# Patient Record
Sex: MALE | Race: WHITE | NOT HISPANIC OR LATINO | Employment: OTHER | URBAN - METROPOLITAN AREA
[De-identification: names, ages, dates, MRNs, and addresses within clinical notes are randomized per-mention and may not be internally consistent; named-entity substitution may affect disease eponyms.]

---

## 2017-02-21 ENCOUNTER — RECORDS - HEALTHEAST (OUTPATIENT)
Dept: LAB | Facility: CLINIC | Age: 72
End: 2017-02-21

## 2017-02-21 LAB
CHOLEST SERPL-MCNC: 146 MG/DL
FASTING STATUS PATIENT QL REPORTED: NO
HDLC SERPL-MCNC: 59 MG/DL
LDLC SERPL CALC-MCNC: 52 MG/DL
TRIGL SERPL-MCNC: 174 MG/DL

## 2017-11-14 ENCOUNTER — RECORDS - HEALTHEAST (OUTPATIENT)
Dept: LAB | Facility: CLINIC | Age: 72
End: 2017-11-14

## 2017-11-14 LAB
CHOLEST SERPL-MCNC: 144 MG/DL
FASTING STATUS PATIENT QL REPORTED: ABNORMAL
HDLC SERPL-MCNC: 48 MG/DL
LDLC SERPL CALC-MCNC: 40 MG/DL
TRIGL SERPL-MCNC: 282 MG/DL

## 2017-11-15 LAB — HCV AB SERPL QL IA: NEGATIVE

## 2018-02-05 ENCOUNTER — RECORDS - HEALTHEAST (OUTPATIENT)
Dept: ADMINISTRATIVE | Facility: OTHER | Age: 73
End: 2018-02-05

## 2018-02-06 ENCOUNTER — RECORDS - HEALTHEAST (OUTPATIENT)
Dept: VASCULAR ULTRASOUND | Facility: CLINIC | Age: 73
End: 2018-02-06

## 2018-02-06 ENCOUNTER — COMMUNICATION - HEALTHEAST (OUTPATIENT)
Dept: TELEHEALTH | Facility: CLINIC | Age: 73
End: 2018-02-06

## 2018-02-06 DIAGNOSIS — I73.9 PERIPHERAL VASCULAR DISEASE, UNSPECIFIED (H): ICD-10-CM

## 2018-04-26 ENCOUNTER — RECORDS - HEALTHEAST (OUTPATIENT)
Dept: LAB | Facility: CLINIC | Age: 73
End: 2018-04-26

## 2018-04-26 LAB
ANION GAP SERPL CALCULATED.3IONS-SCNC: 10 MMOL/L (ref 5–18)
BUN SERPL-MCNC: 29 MG/DL (ref 8–28)
CALCIUM SERPL-MCNC: 9.5 MG/DL (ref 8.5–10.5)
CHLORIDE BLD-SCNC: 103 MMOL/L (ref 98–107)
CO2 SERPL-SCNC: 25 MMOL/L (ref 22–31)
CREAT SERPL-MCNC: 1.12 MG/DL (ref 0.7–1.3)
GFR SERPL CREATININE-BSD FRML MDRD: >60 ML/MIN/1.73M2
GLUCOSE BLD-MCNC: 181 MG/DL (ref 70–125)
POTASSIUM BLD-SCNC: 4.6 MMOL/L (ref 3.5–5)
SODIUM SERPL-SCNC: 138 MMOL/L (ref 136–145)

## 2018-06-27 ENCOUNTER — RECORDS - HEALTHEAST (OUTPATIENT)
Dept: LAB | Facility: CLINIC | Age: 73
End: 2018-06-27

## 2018-06-27 LAB
ANION GAP SERPL CALCULATED.3IONS-SCNC: 8 MMOL/L (ref 5–18)
BUN SERPL-MCNC: 30 MG/DL (ref 8–28)
CALCIUM SERPL-MCNC: 9.3 MG/DL (ref 8.5–10.5)
CHLORIDE BLD-SCNC: 105 MMOL/L (ref 98–107)
CO2 SERPL-SCNC: 24 MMOL/L (ref 22–31)
CREAT SERPL-MCNC: 1.12 MG/DL (ref 0.7–1.3)
GFR SERPL CREATININE-BSD FRML MDRD: >60 ML/MIN/1.73M2
GLUCOSE BLD-MCNC: 223 MG/DL (ref 70–125)
POTASSIUM BLD-SCNC: 4.8 MMOL/L (ref 3.5–5)
SODIUM SERPL-SCNC: 137 MMOL/L (ref 136–145)

## 2018-07-26 ENCOUNTER — RECORDS - HEALTHEAST (OUTPATIENT)
Dept: LAB | Facility: CLINIC | Age: 73
End: 2018-07-26

## 2018-07-26 LAB
ANION GAP SERPL CALCULATED.3IONS-SCNC: 9 MMOL/L (ref 5–18)
BUN SERPL-MCNC: 22 MG/DL (ref 8–28)
CALCIUM SERPL-MCNC: 9.5 MG/DL (ref 8.5–10.5)
CHLORIDE BLD-SCNC: 103 MMOL/L (ref 98–107)
CO2 SERPL-SCNC: 24 MMOL/L (ref 22–31)
CREAT SERPL-MCNC: 1.17 MG/DL (ref 0.7–1.3)
GFR SERPL CREATININE-BSD FRML MDRD: >60 ML/MIN/1.73M2
GLUCOSE BLD-MCNC: 166 MG/DL (ref 70–125)
POTASSIUM BLD-SCNC: 4.8 MMOL/L (ref 3.5–5)
SODIUM SERPL-SCNC: 136 MMOL/L (ref 136–145)

## 2018-08-28 ENCOUNTER — RECORDS - HEALTHEAST (OUTPATIENT)
Dept: LAB | Facility: CLINIC | Age: 73
End: 2018-08-28

## 2018-08-28 LAB
ALBUMIN SERPL-MCNC: 4.1 G/DL (ref 3.5–5)
ALP SERPL-CCNC: 59 U/L (ref 45–120)
ALT SERPL W P-5'-P-CCNC: 74 U/L (ref 0–45)
ANION GAP SERPL CALCULATED.3IONS-SCNC: 13 MMOL/L (ref 5–18)
AST SERPL W P-5'-P-CCNC: 47 U/L (ref 0–40)
BILIRUB SERPL-MCNC: 1 MG/DL (ref 0–1)
BUN SERPL-MCNC: 38 MG/DL (ref 8–28)
CALCIUM SERPL-MCNC: 10 MG/DL (ref 8.5–10.5)
CHLORIDE BLD-SCNC: 98 MMOL/L (ref 98–107)
CO2 SERPL-SCNC: 22 MMOL/L (ref 22–31)
CREAT SERPL-MCNC: 1.46 MG/DL (ref 0.7–1.3)
GFR SERPL CREATININE-BSD FRML MDRD: 47 ML/MIN/1.73M2
GLUCOSE BLD-MCNC: 324 MG/DL (ref 70–125)
LIPASE SERPL-CCNC: 28 U/L (ref 0–52)
MAGNESIUM SERPL-MCNC: 1.8 MG/DL (ref 1.8–2.6)
POTASSIUM BLD-SCNC: 4.8 MMOL/L (ref 3.5–5)
PROT SERPL-MCNC: 7.1 G/DL (ref 6–8)
SODIUM SERPL-SCNC: 133 MMOL/L (ref 136–145)

## 2018-11-14 ENCOUNTER — RECORDS - HEALTHEAST (OUTPATIENT)
Dept: LAB | Facility: CLINIC | Age: 73
End: 2018-11-14

## 2018-11-14 LAB
ALBUMIN SERPL-MCNC: 4.1 G/DL (ref 3.5–5)
ALP SERPL-CCNC: 66 U/L (ref 45–120)
ALT SERPL W P-5'-P-CCNC: 68 U/L (ref 0–45)
ANION GAP SERPL CALCULATED.3IONS-SCNC: 11 MMOL/L (ref 5–18)
AST SERPL W P-5'-P-CCNC: 42 U/L (ref 0–40)
BILIRUB SERPL-MCNC: 1 MG/DL (ref 0–1)
BUN SERPL-MCNC: 36 MG/DL (ref 8–28)
CALCIUM SERPL-MCNC: 9.6 MG/DL (ref 8.5–10.5)
CHLORIDE BLD-SCNC: 98 MMOL/L (ref 98–107)
CO2 SERPL-SCNC: 25 MMOL/L (ref 22–31)
CREAT SERPL-MCNC: 1.57 MG/DL (ref 0.7–1.3)
GFR SERPL CREATININE-BSD FRML MDRD: 44 ML/MIN/1.73M2
GLUCOSE BLD-MCNC: 311 MG/DL (ref 70–125)
POTASSIUM BLD-SCNC: 5 MMOL/L (ref 3.5–5)
PROT SERPL-MCNC: 7.3 G/DL (ref 6–8)
SODIUM SERPL-SCNC: 134 MMOL/L (ref 136–145)

## 2019-01-15 ENCOUNTER — RECORDS - HEALTHEAST (OUTPATIENT)
Dept: LAB | Facility: CLINIC | Age: 74
End: 2019-01-15

## 2019-01-15 LAB
ALBUMIN SERPL-MCNC: 3.9 G/DL (ref 3.5–5)
ALP SERPL-CCNC: 54 U/L (ref 45–120)
ALT SERPL W P-5'-P-CCNC: 93 U/L (ref 0–45)
ANION GAP SERPL CALCULATED.3IONS-SCNC: 12 MMOL/L (ref 5–18)
AST SERPL W P-5'-P-CCNC: 50 U/L (ref 0–40)
BASOPHILS # BLD AUTO: 0.1 THOU/UL (ref 0–0.2)
BASOPHILS NFR BLD AUTO: 1 % (ref 0–2)
BILIRUB SERPL-MCNC: 0.8 MG/DL (ref 0–1)
BUN SERPL-MCNC: 28 MG/DL (ref 8–28)
CALCIUM SERPL-MCNC: 9 MG/DL (ref 8.5–10.5)
CHLORIDE BLD-SCNC: 99 MMOL/L (ref 98–107)
CHOLEST SERPL-MCNC: 145 MG/DL
CO2 SERPL-SCNC: 23 MMOL/L (ref 22–31)
CREAT SERPL-MCNC: 1.26 MG/DL (ref 0.7–1.3)
CREAT UR-MCNC: 58.7 MG/DL
EOSINOPHIL # BLD AUTO: 0.1 THOU/UL (ref 0–0.4)
EOSINOPHIL NFR BLD AUTO: 2 % (ref 0–6)
ERYTHROCYTE [DISTWIDTH] IN BLOOD BY AUTOMATED COUNT: 12.9 % (ref 11–14.5)
FASTING STATUS PATIENT QL REPORTED: NO
GFR SERPL CREATININE-BSD FRML MDRD: 56 ML/MIN/1.73M2
GLUCOSE BLD-MCNC: 364 MG/DL (ref 70–125)
HCT VFR BLD AUTO: 38.9 % (ref 40–54)
HDLC SERPL-MCNC: 67 MG/DL
HGB BLD-MCNC: 13.5 G/DL (ref 14–18)
LDLC SERPL CALC-MCNC: 44 MG/DL
LYMPHOCYTES # BLD AUTO: 1 THOU/UL (ref 0.8–4.4)
LYMPHOCYTES NFR BLD AUTO: 21 % (ref 20–40)
MCH RBC QN AUTO: 33.8 PG (ref 27–34)
MCHC RBC AUTO-ENTMCNC: 34.7 G/DL (ref 32–36)
MCV RBC AUTO: 97 FL (ref 80–100)
MICROALBUMIN UR-MCNC: 6.38 MG/DL (ref 0–1.99)
MICROALBUMIN/CREAT UR: 108.7 MG/G
MONOCYTES # BLD AUTO: 0.4 THOU/UL (ref 0–0.9)
MONOCYTES NFR BLD AUTO: 9 % (ref 2–10)
NEUTROPHILS # BLD AUTO: 2.9 THOU/UL (ref 2–7.7)
NEUTROPHILS NFR BLD AUTO: 66 % (ref 50–70)
PLATELET # BLD AUTO: 99 THOU/UL (ref 140–440)
PMV BLD AUTO: 12.2 FL (ref 8.5–12.5)
POTASSIUM BLD-SCNC: 4.7 MMOL/L (ref 3.5–5)
PROT SERPL-MCNC: 7.1 G/DL (ref 6–8)
RBC # BLD AUTO: 4 MILL/UL (ref 4.4–6.2)
SODIUM SERPL-SCNC: 134 MMOL/L (ref 136–145)
TRIGL SERPL-MCNC: 171 MG/DL
WBC: 4.5 THOU/UL (ref 4–11)

## 2019-08-05 ENCOUNTER — RECORDS - HEALTHEAST (OUTPATIENT)
Dept: LAB | Facility: CLINIC | Age: 74
End: 2019-08-05

## 2019-08-05 LAB
ALBUMIN SERPL-MCNC: 3.9 G/DL (ref 3.5–5)
ALP SERPL-CCNC: 100 U/L (ref 45–120)
ALT SERPL W P-5'-P-CCNC: 72 U/L (ref 0–45)
ANION GAP SERPL CALCULATED.3IONS-SCNC: 10 MMOL/L (ref 5–18)
AST SERPL W P-5'-P-CCNC: 45 U/L (ref 0–40)
BILIRUB SERPL-MCNC: 0.6 MG/DL (ref 0–1)
BUN SERPL-MCNC: 28 MG/DL (ref 8–28)
CALCIUM SERPL-MCNC: 9.4 MG/DL (ref 8.5–10.5)
CHLORIDE BLD-SCNC: 98 MMOL/L (ref 98–107)
CO2 SERPL-SCNC: 27 MMOL/L (ref 22–31)
CREAT SERPL-MCNC: 1.43 MG/DL (ref 0.7–1.3)
GFR SERPL CREATININE-BSD FRML MDRD: 48 ML/MIN/1.73M2
GLUCOSE BLD-MCNC: 418 MG/DL (ref 70–125)
POTASSIUM BLD-SCNC: 5.3 MMOL/L (ref 3.5–5)
PROT SERPL-MCNC: 7.2 G/DL (ref 6–8)
SODIUM SERPL-SCNC: 135 MMOL/L (ref 136–145)

## 2019-11-06 ENCOUNTER — RECORDS - HEALTHEAST (OUTPATIENT)
Dept: LAB | Facility: CLINIC | Age: 74
End: 2019-11-06

## 2019-11-06 LAB
ANION GAP SERPL CALCULATED.3IONS-SCNC: 11 MMOL/L (ref 5–18)
BUN SERPL-MCNC: 27 MG/DL (ref 8–28)
CALCIUM SERPL-MCNC: 9.1 MG/DL (ref 8.5–10.5)
CHLORIDE BLD-SCNC: 98 MMOL/L (ref 98–107)
CO2 SERPL-SCNC: 26 MMOL/L (ref 22–31)
CREAT SERPL-MCNC: 1.42 MG/DL (ref 0.7–1.3)
GFR SERPL CREATININE-BSD FRML MDRD: 49 ML/MIN/1.73M2
GLUCOSE BLD-MCNC: 368 MG/DL (ref 70–125)
POTASSIUM BLD-SCNC: 4.7 MMOL/L (ref 3.5–5)
SODIUM SERPL-SCNC: 135 MMOL/L (ref 136–145)

## 2020-02-10 ENCOUNTER — RECORDS - HEALTHEAST (OUTPATIENT)
Dept: LAB | Facility: CLINIC | Age: 75
End: 2020-02-10

## 2020-02-10 LAB
ANION GAP SERPL CALCULATED.3IONS-SCNC: 13 MMOL/L (ref 5–18)
BUN SERPL-MCNC: 31 MG/DL (ref 8–28)
CALCIUM SERPL-MCNC: 9.9 MG/DL (ref 8.5–10.5)
CHLORIDE BLD-SCNC: 102 MMOL/L (ref 98–107)
CHOLEST SERPL-MCNC: 152 MG/DL
CO2 SERPL-SCNC: 25 MMOL/L (ref 22–31)
CREAT SERPL-MCNC: 1.21 MG/DL (ref 0.7–1.3)
CREAT UR-MCNC: 52.7 MG/DL
FASTING STATUS PATIENT QL REPORTED: NO
GFR SERPL CREATININE-BSD FRML MDRD: 59 ML/MIN/1.73M2
GLUCOSE BLD-MCNC: 175 MG/DL (ref 70–125)
HDLC SERPL-MCNC: 63 MG/DL
LDLC SERPL CALC-MCNC: 65 MG/DL
MICROALBUMIN UR-MCNC: 7.22 MG/DL (ref 0–1.99)
MICROALBUMIN/CREAT UR: 137 MG/G
POTASSIUM BLD-SCNC: 4.8 MMOL/L (ref 3.5–5)
SODIUM SERPL-SCNC: 140 MMOL/L (ref 136–145)
TRIGL SERPL-MCNC: 119 MG/DL

## 2020-03-19 ENCOUNTER — RECORDS - HEALTHEAST (OUTPATIENT)
Dept: LAB | Facility: CLINIC | Age: 75
End: 2020-03-19

## 2020-03-19 LAB
ALBUMIN SERPL-MCNC: 4.2 G/DL (ref 3.5–5)
ALP SERPL-CCNC: 90 U/L (ref 45–120)
ALT SERPL W P-5'-P-CCNC: 83 U/L (ref 0–45)
ANION GAP SERPL CALCULATED.3IONS-SCNC: 11 MMOL/L (ref 5–18)
AST SERPL W P-5'-P-CCNC: 57 U/L (ref 0–40)
BILIRUB SERPL-MCNC: 0.9 MG/DL (ref 0–1)
BUN SERPL-MCNC: 23 MG/DL (ref 8–28)
CALCIUM SERPL-MCNC: 9.7 MG/DL (ref 8.5–10.5)
CHLORIDE BLD-SCNC: 99 MMOL/L (ref 98–107)
CO2 SERPL-SCNC: 26 MMOL/L (ref 22–31)
CREAT SERPL-MCNC: 1.2 MG/DL (ref 0.7–1.3)
GFR SERPL CREATININE-BSD FRML MDRD: 59 ML/MIN/1.73M2
GLUCOSE BLD-MCNC: 137 MG/DL (ref 70–125)
LIPASE SERPL-CCNC: 58 U/L (ref 0–52)
POTASSIUM BLD-SCNC: 4.4 MMOL/L (ref 3.5–5)
PROT SERPL-MCNC: 7.9 G/DL (ref 6–8)
SODIUM SERPL-SCNC: 136 MMOL/L (ref 136–145)

## 2020-05-05 ENCOUNTER — RECORDS - HEALTHEAST (OUTPATIENT)
Dept: LAB | Facility: CLINIC | Age: 75
End: 2020-05-05

## 2020-05-05 LAB
ALBUMIN SERPL-MCNC: 4.1 G/DL (ref 3.5–5)
ALP SERPL-CCNC: 93 U/L (ref 45–120)
ALT SERPL W P-5'-P-CCNC: 77 U/L (ref 0–45)
ANION GAP SERPL CALCULATED.3IONS-SCNC: 11 MMOL/L (ref 5–18)
AST SERPL W P-5'-P-CCNC: 37 U/L (ref 0–40)
BILIRUB SERPL-MCNC: 0.7 MG/DL (ref 0–1)
BUN SERPL-MCNC: 32 MG/DL (ref 8–28)
CALCIUM SERPL-MCNC: 9.3 MG/DL (ref 8.5–10.5)
CHLORIDE BLD-SCNC: 101 MMOL/L (ref 98–107)
CO2 SERPL-SCNC: 23 MMOL/L (ref 22–31)
CREAT SERPL-MCNC: 1.37 MG/DL (ref 0.7–1.3)
GFR SERPL CREATININE-BSD FRML MDRD: 51 ML/MIN/1.73M2
GLUCOSE BLD-MCNC: 222 MG/DL (ref 70–125)
LIPASE SERPL-CCNC: 45 U/L (ref 0–52)
POTASSIUM BLD-SCNC: 4.6 MMOL/L (ref 3.5–5)
PROT SERPL-MCNC: 7.7 G/DL (ref 6–8)
SODIUM SERPL-SCNC: 135 MMOL/L (ref 136–145)

## 2020-07-06 ENCOUNTER — RECORDS - HEALTHEAST (OUTPATIENT)
Dept: LAB | Facility: CLINIC | Age: 75
End: 2020-07-06

## 2020-07-06 LAB
ALBUMIN SERPL-MCNC: 4.2 G/DL (ref 3.5–5)
ALP SERPL-CCNC: 75 U/L (ref 45–120)
ALT SERPL W P-5'-P-CCNC: 62 U/L (ref 0–45)
ANION GAP SERPL CALCULATED.3IONS-SCNC: 14 MMOL/L (ref 5–18)
AST SERPL W P-5'-P-CCNC: 36 U/L (ref 0–40)
BILIRUB SERPL-MCNC: 0.6 MG/DL (ref 0–1)
BUN SERPL-MCNC: 30 MG/DL (ref 8–28)
CALCIUM SERPL-MCNC: 9.6 MG/DL (ref 8.5–10.5)
CHLORIDE BLD-SCNC: 103 MMOL/L (ref 98–107)
CO2 SERPL-SCNC: 22 MMOL/L (ref 22–31)
CREAT SERPL-MCNC: 1.27 MG/DL (ref 0.7–1.3)
ERYTHROCYTE [SEDIMENTATION RATE] IN BLOOD BY WESTERGREN METHOD: 25 MM/HR (ref 0–15)
GFR SERPL CREATININE-BSD FRML MDRD: 55 ML/MIN/1.73M2
GLUCOSE BLD-MCNC: 102 MG/DL (ref 70–125)
POTASSIUM BLD-SCNC: 4.7 MMOL/L (ref 3.5–5)
PROT SERPL-MCNC: 7.6 G/DL (ref 6–8)
SODIUM SERPL-SCNC: 139 MMOL/L (ref 136–145)

## 2020-07-13 ENCOUNTER — RECORDS - HEALTHEAST (OUTPATIENT)
Dept: LAB | Facility: CLINIC | Age: 75
End: 2020-07-13

## 2020-07-13 LAB — ERYTHROCYTE [SEDIMENTATION RATE] IN BLOOD BY WESTERGREN METHOD: 18 MM/HR (ref 0–15)

## 2021-05-27 ENCOUNTER — RECORDS - HEALTHEAST (OUTPATIENT)
Dept: ADMINISTRATIVE | Facility: CLINIC | Age: 76
End: 2021-05-27

## 2021-05-29 ENCOUNTER — RECORDS - HEALTHEAST (OUTPATIENT)
Dept: ADMINISTRATIVE | Facility: CLINIC | Age: 76
End: 2021-05-29

## 2021-05-30 ENCOUNTER — RECORDS - HEALTHEAST (OUTPATIENT)
Dept: ADMINISTRATIVE | Facility: CLINIC | Age: 76
End: 2021-05-30

## 2021-05-31 ENCOUNTER — RECORDS - HEALTHEAST (OUTPATIENT)
Dept: ADMINISTRATIVE | Facility: CLINIC | Age: 76
End: 2021-05-31

## 2021-06-02 ENCOUNTER — RECORDS - HEALTHEAST (OUTPATIENT)
Dept: ADMINISTRATIVE | Facility: CLINIC | Age: 76
End: 2021-06-02

## 2024-10-07 ENCOUNTER — APPOINTMENT (OUTPATIENT)
Dept: RADIOLOGY | Facility: HOSPITAL | Age: 79
DRG: 638 | End: 2024-10-07
Attending: STUDENT IN AN ORGANIZED HEALTH CARE EDUCATION/TRAINING PROGRAM
Payer: COMMERCIAL

## 2024-10-07 ENCOUNTER — HOSPITAL ENCOUNTER (INPATIENT)
Facility: HOSPITAL | Age: 79
LOS: 2 days | Discharge: HOME | DRG: 638 | End: 2024-10-09
Attending: STUDENT IN AN ORGANIZED HEALTH CARE EDUCATION/TRAINING PROGRAM | Admitting: FAMILY MEDICINE
Payer: COMMERCIAL

## 2024-10-07 DIAGNOSIS — E11.628 DIABETIC FOOT INFECTION (H): ICD-10-CM

## 2024-10-07 DIAGNOSIS — L08.9 DIABETIC FOOT INFECTION (H): ICD-10-CM

## 2024-10-07 PROBLEM — E11.9 DIABETES MELLITUS (H): Status: ACTIVE | Noted: 2024-10-07

## 2024-10-07 LAB
ALBUMIN SERPL BCG-MCNC: 3.9 G/DL (ref 3.5–5.2)
ALP SERPL-CCNC: 89 U/L (ref 40–150)
ALT SERPL W P-5'-P-CCNC: 42 U/L (ref 0–70)
ANION GAP SERPL CALCULATED.3IONS-SCNC: 11 MMOL/L (ref 7–15)
AST SERPL W P-5'-P-CCNC: 23 U/L (ref 0–45)
BASOPHILS # BLD AUTO: 0.1 10E3/UL (ref 0–0.2)
BASOPHILS NFR BLD AUTO: 1 %
BILIRUB SERPL-MCNC: 0.5 MG/DL
BUN SERPL-MCNC: 33.4 MG/DL (ref 8–23)
CALCIUM SERPL-MCNC: 9.3 MG/DL (ref 8.8–10.4)
CHLORIDE SERPL-SCNC: 101 MMOL/L (ref 98–107)
CREAT SERPL-MCNC: 1.19 MG/DL (ref 0.67–1.17)
CRP SERPL-MCNC: 7.3 MG/L
EGFRCR SERPLBLD CKD-EPI 2021: 63 ML/MIN/1.73M2
EOSINOPHIL # BLD AUTO: 0.1 10E3/UL (ref 0–0.7)
EOSINOPHIL NFR BLD AUTO: 1 %
ERYTHROCYTE [DISTWIDTH] IN BLOOD BY AUTOMATED COUNT: 14.9 % (ref 10–15)
ERYTHROCYTE [SEDIMENTATION RATE] IN BLOOD BY WESTERGREN METHOD: 57 MM/HR (ref 0–20)
GLUCOSE BLDC GLUCOMTR-MCNC: 184 MG/DL (ref 70–99)
GLUCOSE BLDC GLUCOMTR-MCNC: 254 MG/DL (ref 70–99)
GLUCOSE SERPL-MCNC: 171 MG/DL (ref 70–99)
HCO3 SERPL-SCNC: 24 MMOL/L (ref 22–29)
HCT VFR BLD AUTO: 36.5 % (ref 40–53)
HGB BLD-MCNC: 11.8 G/DL (ref 13.3–17.7)
IMM GRANULOCYTES # BLD: 0.2 10E3/UL
IMM GRANULOCYTES NFR BLD: 3 %
LYMPHOCYTES # BLD AUTO: 1.4 10E3/UL (ref 0.8–5.3)
LYMPHOCYTES NFR BLD AUTO: 15 %
MAGNESIUM SERPL-MCNC: 2.3 MG/DL (ref 1.7–2.3)
MCH RBC QN AUTO: 28.9 PG (ref 26.5–33)
MCHC RBC AUTO-ENTMCNC: 32.3 G/DL (ref 31.5–36.5)
MCV RBC AUTO: 89 FL (ref 78–100)
MONOCYTES # BLD AUTO: 0.8 10E3/UL (ref 0–1.3)
MONOCYTES NFR BLD AUTO: 8 %
NEUTROPHILS # BLD AUTO: 7 10E3/UL (ref 1.6–8.3)
NEUTROPHILS NFR BLD AUTO: 73 %
NRBC # BLD AUTO: 0 10E3/UL
NRBC BLD AUTO-RTO: 0 /100
PLATELET # BLD AUTO: 143 10E3/UL (ref 150–450)
POTASSIUM SERPL-SCNC: 5.1 MMOL/L (ref 3.4–5.3)
PROT SERPL-MCNC: 7.9 G/DL (ref 6.4–8.3)
RBC # BLD AUTO: 4.09 10E6/UL (ref 4.4–5.9)
SODIUM SERPL-SCNC: 136 MMOL/L (ref 135–145)
WBC # BLD AUTO: 9.6 10E3/UL (ref 4–11)

## 2024-10-07 PROCEDURE — 250N000012 HC RX MED GY IP 250 OP 636 PS 637

## 2024-10-07 PROCEDURE — 36415 COLL VENOUS BLD VENIPUNCTURE: CPT | Performed by: STUDENT IN AN ORGANIZED HEALTH CARE EDUCATION/TRAINING PROGRAM

## 2024-10-07 PROCEDURE — 250N000011 HC RX IP 250 OP 636: Performed by: STUDENT IN AN ORGANIZED HEALTH CARE EDUCATION/TRAINING PROGRAM

## 2024-10-07 PROCEDURE — 99222 1ST HOSP IP/OBS MODERATE 55: CPT | Mod: AI

## 2024-10-07 PROCEDURE — 80053 COMPREHEN METABOLIC PANEL: CPT | Performed by: STUDENT IN AN ORGANIZED HEALTH CARE EDUCATION/TRAINING PROGRAM

## 2024-10-07 PROCEDURE — 96365 THER/PROPH/DIAG IV INF INIT: CPT

## 2024-10-07 PROCEDURE — 99285 EMERGENCY DEPT VISIT HI MDM: CPT | Mod: 25

## 2024-10-07 PROCEDURE — 85652 RBC SED RATE AUTOMATED: CPT | Performed by: STUDENT IN AN ORGANIZED HEALTH CARE EDUCATION/TRAINING PROGRAM

## 2024-10-07 PROCEDURE — 86140 C-REACTIVE PROTEIN: CPT | Performed by: STUDENT IN AN ORGANIZED HEALTH CARE EDUCATION/TRAINING PROGRAM

## 2024-10-07 PROCEDURE — 999N000157 HC STATISTIC RCP TIME EA 10 MIN

## 2024-10-07 PROCEDURE — 120N000001 HC R&B MED SURG/OB

## 2024-10-07 PROCEDURE — 73630 X-RAY EXAM OF FOOT: CPT | Mod: RT

## 2024-10-07 PROCEDURE — 85025 COMPLETE CBC W/AUTO DIFF WBC: CPT | Performed by: STUDENT IN AN ORGANIZED HEALTH CARE EDUCATION/TRAINING PROGRAM

## 2024-10-07 PROCEDURE — 87040 BLOOD CULTURE FOR BACTERIA: CPT | Performed by: STUDENT IN AN ORGANIZED HEALTH CARE EDUCATION/TRAINING PROGRAM

## 2024-10-07 PROCEDURE — 250N000013 HC RX MED GY IP 250 OP 250 PS 637

## 2024-10-07 PROCEDURE — 83735 ASSAY OF MAGNESIUM: CPT | Performed by: INTERNAL MEDICINE

## 2024-10-07 RX ORDER — ASPIRIN 81 MG/1
81 TABLET ORAL DAILY
Status: DISCONTINUED | OUTPATIENT
Start: 2024-10-08 | End: 2024-10-09 | Stop reason: HOSPADM

## 2024-10-07 RX ORDER — ONDANSETRON 2 MG/ML
4 INJECTION INTRAMUSCULAR; INTRAVENOUS EVERY 6 HOURS PRN
Status: DISCONTINUED | OUTPATIENT
Start: 2024-10-07 | End: 2024-10-09 | Stop reason: HOSPADM

## 2024-10-07 RX ORDER — ACETAMINOPHEN 325 MG/1
650 TABLET ORAL EVERY 4 HOURS PRN
Status: DISCONTINUED | OUTPATIENT
Start: 2024-10-07 | End: 2024-10-09 | Stop reason: HOSPADM

## 2024-10-07 RX ORDER — PIPERACILLIN SODIUM, TAZOBACTAM SODIUM 3; .375 G/15ML; G/15ML
3.38 INJECTION, POWDER, LYOPHILIZED, FOR SOLUTION INTRAVENOUS EVERY 8 HOURS
Status: DISCONTINUED | OUTPATIENT
Start: 2024-10-07 | End: 2024-10-09

## 2024-10-07 RX ORDER — ONDANSETRON 4 MG/1
4 TABLET, ORALLY DISINTEGRATING ORAL EVERY 6 HOURS PRN
Status: DISCONTINUED | OUTPATIENT
Start: 2024-10-07 | End: 2024-10-09 | Stop reason: HOSPADM

## 2024-10-07 RX ORDER — MULTIVITAMIN WITH IRON
1 TABLET ORAL DAILY
COMMUNITY

## 2024-10-07 RX ORDER — LEVOTHYROXINE SODIUM 25 UG/1
50 TABLET ORAL DAILY
Status: DISCONTINUED | OUTPATIENT
Start: 2024-10-08 | End: 2024-10-09 | Stop reason: HOSPADM

## 2024-10-07 RX ORDER — CLOPIDOGREL BISULFATE 75 MG/1
75 TABLET ORAL DAILY
Status: DISCONTINUED | OUTPATIENT
Start: 2024-10-08 | End: 2024-10-09 | Stop reason: HOSPADM

## 2024-10-07 RX ORDER — PROCHLORPERAZINE 25 MG
12.5 SUPPOSITORY, RECTAL RECTAL EVERY 12 HOURS PRN
Status: DISCONTINUED | OUTPATIENT
Start: 2024-10-07 | End: 2024-10-09 | Stop reason: HOSPADM

## 2024-10-07 RX ORDER — AMOXICILLIN 250 MG
2 CAPSULE ORAL 2 TIMES DAILY PRN
Status: DISCONTINUED | OUTPATIENT
Start: 2024-10-07 | End: 2024-10-09 | Stop reason: HOSPADM

## 2024-10-07 RX ORDER — MULTIVIT WITH MINERALS/LUTEIN
1000 TABLET ORAL DAILY
Status: ON HOLD | COMMUNITY
End: 2024-10-24

## 2024-10-07 RX ORDER — PROCHLORPERAZINE MALEATE 5 MG/1
5 TABLET ORAL EVERY 6 HOURS PRN
Status: DISCONTINUED | OUTPATIENT
Start: 2024-10-07 | End: 2024-10-09 | Stop reason: HOSPADM

## 2024-10-07 RX ORDER — ACETAMINOPHEN 325 MG/1
650 TABLET ORAL ONCE
Status: COMPLETED | OUTPATIENT
Start: 2024-10-07 | End: 2024-10-07

## 2024-10-07 RX ORDER — METFORMIN HYDROCHLORIDE 500 MG/1
2000 TABLET, EXTENDED RELEASE ORAL
COMMUNITY

## 2024-10-07 RX ORDER — AMOXICILLIN 250 MG
1 CAPSULE ORAL 2 TIMES DAILY PRN
Status: DISCONTINUED | OUTPATIENT
Start: 2024-10-07 | End: 2024-10-09 | Stop reason: HOSPADM

## 2024-10-07 RX ORDER — IRBESARTAN 300 MG/1
300 TABLET ORAL AT BEDTIME
Status: ON HOLD | COMMUNITY
End: 2024-11-01

## 2024-10-07 RX ORDER — SPIRONOLACTONE 25 MG/1
25 TABLET ORAL DAILY
COMMUNITY

## 2024-10-07 RX ORDER — ASPIRIN 81 MG/1
81 TABLET ORAL DAILY
COMMUNITY

## 2024-10-07 RX ORDER — AMLODIPINE BESYLATE 5 MG/1
5 TABLET ORAL DAILY
Status: ON HOLD | COMMUNITY
End: 2024-11-01

## 2024-10-07 RX ORDER — SPIRONOLACTONE 25 MG/1
25 TABLET ORAL DAILY
Status: DISCONTINUED | OUTPATIENT
Start: 2024-10-08 | End: 2024-10-09 | Stop reason: HOSPADM

## 2024-10-07 RX ORDER — UBIDECARENONE 100 MG
200 CAPSULE ORAL DAILY
COMMUNITY

## 2024-10-07 RX ORDER — ROSUVASTATIN CALCIUM 40 MG/1
40 TABLET, COATED ORAL DAILY
COMMUNITY
End: 2024-11-05

## 2024-10-07 RX ORDER — ROSUVASTATIN CALCIUM 40 MG/1
40 TABLET, COATED ORAL DAILY
Status: DISCONTINUED | OUTPATIENT
Start: 2024-10-08 | End: 2024-10-09 | Stop reason: HOSPADM

## 2024-10-07 RX ORDER — MULTIVITAMIN,THERAPEUTIC
1 TABLET ORAL DAILY
COMMUNITY
End: 2024-11-05

## 2024-10-07 RX ORDER — LACTOBACILLUS RHAMNOSUS GG 10B CELL
1 CAPSULE ORAL DAILY
COMMUNITY

## 2024-10-07 RX ORDER — CLOPIDOGREL BISULFATE 75 MG/1
75 TABLET ORAL DAILY
COMMUNITY

## 2024-10-07 RX ORDER — CALCIUM CARBONATE 500 MG/1
1000 TABLET, CHEWABLE ORAL 4 TIMES DAILY PRN
Status: DISCONTINUED | OUTPATIENT
Start: 2024-10-07 | End: 2024-10-09 | Stop reason: HOSPADM

## 2024-10-07 RX ORDER — DEXTROSE MONOHYDRATE 25 G/50ML
25-50 INJECTION, SOLUTION INTRAVENOUS
Status: DISCONTINUED | OUTPATIENT
Start: 2024-10-07 | End: 2024-10-09 | Stop reason: HOSPADM

## 2024-10-07 RX ORDER — NICOTINE POLACRILEX 4 MG
15-30 LOZENGE BUCCAL
Status: DISCONTINUED | OUTPATIENT
Start: 2024-10-07 | End: 2024-10-09 | Stop reason: HOSPADM

## 2024-10-07 RX ORDER — PIPERACILLIN SODIUM, TAZOBACTAM SODIUM 3; .375 G/15ML; G/15ML
3.38 INJECTION, POWDER, LYOPHILIZED, FOR SOLUTION INTRAVENOUS ONCE
Status: COMPLETED | OUTPATIENT
Start: 2024-10-07 | End: 2024-10-07

## 2024-10-07 RX ORDER — LEVOTHYROXINE SODIUM 50 UG/1
50 TABLET ORAL DAILY
COMMUNITY

## 2024-10-07 RX ORDER — AMLODIPINE BESYLATE 5 MG/1
5 TABLET ORAL DAILY
Status: DISCONTINUED | OUTPATIENT
Start: 2024-10-08 | End: 2024-10-09 | Stop reason: HOSPADM

## 2024-10-07 RX ORDER — ACETAMINOPHEN 650 MG/1
650 SUPPOSITORY RECTAL EVERY 4 HOURS PRN
Status: DISCONTINUED | OUTPATIENT
Start: 2024-10-07 | End: 2024-10-09 | Stop reason: HOSPADM

## 2024-10-07 RX ORDER — IRBESARTAN 300 MG/1
300 TABLET ORAL AT BEDTIME
Status: DISCONTINUED | OUTPATIENT
Start: 2024-10-07 | End: 2024-10-09 | Stop reason: HOSPADM

## 2024-10-07 RX ORDER — LIDOCAINE 40 MG/G
CREAM TOPICAL
Status: DISCONTINUED | OUTPATIENT
Start: 2024-10-07 | End: 2024-10-09 | Stop reason: HOSPADM

## 2024-10-07 RX ADMIN — ACETAMINOPHEN 650 MG: 325 TABLET ORAL at 14:38

## 2024-10-07 RX ADMIN — INSULIN GLARGINE 15 UNITS: 100 INJECTION, SOLUTION SUBCUTANEOUS at 21:14

## 2024-10-07 RX ADMIN — PIPERACILLIN AND TAZOBACTAM 3.38 G: 3; .375 INJECTION, POWDER, FOR SOLUTION INTRAVENOUS at 11:12

## 2024-10-07 RX ADMIN — IRBESARTAN 300 MG: 300 TABLET ORAL at 21:13

## 2024-10-07 ASSESSMENT — ACTIVITIES OF DAILY LIVING (ADL)
ADLS_ACUITY_SCORE: 40
ADLS_ACUITY_SCORE: 40
ADLS_ACUITY_SCORE: 35
ADLS_ACUITY_SCORE: 35
ADLS_ACUITY_SCORE: 40
ADLS_ACUITY_SCORE: 40
DEPENDENT_IADLS:: SHOPPING;TRANSPORTATION
ADLS_ACUITY_SCORE: 40
ADLS_ACUITY_SCORE: 35
ADLS_ACUITY_SCORE: 33
ADLS_ACUITY_SCORE: 35
ADLS_ACUITY_SCORE: 40
ADLS_ACUITY_SCORE: 40
ADLS_ACUITY_SCORE: 35
ADLS_ACUITY_SCORE: 35

## 2024-10-07 ASSESSMENT — COLUMBIA-SUICIDE SEVERITY RATING SCALE - C-SSRS
1. IN THE PAST MONTH, HAVE YOU WISHED YOU WERE DEAD OR WISHED YOU COULD GO TO SLEEP AND NOT WAKE UP?: NO
6. HAVE YOU EVER DONE ANYTHING, STARTED TO DO ANYTHING, OR PREPARED TO DO ANYTHING TO END YOUR LIFE?: NO
2. HAVE YOU ACTUALLY HAD ANY THOUGHTS OF KILLING YOURSELF IN THE PAST MONTH?: NO

## 2024-10-07 NOTE — CONSULTS
"Care Management Initial Consult    General Information  Assessment completed with: Patient,    Type of CM/SW Visit: Initial Assessment    Primary Care Provider verified and updated as needed: Yes   Readmission within the last 30 days: no previous admission in last 30 days      Reason for Consult: discharge planning  Advance Care Planning: Advance Care Planning Reviewed: other (see comments) (\"have a HCD at home\", informed pt if he brings in we can get scanned into chart)          Communication Assessment  Patient's communication style: spoken language (English or Bilingual)             Cognitive  Cognitive/Neuro/Behavioral:                        Living Environment:   People in home: alone     Current living Arrangements: apartment, independent living facility      Able to return to prior arrangements: other (see comments) (to be determined)       Family/Social Support:  Care provided by: self  Provides care for: no one  Marital Status:   Support system: Children          Description of Support System: Supportive, Involved    Support Assessment: Adequate family and caregiver support, Patient communicates needs well met    Current Resources:   Patient receiving home care services: No (was due to start home PT this week)        Community Resources: None  Equipment currently used at home: cane, straight, other (see comments) (electric scooter, CPAP)  Supplies currently used at home: None    Employment/Financial:  Employment Status: retired, , previous service     Employment/ Comments: [facility]: [id number]  Financial Concerns: none   Referral to Financial Worker: No       Does the patient's insurance plan have a 3 day qualifying hospital stay waiver?  No    Lifestyle & Psychosocial Needs:  Social Determinants of Health     Food Insecurity: Not on file   Depression: Not on file   Housing Stability: Not on file   Tobacco Use: Not on file (11/12/2016)   Financial Resource Strain: Not " on file   Alcohol Use: Not on file   Transportation Needs: Not on file   Physical Activity: Not on file   Interpersonal Safety: Not on file   Stress: Not on file   Social Connections: Not on file   Health Literacy: Not on file       Functional Status:  Prior to admission patient needed assistance:   Dependent ADLs:: Ambulation-cane, Independent  Dependent IADLs:: Shopping, Transportation       Mental Health Status:  Mental Health Status: No Current Concerns       Chemical Dependency Status:  Chemical Dependency Status: No Current Concerns             Values/Beliefs:  Spiritual, Cultural Beliefs, [employer]ism Practices, Values that affect care: no               Discussed  Partnership in Safe Discharge Planning  document with patient/family: No    Additional Information:  CM consult received for discharge planning.  Met with patient at bedside to introduce CM role and perform initial assessment.  Janice lives in an apartment at [facility].  He ambulates short distances with a cane.  Uses an electric scooter for longer distances and outside the apartment building.  He is independent with ADLs and IADLs except shopping and transportation which sons help with.  Patient reports he was due to start home care PT this week through his facility.  Will likely need assistance for wound care at discharge as he says he cannot care for wound himself.  PT/OT recommendations pending.  Sons can transport at discharge.    Next Steps: CM to follow for medical progression of care, discharge recommendations, and final discharge plan.         Riley Rosales CRNI

## 2024-10-07 NOTE — PHARMACY-VANCOMYCIN DOSING SERVICE
"Pharmacy Vancomycin Initial Note  Date of Service 2024  Patient's  1945  79 year old, male    Indication: Skin and Soft Tissue Infection    Current estimated CrCl = Estimated Creatinine Clearance: 59.3 mL/min (A) (based on SCr of 1.19 mg/dL (H)).    Creatinine for last 3 days  10/7/2024: 11:04 AM Creatinine 1.19 mg/dL    Recent Vancomycin Level(s) for last 3 days  No results found for requested labs within last 3 days.      Vancomycin IV Administrations (past 72 hours)        No vancomycin orders with administrations in past 72 hours.                    Nephrotoxins and other renal medications (From now, onward)      Start     Dose/Rate Route Frequency Ordered Stop    10/08/24 0900  vancomycin (VANCOCIN) 1,500 mg in sodium chloride 0.9 % 290 mL intermittent infusion         1,500 mg  over 90 Minutes Intravenous EVERY 24 HOURS 10/07/24 1538      10/08/24 0800  empagliflozin (JARDIANCE) tablet 10 mg        Note to Pharmacy: PTA Sig:Take 10 mg by mouth daily.      10 mg Oral DAILY 10/07/24 1439      10/07/24 1700  piperacillin-tazobactam (ZOSYN) 3.375 g vial to attach to  mL bag        Note to Pharmacy: For [facility], [facility] and [facility]: For Zosyn-naive patients, use the \"Zosyn initial dose + extended infusion\" order panel.    3.375 g  over 240 Minutes Intravenous EVERY 8 HOURS 10/07/24 1530      10/07/24 1600  vancomycin (VANCOCIN) 1,750 mg in 0.9% NaCl 517.5 mL intermittent infusion         1,750 mg  over 2 Hours Intravenous ONCE 10/07/24 1533              Contrast Orders - past 72 hours (72h ago, onward)      None            Insight[employer] Prediction of Planned Initial Vancomycin Regimen  Loading dose: 1750 mg at 16:00 10/07/2024.  Regimen: 1500 mg IV every 24 hours.  Start time: 09:00 on 10/08/2024  Exposure target: AUC24 (range)400-600 mg/L.hr   AUC24,ss: 481 mg/L.hr  Probability of AUC24 > 400: 70 %  Ctrough,ss: 14.4 mg/L  Probability of Ctrough,ss > 20: 23 %  Probability of nephrotoxicity (Lodise DOT " 2009): 10 %        Plan:  Load vancomycin 1750 mg IV x1. Start vancomycin 1500 mg IV q24h.   Vancomycin monitoring method: AUC  Vancomycin therapeutic monitoring goal: 400-600 mg*h/L  Pharmacy will check vancomycin levels as appropriate in 1-3 Days.    Serum creatinine levels will be ordered daily for the first week of therapy and at least twice weekly for subsequent weeks.      Cindy Soriano, PharmD

## 2024-10-07 NOTE — PHARMACY-ADMISSION MEDICATION HISTORY
Pharmacist Admission Medication History    Admission medication history is complete. The information provided in this note is only as accurate as the sources available at the time of the update.    Information Source(s): Patient and CareEverywhere/SureScripts via in-person    Pertinent Information: [facility] so [facility] meds were not available in SureScripts. He did give me a nice list of his meds that I was able to use to update our list. He was unable to give me exact details of his mealtime insulin regimen. He reported that it is based on his pre meal blood sugar and if it is >150 mg/dL he will take 20 units. He was not able to produce any other information regarding the sliding scale.     Changes made to PTA medication list:  Added: all  Deleted: None  Changed: None    Allergies reviewed with patient and updates made in EHR: yes    Medication History Completed By: LAVELL FUENTES [facility] 10/7/2024 11:59 AM    PTA Med List   Medication Sig Last Dose    amLODIPine (NORVASC) 5 MG tablet Take 5 mg by mouth daily. 10/7/2024 at am    aspirin 81 MG EC tablet Take 81 mg by mouth daily. 10/7/2024 at am    clopidogrel (PLAVIX) 75 MG tablet Take 75 mg by mouth daily. 10/7/2024 at am    co-enzyme Q-10 100 MG CAPS capsule Take 200 mg by mouth daily. 10/7/2024 at am    empagliflozin (JARDIANCE) 10 MG TABS tablet Take 10 mg by mouth daily. 10/7/2024 at am    insulin aspart (NOVOLOG PEN) 100 UNIT/ML pen Inject subcutaneously 3 times daily (with meals). Per sliding scale, if BG >150 mg/dL patient takes 20 units 10/7/2024 at am    insulin glargine (LANTUS PEN) 100 UNIT/ML pen Inject 100 Units subcutaneously every morning. 10/6/2024 at am    insulin glargine (LANTUS PEN) 100 UNIT/ML pen Inject 25 Units subcutaneously at bedtime. 10/7/2024 at pm    irbesartan (AVAPRO) 300 MG tablet Take 300 mg by mouth at bedtime. 10/6/2024 at qhs    lactobacillus rhamnosus, GG, (CULTURELL) capsule Take 1 capsule by mouth daily.  10/7/2024 at am    levothyroxine (SYNTHROID/LEVOTHROID) 50 MCG tablet Take 50 mcg by mouth daily. 10/7/2024 at am    metFORMIN (GLUCOPHAGE XR) 500 MG 24 hr tablet Take 2,000 mg by mouth daily (with dinner). 10/6/2024 at pm    Multiple Vitamins-Minerals (PRESERVISION AREDS 2 PO) Take 2 capsules by mouth daily. 10/7/2024 at am    multivitamin, therapeutic (THERA-VIT) TABS tablet Take 1 tablet by mouth daily. 10/7/2024 at am    rosuvastatin (CRESTOR) 40 MG tablet Take 40 mg by mouth daily. 10/7/2024 at am    spironolactone (ALDACTONE) 25 MG tablet Take 25 mg by mouth daily. 10/7/2024 at am    vitamin C (ASCORBIC ACID) 1000 MG TABS Take 1,000 mg by mouth daily. 10/7/2024 at am    vitamin C with B complex (B COMPLEX-C) tablet Take 1 tablet by mouth daily. 10/7/2024 at am

## 2024-10-07 NOTE — ED PROVIDER NOTES
EMERGENCY DEPARTMENT ENCOUNTER      NAME: Janice Nowak  AGE: 79 year old male  YOB: 1945  MRN: [id number]  EVALUATION DATE & TIME: 10/7/2024  9:58 AM    PCP: Min Rangel    ED PROVIDER: Tristan Smallwood M.D.      Chief Complaint   Patient presents with    Wound Check         FINAL IMPRESSION:  1. Diabetic foot infection (H)          ED COURSE & MEDICAL DECISION MAKING:    10:40 AM Introduced myself to the patient, obtained history of present illness, and performed initial physical exam at this time.    2:22 PM I spoke to the hospitalist.    Pertinent Labs & Imaging studies reviewed. (See chart for details)  79 year old male presents to the Emergency Department for evaluation of infection to foot.  Patient has a diabetic foot ulcer.  X-ray does not show any obvious osteomyelitis.   blood work reassuring.  Patient is nontoxic.  Because of the significance of the ulcer will admit him to the hospital for IV antibiotics and consultation with podiatry.   considered whether MRI was necessary today however with the patient be admitted will allow the inpatient team to order that test and consultations as needed.    At the conclusion of the encounter I discussed the results of all of the tests and the disposition. The questions were answered. The patient or family acknowledged understanding and was agreeable with the care plan.              Medical Decision Making  Obtained supplemental history:Supplemental history obtained?: Family Member/Significant Other  Reviewed external records: External records reviewed?: No  Care impacted by chronic illness:Diabetes, Heart Disease, Hyperlipidemia, Hypertension, and Other: neuropathy  Did you consider but not order tests?: Work up considered but not performed and documented in chart, if applicable  Did you interpret images independently?: Independent interpretation of ECG and images noted in documentation, when applicable.  Consultation discussion with other  provider:Did you involve another provider (consultant, , pharmacy, etc.)?: I discussed the care with another health care provider, see documentation for details.  Admit.    MIPS: Not Applicable          This patient involved a high degree of complexity in medical decision making, as significant risks were present and assessed. Recent encounters & results in medical record reviewed by me.     All workup (i.e. any EKG/labs/imaging as per charting below) reviewed and independently interpreted by me. See respective sections for details.       minutes of critical care time     MEDICATIONS GIVEN IN THE EMERGENCY:  Medications   piperacillin-tazobactam (ZOSYN) 3.375 g vial to attach to  mL bag (0 g Intravenous Stopped 10/7/24 1140)   acetaminophen (TYLENOL) tablet 650 mg (650 mg Oral $Given 10/7/24 1438)   piperacillin-tazobactam (ZOSYN) 3.375 g vial to attach to  mL bag (3.375 g Intravenous $New Bag 10/8/24 2100)   vancomycin (VANCOCIN) 1,750 mg in 0.9% NaCl 517.5 mL intermittent infusion (1,750 mg Intravenous $New Bag 10/8/24 2210)       NEW PRESCRIPTIONS STARTED AT TODAY'S ER VISIT  Discharge Medication List as of 10/9/2024  1:38 PM             =================================================================    HPI    Patient information was obtained from: the patient, family member    Use of : N/A        Janice Nowak is a 79 year old male with a pertinent history of hyperlipidemia, HTN, DMT2, SABRINA, CAD, heart failure, neuropathy, osteoarthritis who presents for evaluation of wound check.    [facility] a wound on his right heel that has been there since June. He was recommended to come in as he was told it's likely infected. The patient was seen in June for the wound and again ten days ago, it has gotten worse in that time. He denies fever, chills, and sick symptoms.      REVIEW OF SYSTEMS   Review of Systems   See HPI    PAST MEDICAL HISTORY:  No past  "medical history on file.    PAST SURGICAL HISTORY:  No past surgical history on file.        CURRENT MEDICATIONS:    amLODIPine (NORVASC) 5 MG tablet  aspirin 81 MG EC tablet  clopidogrel (PLAVIX) 75 MG tablet  co-enzyme Q-10 100 MG CAPS capsule  empagliflozin (JARDIANCE) 10 MG TABS tablet  insulin aspart (NOVOLOG PEN) 100 UNIT/ML pen  insulin glargine (LANTUS PEN) 100 UNIT/ML pen  insulin glargine (LANTUS PEN) 100 UNIT/ML pen  irbesartan (AVAPRO) 300 MG tablet  lactobacillus rhamnosus, GG, (CULTURELL) capsule  levothyroxine (SYNTHROID/LEVOTHROID) 50 MCG tablet  metFORMIN (GLUCOPHAGE XR) 500 MG 24 hr tablet  Multiple Vitamins-Minerals (PRESERVISION AREDS 2 PO)  multivitamin, therapeutic (THERA-VIT) TABS tablet  rosuvastatin (CRESTOR) 40 MG tablet  spironolactone (ALDACTONE) 25 MG tablet  vitamin C (ASCORBIC ACID) 1000 MG TABS  vitamin C with B complex (B COMPLEX-C) tablet        ALLERGIES:  Allergies   Allergen Reactions    Exenatide Unknown    Heparin     Liraglutide     Lisinopril Cough    Morphine        FAMILY HISTORY:  No family history on file.    SOCIAL HISTORY:   Social History     Socioeconomic History    Marital status:        VITALS:  BP 91/60 (BP Location: Right arm)   Pulse 87   Temp 98.3  F (36.8  C) (Oral)   Resp 18   Ht 1.727 m (5' 8\")   Wt 102.3 kg (225 lb 8 oz)   SpO2 97%   BMI 34.29 kg/m        PHYSICAL EXAM    Constitutional: Well developed, Well nourished, NAD, GCS 15  HENT: Normocephalic, Atraumatic, Bilateral external ears normal, Oropharynx normal, mucous membranes moist, Nose normal. Neck-  Normal range of motion, No tenderness, Supple, No stridor.  Eyes: PERRL, EOMI, Conjunctiva normal, No discharge.   Respiratory: Normal breath sounds, No respiratory distress, No wheezing, Speaks full sentences easily. No cough.  Cardiovascular: Normal heart rate, Regular rhythm, No murmurs, No rubs, No gallops. Chest wall nontender.  GI:Soft, No tenderness, No masses, No flank tenderness. No " rebound or guarding.   :   Musculoskeletal: 2+ DP pulses. No edema.No cyanosis, No clubbing. Good range of motion in all major joints. No tenderness to palpation or major deformities noted.   Integument: Warm, Dry, No erythema, No rash. No petechiae.   Neurologic: Alert & oriented x 3,  CN 3-12 intact Normal motor function, Normal sensory function, No focal deficits noted. Normal gait. Normal finger to nose bilaterally  Psychiatric: Affect normal, Judgment normal, Mood normal. Cooperative.          LAB:  All pertinent labs reviewed and interpreted.  Labs Ordered and Resulted from Time of ED Arrival to Time of ED Departure   COMPREHENSIVE METABOLIC PANEL - Abnormal       Result Value    Sodium 136      Potassium 5.1      Carbon Dioxide (CO2) 24      Anion Gap 11      Urea Nitrogen 33.4 (*)     Creatinine 1.19 (*)     GFR Estimate 63      Calcium 9.3      Chloride 101      Glucose 171 (*)     Alkaline Phosphatase 89      AST 23      ALT 42      Protein Total 7.9      Albumin 3.9      Bilirubin Total 0.5     ERYTHROCYTE SEDIMENTATION RATE AUTO - Abnormal    Erythrocyte Sedimentation Rate 57 (*)    CRP INFLAMMATION - Abnormal    CRP Inflammation 7.30 (*)    CBC WITH PLATELETS AND DIFFERENTIAL - Abnormal    WBC Count 9.6      RBC Count 4.09 (*)     Hemoglobin 11.8 (*)     Hematocrit 36.5 (*)     MCV 89      MCH 28.9      MCHC 32.3      RDW 14.9      Platelet Count 143 (*)     % Neutrophils 73      % Lymphocytes 15      % Monocytes 8      % Eosinophils 1      % Basophils 1      % Immature Granulocytes 3      NRBCs per 100 WBC 0      Absolute Neutrophils 7.0      Absolute Lymphocytes 1.4      Absolute Monocytes 0.8      Absolute Eosinophils 0.1      Absolute Basophils 0.1      Absolute Immature Granulocytes 0.2      Absolute NRBCs 0.0         RADIOLOGY:  Reviewed all pertinent imaging. Please see official radiology report.  MR Foot Right w/o Contrast   Final Result   IMPRESSION:   1.  Soft tissue ulcer of the heel with  subcutaneous edema abutting the calcaneus compatible with cellulitis. No organized/drainable collection. There is T2 hyperintense marrow edema of the subjacent calcaneal tuberosity with mild linear T1 hypointense    signal but no confluent T1 hypointense replacement. Given location subjacent to an ulcer, these findings are compatible with a high likelihood though are not definitive of osteomyelitis.   2.  Severe talonavicular joint osteoarthritis.      Foot  XR, G/E 3 views, right   Final Result   IMPRESSION: Deep soft tissue ulceration along the posterior aspect of the heel. No focal osseous erosions or periostitis to suggest osteomyelitis. If there is persistent clinical concern for osteomyelitis, MRI is more sensitive.  Large plantar calcaneal    spur. Moderate midfoot degenerative change most evident at the talonavicular joint where there are prominent dorsal osteophytes. Vascular calcifications extending into the toes. Bipartite medial hallux sesamoid.          PROCEDURES:         I, Andrés Winston, am serving as a scribe to document services personally performed by Dr. Tristan Smallwood based on my observation and the provider's statements to me. I, Tristan Smallwood MD attest that Andrés Winston is acting in a scribe capacity, has observed my performance of the services and has documented them in accordance with my direction.    Tristan Smallwood M.D.  Emergency Medicine  [facility] EMERGENCY DEPARTMENT  [facility] [facility] [address]  [phone]  Dept: [phone]       Tristan Smallwood MD  10/12/24 0656

## 2024-10-07 NOTE — PROGRESS NOTES
"Pt uses CPAP nightly at home, family to bring home unit to the hospital today, RT available as needed, Room air SpO2 99 %.     BP (!) 199/86   Pulse 79   Temp 97.5  F (36.4  C) (Oral)   Resp 20   Ht 1.727 m (5' 8\")   Wt 102.3 kg (225 lb 8 oz)   SpO2 99%   BMI 34.29 kg/m      "

## 2024-10-07 NOTE — H&P
Resident/Fellow Attestation   I, Sybil Peter DO, was present with the medical/ELIO student who participated in the service and in the documentation of the note.  I have verified the history and personally performed the physical exam and medical decision making.  I agree with the assessment and plan of care as documented in the note.      Here for what appears to be a diabetic foot ulcer. This is chronic for him >2 years and intermittently painful. The patient is denying any CP, SOB, fever, or chills. Appears to have poorly controlled diabetes with most recent A1c 10 days ago of 9.1.     On exam, right heel ulcer is about 4 cm in diameter and 0.5-1 cm in depth. Dry, without purulence. No surrounding erythema, swelling, or warmth. Feet appear well perfused and warm although he does have multiple healing wounds on his shin's and feet bilaterally. There is a fissure of the lateral left foot with surrounding dried blood. Diabetic foot exam demonstrates absent pinprick sensation in the bilateral feet on the dorsal and plantar surfaces.     Plan for podiatry consult and right foot MRI. Hold antibiotics for now in the absence of soft tissue infection. Will consider restarting if osteomyelitis is demonstrated on MRI.     Sybil Peter DO  PGY1  Date of Service (when I saw the patient): 10/07/24    [facility]    History and Physical - Hospitalist Service       Date of Admission:  10/7/2024    Assessment & Plan      Janice Nowak is a 79 year old male admitted on 10/7/2024. He has history of insulin dependent type 2 diabetes mellitus, presenting with worsening ulcer to the right heel, concerning for possible osteomyelitis.     Diabetic foot ulcer  Concern for osteomyelitis  Type 2 Diabetes Mellitus  Neuropathy  Patient with poorly controlled diabetes, A1c of 9.1% on 09/27/24, and poor glucose control at home per CGM. Presenting with ulcer to the heel of the right heel which has been present  "for several years. Has occasional discomfort to the area, however this has not changed. He has significant numbness to the bilateral feet, with diminished sensation with diabetic foot exam. Work up in ED with elevated CRP, sed rate, but no fever or white count. No erythema or purulence to suggest cellulitis and no evidence of systemic infection. Foot XR without evidence of osteomyelitis, however MRI is more sensitive. Overall, ulcer does not appear infected, however MRI would be needed to rule out osteomyelitis.   - Hold antibiotics, pending MRI in AM   > Low index of suspicion for infection at this time, would need MRI vs biopsy to diagnose osteomyelitis  - Podiatry consult in AM  - Glargine 100u in AM, 15u in PM  - HSSI   - PTA Empagliflozin 10mg daily  - Hold PTA metformin    CAD  Heart failure  HTN  HLD  No echocardiogram available in the system. No evidence of volume overload with clear lungs, normal work of breathing, and trace edema.   - PTA ASA 81mg, Plavix 75mg  - PTA irbesartan 300mg daily  - PTA norvasc 5mg daily  - PTA Rosuvastatin 40mg daily  - PTA spironolactone 25mg daily    SABRINA  - PTA CPAP at night    Hypothyroidism  - PTA Levothyroxine 50mcg daily          Diet:  Consistent carbs  DVT Prophylaxis: Pneumatic Compression Devices  Reyes Catheter: Not present  Fluids: None  Lines: None     Cardiac Monitoring: None  Code Status:  DNR/DNI    Clinically Significant Risk Factors Present on Admission                # Drug Induced Platelet Defect: home medication list includes an antiplatelet medication   # Hypertension: Noted on problem list         # Obesity: Estimated body mass index is 34.29 kg/m  as calculated from the following:    Height as of this encounter: 1.727 m (5' 8\").    Weight as of this encounter: 102.3 kg (225 lb 8 oz).              Disposition Plan      Expected Discharge Date: 10/09/2024                The patient's care was discussed with the Attending Physician, Dr. Bain " .      Angel Chu, M4  Medical Student  Hospitalist Service  [facility]  Securely message with [employer] (more info)  Text page via [facility] Paging/Directory   ______________________________________________________________________    Chief Complaint   Foot ulcer    History is obtained from the patient    History of Present Illness   Janice Nowak is a 79 year old male who has a history of type 2 diabetes with insulin dependence, CAD, HTN, HLD, and is admitted for worsening diabetic foot ulcer.     He  has had an ulcer to the right heal for several years which has been managed as an outpatient. [facility] and was recommended to come to the ER for admission for further management due to concern for infection. Patient denies fever, chills, chest pain, shortness of breath, nausea, vomiting, and diarrhea. He does have diffuse abdominal pain that has been present for several months but is not acutely changed today.    ED course:  - CRP, sed rate elevated  - CBC without leukocytosis  - CMP unremarkable  - Foot XR without evidence of acute osteomyelitis  - Blood cultures pending  - S/p dose of Zosyn    Past Medical History    No past medical history on file.    Past Surgical History   No past surgical history on file.    Prior to Admission Medications   Prior to Admission Medications   Prescriptions Last Dose Informant Patient Reported? Taking?   Multiple Vitamins-Minerals (PRESERVISION AREDS 2 PO) 10/7/2024 at am  Yes Yes   Sig: Take 2 capsules by mouth daily.   amLODIPine (NORVASC) 5 MG tablet 10/7/2024 at am  Yes Yes   Sig: Take 5 mg by mouth daily.   aspirin 81 MG EC tablet 10/7/2024 at am  Yes Yes   Sig: Take 81 mg by mouth daily.   clopidogrel (PLAVIX) 75 MG tablet 10/7/2024 at am  Yes Yes   Sig: Take 75 mg by mouth daily.   co-enzyme Q-10 100 MG CAPS capsule 10/7/2024 at am  Yes Yes   Sig: Take 200 mg by mouth daily.   empagliflozin  (JARDIANCE) 10 MG TABS tablet 10/7/2024 at am  Yes Yes   Sig: Take 10 mg by mouth daily.   insulin aspart (NOVOLOG PEN) 100 UNIT/ML pen 10/7/2024 at am  Yes Yes   Sig: Inject subcutaneously 3 times daily (with meals). Per sliding scale, if BG >150 mg/dL patient takes 20 units   insulin glargine (LANTUS PEN) 100 UNIT/ML pen 10/6/2024 at am  Yes Yes   Sig: Inject 100 Units subcutaneously every morning.   insulin glargine (LANTUS PEN) 100 UNIT/ML pen 10/7/2024 at pm  Yes Yes   Sig: Inject 25 Units subcutaneously at bedtime.   irbesartan (AVAPRO) 300 MG tablet 10/6/2024 at qhs  Yes Yes   Sig: Take 300 mg by mouth at bedtime.   lactobacillus rhamnosus, GG, (CULTURELL) capsule 10/7/2024 at am  Yes Yes   Sig: Take 1 capsule by mouth daily.   levothyroxine (SYNTHROID/LEVOTHROID) 50 MCG tablet 10/7/2024 at am  Yes Yes   Sig: Take 50 mcg by mouth daily.   metFORMIN (GLUCOPHAGE XR) 500 MG 24 hr tablet 10/6/2024 at pm  Yes Yes   Sig: Take 2,000 mg by mouth daily (with dinner).   multivitamin, therapeutic (THERA-VIT) TABS tablet 10/7/2024 at am  Yes Yes   Sig: Take 1 tablet by mouth daily.   rosuvastatin (CRESTOR) 40 MG tablet 10/7/2024 at am  Yes Yes   Sig: Take 40 mg by mouth daily.   spironolactone (ALDACTONE) 25 MG tablet 10/7/2024 at am  Yes Yes   Sig: Take 25 mg by mouth daily.   vitamin C (ASCORBIC ACID) 1000 MG TABS 10/7/2024 at am  Yes Yes   Sig: Take 1,000 mg by mouth daily.   vitamin C with B complex (B COMPLEX-C) tablet 10/7/2024 at am  Yes Yes   Sig: Take 1 tablet by mouth daily.      Facility-Administered Medications: None        Review of Systems    The 10 point Review of Systems is negative other than noted in the HPI or here.     Social History   I have reviewed this patient's social history and updated it with pertinent information if needed.       Family History     No significant family history      Allergies   Allergies   Allergen Reactions    Exenatide Unknown    Heparin     Liraglutide     Lisinopril Cough     Morphine         Physical Exam   Vital Signs: Temp: 97.5  F (36.4  C) Temp src: Oral BP: (!) 199/86 Pulse: 79   Resp: 20 SpO2: 99 %      Weight: 225 lbs 8 oz    General Appearance: Awake, alert, lying in bed, no acute distress  Respiratory: Normal work of breathing, clear to auscultation bilaterally  Cardiovascular: RRR, no murmurs  GI: Normoactive bowel sounds, mild distension, mild diffuse tenderness  Skin: No rashes noted at exposed skin;   Extremities: base of heel of right foot with ulcer, 3cm in diameter, 0.5cm in depth, no evidence of surrounding erythema. Significantly diminished sensation of bilateral feet at dorsal and plantar aspects  Other: Normal mood, normal affect       Data     I have personally reviewed the following data over the past 24 hrs:    9.6  \   11.8 (L)   / 143 (L)     136 101 33.4 (H) /  171 (H)   5.1 24 1.19 (H) \     ALT: 42 AST: 23 AP: 89 TBILI: 0.5   ALB: 3.9 TOT PROTEIN: 7.9 LIPASE: N/A     Procal: N/A CRP: 7.30 (H) Lactic Acid: N/A         Imaging results reviewed over the past 24 hrs:   Recent Results (from the past 24 hour(s))   Foot  XR, G/E 3 views, right    Narrative    EXAM: XR FOOT RIGHT G/E 3 VIEWS  LOCATION: [facility]  DATE: 10/7/2024    INDICATION: diabetic foot ulcer right heel  COMPARISON: None.      Impression    IMPRESSION: Deep soft tissue ulceration along the posterior aspect of the heel. No focal osseous erosions or periostitis to suggest osteomyelitis. If there is persistent clinical concern for osteomyelitis, MRI is more sensitive.  Large plantar calcaneal   spur. Moderate midfoot degenerative change most evident at the talonavicular joint where there are prominent dorsal osteophytes. Vascular calcifications extending into the toes. Bipartite medial hallux sesamoid.

## 2024-10-07 NOTE — ED TRIAGE NOTES
Pt to triage via w/c requesting evaluation of right heel ulcer. [facility] for wound evaluation for suspected infection. Pt has had poorly healing ulcer to right heel since April, hx IDDM.   Denies fever, fatigue, n/v or other systemic complaints.

## 2024-10-07 NOTE — ED NOTES
"[facility] ED Handoff Report    ED Chief Complaint: Wound on the right heel    ED Diagnosis:  (E11.628,  L08.9) Diabetic foot infection (H)       PMH:  No past medical history on file.     Code Status:  Full Code     Falls Risk: Yes Band: Applied    Current Living Situation/Residence: lives in an assisted living facility     Elimination Status: Continent: Yes     Activity Level: SBA w/ walker    Patients Preferred Language:  English     Needed: No    Vital Signs:  BP (!) 199/86   Pulse 79   Temp 97.5  F (36.4  C) (Oral)   Resp 20   Ht 1.727 m (5' 8\")   Wt 102.3 kg (225 lb 8 oz)   SpO2 99%   BMI 34.29 kg/m       Cardiac Rhythm: NA    Pain Score: 4/10    Is the Patient Confused:  No    Last Food or Drink: 10/07/24    Focused Assessment:  [facility] on heel of right foot that has not been healing well since April.    Tests Performed: Done: Labs and Imaging    Treatments Provided:  Tylenol for shooting pain up right leg.    Family Dynamics/Concerns: No    Family Updated On Visitor Policy: Yes    Plan of Care Communicated to Family: Yes    Who Was Updated about Plan of Care: Pt son at bedside    Belongings Checklist Done and Signed by Patient: Yes    Belongings Sent with Patient: Clothes, cane, glasses    Medications sent with patient: NA    Additional Information: JAMESON    RN: Cary Vasquez RN   10/7/2024 2:58 PM        "

## 2024-10-08 ENCOUNTER — APPOINTMENT (OUTPATIENT)
Dept: MRI IMAGING | Facility: HOSPITAL | Age: 79
DRG: 638 | End: 2024-10-08
Payer: COMMERCIAL

## 2024-10-08 LAB
ANION GAP SERPL CALCULATED.3IONS-SCNC: 10 MMOL/L (ref 7–15)
BUN SERPL-MCNC: 32.5 MG/DL (ref 8–23)
CALCIUM SERPL-MCNC: 9 MG/DL (ref 8.8–10.4)
CHLORIDE SERPL-SCNC: 105 MMOL/L (ref 98–107)
CREAT SERPL-MCNC: 1.08 MG/DL (ref 0.67–1.17)
EGFRCR SERPLBLD CKD-EPI 2021: 70 ML/MIN/1.73M2
ERYTHROCYTE [DISTWIDTH] IN BLOOD BY AUTOMATED COUNT: 14.9 % (ref 10–15)
GLUCOSE BLDC GLUCOMTR-MCNC: 115 MG/DL (ref 70–99)
GLUCOSE BLDC GLUCOMTR-MCNC: 120 MG/DL (ref 70–99)
GLUCOSE BLDC GLUCOMTR-MCNC: 122 MG/DL (ref 70–99)
GLUCOSE BLDC GLUCOMTR-MCNC: 137 MG/DL (ref 70–99)
GLUCOSE BLDC GLUCOMTR-MCNC: 192 MG/DL (ref 70–99)
GLUCOSE SERPL-MCNC: 128 MG/DL (ref 70–99)
HCO3 SERPL-SCNC: 22 MMOL/L (ref 22–29)
HCT VFR BLD AUTO: 34 % (ref 40–53)
HGB BLD-MCNC: 11.2 G/DL (ref 13.3–17.7)
MAGNESIUM SERPL-MCNC: 2.3 MG/DL (ref 1.7–2.3)
MCH RBC QN AUTO: 29.3 PG (ref 26.5–33)
MCHC RBC AUTO-ENTMCNC: 32.9 G/DL (ref 31.5–36.5)
MCV RBC AUTO: 89 FL (ref 78–100)
PLATELET # BLD AUTO: 125 10E3/UL (ref 150–450)
POTASSIUM SERPL-SCNC: 4.5 MMOL/L (ref 3.4–5.3)
RBC # BLD AUTO: 3.82 10E6/UL (ref 4.4–5.9)
SODIUM SERPL-SCNC: 137 MMOL/L (ref 135–145)
WBC # BLD AUTO: 8.4 10E3/UL (ref 4–11)

## 2024-10-08 PROCEDURE — 85014 HEMATOCRIT: CPT

## 2024-10-08 PROCEDURE — 250N000011 HC RX IP 250 OP 636: Performed by: FAMILY MEDICINE

## 2024-10-08 PROCEDURE — 80048 BASIC METABOLIC PNL TOTAL CA: CPT

## 2024-10-08 PROCEDURE — 36415 COLL VENOUS BLD VENIPUNCTURE: CPT

## 2024-10-08 PROCEDURE — 120N000001 HC R&B MED SURG/OB

## 2024-10-08 PROCEDURE — 83735 ASSAY OF MAGNESIUM: CPT

## 2024-10-08 PROCEDURE — 99232 SBSQ HOSP IP/OBS MODERATE 35: CPT | Mod: GC

## 2024-10-08 PROCEDURE — 250N000013 HC RX MED GY IP 250 OP 250 PS 637

## 2024-10-08 PROCEDURE — 73718 MRI LOWER EXTREMITY W/O DYE: CPT | Mod: RT

## 2024-10-08 PROCEDURE — 258N000003 HC RX IP 258 OP 636: Performed by: FAMILY MEDICINE

## 2024-10-08 RX ORDER — PIPERACILLIN SODIUM, TAZOBACTAM SODIUM 3; .375 G/15ML; G/15ML
3.38 INJECTION, POWDER, LYOPHILIZED, FOR SOLUTION INTRAVENOUS EVERY 8 HOURS
Status: DISCONTINUED | OUTPATIENT
Start: 2024-10-09 | End: 2024-10-09 | Stop reason: HOSPADM

## 2024-10-08 RX ORDER — PIPERACILLIN SODIUM, TAZOBACTAM SODIUM 3; .375 G/15ML; G/15ML
3.38 INJECTION, POWDER, LYOPHILIZED, FOR SOLUTION INTRAVENOUS ONCE
Status: COMPLETED | OUTPATIENT
Start: 2024-10-08 | End: 2024-10-08

## 2024-10-08 RX ADMIN — IRBESARTAN 300 MG: 300 TABLET ORAL at 21:00

## 2024-10-08 RX ADMIN — INSULIN GLARGINE 15 UNITS: 100 INJECTION, SOLUTION SUBCUTANEOUS at 21:02

## 2024-10-08 RX ADMIN — ROSUVASTATIN 40 MG: 40 TABLET, FILM COATED ORAL at 08:41

## 2024-10-08 RX ADMIN — AMLODIPINE BESYLATE 5 MG: 5 TABLET ORAL at 08:41

## 2024-10-08 RX ADMIN — LEVOTHYROXINE SODIUM 50 MCG: 0.03 TABLET ORAL at 08:41

## 2024-10-08 RX ADMIN — SPIRONOLACTONE 25 MG: 25 TABLET, FILM COATED ORAL at 08:41

## 2024-10-08 RX ADMIN — EMPAGLIFLOZIN 10 MG: 10 TABLET, FILM COATED ORAL at 08:41

## 2024-10-08 RX ADMIN — ACETAMINOPHEN 650 MG: 325 TABLET ORAL at 01:02

## 2024-10-08 RX ADMIN — CLOPIDOGREL BISULFATE 75 MG: 75 TABLET ORAL at 10:25

## 2024-10-08 RX ADMIN — SODIUM CHLORIDE 1750 MG: 9 INJECTION, SOLUTION INTRAVENOUS at 22:10

## 2024-10-08 RX ADMIN — ASPIRIN 81 MG: 81 TABLET, COATED ORAL at 10:25

## 2024-10-08 RX ADMIN — PIPERACILLIN AND TAZOBACTAM 3.38 G: 3; .375 INJECTION, POWDER, FOR SOLUTION INTRAVENOUS at 21:00

## 2024-10-08 ASSESSMENT — ACTIVITIES OF DAILY LIVING (ADL)
ADLS_ACUITY_SCORE: 40
ADLS_ACUITY_SCORE: 46
ADLS_ACUITY_SCORE: 46
ADLS_ACUITY_SCORE: 40
ADLS_ACUITY_SCORE: 46
ADLS_ACUITY_SCORE: 40
ADLS_ACUITY_SCORE: 46
ADLS_ACUITY_SCORE: 44
ADLS_ACUITY_SCORE: 46
ADLS_ACUITY_SCORE: 44
ADLS_ACUITY_SCORE: 44
ADLS_ACUITY_SCORE: 40
ADLS_ACUITY_SCORE: 46
ADLS_ACUITY_SCORE: 40
ADLS_ACUITY_SCORE: 46
ADLS_ACUITY_SCORE: 40
ADLS_ACUITY_SCORE: 46

## 2024-10-08 NOTE — PLAN OF CARE
"  Problem: Adult Inpatient Plan of Care  Goal: Plan of Care Review  Description: The Plan of Care Review/Shift note should be completed every shift.  The Outcome Evaluation is a brief statement about your assessment that the patient is improving, declining, or no change.  This information will be displayed automatically on your shift  note.  10/7/2024 2142 by Marshall Magaña RN  Outcome: Progressing  10/7/2024 2142 by Marshall Magaña RN  Outcome: Progressing  Flowsheets (Taken 10/7/2024 2142)  Plan of Care Reviewed With:   patient   family  Goal: Patient-Specific Goal (Individualized)  Description: You can add care plan individualizations to a care plan. Examples of Individualization might be:  \"Parent requests to be called daily at 9am for status\", \"I have a hard time hearing out of my right ear\", or \"Do not touch me to wake me up as it startles  me\".  10/7/2024 2142 by Marshall Magaña RN  Outcome: Progressing  10/7/2024 2142 by Marshall Magaña RN  Outcome: Progressing  Goal: Absence of Hospital-Acquired Illness or Injury  10/7/2024 2142 by Marshall Magaña RN  Outcome: Progressing  10/7/2024 2142 by Marshall Magaña RN  Outcome: Progressing  Intervention: Identify and Manage Fall Risk  Recent Flowsheet Documentation  Taken 10/7/2024 1600 by Marshall Magaña RN  Safety Promotion/Fall Prevention:   clutter free environment maintained   lighting adjusted  Intervention: Prevent Skin Injury  Recent Flowsheet Documentation  Taken 10/7/2024 1617 by Marshall Magaña RN  Body Position: position changed independently  Taken 10/7/2024 1520 by Marshall Magaña RN  Body Position: position changed independently  Intervention: Prevent and Manage VTE (Venous Thromboembolism) Risk  Recent Flowsheet Documentation  Taken 10/7/2024 1600 by Marshall Magaña RN  VTE Prevention/Management: SCDs off (sequential compression devices)  Goal: Optimal Comfort and Wellbeing  10/7/2024 2142 by Marshall Magaña RN  Outcome: Progressing  10/7/2024 2142 by Gómez" Marshall IBRAHIM RN  Outcome: Progressing  Intervention: Monitor Pain and Promote Comfort  Recent Flowsheet Documentation  Taken 10/7/2024 1617 by Marshall Magaña RN  Pain Management Interventions:   emotional support   pillow support provided   repositioned   rest  Taken 10/7/2024 1520 by Marshall Magaña RN  Pain Management Interventions:   repositioned   rest  Goal: Readiness for Transition of Care  10/7/2024 2142 by Marshall Magaña RN  Outcome: Progressing  10/7/2024 2142 by Marshall Magaña RN  Outcome: Progressing     Problem: Risk for Delirium  Goal: Optimal Coping  10/7/2024 2142 by Marshall Magaña RN  Outcome: Progressing  10/7/2024 2142 by Marshall Magaña RN  Outcome: Progressing  Goal: Improved Behavioral Control  10/7/2024 2142 by Marshall Magaña RN  Outcome: Progressing  10/7/2024 2142 by Marshall Magaña RN  Outcome: Progressing  Intervention: Minimize Safety Risk  Recent Flowsheet Documentation  Taken 10/7/2024 1600 by Marshall Magaña RN  Communication Enhancement Strategies: call light answered in person  Enhanced Safety Measures: pain management  Goal: Improved Attention and Thought Clarity  10/7/2024 2142 by Marshall Magaña RN  Outcome: Progressing  10/7/2024 2142 by Marshall Magaña RN  Outcome: Progressing  Goal: Improved Sleep  10/7/2024 2142 by Marshall Magaña RN  Outcome: Progressing  10/7/2024 2142 by Marshall Magaña RN  Outcome: Progressing   Goal Outcome Evaluation:      Plan of Care Reviewed With: patient, family

## 2024-10-08 NOTE — PROGRESS NOTES
Resident/Fellow Attestation   I, Sybil Peter DO, was present with the medical/ELIO student who participated in the service and in the documentation of the note.  I have verified the history and personally performed the physical exam and medical decision making.  I agree with the assessment and plan of care as documented in the note.      The patient is stable today with no changes in symptoms. Diet started per podiatry. Continue aspirin and plavix per podiatry as no surgery planned for today. MRI this afternoon and podiatry to follow.     Sybil Peter DO  PGY1  Date of Service (when I saw the patient): 10/08/24    [facility]    Progress Note - Hospitalist Service       Date of Admission:  10/7/2024    Assessment & Plan   Janice Nowak is a 79 year old male admitted on 10/7/2024. He has history of insulin dependent type 2 diabetes mellitus, presenting with worsening ulcer to the right heel, concerning for possible osteomyelitis. MRI and podiatry consult pending.     Diabetic foot ulcer  Concern for osteomyelitis  Type 2 Diabetes Mellitus  Neuropathy  Patient with poorly controlled diabetes, A1c of 9.1% on 09/27/24, and poor glucose control at home per CGM. Presenting with ulcer to the heel of the right foot which has been present for several years. Has occasional discomfort to the area, however this has not changed. Significant numbness to the bilateral feet, with diminished sensation with diabetic foot exam. Work up in ED with elevated CRP, sed rate, but no fever or white count. No erythema or purulence to suggest cellulitis and no evidence of systemic infection. Foot XR without evidence of osteomyelitis, however MRI is more sensitive. Overall, ulcer does not appear infected, however MRI would be needed to rule out osteomyelitis.   - Hold antibiotics, pending MRI 10/08  > Low index of suspicion for infection at this time, would need MRI vs biopsy to diagnose osteomyelitis  -  "Podiatry consult 10/08  - Glargine 100u in AM, 15u in PM  - Bolus insulin carb ratio 1:10 with meals   - HSSI   - PTA Empagliflozin 10mg daily  - Hold PTA metformin     CAD  Heart failure  HTN  HLD  No echocardiogram available in the system. No evidence of volume overload with clear lungs, normal work of breathing, and trace edema.   - PTA ASA 81mg, Plavix 75mg  - PTA irbesartan 300mg daily  - PTA norvasc 5mg daily  - PTA Rosuvastatin 40mg daily  - PTA spironolactone 25mg daily     SABRINA  - PTA CPAP at night     Hypothyroidism  - PTA Levothyroxine 50mcg daily         Diet: NPO for Medical/Clinical Reasons Except for: Meds    DVT Prophylaxis: Pneumatic Compression Devices  Reyes Catheter: Not present  Fluids: None  Lines: None     Cardiac Monitoring: None  Code Status: No CPR- Do NOT Intubate      Clinically Significant Risk Factors Present on Admission                # Drug Induced Platelet Defect: home medication list includes an antiplatelet medication   # Hypertension: Noted on problem list         # Obesity: Estimated body mass index is 34.29 kg/m  as calculated from the following:    Height as of this encounter: 1.727 m (5' 8\").    Weight as of this encounter: 102.3 kg (225 lb 8 oz).       # Financial/Environmental Concerns: none         Disposition Plan      Expected Discharge Date: 10/10/2024      Destination: home with help/services          The patient's care was discussed with the Attending Physician, Dr. Bain .    Angel Chu, M4  Medical Student  Hospitalist Service  [facility]  Securely message with [employer] (more info)  Text page via [employer] Paging/Directory   ______________________________________________________________________    Interval History   - No acute events overnight    Felling well overall. Requesting to be able to eat and drink. Somewhat frustrated with delay in MRI. Mild tenderness to the right heel when ambulating but able to get to and from the " restroom. No new swelling or warmth in the lower extremities. No chills, CP, or SOB.     Physical Exam   Vital Signs: Temp: 97.9  F (36.6  C) Temp src: Oral BP: (!) 142/65 Pulse: 79   Resp: 18 SpO2: 95 % O2 Device: None (Room air)    Weight: 225 lbs 8 oz    Physical Exam  Constitutional:       Appearance: Normal appearance. He is normal weight.   Cardiovascular:      Rate and Rhythm: Normal rate and regular rhythm.      Pulses: Normal pulses.      Heart sounds: Normal heart sounds.   Pulmonary:      Effort: Pulmonary effort is normal.      Breath sounds: Normal breath sounds.   Abdominal:      General: Abdomen is flat. Bowel sounds are normal.      Palpations: Abdomen is soft.   Skin:     General: Skin is warm and dry.      Comments: Diabetic foot ulcer about 4 cm in diameter. Malodorous. Tender to palpation of surrounding area. No surrounding erythema or edema.    Neurological:      Mental Status: He is alert.           Medical Decision Making             Data     I have personally reviewed the following data over the past 24 hrs:    8.4  \   11.2 (L)   / 125 (L)     137 105 32.5 (H) /  115 (H)   4.5 22 1.08 \

## 2024-10-08 NOTE — PROGRESS NOTES
"Care Management Follow Up    Length of Stay (days): 1    Expected Discharge Date: 10/10/2024    Anticipated Discharge Plan:   TBD     Transportation: TBD    PT Recommendations:  Consults placed, awaiting recs.   OT Recommendations:  Consults placed , awaiting recs        Barriers to Discharge: medical stability/ podiatry consult placed, and therapy consults placed.       Prior Living Situation: \"Janice lives in an apartment at [facility]. He ambulates short distances with a cane. Uses an electric scooter for longer distances and outside the apartment building. He is independent with ADLs and IADLs except shopping and transportation which sons help with. Patient reports he was due to start home care PT this week through his facility. Will likely need assistance for wound care at discharge as he says he cannot care for wound himself. PT/OT recommendations pending. Sons can transport at discharge. \"      Mariaelena Chung #[phone] was updated today.       Discussed  Partnership in Safe Discharge Planning  document with patient/family: No     Handoff Completed: Not at this time     Patient/Spokesperson Updated: No    Additional Information:    Mariaelena Chung called, writer updated her.       Next Steps: Follow podiatry and therapy recs. CM will continue to monitor progression of care, review team recommendations and provide discharge planning assist as needed.       Summer Calderon RN      "

## 2024-10-08 NOTE — PLAN OF CARE
Problem: Adult Inpatient Plan of Care  Goal: Plan of Care Review  Description: The Plan of Care Review/Shift note should be completed every shift.  The Outcome Evaluation is a brief statement about your assessment that the patient is improving, declining, or no change.  This information will be displayed automatically on your shift  note.  10/8/2024 0740 by Pastora Kelley, RN  Outcome: Progressing  Flowsheets (Taken 10/8/2024 0740)  Plan of Care Reviewed With: patient  Overall Patient Progress: improving  10/8/2024 0737 by Pastora Kelley RN  Outcome: Progressing  Flowsheets (Taken 10/8/2024 0737)  Plan of Care Reviewed With: patient  Overall Patient Progress: improving     Problem: Comorbidity Management  Goal: Blood Glucose Levels Within Targeted Range  Outcome: Progressing  Intervention: Monitor and Manage Glycemia  Recent Flowsheet Documentation  Taken 10/8/2024 0030 by Pastora Kelley, RN  Medication Review/Management: medications reviewed     Problem: Wound  Goal: Optimal Functional Ability  Outcome: Progressing  Intervention: Optimize Functional Ability  Recent Flowsheet Documentation  Taken 10/8/2024 0030 by Pastora Kelley, RN  Assistive Device Utilized:   gait belt   walker  Activity Management:   activity adjusted per tolerance   activity encouraged  Activity Assistance Provided: assistance, stand-by  Goal: Optimal Pain Control and Function  Outcome: Progressing   Goal Outcome Evaluation:      Plan of Care Reviewed With: patient    Overall Patient Progress: improvingOverall Patient Progress: improving  Pt is ambulatory with assist of one has a cane in room or walker. Pt right heel open wound black and odor present. This morning will have MRI done. Tylenol given for pain.

## 2024-10-08 NOTE — PLAN OF CARE
Goal Outcome Evaluation:      Plan of Care Reviewed With: patient    Overall Patient Progress: no changeOverall Patient Progress: no change    Outcome Evaluation: No change in wound apperance, remains afebrile.  MRI completed waiting for podiatry to see.

## 2024-10-09 ENCOUNTER — APPOINTMENT (OUTPATIENT)
Dept: OCCUPATIONAL THERAPY | Facility: HOSPITAL | Age: 79
DRG: 638 | End: 2024-10-09
Payer: COMMERCIAL

## 2024-10-09 ENCOUNTER — TELEPHONE (OUTPATIENT)
Dept: VASCULAR SURGERY | Facility: CLINIC | Age: 79
End: 2024-10-09
Payer: COMMERCIAL

## 2024-10-09 ENCOUNTER — APPOINTMENT (OUTPATIENT)
Dept: PHYSICAL THERAPY | Facility: HOSPITAL | Age: 79
DRG: 638 | End: 2024-10-09
Payer: COMMERCIAL

## 2024-10-09 VITALS
HEIGHT: 68 IN | RESPIRATION RATE: 18 BRPM | HEART RATE: 87 BPM | OXYGEN SATURATION: 97 % | SYSTOLIC BLOOD PRESSURE: 91 MMHG | TEMPERATURE: 98.3 F | DIASTOLIC BLOOD PRESSURE: 60 MMHG | BODY MASS INDEX: 34.17 KG/M2 | WEIGHT: 225.5 LBS

## 2024-10-09 DIAGNOSIS — L08.9 TYPE 2 DIABETES MELLITUS WITH RIGHT DIABETIC FOOT INFECTION (H): ICD-10-CM

## 2024-10-09 DIAGNOSIS — L08.9 DIABETIC FOOT INFECTION (H): Primary | ICD-10-CM

## 2024-10-09 DIAGNOSIS — E11.628 TYPE 2 DIABETES MELLITUS WITH RIGHT DIABETIC FOOT INFECTION (H): ICD-10-CM

## 2024-10-09 DIAGNOSIS — I10 ESSENTIAL HYPERTENSION: ICD-10-CM

## 2024-10-09 DIAGNOSIS — E11.628 DIABETIC FOOT INFECTION (H): Primary | ICD-10-CM

## 2024-10-09 DIAGNOSIS — E11.9 DIABETES MELLITUS (H): ICD-10-CM

## 2024-10-09 DIAGNOSIS — L97.519 NON-PRESSURE CHRONIC ULCER OF OTHER PART OF RIGHT FOOT WITH UNSPECIFIED SEVERITY (H): ICD-10-CM

## 2024-10-09 DIAGNOSIS — S91.301A NON-HEALING OPEN WOUND OF RIGHT HEEL: ICD-10-CM

## 2024-10-09 LAB
ANION GAP SERPL CALCULATED.3IONS-SCNC: 13 MMOL/L (ref 7–15)
BUN SERPL-MCNC: 27.1 MG/DL (ref 8–23)
CALCIUM SERPL-MCNC: 8.9 MG/DL (ref 8.8–10.4)
CHLORIDE SERPL-SCNC: 103 MMOL/L (ref 98–107)
CREAT SERPL-MCNC: 1.05 MG/DL (ref 0.67–1.17)
EGFRCR SERPLBLD CKD-EPI 2021: 73 ML/MIN/1.73M2
ERYTHROCYTE [DISTWIDTH] IN BLOOD BY AUTOMATED COUNT: 15 % (ref 10–15)
GLUCOSE BLDC GLUCOMTR-MCNC: 103 MG/DL (ref 70–99)
GLUCOSE BLDC GLUCOMTR-MCNC: 94 MG/DL (ref 70–99)
GLUCOSE BLDC GLUCOMTR-MCNC: 96 MG/DL (ref 70–99)
GLUCOSE SERPL-MCNC: 87 MG/DL (ref 70–99)
HCO3 SERPL-SCNC: 19 MMOL/L (ref 22–29)
HCT VFR BLD AUTO: 37.4 % (ref 40–53)
HGB BLD-MCNC: 12.3 G/DL (ref 13.3–17.7)
MAGNESIUM SERPL-MCNC: 2.2 MG/DL (ref 1.7–2.3)
MCH RBC QN AUTO: 29.4 PG (ref 26.5–33)
MCHC RBC AUTO-ENTMCNC: 32.9 G/DL (ref 31.5–36.5)
MCV RBC AUTO: 89 FL (ref 78–100)
PLATELET # BLD AUTO: 131 10E3/UL (ref 150–450)
POTASSIUM SERPL-SCNC: 4.1 MMOL/L (ref 3.4–5.3)
RBC # BLD AUTO: 4.19 10E6/UL (ref 4.4–5.9)
SODIUM SERPL-SCNC: 135 MMOL/L (ref 135–145)
WBC # BLD AUTO: 10.9 10E3/UL (ref 4–11)

## 2024-10-09 PROCEDURE — 85014 HEMATOCRIT: CPT

## 2024-10-09 PROCEDURE — 97162 PT EVAL MOD COMPLEX 30 MIN: CPT | Mod: GP

## 2024-10-09 PROCEDURE — 97530 THERAPEUTIC ACTIVITIES: CPT | Mod: GP

## 2024-10-09 PROCEDURE — 83735 ASSAY OF MAGNESIUM: CPT

## 2024-10-09 PROCEDURE — 99238 HOSP IP/OBS DSCHRG MGMT 30/<: CPT | Mod: GC

## 2024-10-09 PROCEDURE — 250N000013 HC RX MED GY IP 250 OP 250 PS 637

## 2024-10-09 PROCEDURE — 258N000003 HC RX IP 258 OP 636: Performed by: FAMILY MEDICINE

## 2024-10-09 PROCEDURE — 97165 OT EVAL LOW COMPLEX 30 MIN: CPT | Mod: GO

## 2024-10-09 PROCEDURE — 80048 BASIC METABOLIC PNL TOTAL CA: CPT

## 2024-10-09 PROCEDURE — 97535 SELF CARE MNGMENT TRAINING: CPT | Mod: GO

## 2024-10-09 PROCEDURE — 36415 COLL VENOUS BLD VENIPUNCTURE: CPT

## 2024-10-09 PROCEDURE — 99222 1ST HOSP IP/OBS MODERATE 55: CPT | Performed by: PODIATRIST

## 2024-10-09 PROCEDURE — 250N000011 HC RX IP 250 OP 636: Performed by: FAMILY MEDICINE

## 2024-10-09 RX ADMIN — ROSUVASTATIN 40 MG: 40 TABLET, FILM COATED ORAL at 09:32

## 2024-10-09 RX ADMIN — VANCOMYCIN HYDROCHLORIDE 1500 MG: 1 INJECTION, POWDER, LYOPHILIZED, FOR SOLUTION INTRAVENOUS at 08:53

## 2024-10-09 RX ADMIN — PIPERACILLIN AND TAZOBACTAM 3.38 G: 3; .375 INJECTION, POWDER, FOR SOLUTION INTRAVENOUS at 11:25

## 2024-10-09 RX ADMIN — ASPIRIN 81 MG: 81 TABLET, COATED ORAL at 09:32

## 2024-10-09 RX ADMIN — CLOPIDOGREL BISULFATE 75 MG: 75 TABLET ORAL at 09:32

## 2024-10-09 RX ADMIN — SPIRONOLACTONE 25 MG: 25 TABLET, FILM COATED ORAL at 11:27

## 2024-10-09 RX ADMIN — PIPERACILLIN AND TAZOBACTAM 3.38 G: 3; .375 INJECTION, POWDER, FOR SOLUTION INTRAVENOUS at 03:13

## 2024-10-09 RX ADMIN — LEVOTHYROXINE SODIUM 50 MCG: 0.03 TABLET ORAL at 09:32

## 2024-10-09 RX ADMIN — AMLODIPINE BESYLATE 5 MG: 5 TABLET ORAL at 11:27

## 2024-10-09 RX ADMIN — ACETAMINOPHEN 650 MG: 325 TABLET ORAL at 05:48

## 2024-10-09 RX ADMIN — EMPAGLIFLOZIN 10 MG: 10 TABLET, FILM COATED ORAL at 09:32

## 2024-10-09 ASSESSMENT — ACTIVITIES OF DAILY LIVING (ADL)
ADLS_ACUITY_SCORE: 33
ADLS_ACUITY_SCORE: 33
ADLS_ACUITY_SCORE: 46
ADLS_ACUITY_SCORE: 33
ADLS_ACUITY_SCORE: 46
ADLS_ACUITY_SCORE: 46
ADLS_ACUITY_SCORE: 33
ADLS_ACUITY_SCORE: 46
ADLS_ACUITY_SCORE: 33

## 2024-10-09 NOTE — PLAN OF CARE
Goal Outcome Evaluation:      Plan of Care Reviewed With: patient    Overall Patient Progress: no changeOverall Patient Progress: no change    Outcome Evaluation: Foul smelling heel wound unchanged.  Patient states he is unable to be non weight bearing on foot.  Assist of 1 for getting in and out of chair or bed. Discharge to home per MD order.

## 2024-10-09 NOTE — CONSULTS
FOOT AND ANKLE SURGERY/PODIATRY CONSULT NOTE         ASSESSMENT:   Diabetic ulcer right foot      TREATMENT:  MRI was negative for any osteomyelitis or abscess.  I informed the patient that the ulcer is stable but he would benefit from debridement and irrigation of the ulcer in an attempt to promote healing.  The procedure will have to be delayed due to restrictions on procedures (urgent.  Recommend placing a protective boot to diminish pressure on the posterior aspect of the right heel.  The patient may be discharged from podiatry standpoint.  Will follow.  Please call with questions.        HPI: I was asked to see Janice Nowak today to evaluate and treat an ulcer of the right heel.  The patient is a pleasant 79-year-old male who was admitted at 10/7/2024 with a history of insulin-dependent DM2.  He presented with a worsening ulcer on the posterior aspect of the right heel.  There was some concern for osteomyelitis.  MRIs indicated no evidence of osteomyelitis or abscess.  The patient stated that he has had this ulcer for several years and has mild to moderate pain.   He also has loss of protective sensation of both feet..      No past medical history on file.    Social History     Socioeconomic History    Marital status:      Spouse name: Not on file    Number of children: Not on file    Years of education: Not on file    Highest education level: Not on file   Occupational History    Not on file   Tobacco Use    Smoking status: Not on file    Smokeless tobacco: Not on file   Substance and Sexual Activity    Alcohol use: Not on file    Drug use: Not on file    Sexual activity: Not on file   Other Topics Concern    Not on file   Social History Narrative    Not on file     Social Determinants of Health     Financial Resource Strain: Low Risk  (10/9/2024)    Financial Resource Strain     Within the past 12 months, have you or your family members you live with been unable to get utilities (heat,  electricity) when it was really needed?: No   Food Insecurity: Low Risk  (10/9/2024)    Food Insecurity     Within the past 12 months, did you worry that your food would run out before you got money to buy more?: No     Within the past 12 months, did the food you bought just not last and you didn t have money to get more?: No   Transportation Needs: Low Risk  (10/9/2024)    Transportation Needs     Within the past 12 months, has lack of transportation kept you from medical appointments, getting your medicines, non-medical meetings or appointments, work, or from getting things that you need?: No   Physical Activity: Not on file   Stress: Not on file   Social Connections: Not on file   Interpersonal Safety: High Risk (10/9/2024)    Interpersonal Safety     Do you feel physically and emotionally safe where you currently live?: No     Within the past 12 months, have you been hit, slapped, kicked or otherwise physically hurt by someone?: No     Within the past 12 months, have you been humiliated or emotionally abused in other ways by your partner or ex-partner?: No   Housing Stability: High Risk (10/9/2024)    Housing Stability     Do you have housing? : No     Are you worried about losing your housing?: No          Allergies   Allergen Reactions    Exenatide Unknown    Heparin     Liraglutide     Lisinopril Cough    Morphine           Current Facility-Administered Medications:     acetaminophen (TYLENOL) tablet 650 mg, 650 mg, Oral, Q4H PRN, 650 mg at 10/09/24 0548 **OR** acetaminophen (TYLENOL) Suppository 650 mg, 650 mg, Rectal, Q4H PRN, Sybil Peter DO    amLODIPine (NORVASC) tablet 5 mg, 5 mg, Oral, Daily, Sybil Peter DO, 5 mg at 10/09/24 1127    aspirin EC tablet 81 mg, 81 mg, Oral, Daily, Sybil Peter DO, 81 mg at 10/09/24 0932    calcium carbonate (TUMS) chewable tablet 1,000 mg, 1,000 mg, Oral, 4x Daily PRN, Sybil Peter DO    clopidogrel (PLAVIX) tablet 75 mg, 75 mg, Oral, Daily, [place]  DO Sybil, 75 mg at 10/09/24 0932    glucose gel 15-30 g, 15-30 g, Oral, Q15 Min PRN **OR** dextrose 50 % injection 25-50 mL, 25-50 mL, Intravenous, Q15 Min PRN **OR** glucagon injection 1 mg, 1 mg, Subcutaneous, Q15 Min PRN, Sybil Peter DO    empagliflozin (JARDIANCE) tablet 10 mg, 10 mg, Oral, Daily, Sybil Peter DO, 10 mg at 10/09/24 0932    insulin aspart (NovoLOG) injection (RAPID ACTING), , Subcutaneous, TID w/meals, Sybil Peter DO, 6 Units at 10/08/24 1747    insulin aspart (NovoLOG) injection (RAPID ACTING), 1-10 Units, Subcutaneous, TID AC, Sybil Peter DO    insulin aspart (NovoLOG) injection (RAPID ACTING), 1-7 Units, Subcutaneous, At Bedtime, Sybil Peter DO, 3 Units at 10/07/24 2114    insulin glargine (LANTUS PEN) injection 10 Units, 10 Units, Subcutaneous, At Bedtime, Sybil Peter DO    [START ON 10/10/2024] insulin glargine (LANTUS PEN) injection 90 Units, 90 Units, Subcutaneous, QAM AC, Sybil Peter DO    irbesartan (AVAPRO) tablet 300 mg, 300 mg, Oral, At Bedtime, Sybil Peter DO, 300 mg at 10/08/24 2100    levothyroxine (SYNTHROID/LEVOTHROID) tablet 50 mcg, 50 mcg, Oral, Daily, Sybil Peter DO, 50 mcg at 10/09/24 0932    lidocaine (LMX4) cream, , Topical, Q1H PRN, Sybil Peter DO    lidocaine 1 % 0.1-1 mL, 0.1-1 mL, Other, Q1H PRN, Sybil Peter DO    ondansetron (ZOFRAN ODT) ODT tab 4 mg, 4 mg, Oral, Q6H PRN **OR** ondansetron (ZOFRAN) injection 4 mg, 4 mg, Intravenous, Q6H PRN, Sybil Peter DO    piperacillin-tazobactam (ZOSYN) 3.375 g vial to attach to  mL bag, 3.375 g, Intravenous, Q8H, Simón Bain MD, 3.375 g at 10/09/24 1125    prochlorperazine (COMPAZINE) injection 5 mg, 5 mg, Intravenous, Q6H PRN **OR** prochlorperazine (COMPAZINE) tablet 5 mg, 5 mg, Oral, Q6H PRN **OR** prochlorperazine (COMPAZINE) suppository 12.5 mg, 12.5 mg, Rectal, Q12H PRN, Sybil Peter DO    rosuvastatin (CRESTOR) tablet 40 mg, 40 mg,  Oral, Daily, Messelt, Sybil, DO, 40 mg at 10/09/24 0932    senna-docusate (SENOKOT-S/PERICOLACE) 8.6-50 MG per tablet 1 tablet, 1 tablet, Oral, BID PRN **OR** senna-docusate (SENOKOT-S/PERICOLACE) 8.6-50 MG per tablet 2 tablet, 2 tablet, Oral, BID PRN, Messelt, Sybil, DO    sodium chloride (PF) 0.9% PF flush 3 mL, 3 mL, Intracatheter, Q8H, Messelt, Syibl, DO, 3 mL at 10/08/24 1748    sodium chloride (PF) 0.9% PF flush 3 mL, 3 mL, Intracatheter, q1 min prn, Messelt, Sybil, DO    spironolactone (ALDACTONE) tablet 25 mg, 25 mg, Oral, Daily, Messelt, Sybil, DO, 25 mg at 10/09/24 1127    vancomycin (VANCOCIN) 1,500 mg in sodium chloride 0.9 % 290 mL intermittent infusion, 1,500 mg, Intravenous, Q24H, Simón Bain MD, 1,500 mg at 10/09/24 0853     No family history on file.     Social History     Socioeconomic History    Marital status:      Spouse name: Not on file    Number of children: Not on file    Years of education: Not on file    Highest education level: Not on file   Occupational History    Not on file   Tobacco Use    Smoking status: Not on file    Smokeless tobacco: Not on file   Substance and Sexual Activity    Alcohol use: Not on file    Drug use: Not on file    Sexual activity: Not on file   Other Topics Concern    Not on file   Social History Narrative    Not on file     Social Determinants of Health     Financial Resource Strain: Low Risk  (10/9/2024)    Financial Resource Strain     Within the past 12 months, have you or your family members you live with been unable to get utilities (heat, electricity) when it was really needed?: No   Food Insecurity: Low Risk  (10/9/2024)    Food Insecurity     Within the past 12 months, did you worry that your food would run out before you got money to buy more?: No     Within the past 12 months, did the food you bought just not last and you didn t have money to get more?: No   Transportation Needs: Low Risk  (10/9/2024)    Transportation Needs  "    Within the past 12 months, has lack of transportation kept you from medical appointments, getting your medicines, non-medical meetings or appointments, work, or from getting things that you need?: No   Physical Activity: Not on file   Stress: Not on file   Social Connections: Not on file   Interpersonal Safety: High Risk (10/9/2024)    Interpersonal Safety     Do you feel physically and emotionally safe where you currently live?: No     Within the past 12 months, have you been hit, slapped, kicked or otherwise physically hurt by someone?: No     Within the past 12 months, have you been humiliated or emotionally abused in other ways by your partner or ex-partner?: No   Housing Stability: High Risk (10/9/2024)    Housing Stability     Do you have housing? : No     Are you worried about losing your housing?: No        Review of Systems - Patient denies fever, chills, rash, wound, stiffness,  numbness, weakness, heart burn, blood in stool, chest pain with activity, calf pain when walking, shortness of breath with activity, chronic cough, easy bleeding/bruising, swelling of ankles, excessive thirst, fatigue, depression, anxiety.  Patient admits to chronic ulcer right foot.      OBJECTIVE:  Appearance: alert, well appearing, and in no distress.    BP (!) 158/68 (Patient Position: Standing)   Pulse 66   Temp 98  F (36.7  C) (Oral)   Resp 18   Ht 1.727 m (5' 8\")   Wt 102.3 kg (225 lb 8 oz)   SpO2 96%   BMI 34.29 kg/m       Body mass index is 34.29 kg/m .     General appearance: Patient is alert and fully cooperative with history & exam.  No sign of distress is noted during the visit.  Psychiatric: Affect is pleasant & appropriate.  Patient appears motivated to improve health.  Respiratory: Breathing is regular & unlabored while sitting.  HEENT: Hearing is intact to spoken word.  Speech is clear.  No gross evidence of visual impairment that would impact ambulation.    Vascular: Dorsalis pedis and posterior tibial " pulses are palpable. There is no pedal hair growth noted.  CFT < 3 sec from anterior tibial surface to distal digits bilaterally. There is no appreciable edema noted.  Dermatologic: Large round necrotic wound posterior aspect right heel.  No fluctuance noted.  No active drainage or bleeding noted.  No odor noted.  Turgor and texture are within normal limits. No coloration or temperature changes.  Neurologic: All epicritic and proprioceptive sensations are diminished bilaterally.   Musculoskeletal: All active and passive ankle, subtalar, midtarsal, and 1st MPJ range of motion are grossly intact. Manual muscle strength is within normal limits. All dorsiflexors, plantarflexors, invertors, evertors are intact bilaterally. Tenderness present to the posterior aspect of the right heel on palpation.  No tenderness to the right foot or ankle with range of motion. Calf is soft/non-tender without warmth/induration    Imaging:       [unfilled]     MR Foot Right w/o Contrast    Result Date: 10/8/2024  EXAM: MR FOOT RIGHT W/O CONTRAST LOCATION: [facility] DATE: 10/8/2024 INDICATION: Diabetic foot ulcer COMPARISON: Radiograph 10/07/2024 TECHNIQUE: Unenhanced. FINDINGS: TENDONS: -Peroneal: Peroneus longus and brevis tendons are intact. No tendinopathy or tenosynovitis. No subluxation. -Medial: Posterior tibialis tendon is intact. No tendinopathy or tenosynovitis. Flexor digitorum longus and flexor hallucis longus tendons are normal. No tenosynovitis. -Anterior: Anterior tibialis, extensor hallucis longus, and extensor digitorum longus tendons are normal. No tenosynovitis. -Achilles: Minimal Achilles tendinopathy. No tear or peritendinitis. LIGAMENTS: -Anterior talofibular ligament: Intact. -Calcaneofibular ligament: Intact. -Posterior talofibular ligament: Intact. -Syndesmotic inferior tibiofibular ligaments: Intact. -Deltoid ligament complex: Effacement of fat in the deltoid ligament could represent  prior sprain. The ligament remains intact. -Spring ligament complex: Intact. JOINTS AND BONES: -No fracture or contusion. -There is T2 hyperintense edema of the calcaneal tuberosity deep to the ulcer. There is mild linear T1 hypointense signal without confluent T1 hypointense replacement. -Severe talonavicular joint osteoarthritis with subchondral cystic change and remodeling of the navicular bone. SOFT TISSUES: -Plantar fascia: Chronic central band plantar fasciitis. No tear. -Sinus tarsi and tarsal tunnel: Normal. -Muscles: Subacute on chronic denervation atrophy of the visualized foot and distal leg musculature. -There is a soft tissue ulcer of the calcaneus with subjacent nonorganized subcutaneous edema. Edema abuts the calcaneal tuberosity. No organized/drainable collection.     IMPRESSION: 1.  Soft tissue ulcer of the heel with subcutaneous edema abutting the calcaneus compatible with cellulitis. No organized/drainable collection. There is T2 hyperintense marrow edema of the subjacent calcaneal tuberosity with mild linear T1 hypointense signal but no confluent T1 hypointense replacement. Given location subjacent to an ulcer, these findings are compatible with a high likelihood though are not definitive of osteomyelitis. 2.  Severe talonavicular joint osteoarthritis.    Foot  XR, G/E 3 views, right    Result Date: 10/7/2024  EXAM: XR FOOT RIGHT G/E 3 VIEWS LOCATION: [facility] DATE: 10/7/2024 INDICATION: diabetic foot ulcer right heel COMPARISON: None.     IMPRESSION: Deep soft tissue ulceration along the posterior aspect of the heel. No focal osseous erosions or periostitis to suggest osteomyelitis. If there is persistent clinical concern for osteomyelitis, MRI is more sensitive.  Large plantar calcaneal spur. Moderate midfoot degenerative change most evident at the talonavicular joint where there are prominent dorsal osteophytes. Vascular calcifications extending into the toes.  Bipartite medial hallux sesamoid.     MR Foot Right w/o Contrast    Result Date: 10/8/2024  EXAM: MR FOOT RIGHT W/O CONTRAST LOCATION: [facility] DATE: 10/8/2024 INDICATION: Diabetic foot ulcer COMPARISON: Radiograph 10/07/2024 TECHNIQUE: Unenhanced. FINDINGS: TENDONS: -Peroneal: Peroneus longus and brevis tendons are intact. No tendinopathy or tenosynovitis. No subluxation. -Medial: Posterior tibialis tendon is intact. No tendinopathy or tenosynovitis. Flexor digitorum longus and flexor hallucis longus tendons are normal. No tenosynovitis. -Anterior: Anterior tibialis, extensor hallucis longus, and extensor digitorum longus tendons are normal. No tenosynovitis. -Achilles: Minimal Achilles tendinopathy. No tear or peritendinitis. LIGAMENTS: -Anterior talofibular ligament: Intact. -Calcaneofibular ligament: Intact. -Posterior talofibular ligament: Intact. -Syndesmotic inferior tibiofibular ligaments: Intact. -Deltoid ligament complex: Effacement of fat in the deltoid ligament could represent prior sprain. The ligament remains intact. -Spring ligament complex: Intact. JOINTS AND BONES: -No fracture or contusion. -There is T2 hyperintense edema of the calcaneal tuberosity deep to the ulcer. There is mild linear T1 hypointense signal without confluent T1 hypointense replacement. -Severe talonavicular joint osteoarthritis with subchondral cystic change and remodeling of the navicular bone. SOFT TISSUES: -Plantar fascia: Chronic central band plantar fasciitis. No tear. -Sinus tarsi and tarsal tunnel: Normal. -Muscles: Subacute on chronic denervation atrophy of the visualized foot and distal leg musculature. -There is a soft tissue ulcer of the calcaneus with subjacent nonorganized subcutaneous edema. Edema abuts the calcaneal tuberosity. No organized/drainable collection.     IMPRESSION: 1.  Soft tissue ulcer of the heel with subcutaneous edema abutting the calcaneus compatible with cellulitis. No  organized/drainable collection. There is T2 hyperintense marrow edema of the subjacent calcaneal tuberosity with mild linear T1 hypointense signal but no confluent T1 hypointense replacement. Given location subjacent to an ulcer, these findings are compatible with a high likelihood though are not definitive of osteomyelitis. 2.  Severe talonavicular joint osteoarthritis.    Foot  XR, G/E 3 views, right    Result Date: 10/7/2024  EXAM: XR FOOT RIGHT G/E 3 VIEWS LOCATION: [facility] DATE: 10/7/2024 INDICATION: diabetic foot ulcer right heel COMPARISON: None.     IMPRESSION: Deep soft tissue ulceration along the posterior aspect of the heel. No focal osseous erosions or periostitis to suggest osteomyelitis. If there is persistent clinical concern for osteomyelitis, MRI is more sensitive.  Large plantar calcaneal spur. Moderate midfoot degenerative change most evident at the talonavicular joint where there are prominent dorsal osteophytes. Vascular calcifications extending into the toes. Bipartite medial hallux sesamoid.           Eh Morales DPM  [facility] Foot & Ankle Surgery/Podiatry

## 2024-10-09 NOTE — PROGRESS NOTES
Patient left hospital before ordered boot could be delivered.  Called patient at home to arrange delivery, patient said he was not interested.

## 2024-10-09 NOTE — PROGRESS NOTES
"PT Evaluation   10/09/24 1320   Appointment Info   Signing Clinician's Name / Credentials (PT) Isabella Barger PT, DPT   Living Environment   People in Home alone   Current Living Arrangements independent living facility   Home Accessibility no concerns   Transportation Anticipated family or friend will provide   Living Environment Comments walk in shower with GB and shower chair   Self-Care   Usual Activity Tolerance moderate   Current Activity Tolerance fair   Equipment Currently Used at Home cane, straight;grab bar, tub/shower;shower chair;walker, rolling;other (see comments)  (power scooter for long distances)   Fall history within last six months no   Activity/Exercise/Self-Care Comment IND with ADLs at baseline.   General Information   Onset of Illness/Injury or Date of Surgery 10/07/24   Referring Physician Sybil Peter DO   Patient/Family Therapy Goals Statement (PT) None stated   Pertinent History of Current Problem (include personal factors and/or comorbidities that impact the POC) Per chart: \"79 year old male admitted on 10/7/2024. He has history of insulin dependent type 2 diabetes mellitus, presenting with worsening ulcer to the right heel, concerning for possible osteomyelitis.\"   Existing Precautions/Restrictions fall;weight bearing   Weight-Bearing Status - RLE nonweight-bearing  (per verbal order DPM provided to RN. After session, through [employer] message, when told patient could not maintain precautions, DPM stated patient could weightbear in a CAM boot.)   Cognition   Affect/Mental Status (Cognition) WNL   Follows Commands (Cognition) WNL   Pain Assessment   Patient Currently in Pain Yes, see Vital Sign flowsheet  (Reports pain in L knee in standing, pain in B lower legs)   Integumentary/Edema   Integumentary/Edema other (describe)   Integumentary/Edema Comments diabetic foot infection on R heel   Posture    Posture Forward head position   Range of Motion (ROM)   Range of Motion ROM is WFL "   Strength (Manual Muscle Testing)   Strength (Manual Muscle Testing) Deficits observed during functional mobility   Bed Mobility   Comment, (Bed Mobility) Patient already sitting up in recliner when PT arrived.   Transfers   Transfers sit-stand transfer   Maintains Weight-bearing Status (Transfers) unable to maintain;physical assist to maintain;verbal cues to maintain;nonverbal cues (demo/gesture) to maintain   Transfer Safety Concerns Noted losing balance backward;inability to maintain weight-bearing restrictions w/o assist   Impairments Contributing to Impaired Transfers decreased strength;impaired balance;pain   Sit-Stand Transfer   Sit-Stand [place] (Transfers) moderate assist (50% patient effort);2 person assist  (with 3rd assist attempting to hold RLE to maintain NWB, but pt continues to press through RLE)   Assistive Device (Sit-Stand Transfers) walker, front-wheeled   Comment, (Sit-Stand Transfer) Pt standing for a few seconds before refusing to try to stand any longer and refusing further attempts to stand with NWB RLE. Pt firmly stating he would be unable to mobilize with maintaining NWB RLE.   Gait/Stairs (Locomotion)   [place] Level (Gait) unable to assess   Comment, (Gait/Stairs) Patient unable to maintain NWB RLE precautions for sit<>stand transfer and standing.   Balance   Balance other (describe)   Balance Comments modAx2 for standing balance with FWW while attempting to have patient maintain NWB RLE. Pt unable to maintain precautions.   Sensory Examination   Sensory Perception other (describe)   Sensory Perception Comments BLE neuropathy at baseline   Clinical Impression   Criteria for Skilled Therapeutic Intervention Yes, treatment indicated   PT Diagnosis (PT) Impaired functional mobility   Influenced by the following impairments Impaired strength, balance, activity tolerance; pain   Functional limitations due to impairments Bed mobility, transfers, gait, endurance   Clinical  Presentation (PT Evaluation Complexity) evolving   Clinical Presentation Rationale Clinical judgment   Clinical Decision Making (Complexity) moderate complexity   Planned Therapy Interventions (PT) balance training;bed mobility training;gait training;home exercise program;neuromuscular re-education;patient/family education;stair training;strengthening;stretching;transfer training;progressive activity/exercise;home program guidelines   Risk & Benefits of therapy have been explained evaluation/treatment results reviewed;participants included;patient;participants voiced agreement with care plan   PT Total Evaluation Time   PT Eval, Moderate Complexity Minutes (79865) 7   Physical Therapy Goals   PT Frequency Daily   PT Predicted Duration/Target Date for Goal Attainment 10/16/24   PT Goals Bed Mobility;Transfers;Gait   PT: Bed Mobility Supervision/stand-by assist;Supine to/from sit;Within precautions   PT: Transfers Sit to/from stand;Bed to/from chair;Assistive device;Within precautions;Supervision/stand-by assist   PT: Gait Supervision/stand-by assist;25 feet;Rolling walker   Interventions   Interventions Quick Adds Therapeutic Activity   Therapeutic Activity   Therapeutic Activities: dynamic activities to improve functional performance Minutes (92458) 9   Symptoms Noted During/After Treatment Increased pain;Fatigue   Treatment Detail/Skilled Intervention Eval completed, tx initiated. Education on NWB RLE precautions, and importance for preventing worsening of wound/infection. Demonstration and instruction on how to maintain NWB RLE for transfers and gait with FWW. Patient declining further transfer/standing attempts to further strengthen and improve abilities to maintain NWB RLE. Pt firmly stating he would be unable to maintain NWB RLE precautions. Discussed with pt that would communicate with DPM and RN regarding options ie special shoe while using FWW instead of his cane to further help offload RLE. Pt left sitting  up in recliner with call light & all other needs in reach. After session communicated with DPM through [employer] regarding inability to maintain NWB RLE - DPM put in order for orthotics consult for CAM boot.   PT Discharge Planning   PT Plan NWB RLE: bed mob, don CAM boot - prog transf & gait FWW   PT Discharge Recommendation (DC Rec) home with home care physical therapy   PT Rationale for DC Rec Patient is unable to maintain NWB RLE precautions with transfers. PT notified DPM, who stated patient would be allowed to weightbear with use of the CAM boot. DPM placed order for CAM boot. Anticipate patient would be able to safely discharge home with use of CAM boot on RLE and use of FWW for mobility in order to protect RLE wound.   PT Brief overview of current status modAx2 sit<>stand FWW, unable to maintain NWB RLE   PT Equipment Needed at Discharge other (see comments)  (has walker at home)   Total Session Time   Timed Code Treatment Minutes 9   Total Session Time (sum of timed and untimed services) 16

## 2024-10-09 NOTE — PROGRESS NOTES
Addendum to Discharge Summary dated October 9, 2024.     # Diabetic foot ulcer w/cellulitis and osteomyelitis   # Uncontrolled T2DM    Patient with poorly controlled diabetes, A1c of 9.1% on 09/27/24, and poor glucose control at home per CGM. Presenting with ulcer to the heel of the right foot which has been present for several years. Admitted for concern of cellulitis and osteomyelitis requiring antibiotic infusion, additional imaging, and evaluation by podiatry. MRI on 10/8 demonstrating subcutaneous edema concerning for cellulitis and calcaneal changes suggestive of osteomyelitis although not definitive.    IMPRESSION:  1. Soft tissue ulcer of the heel with subcutaneous edema abutting the calcaneus compatible with cellulitis... There is T2 hyperintense marrow edema of the subjacent calcaneal tuberosity with mild linear T1 hypointense signal but no confluent T1 hypointense replacement. Given location subjacent to an ulcer, these findings are compatible with a high likelihood though are not definitive of osteomyelitis.    - Podiatry consulted   - Vancomycin/Zosyn given during admission given evidence of subcutaneous edema/cellulitis. Will not need to continue on discharge, per podiatry.   - Diabetes management:    - Continue PTA Jardiance   - 100 unit(s) Lantus qam   - 15 unit(s) Lantus hs    - Bolus Novolog 1:10 carb ratio TID with meals   - HSBÁRBARA Peter DO

## 2024-10-09 NOTE — TELEPHONE ENCOUNTER
Vascular Referral Intake    Referred by: Sybil Peter DO for Diabetic foot infection.     Specialty: Wound Clinic    Specific Provider if Necessary:  CLYDE Patricio    Visit Type: New    Time Frame: Next Available    Testing/Imaging Needed Before Consult: SONNY     Appt Note: (to be pasted into appt comments, also add where additional information is located, ie, outside images pushed to PACS, in Epic, sent to [facility], etc)  Right heel wound present for several years. Seen inpt [facility] and MRI done 10/7/24. A1c of 9.1% on 09/27/24, and poor glucose control at home.

## 2024-10-09 NOTE — PLAN OF CARE
Problem: Adult Inpatient Plan of Care  Goal: Plan of Care Review  Description: The Plan of Care Review/Shift note should be completed every shift.  The Outcome Evaluation is a brief statement about your assessment that the patient is improving, declining, or no change.  This information will be displayed automatically on your shift  note.  Outcome: Progressing  Goal: Absence of Hospital-Acquired Illness or Injury  Intervention: Identify and Manage Fall Risk  Recent Flowsheet Documentation  Taken 10/9/2024 0030 by Jada Yousif RN  Safety Promotion/Fall Prevention:   clutter free environment maintained   patient and family education  Taken 10/8/2024 2030 by Jada Yousif RN  Safety Promotion/Fall Prevention:   clutter free environment maintained   patient and family education  Intervention: Prevent Skin Injury  Recent Flowsheet Documentation  Taken 10/9/2024 0030 by Jada Yousif RN  Body Position: position changed independently  Taken 10/8/2024 2030 by Jada Yousif RN  Body Position: position changed independently     Problem: Comorbidity Management  Goal: Blood Glucose Levels Within Targeted Range  Outcome: Progressing  Intervention: Monitor and Manage Glycemia  Recent Flowsheet Documentation  Taken 10/9/2024 0030 by Jada Yousif RN  Medication Review/Management: medications reviewed  Taken 10/8/2024 2030 by Jada Yousif RN  Medication Review/Management: medications reviewed  Goal: Maintenance of Heart Failure Symptom Control  Outcome: Progressing  Intervention: Maintain Heart Failure Management  Recent Flowsheet Documentation  Taken 10/9/2024 0030 by Jada Yousif RN  Medication Review/Management: medications reviewed  Taken 10/8/2024 2030 by Jada Yousif RN  Medication Review/Management: medications reviewed  Goal: Blood Pressure in Desired Range  Outcome: Progressing  Intervention: Maintain Blood Pressure  Management  Recent Flowsheet Documentation  Taken 10/9/2024 0030 by Jada Yousif RN  Medication Review/Management: medications reviewed  Taken 10/8/2024 2030 by Jada Yousif RN  Medication Review/Management: medications reviewed     Problem: Wound  Goal: Optimal Pain Control and Function  Outcome: Progressing     Problem: Pain Acute  Goal: Optimal Pain Control and Function  Outcome: Progressing  Intervention: Prevent or Manage Pain  Recent Flowsheet Documentation  Taken 10/9/2024 0030 by Jada Yousif RN  Medication Review/Management: medications reviewed  Taken 10/8/2024 2030 by Jada Yousif RN  Medication Review/Management: medications reviewed     Problem: Infection  Goal: Absence of Infection Signs and Symptoms  Outcome: Progressing   Goal Outcome Evaluation:       Pt alert & oriented. Denies pain. IV abx given as ordered. NPO. Rt heel wound open to air, black and odor present. On room air, Vital Signs WNL. Will continue to monitor.

## 2024-10-09 NOTE — DISCHARGE SUMMARY
"[facility]  Discharge Summary - Medicine & Pediatrics       Date of Admission:  10/7/2024  Date of Discharge:  10/9/2024  Discharging Provider: Sybil Peter DO  Discharge Service: Hospitalist Service    Discharge Diagnoses   Diabetic foot ulcer  Concern for osteomyelitis  Uncontrolled Type 2 Diabetes Mellitus  Neuropathy    Clinically Significant Risk Factors     # Obesity: Estimated body mass index is 34.29 kg/m  as calculated from the following:    Height as of this encounter: 1.727 m (5' 8\").    Weight as of this encounter: 102.3 kg (225 lb 8 oz).       Follow-ups Needed After Discharge   Follow up with PCP for diabetes management  Follow up with vascular clinic outpatient for management of foot ulcer     Unresulted Labs Ordered in the Past 30 Days of this Admission       Date and Time Order Name Status Description    10/7/2024 10:47 AM Blood Culture Line, venous Preliminary         These results will be followed up by PCP    Discharge Disposition   Discharged to home  Condition at discharge: Stable    Hospital Course   Diabetic foot ulcer  Concern for osteomyelitis  Patient with poorly controlled diabetes, A1c of 9.1% on 09/27/24, and poor glucose control at home per CGM. Presenting with ulcer to the heel of the right foot which has been present for several years. Has occasional discomfort to the area, however this has not changed. Significant numbness to the bilateral feet, with near absent pinprick and light touch sensation with diabetic foot exam. Work up in ED with elevated CRP, sed rate, but no fever or white count. No erythema or purulence to suggest cellulitis and no evidence of systemic infection. Foot XR without evidence of osteomyelitis, however MRI is more sensitive. MRI on 10/8 not definitive for osteomyelitis although highly likely (see below). Given two days of Zosyn and one day of Vanc due to signs of underlying edema abutting the calcaneus concerning for cellulitis. " Podiatry recommending outpatient follow up with vascular clinic without continuation of antibiotics on discharge. Likely will need debridement sometime in the future but not requiring immediate intervention.                  IMPRESSION:  1.  Soft tissue ulcer of the heel with subcutaneous edema abutting the calcaneus compatible with cellulitis. No organized/drainable collection. There is T2 hyperintense marrow edema of the subjacent calcaneal tuberosity with mild linear T1 hypointense   signal but no confluent T1 hypointense replacement. Given location subjacent to an ulcer, these findings are compatible with a high likelihood though are not definitive of osteomyelitis.  2.  Severe talonavicular joint osteoarthritis.    Type 2 Diabetes Mellitus  Neuropathy  Patient with poorly controlled diabetes, A1c of 9.1% on 09/27/24, and poor glucose control at home per CGM. On a home regimen of empagliflozin, Lantus 100 qam + 25 HS as well as sliding scale with meals.     Consultations This Hospital Stay   PHYSICAL THERAPY ADULT IP CONSULT  OCCUPATIONAL THERAPY ADULT IP CONSULT  CARE MANAGEMENT / SOCIAL WORK IP CONSULT  PODIATRY IP CONSULT  PHARMACY TO DOSE VANCO    Code Status   No CPR- Do NOT Intubate       The patient was discussed with Dr. Odell Peter, DO  [facility]  [address] [address]  Phone: [phone]  Fax: [phone]  ______________________________________________________________________    Physical Exam   Vital Signs: Temp: 98  F (36.7  C) Temp src: Oral BP: (!) 158/68 Pulse: 66   Resp: 18 SpO2: 96 % O2 Device: None (Room air)    Weight: 225 lbs 8 oz    Physical Exam  Constitutional:       General: He is not in acute distress.     Appearance: Normal appearance. He is normal weight.   Cardiovascular:      Rate and Rhythm: Normal rate and regular rhythm.      Heart sounds: Normal heart sounds.   Pulmonary:      Effort:  Pulmonary effort is normal. No respiratory distress.      Breath sounds: Normal breath sounds.   Musculoskeletal:      Right lower leg: Right lower leg edema: Trace.      Left lower leg: Left lower leg edema: Trace.   Skin:     General: Skin is warm and dry.      Capillary Refill: Capillary refill takes 2 to 3 seconds.      Comments: The distal lower extremities appear more dusky in color today but still warm to the touch. Somewhat delayed capillary refill. Trace bilateral lower extremity edema.     Diabetic foot ulcer about 4 cm in diameter on the right heel. Tender to palpation of surrounding area. Malodorous.    Neurological:      General: No focal deficit present.      Mental Status: He is alert. Mental status is at baseline.   Psychiatric:         Mood and Affect: Mood normal.       Primary Care Physician   Anayeli Machado    Discharge Orders   No discharge procedures on file.    Significant Results and Procedures   Results for orders placed or performed during the hospital encounter of 10/07/24   Foot  XR, G/E 3 views, right    Narrative    EXAM: XR FOOT RIGHT G/E 3 VIEWS  LOCATION: [facility]  DATE: 10/7/2024    INDICATION: diabetic foot ulcer right heel  COMPARISON: None.      Impression    IMPRESSION: Deep soft tissue ulceration along the posterior aspect of the heel. No focal osseous erosions or periostitis to suggest osteomyelitis. If there is persistent clinical concern for osteomyelitis, MRI is more sensitive.  Large plantar calcaneal   spur. Moderate midfoot degenerative change most evident at the talonavicular joint where there are prominent dorsal osteophytes. Vascular calcifications extending into the toes. Bipartite medial hallux sesamoid.   MR Foot Right w/o Contrast    Narrative    EXAM: MR FOOT RIGHT W/O CONTRAST  LOCATION: [facility]  DATE: 10/8/2024    INDICATION: Diabetic foot ulcer  COMPARISON: Radiograph 10/07/2024  TECHNIQUE:  Unenhanced.    FINDINGS:     TENDONS:   -Peroneal: Peroneus longus and brevis tendons are intact. No tendinopathy or tenosynovitis. No subluxation.  -Medial: Posterior tibialis tendon is intact. No tendinopathy or tenosynovitis. Flexor digitorum longus and flexor hallucis longus tendons are normal. No tenosynovitis.  -Anterior: Anterior tibialis, extensor hallucis longus, and extensor digitorum longus tendons are normal. No tenosynovitis.  -Achilles: Minimal Achilles tendinopathy. No tear or peritendinitis.    LIGAMENTS:   -Anterior talofibular ligament: Intact.   -Calcaneofibular ligament: Intact.   -Posterior talofibular ligament: Intact.  -Syndesmotic inferior tibiofibular ligaments: Intact.  -Deltoid ligament complex: Effacement of fat in the deltoid ligament could represent prior sprain. The ligament remains intact.  -Spring ligament complex: Intact.    JOINTS AND BONES:   -No fracture or contusion.  -There is T2 hyperintense edema of the calcaneal tuberosity deep to the ulcer. There is mild linear T1 hypointense signal without confluent T1 hypointense replacement.  -Severe talonavicular joint osteoarthritis with subchondral cystic change and remodeling of the navicular bone.    SOFT TISSUES:  -Plantar fascia: Chronic central band plantar fasciitis. No tear.  -Sinus tarsi and tarsal tunnel: Normal.  -Muscles: Subacute on chronic denervation atrophy of the visualized foot and distal leg musculature.  -There is a soft tissue ulcer of the calcaneus with subjacent nonorganized subcutaneous edema. Edema abuts the calcaneal tuberosity. No organized/drainable collection.      Impression    IMPRESSION:  1.  Soft tissue ulcer of the heel with subcutaneous edema abutting the calcaneus compatible with cellulitis. No organized/drainable collection. There is T2 hyperintense marrow edema of the subjacent calcaneal tuberosity with mild linear T1 hypointense   signal but no confluent T1 hypointense replacement. Given location  subjacent to an ulcer, these findings are compatible with a high likelihood though are not definitive of osteomyelitis.  2.  Severe talonavicular joint osteoarthritis.       Discharge Medications   Current Discharge Medication List        CONTINUE these medications which have NOT CHANGED    Details   amLODIPine (NORVASC) 5 MG tablet Take 5 mg by mouth daily.      aspirin 81 MG EC tablet Take 81 mg by mouth daily.      clopidogrel (PLAVIX) 75 MG tablet Take 75 mg by mouth daily.      co-enzyme Q-10 100 MG CAPS capsule Take 200 mg by mouth daily.      empagliflozin (JARDIANCE) 10 MG TABS tablet Take 10 mg by mouth daily.      insulin aspart (NOVOLOG PEN) 100 UNIT/ML pen Inject subcutaneously 3 times daily (with meals). Per sliding scale, if BG >150 mg/dL patient takes 20 units      !! insulin glargine (LANTUS PEN) 100 UNIT/ML pen Inject 100 Units subcutaneously every morning.      !! insulin glargine (LANTUS PEN) 100 UNIT/ML pen Inject 25 Units subcutaneously at bedtime.      irbesartan (AVAPRO) 300 MG tablet Take 300 mg by mouth at bedtime.      lactobacillus rhamnosus, GG, (CULTURELL) capsule Take 1 capsule by mouth daily.      levothyroxine (SYNTHROID/LEVOTHROID) 50 MCG tablet Take 50 mcg by mouth daily.      metFORMIN (GLUCOPHAGE XR) 500 MG 24 hr tablet Take 2,000 mg by mouth daily (with dinner).      Multiple Vitamins-Minerals (PRESERVISION AREDS 2 PO) Take 2 capsules by mouth daily.      multivitamin, therapeutic (THERA-VIT) TABS tablet Take 1 tablet by mouth daily.      rosuvastatin (CRESTOR) 40 MG tablet Take 40 mg by mouth daily.      spironolactone (ALDACTONE) 25 MG tablet Take 25 mg by mouth daily.      vitamin C (ASCORBIC ACID) 1000 MG TABS Take 1,000 mg by mouth daily.      vitamin C with B complex (B COMPLEX-C) tablet Take 1 tablet by mouth daily.       !! - Potential duplicate medications found. Please discuss with provider.        Allergies   Allergies   Allergen Reactions    Exenatide Unknown     Heparin     Liraglutide     Lisinopril Cough    Morphine    Addendum to Discharge Summary dated October 9, 2024.      # Diabetic foot ulcer w/cellulitis and osteomyelitis   # Uncontrolled T2DM     Patient with poorly controlled diabetes, A1c of 9.1% on 09/27/24, and poor glucose control at home per CGM. Presenting with ulcer to the heel of the right foot which has been present for several years. Admitted for concern of cellulitis and osteomyelitis requiring antibiotic infusion, additional imaging, and evaluation by podiatry. MRI on 10/8 demonstrating subcutaneous edema concerning for cellulitis and calcaneal changes suggestive of osteomyelitis although not definitive.    IMPRESSION:  1. Soft tissue ulcer of the heel with subcutaneous edema abutting the calcaneus compatible with cellulitis... There is T2 hyperintense marrow edema of the subjacent calcaneal tuberosity with mild linear T1 hypointense signal but no confluent T1 hypointense replacement. Given location subjacent to an ulcer, these findings are compatible with a high likelihood though are not definitive of osteomyelitis.    - Podiatry consulted, given recs and alright with discharge and close follow up with vascular clinic for wound management.   - Vancomycin/Zosyn given during admission given evidence of subcutaneous edema/cellulitis. Will not need to continue on discharge, per podiatry.   - Diabetes management during this admission.               - Continue PTA Jardiance              - 100 unit(s) Lantus qam              - 15 unit(s) Lantus hs               - Bolus Novolog 1:10 carb ratio TID with meals              - [facility]      Sybil Peter DO

## 2024-10-09 NOTE — PROGRESS NOTES
"Occupational Therapy      10/09/24 1325   Appointment Info   Signing Clinician's Name / Credentials (OT) VANNESSA Khan   Living Environment   People in Home alone   Current Living Arrangements independent living facility   Home Accessibility no concerns   Transportation Anticipated family or friend will provide   Living Environment Comments Pt reports having walk-in shower w/ grab bars and shower chair   Self-Care   Usual Activity Tolerance moderate   Current Activity Tolerance fair   Equipment Currently Used at Home cane, straight;grab bar, tub/shower;shower chair;walker, rolling  (FWW and power scooter for long distances)   Fall history within last six months no   Activity/Exercise/Self-Care Comment Pt reports being ind w/ ADLs at baselien   Instrumental Activities of Daily Living (IADL)   IADL Comments Pt reports being ind w/ IADLs at baseline except shopping and transportation - son helps   General Information   Onset of Illness/Injury or Date of Surgery 10/07/24   Referring Physician Sybil Peter,    Patient/Family Therapy Goal Statement (OT) Return home   Additional Occupational Profile Info/Pertinent History of Current Problem Per chart review, \" 79 year old male admitted on 10/7/2024. He has history of insulin dependent type 2 diabetes mellitus, presenting with worsening ulcer to the right heel, concerning for possible osteomyelitis. MRI and podiatry consult pending\"   Existing Precautions/Restrictions fall;weight bearing   Right Lower Extremity (Weight-bearing Status) (S)  non weight-bearing (NWB)  (no order but verbal from podiatry)   General Observations and Info Pt resistant to cues/education for NWB restriction   Cognitive Status Examination   Orientation Status orientation to person, place and time   Range of Motion Comprehensive   General Range of Motion bilateral upper extremity ROM WFL   Strength Comprehensive (MMT)   Comment, General Manual Muscle Testing (MMT) Assessment Pt reports " having weak LLE at baseline   Transfers   Transfers sit-stand transfer   Sit-Stand Transfer   Sit-Stand [place] (Transfers) maximum assist (25% patient effort);verbal cues;2 person assist   Assistive Device (Sit-Stand Transfers) walker, front-wheeled;other (see comments)  (arm in arm)   Balance   Balance Assessment sitting static balance;sitting dynamic balance;sit to stand dynamic balance;standing static balance;standing dynamic balance   Sitting Balance: Static independent   Sitting Balance: Dynamic independent   Position, Sitting Balance supported;sitting in chair   Sit-to-Stand Balance maximum assist;2-person assist   Standing Balance: Static maximum assist;2-person assist   Standing Balance: Dynamic maximum assist;2-person assist   Position/Device Used, Standing Balance walker, front-wheeled;other (see comments)  (arm in arm)   Activities of Daily Living   BADL Assessment/Intervention lower body dressing;grooming;toileting   Lower Body Dressing Assessment/Training   Comment, (Lower Body Dressing) Per clinical reasoning, anticipated pt would have difficulty w/ LB dressing d/t decreased activity tolerance and NWB restrictions.   [place] Level (Lower Body Dressing) not tested   Grooming Assessment/Training   [place] Level (Grooming) not tested   Comment, (Grooming) Per clinical reasoning, anticipated pt would have difficulty w/ g/h tasks, specifically w/ standing, d/t decreased activity tolerance and NWB restrictions.   Toileting   Comment, (Toileting) Per clinical reasoning, anticipated pt would have difficulty w/ toileting trsfs and tasks d/t decreased activity tolerance and NWB restrictions.   [place] Level (Toileting) unable to assess   Clinical Impression   Criteria for Skilled Therapeutic Interventions Met (OT) Yes, treatment indicated   OT Diagnosis Decreased activity tolerance, decreased safety/ind w/ ADLs/IADLs   Influenced by the following impairments Diabetic foot infection   OT  Problem List-Impairments impacting ADL problems related to;activity tolerance impaired;mobility;sensation;pain   Assessment of Occupational Performance 1-3 Performance Deficits   Identified Performance Deficits trsfs, ambulation, g/h, LB dressing, toileting   Planned Therapy Interventions (OT) ADL retraining;IADL retraining;transfer training;home program guidelines;progressive activity/exercise;risk factor education   Clinical Decision Making Complexity (OT) problem focused assessment/low complexity   Risk & Benefits of therapy have been explained evaluation/treatment results reviewed;care plan/treatment goals reviewed;risks/benefits reviewed;current/potential barriers reviewed;participants included;patient   OT Total Evaluation Time   OT Eval, Low Complexity Minutes (04514) 10   OT Goals   Therapy Frequency (OT) 5 times/week   OT Predicted Duration/Target Date for Goal Attainment 10/16/24   OT Goals Transfers;Lower Body Dressing;Toilet Transfer/Toileting   OT: Lower Body Dressing Supervision/stand-by assist;using adaptive equipment;within precautions;including set-up/clothing retrieval   OT: Transfer Supervision/stand-by assist;with assistive device;within precautions   OT: Toilet Transfer/Toileting Supervision/stand-by assist;toilet transfer;cleaning and garment management;using adaptive equipment;within precautions   Self-Care/Home Management   Self-Care/Home Mgmt/ADL, Compensatory, Meal Prep Minutes (69557) 8   Symptoms Noted During/After Treatment (Meal Preparation/Planning Training) fatigue   Treatment Detail/Skilled Intervention Evaluation completed, treatment initiated. PT and OT in the room for session. Pt was given visual demonstration on STS w/ NWB precations. Pt voiced understanding but concern that it would be too difficult. Pt completed 2 additional STS w/ Max.Ax2, 1 additional person supporting RLE for NWB restrictions, verbal cues, and use of FWW. Pt reiterated that it is not possible for him to be  NWB. Pt educated on the importance of following restrictions for healing and prognosis. Pt still resistant to implementing education. At end of session, pt in recliner w/ call light in reach.   OT Discharge Planning   OT Plan LB dressing, toileting, g/h standing, trsfs, increase ambulation w/ WB restrictions   OT Discharge Recommendation (DC Rec) Transitional Care Facility   OT Rationale for DC Rec Pt ind w/ ADLs/IADLs at baseline Pt currently requires assistx2 for trsfs and is having difficulty adhereing to NWB restrictions. Pt would benefit from TCU to increase strength, activity tolerance, and ind/safety w/ ADLs/IADLs. PT text paged podiatry for clarification on WB restrictions pt can trnf w/ CAM boot on RLE and can WB w/ CAM boot for basic trnfs (no order for this at this time but this is from podiatry)   OT Brief overview of current status Max.Ax2 trsfs - potentially 1 additional person to help support RLE for NWB   Total Session Time   Timed Code Treatment Minutes 8   Total Session Time (sum of timed and untimed services) 18

## 2024-10-09 NOTE — PROGRESS NOTES
[facility]    Progress Note - Hospitalist Service       Date of Admission:  10/7/2024    Assessment & Plan   Janice Nowak is a 79 year old male admitted on 10/7/2024. He has history of insulin dependent type 2 diabetes mellitus, presenting with worsening ulcer to the right heel, concerning for possible osteomyelitis. MRI and podiatry consult pending.      Diabetic foot ulcer  Concern for osteomyelitis  Patient with poorly controlled diabetes, A1c of 9.1% on 09/27/24, and poor glucose control at home per CGM. Presenting with ulcer to the heel of the right foot which has been present for several years. Has occasional discomfort to the area, however this has not changed. Significant numbness to the bilateral feet, with near absent pinprick and light touch sensation with diabetic foot exam. Work up in ED with elevated CRP, sed rate, but no fever or white count. No erythema or purulence to suggest cellulitis and no evidence of systemic infection. Foot XR without evidence of osteomyelitis, however MRI is more sensitive. MRI on 10/8 not definitive for osteomyelitis although highly likely:      IMPRESSION:  1.  Soft tissue ulcer of the heel with subcutaneous edema abutting the calcaneus compatible with cellulitis. No organized/drainable collection. There is T2 hyperintense marrow edema of the subjacent calcaneal tuberosity with mild linear T1 hypointense   signal but no confluent T1 hypointense replacement. Given location subjacent to an ulcer, these findings are compatible with a high likelihood though are not definitive of osteomyelitis.  2.  Severe talonavicular joint osteoarthritis.    - Podiatry consulted, appreciate recommendations   - Resume Vancomycin/Zosyn given evidence of subcutaneous edema/cellulitis     Type 2 Diabetes Mellitus  Neuropathy  Patient with poorly controlled diabetes, A1c of 9.1% on 09/27/24, and poor glucose control at home per CGM. On a home regimen of  "empagliflozin, Lantus 100 qam + 25 HS as well as sliding scale with meals.   - NPO for medical/surgical reasons    - Lantus 90 unit(s) qam and 10 HS  - Insulin regimen once eating   - Lantus 100 unit(s) qam and 15 unit(s) HS   - 1:10 carb count ratio Novolog   - HSSI   - PTA Empagliflozin 10mg daily  - Hold PTA metformin     CAD  Heart failure  HTN  HLD  No echocardiogram available in the system. No evidence of volume overload with clear lungs, normal work of breathing, and trace edema. Some decrease in blood pressure on standing associated with dizziness which resolves on sitting/lying down. Fairly consistent with orthostatic hypotension. Overall, blood pressure has been fairly stable throughout this admission with systolic BP between 120-140s and diastolic 60-70s.   - PTA ASA 81mg, Plavix 75mg  - PTA irbesartan 300mg daily  - PTA amlodipine 5 mg daily   - PTA norvasc 5mg daily  - PTA Rosuvastatin 40mg daily  - PTA spironolactone 25mg daily     SABRINA  - PTA CPAP at night     Hypothyroidism  - PTA Levothyroxine 50mcg daily           Diet: NPO per Anesthesia Guidelines for Procedure/Surgery Except for: Meds    DVT Prophylaxis: Low Risk/Ambulatory with no VTE prophylaxis indicated and Pneumatic Compression Devices at night.   Reyes Catheter: Not present  Fluids: None. Ice chips if needed (currently NPO)  Lines: None     Cardiac Monitoring: None  Code Status: No CPR- Do NOT Intubate      Clinically Significant Risk Factors                # Thrombocytopenia: Lowest platelets = 125 in last 2 days, will monitor for bleeding   # Hypertension: Noted on problem list           # Obesity: Estimated body mass index is 34.29 kg/m  as calculated from the following:    Height as of this encounter: 1.727 m (5' 8\").    Weight as of this encounter: 102.3 kg (225 lb 8 oz)., PRESENT ON ADMISSION     # Financial/Environmental Concerns: none         Disposition Plan      Expected Discharge Date: 10/11/2024      Destination: home with " help/services          The patient's care was discussed with the Attending Physician, Dr. Bain .    Sybil Peter DO  Hospitalist Service  [facility]  Securely message with [employer] (more info)  Text page via [employer] Paging/Directory   ______________________________________________________________________    Interval History   Patient reports no significant changes overnight.  Feels well overall.  States that he continues to have some pain in his right heel that is worse when ambulating.  No new swelling or erythema of the affected right heel.  He denies any fever, chills, chest pain, shortness of breath, abdominal pain.  He was able to eat well yesterday before being made NPO.  Using the restroom just fine.    Paged by patient's nurse for dizziness upon standing and mild hypotension on standing.  Likely orthostatic hypotension but unable to get a good blood pressure read on repeat testing.  Blood pressure returned to 139/74 upon sitting.  Resting blood pressures have been stable.    Physical Exam   Vital Signs: Temp: 98  F (36.7  C) Temp src: Oral BP: 139/74 Pulse: 66   Resp: 18 SpO2: 96 % O2 Device: None (Room air)    Weight: 225 lbs 8 oz    Physical Exam  Constitutional:       General: He is not in acute distress.     Appearance: Normal appearance. He is normal weight.   Cardiovascular:      Rate and Rhythm: Normal rate and regular rhythm.      Heart sounds: Normal heart sounds.   Pulmonary:      Effort: Pulmonary effort is normal. No respiratory distress.      Breath sounds: Normal breath sounds.   Musculoskeletal:      Right lower leg: Right lower leg edema: Trace.      Left lower leg: Left lower leg edema: Trace.   Skin:     General: Skin is warm and dry.      Capillary Refill: Capillary refill takes 2 to 3 seconds.      Comments: The distal lower extremities appear more dusky in color today but still warm to the touch. Somewhat delayed capillary refill. Trace bilateral lower  extremity edema.    Diabetic foot ulcer about 4 cm in diameter on the right heel. Tender to palpation of surrounding area. Malodorous.    Neurological:      General: No focal deficit present.      Mental Status: He is alert. Mental status is at baseline.   Psychiatric:         Mood and Affect: Mood normal.       Medical Decision Making             Data   Imaging results reviewed over the past 24 hrs:   Recent Results (from the past 24 hour(s))   MR Foot Right w/o Contrast    Narrative    EXAM: MR FOOT RIGHT W/O CONTRAST  LOCATION: [facility]  DATE: 10/8/2024    INDICATION: Diabetic foot ulcer  COMPARISON: Radiograph 10/07/2024  TECHNIQUE: Unenhanced.    FINDINGS:     TENDONS:   -Peroneal: Peroneus longus and brevis tendons are intact. No tendinopathy or tenosynovitis. No subluxation.  -Medial: Posterior tibialis tendon is intact. No tendinopathy or tenosynovitis. Flexor digitorum longus and flexor hallucis longus tendons are normal. No tenosynovitis.  -Anterior: Anterior tibialis, extensor hallucis longus, and extensor digitorum longus tendons are normal. No tenosynovitis.  -Achilles: Minimal Achilles tendinopathy. No tear or peritendinitis.    LIGAMENTS:   -Anterior talofibular ligament: Intact.   -Calcaneofibular ligament: Intact.   -Posterior talofibular ligament: Intact.  -Syndesmotic inferior tibiofibular ligaments: Intact.  -Deltoid ligament complex: Effacement of fat in the deltoid ligament could represent prior sprain. The ligament remains intact.  -Spring ligament complex: Intact.    JOINTS AND BONES:   -No fracture or contusion.  -There is T2 hyperintense edema of the calcaneal tuberosity deep to the ulcer. There is mild linear T1 hypointense signal without confluent T1 hypointense replacement.  -Severe talonavicular joint osteoarthritis with subchondral cystic change and remodeling of the navicular bone.    SOFT TISSUES:  -Plantar fascia: Chronic central band plantar fasciitis. No  tear.  -Sinus tarsi and tarsal tunnel: Normal.  -Muscles: Subacute on chronic denervation atrophy of the visualized foot and distal leg musculature.  -There is a soft tissue ulcer of the calcaneus with subjacent nonorganized subcutaneous edema. Edema abuts the calcaneal tuberosity. No organized/drainable collection.      Impression    IMPRESSION:  1.  Soft tissue ulcer of the heel with subcutaneous edema abutting the calcaneus compatible with cellulitis. No organized/drainable collection. There is T2 hyperintense marrow edema of the subjacent calcaneal tuberosity with mild linear T1 hypointense   signal but no confluent T1 hypointense replacement. Given location subjacent to an ulcer, these findings are compatible with a high likelihood though are not definitive of osteomyelitis.  2.  Severe talonavicular joint osteoarthritis.

## 2024-10-09 NOTE — PROGRESS NOTES
Attempted to see Janice for fit and delivery of Cam boot at approximately 4:45. Upon arrival he was not in the room and nurse informed me that he had already discharged.     Did NOT receive CAM boot prior to discharge.

## 2024-10-09 NOTE — PROGRESS NOTES
"Care Management Follow Up    Length of Stay (days): 2    Expected Discharge Date: 10/09/2024    Anticipated Discharge Plan:   TBD    Transportation: TBD    PT Recommendations:  Consults placed, awaiting recs.   OT Recommendations:          Barriers to Discharge: Therapy to see pt.       Prior Living Situation: \"Janice lives in an apartment at [facility]. He ambulates short distances with a cane. Uses an electric scooter for longer distances and outside the apartment building. He is independent with ADLs and IADLs except shopping and transportation which sons help with. Patient reports he was due to start home care PT this week through his facility. Will likely need assistance for wound care at discharge as he says he cannot care for wound himself. PT/OT recommendations pending. Sons can transport at discharge. \"       Discussed  Partnership in Safe Discharge Planning  document with patient/family: No     Handoff Completed: Not at this time     Patient/Spokesperson Updated: No    Additional Information:    Pt has discharge orders in. Consults for therapy pending.   Resident feels and podiatry feels he is safe to return home without seeing therapy.       RNCM updated Bedside RN, Bedside RN had concerns about pt leaving without therapy first.       Resident plans to reach out to bedside RN.         Next Steps: CM will continue to monitor progression of care, review team recommendations and provide discharge planning assist as needed.       Summer Calderon RN      "

## 2024-10-10 LAB — GLUCOSE BLDC GLUCOMTR-MCNC: 95 MG/DL (ref 70–99)

## 2024-10-10 NOTE — PLAN OF CARE
LVM with results.    Occupational Therapy Discharge Summary    Reason for therapy discharge:    Discharged to home with home therapy.    Progress towards therapy goal(s). See goals on Care Plan in [facility] electronic health record for goal details.  Goals partially met.  Barriers to achieving goals:   discharge from facility.    Therapy recommendation(s):    Continued therapy is recommended.  Rationale/Recommendations:  OT rec. TCU upon discharge as pt was unable to maintain NWB w/ RLE for trnfs, pt required assistx2 for STS w/ assist for maintaining NWB. .    KATI Kapoor, OTR/L, 10/10/2024, 7:25 AM

## 2024-10-10 NOTE — PLAN OF CARE
Physical Therapy Discharge Summary    Reason for therapy discharge:    Discharged to home.    Progress towards therapy goal(s). See goals on Care Plan in [facility] electronic health record for goal details.  Goals not met.  Barriers to achieving goals:   limited tolerance for therapy and discharge from facility.    Therapy recommendation(s):    Continued therapy is recommended.  Rationale/Recommendations:  PT goals not met.

## 2024-10-12 LAB — BACTERIA BLD CULT: NO GROWTH

## 2024-10-15 ENCOUNTER — ANCILLARY PROCEDURE (OUTPATIENT)
Dept: VASCULAR ULTRASOUND | Facility: CLINIC | Age: 79
End: 2024-10-15
Attending: PODIATRIST
Payer: COMMERCIAL

## 2024-10-15 DIAGNOSIS — L08.9 DIABETIC FOOT INFECTION (H): ICD-10-CM

## 2024-10-15 DIAGNOSIS — E11.628 TYPE 2 DIABETES MELLITUS WITH RIGHT DIABETIC FOOT INFECTION (H): ICD-10-CM

## 2024-10-15 DIAGNOSIS — I10 ESSENTIAL HYPERTENSION: ICD-10-CM

## 2024-10-15 DIAGNOSIS — L97.519 NON-PRESSURE CHRONIC ULCER OF OTHER PART OF RIGHT FOOT WITH UNSPECIFIED SEVERITY (H): ICD-10-CM

## 2024-10-15 DIAGNOSIS — E11.628 DIABETIC FOOT INFECTION (H): ICD-10-CM

## 2024-10-15 DIAGNOSIS — E11.9 DIABETES MELLITUS (H): ICD-10-CM

## 2024-10-15 DIAGNOSIS — S91.301A NON-HEALING OPEN WOUND OF RIGHT HEEL: ICD-10-CM

## 2024-10-15 DIAGNOSIS — L08.9 TYPE 2 DIABETES MELLITUS WITH RIGHT DIABETIC FOOT INFECTION (H): ICD-10-CM

## 2024-10-15 PROCEDURE — 93923 UPR/LXTR ART STDY 3+ LVLS: CPT

## 2024-10-15 PROCEDURE — 93925 LOWER EXTREMITY STUDY: CPT

## 2024-10-16 ENCOUNTER — TELEPHONE (OUTPATIENT)
Dept: VASCULAR SURGERY | Facility: CLINIC | Age: 79
End: 2024-10-16

## 2024-10-16 ENCOUNTER — OFFICE VISIT (OUTPATIENT)
Dept: VASCULAR SURGERY | Facility: CLINIC | Age: 79
End: 2024-10-16
Payer: COMMERCIAL

## 2024-10-16 ENCOUNTER — PREP FOR PROCEDURE (OUTPATIENT)
Dept: OTHER | Facility: CLINIC | Age: 79
End: 2024-10-16

## 2024-10-16 VITALS — SYSTOLIC BLOOD PRESSURE: 151 MMHG | DIASTOLIC BLOOD PRESSURE: 92 MMHG | HEART RATE: 80 BPM | OXYGEN SATURATION: 100 %

## 2024-10-16 DIAGNOSIS — I73.9 PAD (PERIPHERAL ARTERY DISEASE) (H): ICD-10-CM

## 2024-10-16 DIAGNOSIS — E11.621 DIABETIC ULCER OF RIGHT HEEL ASSOCIATED WITH TYPE 2 DIABETES MELLITUS, WITH NECROSIS OF MUSCLE (H): Primary | ICD-10-CM

## 2024-10-16 DIAGNOSIS — L97.413 DIABETIC ULCER OF RIGHT HEEL ASSOCIATED WITH TYPE 2 DIABETES MELLITUS, WITH NECROSIS OF MUSCLE (H): Primary | ICD-10-CM

## 2024-10-16 PROCEDURE — 11043 DBRDMT MUSC&/FSCA 1ST 20/<: CPT | Performed by: PODIATRIST

## 2024-10-16 PROCEDURE — 99204 OFFICE O/P NEW MOD 45 MIN: CPT | Mod: 25 | Performed by: PODIATRIST

## 2024-10-16 PROCEDURE — G0463 HOSPITAL OUTPT CLINIC VISIT: HCPCS | Performed by: PODIATRIST

## 2024-10-16 RX ORDER — CEFAZOLIN SODIUM/WATER 2 G/20 ML
2 SYRINGE (ML) INTRAVENOUS
Status: CANCELLED | OUTPATIENT
Start: 2024-10-24

## 2024-10-16 RX ORDER — CEFAZOLIN SODIUM/WATER 2 G/20 ML
2 SYRINGE (ML) INTRAVENOUS SEE ADMIN INSTRUCTIONS
Status: CANCELLED | OUTPATIENT
Start: 2024-10-24

## 2024-10-16 ASSESSMENT — PAIN SCALES - GENERAL: PAINLEVEL: MODERATE PAIN (4)

## 2024-10-16 NOTE — PATIENT INSTRUCTIONS
Janice,    Your surgery with [facility] has been scheduled. Please read thoroughly and follow instructions.     Procedure:   I&D right heel  Procedure Date :   TBD  *A surgery nurse will call you 1-2 days before surgery to inform you of the time of arrival for surgery.  Surgeon:   Dr. Amol Patricio  Admission Type:   Outpatient  Procedure Location:   [facility]:  [address] [place] [address] (phone: [phone], Fax: [phone])    Please park in [address]. Enter through the main entrance. Check in at the Welcome Desk and you will be directed to the surgery unit.           Preparation Instructions to complete:    []  You will need a Pre-op Physical within 30 days before surgery with your primary care provider. This exam is required by the anesthesiologist to ensure a safe surgical experience.    Failure  to obtain your pre-op physical will lead to cancellation of your surgery  Call them right away to schedule this. Please ensure your Preoperative Physical is faxed to the Hospital (numbers listed above)    [] Preoperative Medication Instructions  We would like you to stop your anticoagulation medications 3-5 days before surgery HOWEVER contact your prescribing provider for instructions before discontinuing any medications. (Examples: Coumadin, Plavix, Xarelto, Eliquis, Pradaxa, Effient, Brilinta) If you are on Coumadin, we would like the goal INR  1.4.  IT IS OK TO STAY ON ASPIRIN PRIOR TO SURGERY.   Hold Ibuprofen 24 hours prior.   Hold Herbal Supplements and vitamins 14 days prior.   Stop Cialis, Levitra and Viagra 2-3 days before surgery.   Please discuss with your primary care provider if you need to hold any blood pressure medications or others.   If you take these diabetic medications, please discuss with your primary doctor and follow the hold instructions:   []  Hold seven (7) days prior for once weekly injectable doses [semaglutide (Ozempic,  Wegovy), dulaglutide (Trulicity), exenatide ER (Bydureon), tirzepatide (Mounjaro)]  []  Hold the day before and day of for once daily injectable GLP-1 agonists [exenatide (Byetta), liraglutide (Saxenda, Victoza)]  []  Hold seven (7) days for oral semaglutide (Rybelsus)     [] Fasting Requirements  Nothing to eat for 8 hours before surgery unless instructed differently by the surgery nurse.  You may have clear liquids up to two hours before your arrival time (coffee, tea, water, or Gatorade. No cream or milk)  If your primary care provider has instructed you to continue oral medications, you may take them on the morning of surgery with a small sip of water.    No alcohol or smoking after 12:00am the day of surgery    []  COVID-19 test is no longer needed  If you are experiencing COVID symptoms or have tested positive for COVID-19 within 14 days of procedure date, reach out to the care team to reschedule please.     [] Contact your insurance regarding coverage  If you would like a Good [place] Estimate for your upcoming procedure at [facility], contact Cost of Care Estimates   Advocates are available Monday through Friday 8am - 5pm   [phone]  You may also submit a request online through your [employer] account.  For all self-pay, estimate, or anesthesia billing questions at [facility], the contact information is below:  Who to contact: Angelina MOODY  [facility] Network number: [phone]  Prepay number: [phone]    [] DO NOT BRING FMLA WITH TO SURGERY.  These should be sent to the provider's office by fax to [phone].     [] Day of Surgery  Medications - Take as indicated with sips of water.   Wear comfortable loose fitting clothes. Wear your glasses-Not contacts. Do not wear jewelry and remove body piercing's. Surgery may be cancelled if they are not removed.   You may have 1 family member wait in the lobby during your surgery. Visitor restrictions are subject to  change. Please verify with the surgery nurse when they call.   If same day surgery-Have a someone come with you to surgery that can help you understand the surgeon's instructions, drive you home and stay with you overnight the first night.    [] If the community sees a new COVID-19 surge, your procedure may need to be postponed. We will contact you if this happens.    [] You will receive a call from a surgery nurse 1-3 days prior to surgery. They will go over more details with you.     Use Chlorhexidine Gluconate 4% soap to help prevent surgical site infections    You play an important part in helping to prevent a surgical site infection. Let s review what you will need to do.    Chlorhexidine Gluconate 4% is a powerful antiseptic that will help make sure your skin is free of germs. It looks and feels just like liquid soap and should be used liked a shower gel.    **[place] patients can receive free Chlorhexidine Gluconate 4% soap for surgery at any outpatient [place] pharmacy. You can also buy this over the counter at any pharmacy.**    Do not shave within 12 inches of your incision (surgical cut) area for at least 3 days before surgery. Shaving can make small cuts in the skin. This puts you at a higher risk of infection.    Items you will need for each shower:   1 newly washed towel   4 ounces of one of the above soaps   Clean pajamas or clothes to change into    Follow these steps the evening before surgery and the morning of surgery.  Remove all body piercings and jewelry, and leave them off until after your surgery.  Wash your hair and body with your regular shampoo and soap. Make sure you rinse the shampoo and soap from your hair and body.  Using clean hands, apply about 2 ounces of soap gently on your skin from your ear lobes to your toes. You don t need to scrub your skin, but make sure you liberally wash the area where your surgery will be done. Use on your groin area last. Do not use this soap on your  face or head. If you get any soap in your eyes, ears or mouth, rinse right away. It is very important to let the soap stay on your skin for at least 1 minute.  Repeat step 3.   Rinse well and dry off using a clean towel. If you feel any tingling, itching or other irritation, rinse right away. It is normal to feel some coolness on the skin after using the antiseptic soap. Your skin may feel a bit dry after the shower, but do not use any lotions, creams or moisturizers. Do not use hair spray or other products in your hair. Also, do not wash with your regular soap after using this.  Dress in freshly washed clothes or pajamas. Use fresh pillowcases and sheets on your bed.  Repeat these steps the morning of surgery.    If you are allergic or sensitive to Chlorhexidine Gluconate, use an antibacterial soap instead, following the same steps.    If you cannot use the shower, wash yourself with this soap at the sink. Make sure the sink is clean before you begin, and do the best you can to clean your entire body.           We will order you a wound VAC for use after surgery. Please answer any phone calls as they will call you to arrange for delivery     We will order home care for you, however it is not guaranteed that we will find an accepting agency. If we cannot find an agency for you, you will need to come to clinic for VAC dressing changes.       ** AFTER SURGERY INSTRUCTIONS **      [] You are to remain NON WEIGHT BEARING on your right foot NON WEIGHT BEARING MEANS NO PRESSURE ON YOUR FOOT OR HEEL AT ANY TIME FOR ANY REASON!    [] Prior to surgery you will need to obtain a PRAFO BOOT which you will need to WEAR THIS AT ALL TIMES EVEN WHILE IN BED Please bring this with you on the day of surgery.    [] You will need to obtain a A ROLLING KNEE WALKER to help you ambulate after surgery. Please also bring this with you on the day of surgery.    [] During surgery Dr. Patricio will apply a gauze dressing to your foot. This will  remain intact until the wound vac is applied. The wound vac should be applied within 24 hours after surgery.    [] It is NOT OKAY to get your surgical site wet while bathing, you will need to purchase a cast cover to protect your foot from getting wet. You can purchase this at [facility] or your local pharmacy.    [] It is important that you elevate your affected foot after surgery. Proper elevation is raising your foot above your waist. The fluid in your lower extremities needs to get back to your heart so it can get pumped to your kidneys and expelled through urination. So if you notice you have to go to the bathroom more frequently when you are elevating your leg this is a good sign that it is working.     [] It is important that you increase your protein intake after surgery. Protein is essential for wound healing. We recommend you take a protein supplement twice per day. This is in addition to your normal diet. Examples of protein supplements are Ensure, Boost, Glucerna (if you are diabetic), or protein powder. You can purchase these at your local retailer or grocery store.       Notify our office right away, if you have any changes in your health status, or if you develop a cold, flu, diarrhea, infection, fever or sore throat before your scheduled surgery date. We can be reached at [phone]   Monday-Friday 8 am-4:30 pm if you have any questions.     Thank you for trusting us with your care!    DRESSINGS BEFORE SURGERY:  Change dry gauze dressing daily    DRESSING CHANGES AFTER SURGERY:  Wound location: right heel  Change Days: twice weekly  Supplies: medium sponge  Cleanse with: Saline, pat dry.  Suction: Continuous at -125 mmHg pressure.  Bridge to dorsal foot  Back up plan: If VAC malfunctions or unable to maintain seal: VAC dressing must be removed and reapplied within 2 hours of the incident. If floor staff is not able to reapply, place NS moistened gauze in wound bed and cover with  appropriate dressing to keep wound bed moist. Call the clinic with any concerns

## 2024-10-16 NOTE — TELEPHONE ENCOUNTER
Confirmed medication instructions with pt.  Pt will be scheduling preop exam with his primary clinic prior to surgery.  Pt had no further questions at this time.

## 2024-10-16 NOTE — PROGRESS NOTES
FOOT AND ANKLE SURGERY/PODIATRY CONSULT NOTE        ASSESSMENT:   Diabetic ulceration right heel in the muscle  PAD        TREATMENT:  -I discussed the patient that the ulceration along the posterior aspect of the right heel has necrotic/nonviable tissue with increased depth.    -MRI of the right ankle obtained on 10/8 was highly suggestive of osteomyelitis but not definitive.,    -ABIs obtained yesterday indicate adequate perfusion to the right lower extremity for wound healing but significant decrease in perfusion to left lower extremity.  I referred the patient to vascular surgery/IR for consultation.    -Most recent hemoglobin A1c on 9/27 was 9.1%.    -I reviewed the above with the patient today and due to the amount of nonviable tissue present I recommend surgical debridement of nonviable tissue.  Because MRI was suggestive of osteomyelitis we discussed bone biopsy to pathology and also for aerobic/anaerobic culture.  If osteomyelitis is present we will likely involve infectious disease.  Due to the increased depth of the wound we will use the wound VAC postoperatively.  Discussed the importance of nonweightbearing.  All questions invited and answered.  Patient consents to surgery.    -I referred the patient for a PRAFO boot.  Recommend nonweightbearing on the right foot at all times.  Referred for knee walker.  Patient reports he does have a wheelchair at his disposal at a senior care facility.    -Reviewed importance of reducing hemoglobin A1c below 8%.  I referred him to diabetic educator.    -After discussion of risk factors, nursing staff removed dressing, cleansed wound and consent obtained 2% Lidocaine HCL jelly was applied, under clean conditions, the right heel ulceration(s) were debrided using currette.  Devitalized and nonviable tissue, along with any fibrin and slough, was removed to improve granulation tissue formation, stimulate wound healing, decrease overall bacteria load, disrupt biofilm  formation and decrease edge senescence. Wound drainage was scant No. Total excisional debridement was 7.5 sq cm into the muscle/fascia with a depth of 0.5 cm.   Ulcers were improved afterwards and .  Measures were as noted on the flow sheet.  Gauze dressing applied today.  He will reapply daily.    -I will asked my office to coordinate outpatient surgery at [facility]    Amol Patricio DPM  [facility] [place]      HPI: Janice Nowak was seen today at the request of Dr. Rose for a right heel ulceration.  Patient reports that he has had the ulceration of the right heel on and off for the past 2 years.  He was recently hospitalized at [facility] and MRI obtained at that time was negative for underlying abscess or osteomyelitis.  He recently moved to [place] from [place] to be closer to family.  He lives alone in a senior living facility.  Past medical history significant for poorly controlled type 2 diabetes.    No past medical history on file.    No past surgical history on file.     Allergies   Allergen Reactions    Exenatide Unknown    Heparin     Liraglutide     Lisinopril Cough    Morphine          Current Outpatient Medications:     amLODIPine (NORVASC) 5 MG tablet, Take 5 mg by mouth daily., Disp: , Rfl:     aspirin 81 MG EC tablet, Take 81 mg by mouth daily., Disp: , Rfl:     clopidogrel (PLAVIX) 75 MG tablet, Take 75 mg by mouth daily., Disp: , Rfl:     co-enzyme Q-10 100 MG CAPS capsule, Take 200 mg by mouth daily., Disp: , Rfl:     empagliflozin (JARDIANCE) 10 MG TABS tablet, Take 10 mg by mouth daily., Disp: , Rfl:     insulin aspart (NOVOLOG PEN) 100 UNIT/ML pen, Inject subcutaneously 3 times daily (with meals). Per sliding scale, if BG >150 mg/dL patient takes 20 units, Disp: , Rfl:     insulin glargine (LANTUS PEN) 100 UNIT/ML pen, Inject 100 Units subcutaneously every morning., Disp: , Rfl:     insulin glargine (LANTUS PEN) 100 UNIT/ML pen, Inject 25  Units subcutaneously at bedtime., Disp: , Rfl:     irbesartan (AVAPRO) 300 MG tablet, Take 300 mg by mouth at bedtime., Disp: , Rfl:     lactobacillus rhamnosus, GG, (CULTURELL) capsule, Take 1 capsule by mouth daily., Disp: , Rfl:     levothyroxine (SYNTHROID/LEVOTHROID) 50 MCG tablet, Take 50 mcg by mouth daily., Disp: , Rfl:     metFORMIN (GLUCOPHAGE XR) 500 MG 24 hr tablet, Take 2,000 mg by mouth daily (with dinner)., Disp: , Rfl:     Multiple Vitamins-Minerals (PRESERVISION AREDS 2 PO), Take 2 capsules by mouth daily., Disp: , Rfl:     multivitamin, therapeutic (THERA-VIT) TABS tablet, Take 1 tablet by mouth daily., Disp: , Rfl:     rosuvastatin (CRESTOR) 40 MG tablet, Take 40 mg by mouth daily., Disp: , Rfl:     spironolactone (ALDACTONE) 25 MG tablet, Take 25 mg by mouth daily., Disp: , Rfl:     vitamin C (ASCORBIC ACID) 1000 MG TABS, Take 1,000 mg by mouth daily., Disp: , Rfl:     vitamin C with B complex (B COMPLEX-C) tablet, Take 1 tablet by mouth daily., Disp: , Rfl:     Social History     Social History Narrative    Not on file       No family history on file.    Review of Systems - 10 point Review of Systems is negative except for right heel ulcer which is noted in HPI.      OBJECTIVE:  Appearance: alert, well appearing, and in no distress.    BP (!) 151/92   Pulse 80   SpO2 100%     BMI= There is no height or weight on file to calculate BMI.    General appearance: Patient is alert and fully cooperative with history & exam.  No sign of distress is noted during the visit.     Psychiatric: Affect is pleasant & appropriate.  Patient appears motivated to improve health.     Respiratory: Breathing is regular & unlabored while sitting.     HEENT: Hearing is intact to spoken word.  Speech is clear.  No gross evidence of visual impairment that would impact ambulation.    Vascular: Dorsalis pedis non-palpableRight.  Dermatologic:   Wound Heel Ulceration (Active)   Wound Bed Black 10/09/24 1418   Debbie-wound  Assessment callosed 10/09/24 1418   Estimated Circumference ( if not measured golf sized 10/09/24 1418   Drainage Amount Scant 10/09/24 1418   Drainage Color/Characteristics Serosanguineous 10/09/24 1418   Dressing Open to air;Other (Comment) 10/09/24 0030       VASC Wound right heel (Active)   Pre Size Length 2.5 10/16/24 0700   Pre Size Width 3 10/16/24 0700   Pre Size Depth 0.5 10/16/24 0700   Pre Total Sq cm 7.5 10/16/24 0700   Necrotic/nonviable tissue with increased depth along the posterior aspect of the right heel, no erythema.  Neurologic: Diminished to light touch Right.  Musculoskeletal: Contracted digits noted Right.

## 2024-10-16 NOTE — TELEPHONE ENCOUNTER
Awaiting Dr. Jc's okay on plavix hold x 5 days, and will call and update patient. Secure chat sent.    Per Dr. Jc, okay to hold plavix x 5 days. Patient updated via phone.

## 2024-10-16 NOTE — TELEPHONE ENCOUNTER
Reviewed Blood Thinners and plan for holding, continuing and/or bridging: Aspirin: PLEASE DO NOT STOP THIS MEDICATION PRIOR TO SURGERY/ PROCEDURE.  and Plavix: We would like you to HOLD this for 5 days prior to surgery.    Reviewed Diabetic medications that are GLP-1 agonists: NA  Please discuss with your primary doctor and follow the hold instructions:   []  Hold seven (7) days prior for once weekly injectable doses [semaglutide (Ozempic, Wegovy), dulaglutide (Trulicity), exenatide ER (Bydureon), tirzepatide (Mounjaro)]  []  Hold the day before and day of for once daily injectable GLP-1 agonists [exenatide (Byetta), liraglutide (Saxenda, Victoza)]  []  Hold seven (7) days for oral semaglutide (Rybelsus)

## 2024-10-16 NOTE — TELEPHONE ENCOUNTER
Pt states he was told to get a PRAFO boot off of [employer].     He states there are many options and wondering what is the best one for him. Wondering if a short or long is recommended?

## 2024-10-16 NOTE — TELEPHONE ENCOUNTER
Surgery Scheduled    Pt given surgery packet while in clinic - routing message to nursing to confirm medication holds.      Surgery/Procedure: INCISION AND DRAINAGE, right heel     CPT: 20268     Equipment: pulse lavage     **patient to be prone**     Location: [facility]:  [address] [place] [address] (phone: [phone], Fax: [phone])    Please park in [address]. Enter through the main entrance. Check in at the Welcome Desk and you will be directed to the surgery unit.     Date: 10/24/2024    Time: 950 AM    Admission Type: Outpatient    Surgeon: Dr. Patricio    OR Confirmed & :  Yes with Madalyn on 10/16/2024    Entered on provider calendar:  Yes    Post Op: see appt desk    Wound Vac Needed:  TBD    Home Care Needed:  TBD    Blood Thinners Addressed:  routing message to nursing - pt on plavix    Stress Test Clearance: NO

## 2024-10-21 ENCOUNTER — TELEPHONE (OUTPATIENT)
Dept: VASCULAR SURGERY | Facility: CLINIC | Age: 79
End: 2024-10-21
Payer: COMMERCIAL

## 2024-10-21 NOTE — TELEPHONE ENCOUNTER
"Call from Marybel, home care nurse, right heel wound now gray based with odor, moderate drainage. No signs of infection otherwise. She is asking if she can use Mepliex foam over area rather than telfa pt had in place. They are seeing pt every other day right now. She is also concerned about new wound RLE, lateral shin with slough & drainage, pt is not sure how he got it. Has slight edema. She notes is \"a little red\" around wound and will send wound photos to clinic email.    Advised current orders are for dry gauze to heel wound changed daily, would not want to use Mepilex as may cause maceration/too much moisture retention and that softening of eschar may cause some odor. Should he develop signs of infection needs to be seen ASAP. Plan is for surgical debridement of area on Thursday.     Regarding leg wound, may send photos but unable to give orders for this wound as leg would not be managed by podiatry/Dr. Bernal, need to address this with primary care and if needed may be referred to see one of other clinic wound providers.      Amanda Skelton RN, CWOCN   "

## 2024-10-21 NOTE — PATIENT INSTRUCTIONS
Kashif Camacho,    Thank you for entrusting your care with us today. After your visit today with MD Silvestre Jc this is the plan that was discussed at your appointment.    Dr. Jc will call if you abdominal or aorta ultrasound are abnormal      I am including additional information on these things and our contact information if you have any questions or concerns.   Please do not hesitate to reach out to us if you felt we did not answer your questions or you are unsure of the treatment plan after your visit today. Our number is [phone].Thank you for trusting us with your care.         Again thank you for your time.        Aorta Duplex    Description  Abdominal aortic ultrasound examinations are performed to rule out an abdominal aneurysm. They are usually pain free and easy for the patient. The vascular laboratory will contain a bed and just two or three pieces of equipment. You will be asked raise your shirt to the level of the ribs. It usually takes about 30 minutes.  The technician will tuck a towel under your shirt and under your underpants at the level of the belly button. The gel is water-soluble and will not stain your skin or clothes.  Ultrasound gel, usually warmed for your comfort, will be placed on your abdomen.    Through the gel, the technician will apply a small hand-held device to your abdomen that emits sound waves.  When the test is completed, the technician will remove excess gel from your abdomen.  Risks  There are typically no side effects or complications associated with aorta duplex ultrasound examinations.  How to Prepare  Patients will need to fast 8 hours before an abdominal aortic duplex ultrasound. Fasting for 8 hours is crucial for an accurate and reliable exam.  If it is critical for the patient to take medications, they can be consumed with a small sip of water.  What Can I Expect After the Test?  The technician will send the ultrasound images to your vascular surgeon for  evaluation. Typically, a report is available in 2-3 days. If anything critical is found, it is standard practice to notify the vascular surgeon immediately.  Reference: https://vascular.org/patient-resources/vascular-tests       Carotid Duplex    Steps for the test are:  You will be asked to lie on a stretcher. It will be okay for you to wear your street clothes. In some situations, you may be asked to change into a gown.    Ultrasound gel, usually warmed for your comfort, will be placed on either side of your neck.    Through the gel, the technician will apply to your neck a small hand-held device that emits sound waves.   When the test is completed, the technician will remove excess gel from your neck. The gel is water-soluble and will not stain your skin or clothes.    How to Prepare:  Eat and take medications as usual.   Wear minimal or no jewelry to ease application and removal of gel.    Risks  There are typically no side effects or complications associated with a carotid duplex ultrasound examination.    What Can I Expect After the Test?  The technician will send the ultrasound images to your vascular surgeon for evaluation. Typically, a report is available in 2-3 days. If anything critical is found, it is standard practice to notify the vascular surgeon immediately.  Reference: https://vascular.org/patient-resources/vascular-tests

## 2024-10-21 NOTE — PROGRESS NOTES
[facility]        Patient is here for a consult to discuss Peripheral artery disease (PAD). Needs clearance for I and D with Dr. Patricio on 10/24.    Pt is currently taking Aspirin, Statin, and Plavix.    /69   Pulse 74   Resp 16   SpO2 98%     The provider has been notified that the patient has no concerns.     Questions patient would like addressed today are: N/A.    Refills are needed: No    Has homecare services and agency name:  No

## 2024-10-22 ENCOUNTER — OFFICE VISIT (OUTPATIENT)
Dept: VASCULAR SURGERY | Facility: CLINIC | Age: 79
End: 2024-10-22
Attending: RADIOLOGY
Payer: COMMERCIAL

## 2024-10-22 VITALS
RESPIRATION RATE: 16 BRPM | SYSTOLIC BLOOD PRESSURE: 116 MMHG | OXYGEN SATURATION: 98 % | DIASTOLIC BLOOD PRESSURE: 69 MMHG | HEART RATE: 74 BPM

## 2024-10-22 DIAGNOSIS — Z13.6 ENCOUNTER FOR ABDOMINAL AORTIC ANEURYSM (AAA) SCREENING: Primary | ICD-10-CM

## 2024-10-22 DIAGNOSIS — R09.89 OTHER SPECIFIED SYMPTOMS AND SIGNS INVOLVING THE CIRCULATORY AND RESPIRATORY SYSTEMS: ICD-10-CM

## 2024-10-22 DIAGNOSIS — Z13.6 ENCOUNTER FOR SCREENING FOR STENOSIS OF CAROTID ARTERY: ICD-10-CM

## 2024-10-22 PROCEDURE — G0463 HOSPITAL OUTPT CLINIC VISIT: HCPCS

## 2024-10-22 ASSESSMENT — PAIN SCALES - GENERAL: PAINLEVEL: MODERATE PAIN (4)

## 2024-10-23 ENCOUNTER — TELEPHONE (OUTPATIENT)
Dept: VASCULAR SURGERY | Facility: CLINIC | Age: 79
End: 2024-10-23
Payer: COMMERCIAL

## 2024-10-23 RX ORDER — MECLIZINE HYDROCHLORIDE 25 MG/1
25 TABLET ORAL 3 TIMES DAILY PRN
Status: ON HOLD | COMMUNITY
End: 2024-10-24

## 2024-10-23 RX ORDER — FOLIC ACID 1 MG/1
1 TABLET ORAL DAILY
Status: ON HOLD | COMMUNITY
End: 2024-10-24

## 2024-10-23 NOTE — TELEPHONE ENCOUNTER
[facility] physician care management team calls with multiple concerns in regards to patient having outpatient surgery tomorrow as he is at an independent living facility. He really struggles with wound care on his own and managing his health. His blood sugars have been all over the place because he doesn't check them. They are requesting he be admitted and discharged to TCU as they don't want him to go backwards after surgery.     [phone] ([facility]).

## 2024-10-23 NOTE — TELEPHONE ENCOUNTER
Detailed message left for Anny SAMSON Case Manager with [facility] that patient is going to be admitted after surgery.

## 2024-10-24 ENCOUNTER — ANESTHESIA (OUTPATIENT)
Dept: SURGERY | Facility: HOSPITAL | Age: 79
DRG: 629 | End: 2024-10-24
Payer: COMMERCIAL

## 2024-10-24 ENCOUNTER — HOSPITAL ENCOUNTER (INPATIENT)
Facility: HOSPITAL | Age: 79
LOS: 8 days | Discharge: NURSING, REHAB OR LONG-TERM CARE | DRG: 629 | End: 2024-11-01
Attending: PODIATRIST | Admitting: PODIATRIST
Payer: COMMERCIAL

## 2024-10-24 ENCOUNTER — ANESTHESIA EVENT (OUTPATIENT)
Dept: SURGERY | Facility: HOSPITAL | Age: 79
DRG: 629 | End: 2024-10-24
Payer: COMMERCIAL

## 2024-10-24 DIAGNOSIS — L97.413 DIABETIC ULCER OF RIGHT HEEL ASSOCIATED WITH TYPE 2 DIABETES MELLITUS, WITH NECROSIS OF MUSCLE (H): ICD-10-CM

## 2024-10-24 DIAGNOSIS — I10 ESSENTIAL HYPERTENSION: ICD-10-CM

## 2024-10-24 DIAGNOSIS — M86.171 ACUTE OSTEOMYELITIS OF RIGHT CALCANEUS (H): Primary | ICD-10-CM

## 2024-10-24 DIAGNOSIS — E11.621 DIABETIC ULCER OF RIGHT HEEL ASSOCIATED WITH TYPE 2 DIABETES MELLITUS, WITH NECROSIS OF MUSCLE (H): ICD-10-CM

## 2024-10-24 DIAGNOSIS — Z79.4 TYPE 2 DIABETES MELLITUS WITH DIABETIC PERIPHERAL ANGIOPATHY WITHOUT GANGRENE, WITH LONG-TERM CURRENT USE OF INSULIN (H): ICD-10-CM

## 2024-10-24 DIAGNOSIS — E11.51 TYPE 2 DIABETES MELLITUS WITH DIABETIC PERIPHERAL ANGIOPATHY WITHOUT GANGRENE, WITH LONG-TERM CURRENT USE OF INSULIN (H): ICD-10-CM

## 2024-10-24 LAB
ANION GAP SERPL CALCULATED.3IONS-SCNC: 13 MMOL/L (ref 7–15)
BACTERIA SPEC CULT: NORMAL
BUN SERPL-MCNC: 34 MG/DL (ref 8–23)
CALCIUM SERPL-MCNC: 8.9 MG/DL (ref 8.8–10.4)
CHLORIDE SERPL-SCNC: 104 MMOL/L (ref 98–107)
CREAT SERPL-MCNC: 1.54 MG/DL (ref 0.67–1.17)
EGFRCR SERPLBLD CKD-EPI 2021: 46 ML/MIN/1.73M2
ERYTHROCYTE [DISTWIDTH] IN BLOOD BY AUTOMATED COUNT: 15.8 % (ref 10–15)
EST. AVERAGE GLUCOSE BLD GHB EST-MCNC: 217 MG/DL
GLUCOSE BLDC GLUCOMTR-MCNC: 100 MG/DL (ref 70–99)
GLUCOSE BLDC GLUCOMTR-MCNC: 101 MG/DL (ref 70–99)
GLUCOSE BLDC GLUCOMTR-MCNC: 119 MG/DL (ref 70–99)
GLUCOSE BLDC GLUCOMTR-MCNC: 149 MG/DL (ref 70–99)
GLUCOSE SERPL-MCNC: 132 MG/DL (ref 70–99)
GRAM STAIN RESULT: NORMAL
GRAM STAIN RESULT: NORMAL
HBA1C MFR BLD: 9.2 %
HCO3 SERPL-SCNC: 23 MMOL/L (ref 22–29)
HCT VFR BLD AUTO: 33.1 % (ref 40–53)
HGB BLD-MCNC: 10.8 G/DL (ref 13.3–17.7)
MAGNESIUM SERPL-MCNC: 1.9 MG/DL (ref 1.7–2.3)
MCH RBC QN AUTO: 29.1 PG (ref 26.5–33)
MCHC RBC AUTO-ENTMCNC: 32.6 G/DL (ref 31.5–36.5)
MCV RBC AUTO: 89 FL (ref 78–100)
PHOSPHATE SERPL-MCNC: 3.9 MG/DL (ref 2.5–4.5)
PLATELET # BLD AUTO: 114 10E3/UL (ref 150–450)
POTASSIUM SERPL-SCNC: 4.3 MMOL/L (ref 3.4–5.3)
RBC # BLD AUTO: 3.71 10E6/UL (ref 4.4–5.9)
SODIUM SERPL-SCNC: 140 MMOL/L (ref 135–145)
WBC # BLD AUTO: 8.1 10E3/UL (ref 4–11)

## 2024-10-24 PROCEDURE — 99100 ANES PT EXTEME AGE<1 YR&>70: CPT | Performed by: NURSE ANESTHETIST, CERTIFIED REGISTERED

## 2024-10-24 PROCEDURE — 250N000013 HC RX MED GY IP 250 OP 250 PS 637: Performed by: STUDENT IN AN ORGANIZED HEALTH CARE EDUCATION/TRAINING PROGRAM

## 2024-10-24 PROCEDURE — 87077 CULTURE AEROBIC IDENTIFY: CPT | Performed by: PODIATRIST

## 2024-10-24 PROCEDURE — 36415 COLL VENOUS BLD VENIPUNCTURE: CPT | Performed by: STUDENT IN AN ORGANIZED HEALTH CARE EDUCATION/TRAINING PROGRAM

## 2024-10-24 PROCEDURE — 250N000011 HC RX IP 250 OP 636: Performed by: PODIATRIST

## 2024-10-24 PROCEDURE — 250N000013 HC RX MED GY IP 250 OP 250 PS 637: Performed by: PODIATRIST

## 2024-10-24 PROCEDURE — 11044 DBRDMT BONE 1ST 20 SQ CM/<: CPT | Mod: 51 | Performed by: PODIATRIST

## 2024-10-24 PROCEDURE — 83735 ASSAY OF MAGNESIUM: CPT | Performed by: STUDENT IN AN ORGANIZED HEALTH CARE EDUCATION/TRAINING PROGRAM

## 2024-10-24 PROCEDURE — 710N000012 HC RECOVERY PHASE 2, PER MINUTE: Performed by: PODIATRIST

## 2024-10-24 PROCEDURE — 258N000001 HC RX 258: Performed by: PODIATRIST

## 2024-10-24 PROCEDURE — 370N000017 HC ANESTHESIA TECHNICAL FEE, PER MIN: Performed by: PODIATRIST

## 2024-10-24 PROCEDURE — 87070 CULTURE OTHR SPECIMN AEROBIC: CPT | Performed by: PODIATRIST

## 2024-10-24 PROCEDURE — 82962 GLUCOSE BLOOD TEST: CPT

## 2024-10-24 PROCEDURE — 272N000001 HC OR GENERAL SUPPLY STERILE: Performed by: PODIATRIST

## 2024-10-24 PROCEDURE — 120N000001 HC R&B MED SURG/OB

## 2024-10-24 PROCEDURE — 85027 COMPLETE CBC AUTOMATED: CPT | Performed by: STUDENT IN AN ORGANIZED HEALTH CARE EDUCATION/TRAINING PROGRAM

## 2024-10-24 PROCEDURE — 999N000141 HC STATISTIC PRE-PROCEDURE NURSING ASSESSMENT: Performed by: PODIATRIST

## 2024-10-24 PROCEDURE — 272N000004 HC RX 272: Performed by: PODIATRIST

## 2024-10-24 PROCEDURE — 88305 TISSUE EXAM BY PATHOLOGIST: CPT | Mod: TC | Performed by: PODIATRIST

## 2024-10-24 PROCEDURE — 250N000011 HC RX IP 250 OP 636: Performed by: STUDENT IN AN ORGANIZED HEALTH CARE EDUCATION/TRAINING PROGRAM

## 2024-10-24 PROCEDURE — 84100 ASSAY OF PHOSPHORUS: CPT | Performed by: STUDENT IN AN ORGANIZED HEALTH CARE EDUCATION/TRAINING PROGRAM

## 2024-10-24 PROCEDURE — 250N000011 HC RX IP 250 OP 636: Performed by: ANESTHESIOLOGY

## 2024-10-24 PROCEDURE — 11047 DBRDMT BONE EACH ADDL: CPT | Mod: 51 | Performed by: PODIATRIST

## 2024-10-24 PROCEDURE — 87205 SMEAR GRAM STAIN: CPT | Performed by: PODIATRIST

## 2024-10-24 PROCEDURE — 250N000009 HC RX 250: Performed by: NURSE ANESTHETIST, CERTIFIED REGISTERED

## 2024-10-24 PROCEDURE — 250N000009 HC RX 250: Performed by: PODIATRIST

## 2024-10-24 PROCEDURE — 28120 PART REMOVAL OF ANKLE/HEEL: CPT | Performed by: NURSE ANESTHETIST, CERTIFIED REGISTERED

## 2024-10-24 PROCEDURE — 250N000011 HC RX IP 250 OP 636: Mod: JW | Performed by: ANESTHESIOLOGY

## 2024-10-24 PROCEDURE — 0HBMXZZ EXCISION OF RIGHT FOOT SKIN, EXTERNAL APPROACH: ICD-10-PCS | Performed by: PODIATRIST

## 2024-10-24 PROCEDURE — 28120 PART REMOVAL OF ANKLE/HEEL: CPT | Performed by: ANESTHESIOLOGY

## 2024-10-24 PROCEDURE — 83036 HEMOGLOBIN GLYCOSYLATED A1C: CPT | Performed by: STUDENT IN AN ORGANIZED HEALTH CARE EDUCATION/TRAINING PROGRAM

## 2024-10-24 PROCEDURE — 250N000011 HC RX IP 250 OP 636: Performed by: NURSE ANESTHETIST, CERTIFIED REGISTERED

## 2024-10-24 PROCEDURE — 0QBL0ZZ EXCISION OF RIGHT TARSAL, OPEN APPROACH: ICD-10-PCS | Performed by: PODIATRIST

## 2024-10-24 PROCEDURE — 360N000075 HC SURGERY LEVEL 2, PER MIN: Performed by: PODIATRIST

## 2024-10-24 PROCEDURE — 28120 PART REMOVAL OF ANKLE/HEEL: CPT | Mod: RT | Performed by: PODIATRIST

## 2024-10-24 PROCEDURE — 250N000013 HC RX MED GY IP 250 OP 250 PS 637: Performed by: INTERNAL MEDICINE

## 2024-10-24 PROCEDURE — 80048 BASIC METABOLIC PNL TOTAL CA: CPT | Performed by: STUDENT IN AN ORGANIZED HEALTH CARE EDUCATION/TRAINING PROGRAM

## 2024-10-24 PROCEDURE — 87075 CULTR BACTERIA EXCEPT BLOOD: CPT | Performed by: PODIATRIST

## 2024-10-24 PROCEDURE — 99223 1ST HOSP IP/OBS HIGH 75: CPT | Performed by: STUDENT IN AN ORGANIZED HEALTH CARE EDUCATION/TRAINING PROGRAM

## 2024-10-24 RX ORDER — FENTANYL CITRATE 50 UG/ML
25 INJECTION, SOLUTION INTRAMUSCULAR; INTRAVENOUS EVERY 5 MIN PRN
Status: DISCONTINUED | OUTPATIENT
Start: 2024-10-24 | End: 2024-10-24 | Stop reason: HOSPADM

## 2024-10-24 RX ORDER — FENTANYL CITRATE 50 UG/ML
50 INJECTION, SOLUTION INTRAMUSCULAR; INTRAVENOUS EVERY 5 MIN PRN
Status: DISCONTINUED | OUTPATIENT
Start: 2024-10-24 | End: 2024-10-24 | Stop reason: HOSPADM

## 2024-10-24 RX ORDER — NALOXONE HYDROCHLORIDE 0.4 MG/ML
0.2 INJECTION, SOLUTION INTRAMUSCULAR; INTRAVENOUS; SUBCUTANEOUS
Status: DISCONTINUED | OUTPATIENT
Start: 2024-10-24 | End: 2024-11-01 | Stop reason: HOSPADM

## 2024-10-24 RX ORDER — MAGNESIUM SULFATE HEPTAHYDRATE 40 MG/ML
2 INJECTION, SOLUTION INTRAVENOUS ONCE
Status: COMPLETED | OUTPATIENT
Start: 2024-10-24 | End: 2024-10-24

## 2024-10-24 RX ORDER — HYDROMORPHONE HCL IN WATER/PF 6 MG/30 ML
0.4 PATIENT CONTROLLED ANALGESIA SYRINGE INTRAVENOUS
Status: DISCONTINUED | OUTPATIENT
Start: 2024-10-24 | End: 2024-11-01 | Stop reason: HOSPADM

## 2024-10-24 RX ORDER — ACETAMINOPHEN 325 MG/1
975 TABLET ORAL EVERY 8 HOURS
Status: COMPLETED | OUTPATIENT
Start: 2024-10-24 | End: 2024-10-27

## 2024-10-24 RX ORDER — IRBESARTAN 300 MG/1
300 TABLET ORAL AT BEDTIME
Status: DISCONTINUED | OUTPATIENT
Start: 2024-10-24 | End: 2024-11-01 | Stop reason: HOSPADM

## 2024-10-24 RX ORDER — BISACODYL 10 MG
10 SUPPOSITORY, RECTAL RECTAL DAILY PRN
Status: DISCONTINUED | OUTPATIENT
Start: 2024-10-27 | End: 2024-11-01 | Stop reason: HOSPADM

## 2024-10-24 RX ORDER — NICOTINE POLACRILEX 4 MG
15-30 LOZENGE BUCCAL
Status: DISCONTINUED | OUTPATIENT
Start: 2024-10-24 | End: 2024-11-01 | Stop reason: HOSPADM

## 2024-10-24 RX ORDER — NALOXONE HYDROCHLORIDE 1 MG/ML
0.1 INJECTION INTRAMUSCULAR; INTRAVENOUS; SUBCUTANEOUS
Status: DISCONTINUED | OUTPATIENT
Start: 2024-10-24 | End: 2024-10-24 | Stop reason: HOSPADM

## 2024-10-24 RX ORDER — BUPIVACAINE HYDROCHLORIDE 5 MG/ML
INJECTION, SOLUTION EPIDURAL; INTRACAUDAL
Status: COMPLETED | OUTPATIENT
Start: 2024-10-24 | End: 2024-10-24

## 2024-10-24 RX ORDER — HYDROMORPHONE HCL IN WATER/PF 6 MG/30 ML
0.2 PATIENT CONTROLLED ANALGESIA SYRINGE INTRAVENOUS
Status: DISCONTINUED | OUTPATIENT
Start: 2024-10-24 | End: 2024-11-01 | Stop reason: HOSPADM

## 2024-10-24 RX ORDER — ONDANSETRON 4 MG/1
4 TABLET, ORALLY DISINTEGRATING ORAL EVERY 30 MIN PRN
Status: DISCONTINUED | OUTPATIENT
Start: 2024-10-24 | End: 2024-10-24 | Stop reason: HOSPADM

## 2024-10-24 RX ORDER — AMOXICILLIN 250 MG
1 CAPSULE ORAL 2 TIMES DAILY
Status: DISCONTINUED | OUTPATIENT
Start: 2024-10-24 | End: 2024-11-01 | Stop reason: HOSPADM

## 2024-10-24 RX ORDER — POLYETHYLENE GLYCOL 3350 17 G/17G
17 POWDER, FOR SOLUTION ORAL DAILY
Status: DISCONTINUED | OUTPATIENT
Start: 2024-10-25 | End: 2024-10-30

## 2024-10-24 RX ORDER — ASPIRIN 81 MG/1
81 TABLET ORAL DAILY
Status: DISCONTINUED | OUTPATIENT
Start: 2024-10-24 | End: 2024-11-01 | Stop reason: HOSPADM

## 2024-10-24 RX ORDER — AMLODIPINE BESYLATE 5 MG/1
5 TABLET ORAL DAILY
Status: DISCONTINUED | OUTPATIENT
Start: 2024-10-25 | End: 2024-11-01 | Stop reason: HOSPADM

## 2024-10-24 RX ORDER — NALOXONE HYDROCHLORIDE 0.4 MG/ML
0.4 INJECTION, SOLUTION INTRAMUSCULAR; INTRAVENOUS; SUBCUTANEOUS
Status: DISCONTINUED | OUTPATIENT
Start: 2024-10-24 | End: 2024-11-01 | Stop reason: HOSPADM

## 2024-10-24 RX ORDER — LIDOCAINE HYDROCHLORIDE 10 MG/ML
INJECTION, SOLUTION INFILTRATION; PERINEURAL PRN
Status: DISCONTINUED | OUTPATIENT
Start: 2024-10-24 | End: 2024-10-24

## 2024-10-24 RX ORDER — ONDANSETRON 2 MG/ML
4 INJECTION INTRAMUSCULAR; INTRAVENOUS EVERY 30 MIN PRN
Status: DISCONTINUED | OUTPATIENT
Start: 2024-10-24 | End: 2024-10-24 | Stop reason: HOSPADM

## 2024-10-24 RX ORDER — OXYCODONE HYDROCHLORIDE 5 MG/1
10 TABLET ORAL EVERY 4 HOURS PRN
Status: DISCONTINUED | OUTPATIENT
Start: 2024-10-24 | End: 2024-11-01 | Stop reason: HOSPADM

## 2024-10-24 RX ORDER — SPIRONOLACTONE 25 MG/1
25 TABLET ORAL DAILY
Status: DISCONTINUED | OUTPATIENT
Start: 2024-10-25 | End: 2024-11-01 | Stop reason: HOSPADM

## 2024-10-24 RX ORDER — ONDANSETRON 2 MG/ML
INJECTION INTRAMUSCULAR; INTRAVENOUS PRN
Status: DISCONTINUED | OUTPATIENT
Start: 2024-10-24 | End: 2024-10-24

## 2024-10-24 RX ORDER — CLOPIDOGREL BISULFATE 75 MG/1
75 TABLET ORAL DAILY
Status: DISCONTINUED | OUTPATIENT
Start: 2024-10-24 | End: 2024-11-01 | Stop reason: HOSPADM

## 2024-10-24 RX ORDER — OXYCODONE HYDROCHLORIDE 5 MG/1
5 TABLET ORAL EVERY 4 HOURS PRN
Status: DISCONTINUED | OUTPATIENT
Start: 2024-10-24 | End: 2024-11-01 | Stop reason: HOSPADM

## 2024-10-24 RX ORDER — LACTOBACILLUS RHAMNOSUS GG 10B CELL
1 CAPSULE ORAL DAILY
Status: DISCONTINUED | OUTPATIENT
Start: 2024-10-25 | End: 2024-11-01 | Stop reason: HOSPADM

## 2024-10-24 RX ORDER — ONDANSETRON 2 MG/ML
4 INJECTION INTRAMUSCULAR; INTRAVENOUS EVERY 6 HOURS PRN
Status: DISCONTINUED | OUTPATIENT
Start: 2024-10-24 | End: 2024-11-01 | Stop reason: HOSPADM

## 2024-10-24 RX ORDER — ONDANSETRON 4 MG/1
4 TABLET, ORALLY DISINTEGRATING ORAL EVERY 6 HOURS PRN
Status: DISCONTINUED | OUTPATIENT
Start: 2024-10-24 | End: 2024-11-01 | Stop reason: HOSPADM

## 2024-10-24 RX ORDER — ROSUVASTATIN CALCIUM 40 MG/1
40 TABLET, COATED ORAL DAILY
Status: DISCONTINUED | OUTPATIENT
Start: 2024-10-25 | End: 2024-11-01 | Stop reason: HOSPADM

## 2024-10-24 RX ORDER — FENTANYL CITRATE 50 UG/ML
25-100 INJECTION, SOLUTION INTRAMUSCULAR; INTRAVENOUS
Status: DISCONTINUED | OUTPATIENT
Start: 2024-10-24 | End: 2024-10-24

## 2024-10-24 RX ORDER — SODIUM CHLORIDE, SODIUM LACTATE, POTASSIUM CHLORIDE, CALCIUM CHLORIDE 600; 310; 30; 20 MG/100ML; MG/100ML; MG/100ML; MG/100ML
INJECTION, SOLUTION INTRAVENOUS CONTINUOUS
Status: DISCONTINUED | OUTPATIENT
Start: 2024-10-24 | End: 2024-10-24 | Stop reason: HOSPADM

## 2024-10-24 RX ORDER — INSULIN ASPART 100 [IU]/ML
25 INJECTION, SOLUTION INTRAVENOUS; SUBCUTANEOUS AT BEDTIME
Status: ON HOLD | COMMUNITY
End: 2024-11-01

## 2024-10-24 RX ORDER — ACETAMINOPHEN 325 MG/1
650 TABLET ORAL EVERY 4 HOURS PRN
Status: DISCONTINUED | OUTPATIENT
Start: 2024-10-27 | End: 2024-11-01 | Stop reason: HOSPADM

## 2024-10-24 RX ORDER — LIDOCAINE 40 MG/G
CREAM TOPICAL
Status: DISCONTINUED | OUTPATIENT
Start: 2024-10-24 | End: 2024-10-30

## 2024-10-24 RX ORDER — LIDOCAINE 40 MG/G
CREAM TOPICAL
Status: DISCONTINUED | OUTPATIENT
Start: 2024-10-24 | End: 2024-11-01 | Stop reason: HOSPADM

## 2024-10-24 RX ORDER — SODIUM CHLORIDE, SODIUM LACTATE, POTASSIUM CHLORIDE, CALCIUM CHLORIDE 600; 310; 30; 20 MG/100ML; MG/100ML; MG/100ML; MG/100ML
INJECTION, SOLUTION INTRAVENOUS CONTINUOUS
Status: DISCONTINUED | OUTPATIENT
Start: 2024-10-24 | End: 2024-10-25

## 2024-10-24 RX ORDER — CEFAZOLIN SODIUM/WATER 2 G/20 ML
2 SYRINGE (ML) INTRAVENOUS
Status: COMPLETED | OUTPATIENT
Start: 2024-10-24 | End: 2024-10-24

## 2024-10-24 RX ORDER — LEVOTHYROXINE SODIUM 25 UG/1
50 TABLET ORAL DAILY
Status: DISCONTINUED | OUTPATIENT
Start: 2024-10-25 | End: 2024-11-01 | Stop reason: HOSPADM

## 2024-10-24 RX ORDER — DEXTROSE MONOHYDRATE 25 G/50ML
25-50 INJECTION, SOLUTION INTRAVENOUS
Status: DISCONTINUED | OUTPATIENT
Start: 2024-10-24 | End: 2024-11-01 | Stop reason: HOSPADM

## 2024-10-24 RX ORDER — DEXAMETHASONE SODIUM PHOSPHATE 4 MG/ML
4 INJECTION, SOLUTION INTRA-ARTICULAR; INTRALESIONAL; INTRAMUSCULAR; INTRAVENOUS; SOFT TISSUE
Status: DISCONTINUED | OUTPATIENT
Start: 2024-10-24 | End: 2024-10-24 | Stop reason: HOSPADM

## 2024-10-24 RX ORDER — PROPOFOL 10 MG/ML
INJECTION, EMULSION INTRAVENOUS CONTINUOUS PRN
Status: DISCONTINUED | OUTPATIENT
Start: 2024-10-24 | End: 2024-10-24

## 2024-10-24 RX ORDER — PROCHLORPERAZINE MALEATE 5 MG/1
5 TABLET ORAL EVERY 6 HOURS PRN
Status: DISCONTINUED | OUTPATIENT
Start: 2024-10-24 | End: 2024-11-01 | Stop reason: HOSPADM

## 2024-10-24 RX ORDER — CEFAZOLIN SODIUM/WATER 2 G/20 ML
2 SYRINGE (ML) INTRAVENOUS SEE ADMIN INSTRUCTIONS
Status: DISCONTINUED | OUTPATIENT
Start: 2024-10-24 | End: 2024-10-25

## 2024-10-24 RX ADMIN — ACETAMINOPHEN 975 MG: 325 TABLET ORAL at 18:27

## 2024-10-24 RX ADMIN — IRBESARTAN 300 MG: 300 TABLET ORAL at 21:25

## 2024-10-24 RX ADMIN — EMPAGLIFLOZIN 10 MG: 10 TABLET, FILM COATED ORAL at 19:40

## 2024-10-24 RX ADMIN — FENTANYL CITRATE 50 MCG: 50 INJECTION, SOLUTION INTRAMUSCULAR; INTRAVENOUS at 08:10

## 2024-10-24 RX ADMIN — PROPOFOL 75 MCG/KG/MIN: 10 INJECTION, EMULSION INTRAVENOUS at 08:41

## 2024-10-24 RX ADMIN — FENTANYL CITRATE 50 MCG: 50 INJECTION, SOLUTION INTRAMUSCULAR; INTRAVENOUS at 08:19

## 2024-10-24 RX ADMIN — ASPIRIN 81 MG: 81 TABLET, COATED ORAL at 19:40

## 2024-10-24 RX ADMIN — BUPIVACAINE HYDROCHLORIDE 10 ML: 5 INJECTION, SOLUTION EPIDURAL; INTRACAUDAL at 08:13

## 2024-10-24 RX ADMIN — ONDANSETRON 4 MG: 2 INJECTION INTRAMUSCULAR; INTRAVENOUS at 09:00

## 2024-10-24 RX ADMIN — MIDAZOLAM HYDROCHLORIDE 1 MG: 1 INJECTION, SOLUTION INTRAMUSCULAR; INTRAVENOUS at 08:13

## 2024-10-24 RX ADMIN — LIDOCAINE HYDROCHLORIDE 5 ML: 10 INJECTION, SOLUTION INFILTRATION; PERINEURAL at 08:41

## 2024-10-24 RX ADMIN — Medication 2 G: at 08:41

## 2024-10-24 RX ADMIN — SENNOSIDES AND DOCUSATE SODIUM 1 TABLET: 8.6; 5 TABLET ORAL at 21:25

## 2024-10-24 RX ADMIN — MAGNESIUM SULFATE HEPTAHYDRATE 2 G: 40 INJECTION, SOLUTION INTRAVENOUS at 21:26

## 2024-10-24 RX ADMIN — MEPIVACAINE HYDROCHLORIDE 10 ML: 20 INJECTION, SOLUTION EPIDURAL; INFILTRATION at 08:10

## 2024-10-24 RX ADMIN — BUPIVACAINE HYDROCHLORIDE 20 ML: 5 INJECTION, SOLUTION EPIDURAL; INTRACAUDAL; PERINEURAL at 08:10

## 2024-10-24 RX ADMIN — CLOPIDOGREL BISULFATE 75 MG: 75 TABLET ORAL at 19:41

## 2024-10-24 ASSESSMENT — ACTIVITIES OF DAILY LIVING (ADL)
ADLS_ACUITY_SCORE: 0

## 2024-10-24 NOTE — PLAN OF CARE
Goal Outcome Evaluation:    Problem: Pain Acute  Goal: Optimal Pain Control and Function  Outcome: Progressing  Pt came to the unit from special procedure s/p I&D right foot. A&O x 4. On RA  Surgical site is wrap and his toes have good perfusion.   Sensation not present due to nerve block given during surgery.  NWB right foot.

## 2024-10-24 NOTE — ANESTHESIA PROCEDURE NOTES
Sciatic Procedure Note    Pre-Procedure   Staff -        Anesthesiologist:  Gary Ko MD       Performed By: anesthesiologist       Location: pre-op       Procedure Start/Stop Times: 10/24/2024 8:10 AM and 10/24/2024 8:13 AM       Pre-Anesthestic Checklist: patient identified, IV checked, site marked, risks and benefits discussed, informed consent, monitors and equipment checked, pre-op evaluation, at physician/surgeon's request and post-op pain management  Timeout:       Correct Patient: Yes        Correct Procedure: Yes        Correct Site: Yes        Correct Position: Yes        Correct Laterality: Yes        Site Marked: Yes  Procedure Documentation  Procedure: Sciatic       Laterality: right       Patient Position: supine       Patient Prep/Sterile Barriers: sterile gloves, mask       Skin prep: Chloraprep (popliteal approach).       Needle Type: short bevel and insulated       Needle Gauge: 20.        Needle Length (Inches): 4        Ultrasound guided       1. Ultrasound was used to identify targeted nerve, plexus, vascular marker, or fascial plane and place a needle adjacent to it in real-time.       2. Ultrasound was used to visualize the spread of anesthetic in close proximity to the above referenced structure.       3. A permanent image is entered into the patient's record.       4. The visualized anatomic structures appeared normal.       5. There were no apparent abnormal pathologic findings.    Assessment/Narrative         The placement was negative for: blood aspirated, painful injection and site bleeding       Paresthesias: No.       Bolus given via needle..        Secured via.        Insertion/Infusion Method: Single Shot       Complications: none    Medication(s) Administered   Bupivacaine 0.5% PF (Infiltration) - Infiltration   20 mL - 10/24/2024 8:10:00 AM  Mepivacaine 2% PF (Perineural) - Perineural   10 mL - 10/24/2024 8:10:00 AM  Medication Administration Time: 10/24/2024 8:10  "AM      FOR [facility] ([facility] [place] [facility]) ONLY:   Pain Team Contact information: please page the Pain Team [address]. Search \"Pain\". During daytime hours, please page the attending first. At night please page the resident first.      "

## 2024-10-24 NOTE — OP NOTE
Date: 10/24/2024     Surgeon: BRYAN Patricio DPM    Preoperative diagnosis:   1. Acute osteomyelitis calcaneus right  2. Ulceration right heel into bone    Postoperative diagnosis: Same    Procedure:   1.  Incision and drainage right heel   2.  Partial calcanectomy right foot  3.  Sharp, excisional Debridement ulceration right heel into bone    Anesthesia: MAC with Block     Hemostasis: None    Pathology:   ID Type Source Tests Collected by Time Destination   1 : Right calcaneous Bone Resection Foot, Right SURGICAL PATHOLOGY EXAM Amol Patricio DPM 10/24/2024  8:56 AM    A : Right calcaneous Bone Resection Foot, Right ANAEROBIC BACTERIAL CULTURE ROUTINE, GRAM STAIN, AEROBIC BACTERIAL CULTURE ROUTINE Amol Patricio DPM 10/24/2024  8:54 AM         Injectables: None    Materials: None    Complications: None    Blood loss: 5 cc    Findings: Patient presents for operative intervention for a nonhealing wound with underlying osteomyelitis.  I discussed today surgical procedure to include removal of nonviable tissue with resection of bone for aerobic/anaerobic culture and ulceration to pathology.  Risks include but not limited to need for additional surgical intervention or loss of limb.  We discussed hospitalization to involve infectious disease for antibiotic therapy.  Consents been obtained.  Patient questions invited and answered, including appropriate risk, benefits and complications. No guarantees given or implied. Patient has been NPO.    Description: Patient was brought to the operating room and placed on the table in supine position. IV-sedation with popliteal block was administered by the anesthesia department. The foot was then prepped and draped in usual aseptic manner and the following procedure was then performed: Attention was directed to the ulceration along the posterior aspect of the right heel where a large area of necrotic/nonviable tissue was identified.  At this time a #10 blade was then  "used to sharply excision debride the nonviable tissue into the level of muscle and bone of the calcaneus.  Next, an acetone and mallet were then used to resect bone from the posterior calcaneus which was then sent to pathology and also for aerobic/anaerobic culture.  Again a #10 blade was then used to sharply excisionally debride the nonviable tissue and to level of muscle and bone.  Next, 1000 cc pulse Avage was then used to irrigate the surgical site.  Following irrigation debridement no remaining nonviable tissue was identified.    The resulting ulceration measured 6 x 5 x 2 cm. Sharp, excisional debridement was performed with a #10 blade into bone debriding 30 sq cm.     The wound was packed with thrombin impregnated gel foam, 1/4\" gauze, and dressings consistent of 4x4's, ABD, kerlix/shayna roll and an ace wrap.       The patient appeared to tolerate all the procedures and anesthesia well without apparent complications. Patient was transported from the operating room to the recovery room with vital signs stable and neurovascular status as it was pre-operatively to the right foot. Patient to be admitted to [facility]. Consult placed to hospitalist service for medical management of diabetes. Consult placed to ID for antibiotic therapy.  He should remain nonweightbearing on the right foot.  Surgical dressing to remain intact until wound VAC is applied tomorrow.  Elevate right foot above waist at all times.  PRAFO boot right foot at all times including overnight.    Amol Patricio DPM          "

## 2024-10-24 NOTE — PROGRESS NOTES
VASCULAR AND INTERVENTIONAL OUTPATIENT CONSULT OR VISIT  PHYSICIAN: Silvestre Jc MD  NEW PATIENT    LOCATION: [facility]    Janice Nowak   Medical Record #:  [id number]  YOB: 1945  Age:  79 year old     Date of Service: 10/22/2024    PRIMARY CARE PROVIDER: Anayeli Machado    Reason for visit: Peripheral vascular disease    IMPRESSION: 79-year-old male with longstanding history of a right heel wound which has been present and reoccurring for many years.  The patient was seen by Dr. Patricio who plans to debride the lesion.  The patient underwent noninvasive imaging which demonstrates an ankle-brachial index of 0.74 on the right.  The right digit pressure was noted to be 140 mmHg which is adequate for wound healing, however, may be artifactually elevated.  The patient's arterial ultrasound demonstrates multiphasic waveforms to the popliteal artery with transition to monophasic flow in the infrapopliteal vasculature.  No significant stenosis is identified on ultrasound.    RECOMMENDATION:    Guideline recommended medical therapy for peripheral vascular disease  -Continue aspirin/Plavix as previously prescribed  -Continue high intensity statin therapy with Lipitor 40 mg once daily  -Continue offloading and adequate protein intake    Given the noninvasive findings above okay to proceed with debridement as planned.  If there is any impairment in wound healing we will proceed directly to an angiogram given his wound has not completely healed for many years.  Of note, the patient has significant left lower extremity peripheral vascular disease with a left digit pressure inadequate for healing.  The patient was instructed to closely monitor his left foot for the development of any wounds or ulcerations.  Will plan for a short interval follow-up to evaluate progress.    HPI:  Janice Nowak is a 79 year old male who was evaluated today for a nonhealing right heel wound.  The patient  reports his symptoms have been present for many years and he has undergone multiple episodes of debridement without complete wound healing. He was seen by Dr. Patricio and is currently offloading with a boot.  He denies any lower extremity rest pain or ulcerations involving his left lower extremity.He he is a former smoker.      PHH:    Past Medical History:   Diagnosis Date    Diabetes (H)     Hypertension     Sleep apnea     Thyroid disease         Past Surgical History:   Procedure Laterality Date    BACK SURGERY      ORTHOPEDIC SURGERY      VASCULAR SURGERY         ALLERGIES:  Exenatide, Heparin, Liraglutide, Lisinopril, and Morphine    MEDS:    Current Outpatient Medications:     amLODIPine (NORVASC) 5 MG tablet, Take 5 mg by mouth daily., Disp: , Rfl:     aspirin 81 MG EC tablet, Take 81 mg by mouth daily., Disp: , Rfl:     clopidogrel (PLAVIX) 75 MG tablet, Take 75 mg by mouth daily., Disp: , Rfl:     co-enzyme Q-10 100 MG CAPS capsule, Take 200 mg by mouth daily., Disp: , Rfl:     empagliflozin (JARDIANCE) 10 MG TABS tablet, Take 10 mg by mouth daily., Disp: , Rfl:     insulin aspart (NOVOLOG PEN) 100 UNIT/ML pen, Inject subcutaneously 3 times daily (with meals). Per sliding scale, if BG >150 mg/dL patient takes 20 units, Disp: , Rfl:     insulin glargine (LANTUS PEN) 100 UNIT/ML pen, Inject 100 Units subcutaneously every morning., Disp: , Rfl:     insulin glargine (LANTUS PEN) 100 UNIT/ML pen, Inject 25 Units subcutaneously at bedtime., Disp: , Rfl:     irbesartan (AVAPRO) 300 MG tablet, Take 300 mg by mouth at bedtime., Disp: , Rfl:     lactobacillus rhamnosus, GG, (CULTURELL) capsule, Take 1 capsule by mouth daily., Disp: , Rfl:     levothyroxine (SYNTHROID/LEVOTHROID) 50 MCG tablet, Take 50 mcg by mouth daily., Disp: , Rfl:     metFORMIN (GLUCOPHAGE XR) 500 MG 24 hr tablet, Take 2,000 mg by mouth daily (with dinner)., Disp: , Rfl:     Multiple Vitamins-Minerals (PRESERVISION AREDS 2 PO), Take 2 capsules by  mouth daily., Disp: , Rfl:     multivitamin, therapeutic (THERA-VIT) TABS tablet, Take 1 tablet by mouth daily., Disp: , Rfl:     rosuvastatin (CRESTOR) 40 MG tablet, Take 40 mg by mouth daily., Disp: , Rfl:     spironolactone (ALDACTONE) 25 MG tablet, Take 25 mg by mouth daily., Disp: , Rfl:     vitamin C (ASCORBIC ACID) 1000 MG TABS, Take 1,000 mg by mouth daily., Disp: , Rfl:     vitamin C with B complex (B COMPLEX-C) tablet, Take 1 tablet by mouth daily., Disp: , Rfl:     folic acid (FOLVITE) 1 MG tablet, Take 1 mg by mouth daily., Disp: , Rfl:     meclizine (ANTIVERT) 25 MG tablet, Take 25 mg by mouth 3 times daily as needed for dizziness., Disp: , Rfl:     SOCIAL HABITS:    History   Smoking Status    Former    Types: Cigarettes   Smokeless Tobacco    Never     Social History    Substance and Sexual Activity      Alcohol use: Yes        Alcohol/week: 5.0 standard drinks of alcohol        Types: 5 Cans of beer per week      History   Drug Use Unknown       FAMILY HISTORY:  No family history on file.    ADVANCE CARE DIRECTIVES:    Advance care directives reviewed in the chart and no changes made.     PE:  /69   Pulse 74   Resp 16   SpO2 98%   Wt Readings from Last 1 Encounters:   10/07/24 102.3 kg (225 lb 8 oz)     There is no height or weight on file to calculate BMI.    EXAM:  GENERAL: This is a well-developed 79 year old male who appears his stated age  EYES: Grossly normal.  MOUTH: Buccal mucosa normal   MUSCULOSKELETAL: Grossly normal and both lower extremities are intact.  HEME/LYMPH: No lymphedema  NEUROLOGIC: Focally intact, Alert and oriented x 3.   PSYCH: appropriate affect  INTEGUMENT: R heel would (see media tab)    DIAGNOSTIC STUDIES:     Images:  US Lower Extremity Arterial Duplex Bilateral    Result Date: 10/15/2024  Table formatting from the original result was not included. Arterial Duplex Ultrasound (Date: 10/15/24) Lower Extremity Artery Evaluation Indication: Surveillance Bilateral  Leg Arterial: Decreased SONNY's. Left Leg Pain. Right Diabetic Heel Ulcer. Decreased pedal pulses. Previous: 2/6/2018 SONNY's History: Previous Smoker, Hypertension, Diabetic, Hyperlipidemia, PAD, and Vascular Ulcers Technique: Duplex imaging is performed utilizing gray-scale, two-dimensional images, and color-flow imaging. Doppler waveform analysis and spectral Doppler imaging is also performed. LOWER EXTREMITY ARTERIAL DUPLEX EXAM WITH WAVEFORMS Right Leg:(cm/s) Location: Velocities Waveforms EIA:   109  B CFA:   111  B PFA:   202  B SFA Proximal:   89 /  72 B SFA Mid:   75  T SFA Distal: 94   T Popliteal Artery:   76 /  72 B PTA:   105   M ALEX:   33  M DPA:   38  M Waveforms: T=Triphasic, M=Monophasic, B=Biphasic Left Leg:(cm/s) Location: Velocities Waveforms EIA:   53  M CFA:   38  M PFA:   48  M SFA Proximal:   42 / 26 M SFA Mid:   23  M SFA Distal:   27  M Popliteal Artery:   32 /15  M PTA:   18    M ALEX:   5  M DPA:   7  M Waveforms: T=Triphasic, M=Monophasic, B=Biphasic Impression: Right Lower Extremity: Patent vasculature to the popliteal artery with multiphasic waveforms.  Transition to monophasic flow in the infrapopliteal vessels.  No focal stenosis by velocity criteria. Left Lower Extremity: Monophasic waveforms in the external iliac artery, suggesting iliac inflow disease.  The remaining vasculature is patent without focal stenosis. Reference: Category Normal 1-19% 20-49% 50-99% Occluded PSV <160 cm/sec without spectral broadening <160 cm/sec with spectral broadening Increased Increased Absent Flow Ratio N/A N/A < 2.0 >2.0 N/A Post-Stenotic Turbulence No No No Yes N/A      US Low Ext Arterial Dop Seg Pres w/o Exercise    Result Date: 10/15/2024  Table formatting from the original result was not included. BILATERAL RESTING ANKLE-BRACHIAL INDICES (SONNY'S) (Date: 10/15/24) Indication: Surveillance SONNY's: Left Leg Pain. Right Diabetic Heel Ulcer. Decreased pedal pulses. Previous: 2/6/2018 SONNY's History:  Previous Smoker, Hypertension, Diabetic, Hyperlipidemia, PAD, and Vascular Ulcers  Resting SONNY's          Right: mmHg Index     Brachial: 167  Ankle-(PT): 127 0.74 Ankle-(DP): 124 0.73          Digit: 140 0.82               Left: mmHg Index     Brachial: 171  Ankle-(PT): >254 NC Ankle-(DP): 0 0          Digit: 25 0.15 Resting ankle-brachial index of 0.74 on the right. Toe Pressures of 140 mmHg and TBI of 0.82 Resting ankle-brachial index is non compressible  on the left. Toe Pressures of 25 mmHg and TBI of 0.15  VPR WAVEFORMS: The right volume plethysmography waveforms are mildly abnormal at the lower thigh level, mildly abnormal at the upper calf level and moderately abnormal at the ankle. The left volume plethysmography waveforms are severely abnormal at the lower thigh level, severely abnormal at the upper calf level and severely abnormal at the ankle.  Impression:  1. RIGHT LOWER EXTREMITY: SONNY is Abnormal with an SONNY of 0.74 indicating single level disease. Toe Pressures are Normal and adequate for wound healing with toe pressures of 140 mmHg. 2. LEFT LOWER EXTREMITY: SONNY is Non-diagnostic indicating vessel calcification. Toe Pressures are Abnormal and impaired for wound healing with toe pressures of 25 mmHg. Reference: Wound classification Grade SONNY Ankle Systolic Pressure Toe Pressures 0 > 0.80 > 100 mmHg > 60 mmHg 1 0.6 - 0.79 70 - 100 mmHg 40 - 59 mmHg 2 0.4 - 0.59 50-70 mmHg 30 - 39 mmHg 3 < 0.39 < 50 mmHg < 30 mmHg Digit Pressures DBI Disease Category > 0.70 Normal < 0.70 Abnormal > 30 mmHg Potential wound healing < 30 mmHg Impaired wound healing Ankle Brachial Pressures SONNY Disease Category > 1.3  Likely vessel calcification with monophasic waveforms, non-diagnostic 0.95-1.30 Normal with multiphasic waveforms 0.50-0.95 Single level disease 0.30-0.50 Multilevel disease < 0.30 Critical limb ischema Volume Plethysmography Recording (VPR) at all levels Normal Sharp systolic peak, fast upstroke, prominent  dicrotic notch in wave Mild Sharp systolic peak, fast upstroke, absent dicrotic notch in wave Moderate Flattened systolic peak, slowed upstroke, absent dicrotic notch inwave Severe amplitude wave with = upslope and down slope Occluded Flat Line      MR Foot Right w/o Contrast    Result Date: 10/8/2024  EXAM: MR FOOT RIGHT W/O CONTRAST LOCATION: [facility] DATE: 10/8/2024 INDICATION: Diabetic foot ulcer COMPARISON: Radiograph 10/07/2024 TECHNIQUE: Unenhanced. FINDINGS: TENDONS: -Peroneal: Peroneus longus and brevis tendons are intact. No tendinopathy or tenosynovitis. No subluxation. -Medial: Posterior tibialis tendon is intact. No tendinopathy or tenosynovitis. Flexor digitorum longus and flexor hallucis longus tendons are normal. No tenosynovitis. -Anterior: Anterior tibialis, extensor hallucis longus, and extensor digitorum longus tendons are normal. No tenosynovitis. -Achilles: Minimal Achilles tendinopathy. No tear or peritendinitis. LIGAMENTS: -Anterior talofibular ligament: Intact. -Calcaneofibular ligament: Intact. -Posterior talofibular ligament: Intact. -Syndesmotic inferior tibiofibular ligaments: Intact. -Deltoid ligament complex: Effacement of fat in the deltoid ligament could represent prior sprain. The ligament remains intact. -Spring ligament complex: Intact. JOINTS AND BONES: -No fracture or contusion. -There is T2 hyperintense edema of the calcaneal tuberosity deep to the ulcer. There is mild linear T1 hypointense signal without confluent T1 hypointense replacement. -Severe talonavicular joint osteoarthritis with subchondral cystic change and remodeling of the navicular bone. SOFT TISSUES: -Plantar fascia: Chronic central band plantar fasciitis. No tear. -Sinus tarsi and tarsal tunnel: Normal. -Muscles: Subacute on chronic denervation atrophy of the visualized foot and distal leg musculature. -There is a soft tissue ulcer of the calcaneus with subjacent nonorganized  subcutaneous edema. Edema abuts the calcaneal tuberosity. No organized/drainable collection.     IMPRESSION: 1.  Soft tissue ulcer of the heel with subcutaneous edema abutting the calcaneus compatible with cellulitis. No organized/drainable collection. There is T2 hyperintense marrow edema of the subjacent calcaneal tuberosity with mild linear T1 hypointense signal but no confluent T1 hypointense replacement. Given location subjacent to an ulcer, these findings are compatible with a high likelihood though are not definitive of osteomyelitis. 2.  Severe talonavicular joint osteoarthritis.    Foot  XR, G/E 3 views, right    Result Date: 10/7/2024  EXAM: XR FOOT RIGHT G/E 3 VIEWS LOCATION: [facility] DATE: 10/7/2024 INDICATION: diabetic foot ulcer right heel COMPARISON: None.     IMPRESSION: Deep soft tissue ulceration along the posterior aspect of the heel. No focal osseous erosions or periostitis to suggest osteomyelitis. If there is persistent clinical concern for osteomyelitis, MRI is more sensitive.  Large plantar calcaneal spur. Moderate midfoot degenerative change most evident at the talonavicular joint where there are prominent dorsal osteophytes. Vascular calcifications extending into the toes. Bipartite medial hallux sesamoid.      LABS:      Sodium   Date Value Ref Range Status   10/09/2024 135 135 - 145 mmol/L Final   10/08/2024 137 135 - 145 mmol/L Final   10/07/2024 136 135 - 145 mmol/L Final     Urea Nitrogen   Date Value Ref Range Status   10/09/2024 27.1 (H) 8.0 - 23.0 mg/dL Final   10/08/2024 32.5 (H) 8.0 - 23.0 mg/dL Final   10/07/2024 33.4 (H) 8.0 - 23.0 mg/dL Final   07/06/2020 30 (H) 8 - 28 mg/dL Final   05/05/2020 32 (H) 8 - 28 mg/dL Final   03/19/2020 23 8 - 28 mg/dL Final     Hemoglobin   Date Value Ref Range Status   10/09/2024 12.3 (L) 13.3 - 17.7 g/dL Final   10/08/2024 11.2 (L) 13.3 - 17.7 g/dL Final   10/07/2024 11.8 (L) 13.3 - 17.7 g/dL Final     Platelet Count    Date Value Ref Range Status   10/09/2024 131 (L) 150 - 450 10e3/uL Final   10/08/2024 125 (L) 150 - 450 10e3/uL Final   10/07/2024 143 (L) 150 - 450 10e3/uL Final       This was a in person visit in which 45 minutes of  total time was spent (either in face-to-face or non-face-to-face time).    Silvestre Jc MD, [facility]  VASCULAR AND INTERVENTIONAL PHYSICIAN  VASCULAR MEDICINE  INTERNAL MEDICINE  PAGER: [phone]  CALL SERVICE: [phone]

## 2024-10-24 NOTE — ANESTHESIA PROCEDURE NOTES
Adductor canal Procedure Note    Pre-Procedure   Staff -        Anesthesiologist:  Gary Ko MD       Performed By: anesthesiologist       Location: pre-op       Procedure Start/Stop Times: 10/24/2024 8:13 AM and 10/24/2024 8:16 AM       Pre-Anesthestic Checklist: patient identified, IV checked, site marked, risks and benefits discussed, informed consent, monitors and equipment checked, pre-op evaluation, at physician/surgeon's request and post-op pain management  Timeout:       Correct Patient: Yes        Correct Procedure: Yes        Correct Site: Yes        Correct Position: Yes        Correct Laterality: Yes        Site Marked: Yes  Procedure Documentation  Procedure: Adductor canal       Laterality: right       Patient Position: supine       Patient Prep/Sterile Barriers: sterile gloves, mask       Skin prep: Chloraprep       Needle Type: short bevel and insulated       Needle Gauge: 20.        Needle Length (Inches): 4        Ultrasound guided       1. Ultrasound was used to identify targeted nerve, plexus, vascular marker, or fascial plane and place a needle adjacent to it in real-time.       2. Ultrasound was used to visualize the spread of anesthetic in close proximity to the above referenced structure.       3. A permanent image is entered into the patient's record.       4. The visualized anatomic structures appeared normal.       5. There were no apparent abnormal pathologic findings.    Assessment/Narrative         The placement was negative for: blood aspirated, painful injection and site bleeding       Paresthesias: No.       Bolus given via needle..        Secured via.        Insertion/Infusion Method: Single Shot       Complications: none    Medication(s) Administered   Bupivacaine 0.5% PF (Infiltration) - Infiltration   10 mL - 10/24/2024 8:13:00 AM  Mepivacaine 2% PF (Perineural) - Perineural   5 mL - 10/24/2024 8:13:00 AM  Medication Administration Time: 10/24/2024 8:13 AM      FOR  "[facility] ONLY:   Pain Team Contact information: please page the Pain Team Via [facility]. Search \"Pain\". During daytime hours, please page the attending first. At night please page the resident first.      "

## 2024-10-24 NOTE — ANESTHESIA CARE TRANSFER NOTE
Patient: Janice Nowak    Procedure: Procedure(s):  INCISION AND DRAINAGE, right heel       Diagnosis: Diabetic ulcer of right heel associated with type 2 diabetes mellitus, with necrosis of muscle (H) [E11.621, L97.413]  Diagnosis Additional Information: No value filed.    Anesthesia Type:   General     Note:    Oropharynx: oropharynx clear of all foreign objects and spontaneously breathing  Level of Consciousness: drowsy  Oxygen Supplementation: nasal cannula    Independent Airway: airway patency satisfactory and stable  Dentition: dentition unchanged  Vital Signs Stable: post-procedure vital signs reviewed and stable  Report to RN Given: handoff report given  Patient transferred to: Phase II    Handoff Report: Identifed the Patient, Identified the Reponsible Provider, Reviewed the pertinent medical history, Discussed the surgical course, Reviewed Intra-OP anesthesia mangement and issues during anesthesia, Set expectations for post-procedure period and Allowed opportunity for questions and acknowledgement of understanding      Vitals:  Vitals Value Taken Time   /70 10/24/24 1145   Temp 35.9  C (96.62  F) 10/24/24 1145   Pulse 71 10/24/24 1145   Resp 16 10/24/24 1130   SpO2 98 % 10/24/24 1145   Vitals shown include unfiled device data.    Electronically Signed By: SILVIA Dacosta CRNA  October 24, 2024  11:47 AM

## 2024-10-24 NOTE — CONSULTS
[facility]  Consult Note - Hospitalist Service  Date of Admission:  10/24/2024  Consult Requested by: Orthopedics  Reason for Consult: Medical management of diabetes    Assessment & Plan   Assessment:   Janice Nowak is a 79 year old male with a past medical history of insulin-dependent type 2 diabetes mellitus presented to the hospital for incision and drainage of right heel, partial calcanectomy, sharp excisional debridement ulceration right heel into the bone for acute osteomyelitis of the calcaneus of the right foot and ulceration of the right heel into the bone and is s/p procedure, postprocedure medicine was consulted for medical comanagement.    Problem list and plan:  Acute osteomyelitis of the right calcaneus  Ulceration right heel into the bone  Patient presented to the hospital for incision and drainage of right heel, partial calcanectomy, sharp excisional debridement ulceration right heel into the bone for acute osteomyelitis of the calcaneus of the right foot and ulceration of the right heel into the bone and is s/p procedure  Continue with pain management as per protocol  Orthopedics following, continue to follow recommendations  Anaerobic and aerobic cultures along with Gram stain pending  Surgical pathology report pending  ID has been consulted  Wound care nurse consulted    CAROLINE  Baseline creatinine around 1  Current creatinine 1.54  Gentle IV hydration  Monitor renal function closely    Chronic normocytic anemia and thrombocytopenia  Monitor CBC, consider iron panel    History of hypertension with high blood pressure  Monitor vital signs as per protocol  IV hydralazine as needed for elevated blood pressure  Continue with amlodipine, irbesartan, Aldactone    History of insulin-dependent type 2 diabetes mellitus with hyperglycemia  Continue with home insulin regimen  Continue with sliding scale  Holding metformin for now  Monitor blood sugar level  Hypoglycemia  "protocol  Hemoglobin A1c 9.2  Continue with Jardiance  As per pharmacy patient has been prescribed 50 twice daily of Lantus and 20 3 times daily with meals of NovoLog, discussed with the patient and he stated that he takes 100 daily of Lantus and 25 units of NovoLog at bedtime, he also takes 20 to 25 units 3 times daily with meals based on blood sugar levels, his sugar levels were running in the lower side so held nighttime dose of NovoLog, placed on sliding scale and switched to 50 twice daily of Lantus for now until patient is present in the hospital.    History of hyperlipidemia  Continue with aspirin, Plavix, rosuvastatin    History of hypothyroidism  Continue with levothyroxine    DVT PPx  Intermittent pneumatic compression    CODE STATUS  DNR/DNI as per discussion with the patient, as per patient he does not want any aggressive intervention and wants natural death in case his heart stops and he stated to let him pass away in peace in case his heart stops.       Clinically Significant Risk Factors Present on Admission                 # Drug Induced Platelet Defect: home medication list includes an antiplatelet medication   # Hypertension: Noted on problem list         # Obesity: Estimated body mass index is 33.98 kg/m  as calculated from the following:    Height as of this encounter: 1.727 m (5' 8\").    Weight as of this encounter: 101.4 kg (223 lb 8 oz).       # Financial/Environmental Concerns:           Saad J. Gondal, MD  Hospitalist Service  Securely message with [employer]Ã¢â€žÂ¢ (more info)  Text page via [facility] Paging/Directory   ______________________________________________________________________    Chief Complaint   Acute osteomyelitis calcaneus right, ulceration right heel into the bone    History is obtained from the patient and chart review    History of Present Illness   Janice Nowak is a 79 year old male with a past medical history of insulin-dependent type 2 diabetes mellitus presented to the " "hospital for incision and drainage of right heel, partial calcanectomy, sharp excisional debridement ulceration right heel into the bone for acute osteomyelitis of the calcaneus of the right foot and ulceration of the right heel into the bone and is s/p procedure, postprocedure medicine was consulted for medical comanagement.      Past Medical History    Past Medical History:   Diagnosis Date    Diabetes (H)     Hypertension     Sleep apnea     Thyroid disease        Past Surgical History   Past Surgical History:   Procedure Laterality Date    BACK SURGERY      ORTHOPEDIC SURGERY      VASCULAR SURGERY         Medications   I have reviewed this patient's current medications       Review of Systems    The 10 point Review of Systems is negative     Physical Exam   Vital Signs: Temp: 97.8  F (36.6  C) Temp src: Oral BP: (!) 155/71 Pulse: 76   Resp: 20 SpO2: 98 % O2 Device: None (Room air) Oxygen Delivery: 2 LPM  Weight: 223 lbs 8 oz    GENERAL: Patient was seen and examined at bedside with no acute distress  HENT:  Head is normocephalic, atraumatic.   EYES:  Eyes have anicteric sclerae without conjunctival injection   LUNGS: Bilateral air entry, clear to auscultation bilaterally  CARDIOVASCULAR:  Regular rate and rhythm with no murmurs, gallops or rubs.  ABDOMEN:  Normal bowel sounds. Soft; nontender   EXT: no edema    SKIN:  No acute rashes.   NEUROLOGIC:  Grossly nonfocal.      Medical Decision Making       80 MINUTES SPENT BY ME on the date of service doing chart review, history, exam, documentation & further activities per the note.      Data         Imaging results reviewed over the past 24 hrs:   Recent Results (from the past 24 hours)   POC US Guidance Needle Placement    Narrative    Ultrasound was performed as guidance to an anesthesia procedure.  Click   \"PACS images\" hyperlink below to view any stored images.  For specific   procedure details, view procedure note authored by anesthesia.     "

## 2024-10-24 NOTE — PHARMACY-ADMISSION MEDICATION HISTORY
Pharmacist Admission Medication History    Admission medication history is complete. The information provided in this note is only as accurate as the sources available at the time of the update.    Information Source(s): Patient, Clinic records, and [facility]    Pertinent Information: [facility] and [facility] with current active medications [facility] found [facility]. Patient reports using 25 units of Novolog at bedtime instead of lantus with using sliding scale during meal times.     Changes made to PTA medication list:  Added: None  Deleted: Lantus (Bedtime dose)  Changed: Novolog (has been using at bedtime instead)    Allergies reviewed with patient and updates made in EHR: yes    Medication History Completed By: Dino Fry [facility] 10/24/2024 7:29 AM    PTA Med List   Medication Sig Last Dose/Taking    amLODIPine (NORVASC) 5 MG tablet Take 5 mg by mouth daily. 10/23/2024 Morning    aspirin 81 MG EC tablet Take 81 mg by mouth daily. 10/23/2024 Morning    clopidogrel (PLAVIX) 75 MG tablet Take 75 mg by mouth daily. Past Week    co-enzyme Q-10 100 MG CAPS capsule Take 200 mg by mouth daily. 10/23/2024 Morning    empagliflozin (JARDIANCE) 10 MG TABS tablet Take 10 mg by mouth daily. 10/23/2024 Morning    insulin aspart (NOVOLOG FLEXPEN) 100 UNIT/ML pen Inject 25 Units subcutaneously at bedtime. 10/23/2024 Bedtime    insulin aspart (NOVOLOG PEN) 100 UNIT/ML pen Inject subcutaneously 3 times daily (with meals). Per sliding scale, if BG >150 mg/dL patient takes 20 units 10/23/2024    insulin glargine (LANTUS PEN) 100 UNIT/ML pen Inject 100 Units subcutaneously every morning. 10/23/2024 Morning    irbesartan (AVAPRO) 300 MG tablet Take 300 mg by mouth at bedtime. 10/23/2024 Evening    lactobacillus rhamnosus, GG, (CULTURELL) capsule Take 1 capsule by mouth daily. 10/23/2024 Morning    levothyroxine (SYNTHROID/LEVOTHROID) 50 MCG tablet Take 50 mcg  by mouth daily. 10/23/2024 Morning    metFORMIN (GLUCOPHAGE XR) 500 MG 24 hr tablet Take 2,000 mg by mouth daily (with dinner). 10/23/2024 Evening    Multiple Vitamins-Minerals (PRESERVISION AREDS 2 PO) Take 2 capsules by mouth daily. 10/23/2024 Morning    multivitamin, therapeutic (THERA-VIT) TABS tablet Take 1 tablet by mouth daily. 10/23/2024 Morning    rosuvastatin (CRESTOR) 40 MG tablet Take 40 mg by mouth daily. 10/23/2024 Morning    spironolactone (ALDACTONE) 25 MG tablet Take 25 mg by mouth daily. 10/23/2024 Morning    vitamin C with B complex (B COMPLEX-C) tablet Take 1 tablet by mouth daily. 10/23/2024 Morning

## 2024-10-24 NOTE — INTERVAL H&P NOTE
"I have reviewed the surgical (or preoperative) H&P that is linked to this encounter, and examined the patient. There are no significant changes    Clinical Conditions Present on Arrival:  Clinically Significant Risk Factors Present on Admission         # Hyponatremia: Lowest Na = 135 mmol/L in last 30 days, will monitor as appropriate          # Drug Induced Platelet Defect: home medication list includes an antiplatelet medication      # Obesity: Estimated body mass index is 33.98 kg/m  as calculated from the following:    Height as of this encounter: 1.727 m (5' 8\").    Weight as of this encounter: 101.4 kg (223 lb 8 oz).       "

## 2024-10-24 NOTE — ANESTHESIA PREPROCEDURE EVALUATION
Anesthesia Pre-Procedure Evaluation    Patient: Janice Nowak   MRN: [id number] : 1945        Procedure : Procedure(s):  INCISION AND DRAINAGE, right heel          Past Medical History:   Diagnosis Date    Diabetes (H)     Hypertension     Sleep apnea     Thyroid disease       Past Surgical History:   Procedure Laterality Date    BACK SURGERY      ORTHOPEDIC SURGERY      VASCULAR SURGERY        Allergies   Allergen Reactions    Exenatide Unknown    Heparin     Liraglutide     Lisinopril Cough    Morphine       Social History     Tobacco Use    Smoking status: Former     Current packs/day: 0.00     Types: Cigarettes     Quit date:      Years since quittin.8    Smokeless tobacco: Never   Substance Use Topics    Alcohol use: Yes     Alcohol/week: 5.0 standard drinks of alcohol     Types: 5 Cans of beer per week      Wt Readings from Last 1 Encounters:   10/24/24 101.4 kg (223 lb 8 oz)        Anesthesia Evaluation   Pt has had prior anesthetic.         ROS/MED HX  ENT/Pulmonary:     (+) sleep apnea, uses CPAP,                                      Neurologic:  - neg neurologic ROS     Cardiovascular:     (+)  hypertension- -   -  - -                                      METS/Exercise Tolerance:     Hematologic:  - neg hematologic  ROS     Musculoskeletal:  - neg musculoskeletal ROS     GI/Hepatic:  - neg GI/hepatic ROS     Renal/Genitourinary:       Endo:     (+)  type II DM,        thyroid problem,     Obesity,       Psychiatric/Substance Use:  - neg psychiatric ROS     Infectious Disease:       Malignancy:       Other:            Physical Exam    Airway  airway exam normal      Mallampati: II   TM distance: > 3 FB   Neck ROM: full   Mouth opening: > 3 cm    Respiratory Devices and Support         Dental       (+) Modest Abnormalities - crowns, retainers, 1 or 2 missing teeth      Cardiovascular   cardiovascular exam normal       Rhythm and rate: regular and normal     Pulmonary   pulmonary exam  "normal                OUTSIDE LABS:  CBC:   Lab Results   Component Value Date    WBC 10.9 10/09/2024    WBC 8.4 10/08/2024    HGB 12.3 (L) 10/09/2024    HGB 11.2 (L) 10/08/2024    HCT 37.4 (L) 10/09/2024    HCT 34.0 (L) 10/08/2024     (L) 10/09/2024     (L) 10/08/2024     BMP:   Lab Results   Component Value Date     10/09/2024     10/08/2024    POTASSIUM 4.1 10/09/2024    POTASSIUM 4.5 10/08/2024    CHLORIDE 103 10/09/2024    CHLORIDE 105 10/08/2024    CO2 19 (L) 10/09/2024    CO2 22 10/08/2024    BUN 27.1 (H) 10/09/2024    BUN 32.5 (H) 10/08/2024    CR 1.05 10/09/2024    CR 1.08 10/08/2024     (H) 10/24/2024    GLC 96 10/09/2024     COAGS: No results found for: \"PTT\", \"INR\", \"FIBR\"  POC: No results found for: \"BGM\", \"HCG\", \"HCGS\"  HEPATIC:   Lab Results   Component Value Date    ALBUMIN 3.9 10/07/2024    PROTTOTAL 7.9 10/07/2024    ALT 42 10/07/2024    AST 23 10/07/2024    ALKPHOS 89 10/07/2024    BILITOTAL 0.5 10/07/2024     OTHER:   Lab Results   Component Value Date    AYAN 8.9 10/09/2024    MAG 2.2 10/09/2024    LIPASE 45 05/05/2020    SED 57 (H) 10/07/2024       Anesthesia Plan    ASA Status:  3       Anesthesia Type: General.     - Airway: Mask Only   Induction: Intravenous, Propofol.   Maintenance: TIVA.        Consents    Anesthesia Plan(s) and associated risks, benefits, and realistic alternatives discussed. Questions answered and patient/representative(s) expressed understanding.     - Discussed:     - Discussed with:  Patient      - Extended Intubation/Ventilatory Support Discussed: No.           Postoperative Care    Pain management: IV analgesics, Oral pain medications, Peripheral nerve block (Single Shot), Multi-modal analgesia.     - Plan for long acting post-op opioid use   PONV prophylaxis: Ondansetron (or other 5HT-3)     Comments:               Gary Ko MD    I have reviewed the pertinent notes and labs in the chart from the past 30 days and " "(re)examined the patient.  Any updates or changes from those notes are reflected in this note.             # Drug Induced Platelet Defect: home medication list includes an antiplatelet medication   # Hypertension: Noted on problem list         # Obesity: Estimated body mass index is 33.98 kg/m  as calculated from the following:    Height as of this encounter: 1.727 m (5' 8\").    Weight as of this encounter: 101.4 kg (223 lb 8 oz).       # Financial/Environmental Concerns:          "

## 2024-10-24 NOTE — ANESTHESIA POSTPROCEDURE EVALUATION
Patient: Janice Nowak    Procedure: Procedure(s):  INCISION AND DRAINAGE, right heel       Anesthesia Type:  General    Note:  Disposition: Outpatient   Postop Pain Control: Uneventful            Sign Out: Well controlled pain   PONV: No   Neuro/Psych: Uneventful            Sign Out: Acceptable/Baseline neuro status   Airway/Respiratory: Uneventful            Sign Out: Acceptable/Baseline resp. status   CV/Hemodynamics: Uneventful            Sign Out: Acceptable CV status; No obvious hypovolemia; No obvious fluid overload   Other NRE: NONE   DID A NON-ROUTINE EVENT OCCUR? No           Last vitals:  Vitals Value Taken Time   /61 10/24/24 1230   Temp 35.9  C (96.62  F) 10/24/24 1235   Pulse 70 10/24/24 1235   Resp 16 10/24/24 1145   SpO2 97 % 10/24/24 1235   Vitals shown include unfiled device data.    Electronically Signed By: Gary Ko MD  October 24, 2024  12:37 PM

## 2024-10-25 ENCOUNTER — APPOINTMENT (OUTPATIENT)
Dept: PHYSICAL THERAPY | Facility: HOSPITAL | Age: 79
DRG: 629 | End: 2024-10-25
Attending: PODIATRIST
Payer: COMMERCIAL

## 2024-10-25 PROBLEM — Z79.4 TYPE 2 DIABETES MELLITUS WITH DIABETIC PERIPHERAL ANGIOPATHY WITHOUT GANGRENE, WITH LONG-TERM CURRENT USE OF INSULIN (H): Status: ACTIVE | Noted: 2024-10-07

## 2024-10-25 PROBLEM — E11.51 TYPE 2 DIABETES MELLITUS WITH DIABETIC PERIPHERAL ANGIOPATHY WITHOUT GANGRENE, WITH LONG-TERM CURRENT USE OF INSULIN (H): Status: ACTIVE | Noted: 2024-10-07

## 2024-10-25 LAB
ANION GAP SERPL CALCULATED.3IONS-SCNC: 11 MMOL/L (ref 7–15)
BUN SERPL-MCNC: 31.2 MG/DL (ref 8–23)
CALCIUM SERPL-MCNC: 9 MG/DL (ref 8.8–10.4)
CHLORIDE SERPL-SCNC: 105 MMOL/L (ref 98–107)
CK SERPL-CCNC: 75 U/L (ref 39–308)
CREAT SERPL-MCNC: 1.33 MG/DL (ref 0.67–1.17)
EGFRCR SERPLBLD CKD-EPI 2021: 55 ML/MIN/1.73M2
ERYTHROCYTE [DISTWIDTH] IN BLOOD BY AUTOMATED COUNT: 15.9 % (ref 10–15)
GLUCOSE BLDC GLUCOMTR-MCNC: 150 MG/DL (ref 70–99)
GLUCOSE BLDC GLUCOMTR-MCNC: 162 MG/DL (ref 70–99)
GLUCOSE BLDC GLUCOMTR-MCNC: 226 MG/DL (ref 70–99)
GLUCOSE BLDC GLUCOMTR-MCNC: 92 MG/DL (ref 70–99)
GLUCOSE BLDC GLUCOMTR-MCNC: 99 MG/DL (ref 70–99)
GLUCOSE SERPL-MCNC: 93 MG/DL (ref 70–99)
HCO3 SERPL-SCNC: 25 MMOL/L (ref 22–29)
HCT VFR BLD AUTO: 33.9 % (ref 40–53)
HGB BLD-MCNC: 10.8 G/DL (ref 13.3–17.7)
MAGNESIUM SERPL-MCNC: 2.4 MG/DL (ref 1.7–2.3)
MCH RBC QN AUTO: 28.6 PG (ref 26.5–33)
MCHC RBC AUTO-ENTMCNC: 31.9 G/DL (ref 31.5–36.5)
MCV RBC AUTO: 90 FL (ref 78–100)
PATH REPORT.COMMENTS IMP SPEC: NORMAL
PATH REPORT.COMMENTS IMP SPEC: NORMAL
PATH REPORT.FINAL DX SPEC: NORMAL
PATH REPORT.GROSS SPEC: NORMAL
PATH REPORT.MICROSCOPIC SPEC OTHER STN: NORMAL
PATH REPORT.RELEVANT HX SPEC: NORMAL
PHOSPHATE SERPL-MCNC: 3.7 MG/DL (ref 2.5–4.5)
PHOTO IMAGE: NORMAL
PLATELET # BLD AUTO: 118 10E3/UL (ref 150–450)
POTASSIUM SERPL-SCNC: 4.1 MMOL/L (ref 3.4–5.3)
RBC # BLD AUTO: 3.78 10E6/UL (ref 4.4–5.9)
SODIUM SERPL-SCNC: 141 MMOL/L (ref 135–145)
WBC # BLD AUTO: 7 10E3/UL (ref 4–11)

## 2024-10-25 PROCEDURE — 82550 ASSAY OF CK (CPK): CPT | Performed by: INTERNAL MEDICINE

## 2024-10-25 PROCEDURE — 250N000012 HC RX MED GY IP 250 OP 636 PS 637: Performed by: INTERNAL MEDICINE

## 2024-10-25 PROCEDURE — 97162 PT EVAL MOD COMPLEX 30 MIN: CPT | Mod: GP

## 2024-10-25 PROCEDURE — 120N000001 HC R&B MED SURG/OB

## 2024-10-25 PROCEDURE — 250N000013 HC RX MED GY IP 250 OP 250 PS 637: Performed by: STUDENT IN AN ORGANIZED HEALTH CARE EDUCATION/TRAINING PROGRAM

## 2024-10-25 PROCEDURE — 250N000011 HC RX IP 250 OP 636: Performed by: INTERNAL MEDICINE

## 2024-10-25 PROCEDURE — 88311 DECALCIFY TISSUE: CPT | Mod: 26 | Performed by: PATHOLOGY

## 2024-10-25 PROCEDURE — 88305 TISSUE EXAM BY PATHOLOGIST: CPT | Mod: 26 | Performed by: PATHOLOGY

## 2024-10-25 PROCEDURE — 258N000003 HC RX IP 258 OP 636: Performed by: INTERNAL MEDICINE

## 2024-10-25 PROCEDURE — 99232 SBSQ HOSP IP/OBS MODERATE 35: CPT | Mod: 24 | Performed by: FAMILY MEDICINE

## 2024-10-25 PROCEDURE — 83735 ASSAY OF MAGNESIUM: CPT | Performed by: STUDENT IN AN ORGANIZED HEALTH CARE EDUCATION/TRAINING PROGRAM

## 2024-10-25 PROCEDURE — 82947 ASSAY GLUCOSE BLOOD QUANT: CPT | Performed by: STUDENT IN AN ORGANIZED HEALTH CARE EDUCATION/TRAINING PROGRAM

## 2024-10-25 PROCEDURE — 80048 BASIC METABOLIC PNL TOTAL CA: CPT | Performed by: STUDENT IN AN ORGANIZED HEALTH CARE EDUCATION/TRAINING PROGRAM

## 2024-10-25 PROCEDURE — 97530 THERAPEUTIC ACTIVITIES: CPT | Mod: GP

## 2024-10-25 PROCEDURE — 84100 ASSAY OF PHOSPHORUS: CPT | Performed by: STUDENT IN AN ORGANIZED HEALTH CARE EDUCATION/TRAINING PROGRAM

## 2024-10-25 PROCEDURE — 85018 HEMOGLOBIN: CPT | Performed by: STUDENT IN AN ORGANIZED HEALTH CARE EDUCATION/TRAINING PROGRAM

## 2024-10-25 PROCEDURE — 99222 1ST HOSP IP/OBS MODERATE 55: CPT | Performed by: INTERNAL MEDICINE

## 2024-10-25 PROCEDURE — 97605 NEG PRS WND THER DME<=50SQCM: CPT

## 2024-10-25 PROCEDURE — G0463 HOSPITAL OUTPT CLINIC VISIT: HCPCS | Mod: 25

## 2024-10-25 PROCEDURE — 250N000012 HC RX MED GY IP 250 OP 636 PS 637: Performed by: STUDENT IN AN ORGANIZED HEALTH CARE EDUCATION/TRAINING PROGRAM

## 2024-10-25 PROCEDURE — 250N000013 HC RX MED GY IP 250 OP 250 PS 637: Performed by: PODIATRIST

## 2024-10-25 PROCEDURE — 250N000013 HC RX MED GY IP 250 OP 250 PS 637: Performed by: INTERNAL MEDICINE

## 2024-10-25 PROCEDURE — 36415 COLL VENOUS BLD VENIPUNCTURE: CPT | Performed by: STUDENT IN AN ORGANIZED HEALTH CARE EDUCATION/TRAINING PROGRAM

## 2024-10-25 RX ORDER — PIPERACILLIN SODIUM, TAZOBACTAM SODIUM 3; .375 G/15ML; G/15ML
3.38 INJECTION, POWDER, LYOPHILIZED, FOR SOLUTION INTRAVENOUS ONCE
Status: COMPLETED | OUTPATIENT
Start: 2024-10-25 | End: 2024-10-25

## 2024-10-25 RX ORDER — PIPERACILLIN SODIUM, TAZOBACTAM SODIUM 3; .375 G/15ML; G/15ML
3.38 INJECTION, POWDER, LYOPHILIZED, FOR SOLUTION INTRAVENOUS EVERY 8 HOURS
Status: DISCONTINUED | OUTPATIENT
Start: 2024-10-25 | End: 2024-11-01 | Stop reason: HOSPADM

## 2024-10-25 RX ADMIN — INSULIN GLARGINE 50 UNITS: 100 INJECTION, SOLUTION SUBCUTANEOUS at 21:14

## 2024-10-25 RX ADMIN — PIPERACILLIN AND TAZOBACTAM 3.38 G: 3; .375 INJECTION, POWDER, FOR SOLUTION INTRAVENOUS at 16:55

## 2024-10-25 RX ADMIN — ACETAMINOPHEN 975 MG: 325 TABLET ORAL at 00:41

## 2024-10-25 RX ADMIN — SPIRONOLACTONE 25 MG: 25 TABLET ORAL at 08:52

## 2024-10-25 RX ADMIN — Medication 1 CAPSULE: at 08:53

## 2024-10-25 RX ADMIN — ACETAMINOPHEN 975 MG: 325 TABLET ORAL at 16:55

## 2024-10-25 RX ADMIN — ACETAMINOPHEN 975 MG: 325 TABLET ORAL at 08:52

## 2024-10-25 RX ADMIN — INSULIN ASPART 1 UNITS: 100 INJECTION, SOLUTION INTRAVENOUS; SUBCUTANEOUS at 16:59

## 2024-10-25 RX ADMIN — INSULIN GLARGINE 50 UNITS: 100 INJECTION, SOLUTION SUBCUTANEOUS at 08:52

## 2024-10-25 RX ADMIN — DAPTOMYCIN 500 MG: 500 INJECTION, POWDER, LYOPHILIZED, FOR SOLUTION INTRAVENOUS at 11:20

## 2024-10-25 RX ADMIN — SENNOSIDES AND DOCUSATE SODIUM 1 TABLET: 8.6; 5 TABLET ORAL at 08:52

## 2024-10-25 RX ADMIN — CLOPIDOGREL BISULFATE 75 MG: 75 TABLET ORAL at 08:53

## 2024-10-25 RX ADMIN — AMLODIPINE BESYLATE 5 MG: 5 TABLET ORAL at 08:52

## 2024-10-25 RX ADMIN — SENNOSIDES AND DOCUSATE SODIUM 1 TABLET: 8.6; 5 TABLET ORAL at 21:13

## 2024-10-25 RX ADMIN — IRBESARTAN 300 MG: 300 TABLET ORAL at 21:13

## 2024-10-25 RX ADMIN — LEVOTHYROXINE SODIUM 50 MCG: 0.03 TABLET ORAL at 06:16

## 2024-10-25 RX ADMIN — EMPAGLIFLOZIN 10 MG: 10 TABLET, FILM COATED ORAL at 08:52

## 2024-10-25 RX ADMIN — ASPIRIN 81 MG: 81 TABLET, COATED ORAL at 08:52

## 2024-10-25 RX ADMIN — OXYCODONE HYDROCHLORIDE 10 MG: 5 TABLET ORAL at 14:48

## 2024-10-25 RX ADMIN — ROSUVASTATIN CALCIUM 40 MG: 40 TABLET, FILM COATED ORAL at 08:52

## 2024-10-25 RX ADMIN — PIPERACILLIN AND TAZOBACTAM 3.38 G: 3; .375 INJECTION, POWDER, FOR SOLUTION INTRAVENOUS at 12:58

## 2024-10-25 ASSESSMENT — ACTIVITIES OF DAILY LIVING (ADL)
ADLS_ACUITY_SCORE: 0
DEPENDENT_IADLS:: TRANSPORTATION

## 2024-10-25 NOTE — PROGRESS NOTES
[facility] consulted for medical management.   Patient is a [facility] patient and therefore care will be assumed by the resident service.   Patient signed out to resident service this AM.     Michel Gonzalez D.O.

## 2024-10-25 NOTE — PROGRESS NOTES
Denied pain all shift until mid-afternoon when the block wore off. Medicated with oxycodone for pain. Will monitor.

## 2024-10-25 NOTE — PROGRESS NOTES
Patient is alert, oriented, pleasant. Medicated with scheduled tylenol and denies pain at present. Tolerated VAC placement well. Eating well, taking fluids well. Will monitor.

## 2024-10-25 NOTE — PROGRESS NOTES
FOOT AND ANKLE SURGERY/PODIATRY Progress Note        ASSESSMENT:   Right heel ulceration into bone  S/p I&D right heel  DM2      TREATMENT:  -Doing well today. Denies foot pain. Pathology and cultures pending. WBC 7.0. Afebrile.    -I reviewed yesterday's surgery with the patient to include removal of nonviable tissue including resection of bone from the right calcaneus. Recommend continued non-weight bearing on the right foot.  Continue with wound VAC on the right heel.    -Medical management per hospitalist. Antibiotics per ID.  Patient okay for discharge to TCU from podiatry perspective pending final ID input.  I would like to see him for follow-up at his existing appointment with me on 11/1.    Amol Patricio DPM  [facility] Podiatry/Foot & Ankle Surgery      HPI: Janice Nowak was seen again today for a right heel ulceration s/p I&D right heel.  Doing well today.  Denies foot pain.    Past Medical History:   Diagnosis Date    Diabetes (H)     Hypertension     Sleep apnea     Thyroid disease        Past Surgical History:   Procedure Laterality Date    BACK SURGERY      ORTHOPEDIC SURGERY      VASCULAR SURGERY         Allergies   Allergen Reactions    Exenatide Unknown    Heparin     Liraglutide     Lisinopril Cough    Morphine          Current Facility-Administered Medications:     [START ON 10/27/2024] acetaminophen (TYLENOL) tablet 650 mg, 650 mg, Oral, Q4H PRN, Amol Patricio DPM    acetaminophen (TYLENOL) tablet 975 mg, 975 mg, Oral, Q8H, Amol Patricio DPM, 975 mg at 10/25/24 0852    amLODIPine (NORVASC) tablet 5 mg, 5 mg, Oral, Daily, Miguelito Burkett MD, 5 mg at 10/25/24 0852    aspirin EC tablet 81 mg, 81 mg, Oral, Daily, Gondal, Saad J, MD, 81 mg at 10/25/24 0852    [START ON 10/27/2024] bisacodyl (DULCOLAX) suppository 10 mg, 10 mg, Rectal, Daily PRN, Amol Patricio DPM    clopidogrel (PLAVIX) tablet 75 mg, 75 mg, Oral, Daily, Gondal, Saad J, MD, 75 mg at 10/25/24  0853    DAPTOmycin (CUBICIN) 500 mg in sodium chloride 0.9 % 100 mL intermittent infusion, 6 mg/kg (Adjusted), Intravenous, Q24H, 500 mg at 10/25/24 1120 **AND** [COMPLETED] CK total, , , Routine **AND** [START ON 11/1/2024] CK total, , , Q Week, Ike St MD    glucose gel 15-30 g, 15-30 g, Oral, Q15 Min PRN **OR** dextrose 50 % injection 25-50 mL, 25-50 mL, Intravenous, Q15 Min PRN **OR** glucagon injection 1 mg, 1 mg, Subcutaneous, Q15 Min PRN, Miguelito Burkett MD    empagliflozin (JARDIANCE) tablet 10 mg, 10 mg, Oral, Daily, Gondal, Saad J, MD, 10 mg at 10/25/24 0852    HYDROmorphone (DILAUDID) injection 0.2 mg, 0.2 mg, Intravenous, Q2H PRN **OR** HYDROmorphone (DILAUDID) injection 0.4 mg, 0.4 mg, Intravenous, Q2H PRN, Amol Patricio DPM    insulin aspart (NovoLOG) injection (RAPID ACTING), 1-7 Units, Subcutaneous, TID AC, Miguelito Burkett MD    insulin aspart (NovoLOG) injection (RAPID ACTING), 1-5 Units, Subcutaneous, At Bedtime, Miguelito Burkett MD    insulin glargine (LANTUS PEN) injection 50 Units, 50 Units, Subcutaneous, BID, Gondal, Saad J, MD, 50 Units at 10/25/24 0852    irbesartan (AVAPRO) tablet 300 mg, 300 mg, Oral, At Bedtime, Miguelito Burkett MD, 300 mg at 10/24/24 2125    lactated ringers infusion, , Intravenous, Continuous, Amol Patricio DPM    lactobacillus rhamnosus (GG) (CULTURELL) capsule 1 capsule, 1 capsule, Oral, Daily, Gondal, Saad J, MD, 1 capsule at 10/25/24 0853    levothyroxine (SYNTHROID/LEVOTHROID) tablet 50 mcg, 50 mcg, Oral, Daily, Miguelito Burkett MD, 50 mcg at 10/25/24 0616    lidocaine (LMX4) cream, , Topical, Q1H PRN, Amol Patricio DPM    lidocaine (LMX4) cream, , Topical, Q1H PRN, Amol Patricio DPM    lidocaine 1 % 0.1-1 mL, 0.1-1 mL, Other, Q1H PRN, Amol Patricio DPM    lidocaine 1 % 0.1-1 mL, 0.1-1 mL, Other, Q1H PRN, Amol Patricio DPM    [START ON 10/26/2024] magnesium hydroxide (MILK OF MAGNESIA) suspension 30 mL, 30  mL, Oral, Daily PRN, Amol Patricio DPM    midazolam (VERSED) injection 0.5-2 mg, 0.5-2 mg, Intravenous, q1 min prn, Amol Patricio DPM, 1 mg at 10/24/24 0813    naloxone (NARCAN) injection 0.2 mg, 0.2 mg, Intravenous, Q2 Min PRN **OR** naloxone (NARCAN) injection 0.4 mg, 0.4 mg, Intravenous, Q2 Min PRN **OR** naloxone (NARCAN) injection 0.2 mg, 0.2 mg, Intramuscular, Q2 Min PRN **OR** naloxone (NARCAN) injection 0.4 mg, 0.4 mg, Intramuscular, Q2 Min PRN, Amol Patricio DPM    ondansetron (ZOFRAN ODT) ODT tab 4 mg, 4 mg, Oral, Q6H PRN **OR** ondansetron (ZOFRAN) injection 4 mg, 4 mg, Intravenous, Q6H PRN, Amol Patricio DPM    oxyCODONE (ROXICODONE) tablet 5 mg, 5 mg, Oral, Q4H PRN **OR** oxyCODONE (ROXICODONE) tablet 10 mg, 10 mg, Oral, Q4H PRN, Amol Patricio DPM    piperacillin-tazobactam (ZOSYN) 3.375 g vial to attach to  mL bag, 3.375 g, Intravenous, Once, Ike St MD    piperacillin-tazobactam (ZOSYN) 3.375 g vial to attach to  mL bag, 3.375 g, Intravenous, Q8H, Ike St MD    polyethylene glycol (MIRALAX) Packet 17 g, 17 g, Oral, Daily, Amol Patricio DPM    prochlorperazine (COMPAZINE) injection 5 mg, 5 mg, Intravenous, Q6H PRN **OR** prochlorperazine (COMPAZINE) tablet 5 mg, 5 mg, Oral, Q6H PRN, Amol Patricio DPM    rosuvastatin (CRESTOR) tablet 40 mg, 40 mg, Oral, Daily, Miguelito Burkett MD, 40 mg at 10/25/24 0852    senna-docusate (SENOKOT-S/PERICOLACE) 8.6-50 MG per tablet 1 tablet, 1 tablet, Oral, BID, Amol Patricio DPM, 1 tablet at 10/25/24 0852    sodium chloride (PF) 0.9% PF flush 3 mL, 3 mL, Intracatheter, Q8H, Amol Patricio DPM, 3 mL at 10/25/24 0616    sodium chloride (PF) 0.9% PF flush 3 mL, 3 mL, Intracatheter, q1 min prn, Amol Patricio DPM    sodium chloride (PF) 0.9% PF flush 3 mL, 3 mL, Intracatheter, Q8H, Amol Patricio DPM, 3 mL at 10/25/24 0855    sodium chloride (PF)  0.9% PF flush 3 mL, 3 mL, Intracatheter, q1 min prn, Amol Patricio, CLYDE    spironolactone (ALDACTONE) tablet 25 mg, 25 mg, Oral, Daily, Gondal, Saad J, MD, 25 mg at 10/25/24 0852    History reviewed. No pertinent family history.    Social History     Socioeconomic History    Marital status:      Spouse name: Not on file    Number of children: Not on file    Years of education: Not on file    Highest education level: Not on file   Occupational History    Not on file   Tobacco Use    Smoking status: Former     Current packs/day: 0.00     Types: Cigarettes     Quit date:      Years since quittin.8    Smokeless tobacco: Never   Substance and Sexual Activity    Alcohol use: Yes     Alcohol/week: 5.0 standard drinks of alcohol     Types: 5 Cans of beer per week    Drug use: Not Currently    Sexual activity: Not on file   Other Topics Concern    Not on file   Social History Narrative    Not on file     Social Drivers of Health     Financial Resource Strain: Low Risk  (10/9/2024)    Financial Resource Strain     Within the past 12 months, have you or your family members you live with been unable to get utilities (heat, electricity) when it was really needed?: No   Food Insecurity: Low Risk  (10/9/2024)    Food Insecurity     Within the past 12 months, did you worry that your food would run out before you got money to buy more?: No     Within the past 12 months, did the food you bought just not last and you didn t have money to get more?: No   Transportation Needs: Low Risk  (10/9/2024)    Transportation Needs     Within the past 12 months, has lack of transportation kept you from medical appointments, getting your medicines, non-medical meetings or appointments, work, or from getting things that you need?: No   Physical Activity: Not on file   Stress: Not on file   Social Connections: Not on file   Interpersonal Safety: Low Risk  (10/24/2024)    Interpersonal Safety     Do you feel physically and  "emotionally safe where you currently live?: Yes     Within the past 12 months, have you been hit, slapped, kicked or otherwise physically hurt by someone?: No     Within the past 12 months, have you been humiliated or emotionally abused in other ways by your partner or ex-partner?: No   Recent Concern: Interpersonal Safety - High Risk (10/9/2024)    Interpersonal Safety     Do you feel physically and emotionally safe where you currently live?: No     Within the past 12 months, have you been hit, slapped, kicked or otherwise physically hurt by someone?: No     Within the past 12 months, have you been humiliated or emotionally abused in other ways by your partner or ex-partner?: No   Housing Stability: High Risk (10/9/2024)    Housing Stability     Do you have housing? : No     Are you worried about losing your housing?: No       10 point Review of Systems is negative except for right heel ulcer which is noted in HPI.     BP (!) 140/75 (BP Location: Left arm, Patient Position: Semi-De Leon's, Cuff Size: Adult Regular)   Pulse 72   Temp 98.2  F (36.8  C) (Oral)   Resp 18   Ht 5' 8\" (1.727 m)   Wt 223 lb 8 oz (101.4 kg)   SpO2 93%   BMI 33.98 kg/m      BMI= Body mass index is 33.98 kg/m .    OBJECTIVE:  General appearance: Patient is alert and fully cooperative with history & exam.  No sign of distress is noted during the visit.   Wound VAC intact right heel.    Imaging:     POC US Guidance Needle Placement    Result Date: 10/24/2024  Ultrasound was performed as guidance to an anesthesia procedure.  Click \"PACS images\" hyperlink below to view any stored images.  For specific procedure details, view procedure note authored by anesthesia.    US Lower Extremity Arterial Duplex Bilateral    Result Date: 10/15/2024  Table formatting from the original result was not included. Arterial Duplex Ultrasound (Date: 10/15/24) Lower Extremity Artery Evaluation Indication: Surveillance Bilateral Leg Arterial: Decreased SONNY's. " Left Leg Pain. Right Diabetic Heel Ulcer. Decreased pedal pulses. Previous: 2/6/2018 SONNY's History: Previous Smoker, Hypertension, Diabetic, Hyperlipidemia, PAD, and Vascular Ulcers Technique: Duplex imaging is performed utilizing gray-scale, two-dimensional images, and color-flow imaging. Doppler waveform analysis and spectral Doppler imaging is also performed. LOWER EXTREMITY ARTERIAL DUPLEX EXAM WITH WAVEFORMS Right Leg:(cm/s) Location: Velocities Waveforms EIA:   109  B CFA:   111  B PFA:   202  B SFA Proximal:   89 /  72 B SFA Mid:   75  T SFA Distal: 94   T Popliteal Artery:   76 /  72 B PTA:   105   M ALEX:   33  M DPA:   38  M Waveforms: T=Triphasic, M=Monophasic, B=Biphasic Left Leg:(cm/s) Location: Velocities Waveforms EIA:   53  M CFA:   38  M PFA:   48  M SFA Proximal:   42 / 26 M SFA Mid:   23  M SFA Distal:   27  M Popliteal Artery:   32 /15  M PTA:   18    M ALEX:   5  M DPA:   7  M Waveforms: T=Triphasic, M=Monophasic, B=Biphasic Impression: Right Lower Extremity: Patent vasculature to the popliteal artery with multiphasic waveforms.  Transition to monophasic flow in the infrapopliteal vessels.  No focal stenosis by velocity criteria. Left Lower Extremity: Monophasic waveforms in the external iliac artery, suggesting iliac inflow disease.  The remaining vasculature is patent without focal stenosis. Reference: Category Normal 1-19% 20-49% 50-99% Occluded PSV <160 cm/sec without spectral broadening <160 cm/sec with spectral broadening Increased Increased Absent Flow Ratio N/A N/A < 2.0 >2.0 N/A Post-Stenotic Turbulence No No No Yes N/A      US Low Ext Arterial Dop Seg Pres w/o Exercise    Result Date: 10/15/2024  Table formatting from the original result was not included. BILATERAL RESTING ANKLE-BRACHIAL INDICES (SONNY'S) (Date: 10/15/24) Indication: Surveillance SONNY's: Left Leg Pain. Right Diabetic Heel Ulcer. Decreased pedal pulses. Previous: 2/6/2018 SONNY's History: Previous Smoker, Hypertension,  Diabetic, Hyperlipidemia, PAD, and Vascular Ulcers  Resting SONNY's          Right: mmHg Index     Brachial: 167  Ankle-(PT): 127 0.74 Ankle-(DP): 124 0.73          Digit: 140 0.82               Left: mmHg Index     Brachial: 171  Ankle-(PT): >254 NC Ankle-(DP): 0 0          Digit: 25 0.15 Resting ankle-brachial index of 0.74 on the right. Toe Pressures of 140 mmHg and TBI of 0.82 Resting ankle-brachial index is non compressible  on the left. Toe Pressures of 25 mmHg and TBI of 0.15  VPR WAVEFORMS: The right volume plethysmography waveforms are mildly abnormal at the lower thigh level, mildly abnormal at the upper calf level and moderately abnormal at the ankle. The left volume plethysmography waveforms are severely abnormal at the lower thigh level, severely abnormal at the upper calf level and severely abnormal at the ankle.  Impression:  1. RIGHT LOWER EXTREMITY: SONNY is Abnormal with an SONNY of 0.74 indicating single level disease. Toe Pressures are Normal and adequate for wound healing with toe pressures of 140 mmHg. 2. LEFT LOWER EXTREMITY: SONNY is Non-diagnostic indicating vessel calcification. Toe Pressures are Abnormal and impaired for wound healing with toe pressures of 25 mmHg. Reference: Wound classification Grade SONNY Ankle Systolic Pressure Toe Pressures 0 > 0.80 > 100 mmHg > 60 mmHg 1 0.6 - 0.79 70 - 100 mmHg 40 - 59 mmHg 2 0.4 - 0.59 50-70 mmHg 30 - 39 mmHg 3 < 0.39 < 50 mmHg < 30 mmHg Digit Pressures DBI Disease Category > 0.70 Normal < 0.70 Abnormal > 30 mmHg Potential wound healing < 30 mmHg Impaired wound healing Ankle Brachial Pressures SONNY Disease Category > 1.3  Likely vessel calcification with monophasic waveforms, non-diagnostic 0.95-1.30 Normal with multiphasic waveforms 0.50-0.95 Single level disease 0.30-0.50 Multilevel disease < 0.30 Critical limb ischema Volume Plethysmography Recording (VPR) at all levels Normal Sharp systolic peak, fast upstroke, prominent dicrotic notch in wave Mild Sharp  systolic peak, fast upstroke, absent dicrotic notch in wave Moderate Flattened systolic peak, slowed upstroke, absent dicrotic notch inwave Severe amplitude wave with = upslope and down slope Occluded Flat Line      MR Foot Right w/o Contrast    Result Date: 10/8/2024  EXAM: MR FOOT RIGHT W/O CONTRAST LOCATION: [facility] DATE: 10/8/2024 INDICATION: Diabetic foot ulcer COMPARISON: Radiograph 10/07/2024 TECHNIQUE: Unenhanced. FINDINGS: TENDONS: -Peroneal: Peroneus longus and brevis tendons are intact. No tendinopathy or tenosynovitis. No subluxation. -Medial: Posterior tibialis tendon is intact. No tendinopathy or tenosynovitis. Flexor digitorum longus and flexor hallucis longus tendons are normal. No tenosynovitis. -Anterior: Anterior tibialis, extensor hallucis longus, and extensor digitorum longus tendons are normal. No tenosynovitis. -Achilles: Minimal Achilles tendinopathy. No tear or peritendinitis. LIGAMENTS: -Anterior talofibular ligament: Intact. -Calcaneofibular ligament: Intact. -Posterior talofibular ligament: Intact. -Syndesmotic inferior tibiofibular ligaments: Intact. -Deltoid ligament complex: Effacement of fat in the deltoid ligament could represent prior sprain. The ligament remains intact. -Spring ligament complex: Intact. JOINTS AND BONES: -No fracture or contusion. -There is T2 hyperintense edema of the calcaneal tuberosity deep to the ulcer. There is mild linear T1 hypointense signal without confluent T1 hypointense replacement. -Severe talonavicular joint osteoarthritis with subchondral cystic change and remodeling of the navicular bone. SOFT TISSUES: -Plantar fascia: Chronic central band plantar fasciitis. No tear. -Sinus tarsi and tarsal tunnel: Normal. -Muscles: Subacute on chronic denervation atrophy of the visualized foot and distal leg musculature. -There is a soft tissue ulcer of the calcaneus with subjacent nonorganized subcutaneous edema. Edema abuts the  calcaneal tuberosity. No organized/drainable collection.     IMPRESSION: 1.  Soft tissue ulcer of the heel with subcutaneous edema abutting the calcaneus compatible with cellulitis. No organized/drainable collection. There is T2 hyperintense marrow edema of the subjacent calcaneal tuberosity with mild linear T1 hypointense signal but no confluent T1 hypointense replacement. Given location subjacent to an ulcer, these findings are compatible with a high likelihood though are not definitive of osteomyelitis. 2.  Severe talonavicular joint osteoarthritis.    Foot  XR, G/E 3 views, right    Result Date: 10/7/2024  EXAM: XR FOOT RIGHT G/E 3 VIEWS LOCATION: [facility] DATE: 10/7/2024 INDICATION: diabetic foot ulcer right heel COMPARISON: None.     IMPRESSION: Deep soft tissue ulceration along the posterior aspect of the heel. No focal osseous erosions or periostitis to suggest osteomyelitis. If there is persistent clinical concern for osteomyelitis, MRI is more sensitive.  Large plantar calcaneal spur. Moderate midfoot degenerative change most evident at the talonavicular joint where there are prominent dorsal osteophytes. Vascular calcifications extending into the toes. Bipartite medial hallux sesamoid.

## 2024-10-25 NOTE — PLAN OF CARE
Problem: Adult Inpatient Plan of Care  Goal: Plan of Care Review  Description: The Plan of Care Review/Shift note should be completed every shift.  The Outcome Evaluation is a brief statement about your assessment that the patient is improving, declining, or no change.  This information will be displayed automatically on your shift  note.  10/25/2024 0659 by Vitor Fallon RN  Outcome: Progressing  10/25/2024 0658 by Vitor Fallon RN  Outcome: Progressing     Problem: Adult Inpatient Plan of Care  Goal: Optimal Comfort and Wellbeing  10/25/2024 0659 by Vitor Fallon RN  Outcome: Progressing  10/25/2024 0658 by Vitor Fallon RN  Outcome: Progressing     Problem: Pain Acute  Goal: Optimal Pain Control and Function  10/25/2024 0659 by Vitor Fallon RN  Outcome: Progressing  10/25/2024 0658 by Vitor Fallon RN  Outcome: Progressing     Problem: Mobility Impairment  Goal: Optimal Mobility  Outcome: Progressing     Problem: Activity Intolerance  Goal: Enhanced Capacity and Energy  Outcome: Progressing   Goal Outcome Evaluation:  Patient had a good night. Alert and oriented x4 and makes his needs known. Can move toes RLE. Minimal pain. Plan of care reviewed.

## 2024-10-25 NOTE — PROGRESS NOTES
"PT Evaluation   10/25/24 5782   Appointment Info   Signing Clinician's Name / Credentials (PT) Isabella Barger PT, DPT   Living Environment   People in Home alone   Current Living Arrangements independent living facility   Home Accessibility no concerns   Transportation Anticipated family or friend will provide   Living Environment Comments Walk in shower with GB & shower chair.   Self-Care   Usual Activity Tolerance moderate   Current Activity Tolerance fair   Regular Exercise No   Equipment Currently Used at Home cane, straight;shower chair;walker, rolling;wheelchair, manual   Fall history within last six months no   Activity/Exercise/Self-Care Comment IND with ADLs at baseline. Pt reports he typically uses a cane. States he plans to mainly sit in wheelchair while at home to mobilize.   General Information   Onset of Illness/Injury or Date of Surgery 10/24/24   Referring Physician Amol Patricio DPM   Patient/Family Therapy Goals Statement (PT) none stated   Pertinent History of Current Problem (include personal factors and/or comorbidities that impact the POC) Per chart: \"79 year old male with a past medical history of insulin-dependent type 2 diabetes mellitus presented to the hospital for incision and drainage of right heel, partial calcanectomy, sharp excisional debridement ulceration right heel into the bone for acute osteomyelitis of the calcaneus of the right foot and ulceration of the right heel into the bone and is s/p procedure. Hospitalist team initially  consulted for medical co management and transferred care to our team 10/25.\"   Existing Precautions/Restrictions fall;weight bearing   Weight-Bearing Status - RLE nonweight-bearing   Cognition   Affect/Mental Status (Cognition) WNL   Follows Commands (Cognition) WNL   Pain Assessment   Patient Currently in Pain No   Integumentary/Edema   Integumentary/Edema Comments R foot with dressing and PRAFO boot donned   Posture    Posture Forward head " position   Range of Motion (ROM)   Range of Motion ROM deficits secondary to surgical procedure   Strength (Manual Muscle Testing)   Strength (Manual Muscle Testing) Deficits observed during functional mobility   Bed Mobility   Bed Mobility supine-sit;sit-supine   Supine-Sit Desha (Bed Mobility) modified independence   Sit-Supine Desha (Bed Mobility) minimum assist (75% patient effort)   Bed Mobility Limitations impaired ability to control trunk for mobility;decreased ability to use arms for pushing/pulling;decreased ability to use legs for bridging/pushing   Impairments Contributing to Impaired Bed Mobility decreased strength   Assistive Device (Bed Mobility) bed rails;draw sheet   Transfers   Transfers sit-stand transfer;bed-chair transfer   Maintains Weight-bearing Status (Transfers) able to maintain;verbal cues to maintain   Transfer Safety Concerns Noted losing balance backward;decreased balance during turns   Impairments Contributing to Impaired Transfers impaired balance;decreased strength   Bed-Chair Transfer   Assistive Device (Bed-Chair Transfers) walker, front-wheeled   Bed-Chair Desha (Transfers) moderate assist (50% patient effort);1 person assist;verbal cues;nonverbal cues (demo/gesture)   Comment, (Bed-Chair Transfer) stand pivot on LLE - patient unable to maintain fully NWB RLE during stand pivot, continues to place RLE on floor to steady himself.   Sit-Stand Transfer   Sit-Stand Desha (Transfers) moderate assist (50% patient effort);1 person assist;verbal cues;nonverbal cues (demo/gesture)   Assistive Device (Sit-Stand Transfers) walker, front-wheeled   Comment, (Sit-Stand Transfer) slightly raised bed to what pt estimates as his bed height. able to maintain NWB RLE precautions with reminders   Gait/Stairs (Locomotion)   Desha Level (Gait) unable to assess   Comment, (Gait/Stairs) Unable to hop on LLE to amb with FWW (for maintenance of NWB RLE). Only able to pivot  on L foot.   Balance   Balance other (describe)   Balance Comments modA for balance during SPT with FWW, unable to maintain NWB RLE. Improves slightly with modAx2 for balance and physical assist under RLE to prevent WB, but pt very unsteady.   Sensory Examination   Sensory Perception other (describe)   Sensory Perception Comments BLE neuropathy at baseline   Clinical Impression   Criteria for Skilled Therapeutic Intervention Yes, treatment indicated   PT Diagnosis (PT) Impaired functional mobility   Influenced by the following impairments Impaired strength, balance, activity tolerance; WB precautions   Functional limitations due to impairments Bed mobility, transfers, gait, endurance   Clinical Presentation (PT Evaluation Complexity) stable   Clinical Presentation Rationale Clinical judgment   Clinical Decision Making (Complexity) moderate complexity   Planned Therapy Interventions (PT) balance training;bed mobility training;gait training;home exercise program;neuromuscular re-education;patient/family education;stair training;strengthening;stretching;transfer training;progressive activity/exercise;home program guidelines   Risk & Benefits of therapy have been explained evaluation/treatment results reviewed;participants included;patient;participants voiced agreement with care plan   PT Total Evaluation Time   PT Eval, Moderate Complexity Minutes (05847) 10   Physical Therapy Goals   PT Frequency 5x/week   PT Predicted Duration/Target Date for Goal Attainment 11/01/24   PT: Bed Mobility Independent;Supine to/from sit;Within precautions  (flat HOB, no bedrails, NWB RLE)   PT: Transfers Sit to/from stand;Bed to/from chair;Assistive device;Within precautions;Minimal assist  (NWB RLE)   PT: Gait Supervision/stand-by assist;Rolling walker;10 feet;Within precautions  (NWB RLE)   Therapeutic Activity   Therapeutic Activities: dynamic activities to improve functional performance Minutes (21736) 28   Symptoms Noted  During/After Treatment Fatigue   Treatment Detail/Skilled Intervention Eval completed, tx initiated. Education on importance of NWB RLE for healing process, don of PRAFO to help offload and protect RLE. Demonstration and instruction provided on transfers and gait with NWB RLE and FWW. additional sit<>stand eob<>FWW NWB RLE modA for balance. ModA for balance for sit>stand BSC>FWW with PT's foot underneath pt's RLE to prevent WB, RN providing pericares. ModAx2 for balance with stand pivot with FWW BSC>EOB, pt fatiguing quickly and needing bed pushed closer for pt to sit, pt demos poor eccentric control. Verbal cues throughout transfers for safe technique, BUE placement, NWB RLE. Time discussing discharge recommendation to TCU to improve functional strength and mobility with NWB RLE, patient receptive and agreeable. Patient left supine with call light & all other needs in reach, bed alarm engaged.   PT Discharge Planning   PT Plan NWB RLE with PRAFO: bed mob, sit<>stands, stand pivot Ax2 practice   PT Discharge Recommendation (DC Rec) Transitional Care Facility   PT Rationale for DC Rec Patient unable to maintain NWB RLE precautions with stand pivot transfer and FWW. Unable to amb yet. Patient fatiguing quickly with all activity, needing Ax2 to better maintain precautions. Recommend TCU at discharge.   PT Brief overview of current status NWB RLE: modAx2 for balance - stand pivot FWW, modA sit<>stand FWW   PT Equipment Needed at Discharge   (has walker at home)   Physical Therapy Time and Intention   Timed Code Treatment Minutes 28   Total Session Time (sum of timed and untimed services) 38

## 2024-10-25 NOTE — PLAN OF CARE
Goal Outcome Evaluation:      Plan of Care Reviewed With: patient          Outcome Evaluation: pt will likely need TCU CM waiting for PT/OT recommendations.

## 2024-10-25 NOTE — PROGRESS NOTES
[facility]    Progress Note - Hospitalist Service       Date of Admission:  10/24/2024    Assessment & Plan   Janice Nowak is a 79 year old male with a past medical history of insulin-dependent type 2 diabetes mellitus presented to the hospital for incision and drainage of right heel, partial calcanectomy, sharp excisional debridement ulceration right heel into the bone for acute osteomyelitis of the calcaneus of the right foot and ulceration of the right heel into the bone and is s/p procedure. Hospitalist team initially  consulted for medical co management and transferred care to our team 10/25.      Acute osteomyelitis of the right calcaneus  Ulceration right heel into the bone  Patient presented to the hospital for incision and drainage of right heel, partial calcanectomy, sharp excisional debridement ulceration right heel into the bone for acute osteomyelitis of the calcaneus of the right foot and ulceration of the right heel into the bone and is s/p R heel I&D 10/24.  - Continue with pain management as per protocol  - Orthopedics following, continue to follow recommendations  - Anaerobic and aerobic cultures along with Gram stain pending  - Surgical pathology report pending  - ID has been consulted, appreciate recs        -Continue Daptomycin 10/25        -Continue Zoysn 10/25  - WOC consult     CAROLINE  Baseline creatinine around 1, creatinine 1.5 and now downtrending 1.3. will continue to monitor.   - Encourage po intake  - Daily BMP     Chronic normocytic anemia and thrombocytopenia  - daily CBC     History of hypertension with high blood pressure  BP remained stable, will continue PTA antihypertensive and monitor closely.  - Continue with amlodipine, irbesartan, Aldactone     History of insulin-dependent type 2 diabetes mellitus with hyperglycemia  T2DM poorly controlled, A1c 9.2. BG stable one day post admission, will continue current regimen and adjust as needed.  "  -Continue with sliding scale  - Holding metformin for now  - Hypoglycemia protocol  - Continue with Jardiance  As per pharmacy patient has been prescribed 50 twice daily of Lantus and 20 3 times daily with meals of NovoLog, discussed with the patient and he stated that he takes 100 daily of Lantus and 25 units of NovoLog at bedtime, he also takes 20 to 25 units 3 times daily with meals based on blood sugar levels, his sugar levels were running in the lower side so held nighttime dose of NovoLog, placed on sliding scale and switched to 50 twice daily of Lantus for now.       History of hyperlipidemia  Continue with aspirin, Plavix, rosuvastatin     History of hypothyroidism  Continue with levothyroxine     DVT PPx  Intermittent pneumatic compression     CODE STATUS  DNR/DNI as per discussion with the patient, as per patient he does not want any aggressive intervention and wants natural death in case his heart stops and he stated to let him pass away in peace in case his heart stops.             Diet: Low Consistent Carb (45 g CHO per Meal) Diet    DVT Prophylaxis: Pneumatic Compression Devices  Reyes Catheter: Not present  Fluids: po  Lines: None     Cardiac Monitoring: None  Code Status: No CPR- Do NOT Intubate      Clinically Significant Risk Factors                 # Thrombocytopenia: Lowest platelets = 114 in last 2 days, will monitor for bleeding   # Hypertension: Noted on problem list          # DMII: A1C = 9.2 % (Ref range: <5.7 %) within past 6 months, PRESENT ON ADMISSION  # Obesity: Estimated body mass index is 33.98 kg/m  as calculated from the following:    Height as of this encounter: 1.727 m (5' 8\").    Weight as of this encounter: 101.4 kg (223 lb 8 oz)., PRESENT ON ADMISSION     # Financial/Environmental Concerns:           Disposition Plan      Expected Discharge Date: 10/28/2024    Discharge Delays: IV Medication - consider oral or Home Infusion  Destination: home with help/services  Discharge " Comments: wound vac        The patient's care was discussed with the Attending Physician, Dr. Marrufo .    Derrick Escalona MD  Hospitalist Service  [facility]  Securely message with [employer] (more info)  Text page via [employer] Paging/Directory   ______________________________________________________________________    Interval History   No acute events overnight. Feels overall well after his procedure, not complaining of pain. No chest pain or shortness of breath. Had no appetite lately but was able to eat breakfast this morning with no nausea.     Physical Exam   Vital Signs: Temp: 98.2  F (36.8  C) Temp src: Oral BP: (!) 162/73 Pulse: 72   Resp: 18 SpO2: 93 % O2 Device: None (Room air)    Weight: 223 lbs 8 oz    General Appearance: Alert and oriented, NAD  Respiratory: normal work of breathing, clear breath sounds, no wheezing  Cardiovascular: normal S1. S2, no murmur  GI: soft, non tender, no guarding.   Skin: normal apperance and turgor, no visible reash  MSK: R foot with dressing intact no visible drainage.     Medical Decision Making             Data     I have personally reviewed the following data over the past 24 hrs:    7.0  \   10.8 (L)   / 118 (L)     141 105 31.2 (H) /  150 (H)   4.1 25 1.33 (H) \     TSH: N/A T4: N/A A1C: 9.2 (H)       Imaging results reviewed over the past 24 hrs:   No results found for this or any previous visit (from the past 24 hours).

## 2024-10-25 NOTE — PHARMACY-ADMISSION MEDICATION HISTORY
Per scanned H&P from [facility]    Insulin regimen on 10/16/24   Lantus 50 units BID  Novolog 20 units TID with meals plus sliding scale.    Niyah Teran RPH on 10/24/2024 at 7:28 PM

## 2024-10-25 NOTE — CONSULTS
Consultation - Infectious Disease  [facility]  Janice Nowak,  1945, MRN [id number]    Admitting Dx: Diabetic ulcer of right heel associated with type 2 diabetes mellitus, with necrosis of muscle (H) [E11.621, L97.413]    PCP: Jeannette April, [phone]       ASSESSMENT   79-year-old male with a history of diabetes, hypertension, hyperlipidemia, hypothyroidism who is admitted for nonhealing right heel wound.    Right heel wound, nonhealing.  Recent MRI showing bone marrow edema with questionable osteomyelitis.  Underwent I&D and partial calcanectomy on 10/24.  Cultures and pathology collected.  Patient on levofloxacin for 3 days prior to procedure for right leg cellulitis  Poorly controlled diabetes.  On insulin.  A1c 9.1  Peripheral vascular disease.  Recently saw vascular surgery for low SONNY in the left leg.  On platelet therapy and high intensity statin.    Active Problems:    Diabetic ulcer of right heel associated with type 2 diabetes mellitus, with necrosis of muscle (H)       PLAN   -Start daptomycin and Zosyn  -Baseline CK, follow weekly.  Monitor for myalgias while on Lipitor  -Follow-up on intraoperative cultures and pathology results      Thank you for this consult. Will follow.    Ike St MD  [facility]  Direct messaging: [employer]  On-Call ID provider: [phone], option: [phone]      ===========================================      Chief Complaint   <principal problem not specified>       HPI     We have been requested by Amol Patricio D* to evaluate Janice Nowak for the above.    History obtained by patient    Janice Nowak is a 79 year old man with a history of diabetes, hypertension, hyperlipidemia, hypothyroidism who is admitted for right heel wound he reports chronic.  Issues with a linear wound, which changed in appearance 3 months ago.  Per clinic notes this was more of a necrotic heel ulcer.  He denies being  "on antibiotics for this in the past.  He follows closely with podiatry.  He was recently seen in vascular surgery clinic for decreased ABIs on the left (not the right foot).  Recent MRI showing signs of bone marrow edema in the right calcaneus, suggestive of osteomyelitis.  Of note, the patient has a home nurse who was concerned about right lower leg cellulitis and started the patient on levofloxacin on Monday.    Patient will have a wound VAC placed today.  Plan for TCU at discharge.          Review of Systems   Ten systems reviewed and negative except for what is noted in the HPI       Medical History  Past Medical History:   Diagnosis Date    Diabetes (H)     Hypertension     Sleep apnea     Thyroid disease     Surgical History  He  has a past surgical history that includes vascular surgery; orthopedic surgery; and back surgery.     Social History  Reviewed, and he  reports that he quit smoking about 41 years ago. His smoking use included cigarettes. He has never used smokeless tobacco. He reports current alcohol use of about 5.0 standard drinks of alcohol per week. He reports that he does not currently use drugs.  Social History     Social History Narrative    Not on file     Family History  family history is not on file.  family history reviewed and is not pertinent to the presenting problem.            Allergies     Allergies   Allergen Reactions    Exenatide Unknown    Heparin     Liraglutide     Lisinopril Cough    Morphine          Antibiotics   Perioperative cefazolin    Previous:  Levofloxacin prior to arrival      Physical Exam     Temp:  [96.1  F (35.6  C)-99.7  F (37.6  C)] 98.2  F (36.8  C)  Pulse:  [66-78] 72  Resp:  [11-20] 18  BP: (111-165)/(58-75) 162/73  SpO2:  [92 %-99 %] 93 %    BP (!) 162/73 (BP Location: Left arm)   Pulse 72   Temp 98.2  F (36.8  C) (Oral)   Resp 18   Ht 1.727 m (5' 8\")   Wt 101.4 kg (223 lb 8 oz)   SpO2 93%   BMI 33.98 kg/m      GENERAL:  well-developed, " "well-nourished, lying in bed in no acute distress.   HENT:  Head is normocephalic, atraumatic.   EYES:  Eyes have anicteric sclerae without conjunctival injection or stigmata of endocarditis.   NECK:  Supple.  LUNGS:  Clear to auscultation.  CARDIOVASCULAR:  Regular rate and rhythm with no murmurs, gallops or rubs.  ABDOMEN:  Normal bowel sounds, soft, nontender. No appreciable hepatosplenomegaly  EXT: Extremities warm and without edema.  Chronic venous stasis changes in the lower extremities.  Superficial ulceration on the right lower lateral leg with surrounding erythema.  Right foot is wrapped  SKIN:  No acute rashes.  No stigmata of endocarditis.  NEUROLOGIC:  Grossly nonfocal.      Cultures   10/24 right foot bone culture: Pending; no organisms on Gram stain    No results found for the last 90 days.       Laboratory results     Recent Labs   Lab 10/25/24  0632 10/24/24  1841   WBC 7.0 8.1   HGB 10.8* 10.8*   * 114*       Recent Labs   Lab 10/25/24  0632 10/24/24  1841    140   CO2 25 23   BUN 31.2* 34.0*       No results for input(s): \"CRPI\", \"SED\" in the last 168 hours.        Imaging   Radiology results reviewed    POC US Guidance Needle Placement    Result Date: 10/24/2024  Ultrasound was performed as guidance to an anesthesia procedure.  Click \"PACS images\" hyperlink below to view any stored images.  For specific procedure details, view procedure note authored by anesthesia.    EXAM: MR FOOT RIGHT W/O CONTRAST  LOCATION: [facility]  DATE: 10/8/2024     INDICATION: Diabetic foot ulcer  COMPARISON: Radiograph 10/07/2024  TECHNIQUE: Unenhanced.     FINDINGS:      TENDONS:   -Peroneal: Peroneus longus and brevis tendons are intact. No tendinopathy or tenosynovitis. No subluxation.  -Medial: Posterior tibialis tendon is intact. No tendinopathy or tenosynovitis. Flexor digitorum longus and flexor hallucis longus tendons are normal. No tenosynovitis.  -Anterior: Anterior " tibialis, extensor hallucis longus, and extensor digitorum longus tendons are normal. No tenosynovitis.  -Achilles: Minimal Achilles tendinopathy. No tear or peritendinitis.     LIGAMENTS:   -Anterior talofibular ligament: Intact.   -Calcaneofibular ligament: Intact.   -Posterior talofibular ligament: Intact.  -Syndesmotic inferior tibiofibular ligaments: Intact.  -Deltoid ligament complex: Effacement of fat in the deltoid ligament could represent prior sprain. The ligament remains intact.  -Spring ligament complex: Intact.     JOINTS AND BONES:   -No fracture or contusion.  -There is T2 hyperintense edema of the calcaneal tuberosity deep to the ulcer. There is mild linear T1 hypointense signal without confluent T1 hypointense replacement.  -Severe talonavicular joint osteoarthritis with subchondral cystic change and remodeling of the navicular bone.     SOFT TISSUES:  -Plantar fascia: Chronic central band plantar fasciitis. No tear.  -Sinus tarsi and tarsal tunnel: Normal.  -Muscles: Subacute on chronic denervation atrophy of the visualized foot and distal leg musculature.  -There is a soft tissue ulcer of the calcaneus with subjacent nonorganized subcutaneous edema. Edema abuts the calcaneal tuberosity. No organized/drainable collection.                                                                      IMPRESSION:  1.  Soft tissue ulcer of the heel with subcutaneous edema abutting the calcaneus compatible with cellulitis. No organized/drainable collection. There is T2 hyperintense marrow edema of the subjacent calcaneal tuberosity with mild linear T1 hypointense   signal but no confluent T1 hypointense replacement. Given location subjacent to an ulcer, these findings are compatible with a high likelihood though are not definitive of osteomyelitis.  2.  Severe talonavicular joint osteoarthritis.      Data reviewed today: I reviewed all medications, new labs and imaging results over the last 24 hours. I  personally reviewed no images or EKG's today.  The patient's care was discussed with the Bedside Nurse and Patient.

## 2024-10-25 NOTE — DISCHARGE INSTRUCTIONS
Podiatry discharge instructions  -Nonweightbearing right foot.  No pivot transfer allowed.  -Elevate right foot above waist at all times  -Continue with wound VAC along right heel ulceration at 125 mmHg  -PRAFO boot right foot at all times including overnight  -Follow-up with Dr. Patricio on 11/1. For scheduling please call [phone].    May use NPWT available at receiving facility  Wound location: R heel  Change Days: Tues/Friday  Supplies: Small black foam  Cleanse with: Vashe (moistened gauze over wound for 5 - 10 minutes); gently dry.  Suction: Continuous at -125 mmHg pressure.    Back up plan: If VAC (or may use NPWT available at receiving facility) malfunctions or unable to maintain seal: VAC (or may use NPWT available at receiving facility) dressing must be removed and reapplied within 2 hours of the incident. Ifnot able to reapply, place NS moistened gauze in wound bed and cover with appropriate dressing to keep wound bed moist.   Change wet-to-dry dressing BID until reimplementation of VAC (or may use NPWT available at receiving facility) therapy when available.    R lat hardy - Every 5 days (may change at 3 days if excessive drainage)  Cleanse site with Vashe (moistened gauze over wound for 5 - 10 minutes); gently dry.  Cover with Mepilex.

## 2024-10-25 NOTE — CONSULTS
Care Management Initial Consult    General Information  Assessment completed with: Patient,    Type of CM/SW Visit: Initial Assessment    Primary Care Provider verified and updated as needed: Yes   Readmission within the last 30 days: no previous admission in last 30 days      Reason for Consult: discharge planning  Advance Care Planning: Advance Care Planning Reviewed: no concerns identified          Communication Assessment  Patient's communication style: spoken language (English or Bilingual)    Hearing Difficulty or Deaf: no   Wear Glasses or Blind: yes    Cognitive  Cognitive/Neuro/Behavioral: WDL                      Living Environment:   People in home: alone     Current living Arrangements: independent living facility      Able to return to prior arrangements: no       Family/Social Support:  Care provided by: self  Provides care for: no one  Marital Status:   Support system: Children          Description of Support System: Supportive, Involved         Current Resources:   Patient receiving home care services: Yes  Skilled Home Care Services: Skilled Nursing, Physical Therapy, Occupational Therapy     Community Resources:    Equipment currently used at home: cane, straight, shower chair, walker, rolling, wheelchair, manual  Supplies currently used at home:      Employment/Financial:  Employment Status: retired        Financial Concerns: none   Referral to Financial Worker: No       Does the patient's insurance plan have a 3 day qualifying hospital stay waiver?  No    Lifestyle & Psychosocial Needs:  Social Drivers of Health     Food Insecurity: Low Risk  (10/9/2024)    Food Insecurity     Within the past 12 months, did you worry that your food would run out before you got money to buy more?: No     Within the past 12 months, did the food you bought just not last and you didn t have money to get more?: No   Depression: Not on file   Housing Stability: High Risk (10/9/2024)    Housing Stability     Do  you have housing? : No     Are you worried about losing your housing?: No   Tobacco Use: Medium Risk (10/24/2024)    Patient History     Smoking Tobacco Use: Former     Smokeless Tobacco Use: Never     Passive Exposure: Not on file   Financial Resource Strain: Low Risk  (10/9/2024)    Financial Resource Strain     Within the past 12 months, have you or your family members you live with been unable to get utilities (heat, electricity) when it was really needed?: No   Alcohol Use: Not on file   Transportation Needs: Low Risk  (10/9/2024)    Transportation Needs     Within the past 12 months, has lack of transportation kept you from medical appointments, getting your medicines, non-medical meetings or appointments, work, or from getting things that you need?: No   Physical Activity: Not on file   Interpersonal Safety: Low Risk  (10/24/2024)    Interpersonal Safety     Do you feel physically and emotionally safe where you currently live?: Yes     Within the past 12 months, have you been hit, slapped, kicked or otherwise physically hurt by someone?: No     Within the past 12 months, have you been humiliated or emotionally abused in other ways by your partner or ex-partner?: No   Recent Concern: Interpersonal Safety - High Risk (10/9/2024)    Interpersonal Safety     Do you feel physically and emotionally safe where you currently live?: No     Within the past 12 months, have you been hit, slapped, kicked or otherwise physically hurt by someone?: No     Within the past 12 months, have you been humiliated or emotionally abused in other ways by your partner or ex-partner?: No   Stress: Not on file   Social Connections: Not on file   Health Literacy: Not on file       Functional Status:  Prior to admission patient needed assistance:   Dependent ADLs:: Independent  Dependent IADLs:: Transportation       Mental Health Status:          Chemical Dependency Status:                Values/Beliefs:  Spiritual, Cultural Beliefs,  Yazdanism Practices, Values that affect care: no               Discussed  Partnership in Safe Discharge Planning  document with patient/family: No    Additional Information:  Met with patient to review role of care management, progression of care and possible need for services at discharge, including OP services, home care, or skilled nursing care. Patient alert, oriented and engaged in the conversation. Writer then verified patient demographics and updated any changes needed in the patient chart.     Pt lives in an ILF ([facility]) he is independent with ADLs and most IADLs except driving. His wife lives in the [facility]/ part of the building at [facility]. He is open to [facility] home care which started on 10/15 for skilled nursing/PT/OT. Has a cane and WC for mobility. Pt told writer he will likely need TCU and would like to go to [facility]. Son can transport if he can get into the car at time of discharge.    Plan is for WOC to see and place wound vac.    Got a call from pt's Sheldon Montes De Oca ([phone]) and she told writer that they are working on getting the pt to [facility] as he has needed increased services and that she has spoke to the pt's sons and the pt about TCU at discharge and pt would be open to [facility], [facility], or [facility]. She told writer it is not safe for the pt to go home with his NWB status and would recommend he go to a TCU, we did discuss that therapy has not put in their recommendation yet but we can keep her updated on the discharge plans.    Also got a call from [facility] and they would also like to be kept in the loop as far as discharge plans.    Contacts:  Sheldon Montes De Oca [phone]  [facility] - [phone]        Next Steps: medical progression, final ABX plan, PT/OT rec, WOC to place wound vac    Treasure Arzate RN

## 2024-10-25 NOTE — PROGRESS NOTES
Janice was seen for fit and delivery of a right PRAFO per order of his physician. His mood today was pleasant.     The PRAFO was donned and all straps and padding ws trimmed to appropriate length for total contact around his foot and ankle. The posterior strut was left in neutral position to prevent plantarflexion contracture and to relieve the post-surgical heel.     The goal of the orthosis is to provide protection to his foot and ankle post-surgery and to prevent pressure over the heel while laying in bed.     Instructions on use and application of the device was given to him along with a card for contact after discharge.     I plan to see him PRN.     Electronically signed by NANDA Cannon CPO

## 2024-10-25 NOTE — CONSULTS
[facility] Nurse Inpatient Assessment     Consulted for: R heel    Summary: VAC placement    Patient History (according to provider note(s):    Preoperative diagnosis:   1. Acute osteomyelitis calcaneus right  2. Ulceration right heel into bone     Postoperative diagnosis: Same     Procedure:   1.  Incision and drainage right heel   2.  Partial calcanectomy right foot  3.  Sharp, excisional Debridement ulceration right heel into bone       Assessment:    Negative pressure wound therapy applied to: R heel   Last photo: No camera at time of dressing change   Wound due to: Surgical Wound   Wound history/plan of care:    Surgical date: 10/24/24   Service following: DPM  Date Negative Pressure Wound Therapy initiated: 10/24/24   Interventions in place: offloading and elevation  Is patient s nutritional status compromised? no   If yes, what interventions are in place? N/A  Reason for initiating vac therapy? Presence of co-morbidities, High risk of infections, and Need for accelerated granulation tissue  Which?of?the?following?co-morbidities?apply? Diabetes, Immobility, and Obesity  If diabetic is patient on a diabetic management program? Yes   Is osteomyelitis present in wound? yes   If yes what treatments are in place? IV antibiotic Zosyn    Wound base: 70% Bone, 30%  viable tissue with cauterized areas       Palpation of the wound bed: firm       Drainage: moderate      Volume in cannister: New     Last cannister change date: 10/25/24     Description of drainage: bloody      Measurements (length x width x depth, in cm) 4.5 cm  x 5 cm  x  0.5 cm       Tunneling N/A      Undermining N/A   Periwound skin: Intact       Color: normal and consistent with surrounding tissue       Temperature: normal    Odor: none   Pain: denies , sharp when gel foam removed otherwise 0/10 - block may still be in effect at this time  Pain intervention prior to dressing change: patient tolerated well and slow and  gentle cares   Treatment goal: Heal , Maintain (prevention of deterioration), and Protection  STATUS: initial assessment   Supplies ordered: ordered VAC pump, canister, foam    Number of foam pieces removed from a wound (excluding foam for bridge) :  gauze    Verified this matched the number of foam pieces applied last dressing change: N/A   Number of foam pieces packed into wound (excluding foam for bridge) :  1 piece plus bridge GranuFoam Black       Treatment Plan:   Wound location: R heel  Change Days: Tues/Friday  Supplies: Small black foam  Cleanse with: Saline, pat dry.  Suction: Continuous at -125 mmHg pressure.    Back up plan: If VAC malfunctions or unable to maintain seal: VAC dressing must be removed and reapplied within 2 hours of the incident. If floor staff is not able to reapply, place NS moistened gauze in wound bed and cover with appropriate dressing to keep wound bed moist.   Change wet-to-dry dressing BID and notify WOC staff for reimplementation of VAC therapy when available.  Date canister. Chart canister output every shift.    The hospital VAC pump is not to be discharged with the patient.  Please do one of the following prior to discharge:  If a home VAC pump has been delivered, please apply the home pump to the dressing prior to patient discharge.    If the patient is discharging to LTAC or a TCU/SNF and there is a VAC pump waiting for the patient, close clamp and then disconnect the tubing for transfer, place tubing inside a glove.   If the patient is discharging to home or other facility and there is no VAC pump available for immediate placement, remove the VAC dressing and apply a wet-to-moist gauze dressing for transfer.    Orders: Written    RECOMMEND PRIMARY TEAM ORDER: None, at this time  Education provided: plan of care  Discussed plan of care with: Patient and Nurse  WOC nurse follow-up plan: Tuesday/Friday  Notify WOC if wound(s) deteriorate.  Nursing to notify the Provider(s) and  re-consult the [facility] Nurse if new skin concern.    DATA:     Current support surface: Standard  Standard gel mattress (Isoflex)  Containment of urine/stool: Continent of bladder and Continent of bowel  BMI: Body mass index is 33.98 kg/m .   Active diet order: Orders Placed This Encounter      Low Consistent Carb (45 g CHO per Meal) Diet     Output: I/O last 3 completed shifts:  In: 760 [P.O.:760]  Out: 1250 [Urine:1250]     Labs:   Recent Labs   Lab 10/25/24  0632 10/24/24  1841   HGB 10.8* 10.8*   WBC 7.0 8.1   A1C  --  9.2*     Pressure injury risk assessment:   Sensory Perception: 3-->slightly limited  Moisture: 4-->rarely moist  Activity: 3-->walks occasionally  Mobility: 2-->very limited  Nutrition: 3-->adequate  Friction and Shear: 3-->no apparent problem  Ramón Score: 18    Ela Vides MSN RN CWOCN  Pager no longer is use, please contact through [employer] group: [facility]

## 2024-10-26 LAB
ANION GAP SERPL CALCULATED.3IONS-SCNC: 11 MMOL/L (ref 7–15)
BASOPHILS # BLD AUTO: 0.1 10E3/UL (ref 0–0.2)
BASOPHILS NFR BLD AUTO: 1 %
BUN SERPL-MCNC: 26.7 MG/DL (ref 8–23)
CALCIUM SERPL-MCNC: 8.6 MG/DL (ref 8.8–10.4)
CHLORIDE SERPL-SCNC: 103 MMOL/L (ref 98–107)
CREAT SERPL-MCNC: 1.38 MG/DL (ref 0.67–1.17)
EGFRCR SERPLBLD CKD-EPI 2021: 52 ML/MIN/1.73M2
EOSINOPHIL # BLD AUTO: 0.2 10E3/UL (ref 0–0.7)
EOSINOPHIL NFR BLD AUTO: 2 %
ERYTHROCYTE [DISTWIDTH] IN BLOOD BY AUTOMATED COUNT: 15.7 % (ref 10–15)
GLUCOSE BLDC GLUCOMTR-MCNC: 167 MG/DL (ref 70–99)
GLUCOSE BLDC GLUCOMTR-MCNC: 190 MG/DL (ref 70–99)
GLUCOSE BLDC GLUCOMTR-MCNC: 242 MG/DL (ref 70–99)
GLUCOSE BLDC GLUCOMTR-MCNC: 95 MG/DL (ref 70–99)
GLUCOSE SERPL-MCNC: 134 MG/DL (ref 70–99)
HCO3 SERPL-SCNC: 25 MMOL/L (ref 22–29)
HCT VFR BLD AUTO: 34.6 % (ref 40–53)
HGB BLD-MCNC: 11.1 G/DL (ref 13.3–17.7)
IMM GRANULOCYTES # BLD: 0.1 10E3/UL
IMM GRANULOCYTES NFR BLD: 2 %
LYMPHOCYTES # BLD AUTO: 1.2 10E3/UL (ref 0.8–5.3)
LYMPHOCYTES NFR BLD AUTO: 15 %
MCH RBC QN AUTO: 28.5 PG (ref 26.5–33)
MCHC RBC AUTO-ENTMCNC: 32.1 G/DL (ref 31.5–36.5)
MCV RBC AUTO: 89 FL (ref 78–100)
MONOCYTES # BLD AUTO: 0.7 10E3/UL (ref 0–1.3)
MONOCYTES NFR BLD AUTO: 9 %
NEUTROPHILS # BLD AUTO: 5.8 10E3/UL (ref 1.6–8.3)
NEUTROPHILS NFR BLD AUTO: 72 %
NRBC # BLD AUTO: 0 10E3/UL
NRBC BLD AUTO-RTO: 0 /100
PLATELET # BLD AUTO: 138 10E3/UL (ref 150–450)
POTASSIUM SERPL-SCNC: 4.2 MMOL/L (ref 3.4–5.3)
RBC # BLD AUTO: 3.9 10E6/UL (ref 4.4–5.9)
SODIUM SERPL-SCNC: 139 MMOL/L (ref 135–145)
WBC # BLD AUTO: 8.2 10E3/UL (ref 4–11)

## 2024-10-26 PROCEDURE — 258N000003 HC RX IP 258 OP 636: Performed by: INTERNAL MEDICINE

## 2024-10-26 PROCEDURE — 250N000013 HC RX MED GY IP 250 OP 250 PS 637: Performed by: STUDENT IN AN ORGANIZED HEALTH CARE EDUCATION/TRAINING PROGRAM

## 2024-10-26 PROCEDURE — 250N000013 HC RX MED GY IP 250 OP 250 PS 637: Performed by: PODIATRIST

## 2024-10-26 PROCEDURE — 99207 PR NO CHARGE LOS: CPT | Performed by: INTERNAL MEDICINE

## 2024-10-26 PROCEDURE — 85025 COMPLETE CBC W/AUTO DIFF WBC: CPT

## 2024-10-26 PROCEDURE — 250N000011 HC RX IP 250 OP 636: Performed by: INTERNAL MEDICINE

## 2024-10-26 PROCEDURE — 250N000013 HC RX MED GY IP 250 OP 250 PS 637: Performed by: INTERNAL MEDICINE

## 2024-10-26 PROCEDURE — 80048 BASIC METABOLIC PNL TOTAL CA: CPT

## 2024-10-26 PROCEDURE — 99232 SBSQ HOSP IP/OBS MODERATE 35: CPT | Mod: 24 | Performed by: FAMILY MEDICINE

## 2024-10-26 PROCEDURE — 82947 ASSAY GLUCOSE BLOOD QUANT: CPT

## 2024-10-26 PROCEDURE — 120N000001 HC R&B MED SURG/OB

## 2024-10-26 PROCEDURE — 36415 COLL VENOUS BLD VENIPUNCTURE: CPT

## 2024-10-26 RX ADMIN — INSULIN GLARGINE 50 UNITS: 100 INJECTION, SOLUTION SUBCUTANEOUS at 21:23

## 2024-10-26 RX ADMIN — IRBESARTAN 300 MG: 300 TABLET ORAL at 21:22

## 2024-10-26 RX ADMIN — LEVOTHYROXINE SODIUM 50 MCG: 0.03 TABLET ORAL at 06:56

## 2024-10-26 RX ADMIN — AMLODIPINE BESYLATE 5 MG: 5 TABLET ORAL at 09:53

## 2024-10-26 RX ADMIN — PIPERACILLIN AND TAZOBACTAM 3.38 G: 3; .375 INJECTION, POWDER, FOR SOLUTION INTRAVENOUS at 17:56

## 2024-10-26 RX ADMIN — ACETAMINOPHEN 975 MG: 325 TABLET ORAL at 09:53

## 2024-10-26 RX ADMIN — DAPTOMYCIN 500 MG: 500 INJECTION, POWDER, LYOPHILIZED, FOR SOLUTION INTRAVENOUS at 13:29

## 2024-10-26 RX ADMIN — OXYCODONE HYDROCHLORIDE 10 MG: 5 TABLET ORAL at 10:48

## 2024-10-26 RX ADMIN — SENNOSIDES AND DOCUSATE SODIUM 1 TABLET: 8.6; 5 TABLET ORAL at 21:22

## 2024-10-26 RX ADMIN — EMPAGLIFLOZIN 10 MG: 10 TABLET, FILM COATED ORAL at 09:53

## 2024-10-26 RX ADMIN — PIPERACILLIN AND TAZOBACTAM 3.38 G: 3; .375 INJECTION, POWDER, FOR SOLUTION INTRAVENOUS at 09:53

## 2024-10-26 RX ADMIN — Medication 1 CAPSULE: at 09:53

## 2024-10-26 RX ADMIN — INSULIN ASPART 1 UNITS: 100 INJECTION, SOLUTION INTRAVENOUS; SUBCUTANEOUS at 17:03

## 2024-10-26 RX ADMIN — INSULIN ASPART 2 UNITS: 100 INJECTION, SOLUTION INTRAVENOUS; SUBCUTANEOUS at 13:29

## 2024-10-26 RX ADMIN — ASPIRIN 81 MG: 81 TABLET, COATED ORAL at 09:53

## 2024-10-26 RX ADMIN — CLOPIDOGREL BISULFATE 75 MG: 75 TABLET ORAL at 09:53

## 2024-10-26 RX ADMIN — SPIRONOLACTONE 25 MG: 25 TABLET ORAL at 09:53

## 2024-10-26 RX ADMIN — ACETAMINOPHEN 975 MG: 325 TABLET ORAL at 17:04

## 2024-10-26 RX ADMIN — PIPERACILLIN AND TAZOBACTAM 3.38 G: 3; .375 INJECTION, POWDER, FOR SOLUTION INTRAVENOUS at 00:32

## 2024-10-26 RX ADMIN — INSULIN GLARGINE 50 UNITS: 100 INJECTION, SOLUTION SUBCUTANEOUS at 09:53

## 2024-10-26 RX ADMIN — ROSUVASTATIN CALCIUM 40 MG: 40 TABLET, FILM COATED ORAL at 09:53

## 2024-10-26 NOTE — PROGRESS NOTES
ID chart check    Cultures negative to date. Continuing to follow on pip/tazo and daptomycin.    Christiano Hernadez MD

## 2024-10-26 NOTE — PLAN OF CARE
Problem: Adult Inpatient Plan of Care  Goal: Plan of Care Review  Description: The Plan of Care Review/Shift note should be completed every shift.  The Outcome Evaluation is a brief statement about your assessment that the patient is improving, declining, or no change.  This information will be displayed automatically on your shift  note.  Outcome: Progressing     Problem: Mobility Impairment  Goal: Optimal Mobility  Outcome: Progressing  Intervention: Optimize Mobility  Recent Flowsheet Documentation  Taken 10/26/2024 0208 by Carri Borden, RN  Activity Management: activity adjusted per tolerance  Positioning/Transfer Devices:   pillows   in use  Taken 10/25/2024 2118 by Carri Borden, RN  Positioning/Transfer Devices:   pillows   in use   Goal Outcome Evaluation:         Pt alert and oriented,calm and cooperative with cares,has baseline numbness to his right LE,able to wigle his toes,wound vac intact,denied pain,declined scheduled tylenol,voiding ok,slept between cares, VS stable on room air,wound dressings intact.

## 2024-10-26 NOTE — PROGRESS NOTES
Care Management Follow Up    Length of Stay (days): 2    Expected Discharge Date: 10/28/2024    Anticipated Discharge Plan: TCU      Transportation: TBD    PT Recommendations: Transitional Care Facility  OT Recommendations:        Barriers to Discharge: medical stability, IV abx    Prior Living Situation: independent living facility with alone    Discussed  Partnership in Safe Discharge Planning  document with patient/family: No     Handoff Completed: No, handoff not indicated or clinically appropriate    Patient/Spokesperson Updated: Yes. Who? Patient    Additional Information:  Therapy is recommending TCU at discharge. Pt mentioned to CM yesterday that he would like TCU referrals sent to [facility], [facility], or [facility] if needing TCU placement. TCU referrals sent.     3:36 PM  SW updated Pt about TCU recommendations. Pt reported that he mostly prefers [facility]. Pt was unsure about [facility] and [facility] but was agreeable with his family's choices for those facilities as a secondary preference.     Next Steps: CM to follow up on pending TCU referrals and coordinate discharge plans. PAS needed once an accepting facility has been identified.       ROSITA Epps

## 2024-10-26 NOTE — PLAN OF CARE
Goal Outcome Evaluation:      Plan of Care Reviewed With: patient    Overall Patient Progress: improvingOverall Patient Progress: improving           Problem: Mobility Impairment  Goal: Optimal Mobility  Outcome: Progressing  Intervention: Optimize Mobility  Recent Flowsheet Documentation  Taken 10/26/2024 1000 by Dot Shelley RN  Assistive Device Utilized:   gait belt   walker  Activity Management: activity adjusted per tolerance  Positioning/Transfer Devices:   pillows   in use     Problem: Infection  Goal: Absence of Infection Signs and Symptoms  Outcome: Progressing     Patient's pain is fairly well-controlled, typically only medicated x1 per shift. Transfers remain difficult, since patient is non-weight bearing on right foot. Vac dressing intact. Taking fluids well, good appetite, voiding good amounts, having regular bm's.

## 2024-10-26 NOTE — PROGRESS NOTES
[facility]    Progress Note - Hospitalist Service       Date of Admission:  10/24/2024    Assessment & Plan   Janice Nowak is a 79 year old male with a past medical history of insulin-dependent type 2 diabetes mellitus presented to the hospital for incision and drainage of right heel, partial calcanectomy, sharp excisional debridement ulceration right heel into the bone for acute osteomyelitis of the calcaneus of the right foot and ulceration of the right heel into the bone and is s/p procedure. Hospitalist team initially  consulted for medical co management and transferred care to our team 10/25.      Acute osteomyelitis of the right calcaneus  Ulceration right heel into the bone  Patient presented to the hospital for incision and drainage of right heel, partial calcanectomy, sharp excisional debridement ulceration right heel into the bone for acute osteomyelitis of the calcaneus of the right foot and ulceration of the right heel into the bone and is s/p R heel I&D 10/24.  - Continue with pain management as per protocol  - Orthopedics following, continue to follow recommendations  - Anaerobic and aerobic cultures along with Gram stain pending  - Surgical pathology report pending  - ID has been consulted, appreciate recs        -Continue Daptomycin 10/25        -Continue Zoysn 10/25  - WOC consult     CAROLINE  Baseline creatinine around 1, creatinine 1.5 and now downtrending 1.3. will continue to monitor.   - Encourage po intake  - Daily BMP     Chronic normocytic anemia and thrombocytopenia  - daily CBC     History of hypertension with high blood pressure  BP remained stable, will continue PTA antihypertensive and monitor closely.  - Continue with amlodipine, irbesartan, Aldactone     History of insulin-dependent type 2 diabetes mellitus with hyperglycemia  T2DM poorly controlled, A1c 9.2. BG stable one day post admission, will continue current regimen and adjust as needed.  "  -Continue with sliding scale  - Holding metformin for now  - Hypoglycemia protocol  - Continue with Jardiance  As per pharmacy patient has been prescribed 50 twice daily of Lantus and 20 3 times daily with meals of NovoLog, discussed with the patient and he stated that he takes 100 daily of Lantus and 25 units of NovoLog at bedtime, he also takes 20 to 25 units 3 times daily with meals based on blood sugar levels, his sugar levels were running in the lower side so held nighttime dose of NovoLog, placed on sliding scale and switched to 50 twice daily of Lantus for now.       History of hyperlipidemia  Continue with aspirin, Plavix, rosuvastatin     History of hypothyroidism  Continue with levothyroxine     DVT PPx  Intermittent pneumatic compression     CODE STATUS  DNR/DNI as per discussion with the patient, as per patient he does not want any aggressive intervention and wants natural death in case his heart stops and he stated to let him pass away in peace in case his heart stops.             Diet: Low Consistent Carb (45 g CHO per Meal) Diet    DVT Prophylaxis: Pneumatic Compression Devices  Reyes Catheter: Not present  Fluids: po  Lines: None     Cardiac Monitoring: None  Code Status: No CPR- Do NOT Intubate      Clinically Significant Risk Factors                 # Thrombocytopenia: Lowest platelets = 114 in last 2 days, will monitor for bleeding   # Hypertension: Noted on problem list          # DMII: A1C = 9.2 % (Ref range: <5.7 %) within past 6 months, PRESENT ON ADMISSION  # Obesity: Estimated body mass index is 33.98 kg/m  as calculated from the following:    Height as of this encounter: 1.727 m (5' 8\").    Weight as of this encounter: 101.4 kg (223 lb 8 oz)., PRESENT ON ADMISSION       # Financial/Environmental Concerns: none         Disposition Plan     Expected Discharge Date: 10/28/2024    Discharge Delays: IV Medication - consider oral or Home Infusion  Destination: nursing home  Discharge Comments: " wound vac        The patient's care was discussed with the Attending Physician, Dr. Marrufo .    Derrick Escalona MD  Hospitalist Service  [facility]  Securely message with [employer] (more info)  Text page via [email or web address] Paging/Directory   ______________________________________________________________________    Interval History   No acute events overnight.  States that he is feeling well today.  Started to feel pain in his right foot and was given pain medication just recently and is starting to feel relief.  Otherwise denies chest pain or shortness of breath.  Otherwise has no concerns.    Physical Exam   Vital Signs: Temp: 97.8  F (36.6  C) Temp src: Oral BP: (!) 159/74 Pulse: 66   Resp: 16 SpO2: 94 % O2 Device: None (Room air)    Weight: 223 lbs 8 oz    General Appearance: Alert and oriented, NAD  Respiratory: normal work of breathing, clear breath sounds, no wheezing  Cardiovascular: normal S1. S2, no murmur  GI: soft, non tender, no guarding.   Skin: normal apperance and turgor.  MSK: R foot with dressing intact no visible drainage.     Medical Decision Making             Data         Imaging results reviewed over the past 24 hrs:   No results found for this or any previous visit (from the past 24 hours).

## 2024-10-27 LAB
ANION GAP SERPL CALCULATED.3IONS-SCNC: 9 MMOL/L (ref 7–15)
BACTERIA BONE ANAEROBE+AEROBE CULT: ABNORMAL
BACTERIA BONE ANAEROBE+AEROBE CULT: ABNORMAL
BASOPHILS # BLD AUTO: 0.1 10E3/UL (ref 0–0.2)
BASOPHILS NFR BLD AUTO: 1 %
BUN SERPL-MCNC: 29 MG/DL (ref 8–23)
CALCIUM SERPL-MCNC: 8.8 MG/DL (ref 8.8–10.4)
CHLORIDE SERPL-SCNC: 104 MMOL/L (ref 98–107)
CREAT SERPL-MCNC: 1.48 MG/DL (ref 0.67–1.17)
EGFRCR SERPLBLD CKD-EPI 2021: 48 ML/MIN/1.73M2
EOSINOPHIL # BLD AUTO: 0.2 10E3/UL (ref 0–0.7)
EOSINOPHIL NFR BLD AUTO: 3 %
ERYTHROCYTE [DISTWIDTH] IN BLOOD BY AUTOMATED COUNT: 15.4 % (ref 10–15)
GLUCOSE BLDC GLUCOMTR-MCNC: 141 MG/DL (ref 70–99)
GLUCOSE BLDC GLUCOMTR-MCNC: 161 MG/DL (ref 70–99)
GLUCOSE BLDC GLUCOMTR-MCNC: 187 MG/DL (ref 70–99)
GLUCOSE BLDC GLUCOMTR-MCNC: 90 MG/DL (ref 70–99)
GLUCOSE BLDC GLUCOMTR-MCNC: 99 MG/DL (ref 70–99)
GLUCOSE SERPL-MCNC: 98 MG/DL (ref 70–99)
HCO3 SERPL-SCNC: 26 MMOL/L (ref 22–29)
HCT VFR BLD AUTO: 32.6 % (ref 40–53)
HGB BLD-MCNC: 10.8 G/DL (ref 13.3–17.7)
IMM GRANULOCYTES # BLD: 0.1 10E3/UL
IMM GRANULOCYTES NFR BLD: 2 %
LYMPHOCYTES # BLD AUTO: 1.4 10E3/UL (ref 0.8–5.3)
LYMPHOCYTES NFR BLD AUTO: 20 %
MCH RBC QN AUTO: 29.3 PG (ref 26.5–33)
MCHC RBC AUTO-ENTMCNC: 33.1 G/DL (ref 31.5–36.5)
MCV RBC AUTO: 89 FL (ref 78–100)
MONOCYTES # BLD AUTO: 0.7 10E3/UL (ref 0–1.3)
MONOCYTES NFR BLD AUTO: 9 %
NEUTROPHILS # BLD AUTO: 4.6 10E3/UL (ref 1.6–8.3)
NEUTROPHILS NFR BLD AUTO: 66 %
NRBC # BLD AUTO: 0 10E3/UL
NRBC BLD AUTO-RTO: 0 /100
PLATELET # BLD AUTO: 118 10E3/UL (ref 150–450)
POTASSIUM SERPL-SCNC: 3.8 MMOL/L (ref 3.4–5.3)
RBC # BLD AUTO: 3.68 10E6/UL (ref 4.4–5.9)
SODIUM SERPL-SCNC: 139 MMOL/L (ref 135–145)
WBC # BLD AUTO: 6.9 10E3/UL (ref 4–11)

## 2024-10-27 PROCEDURE — 250N000013 HC RX MED GY IP 250 OP 250 PS 637: Performed by: INTERNAL MEDICINE

## 2024-10-27 PROCEDURE — 258N000003 HC RX IP 258 OP 636: Performed by: FAMILY MEDICINE

## 2024-10-27 PROCEDURE — 258N000003 HC RX IP 258 OP 636: Performed by: INTERNAL MEDICINE

## 2024-10-27 PROCEDURE — 99232 SBSQ HOSP IP/OBS MODERATE 35: CPT | Mod: 24 | Performed by: FAMILY MEDICINE

## 2024-10-27 PROCEDURE — 250N000012 HC RX MED GY IP 250 OP 636 PS 637: Performed by: STUDENT IN AN ORGANIZED HEALTH CARE EDUCATION/TRAINING PROGRAM

## 2024-10-27 PROCEDURE — 250N000013 HC RX MED GY IP 250 OP 250 PS 637: Performed by: STUDENT IN AN ORGANIZED HEALTH CARE EDUCATION/TRAINING PROGRAM

## 2024-10-27 PROCEDURE — 36415 COLL VENOUS BLD VENIPUNCTURE: CPT

## 2024-10-27 PROCEDURE — 80048 BASIC METABOLIC PNL TOTAL CA: CPT

## 2024-10-27 PROCEDURE — 250N000011 HC RX IP 250 OP 636: Performed by: INTERNAL MEDICINE

## 2024-10-27 PROCEDURE — 250N000013 HC RX MED GY IP 250 OP 250 PS 637: Performed by: PODIATRIST

## 2024-10-27 PROCEDURE — 85004 AUTOMATED DIFF WBC COUNT: CPT

## 2024-10-27 PROCEDURE — 120N000001 HC R&B MED SURG/OB

## 2024-10-27 PROCEDURE — 250N000012 HC RX MED GY IP 250 OP 636 PS 637: Performed by: INTERNAL MEDICINE

## 2024-10-27 RX ADMIN — ACETAMINOPHEN 975 MG: 325 TABLET ORAL at 08:49

## 2024-10-27 RX ADMIN — CLOPIDOGREL BISULFATE 75 MG: 75 TABLET ORAL at 08:49

## 2024-10-27 RX ADMIN — SPIRONOLACTONE 25 MG: 25 TABLET ORAL at 08:50

## 2024-10-27 RX ADMIN — SENNOSIDES AND DOCUSATE SODIUM 1 TABLET: 8.6; 5 TABLET ORAL at 20:14

## 2024-10-27 RX ADMIN — INSULIN GLARGINE 50 UNITS: 100 INJECTION, SOLUTION SUBCUTANEOUS at 20:15

## 2024-10-27 RX ADMIN — ACETAMINOPHEN 975 MG: 325 TABLET ORAL at 00:43

## 2024-10-27 RX ADMIN — SODIUM CHLORIDE, POTASSIUM CHLORIDE, SODIUM LACTATE AND CALCIUM CHLORIDE 500 ML: 600; 310; 30; 20 INJECTION, SOLUTION INTRAVENOUS at 12:28

## 2024-10-27 RX ADMIN — PIPERACILLIN AND TAZOBACTAM 3.38 G: 3; .375 INJECTION, POWDER, FOR SOLUTION INTRAVENOUS at 08:50

## 2024-10-27 RX ADMIN — INSULIN ASPART 1 UNITS: 100 INJECTION, SOLUTION INTRAVENOUS; SUBCUTANEOUS at 18:22

## 2024-10-27 RX ADMIN — Medication 1 CAPSULE: at 08:50

## 2024-10-27 RX ADMIN — ASPIRIN 81 MG: 81 TABLET, COATED ORAL at 08:49

## 2024-10-27 RX ADMIN — PIPERACILLIN AND TAZOBACTAM 3.38 G: 3; .375 INJECTION, POWDER, FOR SOLUTION INTRAVENOUS at 18:22

## 2024-10-27 RX ADMIN — INSULIN GLARGINE 50 UNITS: 100 INJECTION, SOLUTION SUBCUTANEOUS at 08:33

## 2024-10-27 RX ADMIN — IRBESARTAN 300 MG: 300 TABLET ORAL at 20:15

## 2024-10-27 RX ADMIN — DAPTOMYCIN 500 MG: 500 INJECTION, POWDER, LYOPHILIZED, FOR SOLUTION INTRAVENOUS at 12:33

## 2024-10-27 RX ADMIN — LEVOTHYROXINE SODIUM 50 MCG: 0.03 TABLET ORAL at 07:19

## 2024-10-27 RX ADMIN — AMLODIPINE BESYLATE 5 MG: 5 TABLET ORAL at 08:49

## 2024-10-27 RX ADMIN — EMPAGLIFLOZIN 10 MG: 10 TABLET, FILM COATED ORAL at 08:49

## 2024-10-27 RX ADMIN — PIPERACILLIN AND TAZOBACTAM 3.38 G: 3; .375 INJECTION, POWDER, FOR SOLUTION INTRAVENOUS at 00:45

## 2024-10-27 RX ADMIN — ROSUVASTATIN CALCIUM 40 MG: 40 TABLET, FILM COATED ORAL at 08:49

## 2024-10-27 NOTE — PROGRESS NOTES
Patient up to commode with heavy assist of 2--patient is deconditioned and needs quite a bit of assist--encouraged to get up more frequently to build strength and confidence.

## 2024-10-27 NOTE — PROGRESS NOTES
[facility]    Progress Note - Hospitalist Service       Date of Admission:  10/24/2024    Assessment & Plan   Janice Nowak is a 79 year old male with a past medical history of insulin-dependent type 2 diabetes mellitus presented to the hospital for incision and drainage of right heel, partial calcanectomy, sharp excisional debridement ulceration right heel into the bone for acute osteomyelitis of the calcaneus of the right foot and ulceration of the right heel into the bone and is s/p procedure. Hospitalist team initially  consulted for medical co management and transferred care to our team 10/25.      Acute osteomyelitis of the right calcaneus  Ulceration right heel into the bone  Patient presented to the hospital for incision and drainage of right heel, partial calcanectomy, sharp excisional debridement ulceration right heel into the bone for acute osteomyelitis of the calcaneus of the right foot and ulceration of the right heel into the bone and is s/p R heel I&D 10/24.  - Continue with pain management as per protocol  - Orthopedics following, continue to follow recommendations  - Anaerobic and aerobic cultures along with Gram stain pending  - Surgical pathology report pending  - ID has been consulted, appreciate recs        -Continue Daptomycin 10/25        -Continue Zoysn 10/25  - WOC consult     CAROLINE  Baseline creatinine around 1, creatinine 1.5 and now downtrending, will continue to monitor.   - Encourage po intake  - Daily BMP     Chronic normocytic anemia and thrombocytopenia  Hemoglobin stable 10.8 and close to baseline on admission.  Will continue to monitor closely.  - daily CBC     History of hypertension with high blood pressure  BP remained stable, will continue PTA antihypertensive and monitor closely.  - Continue with amlodipine, irbesartan, Aldactone     History of insulin-dependent type 2 diabetes mellitus with hyperglycemia  T2DM poorly controlled, A1c 9.2. BG  "stable one day post admission, will continue current regimen and adjust as needed.   -Continue with sliding scale  - Holding metformin for now  - Hypoglycemia protocol  - Continue with Jardiance  - lantus 50U BID  As per pharmacy patient has been prescribed 50 twice daily of Lantus and 20 3 times daily with meals of NovoLog, discussed with the patient and he stated that he takes 100 daily of Lantus and 25 units of NovoLog at bedtime, he also takes 20 to 25 units 3 times daily with meals based on blood sugar levels, his sugar levels were running in the lower side so held nighttime dose of NovoLog, placed on sliding scale and switched to 50 twice daily of Lantus for now.       History of hyperlipidemia  Continue with aspirin, Plavix, rosuvastatin     History of hypothyroidism  Continue with levothyroxine     DVT PPx  Intermittent pneumatic compression     CODE STATUS  DNR/DNI as per discussion of Dr. Gondal with the patient, as per patient he does not want any aggressive intervention and wants natural death in case his heart stops and he stated to let him pass away in peace in case his heart stops.             Diet: Low Consistent Carb (45 g CHO per Meal) Diet    DVT Prophylaxis: Pneumatic Compression Devices  Reyes Catheter: Not present  Fluids: po  Lines: None     Cardiac Monitoring: None  Code Status: No CPR- Do NOT Intubate      Clinically Significant Risk Factors                 # Thrombocytopenia: Lowest platelets = 118 in last 2 days, will monitor for bleeding   # Hypertension: Noted on problem list          # DMII: A1C = 9.2 % (Ref range: <5.7 %) within past 6 months, PRESENT ON ADMISSION  # Obesity: Estimated body mass index is 33.98 kg/m  as calculated from the following:    Height as of this encounter: 1.727 m (5' 8\").    Weight as of this encounter: 101.4 kg (223 lb 8 oz)., PRESENT ON ADMISSION       # Financial/Environmental Concerns: none         Disposition Plan     Expected Discharge Date: 10/28/2024  "   Discharge Delays: IV Medication - consider oral or Home Infusion  Destination: nursing home  Discharge Comments: wound vac        The patient's care was discussed with the Attending Physician, Dr. Marrufo .    Derrick Escalona MD  Hospitalist Service  [facility]  Securely message with [employer] (more info)  Text page via [employer] Paging/Directory   ______________________________________________________________________    Interval History   No acute events overnight.  States that he is feeling well today.  Pain well-managed with current as needed pain management.  Otherwise denies chest pain or shortness of breath.     Physical Exam   Vital Signs: Temp: 97.2  F (36.2  C) Temp src: Oral BP: 130/65 Pulse: 66   Resp: 17 SpO2: 92 % O2 Device: None (Room air)    Weight: 223 lbs 8 oz    General Appearance: Alert and oriented, NAD  Respiratory: normal work of breathing, clear breath sounds, no wheezing  Cardiovascular: normal S1. S2, no murmur  GI: soft, non tender, no guarding.   Skin: normal apperance and turgor.  MSK: R foot with dressing intact no visible drainage.     Medical Decision Making             Data     I have personally reviewed the following data over the past 24 hrs:    6.9  \   10.8 (L)   / 118 (L)     139 104 29.0 (H) /  99   3.8 26 1.48 (H) \       Imaging results reviewed over the past 24 hrs:   No results found for this or any previous visit (from the past 24 hours).

## 2024-10-27 NOTE — PLAN OF CARE
Problem: Adult Inpatient Plan of Care  Goal: Plan of Care Review  Description: The Plan of Care Review/Shift note should be completed every shift.  The Outcome Evaluation is a brief statement about your assessment that the patient is improving, declining, or no change.  This information will be displayed automatically on your shift  note.  Outcome: Progressing     Problem: Infection  Goal: Absence of Infection Signs and Symptoms  Outcome: Progressing   Goal Outcome Evaluation:       Vs stable on room air,scheduled tylenol effective for pain control,slept between cares.

## 2024-10-27 NOTE — PROGRESS NOTES
Patient eating and drinking well, denies pain--medicated with scheduled tylenol. He's very quiet and is hesitant to get in chair. Vac intact. Voiding good amounts, bowel sounds present and is having regular bm's.

## 2024-10-28 ENCOUNTER — APPOINTMENT (OUTPATIENT)
Dept: PHYSICAL THERAPY | Facility: HOSPITAL | Age: 79
DRG: 629 | End: 2024-10-28
Attending: PODIATRIST
Payer: COMMERCIAL

## 2024-10-28 LAB
ANION GAP SERPL CALCULATED.3IONS-SCNC: 9 MMOL/L (ref 7–15)
BACTERIA BONE ANAEROBE+AEROBE CULT: ABNORMAL
BASOPHILS # BLD AUTO: 0.1 10E3/UL (ref 0–0.2)
BASOPHILS NFR BLD AUTO: 1 %
BUN SERPL-MCNC: 26.1 MG/DL (ref 8–23)
CALCIUM SERPL-MCNC: 8.6 MG/DL (ref 8.8–10.4)
CHLORIDE SERPL-SCNC: 104 MMOL/L (ref 98–107)
CREAT SERPL-MCNC: 1.3 MG/DL (ref 0.67–1.17)
EGFRCR SERPLBLD CKD-EPI 2021: 56 ML/MIN/1.73M2
EOSINOPHIL # BLD AUTO: 0.2 10E3/UL (ref 0–0.7)
EOSINOPHIL NFR BLD AUTO: 2 %
ERYTHROCYTE [DISTWIDTH] IN BLOOD BY AUTOMATED COUNT: 15.3 % (ref 10–15)
GLUCOSE BLDC GLUCOMTR-MCNC: 100 MG/DL (ref 70–99)
GLUCOSE BLDC GLUCOMTR-MCNC: 158 MG/DL (ref 70–99)
GLUCOSE BLDC GLUCOMTR-MCNC: 185 MG/DL (ref 70–99)
GLUCOSE BLDC GLUCOMTR-MCNC: 190 MG/DL (ref 70–99)
GLUCOSE SERPL-MCNC: 109 MG/DL (ref 70–99)
HCO3 SERPL-SCNC: 25 MMOL/L (ref 22–29)
HCT VFR BLD AUTO: 32 % (ref 40–53)
HGB BLD-MCNC: 10.7 G/DL (ref 13.3–17.7)
IMM GRANULOCYTES # BLD: 0.1 10E3/UL
IMM GRANULOCYTES NFR BLD: 2 %
LYMPHOCYTES # BLD AUTO: 1.5 10E3/UL (ref 0.8–5.3)
LYMPHOCYTES NFR BLD AUTO: 20 %
MCH RBC QN AUTO: 29.4 PG (ref 26.5–33)
MCHC RBC AUTO-ENTMCNC: 33.4 G/DL (ref 31.5–36.5)
MCV RBC AUTO: 88 FL (ref 78–100)
MONOCYTES # BLD AUTO: 0.7 10E3/UL (ref 0–1.3)
MONOCYTES NFR BLD AUTO: 9 %
NEUTROPHILS # BLD AUTO: 5 10E3/UL (ref 1.6–8.3)
NEUTROPHILS NFR BLD AUTO: 66 %
NRBC # BLD AUTO: 0 10E3/UL
NRBC BLD AUTO-RTO: 0 /100
PLATELET # BLD AUTO: 130 10E3/UL (ref 150–450)
POTASSIUM SERPL-SCNC: 3.8 MMOL/L (ref 3.4–5.3)
RBC # BLD AUTO: 3.64 10E6/UL (ref 4.4–5.9)
SODIUM SERPL-SCNC: 138 MMOL/L (ref 135–145)
WBC # BLD AUTO: 7.6 10E3/UL (ref 4–11)

## 2024-10-28 PROCEDURE — 250N000013 HC RX MED GY IP 250 OP 250 PS 637: Performed by: STUDENT IN AN ORGANIZED HEALTH CARE EDUCATION/TRAINING PROGRAM

## 2024-10-28 PROCEDURE — 99232 SBSQ HOSP IP/OBS MODERATE 35: CPT | Performed by: INTERNAL MEDICINE

## 2024-10-28 PROCEDURE — 99232 SBSQ HOSP IP/OBS MODERATE 35: CPT | Mod: 24 | Performed by: FAMILY MEDICINE

## 2024-10-28 PROCEDURE — 36415 COLL VENOUS BLD VENIPUNCTURE: CPT

## 2024-10-28 PROCEDURE — 80048 BASIC METABOLIC PNL TOTAL CA: CPT

## 2024-10-28 PROCEDURE — 250N000011 HC RX IP 250 OP 636: Performed by: INTERNAL MEDICINE

## 2024-10-28 PROCEDURE — 120N000001 HC R&B MED SURG/OB

## 2024-10-28 PROCEDURE — 97530 THERAPEUTIC ACTIVITIES: CPT | Mod: GP

## 2024-10-28 PROCEDURE — 258N000003 HC RX IP 258 OP 636: Performed by: INTERNAL MEDICINE

## 2024-10-28 PROCEDURE — 250N000013 HC RX MED GY IP 250 OP 250 PS 637: Performed by: PODIATRIST

## 2024-10-28 PROCEDURE — 250N000013 HC RX MED GY IP 250 OP 250 PS 637: Performed by: INTERNAL MEDICINE

## 2024-10-28 PROCEDURE — 82947 ASSAY GLUCOSE BLOOD QUANT: CPT

## 2024-10-28 PROCEDURE — 85004 AUTOMATED DIFF WBC COUNT: CPT

## 2024-10-28 RX ADMIN — EMPAGLIFLOZIN 10 MG: 10 TABLET, FILM COATED ORAL at 09:07

## 2024-10-28 RX ADMIN — POLYETHYLENE GLYCOL 3350 17 G: 17 POWDER, FOR SOLUTION ORAL at 09:07

## 2024-10-28 RX ADMIN — DAPTOMYCIN 500 MG: 500 INJECTION, POWDER, LYOPHILIZED, FOR SOLUTION INTRAVENOUS at 10:51

## 2024-10-28 RX ADMIN — INSULIN ASPART 1 UNITS: 100 INJECTION, SOLUTION INTRAVENOUS; SUBCUTANEOUS at 17:20

## 2024-10-28 RX ADMIN — SPIRONOLACTONE 25 MG: 25 TABLET ORAL at 09:07

## 2024-10-28 RX ADMIN — CLOPIDOGREL BISULFATE 75 MG: 75 TABLET ORAL at 09:07

## 2024-10-28 RX ADMIN — ASPIRIN 81 MG: 81 TABLET, COATED ORAL at 09:07

## 2024-10-28 RX ADMIN — IRBESARTAN 300 MG: 300 TABLET ORAL at 20:54

## 2024-10-28 RX ADMIN — PIPERACILLIN AND TAZOBACTAM 3.38 G: 3; .375 INJECTION, POWDER, FOR SOLUTION INTRAVENOUS at 00:39

## 2024-10-28 RX ADMIN — PIPERACILLIN AND TAZOBACTAM 3.38 G: 3; .375 INJECTION, POWDER, FOR SOLUTION INTRAVENOUS at 17:18

## 2024-10-28 RX ADMIN — LEVOTHYROXINE SODIUM 50 MCG: 0.03 TABLET ORAL at 07:06

## 2024-10-28 RX ADMIN — Medication 1 CAPSULE: at 09:07

## 2024-10-28 RX ADMIN — INSULIN GLARGINE 50 UNITS: 100 INJECTION, SOLUTION SUBCUTANEOUS at 09:08

## 2024-10-28 RX ADMIN — AMLODIPINE BESYLATE 5 MG: 5 TABLET ORAL at 09:07

## 2024-10-28 RX ADMIN — INSULIN GLARGINE 50 UNITS: 100 INJECTION, SOLUTION SUBCUTANEOUS at 20:55

## 2024-10-28 RX ADMIN — INSULIN ASPART 2 UNITS: 100 INJECTION, SOLUTION INTRAVENOUS; SUBCUTANEOUS at 11:57

## 2024-10-28 RX ADMIN — SENNOSIDES AND DOCUSATE SODIUM 1 TABLET: 8.6; 5 TABLET ORAL at 09:07

## 2024-10-28 RX ADMIN — PIPERACILLIN AND TAZOBACTAM 3.38 G: 3; .375 INJECTION, POWDER, FOR SOLUTION INTRAVENOUS at 07:56

## 2024-10-28 RX ADMIN — ROSUVASTATIN CALCIUM 40 MG: 40 TABLET, FILM COATED ORAL at 09:07

## 2024-10-28 NOTE — PROGRESS NOTES
[facility]    Progress Note - Hospitalist Service       Date of Admission:  10/24/2024    Assessment & Plan   Janice Nowak is a 79 year old male with a past medical history of insulin-dependent type 2 diabetes mellitus presented to the hospital for incision and drainage of right heel, partial calcanectomy, sharp excisional debridement ulceration right heel into the bone for acute osteomyelitis of the calcaneus of the right foot and ulceration of the right heel into the bone and is s/p procedure. Hospitalist team initially  consulted for medical co management and transferred care to our team 10/25.      Acute osteomyelitis of the right calcaneus  Ulceration right heel into the bone  Patient presented to the hospital for incision and drainage of right heel, partial calcanectomy, sharp excisional debridement ulceration right heel into the bone for acute osteomyelitis of the calcaneus of the right foot and ulceration of the right heel into the bone and is s/p R heel I&D 10/24.  - Continue with pain management as per protocol  - Orthopedics following, continue to follow recommendations  - Anaerobic and aerobic cultures along with Gram stain pending  - Surgical pathology report pending  - ID has been consulted, appreciate recs        -Continue Daptomycin 10/25        -Continue Zoysn 10/25  - WOC consult     CAROLINE  Baseline creatinine around 1, creatinine 1.5 and now downtrending, will continue to monitor.   - Encourage po intake  - Daily BMP     Chronic normocytic anemia and thrombocytopenia  Hemoglobin stable 10.8 and close to baseline on admission.  Will continue to monitor closely.  - daily CBC     History of hypertension with high blood pressure  BP remained stable, will continue PTA antihypertensive and monitor closely.  - Continue with amlodipine, irbesartan, Aldactone     History of insulin-dependent type 2 diabetes mellitus with hyperglycemia  T2DM poorly controlled, A1c 9.2. BG  "stable one day post admission, will continue current regimen and adjust as needed.  Sugars elevated in the afternoon and evening will add carb correction and adjust as needed.   -Continue with sliding scale  - Carb coverage 1:10  - Holding metformin for now  - Hypoglycemia protocol  - Continue with Jardiance  - lantus 50U BID  As per pharmacy patient has been prescribed 50 twice daily of Lantus and 20 3 times daily with meals of NovoLog, discussed with the patient and he stated that he takes 100 daily of Lantus and 25 units of NovoLog at bedtime, he also takes 20 to 25 units 3 times daily with meals based on blood sugar levels, his sugar levels were running in the lower side so held nighttime dose of NovoLog, placed on sliding scale and switched to 50 twice daily of Lantus for now.       History of hyperlipidemia  Continue with aspirin, Plavix, rosuvastatin     History of hypothyroidism  Continue with levothyroxine     DVT PPx  Intermittent pneumatic compression     CODE STATUS  DNR/DNI as per discussion of Dr. Gondal with the patient, as per patient he does not want any aggressive intervention and wants natural death in case his heart stops and he stated to let him pass away in peace in case his heart stops.             Diet: Low Consistent Carb (45 g CHO per Meal) Diet    DVT Prophylaxis: Pneumatic Compression Devices  Reyes Catheter: Not present  Fluids: po  Lines: None     Cardiac Monitoring: None  Code Status: No CPR- Do NOT Intubate      Clinically Significant Risk Factors                 # Thrombocytopenia: Lowest platelets = 118 in last 2 days, will monitor for bleeding   # Hypertension: Noted on problem list          # DMII: A1C = 9.2 % (Ref range: <5.7 %) within past 6 months, PRESENT ON ADMISSION  # Obesity: Estimated body mass index is 33.98 kg/m  as calculated from the following:    Height as of this encounter: 1.727 m (5' 8\").    Weight as of this encounter: 101.4 kg (223 lb 8 oz)., PRESENT ON " ADMISSION       # Financial/Environmental Concerns: none         Disposition Plan      Expected Discharge Date: 10/28/2024    Discharge Delays: IV Medication - consider oral or Home Infusion  Destination: nursing home  Discharge Comments: wound vac        The patient's care was discussed with the Attending Physician, Dr. Marrufo .    Derrick Escalona MD  Hospitalist Service  [facility]  Securely message with [employer] (more info)  Text page via [employer] Paging/Directory   ______________________________________________________________________    Interval History   No acute events overnight. Overall feeling well. No sob or chest pain.         Physical Exam   Vital Signs: Temp: 98.1  F (36.7  C) Temp src: Oral BP: 139/66 Pulse: 74   Resp: 16 SpO2: 95 % O2 Device: None (Room air)    Weight: 223 lbs 8 oz    General Appearance: Alert and oriented, NAD  Respiratory: normal work of breathing, clear breath sounds, no wheezing  Cardiovascular: normal S1. S2, no murmur  GI: soft, non tender, no guarding.   Skin: normal apperance and turgor.  MSK: R foot with dressing intact no visible drainage.     Medical Decision Making             Data     I have personally reviewed the following data over the past 24 hrs:    7.6  \   10.7 (L)   / 130 (L)     138 104 26.1 (H) /  100 (H)   3.8 25 1.30 (H) \       Imaging results reviewed over the past 24 hrs:   No results found for this or any previous visit (from the past 24 hours).

## 2024-10-28 NOTE — UTILIZATION REVIEW
Admission Status; Secondary Review Determination   INSURANCE DENIAL  Under the authority of the Utilization Management Committee, the utilization review process indicated a secondary review on the above patient. The review outcome is based on review of the medical records, discussions with staff, and applying clinical experience noted on the date of the review.   (x) Inpatient Status Appropriate - This patient's medical care is consistent with medical management for inpatient care and reasonable inpatient medical practice.   RATIONALE FOR DETERMINATION   78 yo female with insulin-dependent diabetes who presented to the hospital for I&D of right ankle that required sharp excisional debridement to the bone and right calcanectomy for treatment of osteomyelitis.  Infectious disease consulted and continuing to follow.  Remaining on IV daptomycin and IV Zosyn (since 10/25/24) as per infectious disease until results of surgical pathology available.  Currently requiring ongoing inpatient medical treatment and monitoring yet today.   At the time of admission with the information available to the attending physician more than 2 nights Hospital complex care was anticipated, based on patient risk of adverse outcome if treated as outpatient and complex care required. Inpatient admission is appropriate based on the Medicare guidelines.   The information on this document is developed by the utilization review team in order for the business office to ensure compliance. This only denotes the appropriateness of proper admission status and does not reflect the quality of care rendered.   The definitions of Inpatient Status and Observation Status used in making the determination above are those provided in the CMS Coverage Manual, Chapter 1 and Chapter 6, section 70.4.     Sincerely,     Destiny Finley, DO  Utilization Review  Physician Advisor

## 2024-10-28 NOTE — PROGRESS NOTES
Care Management Follow Up    Length of Stay (days): 4    Expected Discharge Date: 10/29/2024    Anticipated Discharge Plan:       Transportation: Anticipate Wheelchair. Transportation costs discussed? Yes. Discussed with rosio Rodriguez    PT Recommendations: Transitional Care Facility  OT Recommendations:        Barriers to Discharge: medical stability    Prior Living Situation: independent living facility with alone    Discussed  Partnership in Safe Discharge Planning  document with patient/family: No     Handoff Completed: No, handoff not indicated or clinically appropriate    Patient/Spokesperson Updated: Yes. Who? [email or web address]    Additional Information:  See below     Next Steps: continue to follow     Heather Miles RN        Received a call from Cassia with [facility]. Says she talked to rosio Rodriguez who gave more TCU choices. Referral sent. Will discuss with patient     3:06 PM  Patient accepted at [facility]. Met with patient and he is agreeable     Called rosio Rodriguez and he is also agreeable. Requests wheelchair transport at discharge and is aware of potential cost.

## 2024-10-28 NOTE — PROGRESS NOTES
[facility]    Infectious Disease Progress Note    Date of Service : 10/28/2024     Assessment & Plan   Janice Nowak is a 79 year old male who was admitted on 10/24/2024.       ASSESSMENT:  Right heel osteomyelitis  Diabetes hypertension hyperlipidemia hypothyroidism  Poorly controlled diabetes mellitus  Peripheral vascular disease  Biopsy result:  Final Diagnosis   CALCANEUS, RIGHT, BIOPSY:        1.  ACUTE OSTEOMYELITIS AND MARROW GRANULATION TISSUE        2.  PERIOSTEAL SOFT TISSUE WITH GANGRENOUS NECROSIS AND PERIOSTEAL ABSCESS       RECOMMENDATIONS:    Antibiotics daptomycin and Zosyn  Follow culture results   Focus/de-escalate antibiotics based on final culture results  Monitor CBC, CMP, CK and monitor for myalgia, will likely need to hold Lipitor while on daptomycin  Discussed with patient   Patient will need 6 weeks of IV antibiotics  ID will follow      Erica Nuñez MD  [facility]  [phone]      Interval History   Updated patient regarding biopsy results  Sitting up in chair    Physical Exam   Temp: 98.1  F (36.7  C) Temp src: Oral BP: 139/66 Pulse: 74   Resp: 16 SpO2: 95 % O2 Device: None (Room air)    Vitals:    10/24/24 0640   Weight: 101.4 kg (223 lb 8 oz)     Vital Signs with Ranges  Temp:  [97.5  F (36.4  C)-98.1  F (36.7  C)] 98.1  F (36.7  C)  Pulse:  [72-78] 74  Resp:  [16-18] 16  BP: (139-173)/(66-79) 139/66  SpO2:  [94 %-95 %] 95 %    Constitutional: Awake, no apparent distress  Lungs: normal breathing pattern, no crackles or wheezing  Cardiovascular: Edema  Abdomen: non distended  Skin: warm  Dressing on foot  Neuro: deconditioned  Psych: able to answer questions        Medications   Current Facility-Administered Medications   Medication Dose Route Frequency Provider Last Rate Last Admin     Current Facility-Administered Medications   Medication Dose Route Frequency Provider Last Rate Last Admin    amLODIPine (NORVASC) tablet 5 mg   5 mg Oral Daily Miguelito Burkett MD   5 mg at 10/28/24 0907    aspirin EC tablet 81 mg  81 mg Oral Daily Gondal, Saad J, MD   81 mg at 10/28/24 0907    clopidogrel (PLAVIX) tablet 75 mg  75 mg Oral Daily Gondal, Saad J, MD   75 mg at 10/28/24 0907    DAPTOmycin (CUBICIN) 500 mg in sodium chloride 0.9 % 100 mL intermittent infusion  6 mg/kg (Adjusted) Intravenous Q24H Ike St MD   500 mg at 10/27/24 1233    empagliflozin (JARDIANCE) tablet 10 mg  10 mg Oral Daily Gondal, Saad J, MD   10 mg at 10/28/24 0907    insulin aspart (NovoLOG) injection (RAPID ACTING)  1-7 Units Subcutaneous TID AC Miguelito Burkett MD   1 Units at 10/27/24 1822    insulin aspart (NovoLOG) injection (RAPID ACTING)  1-5 Units Subcutaneous At Bedtime Miguelito Burkett MD   1 Units at 10/26/24 2123    insulin glargine (LANTUS PEN) injection 50 Units  50 Units Subcutaneous BID Gondal, Saad J, MD   50 Units at 10/28/24 0908    irbesartan (AVAPRO) tablet 300 mg  300 mg Oral At Bedtime Miguelito Burkett MD   300 mg at 10/27/24 2015    lactobacillus rhamnosus (GG) (CULTURELL) capsule 1 capsule  1 capsule Oral Daily Gondal, Saad J, MD   1 capsule at 10/28/24 0907    levothyroxine (SYNTHROID/LEVOTHROID) tablet 50 mcg  50 mcg Oral Daily Miguelito Burkett MD   50 mcg at 10/28/24 0706    piperacillin-tazobactam (ZOSYN) 3.375 g vial to attach to  mL bag  3.375 g Intravenous Q8H Ike St MD   3.375 g at 10/28/24 0756    polyethylene glycol (MIRALAX) Packet 17 g  17 g Oral Daily Amol Patricio DPM   17 g at 10/28/24 0907    rosuvastatin (CRESTOR) tablet 40 mg  40 mg Oral Daily Miguelito Burkett MD   40 mg at 10/28/24 0907    senna-docusate (SENOKOT-S/PERICOLACE) 8.6-50 MG per tablet 1 tablet  1 tablet Oral BID Amol Patricio DPM   1 tablet at 10/28/24 0907    sodium chloride (PF) 0.9% PF flush 3 mL  3 mL Intracatheter Q8H Amol Patricio DPM   3 mL at 10/28/24 0707    sodium chloride (PF) 0.9% PF flush 3 mL  3 mL  "Intracatheter Q8H Amol Patricio, DPM   3 mL at 10/28/24 0040    spironolactone (ALDACTONE) tablet 25 mg  25 mg Oral Daily Gondal, Saad J, MD   25 mg at 10/28/24 0907       Data   All microbiology laboratory data reviewed.  Recent Labs   Lab Test 10/28/24  0520 10/27/24  0514 10/26/24  1345   WBC 7.6 6.9 8.2   HGB 10.7* 10.8* 11.1*   HCT 32.0* 32.6* 34.6*   MCV 88 89 89   * 118* 138*     Recent Labs   Lab Test 10/28/24  0520 10/27/24  0514 10/26/24  1345   CR 1.30* 1.48* 1.38*     Recent Labs   Lab Test 10/07/24  1104   SED 57*     No lab results found.    Invalid input(s): \"UC\"    MICROBIOLOGY:    Reviewed    7-Day Micro Results       Collected Updated Procedure Result Status      10/24/2024 0854 10/27/2024 1154 Anaerobic Bacterial Culture Routine [[id number]]   (Abnormal)   Bone Resection from Foot, Right    Final result Component Value   Culture 2+ Mixed Anaerobic Organisms Present    1+ Bacteroides thetaiotaomicron    Susceptibilities not routinely done, refer to antibiogram to view typical susceptibility profiles               10/24/2024 0854 10/24/2024 1323 Gram Stain [[id number]]   Bone Resection from Foot, Right    Final result Component Value   GS Culture See corresponding culture for results   Gram Stain Result No organisms seen   Gram Stain Result No white blood cells seen            10/24/2024 0854 10/28/2024 0757 Bone Resection Aerobic Bacterial Culture Routine [[id number]]   (Abnormal)   Bone Resection from Foot, Right    Final result Component Value   Culture Isolated in broth only Staphylococcus cohnii    Susceptibilities not routinely done, refer to antibiogram to view typical susceptibility profiles                        RADIOLOGY:    Reviewed  POC US Guidance Needle Placement    Result Date: 10/24/2024  Ultrasound was performed as guidance to an anesthesia procedure.  Click \"PACS images\" hyperlink below to view any stored images.  For specific procedure details, view procedure " note authored by anesthesia.    US Lower Extremity Arterial Duplex Bilateral    Result Date: 10/15/2024  Table formatting from the original result was not included. Arterial Duplex Ultrasound (Date: 10/15/24) Lower Extremity Artery Evaluation Indication: Surveillance Bilateral Leg Arterial: Decreased SONNY's. Left Leg Pain. Right Diabetic Heel Ulcer. Decreased pedal pulses. Previous: 2/6/2018 SONNY's History: Previous Smoker, Hypertension, Diabetic, Hyperlipidemia, PAD, and Vascular Ulcers Technique: Duplex imaging is performed utilizing gray-scale, two-dimensional images, and color-flow imaging. Doppler waveform analysis and spectral Doppler imaging is also performed. LOWER EXTREMITY ARTERIAL DUPLEX EXAM WITH WAVEFORMS Right Leg:(cm/s) Location: Velocities Waveforms EIA:   109  B CFA:   111  B PFA:   202  B SFA Proximal:   89 /  72 B SFA Mid:   75  T SFA Distal: 94   T Popliteal Artery:   76 /  72 B PTA:   105   M ALEX:   33  M DPA:   38  M Waveforms: T=Triphasic, M=Monophasic, B=Biphasic Left Leg:(cm/s) Location: Velocities Waveforms EIA:   53  M CFA:   38  M PFA:   48  M SFA Proximal:   42 / 26 M SFA Mid:   23  M SFA Distal:   27  M Popliteal Artery:   32 /15  M PTA:   18    M ALEX:   5  M DPA:   7  M Waveforms: T=Triphasic, M=Monophasic, B=Biphasic Impression: Right Lower Extremity: Patent vasculature to the popliteal artery with multiphasic waveforms.  Transition to monophasic flow in the infrapopliteal vessels.  No focal stenosis by velocity criteria. Left Lower Extremity: Monophasic waveforms in the external iliac artery, suggesting iliac inflow disease.  The remaining vasculature is patent without focal stenosis. Reference: Category Normal 1-19% 20-49% 50-99% Occluded PSV <160 cm/sec without spectral broadening <160 cm/sec with spectral broadening Increased Increased Absent Flow Ratio N/A N/A < 2.0 >2.0 N/A Post-Stenotic Turbulence No No No Yes N/A      US Low Ext Arterial Dop Seg Pres w/o Exercise    Result Date:  10/15/2024  Table formatting from the original result was not included. BILATERAL RESTING ANKLE-BRACHIAL INDICES (SONNY'S) (Date: 10/15/24) Indication: Surveillance SONNY's: Left Leg Pain. Right Diabetic Heel Ulcer. Decreased pedal pulses. Previous: 2/6/2018 SONNY's History: Previous Smoker, Hypertension, Diabetic, Hyperlipidemia, PAD, and Vascular Ulcers  Resting SONNY's          Right: mmHg Index     Brachial: 167  Ankle-(PT): 127 0.74 Ankle-(DP): 124 0.73          Digit: 140 0.82               Left: mmHg Index     Brachial: 171  Ankle-(PT): >254 NC Ankle-(DP): 0 0          Digit: 25 0.15 Resting ankle-brachial index of 0.74 on the right. Toe Pressures of 140 mmHg and TBI of 0.82 Resting ankle-brachial index is non compressible  on the left. Toe Pressures of 25 mmHg and TBI of 0.15  VPR WAVEFORMS: The right volume plethysmography waveforms are mildly abnormal at the lower thigh level, mildly abnormal at the upper calf level and moderately abnormal at the ankle. The left volume plethysmography waveforms are severely abnormal at the lower thigh level, severely abnormal at the upper calf level and severely abnormal at the ankle.  Impression:  1. RIGHT LOWER EXTREMITY: SONNY is Abnormal with an SONNY of 0.74 indicating single level disease. Toe Pressures are Normal and adequate for wound healing with toe pressures of 140 mmHg. 2. LEFT LOWER EXTREMITY: SONNY is Non-diagnostic indicating vessel calcification. Toe Pressures are Abnormal and impaired for wound healing with toe pressures of 25 mmHg. Reference: Wound classification Grade SONNY Ankle Systolic Pressure Toe Pressures 0 > 0.80 > 100 mmHg > 60 mmHg 1 0.6 - 0.79 70 - 100 mmHg 40 - 59 mmHg 2 0.4 - 0.59 50-70 mmHg 30 - 39 mmHg 3 < 0.39 < 50 mmHg < 30 mmHg Digit Pressures DBI Disease Category > 0.70 Normal < 0.70 Abnormal > 30 mmHg Potential wound healing < 30 mmHg Impaired wound healing Ankle Brachial Pressures SONNY Disease Category > 1.3  Likely vessel calcification with  monophasic waveforms, non-diagnostic 0.95-1.30 Normal with multiphasic waveforms 0.50-0.95 Single level disease 0.30-0.50 Multilevel disease < 0.30 Critical limb ischema Volume Plethysmography Recording (VPR) at all levels Normal Sharp systolic peak, fast upstroke, prominent dicrotic notch in wave Mild Sharp systolic peak, fast upstroke, absent dicrotic notch in wave Moderate Flattened systolic peak, slowed upstroke, absent dicrotic notch inwave Severe amplitude wave with = upslope and down slope Occluded Flat Line      MR Foot Right w/o Contrast    Result Date: 10/8/2024  EXAM: MR FOOT RIGHT W/O CONTRAST LOCATION: [facility] DATE: 10/8/2024 INDICATION: Diabetic foot ulcer COMPARISON: Radiograph 10/07/2024 TECHNIQUE: Unenhanced. FINDINGS: TENDONS: -Peroneal: Peroneus longus and brevis tendons are intact. No tendinopathy or tenosynovitis. No subluxation. -Medial: Posterior tibialis tendon is intact. No tendinopathy or tenosynovitis. Flexor digitorum longus and flexor hallucis longus tendons are normal. No tenosynovitis. -Anterior: Anterior tibialis, extensor hallucis longus, and extensor digitorum longus tendons are normal. No tenosynovitis. -Achilles: Minimal Achilles tendinopathy. No tear or peritendinitis. LIGAMENTS: -Anterior talofibular ligament: Intact. -Calcaneofibular ligament: Intact. -Posterior talofibular ligament: Intact. -Syndesmotic inferior tibiofibular ligaments: Intact. -Deltoid ligament complex: Effacement of fat in the deltoid ligament could represent prior sprain. The ligament remains intact. -Spring ligament complex: Intact. JOINTS AND BONES: -No fracture or contusion. -There is T2 hyperintense edema of the calcaneal tuberosity deep to the ulcer. There is mild linear T1 hypointense signal without confluent T1 hypointense replacement. -Severe talonavicular joint osteoarthritis with subchondral cystic change and remodeling of the navicular bone. SOFT TISSUES: -Plantar  fascia: Chronic central band plantar fasciitis. No tear. -Sinus tarsi and tarsal tunnel: Normal. -Muscles: Subacute on chronic denervation atrophy of the visualized foot and distal leg musculature. -There is a soft tissue ulcer of the calcaneus with subjacent nonorganized subcutaneous edema. Edema abuts the calcaneal tuberosity. No organized/drainable collection.     IMPRESSION: 1.  Soft tissue ulcer of the heel with subcutaneous edema abutting the calcaneus compatible with cellulitis. No organized/drainable collection. There is T2 hyperintense marrow edema of the subjacent calcaneal tuberosity with mild linear T1 hypointense signal but no confluent T1 hypointense replacement. Given location subjacent to an ulcer, these findings are compatible with a high likelihood though are not definitive of osteomyelitis. 2.  Severe talonavicular joint osteoarthritis.    Foot  XR, G/E 3 views, right    Result Date: 10/7/2024  EXAM: XR FOOT RIGHT G/E 3 VIEWS LOCATION: [facility] DATE: 10/7/2024 INDICATION: diabetic foot ulcer right heel COMPARISON: None.     IMPRESSION: Deep soft tissue ulceration along the posterior aspect of the heel. No focal osseous erosions or periostitis to suggest osteomyelitis. If there is persistent clinical concern for osteomyelitis, MRI is more sensitive.  Large plantar calcaneal spur. Moderate midfoot degenerative change most evident at the talonavicular joint where there are prominent dorsal osteophytes. Vascular calcifications extending into the toes. Bipartite medial hallux sesamoid.

## 2024-10-28 NOTE — PLAN OF CARE
Problem: Adult Inpatient Plan of Care  Goal: Plan of Care Review  Description: The Plan of Care Review/Shift note should be completed every shift.  The Outcome Evaluation is a brief statement about your assessment that the patient is improving, declining, or no change.  This information will be displayed automatically on your shift  note.  Outcome: Progressing   Goal Outcome Evaluation:         Pt denies having any pain,VS stable,right heel vac dressing intact,received IV abx,slept most of the night.

## 2024-10-28 NOTE — PLAN OF CARE
Problem: Adult Inpatient Plan of Care  Goal: Plan of Care Review  Description: The Plan of Care Review/Shift note should be completed every shift.  The Outcome Evaluation is a brief statement about your assessment that the patient is improving, declining, or no change.  This information will be displayed automatically on your shift  note.  Outcome: Progressing     Problem: Adult Inpatient Plan of Care  Goal: Optimal Comfort and Wellbeing  Outcome: Progressing     Problem: Mobility Impairment  Goal: Optimal Mobility  Outcome: Not Progressing  Intervention: Optimize Mobility  Recent Flowsheet Documentation  Taken 10/28/2024 1000 by Carie Moreno RN  Positioning/Transfer Devices:   pillows   in use  Taken 10/28/2024 0757 by Carie Moreno RN  Assistive Device Utilized:   gait belt   walker  Activity Management: activity adjusted per tolerance  Positioning/Transfer Devices:   pillows   in use     Problem: Comorbidity Management  Goal: Blood Glucose Levels Within Targeted Range  Outcome: Not Progressing  Intervention: Monitor and Manage Glycemia  Recent Flowsheet Documentation  Taken 10/28/2024 0800 by Carie Moreno RN  Medication Review/Management: medications reviewed    Patient is alert and orientated, able to make needs known. Denies pain throughout shift. Up with the assist of 1, gait belt and walker. Pivot transferred to bed side recliner. Up in recliner majority of shift. Weight shifting encouraged. Blood sugar elevated at lunch time, 190. Provider added carb coverage to insulin regiment, 1:10. Wound vac is patent and putting minimal output out. Awaiting TCU placement.     Carie Moreno RN

## 2024-10-29 ENCOUNTER — APPOINTMENT (OUTPATIENT)
Dept: OCCUPATIONAL THERAPY | Facility: HOSPITAL | Age: 79
DRG: 629 | End: 2024-10-29
Payer: COMMERCIAL

## 2024-10-29 ENCOUNTER — DOCUMENTATION ONLY (OUTPATIENT)
Dept: GERIATRICS | Facility: CLINIC | Age: 79
End: 2024-10-29
Payer: COMMERCIAL

## 2024-10-29 ENCOUNTER — APPOINTMENT (OUTPATIENT)
Dept: PHYSICAL THERAPY | Facility: HOSPITAL | Age: 79
DRG: 629 | End: 2024-10-29
Attending: PODIATRIST
Payer: COMMERCIAL

## 2024-10-29 PROBLEM — E03.9 HYPOTHYROIDISM, UNSPECIFIED: Status: ACTIVE | Noted: 2024-10-29

## 2024-10-29 PROBLEM — Z90.49 HISTORY OF CHOLECYSTECTOMY: Status: ACTIVE | Noted: 2024-10-29

## 2024-10-29 PROBLEM — I77.9 CAROTID ARTERIAL DISEASE (H): Status: ACTIVE | Noted: 2024-10-29

## 2024-10-29 PROBLEM — H91.90 HEARING LOSS: Status: ACTIVE | Noted: 2024-10-29

## 2024-10-29 PROBLEM — M54.9 POSTOPERATIVE BACK PAIN: Status: ACTIVE | Noted: 2024-10-29

## 2024-10-29 PROBLEM — Z77.29 EXPOSURE TO POTENTIALLY HAZARDOUS SUBSTANCE: Status: ACTIVE | Noted: 2024-10-29

## 2024-10-29 PROBLEM — M14.679: Status: ACTIVE | Noted: 2024-10-29

## 2024-10-29 PROBLEM — G89.18 POSTOPERATIVE BACK PAIN: Status: ACTIVE | Noted: 2024-10-29

## 2024-10-29 PROBLEM — L89.610 PRESSURE ULCER OF RIGHT HEEL, UNSTAGEABLE (H): Status: ACTIVE | Noted: 2024-10-29

## 2024-10-29 PROBLEM — I65.29 OCCLUSION AND STENOSIS OF UNSPECIFIED CAROTID ARTERY: Status: ACTIVE | Noted: 2024-10-29

## 2024-10-29 PROBLEM — M14.60 CHARCOT'S ARTHROPATHY: Status: ACTIVE | Noted: 2024-10-29

## 2024-10-29 PROBLEM — E03.9 HYPOTHYROIDISM: Status: ACTIVE | Noted: 2024-10-29

## 2024-10-29 PROBLEM — E11.40 DIABETIC NEUROPATHY (H): Status: ACTIVE | Noted: 2024-10-29

## 2024-10-29 LAB
ANION GAP SERPL CALCULATED.3IONS-SCNC: 11 MMOL/L (ref 7–15)
BASOPHILS # BLD AUTO: 0.1 10E3/UL (ref 0–0.2)
BASOPHILS NFR BLD AUTO: 1 %
BUN SERPL-MCNC: 27.8 MG/DL (ref 8–23)
CALCIUM SERPL-MCNC: 8.8 MG/DL (ref 8.8–10.4)
CHLORIDE SERPL-SCNC: 105 MMOL/L (ref 98–107)
CREAT SERPL-MCNC: 1.31 MG/DL (ref 0.67–1.17)
EGFRCR SERPLBLD CKD-EPI 2021: 56 ML/MIN/1.73M2
EOSINOPHIL # BLD AUTO: 0.2 10E3/UL (ref 0–0.7)
EOSINOPHIL NFR BLD AUTO: 2 %
ERYTHROCYTE [DISTWIDTH] IN BLOOD BY AUTOMATED COUNT: 15.5 % (ref 10–15)
GLUCOSE BLDC GLUCOMTR-MCNC: 145 MG/DL (ref 70–99)
GLUCOSE BLDC GLUCOMTR-MCNC: 156 MG/DL (ref 70–99)
GLUCOSE BLDC GLUCOMTR-MCNC: 223 MG/DL (ref 70–99)
GLUCOSE BLDC GLUCOMTR-MCNC: 73 MG/DL (ref 70–99)
GLUCOSE BLDC GLUCOMTR-MCNC: 84 MG/DL (ref 70–99)
GLUCOSE SERPL-MCNC: 77 MG/DL (ref 70–99)
HCO3 SERPL-SCNC: 25 MMOL/L (ref 22–29)
HCT VFR BLD AUTO: 34.9 % (ref 40–53)
HGB BLD-MCNC: 11.3 G/DL (ref 13.3–17.7)
IMM GRANULOCYTES # BLD: 0.1 10E3/UL
IMM GRANULOCYTES NFR BLD: 2 %
LYMPHOCYTES # BLD AUTO: 1.6 10E3/UL (ref 0.8–5.3)
LYMPHOCYTES NFR BLD AUTO: 21 %
MCH RBC QN AUTO: 28.5 PG (ref 26.5–33)
MCHC RBC AUTO-ENTMCNC: 32.4 G/DL (ref 31.5–36.5)
MCV RBC AUTO: 88 FL (ref 78–100)
MONOCYTES # BLD AUTO: 0.7 10E3/UL (ref 0–1.3)
MONOCYTES NFR BLD AUTO: 9 %
NEUTROPHILS # BLD AUTO: 5.3 10E3/UL (ref 1.6–8.3)
NEUTROPHILS NFR BLD AUTO: 66 %
NRBC # BLD AUTO: 0 10E3/UL
NRBC BLD AUTO-RTO: 0 /100
PLATELET # BLD AUTO: 137 10E3/UL (ref 150–450)
POTASSIUM SERPL-SCNC: 3.8 MMOL/L (ref 3.4–5.3)
RBC # BLD AUTO: 3.97 10E6/UL (ref 4.4–5.9)
SODIUM SERPL-SCNC: 141 MMOL/L (ref 135–145)
WBC # BLD AUTO: 8 10E3/UL (ref 4–11)

## 2024-10-29 PROCEDURE — 97165 OT EVAL LOW COMPLEX 30 MIN: CPT | Mod: GO

## 2024-10-29 PROCEDURE — 36415 COLL VENOUS BLD VENIPUNCTURE: CPT

## 2024-10-29 PROCEDURE — 97605 NEG PRS WND THER DME<=50SQCM: CPT

## 2024-10-29 PROCEDURE — 97110 THERAPEUTIC EXERCISES: CPT | Mod: GP

## 2024-10-29 PROCEDURE — G0463 HOSPITAL OUTPT CLINIC VISIT: HCPCS | Mod: 25

## 2024-10-29 PROCEDURE — 97535 SELF CARE MNGMENT TRAINING: CPT | Mod: GO

## 2024-10-29 PROCEDURE — 120N000001 HC R&B MED SURG/OB

## 2024-10-29 PROCEDURE — 258N000003 HC RX IP 258 OP 636

## 2024-10-29 PROCEDURE — 250N000013 HC RX MED GY IP 250 OP 250 PS 637: Performed by: STUDENT IN AN ORGANIZED HEALTH CARE EDUCATION/TRAINING PROGRAM

## 2024-10-29 PROCEDURE — 258N000003 HC RX IP 258 OP 636: Performed by: INTERNAL MEDICINE

## 2024-10-29 PROCEDURE — 250N000011 HC RX IP 250 OP 636: Performed by: INTERNAL MEDICINE

## 2024-10-29 PROCEDURE — 97530 THERAPEUTIC ACTIVITIES: CPT | Mod: GP

## 2024-10-29 PROCEDURE — 250N000013 HC RX MED GY IP 250 OP 250 PS 637: Performed by: PODIATRIST

## 2024-10-29 PROCEDURE — 99232 SBSQ HOSP IP/OBS MODERATE 35: CPT | Mod: 24 | Performed by: FAMILY MEDICINE

## 2024-10-29 PROCEDURE — 99232 SBSQ HOSP IP/OBS MODERATE 35: CPT | Performed by: INTERNAL MEDICINE

## 2024-10-29 PROCEDURE — 85025 COMPLETE CBC W/AUTO DIFF WBC: CPT

## 2024-10-29 PROCEDURE — 80048 BASIC METABOLIC PNL TOTAL CA: CPT

## 2024-10-29 PROCEDURE — 250N000013 HC RX MED GY IP 250 OP 250 PS 637: Performed by: INTERNAL MEDICINE

## 2024-10-29 RX ORDER — LIDOCAINE 40 MG/G
CREAM TOPICAL
Status: DISCONTINUED | OUTPATIENT
Start: 2024-10-29 | End: 2024-11-01 | Stop reason: HOSPADM

## 2024-10-29 RX ADMIN — AMLODIPINE BESYLATE 5 MG: 5 TABLET ORAL at 08:46

## 2024-10-29 RX ADMIN — DAPTOMYCIN 500 MG: 500 INJECTION, POWDER, LYOPHILIZED, FOR SOLUTION INTRAVENOUS at 11:17

## 2024-10-29 RX ADMIN — IRBESARTAN 300 MG: 300 TABLET ORAL at 20:41

## 2024-10-29 RX ADMIN — LEVOTHYROXINE SODIUM 50 MCG: 0.03 TABLET ORAL at 06:54

## 2024-10-29 RX ADMIN — PIPERACILLIN AND TAZOBACTAM 3.38 G: 3; .375 INJECTION, POWDER, FOR SOLUTION INTRAVENOUS at 16:30

## 2024-10-29 RX ADMIN — SPIRONOLACTONE 25 MG: 25 TABLET ORAL at 08:46

## 2024-10-29 RX ADMIN — CLOPIDOGREL BISULFATE 75 MG: 75 TABLET ORAL at 08:46

## 2024-10-29 RX ADMIN — PIPERACILLIN AND TAZOBACTAM 3.38 G: 3; .375 INJECTION, POWDER, FOR SOLUTION INTRAVENOUS at 01:34

## 2024-10-29 RX ADMIN — Medication 1 CAPSULE: at 08:45

## 2024-10-29 RX ADMIN — SODIUM CHLORIDE 500 ML: 9 INJECTION, SOLUTION INTRAVENOUS at 13:33

## 2024-10-29 RX ADMIN — INSULIN ASPART 1 UNITS: 100 INJECTION, SOLUTION INTRAVENOUS; SUBCUTANEOUS at 12:40

## 2024-10-29 RX ADMIN — INSULIN GLARGINE 50 UNITS: 100 INJECTION, SOLUTION SUBCUTANEOUS at 08:51

## 2024-10-29 RX ADMIN — POLYETHYLENE GLYCOL 3350 17 G: 17 POWDER, FOR SOLUTION ORAL at 08:45

## 2024-10-29 RX ADMIN — INSULIN ASPART 1 UNITS: 100 INJECTION, SOLUTION INTRAVENOUS; SUBCUTANEOUS at 17:30

## 2024-10-29 RX ADMIN — SENNOSIDES AND DOCUSATE SODIUM 1 TABLET: 8.6; 5 TABLET ORAL at 08:45

## 2024-10-29 RX ADMIN — PIPERACILLIN AND TAZOBACTAM 3.38 G: 3; .375 INJECTION, POWDER, FOR SOLUTION INTRAVENOUS at 08:48

## 2024-10-29 RX ADMIN — OXYCODONE HYDROCHLORIDE 10 MG: 5 TABLET ORAL at 08:45

## 2024-10-29 RX ADMIN — SENNOSIDES AND DOCUSATE SODIUM 1 TABLET: 8.6; 5 TABLET ORAL at 20:41

## 2024-10-29 RX ADMIN — EMPAGLIFLOZIN 10 MG: 10 TABLET, FILM COATED ORAL at 08:45

## 2024-10-29 RX ADMIN — INSULIN GLARGINE 45 UNITS: 100 INJECTION, SOLUTION SUBCUTANEOUS at 20:42

## 2024-10-29 RX ADMIN — ASPIRIN 81 MG: 81 TABLET, COATED ORAL at 08:45

## 2024-10-29 RX ADMIN — ROSUVASTATIN CALCIUM 40 MG: 40 TABLET, FILM COATED ORAL at 08:45

## 2024-10-29 NOTE — PROGRESS NOTES
[facility]    Infectious Disease Progress Note    Date of Service : 10/29/2024     Assessment & Plan   Janice Nowak is a 79 year old male who was admitted on 10/24/2024.       ASSESSMENT:  Right heel osteomyelitis- active issue  Diabetes hypertension hyperlipidemia hypothyroidism  Poorly controlled diabetes mellitus  Peripheral vascular disease  Biopsy result:  Final Diagnosis   CALCANEUS, RIGHT, BIOPSY:        1.  ACUTE OSTEOMYELITIS AND MARROW GRANULATION TISSUE        2.  PERIOSTEAL SOFT TISSUE WITH GANGRENOUS NECROSIS AND PERIOSTEAL ABSCESS       RECOMMENDATIONS:    Antibiotics daptomycin and Zosyn  Follow culture results   Focus/de-escalate antibiotics based on final culture results  Monitor CBC, CMP, CK and monitor for myalgia, will likely need to hold Lipitor while on daptomycin  Discussed with patient   OK to place PICC  Patient will need 6 weeks of IV antibiotics  ID will follow      Erica Nuñez MD  [facility]  [phone]    Photo from patient's chart      Interval History   Updated patient regarding biopsy results  Tired  Constipation      Physical Exam   Temp: 97.7  F (36.5  C) Temp src: Oral BP: 125/59 Pulse: 78   Resp: 18 SpO2: 94 % O2 Device: None (Room air)    Vitals:    10/24/24 0640   Weight: 101.4 kg (223 lb 8 oz)     Vital Signs with Ranges  Temp:  [97.7  F (36.5  C)] 97.7  F (36.5  C)  Pulse:  [72-78] 78  Resp:  [18] 18  BP: ()/(50-66) 125/59  SpO2:  [93 %-94 %] 94 %    Constitutional: Awake, no apparent distress, tired  Lungs: normal breathing pattern, no crackles or wheezing  Cardiovascular: Edema  Abdomen: non distended  Skin: warm, ecchymosis  Dressing on foot- wound vac  Neuro: deconditioned  Psych: able to answer questions        Medications   Current Facility-Administered Medications   Medication Dose Route Frequency Provider Last Rate Last Admin     Current Facility-Administered Medications   Medication Dose Route  Frequency Provider Last Rate Last Admin    amLODIPine (NORVASC) tablet 5 mg  5 mg Oral Daily Miguelito Burkett MD   5 mg at 10/29/24 0846    aspirin EC tablet 81 mg  81 mg Oral Daily Gondal, Saad J, MD   81 mg at 10/29/24 0845    clopidogrel (PLAVIX) tablet 75 mg  75 mg Oral Daily Gondal, Saad J, MD   75 mg at 10/29/24 0846    DAPTOmycin (CUBICIN) 500 mg in sodium chloride 0.9 % 100 mL intermittent infusion  6 mg/kg (Adjusted) Intravenous Q24H Ike St MD   500 mg at 10/29/24 1117    empagliflozin (JARDIANCE) tablet 10 mg  10 mg Oral Daily Gondal, Saad J, MD   10 mg at 10/29/24 0845    insulin aspart (NovoLOG) injection (RAPID ACTING)   Subcutaneous Daily with breakfast Derrick Escalona MD   4 Units at 10/29/24 0940    insulin aspart (NovoLOG) injection (RAPID ACTING)   Subcutaneous Daily with lunch Derrick Escalona MD   4 Units at 10/29/24 1240    insulin aspart (NovoLOG) injection (RAPID ACTING)   Subcutaneous Daily with supper Derrick Escalona MD   3 Units at 10/28/24 1845    insulin aspart (NovoLOG) injection (RAPID ACTING)  1-7 Units Subcutaneous TID AC Miguelito Burkett MD   1 Units at 10/29/24 1240    insulin aspart (NovoLOG) injection (RAPID ACTING)  1-5 Units Subcutaneous At Bedtime Miguelito Burkett MD   1 Units at 10/26/24 2123    insulin glargine (LANTUS PEN) injection 45 Units  45 Units Subcutaneous BID Simón Bain MD        irbesartan (AVAPRO) tablet 300 mg  300 mg Oral At Bedtime Miguelito Burkett MD   300 mg at 10/28/24 2054    lactobacillus rhamnosus (GG) (CULTURELL) capsule 1 capsule  1 capsule Oral Daily Gondal, Saad J, MD   1 capsule at 10/29/24 0845    levothyroxine (SYNTHROID/LEVOTHROID) tablet 50 mcg  50 mcg Oral Daily Miguelito Burkett MD   50 mcg at 10/29/24 0654    piperacillin-tazobactam (ZOSYN) 3.375 g vial to attach to  mL bag  3.375 g Intravenous Q8H Ike St MD   3.375 g at 10/29/24 1630    polyethylene glycol (MIRALAX) Packet 17 g  17 g Oral Daily Amol Patricio,  "DPM   17 g at 10/29/24 0845    rosuvastatin (CRESTOR) tablet 40 mg  40 mg Oral Daily Miguelito Burkett MD   40 mg at 10/29/24 0845    senna-docusate (SENOKOT-S/PERICOLACE) 8.6-50 MG per tablet 1 tablet  1 tablet Oral BID Amol Patricio, DPM   1 tablet at 10/29/24 0845    sodium chloride (PF) 0.9% PF flush 3 mL  3 mL Intracatheter Q8H Amol Patricio, DPM   3 mL at 10/29/24 1630    sodium chloride (PF) 0.9% PF flush 3 mL  3 mL Intracatheter Q8H Amol Patricio, DPM   3 mL at 10/29/24 1631    spironolactone (ALDACTONE) tablet 25 mg  25 mg Oral Daily Gondal, Saad J, MD   25 mg at 10/29/24 0846       Data   All microbiology laboratory data reviewed.  Recent Labs   Lab Test 10/29/24  0448 10/28/24  0520 10/27/24  0514   WBC 8.0 7.6 6.9   HGB 11.3* 10.7* 10.8*   HCT 34.9* 32.0* 32.6*   MCV 88 88 89   * 130* 118*     Recent Labs   Lab Test 10/29/24  0448 10/28/24  0520 10/27/24  0514   CR 1.31* 1.30* 1.48*     Recent Labs   Lab Test 10/07/24  1104   SED 57*     No lab results found.    Invalid input(s): \"UC\"    MICROBIOLOGY:    Reviewed    7-Day Micro Results       Collected Updated Procedure Result Status      10/24/2024 0854 10/27/2024 1154 Anaerobic Bacterial Culture Routine [[id number]]   (Abnormal)   Bone Resection from Foot, Right    Final result Component Value   Culture 2+ Mixed Anaerobic Organisms Present    1+ Bacteroides thetaiotaomicron    Susceptibilities not routinely done, refer to antibiogram to view typical susceptibility profiles               10/24/2024 0854 10/24/2024 1323 Gram Stain [[id number]]   Bone Resection from Foot, Right    Final result Component Value   GS Culture See corresponding culture for results   Gram Stain Result No organisms seen   Gram Stain Result No white blood cells seen            10/24/2024 0854 10/28/2024 0757 Bone Resection Aerobic Bacterial Culture Routine [[id number]]   (Abnormal)   Bone Resection from Foot, Right    Final result Component Value " "  Culture Isolated in broth only Staphylococcus cohnii    Susceptibilities not routinely done, refer to antibiogram to view typical susceptibility profiles                        RADIOLOGY:    Reviewed  POC US Guidance Needle Placement    Result Date: 10/24/2024  Ultrasound was performed as guidance to an anesthesia procedure.  Click \"PACS images\" hyperlink below to view any stored images.  For specific procedure details, view procedure note authored by anesthesia.    US Lower Extremity Arterial Duplex Bilateral    Result Date: 10/15/2024  Table formatting from the original result was not included. Arterial Duplex Ultrasound (Date: 10/15/24) Lower Extremity Artery Evaluation Indication: Surveillance Bilateral Leg Arterial: Decreased SONNY's. Left Leg Pain. Right Diabetic Heel Ulcer. Decreased pedal pulses. Previous: 2/6/2018 SONNY's History: Previous Smoker, Hypertension, Diabetic, Hyperlipidemia, PAD, and Vascular Ulcers Technique: Duplex imaging is performed utilizing gray-scale, two-dimensional images, and color-flow imaging. Doppler waveform analysis and spectral Doppler imaging is also performed. LOWER EXTREMITY ARTERIAL DUPLEX EXAM WITH WAVEFORMS Right Leg:(cm/s) Location: Velocities Waveforms EIA:   109  B CFA:   111  B PFA:   202  B SFA Proximal:   89 /  72 B SFA Mid:   75  T SFA Distal: 94   T Popliteal Artery:   76 /  72 B PTA:   105   M ALEX:   33  M DPA:   38  M Waveforms: T=Triphasic, M=Monophasic, B=Biphasic Left Leg:(cm/s) Location: Velocities Waveforms EIA:   53  M CFA:   38  M PFA:   48  M SFA Proximal:   42 / 26 M SFA Mid:   23  M SFA Distal:   27  M Popliteal Artery:   32 /15  M PTA:   18    M ALEX:   5  M DPA:   7  M Waveforms: T=Triphasic, M=Monophasic, B=Biphasic Impression: Right Lower Extremity: Patent vasculature to the popliteal artery with multiphasic waveforms.  Transition to monophasic flow in the infrapopliteal vessels.  No focal stenosis by velocity criteria. Left Lower Extremity: Monophasic " waveforms in the external iliac artery, suggesting iliac inflow disease.  The remaining vasculature is patent without focal stenosis. Reference: Category Normal 1-19% 20-49% 50-99% Occluded PSV <160 cm/sec without spectral broadening <160 cm/sec with spectral broadening Increased Increased Absent Flow Ratio N/A N/A < 2.0 >2.0 N/A Post-Stenotic Turbulence No No No Yes N/A      US Low Ext Arterial Dop Seg Pres w/o Exercise    Result Date: 10/15/2024  Table formatting from the original result was not included. BILATERAL RESTING ANKLE-BRACHIAL INDICES (SONNY'S) (Date: 10/15/24) Indication: Surveillance SONNY's: Left Leg Pain. Right Diabetic Heel Ulcer. Decreased pedal pulses. Previous: 2/6/2018 SONNY's History: Previous Smoker, Hypertension, Diabetic, Hyperlipidemia, PAD, and Vascular Ulcers  Resting SONNY's          Right: mmHg Index     Brachial: 167  Ankle-(PT): 127 0.74 Ankle-(DP): 124 0.73          Digit: 140 0.82               Left: mmHg Index     Brachial: 171  Ankle-(PT): >254 NC Ankle-(DP): 0 0          Digit: 25 0.15 Resting ankle-brachial index of 0.74 on the right. Toe Pressures of 140 mmHg and TBI of 0.82 Resting ankle-brachial index is non compressible  on the left. Toe Pressures of 25 mmHg and TBI of 0.15  VPR WAVEFORMS: The right volume plethysmography waveforms are mildly abnormal at the lower thigh level, mildly abnormal at the upper calf level and moderately abnormal at the ankle. The left volume plethysmography waveforms are severely abnormal at the lower thigh level, severely abnormal at the upper calf level and severely abnormal at the ankle.  Impression:  1. RIGHT LOWER EXTREMITY: SONNY is Abnormal with an SONNY of 0.74 indicating single level disease. Toe Pressures are Normal and adequate for wound healing with toe pressures of 140 mmHg. 2. LEFT LOWER EXTREMITY: SONNY is Non-diagnostic indicating vessel calcification. Toe Pressures are Abnormal and impaired for wound healing with toe pressures of 25 mmHg.  Reference: Wound classification Grade SONNY Ankle Systolic Pressure Toe Pressures 0 > 0.80 > 100 mmHg > 60 mmHg 1 0.6 - 0.79 70 - 100 mmHg 40 - 59 mmHg 2 0.4 - 0.59 50-70 mmHg 30 - 39 mmHg 3 < 0.39 < 50 mmHg < 30 mmHg Digit Pressures DBI Disease Category > 0.70 Normal < 0.70 Abnormal > 30 mmHg Potential wound healing < 30 mmHg Impaired wound healing Ankle Brachial Pressures SONNY Disease Category > 1.3  Likely vessel calcification with monophasic waveforms, non-diagnostic 0.95-1.30 Normal with multiphasic waveforms 0.50-0.95 Single level disease 0.30-0.50 Multilevel disease < 0.30 Critical limb ischema Volume Plethysmography Recording (VPR) at all levels Normal Sharp systolic peak, fast upstroke, prominent dicrotic notch in wave Mild Sharp systolic peak, fast upstroke, absent dicrotic notch in wave Moderate Flattened systolic peak, slowed upstroke, absent dicrotic notch inwave Severe amplitude wave with = upslope and down slope Occluded Flat Line      MR Foot Right w/o Contrast    Result Date: 10/8/2024  EXAM: MR FOOT RIGHT W/O CONTRAST LOCATION: [facility] DATE: 10/8/2024 INDICATION: Diabetic foot ulcer COMPARISON: Radiograph 10/07/2024 TECHNIQUE: Unenhanced. FINDINGS: TENDONS: -Peroneal: Peroneus longus and brevis tendons are intact. No tendinopathy or tenosynovitis. No subluxation. -Medial: Posterior tibialis tendon is intact. No tendinopathy or tenosynovitis. Flexor digitorum longus and flexor hallucis longus tendons are normal. No tenosynovitis. -Anterior: Anterior tibialis, extensor hallucis longus, and extensor digitorum longus tendons are normal. No tenosynovitis. -Achilles: Minimal Achilles tendinopathy. No tear or peritendinitis. LIGAMENTS: -Anterior talofibular ligament: Intact. -Calcaneofibular ligament: Intact. -Posterior talofibular ligament: Intact. -Syndesmotic inferior tibiofibular ligaments: Intact. -Deltoid ligament complex: Effacement of fat in the deltoid ligament could  represent prior sprain. The ligament remains intact. -Spring ligament complex: Intact. JOINTS AND BONES: -No fracture or contusion. -There is T2 hyperintense edema of the calcaneal tuberosity deep to the ulcer. There is mild linear T1 hypointense signal without confluent T1 hypointense replacement. -Severe talonavicular joint osteoarthritis with subchondral cystic change and remodeling of the navicular bone. SOFT TISSUES: -Plantar fascia: Chronic central band plantar fasciitis. No tear. -Sinus tarsi and tarsal tunnel: Normal. -Muscles: Subacute on chronic denervation atrophy of the visualized foot and distal leg musculature. -There is a soft tissue ulcer of the calcaneus with subjacent nonorganized subcutaneous edema. Edema abuts the calcaneal tuberosity. No organized/drainable collection.     IMPRESSION: 1.  Soft tissue ulcer of the heel with subcutaneous edema abutting the calcaneus compatible with cellulitis. No organized/drainable collection. There is T2 hyperintense marrow edema of the subjacent calcaneal tuberosity with mild linear T1 hypointense signal but no confluent T1 hypointense replacement. Given location subjacent to an ulcer, these findings are compatible with a high likelihood though are not definitive of osteomyelitis. 2.  Severe talonavicular joint osteoarthritis.    Foot  XR, G/E 3 views, right    Result Date: 10/7/2024  EXAM: XR FOOT RIGHT G/E 3 VIEWS LOCATION: [facility] DATE: 10/7/2024 INDICATION: diabetic foot ulcer right heel COMPARISON: None.     IMPRESSION: Deep soft tissue ulceration along the posterior aspect of the heel. No focal osseous erosions or periostitis to suggest osteomyelitis. If there is persistent clinical concern for osteomyelitis, MRI is more sensitive.  Large plantar calcaneal spur. Moderate midfoot degenerative change most evident at the talonavicular joint where there are prominent dorsal osteophytes. Vascular calcifications extending into the  toes. Bipartite medial hallux sesamoid.     Medical Decision Making       MANAGEMENT DISCUSSED with the following over the past 24 hours: patient   NOTE(S)/MEDICAL RECORDS REVIEWED over the past 24 hours: reviewed  Tests ORDERED & REVIEWED in the past 24 hours:  - See lab/imaging results included in the data section of the note  Medical complexity over the past 24 hours:  - Intensive monitoring for MEDICATION TOXICITY  - PICC order

## 2024-10-29 NOTE — PLAN OF CARE
Problem: Adult Inpatient Plan of Care  Goal: Plan of Care Review  Description: The Plan of Care Review/Shift note should be completed every shift.  The Outcome Evaluation is a brief statement about your assessment that the patient is improving, declining, or no change.  This information will be displayed automatically on your shift  note.  Outcome: Progressing     Problem: Pain Acute  Goal: Optimal Pain Control and Function  Intervention: Prevent or Manage Pain  Recent Flowsheet Documentation  Taken 10/29/2024 0845 by Carie Moreno RN  Sensory Stimulation Regulation: care clustered  Medication Review/Management: medications reviewed     Problem: Mobility Impairment  Goal: Optimal Mobility  Outcome: Not Progressing  Intervention: Optimize Mobility  Recent Flowsheet Documentation  Taken 10/29/2024 0845 by Carie Moreno RN  Positioning/Transfer Devices:   pillows   in use     Problem: Anemia  Goal: Anemia Symptom Improvement  Outcome: Not Progressing  Intervention: Monitor and Manage Anemia  Recent Flowsheet Documentation  Taken 10/29/2024 0845 by Carie Moreno RN  Safety Promotion/Fall Prevention:   activity supervised   assistive device/personal items within reach   mobility aid in reach   nonskid shoes/slippers when out of bed   patient and family education     Problem: Comorbidity Management  Goal: Blood Glucose Levels Within Targeted Range  Outcome: Progressing  Intervention: Monitor and Manage Glycemia  Recent Flowsheet Documentation  Taken 10/29/2024 0845 by Carie Moreno RN  Medication Review/Management: medications reviewed    Patient is alert and orientated. Premedicated patient for wound vac change this AM with 10 mg of oxycodone, with desired effect. Attempted to  get out of bed with PT, became dizzy and lightheaded. BP=99/50, provider ordered a 500 ml bolus. Upon reassessment ( approximately 1 hour post administration) BP = 99/50. Another 500 ml bolus ordered. Blood sugar this  AM was 73, and 145 at lunch time. Will continue to monitor blood pressure. Also patient has not had a bowel movement yet.     Carie Moreno RN

## 2024-10-29 NOTE — PROGRESS NOTES
[facility]    Progress Note - Hospitalist Service       Date of Admission:  10/24/2024    Assessment & Plan   Janice Nowak is a 79 year old male with a past medical history of insulin-dependent type 2 diabetes mellitus presented to the hospital for incision and drainage of right heel, partial calcanectomy, sharp excisional debridement ulceration right heel into the bone for acute osteomyelitis of the calcaneus of the right foot and ulceration of the right heel into the bone and is s/p procedure. Hospitalist team initially consulted for medical co management and transferred care to our team 10/25.      Acute osteomyelitis of the right calcaneus  Ulceration right heel into the bone  Patient presented to the hospital for incision and drainage of right heel, partial calcanectomy, sharp excisional debridement ulceration right heel into the bone for acute osteomyelitis of the calcaneus of the right foot and ulceration of the right heel into the bone and is s/p R heel I&D 10/24.  - Continue with pain management as per protocol  - Orthopedics following, continue to follow recommendations  - Anaerobic and aerobic cultures along with Gram stain pending  - Surgical pathology report pending  - ID has been consulted, appreciate recs        -Continue Daptomycin 10/25        -Continue Zoysn 10/25  - WOC consult    Episode of orthostatic hypotension   10/29 felt lightheaded after transferring from chair to bed, BP soft on check. Still continued to felt slightly dizzy. Will give bolus and recheck.   - 500 ml NS bolus    CKD on CAROLINE  Baseline creatinine around 1, although has few cr 1.3-1.4. GFR 48-56 admitted with 1.5 and now downtrending, will continue to monitor.   - Encourage po intake  - Daily BMP     Chronic normocytic anemia and thrombocytopenia  Hemoglobin stable 10.8 and close to baseline on admission.  Will continue to monitor closely.  - daily CBC     History of hypertension with high  blood pressure  BP remained stable, will continue PTA antihypertensive and monitor closely.  - Continue with amlodipine, irbesartan, Aldactone     History of insulin-dependent type 2 diabetes mellitus with hyperglycemia  T2DM poorly controlled, A1c 9.2. BG stable one day post admission, will continue current regimen and adjust as needed.  Sugars elevated in the afternoon and evening will add carb correction and adjust as needed. Sugars more control with the addition of carb count.  -Continue with sliding scale  - Carb coverage 1:10  - Holding metformin for now  - Hypoglycemia protocol  - Continue with Jardiance  - lantus 50U BID  As per pharmacy patient has been prescribed 50 twice daily of Lantus and 20 3 times daily with meals of NovoLog, discussed with the patient and he stated that he takes 100 daily of Lantus and 25 units of NovoLog at bedtime, he also takes 20 to 25 units 3 times daily with meals based on blood sugar levels, his sugar levels were running in the lower side so held nighttime dose of NovoLog, placed on sliding scale and switched to 50 twice daily of Lantus for now.       History of hyperlipidemia  Continue with aspirin, Plavix, rosuvastatin     History of hypothyroidism  Continue with levothyroxine     DVT PPx  Intermittent pneumatic compression     CODE STATUS  DNR/DNI as per discussion of Dr. Gondal with the patient, as per patient he does not want any aggressive intervention and wants natural death in case his heart stops and he stated to let him pass away in peace in case his heart stops.          Diet: Low Consistent Carb (45 g CHO per Meal) Diet    DVT Prophylaxis: Pneumatic Compression Devices  Reyes Catheter: Not present  Fluids: po  Lines: None     Cardiac Monitoring: None  Code Status: No CPR- Do NOT Intubate      Clinically Significant Risk Factors                   # Hypertension: Noted on problem list          # DMII: A1C = 9.2 % (Ref range: <5.7 %) within past 6 months   # Obesity:  "Estimated body mass index is 33.98 kg/m  as calculated from the following:    Height as of this encounter: 1.727 m (5' 8\").    Weight as of this encounter: 101.4 kg (223 lb 8 oz).        # Financial/Environmental Concerns: none         Disposition Plan      Expected Discharge Date: 10/29/2024      Destination: nursing home  Discharge Comments: wound vac        The patient's care was discussed with the Attending Physician, Dr. Marrufo .    Derrick Escalona MD  Hospitalist Service  [facility]  Securely message with [employer] (more info)  Text page via [facility] Paging/Directory   ______________________________________________________________________    Interval History   No acute events overnight. This morning feels lightheaded after transferring from chair. Still feels lightheaded in bed, but improving. Will give a bolus of fluid. No sob or chest pain.         Physical Exam   Vital Signs: Temp: 97.7  F (36.5  C) Temp src: Oral BP: 99/50 Pulse: 74   Resp: 18 SpO2: 93 % O2 Device: None (Room air)    Weight: 223 lbs 8 oz    General Appearance: Alert and oriented, NAD  Respiratory: normal work of breathing, clear breath sounds, no wheezing  Cardiovascular: normal S1. S2, no murmur  GI: soft, non tender, no guarding.   Skin: normal apperance and turgor.  MSK: R foot with dressing intact no visible drainage.     Medical Decision Making             Data     I have personally reviewed the following data over the past 24 hrs:    8.0  \   11.3 (L)   / 137 (L)     141 105 27.8 (H) /  145 (H)   3.8 25 1.31 (H) \       Imaging results reviewed over the past 24 hrs:   No results found for this or any previous visit (from the past 24 hours).  "

## 2024-10-29 NOTE — PLAN OF CARE
Problem: Mobility Impairment  Goal: Optimal Mobility  Outcome: Progressing     Problem: Activity Intolerance  Goal: Enhanced Capacity and Energy  Outcome: Progressing     Problem: Infection  Goal: Absence of Infection Signs and Symptoms  Intervention: Prevent or Manage Infection  Recent Flowsheet Documentation  Taken 10/28/2024 1700 by Marcio Aviles RN  Isolation Precautions: contact precautions maintained   Goal Outcome Evaluation:         Patient A and O times 4. Wound vac patent and running at 125. Patient blood sugars 158 at dinner and 185 at HS. Patient on carb count along with sliding scale, insulin given as ordered.   No complaints of pain this shift.

## 2024-10-29 NOTE — PROGRESS NOTES
[facility] Vascular Access    Vascular Access consult for PICC insertion noted in EPIC. The Department's cut-off time of service is 1600, Floor staff was advised to call PICCstat or PICC insertion will be completed tomorrow by in house PICC team. Primary RN agreed to the plan of action.

## 2024-10-29 NOTE — PROGRESS NOTES
[facility] Nurse Inpatient Assessment     Consulted for: R heel    Summary: VAC placement    Patient History (according to provider note(s):    Preoperative diagnosis:   1. Acute osteomyelitis calcaneus right  2. Ulceration right heel into bone     Postoperative diagnosis: Same     Procedure:   1.  Incision and drainage right heel   2.  Partial calcanectomy right foot  3.  Sharp, excisional Debridement ulceration right heel into bone       Assessment:    Negative pressure wound therapy applied to: R heel         Last photo: 10/29  Wound due to: Surgical Wound   Wound history/plan of care:    Surgical date: 10/24/24   Service following: DPM  Date Negative Pressure Wound Therapy initiated: 10/24/24   Interventions in place: offloading and elevation  Is patient s nutritional status compromised? no   If yes, what interventions are in place? N/A  Reason for initiating vac therapy? Presence of co-morbidities, High risk of infections, and Need for accelerated granulation tissue  Which?of?the?following?co-morbidities?apply? Diabetes, Immobility, and Obesity  If diabetic is patient on a diabetic management program? Yes   Is osteomyelitis present in wound? yes   If yes what treatments are in place? IV antibiotic Zosyn    Wound base: 70% Bone, 30%  viable tissue with cauterized areas       Palpation of the wound bed: firm       Drainage: scant      Volume in cannister: <100 ml     Last cannister change date: 10/25/24     Description of drainage: bloody      Measurements (length x width x depth, in cm) 4.5 cm  x 5 cm  x  0.5 cm       Tunneling N/A      Undermining N/A   Periwound skin: Intact and Macerated       Color: normal and consistent with surrounding tissue       Temperature: normal    Odor: none   Pain: denies , sharp when touching wound at 6 o'clock closest to end of heel  Pain intervention prior to dressing change: patient tolerated well and slow and gentle cares   Treatment goal: Heal ,  Maintain (prevention of deterioration), and Protection  STATUS: stable   Supplies ordered: ordered foam    Number of foam pieces removed from a wound (excluding foam for bridge) :  1 GranuFoam Black   Verified this matched the number of foam pieces applied last dressing change: yes  Number of foam pieces packed into wound (excluding foam for bridge) :  1 piece plus bridge Poonamm Black       Treatment Plan:   Wound location: R heel  Change Days: Tues/Friday  Supplies: Small black foam  Cleanse with: Vashe, pat dry.  Suction: Continuous at -125 mmHg pressure.    Back up plan: If VAC malfunctions or unable to maintain seal: VAC dressing must be removed and reapplied within 2 hours of the incident. If floor staff is not able to reapply, place NS moistened gauze in wound bed and cover with appropriate dressing to keep wound bed moist.   Change wet-to-dry dressing BID and notify WOC staff for reimplementation of VAC therapy when available.  Date canister. Chart canister output every shift.    The hospital VAC pump is not to be discharged with the patient.  Please do one of the following prior to discharge:  If a home VAC pump has been delivered, please apply the home pump to the dressing prior to patient discharge.    If the patient is discharging to LTAC or a TCU/SNF and there is a VAC pump waiting for the patient, close clamp and then disconnect the tubing for transfer, place tubing inside a glove.   If the patient is discharging to home or other facility and there is no VAC pump available for immediate placement, remove the VAC dressing and apply a wet-to-moist gauze dressing for transfer.    Orders: Reviewed and Updated    RECOMMEND PRIMARY TEAM ORDER: None, at this time  Education provided: plan of care  Discussed plan of care with: Patient and Nurse  WOC nurse follow-up plan: Tuesday/Friday  Notify WOC if wound(s) deteriorate.  Nursing to notify the Provider(s) and re-consult the WOC Nurse if new skin  concern.    DATA:     Current support surface: Standard  Standard gel mattress (Isoflex)  Containment of urine/stool: Continent of bladder and Continent of bowel  BMI: Body mass index is 33.98 kg/m .   Active diet order: Orders Placed This Encounter      Low Consistent Carb (45 g CHO per Meal) Diet     Output: I/O last 3 completed shifts:  In: 640 [P.O.:640]  Out: -      Labs:   Recent Labs   Lab 10/29/24  0448 10/25/24  0632 10/24/24  1841   HGB 11.3*   < > 10.8*   WBC 8.0   < > 8.1   A1C  --   --  9.2*    < > = values in this interval not displayed.     Pressure injury risk assessment:   Sensory Perception: 3-->slightly limited  Moisture: 4-->rarely moist  Activity: 2-->chairfast  Mobility: 2-->very limited  Nutrition: 3-->adequate  Friction and Shear: 3-->no apparent problem  Ramón Score: 17    SWATHI Berrios RN CWOCN  Pager no longer in use, please contact through [employer] group: [facility]

## 2024-10-29 NOTE — PROGRESS NOTES
OT Eval     10/29/24 1000   Appointment Info   Signing Clinician's Name / Credentials (OT) Anahi Tsai OTR/L   Rehab Comments (OT) Monitor BP, pt gets dizzy at times   Living Environment   People in Home alone   Current Living Arrangements independent living facility   Home Accessibility no concerns   Transportation Anticipated family or friend will provide   Living Environment Comments WIS, shower chair, GBs   Self-Care   Usual Activity Tolerance moderate   Current Activity Tolerance fair   Equipment Currently Used at Home cane, straight;walker, rolling;wheelchair, manual;shower chair   Activity/Exercise/Self-Care Comment IND ADLs, uses cane at BL, plans to use w/c at home during recovery   Instrumental Activities of Daily Living (IADL)   IADL Comments A from son shopping, transport   General Information   Onset of Illness/Injury or Date of Surgery 10/24/24   Referring Physician Ethan Kingsley MD   Additional Occupational Profile Info/Pertinent History of Current Problem Acute osteomyelitis of the right calcaneus  Ulceration right heel into the bone  Patient presented to the hospital for incision and drainage of right heel, partial calcanectomy, sharp excisional debridement ulceration right heel into the bone for acute osteomyelitis of the calcaneus of the right foot and ulceration of the right heel into the bone and is s/p R heel I&D 10/24.   Existing Precautions/Restrictions fall;weight bearing   Right Lower Extremity (Weight-bearing Status) non weight-bearing (NWB)   General Observations and Info PRAFO, wound vac RLE   Cognitive Status Examination   Orientation Status orientation to person, place and time   Affect/Mental Status (Cognitive) WFL   Range of Motion Comprehensive   General Range of Motion bilateral upper extremity ROM WFL   Strength Comprehensive (MMT)   Comment, General Manual Muscle Testing (MMT) Assessment Generalized weakness   Muscle Tone Assessment   Muscle Tone Quick Adds No  deficits were identified   Coordination   Upper Extremity Coordination No deficits were identified   Bed Mobility   Bed Mobility supine-sit;sit-supine   Supine-Sit [place] (Bed Mobility) supervision   Sit-Supine [place] (Bed Mobility) moderate assist (50% patient effort)   Assistive Device (Bed Mobility) bed rails   Transfers   Transfers sit-stand transfer   Sit-Stand Transfer   Sit-Stand [place] (Transfers) minimum assist (75% patient effort);2 person assist   Assistive Device (Sit-Stand Transfers) walker, front-wheeled   Sit/Stand Transfer Comments Min Ax2   Balance   Balance Comments CGA sitting unsupported, Min A static standing   Activities of Daily Living   BADL Assessment/Intervention lower body dressing;toileting   Lower Body Dressing Assessment/Training   Comment, (Lower Body Dressing) Per clinical reasoning, pt would need Ax1 at this time, to thread LB clothing, Ax2 to stand to pull up pants   Grooming Assessment/Training   Comment, (Grooming) Per clinical reasoning, pt likely SBA/setup   Toileting   Comment, (Toileting) Per clinical reasoning, Mod Ax2 SPT to commode at this time w/ FWW, likely A w/ hygiene /clothing   Clinical Impression   Criteria for Skilled Therapeutic Interventions Met (OT) Yes, treatment indicated   OT Diagnosis Impaired ADLs, IADLs, functional mobility   Influenced by the following impairments NWB status, pain, deconditioning   OT Problem List-Impairments impacting ADL problems related to;activity tolerance impaired;balance;mobility;strength;post-surgical precautions   Assessment of Occupational Performance 3-5 Performance Deficits   Identified Performance Deficits Dressing, toileting, functional transfers and mobility, bathing   Planned Therapy Interventions (OT) ADL retraining;IADL retraining;transfer training;home program guidelines;progressive activity/exercise;risk factor education   Clinical Decision Making Complexity (OT) problem focused assessment/low  complexity   Risk & Benefits of therapy have been explained evaluation/treatment results reviewed;care plan/treatment goals reviewed;patient   OT Total Evaluation Time   OT Eval, Low Complexity Minutes (69499) 10   OT Goals   Therapy Frequency (OT) 5 times/week   OT Predicted Duration/Target Date for Goal Attainment 11/05/24   OT: Lower Body Dressing Minimal assist;using adaptive equipment;within precautions   OT: Toilet Transfer/Toileting Supervision/stand-by assist;toilet transfer;cleaning and garment management;using adaptive equipment;within precautions   Self-Care/Home Management   Self-Care/Home Mgmt/ADL, Compensatory, Meal Prep Minutes (16439) 15   Symptoms Noted During/After Treatment (Meal Preparation/Planning Training) fatigue;significant change in vital signs   Treatment Detail/Skilled Intervention Eval complete, treatment indicated. Additional unsupported sitting EOB SBA ~10 min total, education provided on WB precautions, OT facilitating discussion on POC, education on benefits of OOB activity for strengthening and function. Additional STS from raised EOB Min Ax2 w/ FWW, static stand ~60 sec, Max VCs for WB precautions, pt having difficulty lifting RLE off ground. A to manage lines. Cues for safe hand positioning during transfers, pt returned to sitting EOB, reporting dizziness. Mod A to support UB as pt reporting fatigue/dizziness, Mod Ax2 sit>supine, RN notified and BP monitored. Pt reporting improvement in symptoms once supine, session ended w/ alarm on, call button near.   OT Discharge Planning   OT Plan Progress standing tolerance, re-educate precautions, monitor BP, functional SPTs, LB dressing w/ AE, toileting   OT Discharge Recommendation (DC Rec) Transitional Care Facility   OT Rationale for DC Rec Pt typically IND, now requiring Ax2 and not tolerating transfers. Would benefit from OT in TCCU setting to progress IND ADLs, functional mobility   OT Brief overview of current status Min Ax2 STS FWW    Total Session Time   Timed Code Treatment Minutes 15   Total Session Time (sum of timed and untimed services) 25

## 2024-10-29 NOTE — PLAN OF CARE
Problem: Adult Inpatient Plan of Care  Goal: Absence of Hospital-Acquired Illness or Injury  Outcome: Progressing  Intervention: Identify and Manage Fall Risk  Recent Flowsheet Documentation  Taken 10/29/2024 0130 by Summer Pendleton RN  Safety Promotion/Fall Prevention:   activity supervised   assistive device/personal items within reach   lighting adjusted   nonskid shoes/slippers when out of bed  Intervention: Prevent Skin Injury  Recent Flowsheet Documentation  Taken 10/29/2024 0523 by Summer Pendleton RN  Body Position: position changed independently  Taken 10/29/2024 0323 by Summer Pendleton RN  Body Position: position changed independently  Taken 10/29/2024 0123 by Summer Pendleton RN  Body Position: supine, head elevated  Taken 10/28/2024 2323 by Summer Pendleton RN  Body Position: supine, head elevated  Intervention: Prevent and Manage VTE (Venous Thromboembolism) Risk  Recent Flowsheet Documentation  Taken 10/29/2024 0130 by Summer Pendleton RN  VTE Prevention/Management: SCDs off (sequential compression devices)  Intervention: Prevent Infection  Recent Flowsheet Documentation  Taken 10/29/2024 0130 by Summer Pendleton RN  Infection Prevention:   hand hygiene promoted   rest/sleep promoted     Problem: Mobility Impairment  Goal: Optimal Mobility  Outcome: Progressing  Intervention: Optimize Mobility  Recent Flowsheet Documentation  Taken 10/29/2024 0523 by Summer Pendleton RN  Positioning/Transfer Devices:   pillows   in use  Taken 10/29/2024 0323 by Summer Pendleton RN  Positioning/Transfer Devices:   pillows   in use  Taken 10/29/2024 0123 by Summer Pendleton RN  Positioning/Transfer Devices:   pillows   in use  Taken 10/28/2024 2323 by Summer Pendleton RN  Positioning/Transfer Devices:   pillows   in use     Problem: Activity Intolerance  Goal: Enhanced Capacity and Energy  Outcome: Progressing     Problem: Infection  Goal: Absence of Infection Signs and Symptoms  Outcome: Progressing     Problem: Acute Kidney Injury/Impairment  Goal: Fluid and Electrolyte  Balance  Outcome: Progressing     Problem: Anemia  Goal: Anemia Symptom Improvement  Outcome: Progressing  Intervention: Monitor and Manage Anemia  Recent Flowsheet Documentation  Taken 10/29/2024 0130 by Summer Pendleton RN  Safety Promotion/Fall Prevention:   activity supervised   assistive device/personal items within reach   lighting adjusted   nonskid shoes/slippers when out of bed     Problem: Comorbidity Management  Goal: Blood Glucose Levels Within Targeted Range  Outcome: Progressing  Intervention: Monitor and Manage Glycemia  Recent Flowsheet Documentation  Taken 10/29/2024 0130 by Summer Pendleton RN  Medication Review/Management: medications reviewed   Goal Outcome Evaluation:  Pt is A&Ox4. Urinal at bedside. Wound Vac in place. Dressing on RLE C/D/I. Denies pain. NWB RLE. On IV zosyn. Awaiting for TCU placement.

## 2024-10-30 ENCOUNTER — APPOINTMENT (OUTPATIENT)
Dept: OCCUPATIONAL THERAPY | Facility: HOSPITAL | Age: 79
DRG: 629 | End: 2024-10-30
Attending: PODIATRIST
Payer: COMMERCIAL

## 2024-10-30 LAB
ANION GAP SERPL CALCULATED.3IONS-SCNC: 10 MMOL/L (ref 7–15)
BASOPHILS # BLD AUTO: 0.1 10E3/UL (ref 0–0.2)
BASOPHILS NFR BLD AUTO: 1 %
BUN SERPL-MCNC: 28 MG/DL (ref 8–23)
CALCIUM SERPL-MCNC: 8.4 MG/DL (ref 8.8–10.4)
CHLORIDE SERPL-SCNC: 108 MMOL/L (ref 98–107)
CREAT SERPL-MCNC: 1.3 MG/DL (ref 0.67–1.17)
EGFRCR SERPLBLD CKD-EPI 2021: 56 ML/MIN/1.73M2
EOSINOPHIL # BLD AUTO: 0.2 10E3/UL (ref 0–0.7)
EOSINOPHIL NFR BLD AUTO: 2 %
ERYTHROCYTE [DISTWIDTH] IN BLOOD BY AUTOMATED COUNT: 15.6 % (ref 10–15)
GLUCOSE BLDC GLUCOMTR-MCNC: 113 MG/DL (ref 70–99)
GLUCOSE BLDC GLUCOMTR-MCNC: 113 MG/DL (ref 70–99)
GLUCOSE BLDC GLUCOMTR-MCNC: 115 MG/DL (ref 70–99)
GLUCOSE BLDC GLUCOMTR-MCNC: 122 MG/DL (ref 70–99)
GLUCOSE BLDC GLUCOMTR-MCNC: 61 MG/DL (ref 70–99)
GLUCOSE BLDC GLUCOMTR-MCNC: 69 MG/DL (ref 70–99)
GLUCOSE BLDC GLUCOMTR-MCNC: 77 MG/DL (ref 70–99)
GLUCOSE BLDC GLUCOMTR-MCNC: 83 MG/DL (ref 70–99)
GLUCOSE BLDC GLUCOMTR-MCNC: 87 MG/DL (ref 70–99)
GLUCOSE SERPL-MCNC: 72 MG/DL (ref 70–99)
HCO3 SERPL-SCNC: 23 MMOL/L (ref 22–29)
HCT VFR BLD AUTO: 32.4 % (ref 40–53)
HGB BLD-MCNC: 10.5 G/DL (ref 13.3–17.7)
IMM GRANULOCYTES # BLD: 0.1 10E3/UL
IMM GRANULOCYTES NFR BLD: 2 %
LYMPHOCYTES # BLD AUTO: 1.5 10E3/UL (ref 0.8–5.3)
LYMPHOCYTES NFR BLD AUTO: 16 %
MCH RBC QN AUTO: 28.6 PG (ref 26.5–33)
MCHC RBC AUTO-ENTMCNC: 32.4 G/DL (ref 31.5–36.5)
MCV RBC AUTO: 88 FL (ref 78–100)
MONOCYTES # BLD AUTO: 0.7 10E3/UL (ref 0–1.3)
MONOCYTES NFR BLD AUTO: 8 %
NEUTROPHILS # BLD AUTO: 6.5 10E3/UL (ref 1.6–8.3)
NEUTROPHILS NFR BLD AUTO: 72 %
NRBC # BLD AUTO: 0 10E3/UL
NRBC BLD AUTO-RTO: 0 /100
PLATELET # BLD AUTO: 122 10E3/UL (ref 150–450)
POTASSIUM SERPL-SCNC: 3.7 MMOL/L (ref 3.4–5.3)
RBC # BLD AUTO: 3.67 10E6/UL (ref 4.4–5.9)
SODIUM SERPL-SCNC: 141 MMOL/L (ref 135–145)
WBC # BLD AUTO: 9 10E3/UL (ref 4–11)

## 2024-10-30 PROCEDURE — 97535 SELF CARE MNGMENT TRAINING: CPT | Mod: GO

## 2024-10-30 PROCEDURE — 36415 COLL VENOUS BLD VENIPUNCTURE: CPT

## 2024-10-30 PROCEDURE — 250N000013 HC RX MED GY IP 250 OP 250 PS 637: Performed by: STUDENT IN AN ORGANIZED HEALTH CARE EDUCATION/TRAINING PROGRAM

## 2024-10-30 PROCEDURE — 99232 SBSQ HOSP IP/OBS MODERATE 35: CPT | Mod: 24 | Performed by: FAMILY MEDICINE

## 2024-10-30 PROCEDURE — 85025 COMPLETE CBC W/AUTO DIFF WBC: CPT

## 2024-10-30 PROCEDURE — 250N000009 HC RX 250: Performed by: INTERNAL MEDICINE

## 2024-10-30 PROCEDURE — 258N000003 HC RX IP 258 OP 636: Performed by: INTERNAL MEDICINE

## 2024-10-30 PROCEDURE — 80048 BASIC METABOLIC PNL TOTAL CA: CPT

## 2024-10-30 PROCEDURE — 99232 SBSQ HOSP IP/OBS MODERATE 35: CPT | Performed by: INTERNAL MEDICINE

## 2024-10-30 PROCEDURE — 272N000450 HC KIT 4FR POWER PICC SINGLE LUMEN

## 2024-10-30 PROCEDURE — 250N000013 HC RX MED GY IP 250 OP 250 PS 637: Performed by: PODIATRIST

## 2024-10-30 PROCEDURE — 272N000277 HC DEVICE 4FR SECURACATH

## 2024-10-30 PROCEDURE — 250N000012 HC RX MED GY IP 250 OP 636 PS 637: Performed by: FAMILY MEDICINE

## 2024-10-30 PROCEDURE — 36569 INSJ PICC 5 YR+ W/O IMAGING: CPT

## 2024-10-30 PROCEDURE — 250N000011 HC RX IP 250 OP 636: Performed by: INTERNAL MEDICINE

## 2024-10-30 PROCEDURE — 250N000013 HC RX MED GY IP 250 OP 250 PS 637: Performed by: INTERNAL MEDICINE

## 2024-10-30 PROCEDURE — 120N000001 HC R&B MED SURG/OB

## 2024-10-30 RX ORDER — PIPERACILLIN SODIUM, TAZOBACTAM SODIUM 36; 4.5 G/152ML; G/152ML
3.38 INJECTION, POWDER, LYOPHILIZED, FOR SOLUTION INTRAVENOUS EVERY 8 HOURS
Status: SHIPPED
Start: 2024-10-31 | End: 2024-12-06

## 2024-10-30 RX ORDER — POLYETHYLENE GLYCOL 3350 17 G/17G
17 POWDER, FOR SOLUTION ORAL 2 TIMES DAILY
Status: DISCONTINUED | OUTPATIENT
Start: 2024-10-30 | End: 2024-11-01 | Stop reason: HOSPADM

## 2024-10-30 RX ADMIN — PIPERACILLIN AND TAZOBACTAM 3.38 G: 3; .375 INJECTION, POWDER, FOR SOLUTION INTRAVENOUS at 00:56

## 2024-10-30 RX ADMIN — POLYETHYLENE GLYCOL 3350 17 G: 17 POWDER, FOR SOLUTION ORAL at 08:57

## 2024-10-30 RX ADMIN — LEVOTHYROXINE SODIUM 50 MCG: 0.03 TABLET ORAL at 06:43

## 2024-10-30 RX ADMIN — SPIRONOLACTONE 25 MG: 25 TABLET ORAL at 07:57

## 2024-10-30 RX ADMIN — MAGNESIUM HYDROXIDE 30 ML: 400 SUSPENSION ORAL at 08:55

## 2024-10-30 RX ADMIN — Medication 1 CAPSULE: at 08:56

## 2024-10-30 RX ADMIN — PIPERACILLIN AND TAZOBACTAM 3.38 G: 3; .375 INJECTION, POWDER, FOR SOLUTION INTRAVENOUS at 16:27

## 2024-10-30 RX ADMIN — AMLODIPINE BESYLATE 5 MG: 5 TABLET ORAL at 07:57

## 2024-10-30 RX ADMIN — ACETAMINOPHEN 650 MG: 325 TABLET ORAL at 00:54

## 2024-10-30 RX ADMIN — OXYCODONE HYDROCHLORIDE 10 MG: 5 TABLET ORAL at 14:51

## 2024-10-30 RX ADMIN — LIDOCAINE HYDROCHLORIDE 2 ML: 10 INJECTION, SOLUTION EPIDURAL; INFILTRATION; INTRACAUDAL; PERINEURAL at 10:11

## 2024-10-30 RX ADMIN — INSULIN GLARGINE 45 UNITS: 100 INJECTION, SOLUTION SUBCUTANEOUS at 09:09

## 2024-10-30 RX ADMIN — EMPAGLIFLOZIN 10 MG: 10 TABLET, FILM COATED ORAL at 08:56

## 2024-10-30 RX ADMIN — IRBESARTAN 300 MG: 300 TABLET ORAL at 20:30

## 2024-10-30 RX ADMIN — ASPIRIN 81 MG: 81 TABLET, COATED ORAL at 08:58

## 2024-10-30 RX ADMIN — CLOPIDOGREL BISULFATE 75 MG: 75 TABLET ORAL at 08:56

## 2024-10-30 RX ADMIN — DAPTOMYCIN 500 MG: 500 INJECTION, POWDER, LYOPHILIZED, FOR SOLUTION INTRAVENOUS at 11:09

## 2024-10-30 RX ADMIN — PIPERACILLIN AND TAZOBACTAM 3.38 G: 3; .375 INJECTION, POWDER, FOR SOLUTION INTRAVENOUS at 07:59

## 2024-10-30 RX ADMIN — ROSUVASTATIN CALCIUM 40 MG: 40 TABLET, FILM COATED ORAL at 08:56

## 2024-10-30 RX ADMIN — SENNOSIDES AND DOCUSATE SODIUM 1 TABLET: 8.6; 5 TABLET ORAL at 08:56

## 2024-10-30 NOTE — CONSULTS
SPIRITUAL HEALTH SERVICES ([facility])  SPIRITUAL ASSESSMENT Progress Note  [facility]. Unit P[address]     REFERRAL SOURCE: Consult - Routine - LOS      Mr Nowak is having a better day than yesterday.  He was ambivalent regarding a [facility] visit and did not identify any emotional or spiritual concerns. He self-describes as Evangelical but unaffiliated with any community.     He pronounces his first name - Ker - knee.      PLAN: No plans to follow at this time.      Sandra Mays, Ph.D., [facility]      [facility] available 24/7 for emergency requests/referrals, either by having the on-call  paged or by entering an ASAP/STAT consult in Epic (this will also page the on-call ).

## 2024-10-30 NOTE — PLAN OF CARE
Goal Outcome Evaluation:                      Problem: Mobility Impairment  Goal: Optimal Mobility  Outcome: Progressing     Problem: Activity Intolerance  Goal: Enhanced Capacity and Energy  Intervention: Optimize Activity Tolerance  Recent Flowsheet Documentation  Taken 10/29/2024 1657 by Marcio Aviles RN  Environmental Support:   environmental consistency promoted   calm environment promoted     Problem: Infection  Goal: Absence of Infection Signs and Symptoms  Outcome: Progressing     Patient started shift feeling weak and dizzy, low bps on previous shift. Patient Bps more stable this shift, last one was 135/63. Patient felt better after dinner. No bowel movement this shift, senna taken, bowel sounds present. PICC scheduled for tomorrow, PICC and purpose explained to patient. Patient verbalized understanding.  at dinner and 223 at HS.

## 2024-10-30 NOTE — PLAN OF CARE
Problem: Adult Inpatient Plan of Care  Goal: Absence of Hospital-Acquired Illness or Injury  Outcome: Progressing  Intervention: Identify and Manage Fall Risk  Recent Flowsheet Documentation  Taken 10/30/2024 0052 by Summer Pendleton RN  Safety Promotion/Fall Prevention:   activity supervised   assistive device/personal items within reach   lighting adjusted   nonskid shoes/slippers when out of bed  Intervention: Prevent and Manage VTE (Venous Thromboembolism) Risk  Recent Flowsheet Documentation  Taken 10/30/2024 0052 by Summer Pendleton RN  VTE Prevention/Management: SCDs off (sequential compression devices)  Intervention: Prevent Infection  Recent Flowsheet Documentation  Taken 10/30/2024 0052 by Summer Pendleton RN  Infection Prevention:   hand hygiene promoted   rest/sleep promoted     Problem: Mobility Impairment  Goal: Optimal Mobility  Outcome: Progressing     Problem: Activity Intolerance  Goal: Enhanced Capacity and Energy  Outcome: Progressing     Problem: Infection  Goal: Absence of Infection Signs and Symptoms  Outcome: Progressing     Problem: Acute Kidney Injury/Impairment  Goal: Fluid and Electrolyte Balance  Outcome: Progressing     Problem: Comorbidity Management  Goal: Blood Glucose Levels Within Targeted Range  Outcome: Progressing  Intervention: Monitor and Manage Glycemia  Recent Flowsheet Documentation  Taken 10/30/2024 0052 by Summer Pendleton RN  Medication Review/Management: medications reviewed   Goal Outcome Evaluation:  Pt is A&Ox4. Urinal at bedside. Pt reports having a headache at the beginning of the shift, PRN Tylenol was given. Proved effective. Denies pain on shift. Low BG corrected with orange popsicle and orange juice. Wound vac in place. R Heel dressing C/D/I.

## 2024-10-30 NOTE — PLAN OF CARE
Problem: Adult Inpatient Plan of Care  Goal: Plan of Care Review  Description: The Plan of Care Review/Shift note should be completed every shift.  The Outcome Evaluation is a brief statement about your assessment that the patient is improving, declining, or no change.  This information will be displayed automatically on your shift  note.  Outcome: Progressing     Problem: Pain Acute  Goal: Optimal Pain Control and Function  Intervention: Prevent or Manage Pain  Recent Flowsheet Documentation  Taken 10/30/2024 0800 by Carie Moreno RN  Sensory Stimulation Regulation:   care clustered   quiet environment promoted  Bowel Elimination Promotion: (PRN Milk of Magnesia)   adequate fluid intake promoted   ambulation promoted   other (see comments)  Medication Review/Management: medications reviewed     Problem: Mobility Impairment  Goal: Optimal Mobility  Intervention: Optimize Mobility  Recent Flowsheet Documentation  Taken 10/30/2024 1332 by Carie Moreno RN  Assistive Device Utilized:   gait belt   walker  Activity Management: up to bedside commode  Taken 10/30/2024 1209 by Carie Moreno RN  Positioning/Transfer Devices:   pillows   in use  Taken 10/30/2024 0915 by Carie Moreno RN  Assistive Device Utilized:   gait belt   walker  Activity Management: back to bed  Positioning/Transfer Devices:   pillows   in use  Taken 10/30/2024 0806 by Carie Moreno RN  Assistive Device Utilized:   gait belt   walker  Activity Management: activity adjusted per tolerance  Positioning/Transfer Devices:   pillows   in use     Problem: Constipation  Goal: Effective Bowel Elimination  Outcome: Progressing    Patient is alert and orientated, able to make needs known. Blood pressure elevated this AM, administered scheduled antihypertensive medications early. Upon reassessment, BP improved. No complaints of dizziness reported this shift. Denied pain throughout shift. Administered PRN milk of magnesia this  AM. Positive results. Up with the assist of 1, gait belt and walker. Up in chair for breakfast and lunch. Appetite good. Blood sugars today improved from yesterday. Single lumen PICC placed in the right arm. Anticipated discharge tomorrow.   At 1450, patient notified writer that he is in pain, 7/10 to right lower extremity. Administered PRN oxycodone 10 mg.    Carie Moreno RN

## 2024-10-30 NOTE — PROGRESS NOTES
[facility]    Progress Note - Hospitalist Service       Date of Admission:  10/24/2024    Assessment & Plan   Janice Nowak is a 79 year old male with a past medical history of insulin-dependent type 2 diabetes mellitus presented to the hospital for incision and drainage of right heel, partial calcanectomy, sharp excisional debridement ulceration right heel into the bone for acute osteomyelitis of the calcaneus of the right foot and ulceration of the right heel into the bone and is s/p procedure. Hospitalist team initially consulted for medical co management and transferred care to our team 10/25.      Acute osteomyelitis of the right calcaneus  Ulceration right heel into the bone  Patient presented to the hospital for incision and drainage of right heel, partial calcanectomy, sharp excisional debridement ulceration right heel into the bone for acute osteomyelitis of the calcaneus of the right foot and ulceration of the right heel into the bone and is s/p R heel I&D 10/24.  - Continue with pain management as per protocol  - Orthopedics following, continue to follow recommendations  - Anaerobic and aerobic cultures along with Gram stain pending  - Surgical pathology report pending  - ID has been consulted, appreciate recs        -Continue Daptomycin 10/25        -Continue Zoysn 10/25        -PICC placed.   - WOC consult    Episode of orthostatic hypotension   10/29 felt lightheaded after transferring from chair to bed, BP soft on check. Still continued to felt slightly dizzy. Will give bolus and recheck.   - 500 ml NS bolus    CKD on CAROLINE  Baseline creatinine around 1, although has few cr 1.3-1.4. GFR 48-56 admitted with 1.5 and now downtrending, will continue to monitor.   - Encourage fluid intake  - Daily BMP     Chronic normocytic anemia and thrombocytopenia  Hemoglobin stable 10.8 and close to baseline on admission.  Will continue to monitor closely.  - daily CBC     History of  hypertension with high blood pressure  BP remained stable, will continue PTA antihypertensive and monitor closely.  - Continue with amlodipine, irbesartan, Aldactone     History of insulin-dependent type 2 diabetes mellitus with hyperglycemia  T2DM poorly controlled, A1c 9.2. BG stable one day post admission, will continue current regimen and adjust as needed.  Sugars elevated in the afternoon and evening will add carb correction and adjust as needed. Sugars more control with the addition of carb count. Borderline low in the morning will continue adjusting insulin  -Continue with sliding scale  - Carb coverage 1:10  - Holding metformin for now  - Hypoglycemia protocol  - Continue with Jardiance  - lantus 45 U AM, 40 U at bedtime.   As per pharmacy patient has been prescribed 50 twice daily of Lantus and 20 3 times daily with meals of NovoLog, discussed with the patient and he stated that he takes 100 daily of Lantus and 25 units of NovoLog at bedtime, he also takes 20 to 25 units 3 times daily with meals based on blood sugar levels, his sugar levels were running in the lower side so held nighttime dose of NovoLog, placed on sliding scale and switched to 50 twice daily of Lantus for now.       History of hyperlipidemia  Continue with aspirin, Plavix, rosuvastatin     History of hypothyroidism  Continue with levothyroxine     DVT PPx  Intermittent pneumatic compression     CODE STATUS  DNR/DNI as per discussion of Dr. Gondal with the patient, as per patient he does not want any aggressive intervention and wants natural death in case his heart stops and he stated to let him pass away in peace in case his heart stops.          Diet: Low Consistent Carb (45 g CHO per Meal) Diet    DVT Prophylaxis: Pneumatic Compression Devices  Reyes Catheter: Not present  Fluids: po  Lines: PRESENT      PICC 10/30/24 Single Lumen Right Basilic Long term antibiotic-Site Assessment: WDL    Cardiac Monitoring: None  Code Status: No CPR- Do  "NOT Intubate      Clinically Significant Risk Factors          # Hyperchloremia: Highest Cl = 108 mmol/L in last 2 days, will monitor as appropriate            # Thrombocytopenia: Lowest platelets = 122 in last 2 days, will monitor for bleeding   # Hypertension: Noted on problem list          # DMII: A1C = 9.2 % (Ref range: <5.7 %) within past 6 months   # Obesity: Estimated body mass index is 33.98 kg/m  as calculated from the following:    Height as of this encounter: 1.727 m (5' 8\").    Weight as of this encounter: 101.4 kg (223 lb 8 oz).        # Financial/Environmental Concerns: none         Disposition Plan      Expected Discharge Date: 10/30/2024      Destination: nursing home  Discharge Comments: wound vac        The patient's care was discussed with the Attending Physician, Dr. Marrufo .    Derrick Escalona MD  Hospitalist Service  [facility]  Securely message with [employer] (more info)  Text page via [employer] Paging/Directory   ______________________________________________________________________    Interval History   No acute events overnight. Reports dizziness/lightheadedness resolved since yesterday. Otherwise no sob or chest pain. Feels constipated for the past few days, no abdominal pain or nausea.       Physical Exam   Vital Signs: Temp: 97.8  F (36.6  C) Temp src: Oral BP: 134/62 Pulse: 70   Resp: 18 SpO2: 96 % O2 Device: None (Room air)    Weight: 223 lbs 8 oz    General Appearance: Alert and oriented, NAD  Respiratory: normal work of breathing, clear breath sounds, no wheezing  Cardiovascular: normal S1. S2, no murmur  GI: soft, non tender, no guarding.   Skin: normal apperance and turgor.  MSK: R foot with dressing intact no visible drainage.     Medical Decision Making             Data     I have personally reviewed the following data over the past 24 hrs:    9.0  \   10.5 (L)   / 122 (L)     141 108 (H) 28.0 (H) /  113 (H)   3.7 23 1.30 (H) \       Imaging results reviewed over " the past 24 hrs:   No results found for this or any previous visit (from the past 24 hours).

## 2024-10-31 ENCOUNTER — TELEPHONE (OUTPATIENT)
Dept: INFECTIOUS DISEASES | Facility: CLINIC | Age: 79
End: 2024-10-31

## 2024-10-31 ENCOUNTER — APPOINTMENT (OUTPATIENT)
Dept: OCCUPATIONAL THERAPY | Facility: HOSPITAL | Age: 79
DRG: 629 | End: 2024-10-31
Attending: PODIATRIST
Payer: COMMERCIAL

## 2024-10-31 ENCOUNTER — APPOINTMENT (OUTPATIENT)
Dept: PHYSICAL THERAPY | Facility: HOSPITAL | Age: 79
DRG: 629 | End: 2024-10-31
Attending: PODIATRIST
Payer: COMMERCIAL

## 2024-10-31 LAB
ANION GAP SERPL CALCULATED.3IONS-SCNC: 10 MMOL/L (ref 7–15)
BASOPHILS # BLD AUTO: 0.1 10E3/UL (ref 0–0.2)
BASOPHILS NFR BLD AUTO: 1 %
BUN SERPL-MCNC: 24.7 MG/DL (ref 8–23)
CALCIUM SERPL-MCNC: 8.5 MG/DL (ref 8.8–10.4)
CHLORIDE SERPL-SCNC: 105 MMOL/L (ref 98–107)
CREAT SERPL-MCNC: 1.27 MG/DL (ref 0.67–1.17)
EGFRCR SERPLBLD CKD-EPI 2021: 58 ML/MIN/1.73M2
EOSINOPHIL # BLD AUTO: 0.2 10E3/UL (ref 0–0.7)
EOSINOPHIL NFR BLD AUTO: 2 %
ERYTHROCYTE [DISTWIDTH] IN BLOOD BY AUTOMATED COUNT: 15.9 % (ref 10–15)
GLUCOSE BLDC GLUCOMTR-MCNC: 121 MG/DL (ref 70–99)
GLUCOSE BLDC GLUCOMTR-MCNC: 140 MG/DL (ref 70–99)
GLUCOSE BLDC GLUCOMTR-MCNC: 148 MG/DL (ref 70–99)
GLUCOSE BLDC GLUCOMTR-MCNC: 165 MG/DL (ref 70–99)
GLUCOSE BLDC GLUCOMTR-MCNC: 180 MG/DL (ref 70–99)
GLUCOSE BLDC GLUCOMTR-MCNC: 191 MG/DL (ref 70–99)
GLUCOSE BLDC GLUCOMTR-MCNC: 193 MG/DL (ref 70–99)
GLUCOSE BLDC GLUCOMTR-MCNC: 66 MG/DL (ref 70–99)
GLUCOSE SERPL-MCNC: 71 MG/DL (ref 70–99)
HCO3 SERPL-SCNC: 24 MMOL/L (ref 22–29)
HCT VFR BLD AUTO: 34.1 % (ref 40–53)
HGB BLD-MCNC: 11.1 G/DL (ref 13.3–17.7)
IMM GRANULOCYTES # BLD: 0.2 10E3/UL
IMM GRANULOCYTES NFR BLD: 2 %
LYMPHOCYTES # BLD AUTO: 1.6 10E3/UL (ref 0.8–5.3)
LYMPHOCYTES NFR BLD AUTO: 16 %
MCH RBC QN AUTO: 28.6 PG (ref 26.5–33)
MCHC RBC AUTO-ENTMCNC: 32.6 G/DL (ref 31.5–36.5)
MCV RBC AUTO: 88 FL (ref 78–100)
MONOCYTES # BLD AUTO: 0.8 10E3/UL (ref 0–1.3)
MONOCYTES NFR BLD AUTO: 8 %
NEUTROPHILS # BLD AUTO: 7.2 10E3/UL (ref 1.6–8.3)
NEUTROPHILS NFR BLD AUTO: 71 %
NRBC # BLD AUTO: 0 10E3/UL
NRBC BLD AUTO-RTO: 0 /100
PLATELET # BLD AUTO: 127 10E3/UL (ref 150–450)
POTASSIUM SERPL-SCNC: 3.9 MMOL/L (ref 3.4–5.3)
RBC # BLD AUTO: 3.88 10E6/UL (ref 4.4–5.9)
SODIUM SERPL-SCNC: 139 MMOL/L (ref 135–145)
WBC # BLD AUTO: 10.1 10E3/UL (ref 4–11)

## 2024-10-31 PROCEDURE — 250N000013 HC RX MED GY IP 250 OP 250 PS 637: Performed by: PODIATRIST

## 2024-10-31 PROCEDURE — 120N000001 HC R&B MED SURG/OB

## 2024-10-31 PROCEDURE — 250N000013 HC RX MED GY IP 250 OP 250 PS 637: Performed by: STUDENT IN AN ORGANIZED HEALTH CARE EDUCATION/TRAINING PROGRAM

## 2024-10-31 PROCEDURE — 99232 SBSQ HOSP IP/OBS MODERATE 35: CPT | Mod: 24 | Performed by: FAMILY MEDICINE

## 2024-10-31 PROCEDURE — 250N000013 HC RX MED GY IP 250 OP 250 PS 637: Performed by: INTERNAL MEDICINE

## 2024-10-31 PROCEDURE — 250N000011 HC RX IP 250 OP 636: Performed by: INTERNAL MEDICINE

## 2024-10-31 PROCEDURE — 258N000003 HC RX IP 258 OP 636: Performed by: INTERNAL MEDICINE

## 2024-10-31 PROCEDURE — 85025 COMPLETE CBC W/AUTO DIFF WBC: CPT

## 2024-10-31 PROCEDURE — 97530 THERAPEUTIC ACTIVITIES: CPT | Mod: GO

## 2024-10-31 PROCEDURE — 97110 THERAPEUTIC EXERCISES: CPT | Mod: GP

## 2024-10-31 PROCEDURE — 97530 THERAPEUTIC ACTIVITIES: CPT | Mod: GP

## 2024-10-31 PROCEDURE — 80048 BASIC METABOLIC PNL TOTAL CA: CPT

## 2024-10-31 PROCEDURE — 258N000003 HC RX IP 258 OP 636

## 2024-10-31 PROCEDURE — 250N000013 HC RX MED GY IP 250 OP 250 PS 637

## 2024-10-31 PROCEDURE — 97535 SELF CARE MNGMENT TRAINING: CPT | Mod: GO

## 2024-10-31 PROCEDURE — 36415 COLL VENOUS BLD VENIPUNCTURE: CPT

## 2024-10-31 RX ADMIN — PIPERACILLIN AND TAZOBACTAM 3.38 G: 3; .375 INJECTION, POWDER, FOR SOLUTION INTRAVENOUS at 00:17

## 2024-10-31 RX ADMIN — Medication 1 CAPSULE: at 08:42

## 2024-10-31 RX ADMIN — DAPTOMYCIN 500 MG: 500 INJECTION, POWDER, LYOPHILIZED, FOR SOLUTION INTRAVENOUS at 11:44

## 2024-10-31 RX ADMIN — ASPIRIN 81 MG: 81 TABLET, COATED ORAL at 08:42

## 2024-10-31 RX ADMIN — INSULIN ASPART 1 UNITS: 100 INJECTION, SOLUTION INTRAVENOUS; SUBCUTANEOUS at 17:45

## 2024-10-31 RX ADMIN — INSULIN ASPART 1 UNITS: 100 INJECTION, SOLUTION INTRAVENOUS; SUBCUTANEOUS at 11:43

## 2024-10-31 RX ADMIN — ROSUVASTATIN CALCIUM 40 MG: 40 TABLET, FILM COATED ORAL at 08:42

## 2024-10-31 RX ADMIN — PIPERACILLIN AND TAZOBACTAM 3.38 G: 3; .375 INJECTION, POWDER, FOR SOLUTION INTRAVENOUS at 18:13

## 2024-10-31 RX ADMIN — SENNOSIDES AND DOCUSATE SODIUM 1 TABLET: 8.6; 5 TABLET ORAL at 08:42

## 2024-10-31 RX ADMIN — EMPAGLIFLOZIN 10 MG: 10 TABLET, FILM COATED ORAL at 08:42

## 2024-10-31 RX ADMIN — PIPERACILLIN AND TAZOBACTAM 3.38 G: 3; .375 INJECTION, POWDER, FOR SOLUTION INTRAVENOUS at 08:42

## 2024-10-31 RX ADMIN — CLOPIDOGREL BISULFATE 75 MG: 75 TABLET ORAL at 08:42

## 2024-10-31 RX ADMIN — SPIRONOLACTONE 25 MG: 25 TABLET ORAL at 08:42

## 2024-10-31 RX ADMIN — AMLODIPINE BESYLATE 5 MG: 5 TABLET ORAL at 08:42

## 2024-10-31 RX ADMIN — Medication 60 ML: at 18:22

## 2024-10-31 RX ADMIN — LEVOTHYROXINE SODIUM 50 MCG: 0.03 TABLET ORAL at 05:58

## 2024-10-31 RX ADMIN — SODIUM CHLORIDE 1000 ML: 9 INJECTION, SOLUTION INTRAVENOUS at 11:44

## 2024-10-31 RX ADMIN — POLYETHYLENE GLYCOL 3350 17 G: 17 POWDER, FOR SOLUTION ORAL at 08:42

## 2024-10-31 NOTE — PROGRESS NOTES
"Care Management Follow Up    Length of Stay (days): 7    Expected Discharge Date: 10/31/2024 or 11/1/2024    Anticipated Discharge Plan:   Transitional care (TCU) is recommended for continued therapy and skilled nursing.    Transportation: Confirmed Wheelchair. Transportation costs discussed? Yes. Discussed with Patient, Delvis Nowak    PT Recommendations: Transitional Care Facility  OT Recommendations:  Transitional Care Facility     Barriers to Discharge:  Pending final orders and confirmation  of Tcu bed    Prior Living Situation: independent living facility with alone    Discussed  Partnership in Safe Discharge Planning  document with patient/family: No     Handoff Completed: No, handoff not indicated or clinically appropriate    Patient/Spokesperson Updated: Yes. Who? Janice Nowak    Additional Information:  Writer spoke with admissions at [facility] who can accept patient for admission today.   Writer met with patient to review review plan and confirm services at discharge. Patient alert, answering questions appropriately and engaged in the conversation.    Discussed out of pocket cost of [facility] medical transportation by wheelchair with patient who agreed with the plan to have wheelchair transportation arranged by [facility] transport.   Patient expressed concern that ID MD told him 'not all TCU's carry the IV medications I need. Will they have a wound VAC for me?\"  Writer left a message for admissions at [facility] to confirm.   10:34 AM:  Per patient, MD told him that he 'would not be discharging today' due to blood sugars. Awaiting update from MD, will update [facility] once confirmed.  1:39 PM: Per MD, patient not ready for discharge today. Update provided to [facility] who states that they can accept patient tomorrow as well.   3:13 PM: Met with patient who is concerned about possible delay of discharge. Text page sent to MD, " update provided to admissions at [facility] to confirm bed for tomorrow. [facility] wheelchair ride set for tomorrow between 1237 and 1322 PM.   3:15 PM:  Left a message with update for Sheldon Montes De [facility]ca at [phone].    Next Steps: Coordinate discharge plan.     Heather Connor RN

## 2024-10-31 NOTE — PROVIDER NOTIFICATION
Patient's HS BG was 87. Patient was given ice cream; refused a protein meal. House officer notified, and Dr. Skelton gave the order to hold scheduled Lantus dose tonight.

## 2024-10-31 NOTE — PROGRESS NOTES
[facility]    Progress Note - Hospitalist Service       Date of Admission:  10/24/2024    Assessment & Plan   Janice Nowak is a 79 year old male with a past medical history of insulin-dependent type 2 diabetes mellitus presented to the hospital for incision and drainage of right heel, partial calcanectomy, sharp excisional debridement ulceration right heel into the bone for acute osteomyelitis of the calcaneus of the right foot and ulceration of the right heel into the bone and is s/p procedure. Hospitalist team initially consulted for medical co management and transferred care to our team 10/25.      Acute osteomyelitis of the right calcaneus  Ulceration right heel into the bone  Patient presented to the hospital for incision and drainage of right heel, partial calcanectomy, sharp excisional debridement ulceration right heel into the bone for acute osteomyelitis of the calcaneus of the right foot and ulceration of the right heel into the bone and is s/p R heel I&D 10/24.  - Continue with pain management as per protocol  - Orthopedics following, continue to follow recommendations  - Anaerobic and aerobic cultures along with Gram stain pending  - Surgical pathology report pending  - ID has been consulted, appreciate recs        -Continue Daptomycin 10/25        -Continue Zoysn 10/25        -PICC placed.   - WOC consult     Episode of orthostatic hypotension   10/29 felt lightheaded after transferring from chair to bed, BP soft on check. Still continued to felt slightly dizzy. Will give bolus and recheck. Hemoglobin stable.  - bolus NS 1L   - hold PTA antihypertensives 10/31     CKD on CAROLINE  Baseline creatinine around 1, although has few cr 1.3-1.4. GFR 48-56 admitted with 1.5 and now downtrending, will continue to monitor.   - Encourage fluid intake  - Daily BMP     Chronic normocytic anemia and thrombocytopenia  Hemoglobin stable 10.8 and close to baseline on admission.  Will  continue to monitor closely.  - daily CBC     History of hypertension with high blood pressure  BP remained stable, will continue PTA antihypertensive and monitor closely.  - Hold with amlodipine, irbesartan,  - continue PTA aldactone     History of insulin-dependent type 2 diabetes mellitus with hyperglycemia  Episodes of hypoglycemia   T2DM poorly controlled, A1c 9.2. BG stable one day post admission, will continue current regimen and adjust as needed.  Sugars elevated in the afternoon and evening will add carb correction and adjust as needed. Sugars more control with the addition of carb count. Borderline low in the morning will continue adjusting insulin  -Continue with sliding scale  - Carb coverage 1:10  - Holding metformin for now  - hold jardiance  - Hypoglycemia protocol  - lantus 45--->35 U AM, 40--->30-->25 U at bedtime.   As per pharmacy patient has been prescribed 50 twice daily of Lantus and 20 3 times daily with meals of NovoLog, discussed with the patient and he stated that he takes 100 daily of Lantus and 25 units of NovoLog at bedtime, he also takes 20 to 25 units 3 times daily with meals based on blood sugar levels, his sugar levels were running in the lower side so held nighttime dose of NovoLog, placed on sliding scale and switched to 50 twice daily of Lantus for now.        History of hyperlipidemia  Continue with aspirin, Plavix, rosuvastatin     History of hypothyroidism  Continue with levothyroxine     DVT PPx  Intermittent pneumatic compression     CODE STATUS  DNR/DNI as per discussion of Dr. Gondal with the patient, as per patient he does not want any aggressive intervention and wants natural death in case his heart stops and he stated to let him pass away in peace in case his heart stops.              Diet: Low Consistent Carb (45 g CHO per Meal) Diet    DVT Prophylaxis: Pneumatic Compression Devices  Reyes Catheter: Not present  Fluids: 1L NS bolus  Lines: PRESENT      PICC 10/30/24  "Single Lumen Right Basilic Long term antibiotic-Site Assessment: WDL      Cardiac Monitoring: None  Code Status: No CPR- Do NOT Intubate      Clinically Significant Risk Factors          # Hyperchloremia: Highest Cl = 108 mmol/L in last 2 days, will monitor as appropriate            # Thrombocytopenia: Lowest platelets = 122 in last 2 days, will monitor for bleeding   # Hypertension: Noted on problem list          # DMII: A1C = 9.2 % (Ref range: <5.7 %) within past 6 months   # Obesity: Estimated body mass index is 33.98 kg/m  as calculated from the following:    Height as of this encounter: 1.727 m (5' 8\").    Weight as of this encounter: 101.4 kg (223 lb 8 oz).      # Financial/Environmental Concerns: none         Disposition Plan      Expected Discharge Date: 10/31/2024      Destination: nursing home  Discharge Comments: wound vac        The patient's care was discussed with the Attending Physician, Dr. Bain .    Derrick Escalona MD  Hospitalist Service  [facility]  Securely message with [employer] (more info)  Text page via [employer] Paging/Directory   ______________________________________________________________________    Interval History   No acute events overnight. Feeling dizzy today after transferring to the chair. Otherwise denies chest pain and shortness of breath.    Physical Exam   Vital Signs: Temp: 97.8  F (36.6  C) Temp src: Oral BP: 101/50 Pulse: 75   Resp: 18 SpO2: 96 % O2 Device: None (Room air)    Weight: 223 lbs 8 oz    General Appearance: Alert and oriented, NAD  Respiratory: normal work of breathing, clear breath sounds, no wheezing  Cardiovascular: normal S1, S2, no murmur  GI: soft, non tender, normal bowel sounds.   Skin: normal turgor and appearance.   Other: No lower limb edema. Wound vac on R foot.     Medical Decision Making             Data     I have personally reviewed the following data over the past 24 hrs:    10.1  \   11.1 (L)   / 127 (L)     139 105 24.7 (H) " /  148 (H)   3.9 24 1.27 (H) \       Imaging results reviewed over the past 24 hrs:   No results found for this or any previous visit (from the past 24 hours).

## 2024-10-31 NOTE — PLAN OF CARE
Problem: Mobility Impairment  Goal: Optimal Mobility  Outcome: Progressing  Intervention: Optimize Mobility  Recent Flowsheet Documentation  Taken 10/31/2024 1123 by Wendy Aldridge RN  Positioning/Transfer Devices:   in use   trapeze  Taken 10/31/2024 0901 by Wendy Aldridge RN  Activity Management: activity adjusted per tolerance  Positioning/Transfer Devices:   pillows   in use  Taken 10/31/2024 0900 by Wendy Aldridge RN  Positioning/Transfer Devices:   in use   pillows  Taken 10/31/2024 0723 by Wendy Aldridge RN  Positioning/Transfer Devices:   pillows   in use     Problem: Activity Intolerance  Goal: Enhanced Capacity and Energy  Outcome: Progressing  Intervention: Optimize Activity Tolerance  Recent Flowsheet Documentation  Taken 10/31/2024 0901 by Wendy Aldridge RN  Activity Management: activity adjusted per tolerance  Environmental Support: calm environment promoted     Problem: Anemia  Goal: Anemia Symptom Improvement  Outcome: Progressing  Intervention: Monitor and Manage Anemia  Recent Flowsheet Documentation  Taken 10/31/2024 0901 by Wendy Aldridge RN  Safety Promotion/Fall Prevention: activity supervised     Problem: Pain Acute  Goal: Optimal Pain Control and Function  Intervention: Prevent or Manage Pain  Recent Flowsheet Documentation  Taken 10/31/2024 0901 by Wendy Aldridge RN  Bowel Elimination Promotion: adequate fluid intake promoted  Medication Review/Management: medications reviewed   Goal Outcome Evaluation:       Pt denied pain this shift, has been up to the [facility] with therapy, prafo boot on, NWB on the surgical leg, BS low in the morning 66, pt had orange juice and BF and BS went up to the 190's, BP low 85/47, fluid bolus given and recheck BP went to 101/50

## 2024-10-31 NOTE — PLAN OF CARE
"Pt is A&Ox4, calm, cooperative, friendly  - vitals stable  - 0200 blood glucose: 121  - IV antibiotics overnight through PICC  - wound vac on at 125 mmHg, prafo boot on, NWB right foot when OOB  - denied pain this shift      Problem: Adult Inpatient Plan of Care  Goal: Plan of Care Review  Description: The Plan of Care Review/Shift note should be completed every shift.  The Outcome Evaluation is a brief statement about your assessment that the patient is improving, declining, or no change.  This information will be displayed automatically on your shift  note.  Outcome: Progressing  Flowsheets (Taken 10/31/2024 0520)  Overall Patient Progress: improving  Goal: Patient-Specific Goal (Individualized)  Description: You can add care plan individualizations to a care plan. Examples of Individualization might be:  \"Parent requests to be called daily at 9am for status\", \"I have a hard time hearing out of my right ear\", or \"Do not touch me to wake me up as it startles  me\".  Outcome: Progressing  Goal: Absence of Hospital-Acquired Illness or Injury  Outcome: Progressing  Intervention: Identify and Manage Fall Risk  Recent Flowsheet Documentation  Taken 10/31/2024 0015 by Gisela Bran, RN  Safety Promotion/Fall Prevention:   activity supervised   assistive device/personal items within reach   clutter free environment maintained   lighting adjusted   mobility aid in reach   nonskid shoes/slippers when out of bed   patient and family education   room door open   safety round/check completed   supervised activity  Intervention: Prevent Skin Injury  Recent Flowsheet Documentation  Taken 10/31/2024 0015 by Gisela Bran, RN  Body Position:   supine, legs elevated   supine, head elevated  Intervention: Prevent Infection  Recent Flowsheet Documentation  Taken 10/31/2024 0015 by Gisela Bran, RN  Infection Prevention:   single patient room provided   rest/sleep promoted   hand hygiene promoted  Goal: Optimal Comfort and " Wellbeing  Outcome: Progressing  Intervention: Monitor Pain and Promote Comfort  Recent Flowsheet Documentation  Taken 10/31/2024 0015 by Gisela Bran RN  Pain Management Interventions: declines  Goal: Readiness for Transition of Care  Outcome: Progressing     Problem: Mobility Impairment  Goal: Optimal Mobility  Outcome: Progressing  Intervention: Optimize Mobility  Recent Flowsheet Documentation  Taken 10/31/2024 0015 by Gisela Bran RN  Activity Management: activity adjusted per tolerance  Positioning/Transfer Devices:   pillows   in use     Problem: Activity Intolerance  Goal: Enhanced Capacity and Energy  Outcome: Progressing  Intervention: Optimize Activity Tolerance  Recent Flowsheet Documentation  Taken 10/31/2024 0015 by Gisela Bran RN  Activity Management: activity adjusted per tolerance  Environmental Support: calm environment promoted     Problem: Infection  Goal: Absence of Infection Signs and Symptoms  Outcome: Progressing     Problem: Acute Kidney Injury/Impairment  Goal: Fluid and Electrolyte Balance  Outcome: Progressing     Problem: Anemia  Goal: Anemia Symptom Improvement  Outcome: Progressing  Intervention: Monitor and Manage Anemia  Recent Flowsheet Documentation  Taken 10/31/2024 0015 by Gisela Bran RN  Safety Promotion/Fall Prevention:   activity supervised   assistive device/personal items within reach   clutter free environment maintained   lighting adjusted   mobility aid in reach   nonskid shoes/slippers when out of bed   patient and family education   room door open   safety round/check completed   supervised activity     Problem: Comorbidity Management  Goal: Blood Glucose Levels Within Targeted Range  Outcome: Progressing  Intervention: Monitor and Manage Glycemia  Recent Flowsheet Documentation  Taken 10/31/2024 0015 by Gisela Bran RN  Medication Review/Management: medications reviewed     Problem: Constipation  Goal: Effective Bowel  Elimination  Outcome: Progressing   Goal Outcome Evaluation:           Overall Patient Progress: improvingOverall Patient Progress: improving

## 2024-10-31 NOTE — PROGRESS NOTES
[facility]    Infectious Disease Progress Note    10/30/2024     Assessment & Plan   Janice Nowak is a 79 year old male who was admitted on 10/24/2024.       ASSESSMENT:  Right heel osteomyelitis- active issue  Diabetes hypertension hyperlipidemia hypothyroidism  Poorly controlled diabetes mellitus  Peripheral vascular disease  Biopsy result:  Final Diagnosis   CALCANEUS, RIGHT, BIOPSY:        1.  ACUTE OSTEOMYELITIS AND MARROW GRANULATION TISSUE        2.  PERIOSTEAL SOFT TISSUE WITH GANGRENOUS NECROSIS AND PERIOSTEAL ABSCESS     Susceptibility data from last 90 days.  Collected Specimen Info Organism   10/24/24 Bone Resection from Foot, Right Bacteroides thetaiotaomicron     Mixed Anaerobic Organisms Present   10/24/24 Bone Resection from Foot, Right Staphylococcus cohnii       RECOMMENDATIONS:    Antibiotics daptomycin and Zosyn  Follow culture results   Focus/de-escalate antibiotics based on final culture results  Monitor CBC, CMP, CK and monitor for myalgia, will likely need to hold Lipitor while on daptomycin  Discussed with patient   OK to place PICC  Patient will need 6 weeks of IV antibiotics, from 10/24/24  IV antibiotics, labs and follow up entered.  ID will sign off. Please call with questions       Erica Nuñez MD  [facility]  [phone]      Photo from patient's chart 10/29/24      Interval History   Updated   Updated patient regarding biopsy results  Tired  Constipation      Physical Exam   Temp: 98.5  F (36.9  C) Temp src: Oral BP: 107/56 Pulse: 76   Resp: 18 SpO2: 95 % O2 Device: None (Room air)    Vitals:    10/24/24 0640   Weight: 101.4 kg (223 lb 8 oz)     Vital Signs with Ranges  Temp:  [97.6  F (36.4  C)-98.5  F (36.9  C)] 98.5  F (36.9  C)  Pulse:  [59-76] 76  Resp:  [18] 18  BP: (107-196)/(56-84) 107/56  SpO2:  [90 %-96 %] 95 %    Constitutional: Awake, no apparent distress, tired  Lungs: normal breathing pattern, no crackles or  wheezing  Cardiovascular: Edema  Abdomen: non distended  Skin: warm, ecchymosis  Dressing on foot- wound vac  Neuro: deconditioned  Psych: able to answer questions        Medications   Current Facility-Administered Medications   Medication Dose Route Frequency Provider Last Rate Last Admin     Current Facility-Administered Medications   Medication Dose Route Frequency Provider Last Rate Last Admin    amLODIPine (NORVASC) tablet 5 mg  5 mg Oral Daily Miguelito Burkett MD   5 mg at 10/30/24 0757    aspirin EC tablet 81 mg  81 mg Oral Daily Gondal, Saad J, MD   81 mg at 10/30/24 0858    clopidogrel (PLAVIX) tablet 75 mg  75 mg Oral Daily Gondal, Saad J, MD   75 mg at 10/30/24 0856    DAPTOmycin (CUBICIN) 500 mg in sodium chloride 0.9 % 100 mL intermittent infusion  6 mg/kg (Adjusted) Intravenous Q24H Ike St MD   500 mg at 10/30/24 1109    empagliflozin (JARDIANCE) tablet 10 mg  10 mg Oral Daily Gondal, Saad J, MD   10 mg at 10/30/24 0856    insulin aspart (NovoLOG) injection (RAPID ACTING)   Subcutaneous Daily with breakfast Derrick Escalona MD   4 Units at 10/30/24 0804    insulin aspart (NovoLOG) injection (RAPID ACTING)   Subcutaneous Daily with lunch Derrick Escalona MD   5 Units at 10/30/24 1214    insulin aspart (NovoLOG) injection (RAPID ACTING)   Subcutaneous Daily with supper Derrick Escalona MD   4 Units at 10/30/24 1851    insulin aspart (NovoLOG) injection (RAPID ACTING)  1-7 Units Subcutaneous TID AC Miguelito Burkett MD   1 Units at 10/29/24 1730    insulin aspart (NovoLOG) injection (RAPID ACTING)  1-5 Units Subcutaneous At Bedtime Miguelito Burkett MD   1 Units at 10/29/24 2041    insulin glargine (LANTUS PEN) injection 30 Units  30 Units Subcutaneous At Bedtime Trevor Esparza MD        [START ON 10/31/2024] insulin glargine (LANTUS PEN) injection 45 Units  45 Units Subcutaneous QAM AC Derrick Escalona MD        irbesartan (AVAPRO) tablet 300 mg  300 mg Oral At Bedtime Miguelito Burkett MD   300 mg at 10/29/24  "2041    lactobacillus rhamnosus (GG) (CULTURELL) capsule 1 capsule  1 capsule Oral Daily Gondal, Saad J, MD   1 capsule at 10/30/24 0856    levothyroxine (SYNTHROID/LEVOTHROID) tablet 50 mcg  50 mcg Oral Daily Miguelito Burkett MD   50 mcg at 10/30/24 0643    piperacillin-tazobactam (ZOSYN) 3.375 g vial to attach to  mL bag  3.375 g Intravenous Q8H Ike St MD   3.375 g at 10/30/24 1627    polyethylene glycol (MIRALAX) Packet 17 g  17 g Oral BID Derrick Escalona MD        rosuvastatin (CRESTOR) tablet 40 mg  40 mg Oral Daily Miguelito Burkett MD   40 mg at 10/30/24 0856    senna-docusate (SENOKOT-S/PERICOLACE) 8.6-50 MG per tablet 1 tablet  1 tablet Oral BID Amol Patricio DPM   1 tablet at 10/30/24 0856    sodium chloride (PF) 0.9% PF flush 3 mL  3 mL Intracatheter Q8H Amol Patricio DPM   3 mL at 10/30/24 1626    spironolactone (ALDACTONE) tablet 25 mg  25 mg Oral Daily Gondal, Saad J, MD   25 mg at 10/30/24 0757       Data   All microbiology laboratory data reviewed.  Recent Labs   Lab Test 10/30/24  0501 10/29/24  0448 10/28/24  0520   WBC 9.0 8.0 7.6   HGB 10.5* 11.3* 10.7*   HCT 32.4* 34.9* 32.0*   MCV 88 88 88   * 137* 130*     Recent Labs   Lab Test 10/30/24  0501 10/29/24  0448 10/28/24  0520   CR 1.30* 1.31* 1.30*     Recent Labs   Lab Test 10/07/24  1104   SED 57*     No lab results found.    Invalid input(s): \"UC\"    MICROBIOLOGY:    Reviewed    7-Day Micro Results       Collected Updated Procedure Result Status      10/24/2024 0854 10/27/2024 1154 Anaerobic Bacterial Culture Routine [[id number]]   (Abnormal)   Bone Resection from Foot, Right    Final result Component Value   Culture 2+ Mixed Anaerobic Organisms Present    1+ Bacteroides thetaiotaomicron    Susceptibilities not routinely done, refer to antibiogram to view typical susceptibility profiles               10/24/2024 0854 10/24/2024 1323 Gram Stain [[id number]]   Bone Resection from Foot, Right    Final " "result Component Value   GS Culture See corresponding culture for results   Gram Stain Result No organisms seen   Gram Stain Result No white blood cells seen            10/24/2024 0854 10/28/2024 0757 Bone Resection Aerobic Bacterial Culture Routine [[id number]]   (Abnormal)   Bone Resection from Foot, Right    Final result Component Value   Culture Isolated in broth only Staphylococcus cohnii    Susceptibilities not routinely done, refer to antibiogram to view typical susceptibility profiles                        RADIOLOGY:    Reviewed  POC US Guidance Needle Placement    Result Date: 10/24/2024  Ultrasound was performed as guidance to an anesthesia procedure.  Click \"PACS images\" hyperlink below to view any stored images.  For specific procedure details, view procedure note authored by anesthesia.    US Lower Extremity Arterial Duplex Bilateral    Result Date: 10/15/2024  Table formatting from the original result was not included. Arterial Duplex Ultrasound (Date: 10/15/24) Lower Extremity Artery Evaluation Indication: Surveillance Bilateral Leg Arterial: Decreased SONNY's. Left Leg Pain. Right Diabetic Heel Ulcer. Decreased pedal pulses. Previous: 2/6/2018 SONNY's History: Previous Smoker, Hypertension, Diabetic, Hyperlipidemia, PAD, and Vascular Ulcers Technique: Duplex imaging is performed utilizing gray-scale, two-dimensional images, and color-flow imaging. Doppler waveform analysis and spectral Doppler imaging is also performed. LOWER EXTREMITY ARTERIAL DUPLEX EXAM WITH WAVEFORMS Right Leg:(cm/s) Location: Velocities Waveforms EIA:   109  B CFA:   111  B PFA:   202  B SFA Proximal:   89 /  72 B SFA Mid:   75  T SFA Distal: 94   T Popliteal Artery:   76 /  72 B PTA:   105   M ALEX:   33  M DPA:   38  M Waveforms: T=Triphasic, M=Monophasic, B=Biphasic Left Leg:(cm/s) Location: Velocities Waveforms EIA:   53  M CFA:   38  M PFA:   48  M SFA Proximal:   42 / 26 M SFA Mid:   23  M SFA Distal:   27  M Popliteal " Artery:   32 /15  M PTA:   18    M ALEX:   5  M DPA:   7  M Waveforms: T=Triphasic, M=Monophasic, B=Biphasic Impression: Right Lower Extremity: Patent vasculature to the popliteal artery with multiphasic waveforms.  Transition to monophasic flow in the infrapopliteal vessels.  No focal stenosis by velocity criteria. Left Lower Extremity: Monophasic waveforms in the external iliac artery, suggesting iliac inflow disease.  The remaining vasculature is patent without focal stenosis. Reference: Category Normal 1-19% 20-49% 50-99% Occluded PSV <160 cm/sec without spectral broadening <160 cm/sec with spectral broadening Increased Increased Absent Flow Ratio N/A N/A < 2.0 >2.0 N/A Post-Stenotic Turbulence No No No Yes N/A      US Low Ext Arterial Dop Seg Pres w/o Exercise    Result Date: 10/15/2024  Table formatting from the original result was not included. BILATERAL RESTING ANKLE-BRACHIAL INDICES (SONNY'S) (Date: 10/15/24) Indication: Surveillance SONNY's: Left Leg Pain. Right Diabetic Heel Ulcer. Decreased pedal pulses. Previous: 2/6/2018 SONNY's History: Previous Smoker, Hypertension, Diabetic, Hyperlipidemia, PAD, and Vascular Ulcers  Resting SONNY's          Right: mmHg Index     Brachial: 167  Ankle-(PT): 127 0.74 Ankle-(DP): 124 0.73          Digit: 140 0.82               Left: mmHg Index     Brachial: 171  Ankle-(PT): >254 NC Ankle-(DP): 0 0          Digit: 25 0.15 Resting ankle-brachial index of 0.74 on the right. Toe Pressures of 140 mmHg and TBI of 0.82 Resting ankle-brachial index is non compressible  on the left. Toe Pressures of 25 mmHg and TBI of 0.15  VPR WAVEFORMS: The right volume plethysmography waveforms are mildly abnormal at the lower thigh level, mildly abnormal at the upper calf level and moderately abnormal at the ankle. The left volume plethysmography waveforms are severely abnormal at the lower thigh level, severely abnormal at the upper calf level and severely abnormal at the ankle.  Impression:  1.  RIGHT LOWER EXTREMITY: SONNY is Abnormal with an SONNY of 0.74 indicating single level disease. Toe Pressures are Normal and adequate for wound healing with toe pressures of 140 mmHg. 2. LEFT LOWER EXTREMITY: SONNY is Non-diagnostic indicating vessel calcification. Toe Pressures are Abnormal and impaired for wound healing with toe pressures of 25 mmHg. Reference: Wound classification Grade SONNY Ankle Systolic Pressure Toe Pressures 0 > 0.80 > 100 mmHg > 60 mmHg 1 0.6 - 0.79 70 - 100 mmHg 40 - 59 mmHg 2 0.4 - 0.59 50-70 mmHg 30 - 39 mmHg 3 < 0.39 < 50 mmHg < 30 mmHg Digit Pressures DBI Disease Category > 0.70 Normal < 0.70 Abnormal > 30 mmHg Potential wound healing < 30 mmHg Impaired wound healing Ankle Brachial Pressures SONNY Disease Category > 1.3  Likely vessel calcification with monophasic waveforms, non-diagnostic 0.95-1.30 Normal with multiphasic waveforms 0.50-0.95 Single level disease 0.30-0.50 Multilevel disease < 0.30 Critical limb ischema Volume Plethysmography Recording (VPR) at all levels Normal Sharp systolic peak, fast upstroke, prominent dicrotic notch in wave Mild Sharp systolic peak, fast upstroke, absent dicrotic notch in wave Moderate Flattened systolic peak, slowed upstroke, absent dicrotic notch inwave Severe amplitude wave with = upslope and down slope Occluded Flat Line      MR Foot Right w/o Contrast    Result Date: 10/8/2024  EXAM: MR FOOT RIGHT W/O CONTRAST LOCATION: [facility] DATE: 10/8/2024 INDICATION: Diabetic foot ulcer COMPARISON: Radiograph 10/07/2024 TECHNIQUE: Unenhanced. FINDINGS: TENDONS: -Peroneal: Peroneus longus and brevis tendons are intact. No tendinopathy or tenosynovitis. No subluxation. -Medial: Posterior tibialis tendon is intact. No tendinopathy or tenosynovitis. Flexor digitorum longus and flexor hallucis longus tendons are normal. No tenosynovitis. -Anterior: Anterior tibialis, extensor hallucis longus, and extensor digitorum longus tendons are  normal. No tenosynovitis. -Achilles: Minimal Achilles tendinopathy. No tear or peritendinitis. LIGAMENTS: -Anterior talofibular ligament: Intact. -Calcaneofibular ligament: Intact. -Posterior talofibular ligament: Intact. -Syndesmotic inferior tibiofibular ligaments: Intact. -Deltoid ligament complex: Effacement of fat in the deltoid ligament could represent prior sprain. The ligament remains intact. -Spring ligament complex: Intact. JOINTS AND BONES: -No fracture or contusion. -There is T2 hyperintense edema of the calcaneal tuberosity deep to the ulcer. There is mild linear T1 hypointense signal without confluent T1 hypointense replacement. -Severe talonavicular joint osteoarthritis with subchondral cystic change and remodeling of the navicular bone. SOFT TISSUES: -Plantar fascia: Chronic central band plantar fasciitis. No tear. -Sinus tarsi and tarsal tunnel: Normal. -Muscles: Subacute on chronic denervation atrophy of the visualized foot and distal leg musculature. -There is a soft tissue ulcer of the calcaneus with subjacent nonorganized subcutaneous edema. Edema abuts the calcaneal tuberosity. No organized/drainable collection.     IMPRESSION: 1.  Soft tissue ulcer of the heel with subcutaneous edema abutting the calcaneus compatible with cellulitis. No organized/drainable collection. There is T2 hyperintense marrow edema of the subjacent calcaneal tuberosity with mild linear T1 hypointense signal but no confluent T1 hypointense replacement. Given location subjacent to an ulcer, these findings are compatible with a high likelihood though are not definitive of osteomyelitis. 2.  Severe talonavicular joint osteoarthritis.    Foot  XR, G/E 3 views, right    Result Date: 10/7/2024  EXAM: XR FOOT RIGHT G/E 3 VIEWS LOCATION: [facility] DATE: 10/7/2024 INDICATION: diabetic foot ulcer right heel COMPARISON: None.     IMPRESSION: Deep soft tissue ulceration along the posterior aspect of the  heel. No focal osseous erosions or periostitis to suggest osteomyelitis. If there is persistent clinical concern for osteomyelitis, MRI is more sensitive.  Large plantar calcaneal spur. Moderate midfoot degenerative change most evident at the talonavicular joint where there are prominent dorsal osteophytes. Vascular calcifications extending into the toes. Bipartite medial hallux sesamoid.       Medical Decision Making       MANAGEMENT DISCUSSED with the following over the past 24 hours: patient   NOTE(S)/MEDICAL RECORDS REVIEWED over the past 24 hours: reviewed  Tests ORDERED & REVIEWED in the past 24 hours:  - See lab/imaging results included in the data section of the note  Medical complexity over the past 24 hours:  - Prescription DRUG MANAGEMENT performed

## 2024-10-31 NOTE — PLAN OF CARE
Problem: Adult Inpatient Plan of Care  Goal: Absence of Hospital-Acquired Illness or Injury  Outcome: Progressing  Intervention: Identify and Manage Fall Risk  Recent Flowsheet Documentation  Taken 10/30/2024 1632 by Guillermo Mondragon RN  Safety Promotion/Fall Prevention:   activity supervised   assistive device/personal items within reach   clutter free environment maintained   lighting adjusted   mobility aid in reach   nonskid shoes/slippers when out of bed   patient and family education   room door open   safety round/check completed   supervised activity     Problem: Adult Inpatient Plan of Care  Goal: Optimal Comfort and Wellbeing  Outcome: Progressing  Intervention: Monitor Pain and Promote Comfort  Recent Flowsheet Documentation  Taken 10/30/2024 1730 by Guillermo Mondragon RN  Pain Management Interventions:   medication offered but refused   pillow support provided   rest  Taken 10/30/2024 1631 by Guillermo Mondragon RN  Pain Management Interventions:   medication offered but refused   pillow support provided   rest     Problem: Pain Acute  Goal: Optimal Pain Control and Function  Intervention: Develop Pain Management Plan  Recent Flowsheet Documentation  Taken 10/30/2024 1730 by Guillermo Mondragon RN  Pain Management Interventions:   medication offered but refused   pillow support provided   rest  Taken 10/30/2024 1631 by Guillermo Mondragon RN  Pain Management Interventions:   medication offered but refused   pillow support provided   rest     Problem: Mobility Impairment  Goal: Optimal Mobility  Outcome: Progressing     Problem: Activity Intolerance  Goal: Enhanced Capacity and Energy  Outcome: Progressing     Problem: Infection  Goal: Absence of Infection Signs and Symptoms  Outcome: Progressing     Problem: Acute Kidney Injury/Impairment  Goal: Fluid and Electrolyte Balance  Outcome: Progressing     Problem: Anemia  Goal: Anemia Symptom Improvement  Outcome: Progressing  Intervention: Monitor and Manage Anemia  Recent  Flowsheet Documentation  Taken 10/30/2024 1962 by Guillermo Mondragon RN  Safety Promotion/Fall Prevention:   activity supervised   assistive device/personal items within reach   clutter free environment maintained   lighting adjusted   mobility aid in reach   nonskid shoes/slippers when out of bed   patient and family education   room door open   safety round/check completed   supervised activity     Problem: Comorbidity Management  Goal: Blood Glucose Levels Within Targeted Range  Outcome: Progressing     Problem: Constipation  Goal: Effective Bowel Elimination  Outcome: Progressing    Patient was alert and oriented. Patient complained of 7/10 pain even after PRN oxycodone from AM RN. Patient refused PRN pain medications when offered. Pain did decreased to 4/10 later on the shift with elevation and rest. Wound vac to right foot was clean, dry, and intact. Prafo boot on. Pre-prandial BG was 122. Patient was covered with carb count insulin after dinner. HS BG was 87; house officer notified and ordered to hold scheduled Lantus dose. Patient was an assist of 2 during transfers back to bed.

## 2024-10-31 NOTE — PROGRESS NOTES
"CLINICAL NUTRITION SERVICES - ASSESSMENT NOTE     Nutrition Prescription    RECOMMENDATIONS FOR MDs/PROVIDERS TO ORDER:    Malnutrition Status:    Not noted    Recommendations already ordered by Registered Dietitian (RD):  Expedite daily for wound healing    Future/Additional Recommendations:  Recommend ned bid or expedite daily for wound healing at [facility]     REASON FOR ASSESSMENT  Janice Nowak is a/an 79 year old male assessed by the dietitian for LOS    Pt with a past medical history of insulin-dependent type 2 DM admitted to the hospital for I&D of right heel, partial calcanectomy, sharp excisional debridement ulceration right heel into the bone for acute osteomyelitis of the calcaneous of the right foot and ulceration of the right heel into the bone   And is s/p procedure.    NUTRITION HISTORY  Pt states he does not follow any special diet at home.  He declines diabetic/consistent CHO diet education.  He states he's been eating well here and was eating fine PTA.  He did not take any protein shakes. He is open to getting expedite here for wound healing but states he will be discharging tomorrow.    CURRENT NUTRITION ORDERS  Diet: Low Consistent Carbohydrate: 45 g CHO per meals  Intake/Tolerance: eating 100% of those meals recorded    LABS  Labs reviewed: urea nitrogen 24.7 (H), Cr 1.27 (H), ca 8.5 (L), Glu 71    MEDICATIONS  Medications reviewed: plavix, cubicin, novolog, lantus pen, culturell, levothyroxine, zosyn, miralax, crestor, senna-docusate, Aldactone    ANTHROPOMETRICS  Height: 172.7 cm (5' 8\")  Most Recent Weight: 101.4 kg (223 lb 8 oz)    IBW: 70 kg (154 lb)  Weight History:   Wt Readings from Last 10 Encounters:   10/24/24 101.4 kg (223 lb 8 oz)   10/07/24 102.3 kg (225 lb 8 oz)     Dosing Weight: 77.8 kg/ adjusted weight    ASSESSED NUTRITION NEEDS  Estimated Energy Needs: 1946 +/- kcals/day (25+/- Gavin/Kg)  Justification: Maintenance and Obese  Estimated Protein Needs: " 78-94 grams protein/day (1 - 1.2 grams of pro/kg)  Justification: Increased needs and Wound healing ( elevated Cr)  Estimated Fluid Needs: 3542-7452 mL/day (25 - 30 mL/kg)   Justification: Maintenance    PHYSICAL FINDINGS  See malnutrition section below.  Skin: peeling shin, calf and ankle  Abrasion shin, knee  Ramón score=18, nutrition noted as adequate  GI: constipation  Last BM 10/30/2024    MALNUTRITION:  % Weight Loss:  Weight loss does not meet criteria for malnutrition   % Intake:  No decreased intake noted  Subcutaneous Fat Loss:  None observed  Muscle Loss:  None observed  Fluid Retention:  None noted    Malnutrition Diagnosis: Patient does not meet two of the above criteria necessary for diagnosing malnutrition    NUTRITION DIAGNOSIS  Increased nutrient needs  related to wound as evidenced by wound      INTERVENTIONS  Implementation  Add expedite supplement daily at breakfast and recommend pt continue wound healing supplement at [facility]     Goals  Wound healing  Meet nutritional needs     Monitoring/Evaluation  Progress toward goals will be monitored and evaluated per protocol.

## 2024-10-31 NOTE — TELEPHONE ENCOUNTER
----- Message from Erica Nuñez sent at 10/30/2024  7:19 PM CDT -----  Regarding: ID follow up 4-6 weeks  ID follow up 4-6 weeks

## 2024-11-01 ENCOUNTER — LAB REQUISITION (OUTPATIENT)
Dept: LAB | Facility: CLINIC | Age: 79
End: 2024-11-01
Payer: COMMERCIAL

## 2024-11-01 VITALS
OXYGEN SATURATION: 96 % | WEIGHT: 223.5 LBS | DIASTOLIC BLOOD PRESSURE: 51 MMHG | HEIGHT: 68 IN | BODY MASS INDEX: 33.87 KG/M2 | RESPIRATION RATE: 18 BRPM | SYSTOLIC BLOOD PRESSURE: 94 MMHG | TEMPERATURE: 97.7 F | HEART RATE: 79 BPM

## 2024-11-01 DIAGNOSIS — M80.80XD OTHER OSTEOPOROSIS WITH CURRENT PATHOLOGICAL FRACTURE, UNSPECIFIED SITE, SUBSEQUENT ENCOUNTER FOR FRACTURE WITH ROUTINE HEALING: ICD-10-CM

## 2024-11-01 LAB
ANION GAP SERPL CALCULATED.3IONS-SCNC: 9 MMOL/L (ref 7–15)
BASOPHILS # BLD AUTO: 0.1 10E3/UL (ref 0–0.2)
BASOPHILS NFR BLD AUTO: 1 %
BUN SERPL-MCNC: 25.8 MG/DL (ref 8–23)
CALCIUM SERPL-MCNC: 8.8 MG/DL (ref 8.8–10.4)
CHLORIDE SERPL-SCNC: 106 MMOL/L (ref 98–107)
CK SERPL-CCNC: 93 U/L (ref 39–308)
CREAT SERPL-MCNC: 1.25 MG/DL (ref 0.67–1.17)
EGFRCR SERPLBLD CKD-EPI 2021: 59 ML/MIN/1.73M2
EOSINOPHIL # BLD AUTO: 0.2 10E3/UL (ref 0–0.7)
EOSINOPHIL NFR BLD AUTO: 2 %
ERYTHROCYTE [DISTWIDTH] IN BLOOD BY AUTOMATED COUNT: 15.7 % (ref 10–15)
GLUCOSE BLDC GLUCOMTR-MCNC: 140 MG/DL (ref 70–99)
GLUCOSE BLDC GLUCOMTR-MCNC: 81 MG/DL (ref 70–99)
GLUCOSE BLDC GLUCOMTR-MCNC: 96 MG/DL (ref 70–99)
GLUCOSE SERPL-MCNC: 85 MG/DL (ref 70–99)
HCO3 SERPL-SCNC: 24 MMOL/L (ref 22–29)
HCT VFR BLD AUTO: 32.9 % (ref 40–53)
HGB BLD-MCNC: 10.6 G/DL (ref 13.3–17.7)
IMM GRANULOCYTES # BLD: 0.2 10E3/UL
IMM GRANULOCYTES NFR BLD: 2 %
LYMPHOCYTES # BLD AUTO: 1.7 10E3/UL (ref 0.8–5.3)
LYMPHOCYTES NFR BLD AUTO: 17 %
MCH RBC QN AUTO: 28.3 PG (ref 26.5–33)
MCHC RBC AUTO-ENTMCNC: 32.2 G/DL (ref 31.5–36.5)
MCV RBC AUTO: 88 FL (ref 78–100)
MONOCYTES # BLD AUTO: 0.7 10E3/UL (ref 0–1.3)
MONOCYTES NFR BLD AUTO: 8 %
NEUTROPHILS # BLD AUTO: 6.9 10E3/UL (ref 1.6–8.3)
NEUTROPHILS NFR BLD AUTO: 71 %
NRBC # BLD AUTO: 0 10E3/UL
NRBC BLD AUTO-RTO: 0 /100
PLATELET # BLD AUTO: 127 10E3/UL (ref 150–450)
POTASSIUM SERPL-SCNC: 4.2 MMOL/L (ref 3.4–5.3)
RBC # BLD AUTO: 3.74 10E6/UL (ref 4.4–5.9)
SODIUM SERPL-SCNC: 139 MMOL/L (ref 135–145)
WBC # BLD AUTO: 9.7 10E3/UL (ref 4–11)

## 2024-11-01 PROCEDURE — 80048 BASIC METABOLIC PNL TOTAL CA: CPT

## 2024-11-01 PROCEDURE — 99238 HOSP IP/OBS DSCHRG MGMT 30/<: CPT | Mod: GC

## 2024-11-01 PROCEDURE — 250N000013 HC RX MED GY IP 250 OP 250 PS 637: Performed by: PODIATRIST

## 2024-11-01 PROCEDURE — 250N000011 HC RX IP 250 OP 636: Performed by: INTERNAL MEDICINE

## 2024-11-01 PROCEDURE — G0463 HOSPITAL OUTPT CLINIC VISIT: HCPCS

## 2024-11-01 PROCEDURE — 85004 AUTOMATED DIFF WBC COUNT: CPT

## 2024-11-01 PROCEDURE — 250N000013 HC RX MED GY IP 250 OP 250 PS 637

## 2024-11-01 PROCEDURE — 258N000003 HC RX IP 258 OP 636: Performed by: INTERNAL MEDICINE

## 2024-11-01 PROCEDURE — 82550 ASSAY OF CK (CPK): CPT | Performed by: INTERNAL MEDICINE

## 2024-11-01 PROCEDURE — 250N000013 HC RX MED GY IP 250 OP 250 PS 637: Performed by: INTERNAL MEDICINE

## 2024-11-01 PROCEDURE — 250N000013 HC RX MED GY IP 250 OP 250 PS 637: Performed by: STUDENT IN AN ORGANIZED HEALTH CARE EDUCATION/TRAINING PROGRAM

## 2024-11-01 RX ORDER — IRBESARTAN 300 MG/1
150 TABLET ORAL AT BEDTIME
Qty: 15 TABLET | Refills: 0 | Status: SHIPPED | OUTPATIENT
Start: 2024-11-01 | End: 2024-11-05

## 2024-11-01 RX ORDER — INSULIN ASPART 100 [IU]/ML
5 INJECTION, SOLUTION INTRAVENOUS; SUBCUTANEOUS
Qty: 15 ML | Refills: 0 | Status: SHIPPED | OUTPATIENT
Start: 2024-11-01

## 2024-11-01 RX ADMIN — Medication 1 CAPSULE: at 08:12

## 2024-11-01 RX ADMIN — SPIRONOLACTONE 25 MG: 25 TABLET ORAL at 08:12

## 2024-11-01 RX ADMIN — ASPIRIN 81 MG: 81 TABLET, COATED ORAL at 08:12

## 2024-11-01 RX ADMIN — POLYETHYLENE GLYCOL 3350 17 G: 17 POWDER, FOR SOLUTION ORAL at 08:12

## 2024-11-01 RX ADMIN — CLOPIDOGREL BISULFATE 75 MG: 75 TABLET ORAL at 08:12

## 2024-11-01 RX ADMIN — LEVOTHYROXINE SODIUM 50 MCG: 0.03 TABLET ORAL at 06:05

## 2024-11-01 RX ADMIN — ROSUVASTATIN CALCIUM 40 MG: 40 TABLET, FILM COATED ORAL at 08:12

## 2024-11-01 RX ADMIN — INSULIN ASPART 1 UNITS: 100 INJECTION, SOLUTION INTRAVENOUS; SUBCUTANEOUS at 12:20

## 2024-11-01 RX ADMIN — Medication 60 ML: at 08:20

## 2024-11-01 RX ADMIN — PIPERACILLIN AND TAZOBACTAM 3.38 G: 3; .375 INJECTION, POWDER, FOR SOLUTION INTRAVENOUS at 08:12

## 2024-11-01 RX ADMIN — PIPERACILLIN AND TAZOBACTAM 3.38 G: 3; .375 INJECTION, POWDER, FOR SOLUTION INTRAVENOUS at 01:14

## 2024-11-01 RX ADMIN — DAPTOMYCIN 500 MG: 500 INJECTION, POWDER, LYOPHILIZED, FOR SOLUTION INTRAVENOUS at 12:02

## 2024-11-01 RX ADMIN — SENNOSIDES AND DOCUSATE SODIUM 1 TABLET: 8.6; 5 TABLET ORAL at 08:12

## 2024-11-01 ASSESSMENT — ACTIVITIES OF DAILY LIVING (ADL)
ADLS_ACUITY_SCORE: 0

## 2024-11-01 NOTE — PROGRESS NOTES
Care Management Discharge Note    Discharge Date: 11/01/2024     Discharge Disposition:  Transitional care (TCU) is recommended for continued therapy and skilled nursing.    Discharge Services:  TCU    Discharge DME:  Per therapy; wound VAC;     Discharge Transportation: wheelchair     Private pay costs discussed: transportation costs    Does the patient's insurance plan have a 3 day qualifying hospital stay waiver?  No    PAS Confirmation Code: [id number]  Patient/family educated on Medicare website which has current facility and service quality ratings:  yes    Education Provided on the Discharge Plan:  Yes  Persons Notified of Discharge Plans: Nursing; MD; SNF; patient  Patient/Family in Agreement with the Plan:  Yes    Handoff Referral Completed: No, handoff not indicated or clinically appropriate    Additional Information:  Patient ready for discharge and has been accepted for admission to [facility] today.   11:15 AM: Discharge orders noted and faxed to [facility] admissions for review. Spoke with admissions who agrees with the plan.  4:05 PM:  Spoke with nursing at [facility] and faxed updated orders to nursing at [facility].     Heather Connor RN

## 2024-11-01 NOTE — PLAN OF CARE
Occupational Therapy Discharge Summary    Reason for therapy discharge:    Discharged to transitional care facility.    Progress towards therapy goal(s). See goals on Care Plan in [facility] electronic health record for goal details.  Goals partially met.  Barriers to achieving goals:   discharge from facility.    Therapy recommendation(s):    Continued therapy is recommended.  Rationale/Recommendations:  discharge to TCU .    KATI Kapoor, MATEUS/L, 11/1/2024, 1:43 PM

## 2024-11-01 NOTE — PLAN OF CARE
Problem: Adult Inpatient Plan of Care  Goal: Absence of Hospital-Acquired Illness or Injury  Outcome: Progressing     Problem: Adult Inpatient Plan of Care  Goal: Optimal Comfort and Wellbeing  Outcome: Progressing  Intervention: Monitor Pain and Promote Comfort  Recent Flowsheet Documentation  Taken 10/31/2024 2141 by Guillermo Mondragon RN  Pain Management Interventions:   medication offered but refused   pillow support provided   rest  Taken 10/31/2024 1745 by Guillermo Mondragon RN  Pain Management Interventions:   medication offered but refused   rest     Problem: Pain Acute  Goal: Optimal Pain Control and Function  Intervention: Develop Pain Management Plan  Recent Flowsheet Documentation  Taken 10/31/2024 2141 by Guillermo Mondragon RN  Pain Management Interventions:   medication offered but refused   pillow support provided   rest  Taken 10/31/2024 1745 by Guillermo Mondragon RN  Pain Management Interventions:   medication offered but refused   rest     Problem: Mobility Impairment  Goal: Optimal Mobility  Outcome: Progressing     Problem: Activity Intolerance  Goal: Enhanced Capacity and Energy  Outcome: Progressing     Problem: Infection  Goal: Absence of Infection Signs and Symptoms  Outcome: Progressing     Problem: Acute Kidney Injury/Impairment  Goal: Fluid and Electrolyte Balance  Outcome: Progressing     Problem: Anemia  Goal: Anemia Symptom Improvement  Outcome: Progressing     Problem: Comorbidity Management  Goal: Blood Glucose Levels Within Targeted Range  Outcome: Progressing     Problem: Constipation  Goal: Effective Bowel Elimination  Outcome: Progressing    Patient was alert and oriented. POD#7 of right foot I&D. Patient complained of 3/10 intermittent pain to right foot; refused PRN pain medication when offered. Wound vac intact with minimal sanguinous drainage on the cannister and tubing. Dressing was clean, dry, and intact. Prafo boot intact to right foot and RLE elevated with pillows. IV Zosyn infused this  shift. PICC line flushed without difficulty. Pre-prandial BG was 140 and ate 42 g of CHO. Patient covered with sliding scale and carb count Novolog insulin. HS BG was 165; patient received scheduled Lantus. Patient was an assist of 1 with transfers from the chair to bed.

## 2024-11-01 NOTE — PROGRESS NOTES
[facility] Nurse Inpatient Assessment     Consulted for: R heel    Summary: VAC placement    Patient History (according to provider note(s):    Preoperative diagnosis:   1. Acute osteomyelitis calcaneus right  2. Ulceration right heel into bone     Postoperative diagnosis: Same     Procedure:   1.  Incision and drainage right heel   2.  Partial calcanectomy right foot  3.  Sharp, excisional Debridement ulceration right heel into bone       Assessment:    Negative pressure wound therapy applied to: R heel        10/29                                                                 11/1  Last photo: 11/1  Wound due to: Surgical Wound   Wound history/plan of care:    Surgical date: 10/24/24   Service following: DPM  Date Negative Pressure Wound Therapy initiated: 10/24/24   Interventions in place: offloading and elevation  Is patient s nutritional status compromised? no   If yes, what interventions are in place? N/A  Reason for initiating vac therapy? Presence of co-morbidities, High risk of infections, and Need for accelerated granulation tissue  Which?of?the?following?co-morbidities?apply? Diabetes, Immobility, and Obesity  If diabetic is patient on a diabetic management program? Yes   Is osteomyelitis present in wound? yes   If yes what treatments are in place? IV antibiotic Zosyn    Wound base: 40% Bone, 60%  granulation with cauterized areas       Palpation of the wound bed: firm       Drainage: scant      Volume in cannister: <100 ml     Last cannister change date: 10/25/24     Description of drainage: bloody      Measurements (length x width x depth, in cm) 4.5 cm  x 5 cm  x  0.5 cm       Tunneling N/A      Undermining N/A   Periwound skin: Intact and Macerated       Color: normal and consistent with surrounding tissue       Temperature: normal    Odor: none   Pain: denies , sharp when touching wound at 6 o'clock closest to end of heel  Pain intervention prior to dressing change:  patient tolerated well and slow and gentle cares   Treatment goal: Heal , Maintain (prevention of deterioration), and Protection  STATUS: stable   Supplies ordered: ordered foam    Number of foam pieces removed from a wound (excluding foam for bridge) :  1 GranuFoam Black   Verified this matched the number of foam pieces applied last dressing change: yes  Number of foam pieces packed into wound (excluding foam for bridge) :  1 piece plus bridge GranuFoam Black     R lat shin area with wound - patient states was present prior to admission    2.5 cm x 2 cm   Wound bed - 50% slough and 50% clean viable tissue  Small amount of bloody drainage noted on dressing.  Discharge orders updated to reflect care for this site as well.    Treatment Plan:   Wound location: R heel  Change Days: Tues/Friday  Supplies: Small black foam  Cleanse with: Vashe, pat dry.  Suction: Continuous at -125 mmHg pressure.    Back up plan: If VAC malfunctions or unable to maintain seal: VAC dressing must be removed and reapplied within 2 hours of the incident. If floor staff is not able to reapply, place NS moistened gauze in wound bed and cover with appropriate dressing to keep wound bed moist.   Change wet-to-dry dressing BID and notify [facility] staff for reimplementation of VAC therapy when available.  Date canister. Chart canister output every shift.    The hospital VAC pump is not to be discharged with the patient.  Please do one of the following prior to discharge:  If a home VAC pump has been delivered, please apply the home pump to the dressing prior to patient discharge.    If the patient is discharging to LTAC or a TCU/SNF and there is a VAC pump waiting for the patient, close clamp and then disconnect the tubing for transfer, place tubing inside a glove.   If the patient is discharging to home or other facility and there is no VAC pump available for immediate placement, remove the VAC dressing and apply a wet-to-moist gauze dressing for  transfer.    Orders: Reviewed and Updated    RECOMMEND PRIMARY TEAM ORDER: None, at this time  Education provided: plan of care  Discussed plan of care with: Patient and Nurse  WO nurse follow-up plan: Tuesday/Friday  Notify WOC if wound(s) deteriorate.  Nursing to notify the Provider(s) and re-consult the WOC Nurse if new skin concern.    DATA:     Current support surface: Standard  Standard gel mattress (Isoflex)  Containment of urine/stool: Continent of bladder and Continent of bowel  BMI: Body mass index is 33.98 kg/m .   Active diet order: Orders Placed This Encounter      Low Consistent Carb (45 g CHO per Meal) Diet     Output: I/O last 3 completed shifts:  In: 250 [P.O.:240; I.V.:10]  Out: 1450 [Urine:1450]     Labs:   Recent Labs   Lab 11/01/24  0606   HGB 10.6*   WBC 9.7     Pressure injury risk assessment:   Sensory Perception: 3-->slightly limited  Moisture: 4-->rarely moist  Activity: 2-->chairfast  Mobility: 3-->slightly limited  Nutrition: 3-->adequate  Friction and Shear: 3-->no apparent problem  Ramón Score: 18    Ela Vides MSN RN CWOCN  Pager no longer is use, please contact through [facility] group: [facility]

## 2024-11-01 NOTE — DISCHARGE SUMMARY
"[facility]  Discharge Summary - Medicine & Pediatrics       Date of Admission:  10/24/2024  Date of Discharge:  11/1/2024  1:00 PM  Discharging Provider: Regina Garrett  Discharge Service: Hospitalist Service    Discharge Diagnoses   Diabetic ulcer of right heel associated with type 2 diabetes mellitus, with necrosis of muscle (H)  Acute osteomyelitis of right calcaneus (H)  Hypertension  Type 2 diabetes mellitus with diabetic peripheral angiopathy without gangrene, with long-term current use of insulin (H)    Clinically Significant Risk Factors     # DMII: A1C = 9.2 % (Ref range: <5.7 %) within past 6 months    # Obesity: Estimated body mass index is 33.98 kg/m  as calculated from the following:    Height as of this encounter: 1.727 m (5' 8\").    Weight as of this encounter: 101.4 kg (223 lb 8 oz).       Follow-ups Needed After Discharge   Follow-up Appointments       Follow Up and recommended labs and tests      Follow up with half-way physician.  The following labs/tests are recommended: per ID instructions.                  Discharge Disposition   Discharged to short-term care facility  Condition at discharge: Stable    Hospital Course   Janice Nowak was admitted on 10/24/2024 for acute osteomyelitis of the right heel s/p I&D and partial calcanectomy.  The following problems were addressed during his hospitalization: Hospitalist team initially consulted for medical co management and transferred care to our team 10/25.      Acute osteomyelitis of the right calcaneus  Ulceration right heel into the bone  Patient presented to the hospital for incision and drainage of right heel, partial calcanectomy, sharp excisional debridement ulceration right heel into the bone for acute osteomyelitis of the calcaneus of the right foot and ulceration of the right heel into the bone and is s/p R heel I&D 10/24. Continued with pain management as per protocol. Has follow up with " Orthopedic surgery outpatient. ID following and discharged on IV antibiotics for 6 weeks. Follow up labs ordered per ID.   - DAPTOmycin 500 mg; Inject 500 mg over 30 minutes into the vein every 24 hours.   - Weekly labs: CBC with diff, CMP, ESR, CRP, CK total. Fax to Erica Nuñez MD at [phone] Infectious Disease. PICC line cares.  - Piperacillin sod-tazobactam (ZOSYN) SOLR injection; Inject 15 mLs (3.375 g) over 5 minutes into the vein via push every 8 hours  - Outpatient follow up with orthopedic surgery    Episode of orthostatic hypotension   Episodes of lightheadedness and hypotension after transferring from chair to bed, BP soft on check.  Required IV fluid bolus. hemoglobin stable.  Antihypertensives held 10/31 with improvement and resolution of symptoms.  Restarted PTA ARB at reduced dose and held CCB prior to discharge detailed below.        CKD on CAROLINE  Baseline creatinine around 1, although has few cr 1.3-1.4. GFR 48-56 admitted with 1.5 and now downtrending, will continue to monitor. Encouraged fluid intake.       Chronic normocytic anemia and thrombocytopenia  Hemoglobin stable 10.8 and close to baseline on admission.  Remained stable.       History of hypertension with high blood pressure  BP remained stable initially but had episodes of orthostatic hypotension as detailed above. Restart Avarpo reduced to 150 mg. Continue to hold PTA amlodipine and consider restarting outpatient if orthostatic hypotension resolves.   - irbesartan (AVAPRO) 300 MG tablet; Take 0.5 tablets (150 mg) by mouth at bedtime.  - discontinued amlodipine     History of insulin-dependent type 2 diabetes mellitus with hyperglycemia  Episodes of hypoglycemia   T2DM poorly controlled, A1c 9.2. BG stable one day post admission, will continue current regimen and adjust as needed.  Sugars elevated in the afternoon and evening will add carb correction and adjust as needed. Initially Sugars more controlled with the addition of carb  count. Later on developed borderline lows in the morning and overall patient reports ongoing loss of appetite. Was on a very high insulin regimen at home ( 100 lantus qAM, 25 novolog prior to bedtime as well as bolus sliding scale -20 units) anticipate more adjustments outpatient. Discharged with basal/bolus as below:  - continue metformin for now  - continue jardiance.   - insulin glargine (LANTUS PEN) 100 UNIT/ML pen; Inject 35 Units subcutaneously every morning.  - insulin aspart (NOVOLOG FLEXPEN) 100 UNIT/ML pen; Inject 5 Units subcutaneously 3 times daily (with meals). Novolog Flexpen: 5 units with breakfast, 5 units with lunch, 5 units with dinner.   - close follow up with nursing home physician within one week for further adjustments.      History of hyperlipidemia  Continue with aspirin, Plavix, rosuvastatin     History of hypothyroidism  Continue with levothyroxine    Consultations This Hospital Stay   PHYSICAL THERAPY ADULT IP CONSULT  HOSPITALIST IP CONSULT  INFECTIOUS DISEASES IP CONSULT  WOUND OSTOMY CONTINENCE NURSE  IP CONSULT  CARE MANAGEMENT / SOCIAL WORK IP CONSULT  OCCUPATIONAL THERAPY ADULT IP CONSULT  VASCULAR ACCESS ADULT IP CONSULT  SPIRITUAL HEALTH SERVICES IP CONSULT  PHYSICAL THERAPY ADULT IP CONSULT  OCCUPATIONAL THERAPY ADULT IP CONSULT    Code Status   Prior       The patient was discussed with Dr. Dusty Escalona MD  [facility] [address] [place] [address]  Phone: [phone]  Fax: [phone]  ______________________________________________________________________    Physical Exam   Vital Signs: Temp: 97.7  F (36.5  C) Temp src: Oral BP: 94/51 Pulse: 79   Resp: 18 SpO2: 96 % O2 Device: None (Room air)    Weight: 223 lbs 8 oz  General Appearance: Alert, oriented, NAD  Respiratory: Normal work of breathing, clear breath sounds bilaterally, no wheezing  Cardiovascular:normal S1, S2, no murmur  GI: Soft, nontender,  no guarding,normal bowel sounds  Skin: normal appearance and turgor, no visible rash  Other: Wound vac on RT foot, no edema on left lower extremity.        Primary Care Physician   Anayeli Machado    Discharge Orders      Nursing Communication 1    Weekly labs: CBC with diff, CMP, ESR, CRP, CK total. Fax to Erica Nuñez MD at [phone] Infectious Disease. PICC line cares.     Follow Up (TCM)    Weekly labs: CBC with diff, CMP, ESR, CRP. Fax to Erica Nuñez MD at [phone] Infectious Disease. PICC line cares.  Follow up with Erica Nuñez MD, Infectious Disease at the [facility] ID clinic (near [facility]) in 4-6  weeks (or next available). Phone: [phone]     General info for SNF    Length of Stay Estimate: Short Term Care: Estimated # of Days <30  Condition at Discharge: Stable  Level of care:skilled   Rehabilitation Potential: Fair  Admission H&P remains valid and up-to-date: Yes  Recent Chemotherapy: N/A  Use Nursing Home Standing Orders: Yes     Mantoux instructions    Give two-step Mantoux (PPD) Per Facility Policy Yes     Reason for your hospital stay    You were admitted for right calcaneal osteomyelitis (bone infection). You will continue IV antibiotics on discharge.     Follow Up and recommended labs and tests    Follow up with long term physician.  The following labs/tests are recommended: per ID instructions.     Activity - Up with nursing assistance     Physical Therapy Adult Consult    Evaluate and treat as clinically indicated.    Reason:  right foot wound and deconditioning     Occupational Therapy Adult Consult    Evaluate and treat as clinically indicated.    Reason:  deconditiong     Fall precautions     Diet    Follow this diet upon discharge: Current Diet:Orders Placed This Encounter      Low Consistent Carb (45 g CHO per Meal) Diet       Significant Results and Procedures   Most Recent 3 CBC's:  Recent Labs   Lab Test 11/01/24  0606 10/31/24  0535 10/30/24  0501   WBC  "9.7 10.1 9.0   HGB 10.6* 11.1* 10.5*   MCV 88 88 88   * 127* 122*     Most Recent 3 BMP's:  Recent Labs   Lab Test 11/01/24  1202 11/01/24  0801 11/01/24  0606 10/31/24  0732 10/31/24  0552 10/30/24  0529 10/30/24  0501   NA  --   --  139  --  139  --  141   POTASSIUM  --   --  4.2  --  3.9  --  3.7   CHLORIDE  --   --  106  --  105  --  108*   CO2  --   --  24  --  24  --  23   BUN  --   --  25.8*  --  24.7*  --  28.0*   CR  --   --  1.25*  --  1.27*  --  1.30*   ANIONGAP  --   --  9  --  10  --  10   AYAN  --   --  8.8  --  8.5*  --  8.4*   * 81 85   < > 71   < > 72    < > = values in this interval not displayed.   ,   Results for orders placed or performed during the hospital encounter of 10/24/24   POC US Guidance Needle Placement    Narrative    Ultrasound was performed as guidance to an anesthesia procedure.  Click   \"PACS images\" hyperlink below to view any stored images.  For specific   procedure details, view procedure note authored by anesthesia.       Discharge Medications   Discharge Medication List as of 11/1/2024 11:19 AM        START taking these medications    Details   DAPTOmycin 500 mg Inject 500 mg over 30 minutes into the vein every 24 hours. Weekly labs: CBC with diff, CMP, ESR, CRP, CK total. Fax to Erica Nuñez MD at [phone] Infectious Disease. PICC line cares.No Print Out      piperacillin sod-tazobactam (ZOSYN) SOLR injection Inject 15 mLs (3.375 g) over 5 minutes into the vein via push every 8 hours. Weekly labs: CBC with diff, CMP, ESR, CRP, CK total. Fax to Erica Nuñez MD at [phone] Infectious Disease. PICC line cares.No Print Out           CONTINUE these medications which have CHANGED    Details   !! insulin aspart (NOVOLOG FLEXPEN) 100 UNIT/ML pen Inject 5 Units subcutaneously 3 times daily (with meals). Novolog Flexpen: 5 units with breakfast, 5 units with lunch, 5 units with dinner., Disp-15 mL, R-0, E-Prescribe      insulin glargine (LANTUS PEN) 100 " UNIT/ML pen Inject 35 Units subcutaneously every morning., Disp-15 mL, R-0, E-PrescribeIf Lantus is not covered by insurance, may substitute Basaglar or Semglee or other insulin glargine product per insurance preference at same dose and frequency.        irbesartan (AVAPRO) 300 MG tablet Take 0.5 tablets (150 mg) by mouth at bedtime., Disp-15 tablet, R-0, E-Prescribe      !! insulin aspart (NOVOLOG PEN) 100 UNIT/ML pen Inject 1-7 Units subcutaneously 3 times daily (before meals)., Disp-15 mL, R-0, E-Prescribe      !! insulin aspart (NOVOLOG PEN) 100 UNIT/ML pen Inject 1-5 Units subcutaneously at bedtime., Disp-15 mL, R-0, E-Prescribe       !! - Potential duplicate medications found. Please discuss with provider.        CONTINUE these medications which have NOT CHANGED    Details   aspirin 81 MG EC tablet Take 81 mg by mouth daily., Historical      clopidogrel (PLAVIX) 75 MG tablet Take 75 mg by mouth daily., Historical      co-enzyme Q-10 100 MG CAPS capsule Take 200 mg by mouth daily., Historical      empagliflozin (JARDIANCE) 10 MG TABS tablet Take 10 mg by mouth daily., Historical      lactobacillus rhamnosus, GG, (CULTURELL) capsule Take 1 capsule by mouth daily., Historical      levothyroxine (SYNTHROID/LEVOTHROID) 50 MCG tablet Take 50 mcg by mouth daily., Historical      metFORMIN (GLUCOPHAGE XR) 500 MG 24 hr tablet Take 2,000 mg by mouth daily (with dinner)., Historical      Multiple Vitamins-Minerals (PRESERVISION AREDS 2 PO) Take 2 capsules by mouth daily., Historical      multivitamin, therapeutic (THERA-VIT) TABS tablet Take 1 tablet by mouth daily., Historical      rosuvastatin (CRESTOR) 40 MG tablet Take 40 mg by mouth daily., Historical      spironolactone (ALDACTONE) 25 MG tablet Take 25 mg by mouth daily., Historical      vitamin C with B complex (B COMPLEX-C) tablet Take 1 tablet by mouth daily., Historical           STOP taking these medications       amLODIPine (NORVASC) 5 MG tablet Comments:    Reason for Stopping:             Allergies   Allergies   Allergen Reactions    Exenatide Unknown    Heparin Unknown    Liraglutide Unknown    Lisinopril Cough    Morphine Unknown

## 2024-11-01 NOTE — PLAN OF CARE
"Pt is A&Ox4, calm, cooperative  - vitals stable  - 0200 B  - IV antibiotics overnight   - has minimal pain, declined pain meds  - PICC patent  - wound vac on @125  - prafo boot on      Problem: Adult Inpatient Plan of Care  Goal: Plan of Care Review  Description: The Plan of Care Review/Shift note should be completed every shift.  The Outcome Evaluation is a brief statement about your assessment that the patient is improving, declining, or no change.  This information will be displayed automatically on your shift  note.  2024 by Gisela Bran RN  Outcome: Progressing  Flowsheets (Taken 2024)  Plan of Care Reviewed With: patient  Overall Patient Progress: improving  2024 by Gisela Bran RN  Outcome: Progressing  Flowsheets (Taken 2024)  Plan of Care Reviewed With: patient  Overall Patient Progress: improving  Goal: Patient-Specific Goal (Individualized)  Description: You can add care plan individualizations to a care plan. Examples of Individualization might be:  \"Parent requests to be called daily at 9am for status\", \"I have a hard time hearing out of my right ear\", or \"Do not touch me to wake me up as it startles  me\".  2024 by Gisela Bran RN  Outcome: Progressing  2024 by Gisela Bran RN  Outcome: Progressing  Goal: Absence of Hospital-Acquired Illness or Injury  2024 by Gisela Bran RN  Outcome: Progressing  2024 by Gisela Bran RN  Outcome: Progressing  Intervention: Identify and Manage Fall Risk  Recent Flowsheet Documentation  Taken 2024 by Gisela Bran RN  Safety Promotion/Fall Prevention:   activity supervised   assistive device/personal items within reach   clutter free environment maintained   lighting adjusted   mobility aid in reach   nonskid shoes/slippers when out of bed   room door open   safety round/check completed   supervised activity  Intervention: Prevent Skin " Injury  Recent Flowsheet Documentation  Taken 11/1/2024 0110 by Gisela Bran RN  Body Position: position changed independently  Skin Protection: adhesive use limited  Intervention: Prevent and Manage VTE (Venous Thromboembolism) Risk  Recent Flowsheet Documentation  Taken 11/1/2024 0110 by Gisela Bran RN  VTE Prevention/Management: SCDs off (sequential compression devices)  Intervention: Prevent Infection  Recent Flowsheet Documentation  Taken 11/1/2024 0110 by Gisela Bran RN  Infection Prevention:   rest/sleep promoted   single patient room provided   hand hygiene promoted  Goal: Optimal Comfort and Wellbeing  11/1/2024 0659 by Gisela Bran RN  Outcome: Progressing  11/1/2024 0659 by Gisela Bran RN  Outcome: Progressing  Intervention: Monitor Pain and Promote Comfort  Recent Flowsheet Documentation  Taken 11/1/2024 0110 by Gisela Bran RN  Pain Management Interventions: medication offered but refused  Goal: Readiness for Transition of Care  11/1/2024 0659 by Gisela Bran RN  Outcome: Progressing  11/1/2024 0659 by Gisela Bran RN  Outcome: Progressing     Problem: Mobility Impairment  Goal: Optimal Mobility  11/1/2024 0659 by Gisela Bran RN  Outcome: Progressing  11/1/2024 0659 by Gisela Bran RN  Outcome: Progressing  Intervention: Optimize Mobility  Recent Flowsheet Documentation  Taken 11/1/2024 0110 by Gisela Bran RN  Activity Management: activity adjusted per tolerance  Positioning/Transfer Devices:   pillows   in use     Problem: Activity Intolerance  Goal: Enhanced Capacity and Energy  11/1/2024 0659 by Gisela Bran RN  Outcome: Progressing  11/1/2024 0659 by Gisela Bran RN  Outcome: Progressing  Intervention: Optimize Activity Tolerance  Recent Flowsheet Documentation  Taken 11/1/2024 0110 by Gisela Bran RN  Activity Management: activity adjusted per tolerance  Environmental Support: calm environment promoted      Problem: Infection  Goal: Absence of Infection Signs and Symptoms  11/1/2024 0659 by Gisela Bran RN  Outcome: Progressing  11/1/2024 0659 by Gisela Bran RN  Outcome: Progressing     Problem: Acute Kidney Injury/Impairment  Goal: Fluid and Electrolyte Balance  11/1/2024 0659 by Gisela Bran RN  Outcome: Progressing  11/1/2024 0659 by Gisela Bran RN  Outcome: Progressing  Intervention: Monitor and Manage Fluid and Electrolyte Balance  Recent Flowsheet Documentation  Taken 11/1/2024 0110 by Gisela Bran RN  Fluid/Electrolyte Management: fluids provided     Problem: Anemia  Goal: Anemia Symptom Improvement  11/1/2024 0659 by Gisela Bran RN  Outcome: Progressing  11/1/2024 0659 by Gisela Bran RN  Outcome: Progressing  Intervention: Monitor and Manage Anemia  Recent Flowsheet Documentation  Taken 11/1/2024 0110 by Gisela Bran RN  Safety Promotion/Fall Prevention:   activity supervised   assistive device/personal items within reach   clutter free environment maintained   lighting adjusted   mobility aid in reach   nonskid shoes/slippers when out of bed   room door open   safety round/check completed   supervised activity     Problem: Comorbidity Management  Goal: Blood Glucose Levels Within Targeted Range  11/1/2024 0659 by Gisela Bran RN  Outcome: Progressing  11/1/2024 0659 by Gisela Bran RN  Outcome: Progressing  Intervention: Monitor and Manage Glycemia  Recent Flowsheet Documentation  Taken 11/1/2024 0110 by Gisela Bran RN  Medication Review/Management: medications reviewed     Problem: Constipation  Goal: Effective Bowel Elimination  11/1/2024 0659 by Gisela Bran RN  Outcome: Progressing  11/1/2024 0659 by Gisela Bran RN  Outcome: Progressing   Goal Outcome Evaluation:      Plan of Care Reviewed With: patient    Overall Patient Progress: improvingOverall Patient Progress: improving

## 2024-11-03 ENCOUNTER — PATIENT OUTREACH (OUTPATIENT)
Dept: CARE COORDINATION | Facility: CLINIC | Age: 79
End: 2024-11-03
Payer: COMMERCIAL

## 2024-11-03 NOTE — PROGRESS NOTES
[facility]    Background: Transitional Care Management program identified per system criteria and reviewed by Connected [facility] Resource Center team for possible outreach.    Assessment: Upon chart review, CCRC Team member will not proceed with patient outreach related to this episode of Transitional Care Management program due to reason below:    Non-[facility] TCU: CCRC team member noted patient discharged to TCU/ARU/LTACH. Patient is not established with a [facility] currently supported by Primary Care-Care Coordination therefore handoff to Primary Care-Care Coordination is not appropriate at this time.    Plan: Transitional Care Management episode addressed appropriately per reason noted above.      [facility]  [facility], [facility]    *Connected Care Resource Team does NOT follow patient ongoing. Referrals are identified based on internal discharge reports and the outreach is to ensure patient has an understanding of their discharge instructions.

## 2024-11-04 ENCOUNTER — TRANSITIONAL CARE UNIT VISIT (OUTPATIENT)
Dept: GERIATRICS | Facility: CLINIC | Age: 79
End: 2024-11-04
Payer: COMMERCIAL

## 2024-11-04 VITALS
WEIGHT: 224.1 LBS | OXYGEN SATURATION: 93 % | HEART RATE: 78 BPM | HEIGHT: 68 IN | BODY MASS INDEX: 33.96 KG/M2 | TEMPERATURE: 97.5 F | RESPIRATION RATE: 16 BRPM | DIASTOLIC BLOOD PRESSURE: 81 MMHG | SYSTOLIC BLOOD PRESSURE: 139 MMHG

## 2024-11-04 DIAGNOSIS — N17.9 ACUTE KIDNEY INJURY SUPERIMPOSED ON CHRONIC KIDNEY DISEASE (H): ICD-10-CM

## 2024-11-04 DIAGNOSIS — E78.2 MIXED HYPERLIPIDEMIA: ICD-10-CM

## 2024-11-04 DIAGNOSIS — E11.51 TYPE 2 DIABETES MELLITUS WITH DIABETIC PERIPHERAL ANGIOPATHY WITHOUT GANGRENE, WITH LONG-TERM CURRENT USE OF INSULIN (H): ICD-10-CM

## 2024-11-04 DIAGNOSIS — E08.621 DIABETIC ULCER OF HEEL ASSOCIATED WITH DIABETES MELLITUS DUE TO UNDERLYING CONDITION, WITH FAT LAYER EXPOSED, UNSPECIFIED LATERALITY (H): Primary | ICD-10-CM

## 2024-11-04 DIAGNOSIS — D69.6 THROMBOCYTOPENIA (H): ICD-10-CM

## 2024-11-04 DIAGNOSIS — M86.179 OTHER ACUTE OSTEOMYELITIS OF FOOT, UNSPECIFIED LATERALITY (H): ICD-10-CM

## 2024-11-04 DIAGNOSIS — N18.9 ACUTE KIDNEY INJURY SUPERIMPOSED ON CHRONIC KIDNEY DISEASE (H): ICD-10-CM

## 2024-11-04 DIAGNOSIS — L97.402 DIABETIC ULCER OF HEEL ASSOCIATED WITH DIABETES MELLITUS DUE TO UNDERLYING CONDITION, WITH FAT LAYER EXPOSED, UNSPECIFIED LATERALITY (H): Primary | ICD-10-CM

## 2024-11-04 DIAGNOSIS — I10 HYPERTENSION, UNSPECIFIED TYPE: ICD-10-CM

## 2024-11-04 DIAGNOSIS — Z79.4 TYPE 2 DIABETES MELLITUS WITH DIABETIC PERIPHERAL ANGIOPATHY WITHOUT GANGRENE, WITH LONG-TERM CURRENT USE OF INSULIN (H): ICD-10-CM

## 2024-11-04 DIAGNOSIS — Z86.2 HISTORY OF ANEMIA OF CHRONIC DISEASE: ICD-10-CM

## 2024-11-04 DIAGNOSIS — E02 SUBCLINICAL IODINE-DEFICIENCY HYPOTHYROIDISM: ICD-10-CM

## 2024-11-04 LAB
ALBUMIN SERPL BCG-MCNC: 3.5 G/DL (ref 3.5–5.2)
ALP SERPL-CCNC: 91 U/L (ref 40–150)
ALT SERPL W P-5'-P-CCNC: 45 U/L (ref 0–70)
ANION GAP SERPL CALCULATED.3IONS-SCNC: 11 MMOL/L (ref 7–15)
AST SERPL W P-5'-P-CCNC: 44 U/L (ref 0–45)
BASOPHILS # BLD AUTO: 0.1 10E3/UL (ref 0–0.2)
BASOPHILS NFR BLD AUTO: 2 %
BILIRUB SERPL-MCNC: 0.5 MG/DL
BUN SERPL-MCNC: 34.8 MG/DL (ref 8–23)
CALCIUM SERPL-MCNC: 9.2 MG/DL (ref 8.8–10.4)
CHLORIDE SERPL-SCNC: 106 MMOL/L (ref 98–107)
CK SERPL-CCNC: 199 U/L (ref 39–308)
CREAT SERPL-MCNC: 1.31 MG/DL (ref 0.67–1.17)
CRP SERPL-MCNC: <3 MG/L
EGFRCR SERPLBLD CKD-EPI 2021: 56 ML/MIN/1.73M2
EOSINOPHIL # BLD AUTO: 0.3 10E3/UL (ref 0–0.7)
EOSINOPHIL NFR BLD AUTO: 3 %
ERYTHROCYTE [DISTWIDTH] IN BLOOD BY AUTOMATED COUNT: 15.8 % (ref 10–15)
ERYTHROCYTE [SEDIMENTATION RATE] IN BLOOD BY WESTERGREN METHOD: 81 MM/HR (ref 0–20)
GLUCOSE SERPL-MCNC: 96 MG/DL (ref 70–99)
HCO3 SERPL-SCNC: 22 MMOL/L (ref 22–29)
HCT VFR BLD AUTO: 33.1 % (ref 40–53)
HGB BLD-MCNC: 10.5 G/DL (ref 13.3–17.7)
IMM GRANULOCYTES # BLD: 0.4 10E3/UL
IMM GRANULOCYTES NFR BLD: 4 %
LYMPHOCYTES # BLD AUTO: 2.1 10E3/UL (ref 0.8–5.3)
LYMPHOCYTES NFR BLD AUTO: 23 %
MCH RBC QN AUTO: 28.8 PG (ref 26.5–33)
MCHC RBC AUTO-ENTMCNC: 31.7 G/DL (ref 31.5–36.5)
MCV RBC AUTO: 91 FL (ref 78–100)
MONOCYTES # BLD AUTO: 0.7 10E3/UL (ref 0–1.3)
MONOCYTES NFR BLD AUTO: 8 %
NEUTROPHILS # BLD AUTO: 5.4 10E3/UL (ref 1.6–8.3)
NEUTROPHILS NFR BLD AUTO: 60 %
NRBC # BLD AUTO: 0 10E3/UL
NRBC BLD AUTO-RTO: 0 /100
PLATELET # BLD AUTO: 121 10E3/UL (ref 150–450)
POTASSIUM SERPL-SCNC: 4.3 MMOL/L (ref 3.4–5.3)
PROT SERPL-MCNC: 7.2 G/DL (ref 6.4–8.3)
RBC # BLD AUTO: 3.64 10E6/UL (ref 4.4–5.9)
SODIUM SERPL-SCNC: 139 MMOL/L (ref 135–145)
WBC # BLD AUTO: 9 10E3/UL (ref 4–11)

## 2024-11-04 PROCEDURE — P9603 ONE-WAY ALLOW PRORATED MILES: HCPCS | Performed by: FAMILY MEDICINE

## 2024-11-04 PROCEDURE — 85025 COMPLETE CBC W/AUTO DIFF WBC: CPT | Performed by: FAMILY MEDICINE

## 2024-11-04 PROCEDURE — 99310 SBSQ NF CARE HIGH MDM 45: CPT | Performed by: NURSE PRACTITIONER

## 2024-11-04 PROCEDURE — 80053 COMPREHEN METABOLIC PANEL: CPT | Performed by: FAMILY MEDICINE

## 2024-11-04 PROCEDURE — 36415 COLL VENOUS BLD VENIPUNCTURE: CPT | Performed by: FAMILY MEDICINE

## 2024-11-04 PROCEDURE — 85652 RBC SED RATE AUTOMATED: CPT | Performed by: FAMILY MEDICINE

## 2024-11-04 PROCEDURE — 82550 ASSAY OF CK (CPK): CPT | Performed by: FAMILY MEDICINE

## 2024-11-04 PROCEDURE — 86140 C-REACTIVE PROTEIN: CPT | Performed by: FAMILY MEDICINE

## 2024-11-04 RX ORDER — ERTUGLIFLOZIN 5 MG/1
5 TABLET, FILM COATED ORAL EVERY MORNING
COMMUNITY
Start: 2024-11-04

## 2024-11-04 RX ORDER — LOSARTAN POTASSIUM 50 MG/1
50 TABLET ORAL AT BEDTIME
COMMUNITY
Start: 2024-11-04

## 2024-11-04 RX ORDER — ATORVASTATIN CALCIUM 80 MG/1
80 TABLET, FILM COATED ORAL AT BEDTIME
COMMUNITY
Start: 2024-11-04

## 2024-11-04 NOTE — PROGRESS NOTES
"[facility] GERIATRIC SERVICES    Code Status:  DNR/DNI   Visit Type:   Chief Complaint   Patient presents with    TCU Admission     [facility] 10/24/2024 - 11/1/2024     Facility:  [facility] ([facility]) [[id number]]         HPI: Janice Nowak is a 79 year old male who I am seeing today for admit to the TCU. Past medical history includes insulin dependent type 2 diabetes mellitus, HTN, CKD, chronic normocytic anemia, thrombocytopenia, HL and hypothyroidism. Pt recently hospitalized with worsening ulcer to right heel. Pt found to have acute osteomyelitis of the right heel. He underwent I&D and partial calcnectomy on 10/24. Pt seen by ID and place on IV antibiotics for 6 weeks. Post operative hypotension, improved with fluids. ARB reduced during hospitalization and CCB held. DM2 blood sugars elevated in the afternoon with lows in morning in hospital. Home insulin decreased.     Transitional Care Course: Today pt sitting up in wheelchair. He has wound VAC to right heel. Vac changes Tuesday and Fridays. He denies any pain in his foot. He continues with MPB. Slight edema noted. He is receiving IV antibiotic X 2. He appears to be tolerating well. He denies any diarrhea. Today he reports he \"will not stay at the TCU for 6 weeks.\" He currently lives in [place] at [facility]. He is very upset he is here. I did talk with nursing and . Pt denies any SOB or CP. DM satisfactory. He is having regular bowel movements. He denies any diarrhea.       Assessment/Plan:      Acute osteomyelitis of the right calcaneus  Ulceration right heel   -s/p I & D with partial calcanectomy 10/24.   -ID following. (Dr. Erica Nuñez)   -DAPTOmycin 500 mg Q 24 hours X 6 weeks.   -Zosyn Q 8 hours X 6 weeks.   -Weekly labs: CBC with diff, CMP, ESR, CRP, CK total.  -PICC line cares.  -Wound VAC changes Q Tues and Friday.   -MPB at all times to RLE.   -NWB to RLE.   -F/U with Vascular 11/8  -F/U with Podiatry 11/13     HTN  -with " othostatic hypotension in hospital   -Avarpo 150 mg daily.   -irbesartan 150 mg Q HS.   -amlodipine continues to be hold. (Consider restarting if orthostatic hypotension resolves)  -Monitor bp.     CKD on CAROLINE  -Baseline creatinine around 1.3-1.4.   -Follow up BMP.      Chronic normocytic anemia   thrombocytopenia  -Hemoglobin stable 10.8.  -Monitor CBC.      insulin-dependent type 2 diabetes mellitus with hyperglycemia  Episodes of hypoglycemia   -A1c 9.2.   -metformin 2000 mg Q evening.   -steglatro 5 mg daily.   -glargine 35 units in am.   -Aspart 5 units with. Hold is BS < 100.   -Home regimen  ( 100 lantus qAM, 25 novolog prior to bedtime as well as bolus sliding scale -20 units)   -consistent carb diet.   -Blood sugar checks QID.      History of hyperlipidemia  -Continue aspirin, Plavix and rosuvastatin     History of hypothyroidism  -Continue with levothyroxine      -ok for PT, OT to eval and treat.     Active Ambulatory Problems     Diagnosis Date Noted    Type 2 Diabetes Mellitus     Microalbuminuria     Benign Adenomatous Polyp Of The Large Intestine     Hyperlipidemia     Obstructive Sleep Apnea     Hypertension     Diabetic foot infection (H) 10/07/2024    Type 2 diabetes mellitus with diabetic peripheral angiopathy without gangrene, with long-term current use of insulin (H) 10/07/2024    Diabetic ulcer of right heel associated with type 2 diabetes mellitus, with necrosis of muscle (H) 10/16/2024    Carotid arterial disease (H) 10/29/2024    Atypical chest pain 11/12/2015    Charcot joint of foot 10/29/2024    Charcot's arthropathy 10/29/2024    Diabetic neuropathy (H) 10/29/2024    Exposure to potentially hazardous substance 10/29/2024    Fatigue 11/12/2015    Pressure ulcer of right heel, unstageable (H) 10/29/2024    Occlusion and stenosis of unspecified carotid artery 10/29/2024    Postoperative back pain 10/29/2024    Hypothyroidism 10/29/2024    Hypothyroidism, unspecified 10/29/2024    History of  cholecystectomy 10/29/2024    Hearing loss 10/29/2024     Resolved Ambulatory Problems     Diagnosis Date Noted    No Resolved Ambulatory Problems     Past Medical History:   Diagnosis Date    Diabetes (H)     Hypertension     Sleep apnea     Thyroid disease      Allergies   Allergen Reactions    Exenatide Unknown    Heparin Unknown    Liraglutide Unknown    Lisinopril Cough    Morphine Unknown       All Meds and Allergies reviewed in the record at the facility and is the most up-to-date.    Post Discharge Medication Reconciliation Status: discharge medications reconciled, continue medications without change  Current Outpatient Medications   Medication Sig Dispense Refill    atorvastatin (LIPITOR) 80 MG tablet Take 80 mg by mouth at bedtime.      ertugliflozin (STEGLATRO) 5 MG TABS Take 5 mg by mouth every morning.      losartan (COZAAR) 50 MG tablet Take 50 mg by mouth at bedtime.      aspirin 81 MG EC tablet Take 81 mg by mouth daily.      clopidogrel (PLAVIX) 75 MG tablet Take 75 mg by mouth daily.      co-enzyme Q-10 100 MG CAPS capsule Take 200 mg by mouth daily.      DAPTOmycin 500 mg Inject 500 mg over 30 minutes into the vein every 24 hours. Weekly labs: CBC with diff, CMP, ESR, CRP, CK total. Fax to Erica Nuñez MD at [phone] Infectious Disease. PICC line cares.      empagliflozin (JARDIANCE) 10 MG TABS tablet Take 10 mg by mouth daily. (Patient not taking: Reported on 11/4/2024)      insulin aspart (NOVOLOG FLEXPEN) 100 UNIT/ML pen Inject 5 Units subcutaneously 3 times daily (with meals). Novolog Flexpen: 5 units with breakfast, 5 units with lunch, 5 units with dinner. 15 mL 0    insulin glargine (LANTUS PEN) 100 UNIT/ML pen Inject 35 Units subcutaneously every morning. 15 mL 0    irbesartan (AVAPRO) 300 MG tablet Take 0.5 tablets (150 mg) by mouth at bedtime. (Patient not taking: Reported on 11/4/2024) 15 tablet 0    lactobacillus rhamnosus, GG, (CULTURELL) capsule Take 1 capsule by mouth daily.    "   levothyroxine (SYNTHROID/LEVOTHROID) 50 MCG tablet Take 50 mcg by mouth daily.      metFORMIN (GLUCOPHAGE XR) 500 MG 24 hr tablet Take 2,000 mg by mouth daily (with dinner).      Multiple Vitamins-Minerals (PRESERVISION AREDS 2 PO) Take 2 capsules by mouth daily.      multivitamin, therapeutic (THERA-VIT) TABS tablet Take 1 tablet by mouth daily. (Patient not taking: Reported on 11/4/2024)      piperacillin sod-tazobactam (ZOSYN) SOLR injection Inject 15 mLs (3.375 g) over 5 minutes into the vein via push every 8 hours. Weekly labs: CBC with diff, CMP, ESR, CRP, CK total. Fax to Erica Nuñez MD at [phone] Infectious Disease. PICC line cares.      rosuvastatin (CRESTOR) 40 MG tablet Take 40 mg by mouth daily. (Patient not taking: Reported on 11/4/2024)      spironolactone (ALDACTONE) 25 MG tablet Take 25 mg by mouth daily.      vitamin C with B complex (B COMPLEX-C) tablet Take 1 tablet by mouth daily.       No current facility-administered medications for this visit.       REVIEW OF SYSTEMS:   10 point review of systems reviewed and pertinent positives in the HPI.     PHYSICAL EXAMINATION:  Physical Exam     Vital signs: /81   Pulse 78   Temp 97.5  F (36.4  C)   Resp 16   Ht 1.727 m (5' 8\")   Wt 101.7 kg (224 lb 1.6 oz)   SpO2 93%   BMI 34.07 kg/m    General: Awake, Alert, oriented x3, sitting up in wheelchair, follows simple commands, conversant  HEENT: Pink conjunctiva, moist oral mucosa  NECK: Supple  CVS:  S1  S2, without murmur or gallop.   LUNG: Clear to auscultation, No wheezes, rales or rhonci.  BACK: No kyphosis of the thoracic spine  ABDOMEN: Soft, nontender to palpation, with positive bowel sounds  EXTREMITIES: Moves both upper and lower extremities with limitation to RLE, 1+ pedal edema. Multi podus boot to RLE.   SKIN: Wound VAC to RLE.   NEUROLOGIC: Intact, pulses palpable  PSYCHIATRIC: Cognition intact      Labs:  All labs reviewed in the nursing home record and Epic   @  Lab " Results   Component Value Date    WBC 9.0 11/04/2024     Lab Results   Component Value Date    RBC 3.64 11/04/2024     Lab Results   Component Value Date    HGB 10.5 11/04/2024     Lab Results   Component Value Date    HCT 33.1 11/04/2024     Lab Results   Component Value Date    MCV 91 11/04/2024     Lab Results   Component Value Date    MCH 28.8 11/04/2024     Lab Results   Component Value Date    MCHC 31.7 11/04/2024     Lab Results   Component Value Date    RDW 15.8 11/04/2024     Lab Results   Component Value Date     11/04/2024        @Last Comprehensive Metabolic Panel:  Sodium   Date Value Ref Range Status   11/04/2024 139 135 - 145 mmol/L Final     Potassium   Date Value Ref Range Status   11/04/2024 4.3 3.4 - 5.3 mmol/L Final   07/06/2020 4.7 3.5 - 5.0 mmol/L Final     Chloride   Date Value Ref Range Status   11/04/2024 106 98 - 107 mmol/L Final   07/06/2020 103 98 - 107 mmol/L Final     Carbon Dioxide (CO2)   Date Value Ref Range Status   11/04/2024 22 22 - 29 mmol/L Final   07/06/2020 22 22 - 31 mmol/L Final     Anion Gap   Date Value Ref Range Status   11/04/2024 11 7 - 15 mmol/L Final   07/06/2020 14 5 - 18 mmol/L Final     Glucose   Date Value Ref Range Status   11/04/2024 96 70 - 99 mg/dL Final   07/06/2020 102 70 - 125 mg/dL Final     GLUCOSE BY METER POCT   Date Value Ref Range Status   11/01/2024 140 (H) 70 - 99 mg/dL Final     Urea Nitrogen   Date Value Ref Range Status   11/04/2024 34.8 (H) 8.0 - 23.0 mg/dL Final   07/06/2020 30 (H) 8 - 28 mg/dL Final     Creatinine   Date Value Ref Range Status   11/04/2024 1.31 (H) 0.67 - 1.17 mg/dL Final     GFR Estimate   Date Value Ref Range Status   11/04/2024 56 (L) >60 mL/min/1.73m2 Final   07/06/2020 55 (L) >60 mL/min/1.73m2 Final     Calcium   Date Value Ref Range Status   11/04/2024 9.2 8.8 - 10.4 mg/dL Final     Comment:     Reference intervals for this test were updated on 7/16/2024 to reflect our healthy population more accurately. There may  be differences in the flagging of prior results with similar values performed with this method. Those prior results can be interpreted in the context of the updated reference intervals.       > 45 minutes spent preparing for visit which included reviewing hospital records, labs, meds, imaging and consults as well as face to face time spent with pt and collaborating with nursing staff.     This note has been dictated using voice recognition software. Any grammatical or context distortions are unintentional and inherent to the software    Electronically signed by: Treasure Dumont CNP

## 2024-11-04 NOTE — LETTER
" 11/4/2024      Janice Nowak  [address]  [facility] [address]        [facility]    Code Status:  DNR/DNI   Visit Type:   Chief Complaint   Patient presents with     TCU Admission     [facility] 10/24/2024 - 11/1/2024     Facility:  [facility] ([facility]) [[id number]]         HPI: Janice Nowak is a 79 year old male who I am seeing today for admit to the TCU. Past medical history includes insulin dependent type 2 diabetes mellitus, HTN, CKD, chronic normocytic anemia, thrombocytopenia, HL and hypothyroidism. Pt recently hospitalized with worsening ulcer to right heel. Pt found to have acute osteomyelitis of the right heel. He underwent I&D and partial calcnectomy on 10/24. Pt seen by ID and place on IV antibiotics for 6 weeks. Post operative hypotension, improved with fluids. ARB reduced during hospitalization and CCB held. DM2 blood sugars elevated in the afternoon with lows in morning in hospital. Home insulin decreased.     Transitional Care Course: Today pt sitting up in wheelchair. He has wound VAC to right heel. Vac changes Tuesday and Fridays. He denies any pain in his foot. He continues with MPB. Slight edema noted. He is receiving IV antibiotic X 2. He appears to be tolerating well. He denies any diarrhea. Today he reports he \"will not stay at the TCU for 6 weeks.\" He currently lives in [place] at [facility]. He is very upset he is here. I did talk with nursing and . Pt denies any SOB or CP. DM satisfactory. He is having regular bowel movements. He denies any diarrhea.       Assessment/Plan:      Acute osteomyelitis of the right calcaneus  Ulceration right heel   -s/p I & D with partial calcanectomy 10/24.   -ID following. (Dr. Erica Nuñez)   -DAPTOmycin 500 mg Q 24 hours X 6 weeks.   -Zosyn Q 8 hours X 6 weeks.   -Weekly labs: CBC with diff, CMP, ESR, CRP, CK total.  -PICC line cares.  -Wound VAC changes Q Tues and Friday.   -MPB at all " times to RLE.   -NWB to RLE.   -F/U with Vascular 11/8  -F/U with Podiatry 11/13     HTN  -with othostatic hypotension in hospital   -Avarpo 150 mg daily.   -irbesartan 150 mg Q HS.   -amlodipine continues to be hold. (Consider restarting if orthostatic hypotension resolves)  -Monitor bp.     CKD on CAROLINE  -Baseline creatinine around 1.3-1.4.   -Follow up BMP.      Chronic normocytic anemia   thrombocytopenia  -Hemoglobin stable 10.8.  -Monitor CBC.      insulin-dependent type 2 diabetes mellitus with hyperglycemia  Episodes of hypoglycemia   -A1c 9.2.   -metformin 2000 mg Q evening.   -steglatro 5 mg daily.   -glargine 35 units in am.   -Aspart 5 units with. Hold is BS < 100.   -Home regimen  ( 100 lantus qAM, 25 novolog prior to bedtime as well as bolus sliding scale -20 units)   -consistent carb diet.   -Blood sugar checks QID.      History of hyperlipidemia  -Continue aspirin, Plavix and rosuvastatin     History of hypothyroidism  -Continue with levothyroxine      -ok for PT, OT to eval and treat.     Active Ambulatory Problems     Diagnosis Date Noted     Type 2 Diabetes Mellitus      Microalbuminuria      Benign Adenomatous Polyp Of The Large Intestine      Hyperlipidemia      Obstructive Sleep Apnea      Hypertension      Diabetic foot infection (H) 10/07/2024     Type 2 diabetes mellitus with diabetic peripheral angiopathy without gangrene, with long-term current use of insulin (H) 10/07/2024     Diabetic ulcer of right heel associated with type 2 diabetes mellitus, with necrosis of muscle (H) 10/16/2024     Carotid arterial disease (H) 10/29/2024     Atypical chest pain 11/12/2015     Charcot joint of foot 10/29/2024     Charcot's arthropathy 10/29/2024     Diabetic neuropathy (H) 10/29/2024     Exposure to potentially hazardous substance 10/29/2024     Fatigue 11/12/2015     Pressure ulcer of right heel, unstageable (H) 10/29/2024     Occlusion and stenosis of unspecified carotid artery 10/29/2024      Postoperative back pain 10/29/2024     Hypothyroidism 10/29/2024     Hypothyroidism, unspecified 10/29/2024     History of cholecystectomy 10/29/2024     Hearing loss 10/29/2024     Resolved Ambulatory Problems     Diagnosis Date Noted     No Resolved Ambulatory Problems     Past Medical History:   Diagnosis Date     Diabetes (H)      Hypertension      Sleep apnea      Thyroid disease      Allergies   Allergen Reactions     Exenatide Unknown     Heparin Unknown     Liraglutide Unknown     Lisinopril Cough     Morphine Unknown       All Meds and Allergies reviewed in the record at the facility and is the most up-to-date.    Post Discharge Medication Reconciliation Status: discharge medications reconciled, continue medications without change  Current Outpatient Medications   Medication Sig Dispense Refill     atorvastatin (LIPITOR) 80 MG tablet Take 80 mg by mouth at bedtime.       ertugliflozin (STEGLATRO) 5 MG TABS Take 5 mg by mouth every morning.       losartan (COZAAR) 50 MG tablet Take 50 mg by mouth at bedtime.       aspirin 81 MG EC tablet Take 81 mg by mouth daily.       clopidogrel (PLAVIX) 75 MG tablet Take 75 mg by mouth daily.       co-enzyme Q-10 100 MG CAPS capsule Take 200 mg by mouth daily.       DAPTOmycin 500 mg Inject 500 mg over 30 minutes into the vein every 24 hours. Weekly labs: CBC with diff, CMP, ESR, CRP, CK total. Fax to Erica Nuñez MD at [phone] Infectious Disease. PICC line cares.       empagliflozin (JARDIANCE) 10 MG TABS tablet Take 10 mg by mouth daily. (Patient not taking: Reported on 11/4/2024)       insulin aspart (NOVOLOG FLEXPEN) 100 UNIT/ML pen Inject 5 Units subcutaneously 3 times daily (with meals). Novolog Flexpen: 5 units with breakfast, 5 units with lunch, 5 units with dinner. 15 mL 0     insulin glargine (LANTUS PEN) 100 UNIT/ML pen Inject 35 Units subcutaneously every morning. 15 mL 0     irbesartan (AVAPRO) 300 MG tablet Take 0.5 tablets (150 mg) by mouth at  "bedtime. (Patient not taking: Reported on 11/4/2024) 15 tablet 0     lactobacillus rhamnosus, GG, (CULTURELL) capsule Take 1 capsule by mouth daily.       levothyroxine (SYNTHROID/LEVOTHROID) 50 MCG tablet Take 50 mcg by mouth daily.       metFORMIN (GLUCOPHAGE XR) 500 MG 24 hr tablet Take 2,000 mg by mouth daily (with dinner).       Multiple Vitamins-Minerals (PRESERVISION AREDS 2 PO) Take 2 capsules by mouth daily.       multivitamin, therapeutic (THERA-VIT) TABS tablet Take 1 tablet by mouth daily. (Patient not taking: Reported on 11/4/2024)       piperacillin sod-tazobactam (ZOSYN) SOLR injection Inject 15 mLs (3.375 g) over 5 minutes into the vein via push every 8 hours. Weekly labs: CBC with diff, CMP, ESR, CRP, CK total. Fax to Erica Nuñez MD at [phone] Infectious Disease. PICC line cares.       rosuvastatin (CRESTOR) 40 MG tablet Take 40 mg by mouth daily. (Patient not taking: Reported on 11/4/2024)       spironolactone (ALDACTONE) 25 MG tablet Take 25 mg by mouth daily.       vitamin C with B complex (B COMPLEX-C) tablet Take 1 tablet by mouth daily.       No current facility-administered medications for this visit.       REVIEW OF SYSTEMS:   10 point review of systems reviewed and pertinent positives in the HPI.     PHYSICAL EXAMINATION:  Physical Exam     Vital signs: /81   Pulse 78   Temp 97.5  F (36.4  C)   Resp 16   Ht 1.727 m (5' 8\")   Wt 101.7 kg (224 lb 1.6 oz)   SpO2 93%   BMI 34.07 kg/m    General: Awake, Alert, oriented x3, sitting up in wheelchair, follows simple commands, conversant  HEENT: Pink conjunctiva, moist oral mucosa  NECK: Supple  CVS:  S1  S2, without murmur or gallop.   LUNG: Clear to auscultation, No wheezes, rales or rhonci.  BACK: No kyphosis of the thoracic spine  ABDOMEN: Soft, nontender to palpation, with positive bowel sounds  EXTREMITIES: Moves both upper and lower extremities with limitation to RLE, 1+ pedal edema. Multi podus boot to RLE.   SKIN: " Wound VAC to RLE.   NEUROLOGIC: Intact, pulses palpable  PSYCHIATRIC: Cognition intact      Labs:  All labs reviewed in the nursing home record and Epic   @  Lab Results   Component Value Date    WBC 9.0 11/04/2024     Lab Results   Component Value Date    RBC 3.64 11/04/2024     Lab Results   Component Value Date    HGB 10.5 11/04/2024     Lab Results   Component Value Date    HCT 33.1 11/04/2024     Lab Results   Component Value Date    MCV 91 11/04/2024     Lab Results   Component Value Date    MCH 28.8 11/04/2024     Lab Results   Component Value Date    MCHC 31.7 11/04/2024     Lab Results   Component Value Date    RDW 15.8 11/04/2024     Lab Results   Component Value Date     11/04/2024        @Last Comprehensive Metabolic Panel:  Sodium   Date Value Ref Range Status   11/04/2024 139 135 - 145 mmol/L Final     Potassium   Date Value Ref Range Status   11/04/2024 4.3 3.4 - 5.3 mmol/L Final   07/06/2020 4.7 3.5 - 5.0 mmol/L Final     Chloride   Date Value Ref Range Status   11/04/2024 106 98 - 107 mmol/L Final   07/06/2020 103 98 - 107 mmol/L Final     Carbon Dioxide (CO2)   Date Value Ref Range Status   11/04/2024 22 22 - 29 mmol/L Final   07/06/2020 22 22 - 31 mmol/L Final     Anion Gap   Date Value Ref Range Status   11/04/2024 11 7 - 15 mmol/L Final   07/06/2020 14 5 - 18 mmol/L Final     Glucose   Date Value Ref Range Status   11/04/2024 96 70 - 99 mg/dL Final   07/06/2020 102 70 - 125 mg/dL Final     GLUCOSE BY METER POCT   Date Value Ref Range Status   11/01/2024 140 (H) 70 - 99 mg/dL Final     Urea Nitrogen   Date Value Ref Range Status   11/04/2024 34.8 (H) 8.0 - 23.0 mg/dL Final   07/06/2020 30 (H) 8 - 28 mg/dL Final     Creatinine   Date Value Ref Range Status   11/04/2024 1.31 (H) 0.67 - 1.17 mg/dL Final     GFR Estimate   Date Value Ref Range Status   11/04/2024 56 (L) >60 mL/min/1.73m2 Final   07/06/2020 55 (L) >60 mL/min/1.73m2 Final     Calcium   Date Value Ref Range Status   11/04/2024  9.2 8.8 - 10.4 mg/dL Final     Comment:     Reference intervals for this test were updated on 7/16/2024 to reflect our healthy population more accurately. There may be differences in the flagging of prior results with similar values performed with this method. Those prior results can be interpreted in the context of the updated reference intervals.       > 45 minutes spent preparing for visit which included reviewing hospital records, labs, meds, imaging and consults as well as face to face time spent with pt and collaborating with nursing staff.     This note has been dictated using voice recognition software. Any grammatical or context distortions are unintentional and inherent to the software    Electronically signed by: Treasure Dumont CNP       Sincerely,        Treasure Dumont NP

## 2024-11-05 ENCOUNTER — TELEPHONE (OUTPATIENT)
Dept: CARE COORDINATION | Facility: HOSPITAL | Age: 79
End: 2024-11-05
Payer: COMMERCIAL

## 2024-11-05 NOTE — PROGRESS NOTES
8:20 AM  CM received a voicemail from Camille ([phone]) at [facility] requesting an update regarding Pt's discharge plans. Pt discharged on 11/1 to [facility] ([facility]). SW called Camille back and left a voicemail with the update regarding Pt having already discharged on 11/1.     ROSITA Epps   November 5, 2024 8:23 AM

## 2024-11-06 ENCOUNTER — TRANSITIONAL CARE UNIT VISIT (OUTPATIENT)
Dept: GERIATRICS | Facility: CLINIC | Age: 79
End: 2024-11-06
Payer: COMMERCIAL

## 2024-11-06 ENCOUNTER — LAB REQUISITION (OUTPATIENT)
Dept: LAB | Facility: CLINIC | Age: 79
End: 2024-11-06

## 2024-11-06 VITALS
RESPIRATION RATE: 18 BRPM | HEIGHT: 68 IN | OXYGEN SATURATION: 97 % | TEMPERATURE: 97.4 F | DIASTOLIC BLOOD PRESSURE: 77 MMHG | WEIGHT: 218.4 LBS | HEART RATE: 88 BPM | SYSTOLIC BLOOD PRESSURE: 123 MMHG | BODY MASS INDEX: 33.1 KG/M2

## 2024-11-06 DIAGNOSIS — M86.179 OTHER ACUTE OSTEOMYELITIS OF FOOT, UNSPECIFIED LATERALITY (H): ICD-10-CM

## 2024-11-06 DIAGNOSIS — E11.51 TYPE 2 DIABETES MELLITUS WITH DIABETIC PERIPHERAL ANGIOPATHY WITHOUT GANGRENE, WITH LONG-TERM CURRENT USE OF INSULIN (H): ICD-10-CM

## 2024-11-06 DIAGNOSIS — Z79.4 TYPE 2 DIABETES MELLITUS WITH DIABETIC PERIPHERAL ANGIOPATHY WITHOUT GANGRENE, WITH LONG-TERM CURRENT USE OF INSULIN (H): ICD-10-CM

## 2024-11-06 DIAGNOSIS — D69.6 THROMBOCYTOPENIA (H): ICD-10-CM

## 2024-11-06 DIAGNOSIS — R19.7 DIARRHEA, UNSPECIFIED TYPE: ICD-10-CM

## 2024-11-06 DIAGNOSIS — Z86.2 HISTORY OF ANEMIA OF CHRONIC DISEASE: ICD-10-CM

## 2024-11-06 DIAGNOSIS — E08.621 DIABETIC ULCER OF HEEL ASSOCIATED WITH DIABETES MELLITUS DUE TO UNDERLYING CONDITION, WITH FAT LAYER EXPOSED, UNSPECIFIED LATERALITY (H): Primary | ICD-10-CM

## 2024-11-06 DIAGNOSIS — R19.7 DIARRHEA, UNSPECIFIED: ICD-10-CM

## 2024-11-06 DIAGNOSIS — N17.9 ACUTE KIDNEY INJURY SUPERIMPOSED ON CHRONIC KIDNEY DISEASE (H): ICD-10-CM

## 2024-11-06 DIAGNOSIS — N18.9 ACUTE KIDNEY INJURY SUPERIMPOSED ON CHRONIC KIDNEY DISEASE (H): ICD-10-CM

## 2024-11-06 DIAGNOSIS — I10 HYPERTENSION, UNSPECIFIED TYPE: ICD-10-CM

## 2024-11-06 DIAGNOSIS — L97.402 DIABETIC ULCER OF HEEL ASSOCIATED WITH DIABETES MELLITUS DUE TO UNDERLYING CONDITION, WITH FAT LAYER EXPOSED, UNSPECIFIED LATERALITY (H): Primary | ICD-10-CM

## 2024-11-06 LAB — C DIFF TOX B STL QL: NEGATIVE

## 2024-11-06 PROCEDURE — 99309 SBSQ NF CARE MODERATE MDM 30: CPT | Performed by: NURSE PRACTITIONER

## 2024-11-06 PROCEDURE — 87493 C DIFF AMPLIFIED PROBE: CPT | Performed by: NURSE PRACTITIONER

## 2024-11-06 NOTE — LETTER
11/6/2024      Janice Nowak  [address]  [facility] [address]        [facility]    Code Status:  DNR/DNI   Visit Type:   Chief Complaint   Patient presents with     TCU Follow Up     Facility:  [facility] ([facility]) [[id number]]         HPI: Janice Nowak is a 79 year old male who I am seeing today for follow up on the TCU. Past medical history includes insulin dependent type 2 diabetes mellitus, HTN, CKD, chronic normocytic anemia, thrombocytopenia, HL and hypothyroidism. Pt recently hospitalized with worsening ulcer to right heel. Pt found to have acute osteomyelitis of the right heel. He underwent I&D and partial calcnectomy on 10/24. Pt seen by ID and place on IV antibiotics for 6 weeks. Post operative hypotension, improved with fluids. ARB reduced during hospitalization and CCB held. DM2 blood sugars elevated in the afternoon with lows in morning in hospital. Home insulin decreased.     Transitional Care Course: Today pt sitting up in wheelchair. Right heel s/p I & D. He continues in wound VAC. VAC changed yesterday and wound showed good granulation. Moderate serosanguinous drainage. No redness or warmth. He continues on IV anbx X 2. Today he is reporting diarrhea today. He reports 1-2 loose stools per day. He denies any abdominal pain. No further hypotension. DM2. Blood sugars satisfactory. Pt denies any pain.        Assessment/Plan:      Acute osteomyelitis of the right calcaneus  Ulceration right heel   -s/p I & D with partial calcanectomy 10/24.   -ID following. (Dr. Erica Nuñez)   -DAPTOmycin 500 mg Q 24 hours X 6 weeks.   -Zosyn Q 8 hours X 6 weeks.   -Weekly labs: CBC with diff, CMP, ESR, CRP, CK total. No leukocytosis, ESR 81, CRP <3.00.   -PICC line cares.  -Wound VAC changes Q Tues and Friday.   -MPB at all times to RLE.   -NWB to RLE.   -F/U with Vascular 11/8  -F/U with Podiatry 11/13     Diarrhea  -pt reporting loose stools.  -Check stool for  C diff.     HTN  -with othostatic hypotension in hospital   -Bp improved but continues to fluctuate.   -Avarpo 150 mg daily.   -irbesartan 150 mg Q HS.   -amlodipine continues to be hold. (Consider restarting if orthostatic hypotension resolves)  -Monitor bp.     CKD on CAROLINE  -Baseline creatinine around 1.3-1.4.   -Follow up BMP with Creatine stable at 1.31.      Chronic normocytic anemia   thrombocytopenia  -Hemoglobin stable 10.8.  -Follow up CBC with Hgb 10.5.      insulin-dependent type 2 diabetes mellitus with hyperglycemia  Episodes of hypoglycemia   -A1c 9.2.   -metformin 2000 mg Q evening.   -steglatro 5 mg daily.   -glargine 35 units in am.   -Aspart 5 units with. Hold is BS < 100.   -Home regimen  ( 100 lantus qAM, 25 novolog prior to bedtime as well as bolus sliding scale -20 units)   -consistent carb diet.   -Blood sugar checks QID.        Active Ambulatory Problems     Diagnosis Date Noted     Type 2 Diabetes Mellitus      Microalbuminuria      Benign Adenomatous Polyp Of The Large Intestine      Hyperlipidemia      Obstructive Sleep Apnea      Hypertension      Diabetic foot infection (H) 10/07/2024     Type 2 diabetes mellitus with diabetic peripheral angiopathy without gangrene, with long-term current use of insulin (H) 10/07/2024     Diabetic ulcer of right heel associated with type 2 diabetes mellitus, with necrosis of muscle (H) 10/16/2024     Carotid arterial disease (H) 10/29/2024     Atypical chest pain 11/12/2015     Charcot joint of foot 10/29/2024     Charcot's arthropathy 10/29/2024     Diabetic neuropathy (H) 10/29/2024     Exposure to potentially hazardous substance 10/29/2024     Fatigue 11/12/2015     Pressure ulcer of right heel, unstageable (H) 10/29/2024     Occlusion and stenosis of unspecified carotid artery 10/29/2024     Postoperative back pain 10/29/2024     Hypothyroidism 10/29/2024     Hypothyroidism, unspecified 10/29/2024     History of cholecystectomy 10/29/2024     Hearing  loss 10/29/2024     Resolved Ambulatory Problems     Diagnosis Date Noted     No Resolved Ambulatory Problems     Past Medical History:   Diagnosis Date     Diabetes (H)      Hypertension      Sleep apnea      Thyroid disease      Allergies   Allergen Reactions     Exenatide Unknown     Heparin Unknown     Liraglutide Unknown     Lisinopril Cough     Morphine Unknown       All Meds and Allergies reviewed in the record at the facility and is the most up-to-date.    Current Outpatient Medications   Medication Sig Dispense Refill     aspirin 81 MG EC tablet Take 81 mg by mouth daily.       atorvastatin (LIPITOR) 80 MG tablet Take 80 mg by mouth at bedtime.       clopidogrel (PLAVIX) 75 MG tablet Take 75 mg by mouth daily.       co-enzyme Q-10 100 MG CAPS capsule Take 200 mg by mouth daily.       DAPTOmycin 500 mg Inject 500 mg over 30 minutes into the vein every 24 hours. Weekly labs: CBC with diff, CMP, ESR, CRP, CK total. Fax to Erica Nuñez MD at [phone] Infectious Disease. PICC line cares.       ertugliflozin (STEGLATRO) 5 MG TABS Take 5 mg by mouth every morning.       insulin aspart (NOVOLOG FLEXPEN) 100 UNIT/ML pen Inject 5 Units subcutaneously 3 times daily (with meals). Novolog Flexpen: 5 units with breakfast, 5 units with lunch, 5 units with dinner. 15 mL 0     insulin glargine (LANTUS PEN) 100 UNIT/ML pen Inject 35 Units subcutaneously every morning. 15 mL 0     lactobacillus rhamnosus, GG, (CULTURELL) capsule Take 1 capsule by mouth daily.       levothyroxine (SYNTHROID/LEVOTHROID) 50 MCG tablet Take 50 mcg by mouth daily.       losartan (COZAAR) 50 MG tablet Take 50 mg by mouth at bedtime.       metFORMIN (GLUCOPHAGE XR) 500 MG 24 hr tablet Take 2,000 mg by mouth daily (with dinner).       Multiple Vitamins-Minerals (PRESERVISION AREDS 2 PO) Take 2 capsules by mouth daily.       piperacillin sod-tazobactam (ZOSYN) SOLR injection Inject 15 mLs (3.375 g) over 5 minutes into the vein via push every 8  "hours. Weekly labs: CBC with diff, CMP, ESR, CRP, CK total. Fax to Erica Nuñez MD at [phone] Infectious Disease. PICC line cares.       spironolactone (ALDACTONE) 25 MG tablet Take 25 mg by mouth daily.       vitamin C with B complex (B COMPLEX-C) tablet Take 1 tablet by mouth daily.       No current facility-administered medications for this visit.       REVIEW OF SYSTEMS:   10 point review of systems reviewed and pertinent positives in the HPI.     PHYSICAL EXAMINATION:  Physical Exam     Vital signs: /77   Pulse 88   Temp 97.4  F (36.3  C)   Resp 18   Ht 1.727 m (5' 8\")   Wt 99.1 kg (218 lb 6.4 oz)   SpO2 97%   BMI 33.21 kg/m    General: Awake, Alert, oriented x3, sitting up in wheelchair, follows simple commands, conversant  HEENT: Pink conjunctiva, moist oral mucosa  NECK: Supple  CVS:  S1  S2, without murmur or gallop.   LUNG: Clear to auscultation, No wheezes, rales or rhonci.  BACK: No kyphosis of the thoracic spine  ABDOMEN: Soft, nontender to palpation, with positive bowel sounds  EXTREMITIES: Moves both upper and lower extremities with limitation to RLE, Trace pedal edema. Multi podus boot to RLE.   SKIN: Wound VAC to RLE.   NEUROLOGIC: Intact, pulses palpable  PSYCHIATRIC: Cognition intact      Labs:  All labs reviewed in the nursing home record and Epic   @  Lab Results   Component Value Date    WBC 9.0 11/04/2024     Lab Results   Component Value Date    RBC 3.64 11/04/2024     Lab Results   Component Value Date    HGB 10.5 11/04/2024     Lab Results   Component Value Date    HCT 33.1 11/04/2024     Lab Results   Component Value Date    MCV 91 11/04/2024     Lab Results   Component Value Date    MCH 28.8 11/04/2024     Lab Results   Component Value Date    MCHC 31.7 11/04/2024     Lab Results   Component Value Date    RDW 15.8 11/04/2024     Lab Results   Component Value Date     11/04/2024        @Last Comprehensive Metabolic Panel:  Sodium   Date Value Ref Range Status "   11/04/2024 139 135 - 145 mmol/L Final     Potassium   Date Value Ref Range Status   11/04/2024 4.3 3.4 - 5.3 mmol/L Final   07/06/2020 4.7 3.5 - 5.0 mmol/L Final     Chloride   Date Value Ref Range Status   11/04/2024 106 98 - 107 mmol/L Final   07/06/2020 103 98 - 107 mmol/L Final     Carbon Dioxide (CO2)   Date Value Ref Range Status   11/04/2024 22 22 - 29 mmol/L Final   07/06/2020 22 22 - 31 mmol/L Final     Anion Gap   Date Value Ref Range Status   11/04/2024 11 7 - 15 mmol/L Final   07/06/2020 14 5 - 18 mmol/L Final     Glucose   Date Value Ref Range Status   11/04/2024 96 70 - 99 mg/dL Final   07/06/2020 102 70 - 125 mg/dL Final     GLUCOSE BY METER POCT   Date Value Ref Range Status   11/01/2024 140 (H) 70 - 99 mg/dL Final     Urea Nitrogen   Date Value Ref Range Status   11/04/2024 34.8 (H) 8.0 - 23.0 mg/dL Final   07/06/2020 30 (H) 8 - 28 mg/dL Final     Creatinine   Date Value Ref Range Status   11/04/2024 1.31 (H) 0.67 - 1.17 mg/dL Final     GFR Estimate   Date Value Ref Range Status   11/04/2024 56 (L) >60 mL/min/1.73m2 Final   07/06/2020 55 (L) >60 mL/min/1.73m2 Final     Calcium   Date Value Ref Range Status   11/04/2024 9.2 8.8 - 10.4 mg/dL Final     Comment:     Reference intervals for this test were updated on 7/16/2024 to reflect our healthy population more accurately. There may be differences in the flagging of prior results with similar values performed with this method. Those prior results can be interpreted in the context of the updated reference intervals.     This note has been dictated using voice recognition software. Any grammatical or context distortions are unintentional and inherent to the software    Electronically signed by: Treasure Dumont CNP       Sincerely,        Treasure Dumont NP

## 2024-11-07 NOTE — PROGRESS NOTES
[facility] GERIATRIC SERVICES    Code Status:  DNR/DNI   Visit Type:   Chief Complaint   Patient presents with    TCU Follow Up     Facility:  [facility] ([facility]) [[id number]]         HPI: Janice Nowak is a 79 year old male who I am seeing today for follow up on the TCU. Past medical history includes insulin dependent type 2 diabetes mellitus, HTN, CKD, chronic normocytic anemia, thrombocytopenia, HL and hypothyroidism. Pt recently hospitalized with worsening ulcer to right heel. Pt found to have acute osteomyelitis of the right heel. He underwent I&D and partial calcnectomy on 10/24. Pt seen by ID and place on IV antibiotics for 6 weeks. Post operative hypotension, improved with fluids. ARB reduced during hospitalization and CCB held. DM2 blood sugars elevated in the afternoon with lows in morning in hospital. Home insulin decreased.     Transitional Care Course: Today pt sitting up in wheelchair. Right heel s/p I & D. He continues in wound VAC. VAC changed yesterday and wound showed good granulation. Moderate serosanguinous drainage. No redness or warmth. He continues on IV anbx X 2. Today he is reporting diarrhea today. He reports 1-2 loose stools per day. He denies any abdominal pain. No further hypotension. DM2. Blood sugars satisfactory. Pt denies any pain.        Assessment/Plan:      Acute osteomyelitis of the right calcaneus  Ulceration right heel   -s/p I & D with partial calcanectomy 10/24.   -ID following. (Dr. Erica Nuñez)   -DAPTOmycin 500 mg Q 24 hours X 6 weeks.   -Zosyn Q 8 hours X 6 weeks.   -Weekly labs: CBC with diff, CMP, ESR, CRP, CK total. No leukocytosis, ESR 81, CRP <3.00.   -PICC line cares.  -Wound VAC changes Q Tues and Friday.   -MPB at all times to RLE.   -NWB to RLE.   -F/U with Vascular 11/8  -F/U with Podiatry 11/13     Diarrhea  -pt reporting loose stools.  -Check stool for C diff.     HTN  -with othostatic hypotension in hospital   -Bp improved but continues to  fluctuate.   -Avarpo 150 mg daily.   -irbesartan 150 mg Q HS.   -amlodipine continues to be hold. (Consider restarting if orthostatic hypotension resolves)  -Monitor bp.     CKD on CAROLINE  -Baseline creatinine around 1.3-1.4.   -Follow up BMP with Creatine stable at 1.31.      Chronic normocytic anemia   thrombocytopenia  -Hemoglobin stable 10.8.  -Follow up CBC with Hgb 10.5.      insulin-dependent type 2 diabetes mellitus with hyperglycemia  Episodes of hypoglycemia   -A1c 9.2.   -metformin 2000 mg Q evening.   -steglatro 5 mg daily.   -glargine 35 units in am.   -Aspart 5 units with. Hold is BS < 100.   -Home regimen  ( 100 lantus qAM, 25 novolog prior to bedtime as well as bolus sliding scale -20 units)   -consistent carb diet.   -Blood sugar checks QID.        Active Ambulatory Problems     Diagnosis Date Noted    Type 2 Diabetes Mellitus     Microalbuminuria     Benign Adenomatous Polyp Of The Large Intestine     Hyperlipidemia     Obstructive Sleep Apnea     Hypertension     Diabetic foot infection (H) 10/07/2024    Type 2 diabetes mellitus with diabetic peripheral angiopathy without gangrene, with long-term current use of insulin (H) 10/07/2024    Diabetic ulcer of right heel associated with type 2 diabetes mellitus, with necrosis of muscle (H) 10/16/2024    Carotid arterial disease (H) 10/29/2024    Atypical chest pain 11/12/2015    Charcot joint of foot 10/29/2024    Charcot's arthropathy 10/29/2024    Diabetic neuropathy (H) 10/29/2024    Exposure to potentially hazardous substance 10/29/2024    Fatigue 11/12/2015    Pressure ulcer of right heel, unstageable (H) 10/29/2024    Occlusion and stenosis of unspecified carotid artery 10/29/2024    Postoperative back pain 10/29/2024    Hypothyroidism 10/29/2024    Hypothyroidism, unspecified 10/29/2024    History of cholecystectomy 10/29/2024    Hearing loss 10/29/2024     Resolved Ambulatory Problems     Diagnosis Date Noted    No Resolved Ambulatory Problems      Past Medical History:   Diagnosis Date    Diabetes (H)     Hypertension     Sleep apnea     Thyroid disease      Allergies   Allergen Reactions    Exenatide Unknown    Heparin Unknown    Liraglutide Unknown    Lisinopril Cough    Morphine Unknown       All Meds and Allergies reviewed in the record at the facility and is the most up-to-date.    Current Outpatient Medications   Medication Sig Dispense Refill    aspirin 81 MG EC tablet Take 81 mg by mouth daily.      atorvastatin (LIPITOR) 80 MG tablet Take 80 mg by mouth at bedtime.      clopidogrel (PLAVIX) 75 MG tablet Take 75 mg by mouth daily.      co-enzyme Q-10 100 MG CAPS capsule Take 200 mg by mouth daily.      DAPTOmycin 500 mg Inject 500 mg over 30 minutes into the vein every 24 hours. Weekly labs: CBC with diff, CMP, ESR, CRP, CK total. Fax to Erica Nuñez MD at [phone] Infectious Disease. PICC line cares.      ertugliflozin (STEGLATRO) 5 MG TABS Take 5 mg by mouth every morning.      insulin aspart (NOVOLOG FLEXPEN) 100 UNIT/ML pen Inject 5 Units subcutaneously 3 times daily (with meals). Novolog Flexpen: 5 units with breakfast, 5 units with lunch, 5 units with dinner. 15 mL 0    insulin glargine (LANTUS PEN) 100 UNIT/ML pen Inject 35 Units subcutaneously every morning. 15 mL 0    lactobacillus rhamnosus, GG, (CULTURELL) capsule Take 1 capsule by mouth daily.      levothyroxine (SYNTHROID/LEVOTHROID) 50 MCG tablet Take 50 mcg by mouth daily.      losartan (COZAAR) 50 MG tablet Take 50 mg by mouth at bedtime.      metFORMIN (GLUCOPHAGE XR) 500 MG 24 hr tablet Take 2,000 mg by mouth daily (with dinner).      Multiple Vitamins-Minerals (PRESERVISION AREDS 2 PO) Take 2 capsules by mouth daily.      piperacillin sod-tazobactam (ZOSYN) SOLR injection Inject 15 mLs (3.375 g) over 5 minutes into the vein via push every 8 hours. Weekly labs: CBC with diff, CMP, ESR, CRP, CK total. Fax to Erica Nuñez MD at [phone] Infectious Disease. PICC line  "cares.      spironolactone (ALDACTONE) 25 MG tablet Take 25 mg by mouth daily.      vitamin C with B complex (B COMPLEX-C) tablet Take 1 tablet by mouth daily.       No current facility-administered medications for this visit.       REVIEW OF SYSTEMS:   10 point review of systems reviewed and pertinent positives in the HPI.     PHYSICAL EXAMINATION:  Physical Exam     Vital signs: /77   Pulse 88   Temp 97.4  F (36.3  C)   Resp 18   Ht 1.727 m (5' 8\")   Wt 99.1 kg (218 lb 6.4 oz)   SpO2 97%   BMI 33.21 kg/m    General: Awake, Alert, oriented x3, sitting up in wheelchair, follows simple commands, conversant  HEENT: Pink conjunctiva, moist oral mucosa  NECK: Supple  CVS:  S1  S2, without murmur or gallop.   LUNG: Clear to auscultation, No wheezes, rales or rhonci.  BACK: No kyphosis of the thoracic spine  ABDOMEN: Soft, nontender to palpation, with positive bowel sounds  EXTREMITIES: Moves both upper and lower extremities with limitation to RLE, Trace pedal edema. Multi podus boot to RLE.   SKIN: Wound VAC to RLE.   NEUROLOGIC: Intact, pulses palpable  PSYCHIATRIC: Cognition intact      Labs:  All labs reviewed in the nursing home record and Epic   @  Lab Results   Component Value Date    WBC 9.0 11/04/2024     Lab Results   Component Value Date    RBC 3.64 11/04/2024     Lab Results   Component Value Date    HGB 10.5 11/04/2024     Lab Results   Component Value Date    HCT 33.1 11/04/2024     Lab Results   Component Value Date    MCV 91 11/04/2024     Lab Results   Component Value Date    MCH 28.8 11/04/2024     Lab Results   Component Value Date    MCHC 31.7 11/04/2024     Lab Results   Component Value Date    RDW 15.8 11/04/2024     Lab Results   Component Value Date     11/04/2024        @Last Comprehensive Metabolic Panel:  Sodium   Date Value Ref Range Status   11/04/2024 139 135 - 145 mmol/L Final     Potassium   Date Value Ref Range Status   11/04/2024 4.3 3.4 - 5.3 mmol/L Final   07/06/2020 " 4.7 3.5 - 5.0 mmol/L Final     Chloride   Date Value Ref Range Status   11/04/2024 106 98 - 107 mmol/L Final   07/06/2020 103 98 - 107 mmol/L Final     Carbon Dioxide (CO2)   Date Value Ref Range Status   11/04/2024 22 22 - 29 mmol/L Final   07/06/2020 22 22 - 31 mmol/L Final     Anion Gap   Date Value Ref Range Status   11/04/2024 11 7 - 15 mmol/L Final   07/06/2020 14 5 - 18 mmol/L Final     Glucose   Date Value Ref Range Status   11/04/2024 96 70 - 99 mg/dL Final   07/06/2020 102 70 - 125 mg/dL Final     GLUCOSE BY METER POCT   Date Value Ref Range Status   11/01/2024 140 (H) 70 - 99 mg/dL Final     Urea Nitrogen   Date Value Ref Range Status   11/04/2024 34.8 (H) 8.0 - 23.0 mg/dL Final   07/06/2020 30 (H) 8 - 28 mg/dL Final     Creatinine   Date Value Ref Range Status   11/04/2024 1.31 (H) 0.67 - 1.17 mg/dL Final     GFR Estimate   Date Value Ref Range Status   11/04/2024 56 (L) >60 mL/min/1.73m2 Final   07/06/2020 55 (L) >60 mL/min/1.73m2 Final     Calcium   Date Value Ref Range Status   11/04/2024 9.2 8.8 - 10.4 mg/dL Final     Comment:     Reference intervals for this test were updated on 7/16/2024 to reflect our healthy population more accurately. There may be differences in the flagging of prior results with similar values performed with this method. Those prior results can be interpreted in the context of the updated reference intervals.     This note has been dictated using voice recognition software. Any grammatical or context distortions are unintentional and inherent to the software    Electronically signed by: Treasure Dumont CNP

## 2024-11-08 ENCOUNTER — DOCUMENTATION ONLY (OUTPATIENT)
Dept: VASCULAR ULTRASOUND | Facility: CLINIC | Age: 79
End: 2024-11-08
Payer: COMMERCIAL

## 2024-11-08 ENCOUNTER — ANCILLARY PROCEDURE (OUTPATIENT)
Dept: VASCULAR ULTRASOUND | Facility: CLINIC | Age: 79
End: 2024-11-08
Attending: RADIOLOGY
Payer: COMMERCIAL

## 2024-11-08 DIAGNOSIS — Z13.6 ENCOUNTER FOR ABDOMINAL AORTIC ANEURYSM (AAA) SCREENING: ICD-10-CM

## 2024-11-08 DIAGNOSIS — Z13.6 ENCOUNTER FOR SCREENING FOR STENOSIS OF CAROTID ARTERY: ICD-10-CM

## 2024-11-08 DIAGNOSIS — R09.89 OTHER SPECIFIED SYMPTOMS AND SIGNS INVOLVING THE CIRCULATORY AND RESPIRATORY SYSTEMS: ICD-10-CM

## 2024-11-08 PROCEDURE — 93978 VASCULAR STUDY: CPT

## 2024-11-08 PROCEDURE — 93880 EXTRACRANIAL BILAT STUDY: CPT

## 2024-11-08 NOTE — PROGRESS NOTES
Janice Nowak came into the clinic today for an ultrasound on his carotid arteries and abdominal aorta. When I went to get him from the lobby to bring him to the ultrasound room I noticed he had some blood dripping and clotted blood from the left lateral lower leg. I put a 4 x 4 guaze on it and wrapped it with some roll guaze for first aid, performed my studies, spoke with a nurse who showed me how to enter documentation in EPIC and the rolled him back out to [facility] for discharge.

## 2024-11-11 ENCOUNTER — TRANSITIONAL CARE UNIT VISIT (OUTPATIENT)
Dept: GERIATRICS | Facility: CLINIC | Age: 79
End: 2024-11-11
Payer: COMMERCIAL

## 2024-11-11 ENCOUNTER — LAB REQUISITION (OUTPATIENT)
Dept: LAB | Facility: CLINIC | Age: 79
End: 2024-11-11
Payer: COMMERCIAL

## 2024-11-11 VITALS
OXYGEN SATURATION: 98 % | HEART RATE: 76 BPM | BODY MASS INDEX: 33.04 KG/M2 | TEMPERATURE: 97.4 F | WEIGHT: 218 LBS | DIASTOLIC BLOOD PRESSURE: 73 MMHG | HEIGHT: 68 IN | SYSTOLIC BLOOD PRESSURE: 108 MMHG | RESPIRATION RATE: 16 BRPM

## 2024-11-11 DIAGNOSIS — I10 HYPERTENSION, UNSPECIFIED TYPE: ICD-10-CM

## 2024-11-11 DIAGNOSIS — N17.9 ACUTE KIDNEY INJURY SUPERIMPOSED ON CHRONIC KIDNEY DISEASE (H): ICD-10-CM

## 2024-11-11 DIAGNOSIS — K86.9 PANCREATIC LESION: ICD-10-CM

## 2024-11-11 DIAGNOSIS — T14.8XXA MULTIPLE SKIN TEARS: ICD-10-CM

## 2024-11-11 DIAGNOSIS — M86.179 OTHER ACUTE OSTEOMYELITIS OF FOOT, UNSPECIFIED LATERALITY (H): ICD-10-CM

## 2024-11-11 DIAGNOSIS — L97.402 DIABETIC ULCER OF HEEL ASSOCIATED WITH DIABETES MELLITUS DUE TO UNDERLYING CONDITION, WITH FAT LAYER EXPOSED, UNSPECIFIED LATERALITY (H): Primary | ICD-10-CM

## 2024-11-11 DIAGNOSIS — Z79.4 TYPE 2 DIABETES MELLITUS WITH DIABETIC PERIPHERAL ANGIOPATHY WITHOUT GANGRENE, WITH LONG-TERM CURRENT USE OF INSULIN (H): ICD-10-CM

## 2024-11-11 DIAGNOSIS — E11.51 TYPE 2 DIABETES MELLITUS WITH DIABETIC PERIPHERAL ANGIOPATHY WITHOUT GANGRENE, WITH LONG-TERM CURRENT USE OF INSULIN (H): ICD-10-CM

## 2024-11-11 DIAGNOSIS — D69.6 THROMBOCYTOPENIA (H): ICD-10-CM

## 2024-11-11 DIAGNOSIS — E08.621 DIABETIC ULCER OF HEEL ASSOCIATED WITH DIABETES MELLITUS DUE TO UNDERLYING CONDITION, WITH FAT LAYER EXPOSED, UNSPECIFIED LATERALITY (H): Primary | ICD-10-CM

## 2024-11-11 DIAGNOSIS — M80.80XD OTHER OSTEOPOROSIS WITH CURRENT PATHOLOGICAL FRACTURE, UNSPECIFIED SITE, SUBSEQUENT ENCOUNTER FOR FRACTURE WITH ROUTINE HEALING: ICD-10-CM

## 2024-11-11 DIAGNOSIS — Z86.2 HISTORY OF ANEMIA OF CHRONIC DISEASE: ICD-10-CM

## 2024-11-11 DIAGNOSIS — N18.9 ACUTE KIDNEY INJURY SUPERIMPOSED ON CHRONIC KIDNEY DISEASE (H): ICD-10-CM

## 2024-11-11 DIAGNOSIS — D64.9 ANEMIA, UNSPECIFIED: ICD-10-CM

## 2024-11-11 PROCEDURE — 99310 SBSQ NF CARE HIGH MDM 45: CPT | Performed by: NURSE PRACTITIONER

## 2024-11-11 NOTE — PROGRESS NOTES
[facility] GERIATRIC SERVICES    Code Status:  DNR/DNI   Visit Type:   Chief Complaint   Patient presents with    TCU Follow Up     Facility:  [facility] ([facility]) [[id number]]         HPI: Janice Nowak is a 79 year old male who I am seeing today for follow up on the TCU. Past medical history includes insulin dependent type 2 diabetes mellitus, HTN, CKD, chronic normocytic anemia, thrombocytopenia, HL and hypothyroidism. Pt recently hospitalized with worsening ulcer to right heel. Pt found to have acute osteomyelitis of the right heel. He underwent I&D and partial calcnectomy on 10/24. Pt seen by ID and place on IV antibiotics for 6 weeks. Post operative hypotension, improved with fluids. ARB reduced during hospitalization and CCB held. DM2 blood sugars elevated in the afternoon with lows in morning in hospital. Home insulin decreased.     Transitional Care Course: Today pt sitting up in wheelchair. Right heel ulcer, s/p I & D. He continues in wound VAC. He continues on IV anbx Q 8 hours. He is very eager to discharge home. He continues to be NWB to RLE. He is in a MPB. He denies any pain. He is tolerating IV. He has soft stools. He is on probiotics. DM2. Blood sugars satisfactory. On 11/8 pt underwent US of bilateral carotids and abdominal US ordered by Vascular. US of carotids reviewed <50% stenosis. Pt incidentally found to have pancreatic lesion. I looked back in records and see no history of this. No previous abdominal imaging on file here. Pt unaware of concern or previous imaging. He denies any abdominal pain. Recommneded follow up MRCP.         Assessment/Plan:      Acute osteomyelitis of the right calcaneus  Ulceration right heel   -s/p I & D with partial calcanectomy 10/24.   -ID following. (Dr. Erica Nuñez)   -DAPTOmycin 500 mg Q 24 hours X 6 weeks.   -Zosyn Q 8 hours X 6 weeks.   -Weekly labs: CBC with diff, CMP, ESR, CRP, CK total. No leukocytosis, ESR 81, CRP <3.00.   -PICC line  cares.  -Wound VAC changes Q Tues and Friday.   -MPB at all times to RLE.   -NWB to RLE.   -F/U with Podiatry 11/13     Diarrhea  -pt reporting loose stools.  -C diff negative.   -Continue probiotics BID.     HTN  -with othostatic hypotension in hospital   -Bp improved but continues to fluctuate.   -Avarpo 150 mg daily.   -irbesartan 150 mg Q HS.   -amlodipine continues to be hold. (Consider restarting if orthostatic hypotension resolves)  -Monitor bp. Bp improving.     CKD on CAROLINE  -Baseline creatinine around 1.3-1.4.   -Follow up BMP with Creatine stable at 1.31.      Chronic normocytic anemia   thrombocytopenia  -Hemoglobin stable 10.8.  -Follow up CBC with Hgb 10.5.      insulin-dependent type 2 diabetes mellitus with hyperglycemia  Episodes of hypoglycemia   -A1c 9.2.   -metformin 2000 mg Q evening.   -steglatro 5 mg daily.   -glargine 35 units in am.   -Aspart 5 units with. Hold is BS < 100.   -Home regimen  ( 100 lantus qAM, 25 novolog prior to bedtime as well as bolus sliding scale -20 units)   -consistent carb diet.   -Blood sugar checks QID.      Pancreatic Lesion  -incidentally found on abdominal US ordered by Vascular.   - US 11/08-Hypoechoic, avascular, cystic lesion located in the pancreatic head space with no peristalsis or color flow seen measuring 7.8 x 4.0 cm.Patient has hx of cholecystectomy. Recommend other imaging modality for a better view. Possible posterior pancreatic head lesion or cyst. No evidence of abdominal aortic aneurysm.  Cystic lesion adjacent to the pancreatic head.  Further evaluation with an MRCP is suggested.  -Schedule MRCP with [facility].   -Follow up out pt with [facility].   -Add amylase, lipase and LFTs to next lab day.     Mx skin tears  -wound care daily and prn.     Active Ambulatory Problems     Diagnosis Date Noted    Type 2 Diabetes Mellitus     Microalbuminuria     Benign Adenomatous Polyp Of The Large Intestine     Hyperlipidemia     Obstructive Sleep Apnea      Hypertension     Diabetic foot infection (H) 10/07/2024    Type 2 diabetes mellitus with diabetic peripheral angiopathy without gangrene, with long-term current use of insulin (H) 10/07/2024    Diabetic ulcer of right heel associated with type 2 diabetes mellitus, with necrosis of muscle (H) 10/16/2024    Carotid arterial disease (H) 10/29/2024    Atypical chest pain 11/12/2015    Charcot joint of foot 10/29/2024    Charcot's arthropathy 10/29/2024    Diabetic neuropathy (H) 10/29/2024    Exposure to potentially hazardous substance 10/29/2024    Fatigue 11/12/2015    Pressure ulcer of right heel, unstageable (H) 10/29/2024    Occlusion and stenosis of unspecified carotid artery 10/29/2024    Postoperative back pain 10/29/2024    Hypothyroidism 10/29/2024    Hypothyroidism, unspecified 10/29/2024    History of cholecystectomy 10/29/2024    Hearing loss 10/29/2024     Resolved Ambulatory Problems     Diagnosis Date Noted    No Resolved Ambulatory Problems     Past Medical History:   Diagnosis Date    Diabetes (H)     Hypertension     Sleep apnea     Thyroid disease      Allergies   Allergen Reactions    Exenatide Unknown    Heparin Unknown    Liraglutide Unknown    Lisinopril Cough    Morphine Unknown       All Meds and Allergies reviewed in the record at the facility and is the most up-to-date.    Current Outpatient Medications   Medication Sig Dispense Refill    aspirin 81 MG EC tablet Take 81 mg by mouth daily.      atorvastatin (LIPITOR) 80 MG tablet Take 80 mg by mouth at bedtime.      clopidogrel (PLAVIX) 75 MG tablet Take 75 mg by mouth daily.      co-enzyme Q-10 100 MG CAPS capsule Take 200 mg by mouth daily.      DAPTOmycin 500 mg Inject 500 mg over 30 minutes into the vein every 24 hours. Weekly labs: CBC with diff, CMP, ESR, CRP, CK total. Fax to Erica Nuñez MD at [phone] Infectious Disease. PICC line cares.      ertugliflozin (STEGLATRO) 5 MG TABS Take 5 mg by mouth every morning.      insulin  "aspart (NOVOLOG FLEXPEN) 100 UNIT/ML pen Inject 5 Units subcutaneously 3 times daily (with meals). Novolog Flexpen: 5 units with breakfast, 5 units with lunch, 5 units with dinner. 15 mL 0    insulin glargine (LANTUS PEN) 100 UNIT/ML pen Inject 35 Units subcutaneously every morning. 15 mL 0    lactobacillus rhamnosus, GG, (CULTURELL) capsule Take 1 capsule by mouth daily.      levothyroxine (SYNTHROID/LEVOTHROID) 50 MCG tablet Take 50 mcg by mouth daily.      losartan (COZAAR) 50 MG tablet Take 50 mg by mouth at bedtime.      metFORMIN (GLUCOPHAGE XR) 500 MG 24 hr tablet Take 2,000 mg by mouth daily (with dinner).      Multiple Vitamins-Minerals (PRESERVISION AREDS 2 PO) Take 2 capsules by mouth daily.      piperacillin sod-tazobactam (ZOSYN) SOLR injection Inject 15 mLs (3.375 g) over 5 minutes into the vein via push every 8 hours. Weekly labs: CBC with diff, CMP, ESR, CRP, CK total. Fax to Erica Nuñez MD at [phone] Infectious Disease. PICC line cares.      spironolactone (ALDACTONE) 25 MG tablet Take 25 mg by mouth daily.      vitamin C with B complex (B COMPLEX-C) tablet Take 1 tablet by mouth daily.       No current facility-administered medications for this visit.       REVIEW OF SYSTEMS:   10 point review of systems reviewed and pertinent positives in the HPI.     PHYSICAL EXAMINATION:  Physical Exam     Vital signs: /73   Pulse 76   Temp 97.4  F (36.3  C)   Resp 16   Ht 1.727 m (5' 8\")   Wt 98.9 kg (218 lb)   SpO2 98%   BMI 33.15 kg/m    General: Awake, Alert, oriented x3, sitting up in wheelchair, follows simple commands, conversant  HEENT: Pink conjunctiva, moist oral mucosa  NECK: Supple  CVS:  S1  S2, without murmur or gallop.   LUNG: Clear to auscultation, No wheezes, rales or rhonci.  BACK: No kyphosis of the thoracic spine  ABDOMEN: Soft, nontender to palpation, with positive bowel sounds  EXTREMITIES: Moves both upper and lower extremities with limitation to RLE, Trace pedal " edema. Multi podus boot to RLE.   SKIN: Wound VAC to RLE. Mx skin tears to arms. Skin thin and fragile. Bleeding noted from PIC line site.   NEUROLOGIC: Intact, pulses palpable  PSYCHIATRIC: Cognition intact      Labs:  All labs reviewed in the nursing home record and Epic   @  Lab Results   Component Value Date    WBC 9.0 11/04/2024     Lab Results   Component Value Date    RBC 3.64 11/04/2024     Lab Results   Component Value Date    HGB 10.5 11/04/2024     Lab Results   Component Value Date    HCT 33.1 11/04/2024     Lab Results   Component Value Date    MCV 91 11/04/2024     Lab Results   Component Value Date    MCH 28.8 11/04/2024     Lab Results   Component Value Date    MCHC 31.7 11/04/2024     Lab Results   Component Value Date    RDW 15.8 11/04/2024     Lab Results   Component Value Date     11/04/2024        @Last Comprehensive Metabolic Panel:  Sodium   Date Value Ref Range Status   11/04/2024 139 135 - 145 mmol/L Final     Potassium   Date Value Ref Range Status   11/04/2024 4.3 3.4 - 5.3 mmol/L Final   07/06/2020 4.7 3.5 - 5.0 mmol/L Final     Chloride   Date Value Ref Range Status   11/04/2024 106 98 - 107 mmol/L Final   07/06/2020 103 98 - 107 mmol/L Final     Carbon Dioxide (CO2)   Date Value Ref Range Status   11/04/2024 22 22 - 29 mmol/L Final   07/06/2020 22 22 - 31 mmol/L Final     Anion Gap   Date Value Ref Range Status   11/04/2024 11 7 - 15 mmol/L Final   07/06/2020 14 5 - 18 mmol/L Final     Glucose   Date Value Ref Range Status   11/04/2024 96 70 - 99 mg/dL Final   07/06/2020 102 70 - 125 mg/dL Final     GLUCOSE BY METER POCT   Date Value Ref Range Status   11/01/2024 140 (H) 70 - 99 mg/dL Final     Urea Nitrogen   Date Value Ref Range Status   11/04/2024 34.8 (H) 8.0 - 23.0 mg/dL Final   07/06/2020 30 (H) 8 - 28 mg/dL Final     Creatinine   Date Value Ref Range Status   11/04/2024 1.31 (H) 0.67 - 1.17 mg/dL Final     GFR Estimate   Date Value Ref Range Status   11/04/2024 56 (L) >60  mL/min/1.73m2 Final   07/06/2020 55 (L) >60 mL/min/1.73m2 Final     Calcium   Date Value Ref Range Status   11/04/2024 9.2 8.8 - 10.4 mg/dL Final     Comment:     Reference intervals for this test were updated on 7/16/2024 to reflect our healthy population more accurately. There may be differences in the flagging of prior results with similar values performed with this method. Those prior results can be interpreted in the context of the updated reference intervals.     > 45 minutes spent for this visit which included reviewing results of imaging and findings as well as follow ups and collaborating with nursing staff.     This note has been dictated using voice recognition software. Any grammatical or context distortions are unintentional and inherent to the software    Electronically signed by: Treasure Dumont CNP

## 2024-11-11 NOTE — LETTER
11/11/2024      Janice Nowak  [address]  [facility] [id number]        [facility]    Code Status:  DNR/DNI   Visit Type:   Chief Complaint   Patient presents with     TCU Follow Up     Facility:  [facility] ([facility]) [[id number]]         HPI: Janice Nowak is a 79 year old male who I am seeing today for follow up on the TCU. Past medical history includes insulin dependent type 2 diabetes mellitus, HTN, CKD, chronic normocytic anemia, thrombocytopenia, HL and hypothyroidism. Pt recently hospitalized with worsening ulcer to right heel. Pt found to have acute osteomyelitis of the right heel. He underwent I&D and partial calcnectomy on 10/24. Pt seen by ID and place on IV antibiotics for 6 weeks. Post operative hypotension, improved with fluids. ARB reduced during hospitalization and CCB held. DM2 blood sugars elevated in the afternoon with lows in morning in hospital. Home insulin decreased.     Transitional Care Course: Today pt sitting up in wheelchair. Right heel ulcer, s/p I & D. He continues in wound VAC. He continues on IV anbx Q 8 hours. He is very eager to discharge home. He continues to be NWB to RLE. He is in a MPB. He denies any pain. He is tolerating IV. He has soft stools. He is on probiotics. DM2. Blood sugars satisfactory. On 11/8 pt underwent US of bilateral carotids and abdominal US ordered by Vascular. US of carotids reviewed <50% stenosis. Pt incidentally found to have pancreatic lesion. I looked back in records and see no history of this. No previous abdominal imaging on file here. Pt unaware of concern or previous imaging. He denies any abdominal pain. Recommneded follow up MRCP.         Assessment/Plan:      Acute osteomyelitis of the right calcaneus  Ulceration right heel   -s/p I & D with partial calcanectomy 10/24.   -ID following. (Dr. Erica Nuñez)   -DAPTOmycin 500 mg Q 24 hours X 6 weeks.   -Zosyn Q 8 hours X 6 weeks.   -Weekly labs:  CBC with diff, CMP, ESR, CRP, CK total. No leukocytosis, ESR 81, CRP <3.00.   -PICC line cares.  -Wound VAC changes Q Tues and Friday.   -MPB at all times to RLE.   -NWB to RLE.   -F/U with Podiatry 11/13     Diarrhea  -pt reporting loose stools.  -C diff negative.   -Continue probiotics BID.     HTN  -with othostatic hypotension in hospital   -Bp improved but continues to fluctuate.   -Avarpo 150 mg daily.   -irbesartan 150 mg Q HS.   -amlodipine continues to be hold. (Consider restarting if orthostatic hypotension resolves)  -Monitor bp. Bp improving.     CKD on CAROLINE  -Baseline creatinine around 1.3-1.4.   -Follow up BMP with Creatine stable at 1.31.      Chronic normocytic anemia   thrombocytopenia  -Hemoglobin stable 10.8.  -Follow up CBC with Hgb 10.5.      insulin-dependent type 2 diabetes mellitus with hyperglycemia  Episodes of hypoglycemia   -A1c 9.2.   -metformin 2000 mg Q evening.   -steglatro 5 mg daily.   -glargine 35 units in am.   -Aspart 5 units with. Hold is BS < 100.   -Home regimen  ( 100 lantus qAM, 25 novolog prior to bedtime as well as bolus sliding scale -20 units)   -consistent carb diet.   -Blood sugar checks QID.      Pancreatic Lesion  -incidentally found on abdominal US ordered by Vascular.   - US 11/08-Hypoechoic, avascular, cystic lesion located in the pancreatic head space with no peristalsis or color flow seen measuring 7.8 x 4.0 cm.Patient has hx of cholecystectomy. Recommend other imaging modality for a better view. Possible posterior pancreatic head lesion or cyst. No evidence of abdominal aortic aneurysm.  Cystic lesion adjacent to the pancreatic head.  Further evaluation with an MRCP is suggested.  -Schedule MRCP with [place] [facility]ology.   -Follow up out pt with [facility].   -Add amylase, lipase and LFTs to next lab day.     Mx skin tears  -wound care daily and prn.     Active Ambulatory Problems     Diagnosis Date Noted     Type 2 Diabetes Mellitus      Microalbuminuria      Benign  Adenomatous Polyp Of The Large Intestine      Hyperlipidemia      Obstructive Sleep Apnea      Hypertension      Diabetic foot infection (H) 10/07/2024     Type 2 diabetes mellitus with diabetic peripheral angiopathy without gangrene, with long-term current use of insulin (H) 10/07/2024     Diabetic ulcer of right heel associated with type 2 diabetes mellitus, with necrosis of muscle (H) 10/16/2024     Carotid arterial disease (H) 10/29/2024     Atypical chest pain 11/12/2015     Charcot joint of foot 10/29/2024     Charcot's arthropathy 10/29/2024     Diabetic neuropathy (H) 10/29/2024     Exposure to potentially hazardous substance 10/29/2024     Fatigue 11/12/2015     Pressure ulcer of right heel, unstageable (H) 10/29/2024     Occlusion and stenosis of unspecified carotid artery 10/29/2024     Postoperative back pain 10/29/2024     Hypothyroidism 10/29/2024     Hypothyroidism, unspecified 10/29/2024     History of cholecystectomy 10/29/2024     Hearing loss 10/29/2024     Resolved Ambulatory Problems     Diagnosis Date Noted     No Resolved Ambulatory Problems     Past Medical History:   Diagnosis Date     Diabetes (H)      Hypertension      Sleep apnea      Thyroid disease      Allergies   Allergen Reactions     Exenatide Unknown     Heparin Unknown     Liraglutide Unknown     Lisinopril Cough     Morphine Unknown       All Meds and Allergies reviewed in the record at the facility and is the most up-to-date.    Current Outpatient Medications   Medication Sig Dispense Refill     aspirin 81 MG EC tablet Take 81 mg by mouth daily.       atorvastatin (LIPITOR) 80 MG tablet Take 80 mg by mouth at bedtime.       clopidogrel (PLAVIX) 75 MG tablet Take 75 mg by mouth daily.       co-enzyme Q-10 100 MG CAPS capsule Take 200 mg by mouth daily.       DAPTOmycin 500 mg Inject 500 mg over 30 minutes into the vein every 24 hours. Weekly labs: CBC with diff, CMP, ESR, CRP, CK total. Fax to Erica Nuñez MD at [phone]  "Infectious Disease. PICC line cares.       ertugliflozin (STEGLATRO) 5 MG TABS Take 5 mg by mouth every morning.       insulin aspart (NOVOLOG FLEXPEN) 100 UNIT/ML pen Inject 5 Units subcutaneously 3 times daily (with meals). Novolog Flexpen: 5 units with breakfast, 5 units with lunch, 5 units with dinner. 15 mL 0     insulin glargine (LANTUS PEN) 100 UNIT/ML pen Inject 35 Units subcutaneously every morning. 15 mL 0     lactobacillus rhamnosus, GG, (CULTURELL) capsule Take 1 capsule by mouth daily.       levothyroxine (SYNTHROID/LEVOTHROID) 50 MCG tablet Take 50 mcg by mouth daily.       losartan (COZAAR) 50 MG tablet Take 50 mg by mouth at bedtime.       metFORMIN (GLUCOPHAGE XR) 500 MG 24 hr tablet Take 2,000 mg by mouth daily (with dinner).       Multiple Vitamins-Minerals (PRESERVISION AREDS 2 PO) Take 2 capsules by mouth daily.       piperacillin sod-tazobactam (ZOSYN) SOLR injection Inject 15 mLs (3.375 g) over 5 minutes into the vein via push every 8 hours. Weekly labs: CBC with diff, CMP, ESR, CRP, CK total. Fax to Erica Nuñez MD at [phone] Infectious Disease. PICC line cares.       spironolactone (ALDACTONE) 25 MG tablet Take 25 mg by mouth daily.       vitamin C with B complex (B COMPLEX-C) tablet Take 1 tablet by mouth daily.       No current facility-administered medications for this visit.       REVIEW OF SYSTEMS:   10 point review of systems reviewed and pertinent positives in the HPI.     PHYSICAL EXAMINATION:  Physical Exam     Vital signs: /73   Pulse 76   Temp 97.4  F (36.3  C)   Resp 16   Ht 1.727 m (5' 8\")   Wt 98.9 kg (218 lb)   SpO2 98%   BMI 33.15 kg/m    General: Awake, Alert, oriented x3, sitting up in wheelchair, follows simple commands, conversant  HEENT: Pink conjunctiva, moist oral mucosa  NECK: Supple  CVS:  S1  S2, without murmur or gallop.   LUNG: Clear to auscultation, No wheezes, rales or rhonci.  BACK: No kyphosis of the thoracic spine  ABDOMEN: Soft, " nontender to palpation, with positive bowel sounds  EXTREMITIES: Moves both upper and lower extremities with limitation to RLE, Trace pedal edema. Multi podus boot to RLE.   SKIN: Wound VAC to RLE. Mx skin tears to arms. Skin thin and fragile. Bleeding noted from PIC line site.   NEUROLOGIC: Intact, pulses palpable  PSYCHIATRIC: Cognition intact      Labs:  All labs reviewed in the nursing home record and Epic   @  Lab Results   Component Value Date    WBC 9.0 11/04/2024     Lab Results   Component Value Date    RBC 3.64 11/04/2024     Lab Results   Component Value Date    HGB 10.5 11/04/2024     Lab Results   Component Value Date    HCT 33.1 11/04/2024     Lab Results   Component Value Date    MCV 91 11/04/2024     Lab Results   Component Value Date    MCH 28.8 11/04/2024     Lab Results   Component Value Date    MCHC 31.7 11/04/2024     Lab Results   Component Value Date    RDW 15.8 11/04/2024     Lab Results   Component Value Date     11/04/2024        @Last Comprehensive Metabolic Panel:  Sodium   Date Value Ref Range Status   11/04/2024 139 135 - 145 mmol/L Final     Potassium   Date Value Ref Range Status   11/04/2024 4.3 3.4 - 5.3 mmol/L Final   07/06/2020 4.7 3.5 - 5.0 mmol/L Final     Chloride   Date Value Ref Range Status   11/04/2024 106 98 - 107 mmol/L Final   07/06/2020 103 98 - 107 mmol/L Final     Carbon Dioxide (CO2)   Date Value Ref Range Status   11/04/2024 22 22 - 29 mmol/L Final   07/06/2020 22 22 - 31 mmol/L Final     Anion Gap   Date Value Ref Range Status   11/04/2024 11 7 - 15 mmol/L Final   07/06/2020 14 5 - 18 mmol/L Final     Glucose   Date Value Ref Range Status   11/04/2024 96 70 - 99 mg/dL Final   07/06/2020 102 70 - 125 mg/dL Final     GLUCOSE BY METER POCT   Date Value Ref Range Status   11/01/2024 140 (H) 70 - 99 mg/dL Final     Urea Nitrogen   Date Value Ref Range Status   11/04/2024 34.8 (H) 8.0 - 23.0 mg/dL Final   07/06/2020 30 (H) 8 - 28 mg/dL Final     Creatinine   Date  Value Ref Range Status   11/04/2024 1.31 (H) 0.67 - 1.17 mg/dL Final     GFR Estimate   Date Value Ref Range Status   11/04/2024 56 (L) >60 mL/min/1.73m2 Final   07/06/2020 55 (L) >60 mL/min/1.73m2 Final     Calcium   Date Value Ref Range Status   11/04/2024 9.2 8.8 - 10.4 mg/dL Final     Comment:     Reference intervals for this test were updated on 7/16/2024 to reflect our healthy population more accurately. There may be differences in the flagging of prior results with similar values performed with this method. Those prior results can be interpreted in the context of the updated reference intervals.     > 45 minutes spent for this visit which included reviewing results of imaging and findings as well as follow ups and collaborating with nursing staff.     This note has been dictated using voice recognition software. Any grammatical or context distortions are unintentional and inherent to the software    Electronically signed by: Treasure Dumont CNP       Sincerely,        Treasure Dumont NP

## 2024-11-12 DIAGNOSIS — K66.8 CYSTIC LESION OF ABDOMINAL VISCERA: Primary | ICD-10-CM

## 2024-11-12 LAB
ALBUMIN SERPL BCG-MCNC: 3.7 G/DL (ref 3.5–5.2)
ALBUMIN SERPL BCG-MCNC: 3.8 G/DL (ref 3.5–5.2)
ALP SERPL-CCNC: 74 U/L (ref 40–150)
ALP SERPL-CCNC: 77 U/L (ref 40–150)
ALT SERPL W P-5'-P-CCNC: 40 U/L (ref 0–70)
ALT SERPL W P-5'-P-CCNC: 42 U/L (ref 0–70)
AMYLASE SERPL-CCNC: 51 U/L (ref 28–100)
ANION GAP SERPL CALCULATED.3IONS-SCNC: 12 MMOL/L (ref 7–15)
AST SERPL W P-5'-P-CCNC: 36 U/L (ref 0–45)
AST SERPL W P-5'-P-CCNC: 38 U/L (ref 0–45)
BASOPHILS # BLD AUTO: 0.1 10E3/UL (ref 0–0.2)
BASOPHILS NFR BLD AUTO: 1 %
BILIRUB DIRECT SERPL-MCNC: <0.2 MG/DL (ref 0–0.3)
BILIRUB SERPL-MCNC: 0.5 MG/DL
BILIRUB SERPL-MCNC: 0.5 MG/DL
BUN SERPL-MCNC: 34.8 MG/DL (ref 8–23)
CALCIUM SERPL-MCNC: 9.2 MG/DL (ref 8.8–10.4)
CHLORIDE SERPL-SCNC: 104 MMOL/L (ref 98–107)
CK SERPL-CCNC: 148 U/L (ref 39–308)
CREAT SERPL-MCNC: 1.27 MG/DL (ref 0.67–1.17)
CRP SERPL-MCNC: 6.63 MG/L
EGFRCR SERPLBLD CKD-EPI 2021: 58 ML/MIN/1.73M2
EOSINOPHIL # BLD AUTO: 0.4 10E3/UL (ref 0–0.7)
EOSINOPHIL NFR BLD AUTO: 4 %
ERYTHROCYTE [DISTWIDTH] IN BLOOD BY AUTOMATED COUNT: 16.3 % (ref 10–15)
ERYTHROCYTE [SEDIMENTATION RATE] IN BLOOD BY WESTERGREN METHOD: 57 MM/HR (ref 0–20)
GLUCOSE SERPL-MCNC: 158 MG/DL (ref 70–99)
HCO3 SERPL-SCNC: 21 MMOL/L (ref 22–29)
HCT VFR BLD AUTO: 33 % (ref 40–53)
HGB BLD-MCNC: 10.6 G/DL (ref 13.3–17.7)
IMM GRANULOCYTES # BLD: 0.2 10E3/UL
IMM GRANULOCYTES NFR BLD: 2 %
LIPASE SERPL-CCNC: 27 U/L (ref 13–60)
LYMPHOCYTES # BLD AUTO: 1.5 10E3/UL (ref 0.8–5.3)
LYMPHOCYTES NFR BLD AUTO: 14 %
MCH RBC QN AUTO: 28.8 PG (ref 26.5–33)
MCHC RBC AUTO-ENTMCNC: 32.1 G/DL (ref 31.5–36.5)
MCV RBC AUTO: 90 FL (ref 78–100)
MONOCYTES # BLD AUTO: 0.6 10E3/UL (ref 0–1.3)
MONOCYTES NFR BLD AUTO: 6 %
NEUTROPHILS # BLD AUTO: 7.9 10E3/UL (ref 1.6–8.3)
NEUTROPHILS NFR BLD AUTO: 75 %
NRBC # BLD AUTO: 0 10E3/UL
NRBC BLD AUTO-RTO: 0 /100
PLATELET # BLD AUTO: 128 10E3/UL (ref 150–450)
POTASSIUM SERPL-SCNC: 4.3 MMOL/L (ref 3.4–5.3)
PROT SERPL-MCNC: 7.5 G/DL (ref 6.4–8.3)
PROT SERPL-MCNC: 7.6 G/DL (ref 6.4–8.3)
RBC # BLD AUTO: 3.68 10E6/UL (ref 4.4–5.9)
SODIUM SERPL-SCNC: 137 MMOL/L (ref 135–145)
WBC # BLD AUTO: 10.6 10E3/UL (ref 4–11)

## 2024-11-12 PROCEDURE — 82248 BILIRUBIN DIRECT: CPT | Performed by: FAMILY MEDICINE

## 2024-11-12 PROCEDURE — 82550 ASSAY OF CK (CPK): CPT | Performed by: FAMILY MEDICINE

## 2024-11-12 PROCEDURE — 82150 ASSAY OF AMYLASE: CPT | Performed by: NURSE PRACTITIONER

## 2024-11-12 PROCEDURE — 80053 COMPREHEN METABOLIC PANEL: CPT | Performed by: FAMILY MEDICINE

## 2024-11-12 PROCEDURE — 85652 RBC SED RATE AUTOMATED: CPT | Performed by: FAMILY MEDICINE

## 2024-11-12 PROCEDURE — 85025 COMPLETE CBC W/AUTO DIFF WBC: CPT | Performed by: FAMILY MEDICINE

## 2024-11-12 PROCEDURE — 83690 ASSAY OF LIPASE: CPT | Performed by: NURSE PRACTITIONER

## 2024-11-12 PROCEDURE — 86140 C-REACTIVE PROTEIN: CPT | Performed by: FAMILY MEDICINE

## 2024-11-13 ENCOUNTER — OFFICE VISIT (OUTPATIENT)
Dept: VASCULAR SURGERY | Facility: CLINIC | Age: 79
End: 2024-11-13
Attending: PODIATRIST
Payer: COMMERCIAL

## 2024-11-13 ENCOUNTER — LAB REQUISITION (OUTPATIENT)
Dept: LAB | Facility: CLINIC | Age: 79
End: 2024-11-13
Payer: COMMERCIAL

## 2024-11-13 ENCOUNTER — TRANSITIONAL CARE UNIT VISIT (OUTPATIENT)
Dept: GERIATRICS | Facility: CLINIC | Age: 79
End: 2024-11-13
Payer: COMMERCIAL

## 2024-11-13 VITALS
TEMPERATURE: 97.1 F | DIASTOLIC BLOOD PRESSURE: 58 MMHG | WEIGHT: 218 LBS | OXYGEN SATURATION: 97 % | HEIGHT: 68 IN | HEART RATE: 92 BPM | BODY MASS INDEX: 33.04 KG/M2 | RESPIRATION RATE: 16 BRPM | SYSTOLIC BLOOD PRESSURE: 104 MMHG

## 2024-11-13 VITALS
HEART RATE: 87 BPM | TEMPERATURE: 97.8 F | OXYGEN SATURATION: 98 % | SYSTOLIC BLOOD PRESSURE: 109 MMHG | DIASTOLIC BLOOD PRESSURE: 68 MMHG | RESPIRATION RATE: 18 BRPM

## 2024-11-13 DIAGNOSIS — L97.414 DIABETIC ULCER OF RIGHT HEEL ASSOCIATED WITH TYPE 2 DIABETES MELLITUS, WITH NECROSIS OF BONE (H): Primary | ICD-10-CM

## 2024-11-13 DIAGNOSIS — E11.621 DIABETIC ULCER OF RIGHT HEEL ASSOCIATED WITH TYPE 2 DIABETES MELLITUS, WITH NECROSIS OF BONE (H): Primary | ICD-10-CM

## 2024-11-13 DIAGNOSIS — L97.911 ULCER OF RIGHT LEG, LIMITED TO BREAKDOWN OF SKIN (H): ICD-10-CM

## 2024-11-13 DIAGNOSIS — N17.9 ACUTE KIDNEY INJURY SUPERIMPOSED ON CHRONIC KIDNEY DISEASE (H): ICD-10-CM

## 2024-11-13 DIAGNOSIS — Z86.2 HISTORY OF ANEMIA OF CHRONIC DISEASE: ICD-10-CM

## 2024-11-13 DIAGNOSIS — E11.51 TYPE 2 DIABETES MELLITUS WITH DIABETIC PERIPHERAL ANGIOPATHY WITHOUT GANGRENE, WITH LONG-TERM CURRENT USE OF INSULIN (H): ICD-10-CM

## 2024-11-13 DIAGNOSIS — K86.9 PANCREATIC LESION: ICD-10-CM

## 2024-11-13 DIAGNOSIS — L97.402 DIABETIC ULCER OF HEEL ASSOCIATED WITH DIABETES MELLITUS DUE TO UNDERLYING CONDITION, WITH FAT LAYER EXPOSED, UNSPECIFIED LATERALITY (H): Primary | ICD-10-CM

## 2024-11-13 DIAGNOSIS — Z79.4 TYPE 2 DIABETES MELLITUS WITH DIABETIC PERIPHERAL ANGIOPATHY WITHOUT GANGRENE, WITH LONG-TERM CURRENT USE OF INSULIN (H): ICD-10-CM

## 2024-11-13 DIAGNOSIS — I10 HYPERTENSION, UNSPECIFIED TYPE: ICD-10-CM

## 2024-11-13 DIAGNOSIS — T14.8XXA MULTIPLE SKIN TEARS: ICD-10-CM

## 2024-11-13 DIAGNOSIS — M86.179 OTHER ACUTE OSTEOMYELITIS OF FOOT, UNSPECIFIED LATERALITY (H): ICD-10-CM

## 2024-11-13 DIAGNOSIS — D64.9 ANEMIA, UNSPECIFIED: ICD-10-CM

## 2024-11-13 DIAGNOSIS — N18.9 ACUTE KIDNEY INJURY SUPERIMPOSED ON CHRONIC KIDNEY DISEASE (H): ICD-10-CM

## 2024-11-13 DIAGNOSIS — D69.6 THROMBOCYTOPENIA (H): ICD-10-CM

## 2024-11-13 DIAGNOSIS — E08.621 DIABETIC ULCER OF HEEL ASSOCIATED WITH DIABETES MELLITUS DUE TO UNDERLYING CONDITION, WITH FAT LAYER EXPOSED, UNSPECIFIED LATERALITY (H): Primary | ICD-10-CM

## 2024-11-13 PROCEDURE — 11042 DBRDMT SUBQ TIS 1ST 20SQCM/<: CPT | Performed by: PODIATRIST

## 2024-11-13 PROCEDURE — 99309 SBSQ NF CARE MODERATE MDM 30: CPT | Performed by: NURSE PRACTITIONER

## 2024-11-13 PROCEDURE — G0463 HOSPITAL OUTPT CLINIC VISIT: HCPCS | Performed by: PODIATRIST

## 2024-11-13 PROCEDURE — 11044 DBRDMT BONE 1ST 20 SQ CM/<: CPT | Performed by: PODIATRIST

## 2024-11-13 ASSESSMENT — PAIN SCALES - GENERAL: PAINLEVEL_OUTOF10: NO PAIN (0)

## 2024-11-13 NOTE — PROGRESS NOTES
[facility] GERIATRIC SERVICES    Code Status:  DNR/DNI   Visit Type:   Chief Complaint   Patient presents with    TCU Follow Up     Facility:  [facility] ([facility]) [[id number]]         HPI: Janice Nowak is a 79 year old male who I am seeing today for follow up on the TCU. Past medical history includes insulin dependent type 2 diabetes mellitus, HTN, CKD, chronic normocytic anemia, thrombocytopenia, HL and hypothyroidism. Pt recently hospitalized with worsening ulcer to right heel. Pt found to have acute osteomyelitis of the right heel. He underwent I&D and partial calcnectomy on 10/24. Pt seen by ID and place on IV antibiotics for 6 weeks. Post operative hypotension, improved with fluids. ARB reduced during hospitalization and CCB held. DM2 blood sugars elevated in the afternoon with lows in morning in hospital. Home insulin decreased.     Transitional Care Course: Today pt sitting up in wheelchair. Right heel ulcer, s/p I & D. He continues in wound VAC. Right heel ulcer with slight slough and bone visible to center. Debbie wound maceration. Min serosanguinous drainage. He denies any pain. He continues on IV anbx Q 8 hours. He continues to be NWB to RLE. He is in a MPB. He had a follow up with vascular today. Continues with soft stools on probiotic. C diff negative.     Assessment/Plan:      Acute osteomyelitis of the right calcaneus  Ulceration right heel   -s/p I & D with partial calcanectomy 10/24.   -ID following. (Dr. Erica Nuñez)   -DAPTOmycin 500 mg Q 24 hours X 6 weeks.   -Zosyn Q 8 hours X 6 weeks.   -Weekly labs: CBC with diff, CMP, ESR, CRP, CK total. No leukocytosis, ESR 81, CRP <3.00.   -PICC line cares.  -Wound VAC changes Q Tues and Friday.   -MPB at all times to RLE.   -NWB to RLE.   -F/U with Podiatry 11/13     Diarrhea  -pt reporting loose stools.  -C diff negative.   -Continue probiotics BID.     HTN  -with othostatic hypotension in hospital   -Bp improved but continues to fluctuate.    -Avarpo 150 mg daily.   -irbesartan 150 mg Q HS.   -amlodipine continues to be hold. (Consider restarting if orthostatic hypotension resolves)  -Monitor bp. Bp improving.     CKD on CAROLINE  -Baseline creatinine around 1.3-1.4.   -Follow up BMP with Creatine stable at 1.31.      Chronic normocytic anemia   thrombocytopenia  -Hemoglobin stable 10.8.  -Follow up CBC with Hgb 10.5.      insulin-dependent type 2 diabetes mellitus with hyperglycemia  Episodes of hypoglycemia   -A1c 9.2.   -metformin 2000 mg Q evening.   -steglatro 5 mg daily.   -glargine 35 units in am.   -Aspart 5 units with. Hold is BS < 100.   -Home regimen  ( 100 lantus qAM, 25 novolog prior to bedtime as well as bolus sliding scale -20 units)   -consistent carb diet.   -Blood sugar checks QID.      Pancreatic Lesion  -incidentally found on abdominal US ordered by Vascular.   - US 11/08-Hypoechoic, avascular, cystic lesion located in the pancreatic head space with no peristalsis or color flow seen measuring 7.8 x 4.0 cm.Patient has hx of cholecystectomy. Recommend other imaging modality for a better view. Possible posterior pancreatic head lesion or cyst. No evidence of abdominal aortic aneurysm.  Cystic lesion adjacent to the pancreatic head.  Further evaluation with an MRCP is suggested.  -Schedule MRCP with [facility].   -Follow up out pt with [facility].   -amylase, lipase and LFTs within normal limits.     Active Ambulatory Problems     Diagnosis Date Noted    Type 2 Diabetes Mellitus     Microalbuminuria     Benign Adenomatous Polyp Of The Large Intestine     Hyperlipidemia     Obstructive Sleep Apnea     Hypertension     Diabetic foot infection (H) 10/07/2024    Type 2 diabetes mellitus with diabetic peripheral angiopathy without gangrene, with long-term current use of insulin (H) 10/07/2024    Diabetic ulcer of right heel associated with type 2 diabetes mellitus, with necrosis of muscle (H) 10/16/2024    Carotid arterial disease (H)  10/29/2024    Atypical chest pain 11/12/2015    Charcot joint of foot 10/29/2024    Charcot's arthropathy 10/29/2024    Diabetic neuropathy (H) 10/29/2024    Exposure to potentially hazardous substance 10/29/2024    Fatigue 11/12/2015    Pressure ulcer of right heel, unstageable (H) 10/29/2024    Occlusion and stenosis of unspecified carotid artery 10/29/2024    Postoperative back pain 10/29/2024    Hypothyroidism 10/29/2024    Hypothyroidism, unspecified 10/29/2024    History of cholecystectomy 10/29/2024    Hearing loss 10/29/2024    Ulcer of right leg, limited to breakdown of skin (H) 11/13/2024    Diabetic ulcer of right heel associated with type 2 diabetes mellitus, with necrosis of bone (H) 11/13/2024     Resolved Ambulatory Problems     Diagnosis Date Noted    No Resolved Ambulatory Problems     Past Medical History:   Diagnosis Date    Diabetes (H)     Hypertension     Sleep apnea     Thyroid disease      Allergies   Allergen Reactions    Exenatide Unknown    Heparin Unknown    Liraglutide Unknown    Lisinopril Cough    Morphine Unknown       All Meds and Allergies reviewed in the record at the facility and is the most up-to-date.    Current Outpatient Medications   Medication Sig Dispense Refill    aspirin 81 MG EC tablet Take 81 mg by mouth daily.      atorvastatin (LIPITOR) 80 MG tablet Take 80 mg by mouth at bedtime.      clopidogrel (PLAVIX) 75 MG tablet Take 75 mg by mouth daily.      co-enzyme Q-10 100 MG CAPS capsule Take 200 mg by mouth daily.      DAPTOmycin 500 mg Inject 500 mg over 30 minutes into the vein every 24 hours. Weekly labs: CBC with diff, CMP, ESR, CRP, CK total. Fax to Erica Nuñez MD at [phone] Infectious Disease. PICC line cares.      ertugliflozin (STEGLATRO) 5 MG TABS Take 5 mg by mouth every morning.      insulin aspart (NOVOLOG FLEXPEN) 100 UNIT/ML pen Inject 5 Units subcutaneously 3 times daily (with meals). Novolog Flexpen: 5 units with breakfast, 5 units with lunch, 5  "units with dinner. 15 mL 0    insulin glargine (LANTUS PEN) 100 UNIT/ML pen Inject 35 Units subcutaneously every morning. 15 mL 0    lactobacillus rhamnosus, GG, (CULTURELL) capsule Take 1 capsule by mouth daily.      levothyroxine (SYNTHROID/LEVOTHROID) 50 MCG tablet Take 50 mcg by mouth daily.      losartan (COZAAR) 50 MG tablet Take 50 mg by mouth at bedtime.      metFORMIN (GLUCOPHAGE XR) 500 MG 24 hr tablet Take 2,000 mg by mouth daily (with dinner).      Multiple Vitamins-Minerals (PRESERVISION AREDS 2 PO) Take 2 capsules by mouth daily.      piperacillin sod-tazobactam (ZOSYN) SOLR injection Inject 15 mLs (3.375 g) over 5 minutes into the vein via push every 8 hours. Weekly labs: CBC with diff, CMP, ESR, CRP, CK total. Fax to Erica Nuñez MD at [phone] Infectious Disease. PICC line cares.      spironolactone (ALDACTONE) 25 MG tablet Take 25 mg by mouth daily.      vitamin C with B complex (B COMPLEX-C) tablet Take 1 tablet by mouth daily.       No current facility-administered medications for this visit.       REVIEW OF SYSTEMS:   10 point review of systems reviewed and pertinent positives in the HPI.     PHYSICAL EXAMINATION:  Physical Exam     Vital signs: /58   Pulse 92   Temp 97.1  F (36.2  C)   Resp 16   Ht 1.727 m (5' 8\")   Wt 98.9 kg (218 lb)   SpO2 97%   BMI 33.15 kg/m    General: Awake, Alert, oriented x3, sitting up in wheelchair, follows simple commands, conversant  HEENT: Pink conjunctiva, moist oral mucosa  NECK: Supple  CVS:  S1  S2, without murmur or gallop.   LUNG: Clear to auscultation, No wheezes, rales or rhonci.  BACK: No kyphosis of the thoracic spine  ABDOMEN: Soft, nontender to palpation, with positive bowel sounds  EXTREMITIES: Moves both upper and lower extremities with limitation to RLE, Trace pedal edema. Multi podus boot to RLE.   SKIN: Wound VAC to RLE. Mx skin tears to arms. Skin thin and fragile.   NEUROLOGIC: Intact, pulses palpable  PSYCHIATRIC: Cognition " intact      Labs:  All labs reviewed in the nursing home record and Epic   @  Lab Results   Component Value Date    WBC 9.0 11/04/2024     Lab Results   Component Value Date    RBC 3.64 11/04/2024     Lab Results   Component Value Date    HGB 10.5 11/04/2024     Lab Results   Component Value Date    HCT 33.1 11/04/2024     Lab Results   Component Value Date    MCV 91 11/04/2024     Lab Results   Component Value Date    MCH 28.8 11/04/2024     Lab Results   Component Value Date    MCHC 31.7 11/04/2024     Lab Results   Component Value Date    RDW 15.8 11/04/2024     Lab Results   Component Value Date     11/04/2024        @Last Comprehensive Metabolic Panel:  Sodium   Date Value Ref Range Status   11/12/2024 137 135 - 145 mmol/L Final     Potassium   Date Value Ref Range Status   11/12/2024 4.3 3.4 - 5.3 mmol/L Final   07/06/2020 4.7 3.5 - 5.0 mmol/L Final     Chloride   Date Value Ref Range Status   11/12/2024 104 98 - 107 mmol/L Final   07/06/2020 103 98 - 107 mmol/L Final     Carbon Dioxide (CO2)   Date Value Ref Range Status   11/12/2024 21 (L) 22 - 29 mmol/L Final   07/06/2020 22 22 - 31 mmol/L Final     Anion Gap   Date Value Ref Range Status   11/12/2024 12 7 - 15 mmol/L Final   07/06/2020 14 5 - 18 mmol/L Final     Glucose   Date Value Ref Range Status   11/12/2024 158 (H) 70 - 99 mg/dL Final   07/06/2020 102 70 - 125 mg/dL Final     GLUCOSE BY METER POCT   Date Value Ref Range Status   11/01/2024 140 (H) 70 - 99 mg/dL Final     Urea Nitrogen   Date Value Ref Range Status   11/12/2024 34.8 (H) 8.0 - 23.0 mg/dL Final   07/06/2020 30 (H) 8 - 28 mg/dL Final     Creatinine   Date Value Ref Range Status   11/12/2024 1.27 (H) 0.67 - 1.17 mg/dL Final     GFR Estimate   Date Value Ref Range Status   11/12/2024 58 (L) >60 mL/min/1.73m2 Final   07/06/2020 55 (L) >60 mL/min/1.73m2 Final     Calcium   Date Value Ref Range Status   11/12/2024 9.2 8.8 - 10.4 mg/dL Final     Comment:     Reference intervals for this  test were updated on 7/16/2024 to reflect our healthy population more accurately. There may be differences in the flagging of prior results with similar values performed with this method. Those prior results can be interpreted in the context of the updated reference intervals.     This note has been dictated using voice recognition software. Any grammatical or context distortions are unintentional and inherent to the software    Electronically signed by: Treasure Dumont CNP

## 2024-11-13 NOTE — PROGRESS NOTES
FOOT AND ANKLE SURGERY/PODIATRY CONSULT NOTE        ASSESSMENT:   Diabetic ulceration right heel into bone  Ulceration right leg into subcutaneous tissue  Acute osteomyelitis right calcaneus        TREATMENT:  -I discussed the patient that the right heel ulceration is stable without signs of infection.  However, he continues to have exposed bone of the calcaneus and I recommend continued use of the wound VAC.  I will also prior Auth him for skin graft at this location.    -We will begin use of Medihoney along the ulceration lateral right leg.  Abrasion left leg is stable.  Gauze to be applied at this location.    -Continue nonweightbearing right foot.  PRAFO boot right foot at all times including overnight.    -After discussion of risk factors, nursing staff removed dressing, cleansed wound and consent obtained 2% Lidocaine HCL jelly was applied, under clean conditions, the right heel ulceration(s) were debrided using currette or #15 blade scalpel.  Devitalized and nonviable tissue, along with any fibrin and slough, was removed to improve granulation tissue formation, stimulate wound healing, decrease overall bacteria load, disrupt biofilm formation and decrease edge senescence. Wound drainage was small Serosanguinous. Total excisional debridement was 20 sq cm into the bone with a depth of 1 cm.   Ulcers were improved afterwards and .  Measures were as noted on the flow sheet.  Gauze dressing applied today.  Wound VAC to be applied at U.    -After discussion of risk factors, nursing staff removed dressing, cleansed wound and consent obtained 2% Lidocaine HCL jelly was applied, under clean conditions, the right leg ulceration(s) were debrided using currette.  Devitalized and nonviable tissue, along with any fibrin and slough, was removed to improve granulation tissue formation, stimulate wound healing, decrease overall bacteria load, disrupt biofilm formation and decrease edge senescence. Wound drainage was  scant No. Total excisional debridement was 1.5 sq cm into the subcutaneous tissue with a depth of 0.2 cm.   Ulcers were improved afterwards and .  Measures were as noted on the flow sheet.  Medihoney with gauze dressing applied today which be reapplied every other day.    -He will follow-up in 2 to 3 weeks    Amol Patricio DPM  [facility]      HPI: Janice Nowak was seen today for follow-up right heel and leg ulcers.  Doing well today.  He is currently residing at [facility] and is use the wound VAC on the right heel as directed.    Past Medical History:   Diagnosis Date    Diabetes (H)     Hypertension     Sleep apnea     Thyroid disease        Past Surgical History:   Procedure Laterality Date    BACK SURGERY      BONE EXOSTOSIS EXCISION Right 10/24/2024    Procedure: PARTIAL CALCANECTOMY RIGHT FOOT;  Surgeon: Amol Patricio DPM;  Location: [facility] [place] OR    INCISION AND DRAINAGE FOOT, COMBINED Right 10/24/2024    Procedure: INCISION AND DRAINAGE;  Surgeon: Amol Patricio DPM;  Location: [facility] [place] OR    IRRIGATION AND DEBRIDEMENT FOOT, COMBINED Right 10/24/2024    Procedure: AND DEBRIDEMENT RIGHT HEEL,;  Surgeon: Amol Patricio DPM;  Location: [facility] [place] OR    ORTHOPEDIC SURGERY      PICC SINGLE LUMEN PLACEMENT  10/30/2024    VASCULAR SURGERY          Allergies   Allergen Reactions    Exenatide Unknown    Heparin Unknown    Liraglutide Unknown    Lisinopril Cough    Morphine Unknown         Current Outpatient Medications:     aspirin 81 MG EC tablet, Take 81 mg by mouth daily., Disp: , Rfl:     atorvastatin (LIPITOR) 80 MG tablet, Take 80 mg by mouth at bedtime., Disp: , Rfl:     clopidogrel (PLAVIX) 75 MG tablet, Take 75 mg by mouth daily., Disp: , Rfl:     co-enzyme Q-10 100 MG CAPS capsule, Take 200 mg by mouth daily., Disp: , Rfl:     DAPTOmycin 500 mg, Inject 500 mg over 30 minutes into the vein every 24 hours. Weekly labs: CBC with diff, CMP,  ESR, CRP, CK total. Fax to Erica Nuñez MD at [phone] Infectious Disease. PICC line cares., Disp: , Rfl:     ertugliflozin (STEGLATRO) 5 MG TABS, Take 5 mg by mouth every morning., Disp: , Rfl:     insulin aspart (NOVOLOG FLEXPEN) 100 UNIT/ML pen, Inject 5 Units subcutaneously 3 times daily (with meals). Novolog Flexpen: 5 units with breakfast, 5 units with lunch, 5 units with dinner., Disp: 15 mL, Rfl: 0    insulin glargine (LANTUS PEN) 100 UNIT/ML pen, Inject 35 Units subcutaneously every morning., Disp: 15 mL, Rfl: 0    lactobacillus rhamnosus, GG, (CULTURELL) capsule, Take 1 capsule by mouth daily., Disp: , Rfl:     levothyroxine (SYNTHROID/LEVOTHROID) 50 MCG tablet, Take 50 mcg by mouth daily., Disp: , Rfl:     losartan (COZAAR) 50 MG tablet, Take 50 mg by mouth at bedtime., Disp: , Rfl:     metFORMIN (GLUCOPHAGE XR) 500 MG 24 hr tablet, Take 2,000 mg by mouth daily (with dinner)., Disp: , Rfl:     Multiple Vitamins-Minerals (PRESERVISION AREDS 2 PO), Take 2 capsules by mouth daily., Disp: , Rfl:     piperacillin sod-tazobactam (ZOSYN) SOLR injection, Inject 15 mLs (3.375 g) over 5 minutes into the vein via push every 8 hours. Weekly labs: CBC with diff, CMP, ESR, CRP, CK total. Fax to Erica Nuñez MD at [phone] Infectious Disease. PICC line cares., Disp: , Rfl:     spironolactone (ALDACTONE) 25 MG tablet, Take 25 mg by mouth daily., Disp: , Rfl:     vitamin C with B complex (B COMPLEX-C) tablet, Take 1 tablet by mouth daily., Disp: , Rfl:     Social History     Social History Narrative    Not on file       No family history on file.    Review of Systems - 10 point Review of Systems is negative except for right heel and leg ulcers which is noted in HPI.      OBJECTIVE:  Appearance: alert, well appearing, and in no distress.    /68   Pulse 87   Temp 97.8  F (36.6  C)   Resp 18   SpO2 98%     BMI= There is no height or weight on file to calculate BMI.    General appearance: Patient is  alert and fully cooperative with history & exam.  No sign of distress is noted during the visit.     Psychiatric: Affect is pleasant & appropriate.  Patient appears motivated to improve health.     Respiratory: Breathing is regular & unlabored while sitting.     HEENT: Hearing is intact to spoken word.  Speech is clear.  No gross evidence of visual impairment that would impact ambulation.    Vascular: Dorsalis pedis non-palpableRight.  Dermatologic:   Negative Pressure Wound Therapy Heel Right (Active)   Wound Type Surgical 11/01/24 0110   Cycle Continuous 11/01/24 0110   Target Pressure (mmHg) 125 11/01/24 0110   Cannister changed? No 11/01/24 0110   Output (ml) 0 ml 11/01/24 0110       Wound Leg Abrasion(s) (Active)   Wound Bed Other (Comment) 11/01/24 0110   Debbie-wound Assessment Other (Comment) 11/01/24 0110   Drainage Amount None 11/01/24 0110   Dressing Foam 11/01/24 0110   Dressing Status Clean, dry, intact 11/01/24 0110       Wound Leg Skin tear (Active)   Wound Bed Other (Comment) 10/31/24 0901   Dressing Status Clean, dry, intact 11/01/24 0110       Wound Elbow Skin tear (Active)   Wound Bed Granulation tissue 10/29/24 1657   Drainage Amount None 11/01/24 0110   Drainage Color/Characteristics Serosanguineous 10/29/24 1657   Dressing Foam 11/01/24 0110   Dressing Status Clean, dry, intact 11/01/24 0110       VASC Wound right heel (Active)   Pre Size Length 4 11/13/24 1000   Pre Size Width 5 11/13/24 1000   Pre Size Depth 1 11/13/24 1000   Pre Total Sq cm 20 11/13/24 1000       VASC Wound right lateral leg (Active)   Pre Size Length 1 11/13/24 1000   Pre Size Width 1.5 11/13/24 1000   Pre Size Depth 0.2 11/13/24 1000   Pre Total Sq cm 1.5 11/13/24 1000       Incision/Surgical Site with Packing 10/24/24 Right Heel Qty Placed: 1 (Active)   Incision Assessment UTV 11/01/24 0110   Dressing Vacuum based 11/01/24 0110   Drainage Amount none 11/01/24 0110   Granular tissue along posterior right heel ulceration with  increased depth and exposed bone of the calcaneus.  Mixed granular/fibrotic tissue ulceration lateral right leg.  No erythema right lower extremity.  Superficial abrasion anterior left leg.  Neurologic: Diminished to light touch Bilateral.  Musculoskeletal: Contracted digits noted Bilateral.

## 2024-11-13 NOTE — PATIENT INSTRUCTIONS
[facility] Emergency Dept  [address] [address]  Phone: [phone]    Name:  Sanjiv Quintana  Current Date: 2017  : 1976  MRN: [id number]   MIKI: [id number]    Visit Date: 2017  Address: [address] [place] [address]  Phone: [phone]    Primary Care Provider     Name: No primary care provider on file.    Phone: None    The staff of [facility] would like to thank you for allowing us to assist you with your healthcare needs. The following includes patient education materials and information on how best to care for your illness/injury at home and when to see a physician. If you need to locate a Doctor or clinic close to you, please call the Doctor Referral Service at [phone]. The Service is available Monday through Thursday from 8 AM to 8 PM and  from 8 AM to 4 PM.    Patients Please Note: If further time off is required, or a medical clearance to return to work is required, it must be obtained through your primary physician.  Return to work clearances and extensions of \"Time-Off\" will not be given by the Emergency Department.     We hope that you leave our Emergency Department believing that we provided you with very good care.   Your Opinion Matters To Us  If you receive a patient satisfaction survey in the mail, please complete and return it in the postage-paid envelope.  We truly value and appreciate your feedback.  Emergency Department Care Providers   Physician:Kushal Rojo DO    Advanced Practice Provider:  No providers found     RN_________________ ED Tech__________________ Clerical_________________         *Wheelchairs are never guaranteed at the front door, if you have your own wheel chair or knee walker, please bring that with you to your appointment. Thank you for your understanding!*      Important lnstructions      WEIGHT BEARING STATUS: You are to remain NON WEIGHT BEARING on your right foot. Non-weight bearing means NO PRESSURE on your foot or heel at any time for any reason.    2. OFFLOADING DEVICE: Must use a A WHEELCHAIR at all times! (do not use affected foot to push wheelchair)    3. STABILIZATION DEVICE: Use a PRAFO BOOT . You will need to WEAR THIS AT ALL TIMES EVEN WHILE IN BED.     4. ELEVATE: Elevating your leg means laying with your head on a pillow and your foot ABOVE YOUR HEART.     5. DO NOT MOVE YOUR FOOT.  There is a risk of worsening the wound or incision. To give yourself a higher chance of healing, please DO NOT swing foot back and forth and wiggle foot/toes especially when inside a stabilization device. Limited movement is allowable with therapy as recommended by the doctor.     6. TAKE A PROTEIN SHAKE TWICE A DAY.  (For ex: Boost, Ensure, Glucerna)    7. KEEP YOUR WOUND DRY AT ALL TIMES    Use a shower bag or a cast cover to keep your foot/leg dry during showers. These can be purchased on [email or web address] or any pharmacy.         Dressing Change lnstructions    Wound Care Instructions      3 time a week Cleanse your bilateral leg wound(s) with Normal Saline or Wound Cleanser    Pat dry with non-sterile gauze    Apply Lotion to the intact skin surrounding your wound and other dry skin locations. Some good lotions include: Remedy Skin Repair Cream or Cetaphil    Apply medihoney into/onto the right leg wounds (just a gauze dressing to the left no honey needed)    Cover with gauze    Secure with roll gauze as needed      It is not ok to get your wound wet in the bath or shower      Standard - Wound Vac Instructions to the left heel     1. 3x weekly and as needed cleanse the right heel with NS    2. Pat  dry    3. Apply Cavilon no sting barrier wipe to the skin surrounding the wound to protect from drainage/maceration    4. Apply drape around incision. Cut strip of drape and apply to skin if bridging is needed; plan this area in advance; should not be over bony prominence     5. Cut the foam to fit the area of the incision    6. Cut narrow strip of foam if bridging    7. Cover foam with drape to obtain air tight seal    8. Cut opening the size of a quarter for where the suction pad will be applied    9. Apply Suction pad    10. 125mmHG suction continuous    [employer] [facility] can be reached at [phone], 24 hours a day 7 days a week     - Back up plan in place:     If the negative pressure wound therapy malfunctions or unable to maintain seal: dressing must be removed and reapplied within 2 hours of the incident. If unable to reapply negative pressure wound dressing, place Normal Saline moistened gauze in wound bed and cover with appropriate dressing to keep wound bed moist.  Change wet-to-moist dressing two times a day until healthcare staff can re-implement negative pressure therapy. Change canister at least weekly.  [employer] [facility] can be reached at [phone], 24 hours a day 7 days a week    Information on Vacuum-Assisted Closure of a Wound  Vacuum-assisted closure (VAC) of a wound is a type of treatment to help wounds heal. It s also known as negative pressure wound therapy. During the treatment, a device lowers air pressure on the wound. This can help the wound heal more quickly.  Understanding the wound VAC system  A wound VAC system has several parts. A foam or gauze dressing is put directly on the wound. The dressing is changed every 24 to 72 hours. An adhesive film covers and seals the dressing and wound. A drainage tube leads from under the adhesive film and connects to a portable vacuum pump. This pump removes air pressure over the wound. It may do this constantly. Or it may do  it in cycles. During the treatment, you ll need to carry the portable pump everywhere you go.  Why wound VAC is used  You might need this therapy for a recent traumatic wound. Or you may need it for a chronic wound. This is a wound that does not heal the way it should over time. This can happen with wounds in people who have diabetes. You may need a wound VAC if you ve had a recent skin graft. And you may need a wound VAC for a large wound. Large wounds can take a longer time to heal.  A wound vacuum system may help your wound heal more quickly by:  Draining extra fluid from the wound  Reducing swelling  Reducing bacteria in the wound  Keeping your wound moist and warm  Helping draw together wound edges  Increasing blood flow to your wound  Decreasing inflammation  Wound VAC offers some other advantages over other types of wound care. It may decrease your overall discomfort. The dressings usually need to be changed less often. And they may be easier to keep in position.             SEEK MEDICAL CARE IF:  You have an increase in swelling, pain, or redness around the wound.  You have an increase in the amount of pus coming from the wound.  There is a bad smell coming from the wound.  The wound appears to be worsening/enlarging  You have a fever greater than 101.5 F      It is ok to continue current wound care treatment/products for the next 2-3 days until new wound care supplies are ordered and arrive. If longer than this please contact our office at [phone].      We want to hear from you!   In the next few weeks, you should receive a call or email to complete a survey about your visit at [facility]. Please help us improve your appointment experience by letting us know how we did today. We strive to make your experience good and value any ways in which we could do better.      We value your input and suggestions.    Thank you for choosing the [facility]!

## 2024-11-13 NOTE — LETTER
11/13/2024      Janice Nowak  [address]  [facility] [address]        [facility]    Code Status:  DNR/DNI   Visit Type:   Chief Complaint   Patient presents with     TCU Follow Up     Facility:  [facility] ([facility]) [[id number]]         HPI: Janice Nowak is a 79 year old male who I am seeing today for follow up on the TCU. Past medical history includes insulin dependent type 2 diabetes mellitus, HTN, CKD, chronic normocytic anemia, thrombocytopenia, HL and hypothyroidism. Pt recently hospitalized with worsening ulcer to right heel. Pt found to have acute osteomyelitis of the right heel. He underwent I&D and partial calcnectomy on 10/24. Pt seen by ID and place on IV antibiotics for 6 weeks. Post operative hypotension, improved with fluids. ARB reduced during hospitalization and CCB held. DM2 blood sugars elevated in the afternoon with lows in morning in hospital. Home insulin decreased.     Transitional Care Course: Today pt sitting up in wheelchair. Right heel ulcer, s/p I & D. He continues in wound VAC. Right heel ulcer with slight slough and bone visible to center. Debbie wound maceration. Min serosanguinous drainage. He denies any pain. He continues on IV anbx Q 8 hours. He continues to be NWB to RLE. He is in a MPB. He had a follow up with vascular today. Continues with soft stools on probiotic. C diff negative.     Assessment/Plan:      Acute osteomyelitis of the right calcaneus  Ulceration right heel   -s/p I & D with partial calcanectomy 10/24.   -ID following. (Dr. Erica Nuñez)   -DAPTOmycin 500 mg Q 24 hours X 6 weeks.   -Zosyn Q 8 hours X 6 weeks.   -Weekly labs: CBC with diff, CMP, ESR, CRP, CK total. No leukocytosis, ESR 81, CRP <3.00.   -PICC line cares.  -Wound VAC changes Q Tues and Friday.   -MPB at all times to RLE.   -NWB to RLE.   -F/U with Podiatry 11/13     Diarrhea  -pt reporting loose stools.  -C diff negative.   -Continue probiotics  BID.     HTN  -with othostatic hypotension in hospital   -Bp improved but continues to fluctuate.   -Avarpo 150 mg daily.   -irbesartan 150 mg Q HS.   -amlodipine continues to be hold. (Consider restarting if orthostatic hypotension resolves)  -Monitor bp. Bp improving.     CKD on CAROLINE  -Baseline creatinine around 1.3-1.4.   -Follow up BMP with Creatine stable at 1.31.      Chronic normocytic anemia   thrombocytopenia  -Hemoglobin stable 10.8.  -Follow up CBC with Hgb 10.5.      insulin-dependent type 2 diabetes mellitus with hyperglycemia  Episodes of hypoglycemia   -A1c 9.2.   -metformin 2000 mg Q evening.   -steglatro 5 mg daily.   -glargine 35 units in am.   -Aspart 5 units with. Hold is BS < 100.   -Home regimen  ( 100 lantus qAM, 25 novolog prior to bedtime as well as bolus sliding scale -20 units)   -consistent carb diet.   -Blood sugar checks QID.      Pancreatic Lesion  -incidentally found on abdominal US ordered by Vascular.   - US 11/08-Hypoechoic, avascular, cystic lesion located in the pancreatic head space with no peristalsis or color flow seen measuring 7.8 x 4.0 cm.Patient has hx of cholecystectomy. Recommend other imaging modality for a better view. Possible posterior pancreatic head lesion or cyst. No evidence of abdominal aortic aneurysm.  Cystic lesion adjacent to the pancreatic head.  Further evaluation with an MRCP is suggested.  -Schedule MRCP with [place] radi[facility].   -Follow up out pt with [facility].   -amylase, lipase and LFTs within normal limits.     Active Ambulatory Problems     Diagnosis Date Noted     Type 2 Diabetes Mellitus      Microalbuminuria      Benign Adenomatous Polyp Of The Large Intestine      Hyperlipidemia      Obstructive Sleep Apnea      Hypertension      Diabetic foot infection (H) 10/07/2024     Type 2 diabetes mellitus with diabetic peripheral angiopathy without gangrene, with long-term current use of insulin (H) 10/07/2024     Diabetic ulcer of right heel associated with  type 2 diabetes mellitus, with necrosis of muscle (H) 10/16/2024     Carotid arterial disease (H) 10/29/2024     Atypical chest pain 11/12/2015     Charcot joint of foot 10/29/2024     Charcot's arthropathy 10/29/2024     Diabetic neuropathy (H) 10/29/2024     Exposure to potentially hazardous substance 10/29/2024     Fatigue 11/12/2015     Pressure ulcer of right heel, unstageable (H) 10/29/2024     Occlusion and stenosis of unspecified carotid artery 10/29/2024     Postoperative back pain 10/29/2024     Hypothyroidism 10/29/2024     Hypothyroidism, unspecified 10/29/2024     History of cholecystectomy 10/29/2024     Hearing loss 10/29/2024     Ulcer of right leg, limited to breakdown of skin (H) 11/13/2024     Diabetic ulcer of right heel associated with type 2 diabetes mellitus, with necrosis of bone (H) 11/13/2024     Resolved Ambulatory Problems     Diagnosis Date Noted     No Resolved Ambulatory Problems     Past Medical History:   Diagnosis Date     Diabetes (H)      Hypertension      Sleep apnea      Thyroid disease      Allergies   Allergen Reactions     Exenatide Unknown     Heparin Unknown     Liraglutide Unknown     Lisinopril Cough     Morphine Unknown       All Meds and Allergies reviewed in the record at the facility and is the most up-to-date.    Current Outpatient Medications   Medication Sig Dispense Refill     aspirin 81 MG EC tablet Take 81 mg by mouth daily.       atorvastatin (LIPITOR) 80 MG tablet Take 80 mg by mouth at bedtime.       clopidogrel (PLAVIX) 75 MG tablet Take 75 mg by mouth daily.       co-enzyme Q-10 100 MG CAPS capsule Take 200 mg by mouth daily.       DAPTOmycin 500 mg Inject 500 mg over 30 minutes into the vein every 24 hours. Weekly labs: CBC with diff, CMP, ESR, CRP, CK total. Fax to Erica Nuñez MD at [phone] Infectious Disease. PICC line cares.       ertugliflozin (STEGLATRO) 5 MG TABS Take 5 mg by mouth every morning.       insulin aspart (NOVOLOG FLEXPEN) 100  "UNIT/ML pen Inject 5 Units subcutaneously 3 times daily (with meals). Novolog Flexpen: 5 units with breakfast, 5 units with lunch, 5 units with dinner. 15 mL 0     insulin glargine (LANTUS PEN) 100 UNIT/ML pen Inject 35 Units subcutaneously every morning. 15 mL 0     lactobacillus rhamnosus, GG, (CULTURELL) capsule Take 1 capsule by mouth daily.       levothyroxine (SYNTHROID/LEVOTHROID) 50 MCG tablet Take 50 mcg by mouth daily.       losartan (COZAAR) 50 MG tablet Take 50 mg by mouth at bedtime.       metFORMIN (GLUCOPHAGE XR) 500 MG 24 hr tablet Take 2,000 mg by mouth daily (with dinner).       Multiple Vitamins-Minerals (PRESERVISION AREDS 2 PO) Take 2 capsules by mouth daily.       piperacillin sod-tazobactam (ZOSYN) SOLR injection Inject 15 mLs (3.375 g) over 5 minutes into the vein via push every 8 hours. Weekly labs: CBC with diff, CMP, ESR, CRP, CK total. Fax to Erica Nuñez MD at [phone] Infectious Disease. PICC line cares.       spironolactone (ALDACTONE) 25 MG tablet Take 25 mg by mouth daily.       vitamin C with B complex (B COMPLEX-C) tablet Take 1 tablet by mouth daily.       No current facility-administered medications for this visit.       REVIEW OF SYSTEMS:   10 point review of systems reviewed and pertinent positives in the HPI.     PHYSICAL EXAMINATION:  Physical Exam     Vital signs: /58   Pulse 92   Temp 97.1  F (36.2  C)   Resp 16   Ht 1.727 m (5' 8\")   Wt 98.9 kg (218 lb)   SpO2 97%   BMI 33.15 kg/m    General: Awake, Alert, oriented x3, sitting up in wheelchair, follows simple commands, conversant  HEENT: Pink conjunctiva, moist oral mucosa  NECK: Supple  CVS:  S1  S2, without murmur or gallop.   LUNG: Clear to auscultation, No wheezes, rales or rhonci.  BACK: No kyphosis of the thoracic spine  ABDOMEN: Soft, nontender to palpation, with positive bowel sounds  EXTREMITIES: Moves both upper and lower extremities with limitation to RLE, Trace pedal edema. Multi podus boot " to RLE.   SKIN: Wound VAC to RLE. Mx skin tears to arms. Skin thin and fragile.   NEUROLOGIC: Intact, pulses palpable  PSYCHIATRIC: Cognition intact      Labs:  All labs reviewed in the nursing home record and Epic   @  Lab Results   Component Value Date    WBC 9.0 11/04/2024     Lab Results   Component Value Date    RBC 3.64 11/04/2024     Lab Results   Component Value Date    HGB 10.5 11/04/2024     Lab Results   Component Value Date    HCT 33.1 11/04/2024     Lab Results   Component Value Date    MCV 91 11/04/2024     Lab Results   Component Value Date    MCH 28.8 11/04/2024     Lab Results   Component Value Date    MCHC 31.7 11/04/2024     Lab Results   Component Value Date    RDW 15.8 11/04/2024     Lab Results   Component Value Date     11/04/2024        @Last Comprehensive Metabolic Panel:  Sodium   Date Value Ref Range Status   11/12/2024 137 135 - 145 mmol/L Final     Potassium   Date Value Ref Range Status   11/12/2024 4.3 3.4 - 5.3 mmol/L Final   07/06/2020 4.7 3.5 - 5.0 mmol/L Final     Chloride   Date Value Ref Range Status   11/12/2024 104 98 - 107 mmol/L Final   07/06/2020 103 98 - 107 mmol/L Final     Carbon Dioxide (CO2)   Date Value Ref Range Status   11/12/2024 21 (L) 22 - 29 mmol/L Final   07/06/2020 22 22 - 31 mmol/L Final     Anion Gap   Date Value Ref Range Status   11/12/2024 12 7 - 15 mmol/L Final   07/06/2020 14 5 - 18 mmol/L Final     Glucose   Date Value Ref Range Status   11/12/2024 158 (H) 70 - 99 mg/dL Final   07/06/2020 102 70 - 125 mg/dL Final     GLUCOSE BY METER POCT   Date Value Ref Range Status   11/01/2024 140 (H) 70 - 99 mg/dL Final     Urea Nitrogen   Date Value Ref Range Status   11/12/2024 34.8 (H) 8.0 - 23.0 mg/dL Final   07/06/2020 30 (H) 8 - 28 mg/dL Final     Creatinine   Date Value Ref Range Status   11/12/2024 1.27 (H) 0.67 - 1.17 mg/dL Final     GFR Estimate   Date Value Ref Range Status   11/12/2024 58 (L) >60 mL/min/1.73m2 Final   07/06/2020 55 (L) >60  mL/min/1.73m2 Final     Calcium   Date Value Ref Range Status   11/12/2024 9.2 8.8 - 10.4 mg/dL Final     Comment:     Reference intervals for this test were updated on 7/16/2024 to reflect our healthy population more accurately. There may be differences in the flagging of prior results with similar values performed with this method. Those prior results can be interpreted in the context of the updated reference intervals.     This note has been dictated using voice recognition software. Any grammatical or context distortions are unintentional and inherent to the software    Electronically signed by: Treasure Dumont CNP       Sincerely,        Treasure Dumont, NP

## 2024-11-14 ENCOUNTER — LAB REQUISITION (OUTPATIENT)
Dept: LAB | Facility: CLINIC | Age: 79
End: 2024-11-14
Payer: COMMERCIAL

## 2024-11-14 DIAGNOSIS — M86.171 OTHER ACUTE OSTEOMYELITIS, RIGHT ANKLE AND FOOT (H): ICD-10-CM

## 2024-11-14 DIAGNOSIS — Z79.4 LONG TERM (CURRENT) USE OF INSULIN (H): ICD-10-CM

## 2024-11-14 DIAGNOSIS — R19.7 DIARRHEA, UNSPECIFIED: ICD-10-CM

## 2024-11-14 DIAGNOSIS — L08.9 LOCAL INFECTION OF THE SKIN AND SUBCUTANEOUS TISSUE, UNSPECIFIED: ICD-10-CM

## 2024-11-14 DIAGNOSIS — I95.1 ORTHOSTATIC HYPOTENSION: ICD-10-CM

## 2024-11-18 LAB
ANION GAP SERPL CALCULATED.3IONS-SCNC: 9 MMOL/L (ref 7–15)
BASOPHILS # BLD AUTO: 0 10E3/UL (ref 0–0.2)
BASOPHILS NFR BLD AUTO: 1 %
BUN SERPL-MCNC: 32.4 MG/DL (ref 8–23)
CALCIUM SERPL-MCNC: 8.8 MG/DL (ref 8.8–10.4)
CHLORIDE SERPL-SCNC: 106 MMOL/L (ref 98–107)
CREAT SERPL-MCNC: 1.14 MG/DL (ref 0.67–1.17)
CREAT SERPL-MCNC: 1.14 MG/DL (ref 0.67–1.17)
CRP SERPL-MCNC: 29.3 MG/L
EGFRCR SERPLBLD CKD-EPI 2021: 66 ML/MIN/1.73M2
EGFRCR SERPLBLD CKD-EPI 2021: 66 ML/MIN/1.73M2
EOSINOPHIL # BLD AUTO: 0.2 10E3/UL (ref 0–0.7)
EOSINOPHIL NFR BLD AUTO: 3 %
ERYTHROCYTE [DISTWIDTH] IN BLOOD BY AUTOMATED COUNT: 16.5 % (ref 10–15)
ERYTHROCYTE [SEDIMENTATION RATE] IN BLOOD BY WESTERGREN METHOD: 89 MM/HR (ref 0–20)
GLUCOSE SERPL-MCNC: 189 MG/DL (ref 70–99)
HCO3 SERPL-SCNC: 23 MMOL/L (ref 22–29)
HCT VFR BLD AUTO: 28 % (ref 40–53)
HGB BLD-MCNC: 8.9 G/DL (ref 13.3–17.7)
IMM GRANULOCYTES # BLD: 0.1 10E3/UL
IMM GRANULOCYTES NFR BLD: 1 %
LYMPHOCYTES # BLD AUTO: 1 10E3/UL (ref 0.8–5.3)
LYMPHOCYTES NFR BLD AUTO: 17 %
MCH RBC QN AUTO: 28.4 PG (ref 26.5–33)
MCHC RBC AUTO-ENTMCNC: 31.8 G/DL (ref 31.5–36.5)
MCV RBC AUTO: 90 FL (ref 78–100)
MONOCYTES # BLD AUTO: 0.5 10E3/UL (ref 0–1.3)
MONOCYTES NFR BLD AUTO: 8 %
NEUTROPHILS # BLD AUTO: 4.1 10E3/UL (ref 1.6–8.3)
NEUTROPHILS NFR BLD AUTO: 70 %
NRBC # BLD AUTO: 0 10E3/UL
NRBC BLD AUTO-RTO: 0 /100
PLATELET # BLD AUTO: 102 10E3/UL (ref 150–450)
POTASSIUM SERPL-SCNC: 4 MMOL/L (ref 3.4–5.3)
RBC # BLD AUTO: 3.13 10E6/UL (ref 4.4–5.9)
SODIUM SERPL-SCNC: 138 MMOL/L (ref 135–145)
WBC # BLD AUTO: 5.9 10E3/UL (ref 4–11)

## 2024-11-18 PROCEDURE — 85652 RBC SED RATE AUTOMATED: CPT | Performed by: NURSE PRACTITIONER

## 2024-11-18 PROCEDURE — 80048 BASIC METABOLIC PNL TOTAL CA: CPT | Performed by: NURSE PRACTITIONER

## 2024-11-18 PROCEDURE — 86140 C-REACTIVE PROTEIN: CPT | Performed by: NURSE PRACTITIONER

## 2024-11-18 PROCEDURE — 85025 COMPLETE CBC W/AUTO DIFF WBC: CPT | Performed by: NURSE PRACTITIONER

## 2024-11-18 NOTE — PATIENT INSTRUCTIONS
*Wheelchairs are never guaranteed at the front door, if you have your own wheel chair or knee walker, please bring that with you to your appointment. Thank you for your understanding!*      Important lnstructions      WEIGHT BEARING STATUS: You are to remain NON WEIGHT BEARING on your right foot. Non-weight bearing means NO PRESSURE on your foot or heel at any time for any reason.    2. OFFLOADING DEVICE: Must use a A WHEELCHAIR at all times! (do not use affected foot to push wheelchair)    3. STABILIZATION DEVICE: Use a PRAFO BOOT . You will need to WEAR THIS AT ALL TIMES EVEN WHILE IN BED.     4. ELEVATE: Elevating your leg means laying with your head on a pillow and your foot ABOVE YOUR HEART.     5. DO NOT MOVE YOUR FOOT.  There is a risk of worsening the wound or incision. To give yourself a higher chance of healing, please DO NOT swing foot back and forth and wiggle foot/toes especially when inside a stabilization device. Limited movement is allowable with therapy as recommended by the doctor.     6. TAKE A PROTEIN SHAKE TWICE A DAY.  (For ex: Boost, Ensure, Glucerna)    7. KEEP YOUR WOUND DRY AT ALL TIMES    Use a shower bag or a cast cover to keep your foot/leg dry during showers. These can be purchased on [employer] or any pharmacy.         Dressing Change lnstructions    Wound Care Instructions      3 time a week Cleanse your bilateral leg wound(s) with Normal Saline or Wound Cleanser    Pat dry with non-sterile gauze    Apply Lotion to the intact skin surrounding your wound and other dry skin locations. Some good lotions include: Remedy Skin Repair Cream or Cetaphil    Apply medihoney into/onto the right leg wounds (just dry gauze dressing to the left leg, no honey needed)    Cover with gauze    Secure with roll gauze as needed    It is not ok to get your wound wet in the bath or shower      - Wound Vac Instructions post Theraskin application:     1. Apply wound vac to the left heel within 24 hours from time  of surgery. Wound vac must be set at 100 mmHg continuous suction.     2. LEAVE WOUND VAC IN PLACE UNTIL AFTER 1 WEEK POST OP FOLLOW UP APPOINTMENT.  Home care nurse to re-apply vac after the 1 wk follow-up appointment.     3. Apply Cavilon no-sting barrier wipe to the skin surrounding the wound to protect from drainage/maceration.     4. Apply drape to gunnar wound. Cut strip of drape and apply to skin if bridging isneeded; plan this area in advance; should not be over bony prominence.     5. Cut the foam to fit the size of the wound.     6. Apply foam over the ADAPTIC TOUCH (or similar non-adherent dressing) THAT IS STAPLED/SUTURED IN PLACE OVER the WOUND/GRAFT SITE. DO NOT REMOVE NON-ADHERENT.      7. Cut narrow strip of foam if bridging if needed.     8. Cover foam with drape to obtain air tight seal.     9.Cut opening the size of a quarter for where the suction pad will be applied.     10. Apply Suction pad.     11. 100mmHG suction continuous.     [employer] can be reached at [phone], 24 hours aday 7 days a week     - If you do not have a back up plan in place:     If the negative pressure wound therapy malfunctions or unable to maintain seal: dressing must be removed and reapplied within 2 hours of the incident. If unable to reapply negative pressure wound dressing, place Normal Saline moistened gauze in wound bed and cover with appropriate dressing to keep wound bed moist.  Change wet-to-moist dressing two times aday until healthcare staff can re-implement negative pressure therapy. Change canister at least weekly.  [employer] can be reached at [phone], 24 hours a day 7 days a week    SEEK MEDICAL CARE IF:  You have an increase in swelling, pain, or redness around the wound.  You have an increase in the amount of pus coming from the wound.  There is a bad smell coming from the wound.  The wound appears to be worsening/enlarging  You have a fever greater than 101.5 F      It is ok to  continue current wound care treatment/products for the next 2-3 days until new wound care supplies are ordered and arrive. If longer than this please contact our office at [phone].      We want to hear from you!   In the next few weeks, you should receive a call or email to complete a survey about your visit at [facility]. Please help us improve your appointment experience by letting us know how we did today. We strive to make your experience good and value any ways in which we could do better.      We value your input and suggestions.    Thank you for choosing the [facility]!

## 2024-11-19 ENCOUNTER — TRANSITIONAL CARE UNIT VISIT (OUTPATIENT)
Dept: GERIATRICS | Facility: CLINIC | Age: 79
End: 2024-11-19
Payer: COMMERCIAL

## 2024-11-19 VITALS
WEIGHT: 219.4 LBS | RESPIRATION RATE: 16 BRPM | BODY MASS INDEX: 33.25 KG/M2 | SYSTOLIC BLOOD PRESSURE: 123 MMHG | DIASTOLIC BLOOD PRESSURE: 74 MMHG | HEIGHT: 68 IN | OXYGEN SATURATION: 97 % | TEMPERATURE: 97.7 F | HEART RATE: 84 BPM

## 2024-11-19 DIAGNOSIS — I10 HYPERTENSION, UNSPECIFIED TYPE: ICD-10-CM

## 2024-11-19 DIAGNOSIS — N17.9 ACUTE KIDNEY INJURY SUPERIMPOSED ON CHRONIC KIDNEY DISEASE (H): ICD-10-CM

## 2024-11-19 DIAGNOSIS — N18.9 ACUTE KIDNEY INJURY SUPERIMPOSED ON CHRONIC KIDNEY DISEASE (H): ICD-10-CM

## 2024-11-19 DIAGNOSIS — Z86.2 HISTORY OF ANEMIA OF CHRONIC DISEASE: ICD-10-CM

## 2024-11-19 DIAGNOSIS — Z79.4 TYPE 2 DIABETES MELLITUS WITH DIABETIC PERIPHERAL ANGIOPATHY WITHOUT GANGRENE, WITH LONG-TERM CURRENT USE OF INSULIN (H): ICD-10-CM

## 2024-11-19 DIAGNOSIS — M86.179 OTHER ACUTE OSTEOMYELITIS OF FOOT, UNSPECIFIED LATERALITY (H): ICD-10-CM

## 2024-11-19 DIAGNOSIS — E08.621 DIABETIC ULCER OF HEEL ASSOCIATED WITH DIABETES MELLITUS DUE TO UNDERLYING CONDITION, WITH FAT LAYER EXPOSED, UNSPECIFIED LATERALITY (H): Primary | ICD-10-CM

## 2024-11-19 DIAGNOSIS — D69.6 THROMBOCYTOPENIA (H): ICD-10-CM

## 2024-11-19 DIAGNOSIS — L97.402 DIABETIC ULCER OF HEEL ASSOCIATED WITH DIABETES MELLITUS DUE TO UNDERLYING CONDITION, WITH FAT LAYER EXPOSED, UNSPECIFIED LATERALITY (H): Primary | ICD-10-CM

## 2024-11-19 DIAGNOSIS — T14.8XXA MULTIPLE SKIN TEARS: ICD-10-CM

## 2024-11-19 DIAGNOSIS — E11.51 TYPE 2 DIABETES MELLITUS WITH DIABETIC PERIPHERAL ANGIOPATHY WITHOUT GANGRENE, WITH LONG-TERM CURRENT USE OF INSULIN (H): ICD-10-CM

## 2024-11-19 NOTE — LETTER
11/19/2024      Janice Nowak  [address]  [facility] [address]        [facility]    Code Status:  DNR/DNI   Visit Type:   Chief Complaint   Patient presents with     TCU Discharge      Facility:  [facility] ([facility]) [[id number]]         HPI: Janice Nowak is a 79 year old male who I am seeing today for discharge from the TCU. Past medical history includes insulin dependent type 2 diabetes mellitus, HTN, CKD, chronic normocytic anemia, thrombocytopenia, HL and hypothyroidism. Pt recently hospitalized with worsening ulcer to right heel. Pt found to have acute osteomyelitis of the right heel. He underwent I&D and partial calcnectomy on 10/24. Pt seen by ID and place on IV antibiotics for 6 weeks. Post operative hypotension, improved with fluids. ARB reduced during hospitalization and CCB held. DM2 blood sugars elevated in the afternoon with lows in morning in hospital. Home insulin decreased.     Transitional Care Course: Today pt sitting up in wheelchair able to self ambulate. Right heel ulcer, s/p I & D. He continues in wound VAC.Minimal drainage in canister at the time of visit. Patient had significant bleeding following podiatry visit with wound vac removed for two days, bleeding subsided and wound vac was reapplied. Follow up hgb 8.9. Slightly down from 10.6 due to increased bleeding post debridement at podiatry.  He denies any pain at time of visit. He continues to be NWB to RLE and is in MPB. He continues on IV anbx Q 8 hours and is able to demonstrate to staff proper flushing an attaching medication. DM2. Blood sugars continue to be in the 200-300s. Insulin increased. Plan to discharge home tomorrow with home care through [facility]. [place] apartment which is attached to [facility] where is wife lives. Has 2 sons who are supportive in care. Patient Denies SOB or CP. No issues voiding, and denies constipation and diarrhea.        Assessment/Plan:      Acute osteomyelitis of the right calcaneus  Ulceration right heel   -s/p I & D with partial calcanectomy 10/24.   -ID following. (Dr. Erica Nuñez)   -DAPTOmycin 500 mg Q 24 hours X 6 weeks.   -Zosyn Q 8 hours X 6 weeks.   -Weekly labs: CBC with diff, CMP, ESR, CRP, CK total. No leukocytosis, ESR 89, CRP 29.3.  (ESR and CRP elevated, plan to repeat in am. ID following).   -PICC line cares.  -Wound VAC changes Q Tues and Friday.   -MPB at all times to RLE.   -NWB to RLE.   -F/U with Podiatry /Vascular 12/4      Diarrhea  -resolved.   -C diff negative.   -Continue probiotics BID.     HTN  -with othostatic hypotension in hospital   -Bp improved but continues to fluctuate.   -irbesartan 150 mg Q HS. Losartan substituted 50 mg at bedtime. (Continue Losartan at home).   -amlodipine continues to be hold. (Consider restarting if orthostatic hypotension resolves) Blood pressure stable.   -Monitor bp. Currently stable -113    CKD on CAROLINE  -Baseline creatinine around 1.3-1.4.   -Follow up BMP with Creatine stable at 1.14.      Chronic normocytic anemia   thrombocytopenia  -Hemoglobin stable 10.8.  -Follow up CBC with Hgb 8.9. Repeat in am.      insulin-dependent type 2 diabetes mellitus with hyperglycemia  Episodes of hypoglycemia   -A1c 9.2.   -metformin 2000 mg Q evening.   -steglatro 5 mg daily.   -glargine 35 units in am.   -Aspart 5 units with. Hold is BS < 100.   -Home regimen  ( 100 lantus qAM, 25 novolog prior to bedtime as well as bolus sliding scale -20 units)   -consistent carb diet.   -Blood sugar checks QID.      Pancreatic Lesion  -incidentally found on abdominal US ordered by Vascular.   - US 11/08-Hypoechoic, avascular, cystic lesion located in the pancreatic head space with no peristalsis or color flow seen measuring 7.8 x 4.0 cm.Patient has hx of cholecystectomy. Recommend other imaging modality for a better view. Possible posterior pancreatic head lesion or cyst. No evidence of  abdominal aortic aneurysm.  Cystic lesion adjacent to the pancreatic head.  Further evaluation with an MRCP is suggested.  -Schedule MRCP with [facility].   -Follow up out pt with [facility].   -amylase, lipase and LFTs within normal limits.       -ok to discharge home with current meds and treatments.   -Home PT, OT, HHA and RN (med management, IV care and wound care).   -Follow up with PCP in 1-2 weeks.     Active Ambulatory Problems     Diagnosis Date Noted     Type 2 Diabetes Mellitus      Microalbuminuria      Benign Adenomatous Polyp Of The Large Intestine      Hyperlipidemia      Obstructive Sleep Apnea      Hypertension      Diabetic foot infection (H) 10/07/2024     Type 2 diabetes mellitus with diabetic peripheral angiopathy without gangrene, with long-term current use of insulin (H) 10/07/2024     Diabetic ulcer of right heel associated with type 2 diabetes mellitus, with necrosis of muscle (H) 10/16/2024     Carotid arterial disease (H) 10/29/2024     Atypical chest pain 11/12/2015     Charcot joint of foot 10/29/2024     Charcot's arthropathy 10/29/2024     Diabetic neuropathy (H) 10/29/2024     Exposure to potentially hazardous substance 10/29/2024     Fatigue 11/12/2015     Pressure ulcer of right heel, unstageable (H) 10/29/2024     Occlusion and stenosis of unspecified carotid artery 10/29/2024     Postoperative back pain 10/29/2024     Hypothyroidism 10/29/2024     Hypothyroidism, unspecified 10/29/2024     History of cholecystectomy 10/29/2024     Hearing loss 10/29/2024     Ulcer of right leg, limited to breakdown of skin (H) 11/13/2024     Diabetic ulcer of right heel associated with type 2 diabetes mellitus, with necrosis of bone (H) 11/13/2024     Resolved Ambulatory Problems     Diagnosis Date Noted     No Resolved Ambulatory Problems     Past Medical History:   Diagnosis Date     Diabetes (H)      Hypertension      Sleep apnea      Thyroid disease      Allergies   Allergen Reactions      Exenatide Unknown     Heparin Unknown     Liraglutide Unknown     Lisinopril Cough     Morphine Unknown       All Meds and Allergies reviewed in the record at the facility and is the most up-to-date.    Current Outpatient Medications   Medication Sig Dispense Refill     aspirin 81 MG EC tablet Take 81 mg by mouth daily.       atorvastatin (LIPITOR) 80 MG tablet Take 80 mg by mouth at bedtime.       clopidogrel (PLAVIX) 75 MG tablet Take 75 mg by mouth daily.       co-enzyme Q-10 100 MG CAPS capsule Take 200 mg by mouth daily.       DAPTOmycin 500 mg Inject 500 mg over 30 minutes into the vein every 24 hours. Weekly labs: CBC with diff, CMP, ESR, CRP, CK total. Fax to Erica Nuñez MD at [phone] Infectious Disease. PICC line cares.       ertugliflozin (STEGLATRO) 5 MG TABS Take 5 mg by mouth every morning.       insulin aspart (NOVOLOG FLEXPEN) 100 UNIT/ML pen Inject 5 Units subcutaneously 3 times daily (with meals). Novolog Flexpen: 5 units with breakfast, 5 units with lunch, 5 units with dinner. 15 mL 0     insulin glargine (LANTUS PEN) 100 UNIT/ML pen Inject 35 Units subcutaneously every morning. 15 mL 0     lactobacillus rhamnosus, GG, (CULTURELL) capsule Take 1 capsule by mouth daily.       levothyroxine (SYNTHROID/LEVOTHROID) 50 MCG tablet Take 50 mcg by mouth daily.       losartan (COZAAR) 50 MG tablet Take 50 mg by mouth at bedtime.       metFORMIN (GLUCOPHAGE XR) 500 MG 24 hr tablet Take 2,000 mg by mouth daily (with dinner).       Multiple Vitamins-Minerals (PRESERVISION AREDS 2 PO) Take 2 capsules by mouth daily.       piperacillin sod-tazobactam (ZOSYN) SOLR injection Inject 15 mLs (3.375 g) over 5 minutes into the vein via push every 8 hours. Weekly labs: CBC with diff, CMP, ESR, CRP, CK total. Fax to Erica Nuñez MD at [phone] Infectious Disease. PICC line cares.       spironolactone (ALDACTONE) 25 MG tablet Take 25 mg by mouth daily.       vitamin C with B complex (B COMPLEX-C) tablet  "Take 1 tablet by mouth daily.       No current facility-administered medications for this visit.       REVIEW OF SYSTEMS:   10 point review of systems reviewed and pertinent positives in the HPI.     PHYSICAL EXAMINATION:  Physical Exam     Vital signs: /58   Pulse 92   Temp 97.1  F (36.2  C)   Resp 16   Ht 1.727 m (5' 8\")   Wt 98.9 kg (218 lb)   SpO2 97%   BMI 33.15 kg/m    General: Awake, Alert, oriented x3, sitting up in wheelchair, follows simple commands, conversant  HEENT: Pink conjunctiva, moist oral mucosa  NECK: Supple  CVS:  S1  S2, without murmur or gallop.   LUNG: Clear to auscultation, No wheezes, rales or rhonci.  BACK: No kyphosis of the thoracic spine  ABDOMEN: Soft, nontender to palpation, with positive bowel sounds  EXTREMITIES: Moves both upper and lower extremities with limitation to RLE, Trace pedal edema. Multi podus boot to RLE.   SKIN: Wound VAC to RLE. Mx skin tears to arms. Skin thin and fragile. Tubi  for skin protection.   NEUROLOGIC: Intact, pulses palpable  PSYCHIATRIC: Cognition intact      Labs:  All labs reviewed in the nursing home record and Epic   @  Lab Results   Component Value Date    WBC 9.0 11/04/2024     Lab Results   Component Value Date    RBC 3.64 11/04/2024     Lab Results   Component Value Date    HGB 10.5 11/04/2024     Lab Results   Component Value Date    HCT 33.1 11/04/2024     Lab Results   Component Value Date    MCV 91 11/04/2024     Lab Results   Component Value Date    MCH 28.8 11/04/2024     Lab Results   Component Value Date    MCHC 31.7 11/04/2024     Lab Results   Component Value Date    RDW 15.8 11/04/2024     Lab Results   Component Value Date     11/04/2024        @Last Comprehensive Metabolic Panel:  Sodium   Date Value Ref Range Status   11/12/2024 137 135 - 145 mmol/L Final     Potassium   Date Value Ref Range Status   11/12/2024 4.3 3.4 - 5.3 mmol/L Final   07/06/2020 4.7 3.5 - 5.0 mmol/L Final     Chloride   Date Value Ref " Range Status   11/12/2024 104 98 - 107 mmol/L Final   07/06/2020 103 98 - 107 mmol/L Final     Carbon Dioxide (CO2)   Date Value Ref Range Status   11/12/2024 21 (L) 22 - 29 mmol/L Final   07/06/2020 22 22 - 31 mmol/L Final     Anion Gap   Date Value Ref Range Status   11/12/2024 12 7 - 15 mmol/L Final   07/06/2020 14 5 - 18 mmol/L Final     Glucose   Date Value Ref Range Status   11/12/2024 158 (H) 70 - 99 mg/dL Final   07/06/2020 102 70 - 125 mg/dL Final     GLUCOSE BY METER POCT   Date Value Ref Range Status   11/01/2024 140 (H) 70 - 99 mg/dL Final     Urea Nitrogen   Date Value Ref Range Status   11/12/2024 34.8 (H) 8.0 - 23.0 mg/dL Final   07/06/2020 30 (H) 8 - 28 mg/dL Final     Creatinine   Date Value Ref Range Status   11/12/2024 1.27 (H) 0.67 - 1.17 mg/dL Final     GFR Estimate   Date Value Ref Range Status   11/12/2024 58 (L) >60 mL/min/1.73m2 Final   07/06/2020 55 (L) >60 mL/min/1.73m2 Final     Calcium   Date Value Ref Range Status   11/12/2024 9.2 8.8 - 10.4 mg/dL Final     Comment:     Reference intervals for this test were updated on 7/16/2024 to reflect our healthy population more accurately. There may be differences in the flagging of prior results with similar values performed with this method. Those prior results can be interpreted in the context of the updated reference intervals.     DISCHARGE PLAN/FACE TO FACE:  I certify that this patient is under my care and that I, or a nurse practitioner or physician's assistant working with me, had a face-to-face encounter that meets the physician face-to-face encounter requirements with this patient.       I certify that, based on my findings, the following services are medically necessary home health services.    My clinical findings support the need for the above skilled services.    This patient is homebound because: Pt with recent foot infection with foot ulcer s/p I and D on IV anbx therapy until 12/4/24.     The patient is, or has been, under my  care and I have initiated the establishment of the plan of care. This patient will be followed by a physician who will periodically review the plan of care.    > 35 minutes spent reviewing discharge meds, home care services and follow ups as well as collaborating with nursing and .     I was present with the medical student/advanced practice registered nurse student, Sissy Niño who participated in the service and in the documentation of this note. I have verified the history and personally performed the physical exam and medical decision making. I agree with the assessment and plan of care as documented in the note.       This note has been dictated using voice recognition software. Any grammatical or context distortions are unintentional and inherent to the software    Electronically signed by: Treasure Dumont CNP       Sincerely,        Treasure Dumont NP

## 2024-11-19 NOTE — PROGRESS NOTES
[facility] GERIATRIC SERVICES    Code Status:  DNR/DNI   Visit Type:   Chief Complaint   Patient presents with    TCU Discharge      Facility:  [facility] ([facility]) [[id number]]         HPI: Janice Nowak is a 79 year old male who I am seeing today for discharge from the TCU. Past medical history includes insulin dependent type 2 diabetes mellitus, HTN, CKD, chronic normocytic anemia, thrombocytopenia, HL and hypothyroidism. Pt recently hospitalized with worsening ulcer to right heel. Pt found to have acute osteomyelitis of the right heel. He underwent I&D and partial calcnectomy on 10/24. Pt seen by ID and place on IV antibiotics for 6 weeks. Post operative hypotension, improved with fluids. ARB reduced during hospitalization and CCB held. DM2 blood sugars elevated in the afternoon with lows in morning in hospital. Home insulin decreased.     Transitional Care Course: Today pt sitting up in wheelchair able to self ambulate. Right heel ulcer, s/p I & D. He continues in wound VAC.Minimal drainage in canister at the time of visit. Patient had significant bleeding following podiatry visit with wound vac removed for two days, bleeding subsided and wound vac was reapplied. Follow up hgb 8.9. Slightly down from 10.6 due to increased bleeding post debridement at podiatry.  He denies any pain at time of visit. He continues to be NWB to RLE and is in MPB. He continues on IV anbx Q 8 hours and is able to demonstrate to staff proper flushing an attaching medication. DM2. Blood sugars continue to be in the 200-300s. Insulin increased. Plan to discharge home tomorrow with home care through [facility]. [place] which [place] attached to [facility] where is wife lives. Has 2 sons who are supportive in care. Patient Denies SOB or CP. No issues voiding, and denies constipation and diarrhea.       Assessment/Plan:      Acute osteomyelitis of the right calcaneus  Ulceration right heel   -s/p I & D with  partial calcanectomy 10/24.   -ID following. (Dr. Erica Nuñez)   -DAPTOmycin 500 mg Q 24 hours X 6 weeks.   -Zosyn Q 8 hours X 6 weeks.   -Weekly labs: CBC with diff, CMP, ESR, CRP, CK total. No leukocytosis, ESR 89, CRP 29.3.  (ESR and CRP elevated, plan to repeat in am. ID following).   -PICC line cares.  -Wound VAC changes Q Tues and Friday.   -MPB at all times to RLE.   -NWB to RLE.   -F/U with Podiatry /Vascular 12/4      Diarrhea  -resolved.   -C diff negative.   -Continue probiotics BID.     HTN  -with othostatic hypotension in hospital   -Bp improved but continues to fluctuate.   -irbesartan 150 mg Q HS. Losartan substituted 50 mg at bedtime. (Continue Losartan at home).   -amlodipine continues to be hold. (Consider restarting if orthostatic hypotension resolves) Blood pressure stable.   -Monitor bp. Currently stable -113    CKD on CAROLINE  -Baseline creatinine around 1.3-1.4.   -Follow up BMP with Creatine stable at 1.14.      Chronic normocytic anemia   thrombocytopenia  -Hemoglobin stable 10.8.  -Follow up CBC with Hgb 8.9. Repeat in am.      insulin-dependent type 2 diabetes mellitus with hyperglycemia  Episodes of hypoglycemia   -A1c 9.2.   -metformin 2000 mg Q evening.   -steglatro 5 mg daily.   -glargine 35 units in am.   -Aspart 5 units with. Hold is BS < 100.   -Home regimen  ( 100 lantus qAM, 25 novolog prior to bedtime as well as bolus sliding scale -20 units)   -consistent carb diet.   -Blood sugar checks QID.      Pancreatic Lesion  -incidentally found on abdominal US ordered by Vascular.   - US 11/08-Hypoechoic, avascular, cystic lesion located in the pancreatic head space with no peristalsis or color flow seen measuring 7.8 x 4.0 cm.Patient has hx of cholecystectomy. Recommend other imaging modality for a better view. Possible posterior pancreatic head lesion or cyst. No evidence of abdominal aortic aneurysm.  Cystic lesion adjacent to the pancreatic head.  Further evaluation with an MRCP  is suggested.  -Schedule MRCP with [facility] [facility].   -Follow up out pt with [facility].   -amylase, lipase and LFTs within normal limits.       -ok to discharge home with current meds and treatments.   -Home PT, OT, HHA and RN (med management, IV care and wound care).   -Follow up with PCP in 1-2 weeks.     Active Ambulatory Problems     Diagnosis Date Noted    Type 2 Diabetes Mellitus     Microalbuminuria     Benign Adenomatous Polyp Of The Large Intestine     Hyperlipidemia     Obstructive Sleep Apnea     Hypertension     Diabetic foot infection (H) 10/07/2024    Type 2 diabetes mellitus with diabetic peripheral angiopathy without gangrene, with long-term current use of insulin (H) 10/07/2024    Diabetic ulcer of right heel associated with type 2 diabetes mellitus, with necrosis of muscle (H) 10/16/2024    Carotid arterial disease (H) 10/29/2024    Atypical chest pain 11/12/2015    Charcot joint of foot 10/29/2024    Charcot's arthropathy 10/29/2024    Diabetic neuropathy (H) 10/29/2024    Exposure to potentially hazardous substance 10/29/2024    Fatigue 11/12/2015    Pressure ulcer of right heel, unstageable (H) 10/29/2024    Occlusion and stenosis of unspecified carotid artery 10/29/2024    Postoperative back pain 10/29/2024    Hypothyroidism 10/29/2024    Hypothyroidism, unspecified 10/29/2024    History of cholecystectomy 10/29/2024    Hearing loss 10/29/2024    Ulcer of right leg, limited to breakdown of skin (H) 11/13/2024    Diabetic ulcer of right heel associated with type 2 diabetes mellitus, with necrosis of bone (H) 11/13/2024     Resolved Ambulatory Problems     Diagnosis Date Noted    No Resolved Ambulatory Problems     Past Medical History:   Diagnosis Date    Diabetes (H)     Hypertension     Sleep apnea     Thyroid disease      Allergies   Allergen Reactions    Exenatide Unknown    Heparin Unknown    Liraglutide Unknown    Lisinopril Cough    Morphine Unknown       All Meds and Allergies reviewed in  the record at the facility and is the most up-to-date.    Current Outpatient Medications   Medication Sig Dispense Refill    aspirin 81 MG EC tablet Take 81 mg by mouth daily.      atorvastatin (LIPITOR) 80 MG tablet Take 80 mg by mouth at bedtime.      clopidogrel (PLAVIX) 75 MG tablet Take 75 mg by mouth daily.      co-enzyme Q-10 100 MG CAPS capsule Take 200 mg by mouth daily.      DAPTOmycin 500 mg Inject 500 mg over 30 minutes into the vein every 24 hours. Weekly labs: CBC with diff, CMP, ESR, CRP, CK total. Fax to Erica Nuñez MD at [phone] Infectious Disease. PICC line cares.      ertugliflozin (STEGLATRO) 5 MG TABS Take 5 mg by mouth every morning.      insulin aspart (NOVOLOG FLEXPEN) 100 UNIT/ML pen Inject 5 Units subcutaneously 3 times daily (with meals). Novolog Flexpen: 5 units with breakfast, 5 units with lunch, 5 units with dinner. 15 mL 0    insulin glargine (LANTUS PEN) 100 UNIT/ML pen Inject 35 Units subcutaneously every morning. 15 mL 0    lactobacillus rhamnosus, GG, (CULTURELL) capsule Take 1 capsule by mouth daily.      levothyroxine (SYNTHROID/LEVOTHROID) 50 MCG tablet Take 50 mcg by mouth daily.      losartan (COZAAR) 50 MG tablet Take 50 mg by mouth at bedtime.      metFORMIN (GLUCOPHAGE XR) 500 MG 24 hr tablet Take 2,000 mg by mouth daily (with dinner).      Multiple Vitamins-Minerals (PRESERVISION AREDS 2 PO) Take 2 capsules by mouth daily.      piperacillin sod-tazobactam (ZOSYN) SOLR injection Inject 15 mLs (3.375 g) over 5 minutes into the vein via push every 8 hours. Weekly labs: CBC with diff, CMP, ESR, CRP, CK total. Fax to Erica Nuñez MD at [phone] Infectious Disease. PICC line cares.      spironolactone (ALDACTONE) 25 MG tablet Take 25 mg by mouth daily.      vitamin C with B complex (B COMPLEX-C) tablet Take 1 tablet by mouth daily.       No current facility-administered medications for this visit.       REVIEW OF SYSTEMS:   10 point review of systems reviewed and  "pertinent positives in the HPI.     PHYSICAL EXAMINATION:  Physical Exam     Vital signs: /58   Pulse 92   Temp 97.1  F (36.2  C)   Resp 16   Ht 1.727 m (5' 8\")   Wt 98.9 kg (218 lb)   SpO2 97%   BMI 33.15 kg/m    General: Awake, Alert, oriented x3, sitting up in wheelchair, follows simple commands, conversant  HEENT: Pink conjunctiva, moist oral mucosa  NECK: Supple  CVS:  S1  S2, without murmur or gallop.   LUNG: Clear to auscultation, No wheezes, rales or rhonci.  BACK: No kyphosis of the thoracic spine  ABDOMEN: Soft, nontender to palpation, with positive bowel sounds  EXTREMITIES: Moves both upper and lower extremities with limitation to RLE, Trace pedal edema. Multi podus boot to RLE.   SKIN: Wound VAC to RLE. Mx skin tears to arms. Skin thin and fragile. Tubi  for skin protection.   NEUROLOGIC: Intact, pulses palpable  PSYCHIATRIC: Cognition intact      Labs:  All labs reviewed in the nursing home record and [facility]   @  Lab Results   Component Value Date    WBC 9.0 11/04/2024     Lab Results   Component Value Date    RBC 3.64 11/04/2024     Lab Results   Component Value Date    HGB 10.5 11/04/2024     Lab Results   Component Value Date    HCT 33.1 11/04/2024     Lab Results   Component Value Date    MCV 91 11/04/2024     Lab Results   Component Value Date    MCH 28.8 11/04/2024     Lab Results   Component Value Date    MCHC 31.7 11/04/2024     Lab Results   Component Value Date    RDW 15.8 11/04/2024     Lab Results   Component Value Date     11/04/2024        @Last Comprehensive Metabolic Panel:  Sodium   Date Value Ref Range Status   11/12/2024 137 135 - 145 mmol/L Final     Potassium   Date Value Ref Range Status   11/12/2024 4.3 3.4 - 5.3 mmol/L Final   07/06/2020 4.7 3.5 - 5.0 mmol/L Final     Chloride   Date Value Ref Range Status   11/12/2024 104 98 - 107 mmol/L Final   07/06/2020 103 98 - 107 mmol/L Final     Carbon Dioxide (CO2)   Date Value Ref Range Status   11/12/2024 21 (L) " 22 - 29 mmol/L Final   07/06/2020 22 22 - 31 mmol/L Final     Anion Gap   Date Value Ref Range Status   11/12/2024 12 7 - 15 mmol/L Final   07/06/2020 14 5 - 18 mmol/L Final     Glucose   Date Value Ref Range Status   11/12/2024 158 (H) 70 - 99 mg/dL Final   07/06/2020 102 70 - 125 mg/dL Final     GLUCOSE BY METER POCT   Date Value Ref Range Status   11/01/2024 140 (H) 70 - 99 mg/dL Final     Urea Nitrogen   Date Value Ref Range Status   11/12/2024 34.8 (H) 8.0 - 23.0 mg/dL Final   07/06/2020 30 (H) 8 - 28 mg/dL Final     Creatinine   Date Value Ref Range Status   11/12/2024 1.27 (H) 0.67 - 1.17 mg/dL Final     GFR Estimate   Date Value Ref Range Status   11/12/2024 58 (L) >60 mL/min/1.73m2 Final   07/06/2020 55 (L) >60 mL/min/1.73m2 Final     Calcium   Date Value Ref Range Status   11/12/2024 9.2 8.8 - 10.4 mg/dL Final     Comment:     Reference intervals for this test were updated on 7/16/2024 to reflect our healthy population more accurately. There may be differences in the flagging of prior results with similar values performed with this method. Those prior results can be interpreted in the context of the updated reference intervals.     DISCHARGE PLAN/FACE TO FACE:  I certify that this patient is under my care and that I, or a nurse practitioner or physician's assistant working with me, had a face-to-face encounter that meets the physician face-to-face encounter requirements with this patient.       I certify that, based on my findings, the following services are medically necessary home health services.    My clinical findings support the need for the above skilled services.    This patient is homebound because: Pt with recent foot infection with foot ulcer s/p I and D on IV anbx therapy until 12/4/24.     The patient is, or has been, under my care and I have initiated the establishment of the plan of care. This patient will be followed by a physician who will periodically review the plan of care.    > 35  minutes spent reviewing discharge meds, home care services and follow ups as well as collaborating with nursing and .     I was present with the medical student/advanced practice registered nurse student, Sissy Niño who participated in the service and in the documentation of this note. I have verified the history and personally performed the physical exam and medical decision making. I agree with the assessment and plan of care as documented in the note.       This note has been dictated using voice recognition software. Any grammatical or context distortions are unintentional and inherent to the software    Electronically signed by: Treasure Dumont, CNP

## 2024-11-20 ENCOUNTER — LAB REQUISITION (OUTPATIENT)
Dept: LAB | Facility: CLINIC | Age: 79
End: 2024-11-20

## 2024-11-20 ENCOUNTER — TELEPHONE (OUTPATIENT)
Dept: VASCULAR SURGERY | Facility: CLINIC | Age: 79
End: 2024-11-20
Payer: COMMERCIAL

## 2024-11-20 DIAGNOSIS — M86.171 OTHER ACUTE OSTEOMYELITIS, RIGHT ANKLE AND FOOT (H): ICD-10-CM

## 2024-11-20 LAB
ALBUMIN SERPL BCG-MCNC: 3.4 G/DL (ref 3.5–5.2)
ALP SERPL-CCNC: 97 U/L (ref 40–150)
ALT SERPL W P-5'-P-CCNC: 39 U/L (ref 0–70)
ANION GAP SERPL CALCULATED.3IONS-SCNC: 13 MMOL/L (ref 7–15)
AST SERPL W P-5'-P-CCNC: 33 U/L (ref 0–45)
BASOPHILS # BLD AUTO: 0.1 10E3/UL (ref 0–0.2)
BASOPHILS NFR BLD AUTO: 1 %
BILIRUB SERPL-MCNC: 0.5 MG/DL
BUN SERPL-MCNC: 34.6 MG/DL (ref 8–23)
CALCIUM SERPL-MCNC: 7.5 MG/DL (ref 8.8–10.4)
CHLORIDE SERPL-SCNC: 101 MMOL/L (ref 98–107)
CK SERPL-CCNC: 73 U/L (ref 39–308)
CREAT SERPL-MCNC: 1.29 MG/DL (ref 0.67–1.17)
CRP SERPL-MCNC: 14.2 MG/L
EGFRCR SERPLBLD CKD-EPI 2021: 57 ML/MIN/1.73M2
EOSINOPHIL # BLD AUTO: 0.2 10E3/UL (ref 0–0.7)
EOSINOPHIL NFR BLD AUTO: 3 %
ERYTHROCYTE [DISTWIDTH] IN BLOOD BY AUTOMATED COUNT: 16.4 % (ref 10–15)
ERYTHROCYTE [SEDIMENTATION RATE] IN BLOOD BY WESTERGREN METHOD: 77 MM/HR (ref 0–20)
GLUCOSE SERPL-MCNC: 338 MG/DL (ref 70–99)
HCO3 SERPL-SCNC: 20 MMOL/L (ref 22–29)
HCT VFR BLD AUTO: 29.2 % (ref 40–53)
HGB BLD-MCNC: 9.4 G/DL (ref 13.3–17.7)
IMM GRANULOCYTES # BLD: 0.2 10E3/UL
IMM GRANULOCYTES NFR BLD: 3 %
LYMPHOCYTES # BLD AUTO: 0.8 10E3/UL (ref 0.8–5.3)
LYMPHOCYTES NFR BLD AUTO: 12 %
MCH RBC QN AUTO: 28.7 PG (ref 26.5–33)
MCHC RBC AUTO-ENTMCNC: 32.2 G/DL (ref 31.5–36.5)
MCV RBC AUTO: 89 FL (ref 78–100)
MONOCYTES # BLD AUTO: 0.6 10E3/UL (ref 0–1.3)
MONOCYTES NFR BLD AUTO: 8 %
NEUTROPHILS # BLD AUTO: 5.2 10E3/UL (ref 1.6–8.3)
NEUTROPHILS NFR BLD AUTO: 74 %
NRBC # BLD AUTO: 0 10E3/UL
NRBC BLD AUTO-RTO: 0 /100
PLATELET # BLD AUTO: 137 10E3/UL (ref 150–450)
POTASSIUM SERPL-SCNC: 5.5 MMOL/L (ref 3.4–5.3)
PROT SERPL-MCNC: 7.1 G/DL (ref 6.4–8.3)
RBC # BLD AUTO: 3.27 10E6/UL (ref 4.4–5.9)
SODIUM SERPL-SCNC: 134 MMOL/L (ref 135–145)
WBC # BLD AUTO: 7 10E3/UL (ref 4–11)

## 2024-11-20 PROCEDURE — 85025 COMPLETE CBC W/AUTO DIFF WBC: CPT | Performed by: NURSE PRACTITIONER

## 2024-11-20 PROCEDURE — 80053 COMPREHEN METABOLIC PANEL: CPT | Performed by: NURSE PRACTITIONER

## 2024-11-20 PROCEDURE — 85652 RBC SED RATE AUTOMATED: CPT | Performed by: NURSE PRACTITIONER

## 2024-11-20 PROCEDURE — 82550 ASSAY OF CK (CPK): CPT | Performed by: NURSE PRACTITIONER

## 2024-11-20 PROCEDURE — 86140 C-REACTIVE PROTEIN: CPT | Performed by: NURSE PRACTITIONER

## 2024-11-20 NOTE — TELEPHONE ENCOUNTER
Angiogram scheduled:    Procedure: Right  Leg  Angiogram      Procedure Date :  12/12/2024     Procedure Time :  10:00 AM     Arrival Time: 9:00 AM     2 week Post Procedure Appointment with  Dr. Silvestre Jc and also ultrasound: 12/31/2024 10:00 AM (Arrive by 9:45 AM) Ultrasounds first- followed by visit with Dr. Jc at the [facility]     Admission Type: Outpatient     Surgeon: Dr. Silvestre Jc     Procedure Location: [facility]:  [address] [place] [address] (phone: [phone], Fax: [phone])       Confirmed with Oralia in IR scheduling on 11/20/24    Confirmed details with pt in detail via telephone.  Instructions sent to pt via letter.

## 2024-11-21 ENCOUNTER — DOCUMENTATION ONLY (OUTPATIENT)
Dept: VASCULAR SURGERY | Facility: CLINIC | Age: 79
End: 2024-11-21
Payer: COMMERCIAL

## 2024-11-21 ENCOUNTER — LAB REQUISITION (OUTPATIENT)
Dept: LAB | Facility: CLINIC | Age: 79
End: 2024-11-21
Payer: COMMERCIAL

## 2024-11-21 DIAGNOSIS — D64.9 ANEMIA, UNSPECIFIED: ICD-10-CM

## 2024-11-21 NOTE — PROGRESS NOTES
Reviewed with Dr. Patricio and placed order for [employer] 39 sqcm. Order form placed in Jerod's basket. Pt has follow-up 12/4/24.

## 2024-11-25 ENCOUNTER — LAB REQUISITION (OUTPATIENT)
Dept: LAB | Facility: CLINIC | Age: 79
End: 2024-11-25
Payer: COMMERCIAL

## 2024-11-25 DIAGNOSIS — L08.9 LOCAL INFECTION OF THE SKIN AND SUBCUTANEOUS TISSUE, UNSPECIFIED: ICD-10-CM

## 2024-11-25 DIAGNOSIS — I95.1 ORTHOSTATIC HYPOTENSION: ICD-10-CM

## 2024-11-25 DIAGNOSIS — Z79.4 LONG TERM (CURRENT) USE OF INSULIN (H): ICD-10-CM

## 2024-11-25 DIAGNOSIS — M86.171 OTHER ACUTE OSTEOMYELITIS, RIGHT ANKLE AND FOOT (H): ICD-10-CM

## 2024-11-25 DIAGNOSIS — R19.7 DIARRHEA, UNSPECIFIED: ICD-10-CM

## 2024-11-27 ENCOUNTER — HOSPITAL ENCOUNTER (OUTPATIENT)
Dept: CT IMAGING | Facility: HOSPITAL | Age: 79
Discharge: HOME | End: 2024-11-27
Attending: RADIOLOGY
Payer: COMMERCIAL

## 2024-11-27 ENCOUNTER — TRANSFERRED RECORDS (OUTPATIENT)
Dept: HEALTH INFORMATION MANAGEMENT | Facility: CLINIC | Age: 79
End: 2024-11-27

## 2024-11-27 DIAGNOSIS — L97.414 DIABETIC ULCER OF RIGHT HEEL ASSOCIATED WITH TYPE 2 DIABETES MELLITUS, WITH NECROSIS OF BONE (H): ICD-10-CM

## 2024-11-27 DIAGNOSIS — E11.621 DIABETIC ULCER OF RIGHT HEEL ASSOCIATED WITH TYPE 2 DIABETES MELLITUS, WITH NECROSIS OF BONE (H): ICD-10-CM

## 2024-11-27 PROCEDURE — 250N000011 HC RX IP 250 OP 636: Performed by: RADIOLOGY

## 2024-11-27 PROCEDURE — 75635 CT ANGIO ABDOMINAL ARTERIES: CPT

## 2024-11-27 RX ORDER — IOPAMIDOL 755 MG/ML
90 INJECTION, SOLUTION INTRAVASCULAR ONCE
Status: COMPLETED | OUTPATIENT
Start: 2024-11-27 | End: 2024-11-27

## 2024-11-27 RX ADMIN — IOPAMIDOL 90 ML: 755 INJECTION, SOLUTION INTRAVENOUS at 16:03

## 2024-12-02 ENCOUNTER — TELEPHONE (OUTPATIENT)
Dept: VASCULAR SURGERY | Facility: CLINIC | Age: 79
End: 2024-12-02
Payer: COMMERCIAL

## 2024-12-02 ENCOUNTER — PATIENT OUTREACH (OUTPATIENT)
Dept: ONCOLOGY | Facility: CLINIC | Age: 79
End: 2024-12-02
Payer: COMMERCIAL

## 2024-12-02 DIAGNOSIS — R19.00 ABDOMINAL MASS: Primary | ICD-10-CM

## 2024-12-02 NOTE — TELEPHONE ENCOUNTER
RECORDS STATUS - ALL OTHER DIAGNOSIS      RECORDS RECEIVED FROM: Epic/ Jeremías   NOTES STATUS DETAILS   OFFICE NOTE from referring provider Epic 10/22/24: Dr. Rivers Baadh   MEDICATION LIST [facility]    LABS     ANYTHING RELATED TO DIAGNOSIS DOR[facility]- Jerem[facility] Most recent 11/25/24   IMAGING (NEED IMAGES & REPORT)     CT SCANS PACS 11/27/24: CT Abd Pel

## 2024-12-02 NOTE — PROGRESS NOTES
New Patient Oncology Nurse Navigator Note     Referring provider: Dr Jc, [facility]    Referring Clinic/Organization: [facility]     Referred to: Medical Oncology    Requested provider (if applicable): First available - did not specify     Referral Received: 12/02/24       Evaluation for : CT concerning for malignancy, unknown primary     Clinical History (per Nurse review of records provided):      11/27/2024 CT angiogram Abd/Pelvis ([facility])  IMPRESSION:  1.  Severe aortoiliac atherosclerotic disease with interval short segment occlusion of the left common iliac artery with distal reconstitution. This appears new since the November 8 aortic ultrasound     2.  Bilateral SFA disease proximally. Two-vessel runoff bilaterally with occlusion of the ALEX's.     3.  There is a heterogeneous mass in the central right hemiabdomen posterior to the duodenum and anterior to tzhe IVC. This appears distinct from the pancreatic head and liver, flow is concerning for neoplasm. In addition there is a 2.5 cm hepatic   hypodensity which is incompletely evaluated on this exam. Abdominal MRI with contrast is recommended to further evaluate these findings. The abdominal mass would be amenable to endoscopic ultrasound-guided biopsy as indicated.     4.  Prominent retroperitoneal lymph nodes concerning for a metastatic process given the aforementioned findings.     5.  Partially visualized opacity along the right major fissure. Further evaluation with a CT of the chest is suggested.      Clinical Assessment / Barriers to Care (Per Nurse):    Dr Jc requests Oncology consult for further work up of abdominal, liver masses. Dr Gleason is available tomorrow (12/3) at [facility] due to a cancellation. Will advise pt take this appt to expedite Onc work up. Or schedule per pt preference.       Records Location: Epic     Records Needed:     Outside imaging.    Additional testing needed prior to consult:      N/A          Ricardo Keane, RN, BSN, OCN  Oncology New Patient Nurse Navigator   [facility]  [phone]

## 2024-12-02 NOTE — TELEPHONE ENCOUNTER
Pt updated that recent CTA showed abdominal mass.     Per Dr. Jc order a MR abdomen with contrast and make a referral to oncology and GI. Pt updated that referrals were sent. Dr. Jc will move forward with the angiogram next week.

## 2024-12-03 ENCOUNTER — TELEPHONE (OUTPATIENT)
Dept: GASTROENTEROLOGY | Facility: CLINIC | Age: 79
End: 2024-12-03

## 2024-12-03 ENCOUNTER — ONCOLOGY VISIT (OUTPATIENT)
Dept: ONCOLOGY | Facility: HOSPITAL | Age: 79
End: 2024-12-03
Attending: RADIOLOGY
Payer: COMMERCIAL

## 2024-12-03 ENCOUNTER — PRE VISIT (OUTPATIENT)
Dept: ONCOLOGY | Facility: HOSPITAL | Age: 79
End: 2024-12-03
Payer: COMMERCIAL

## 2024-12-03 VITALS
TEMPERATURE: 97.2 F | HEART RATE: 81 BPM | SYSTOLIC BLOOD PRESSURE: 122 MMHG | OXYGEN SATURATION: 96 % | RESPIRATION RATE: 18 BRPM | DIASTOLIC BLOOD PRESSURE: 58 MMHG

## 2024-12-03 DIAGNOSIS — R19.00 ABDOMINAL MASS: ICD-10-CM

## 2024-12-03 DIAGNOSIS — R93.5 ABNORMAL FINDINGS ON DIAGNOSTIC IMAGING OF OTHER ABDOMINAL REGIONS, INCLUDING RETROPERITONEUM: Primary | ICD-10-CM

## 2024-12-03 ASSESSMENT — PAIN SCALES - GENERAL: PAINLEVEL_OUTOF10: NO PAIN (0)

## 2024-12-03 NOTE — PROGRESS NOTES
"Oncology Rooming Note    December 3, 2024 9:21 AM   Janice Nowak is a 79 year old male who presents for:    Chief Complaint   Patient presents with    Oncology Clinic Visit     New patient consult     Initial Vitals: /58 (BP Location: Left arm, Patient Position: Sitting, Cuff Size: Adult Large)   Pulse 81   Temp 97.2  F (36.2  C) (Tympanic)   Resp 18   SpO2 96%  Estimated body mass index is 33.36 kg/m  as calculated from the following:    Height as of 11/19/24: 1.727 m (5' 8\").    Weight as of 11/19/24: 99.5 kg (219 lb 6.4 oz). There is no height or weight on file to calculate BSA.  No Pain (0) Comment: Data Unavailable   No LMP for male patient.  Allergies reviewed: Yes  Medications reviewed: Yes    Medications: Medication refills not needed today.  Pharmacy name entered into [employer]: [id number] - [place] [id number] [address]    Frailty Screening:   Is the patient here for a new oncology consult visit in cancer care? 1. Yes. Over the past month, have you experienced difficulty or required a caregiver to assist with:   1. Balance, walking or general mobility (including any falls)? YES  2. Completion of self-care tasks such as bathing, dressing, toileting, grooming/hygiene?  NO  3. Concentration or memory that affects your daily life?  NO       Clinical concerns:       KAY GARCIA CMA            "

## 2024-12-03 NOTE — Clinical Note
"12/3/2024      Janice Nowak  [address]  [facility] [address]      Dear Colleague,    Thank you for referring your patient, Janice Nowak, to the [facility]. Please see a copy of my visit note below.    Oncology Rooming Note    December 3, 2024 9:21 AM   Janice Nowak is a 79 year old male who presents for:    Chief Complaint   Patient presents with    Oncology Clinic Visit     New patient consult     Initial Vitals: /58 (BP Location: Left arm, Patient Position: Sitting, Cuff Size: Adult Large)   Pulse 81   Temp 97.2  F (36.2  C) (Tympanic)   Resp 18   SpO2 96%  Estimated body mass index is 33.36 kg/m  as calculated from the following:    Height as of 11/19/24: 1.727 m (5' 8\").    Weight as of 11/19/24: 99.5 kg (219 lb 6.4 oz). There is no height or weight on file to calculate BSA.  No Pain (0) Comment: Data Unavailable   No LMP for male patient.  Allergies reviewed: Yes  Medications reviewed: Yes    Medications: Medication refills not needed today.  Pharmacy name entered into [facility]: [id number] - [place], [place] - [address]     Frailty Screening:   Is the patient here for a new oncology consult visit in cancer care? 1. Yes. Over the past month, have you experienced difficulty or required a caregiver to assist with:   1. Balance, walking or general mobility (including any falls)? YES  2. Completion of self-care tasks such as bathing, dressing, toileting, grooming/hygiene?  NO  3. Concentration or memory that affects your daily life?  NO       Clinical concerns:       KAY GARCIA CMA              [facility] Hematology and Oncology Consult Note      Patient: Janice Nowak  MRN: [id number]  Date of Service: Dec 3, 2024      We have been asked by Dr. Jc to evaluate Janice Nowak for    Assessment/Plan:        ECOG Performance    Staging History:    Cancer Staging   No matching staging information was " found for the patient.      History:    ***    Past History:  Past Medical History:   Diagnosis Date     Diabetes (H)      Hypertension      Sleep apnea      Thyroid disease     No family history on file.   [unfilled] Social History     Socioeconomic History     Marital status:      Spouse name: Not on file     Number of children: Not on file     Years of education: Not on file     Highest education level: Not on file   Occupational History     Not on file   Tobacco Use     Smoking status: Former     Current packs/day: 0.00     Types: Cigarettes     Quit date:      Years since quittin.9     Passive exposure: Never     Smokeless tobacco: Never   Vaping Use     Vaping status: Never Used   Substance and Sexual Activity     Alcohol use: Yes     Alcohol/week: 5.0 standard drinks of alcohol     Types: 5 Cans of beer per week     Drug use: Not Currently     Sexual activity: Not on file   Other Topics Concern     Not on file   Social History Narrative     Not on file     Social Drivers of Health     Financial Resource Strain: Low Risk  (10/28/2024)    Financial Resource Strain      Within the past 12 months, have you or your family members you live with been unable to get utilities (heat, electricity) when it was really needed?: No   Food Insecurity: Low Risk  (10/28/2024)    Food Insecurity      Within the past 12 months, did you worry that your food would run out before you got money to buy more?: No      Within the past 12 months, did the food you bought just not last and you didn t have money to get more?: No   Transportation Needs: Low Risk  (10/28/2024)    Transportation Needs      Within the past 12 months, has lack of transportation kept you from medical appointments, getting your medicines, non-medical meetings or appointments, work, or from getting things that you need?: No   Physical Activity: Not on file   Stress: Not on file   Social Connections: Not on file   Interpersonal Safety: Low Risk   (10/28/2024)    Interpersonal Safety      Do you feel physically and emotionally safe where you currently live?: Yes      Within the past 12 months, have you been hit, slapped, kicked or otherwise physically hurt by someone?: No      Within the past 12 months, have you been humiliated or emotionally abused in other ways by your partner or ex-partner?: No   Recent Concern: Interpersonal Safety - High Risk (10/9/2024)    Interpersonal Safety      Do you feel physically and emotionally safe where you currently live?: No      Within the past 12 months, have you been hit, slapped, kicked or otherwise physically hurt by someone?: No      Within the past 12 months, have you been humiliated or emotionally abused in other ways by your partner or ex-partner?: No   Housing Stability: High Risk (10/28/2024)    Housing Stability      Do you have housing? : No      Are you worried about losing your housing?: No        Allergies:    Allergies   Allergen Reactions     Exenatide Unknown     Heparin Unknown     Liraglutide Unknown     Lisinopril Cough     Morphine Unknown       Review of Systems:    As above in the history.     Review of Systems otherwise Negative for:  General: chills, fever or night sweats  Psychological: anxiety or depression  Ophthalmic: blurry vision, double vision or loss of vision, vision change  ENT: epistaxis, oral lesions, hearing changes  Hematological and Lymphatic: bleeding, bruising, jaundice, swollen lymph nodes  Endocrine: hot flashes, unexpected weight changes  Respiratory: cough, hemoptysis, orthopnea or shortness of breath/DIAS  Cardiovascular: chest pain, edema, palpitations or PND  Gastrointestinal: abdominal pain, blood in stools, change in bowel habits, constipation, diarrhea or nausea/vomiting  Genito-Urinary: change in urinary stream, incontinence, frequency/urgency  Musculoskeletal: joint pain, stiffness, swelling, muscle pain  Neurological: dizziness, headaches,  numbness/tingling  Dermatological: lumps and rash    Pain       Physical Exam:    /58 (BP Location: Left arm, Patient Position: Sitting, Cuff Size: Adult Large)   Pulse 81   Temp 97.2  F (36.2  C) (Tympanic)   Resp 18   SpO2 96%       General: patient appears stated age of 79 year old. Nontoxic and in no distress.   HEENT: Head: atraumatic, normocephalic. Sclerae anicteric.  Chest:  Normal respiratory effort  Cardiac:  No edema.   Abdomen: abdomen is non-distended  Extremities: normal tone and muscle bulk.   Skin: no lesions or rash on visible skin. Warm and dry.   CNS: alert and oriented. Grossly non-focal.   Psychiatric: normal mood and affect.     Lab Results:    No results found for this or any previous visit (from the past week).    Imaging Results:    CTA Abdomen Pelvis Runoff w Contrast    Result Date: 11/27/2024  EXAM: CTA ABDOMEN PELVIS RUNOFF W CONTRAST LOCATION: [facility] DATE: 11/27/2024 INDICATION:  Diabetic ulcer of right heel associated with type 2 diabetes mellitus, with necrosis of bone (H), Diabetic ulcer of right heel associated with type 2 diabetes mellitus, with necrosis of bone (H) COMPARISON: Aortic ultrasound 11/8/2024 TECHNIQUE: Helical acquisition through the abdomen, pelvis, and bilateral lower extremities was performed during the arterial phase of contrast enhancement using IV Contrast. 2D and 3D reconstructions were performed by the CT technologist. Dose reduction  techniques were used. CONTRAST: 90ml isovue 370 FINDINGS: ABDOMEN ARTERIAL FINDINGS Abdominal aorta: Patent and normal caliber with moderate atherosclerotic burden. Celiac artery: Patent. SMA: Patent. Renal Arteries: Mild atherosclerotic narrowing of the renal artery origins which are otherwise patent distally. ALEC: Mild ostial narrowing, otherwise patent. RIGHT LOWER EXTREMITY ARTERIAL FINDINGS Common Iliac: At least mild atherosclerotic narrowing of the origin, otherwise patent. Diffuse  atherosclerotic disease. External Iliac: Patent. Internal Iliac: At least mild ostial stenosis. Common Femoral: Patent with atherosclerosis resulting in approximately 50% luminal narrowing. Profunda: Ostial stenosis, otherwise patent. SFA: Moderate focal stenosis near the origin, otherwise patent. Popliteal: Patent. Tibioperoneal trunk: Patent. Anterior tibial: Occluded in the proximal calf. Peroneal: Patent to the level of the ankle. Posterior tibial: Patent to the level of the foot. LEFT LOWER EXTREMITY ARTERIAL FINDINGS Common Iliac: Severe atherosclerotic disease and occlusion at the origin with reconstitution distally just proximal to the bifurcation. External Iliac: Atherosclerotic disease results in mild narrowing throughout the vessels course. Internal Iliac: Ostial stenosis, otherwise patent. Common Femoral: Atherosclerotic disease results in approximately 50% luminal stenosis. Profunda: Ostial stenosis, otherwise patent. SFA: Atherosclerotic disease results in severe focal stenosis proximally, otherwise patent. Popliteal: Mild atherosclerotic disease in the above-knee segment, the below-knee segment is patent. Tibioperoneal trunk: Patent. Anterior tibial: Occluded. Peroneal: Patent to the level of the ankle. Posterior tibial: Patent to the level of the foot. NON-ARTERIAL FINDINGS LUNG BASES: No pleural effusion. Scattered areas of scarring. A partially visualized opacity along the right major fissure is noted. ABDOMEN: Cholecystectomy. A hypodensity in the posterior right hepatic lobe is noted measuring 2.5 cm in diameter, incompletely evaluated on this exam.. Mild scarring or megaly measuring 14 cm craniocaudal. The adrenal glands and kidneys are normal. Mild  fatty atrophy of the pancreatic head. There is a heterogeneous mass along the anterior aspect of the IVC and posterior to the duodenum measuring 7.0 x 4.7 x 7.0 cm. Prominent lymph nodes are noted immediately adjacent to the mass. Normal caliber  bowel loops. No ascites. PELVIS: Normal MUSCULOSKELETAL: Mild degenerative changes of the thoracolumbar spine and bilateral hips. No destructive osseous lesion     IMPRESSION: 1.  Severe aortoiliac atherosclerotic disease with interval short segment occlusion of the left common iliac artery with distal reconstitution. This appears new since the November 8 aortic ultrasound 2.  Bilateral SFA disease proximally. Two-vessel runoff bilaterally with occlusion of the ALEX's. 3.  There is a heterogeneous mass in the central right hemiabdomen posterior to the duodenum and anterior to tzhe IVC. This appears distinct from the pancreatic head and liver, flow is concerning for neoplasm. In addition there is a 2.5 cm hepatic hypodensity which is incompletely evaluated on this exam. Abdominal MRI with contrast is recommended to further evaluate these findings. The abdominal mass would be amenable to endoscopic ultrasound-guided biopsy as indicated. 4.  Prominent retroperitoneal lymph nodes concerning for a metastatic process given the aforementioned findings. 5.  Partially visualized opacity along the right major fissure. Further evaluation with a CT of the chest is suggested.    US Aorta/Ivc/Iliac Duplex Complete    Result Date: 11/9/2024  Table formatting from the original result was not included. Aorta Ultrasound (Date: 11/08/24) Indication: Screening for abdominal aortic aneurysm Previous: None History: Previous Smoker, Hypertension, Diabetic, Hyperlipidemia, PAD, and Vascular Ulcers Technique: Duplex imaging is performed utilizing gray-scale, two dimensional images, and color-flow imaging. Doppler waveform analysis and spectral Doppler imagine is also performed. Waveforms: Triphasic= T, Biphasic= B, Monophasic=M  Diameter (cm) AP X Width Velocities Waveform Proximal Aorta:   2.1 X 2.7 81  T Mid Aorta:   1.9 X 1.7 84  B Distal Aorta:   1.6 X 1.2 40  M Right WENDY:   0.9 X 0.8 142  T Left WENDY:   0.9 X 0.7 134  T Location of  Aneurysm:  N/A Comments: Hypoechoic, avascular, cystic lesion located in the pancreatic head space with no peristalsis or color flow seen measuring 7.8 x 4.0 cm.. Patient has hx of cholecystectomy. Recommend other imaging modality for a better view. Possible posterior pancreatic head lesion or cyst.  Impressions: No evidence of abdominal aortic aneurysm.  Cystic lesion adjacent to the pancreatic head.  Further evaluation with an MRCP is suggested. Reference: Criteria AAA Size (cm) Recommendation  Normal >2.5 - <3.0 Re-screen in 10 years Abnormal 3.0 - 3.9 Re-screen in 3 years Abnormal 4.0 - 4.9  Re-screen in 1 year Abnormal 5.0 - 5.4 Re-screen in 6 months/vascular referral  Abnormal > 5.5 Elective repair/vascular referral      US Carotid Bilateral    Result Date: 11/9/2024  Table formatting from the original result was not included. BILATERAL CAROTID ULTRASOUND (Date: 11/08/24) Indication: Screening for carotid artery disease/Hx of right carotid endarterectomy. Carotid bruit. Previous: 09/16/2013 Symptoms: Hypertension and Previous Smoker Right Endarterectomy TECHNIQUE: Duplex exam performed utilizing 2D gray-scale imaging, Doppler interrogation with color-flow and spectral waveform analysis. Right Velocity  Chart (cm/sec) Location Right DISTAL CCA-PS 93 DISTAL CCA- ED 16 PROX ICA-PS 79 PROX ICA-ED 15 ECA-PS 57 ECA-ED 0 Vertebral- Antegrade 49 Subclavian A- Biphasic 196 Ratio ICA/CCA-PS 0.85 Ratio ICA/CCA-ED 0.93 Left Velocity  Chart (cm/sec) Location Left DISTAL CCA- DISTAL CCA- ED 20 PROX ICA- PROX ICA-ED 21 ECA- ECA-ED 0 Vertebral- Antegrade 36 Subclavian A- Biphasic 155 Ratio ICA/CCA-PS 0.99 Ratio ICA/CCA-ED 1.05 FINDINGS: RIGHT: There is minimal  atheromatous plaque.  LEFT: There is minimal atheromatous plaque.  RIGHT: Vertebral artery and subclavian artery waveforms are antegrade. LEFT: Vertebral artery and subclavian artery waveforms are antegrade. Impression:  1. Less than 50% diameter  stenosis of the right ICA relative to the proximal ICA diameter. 2. Less than 50% diameter stenosis of the left ICA relative to the proximal ICA diameter. Evaluation based on SRU Consensus Conference Criteria for Internal Carotid Artery Stenosis for Implementation in [facility]-Accredited [facility] Laboratories Primary Parameters      Additional Parameters Degree of Stenosis, % ICA PSV, (cm/s)  Plaque Estimate, %* ICA/CCA PSV Ratio ICA EDV, (cm/s) Normal,  <180  None <2.0 <40 Mild <50% <180 < than 50% <2.0 <40 Moderate Disease 50-69%** 180-230 > than 50% 2.0-4.0  Severe->70% but less than near occlusion >230 > than 50% >4.0 >100 Near Occlusion High, Low or Undetectable Visible Variable Variable Total Occlusion Undetectable Visible, no detectable lumen Not Applicable Not Applicable  *Plaque estimate (diameter reduction) with grayscale and color Doppler US. **-180 cm/sec and ICA/CCA PSV Ratio >= 2.0 is also consistent with 50-69% stenosis Modified from diagnostic criteria proposed by Society of Radiologists in Ultrasound (SRU) Consensus Conference2 Plaquetype Description Type 1 Uniformly echolucent Type 2 Predominantly echolucent >50% Type 3 Predominantly echogenic >50% Type 4 Uniformly echogenic Type 5 Unclassified due to poor visualization Ulcer Visible Ulcerative Plaque      Pathology:    Signed by: Kong Gleason MD      Again, thank you for allowing me to participate in the care of your patient.        Sincerely,        Kong Gleason MD

## 2024-12-03 NOTE — TELEPHONE ENCOUNTER
[facility]     Referring provider:      Silvestre Jc MD       Referred to: [facility]     Provider Requested:  none specified     Referral Received:  12/2/24    PET scan scheduled for 12/6/24    MR Abd with contrast ordered by referring provider, not yet scheduled    CTA abd/pelvis with contrast 11/27/24    IMPRESSION:  1.  Severe aortoiliac atherosclerotic disease with interval short segment occlusion of the left common iliac artery with distal reconstitution. This appears new since the November 8 aortic ultrasound     2.  Bilateral SFA disease proximally. Two-vessel runoff bilaterally with occlusion of the ALEX's.     3.  There is a heterogeneous mass in the central right hemiabdomen posterior to the duodenum and anterior to tzhe IVC. This appears distinct from the pancreatic head and liver, flow is concerning for neoplasm. In addition there is a 2.5 cm hepatic   hypodensity which is incompletely evaluated on this exam. Abdominal MRI with contrast is recommended to further evaluate these findings. The abdominal mass would be amenable to endoscopic ultrasound-guided biopsy as indicated.     4.  Prominent retroperitoneal lymph nodes concerning for a metastatic process given the aforementioned findings.     5.  Partially visualized opacity along the right major fissure. Further evaluation with a CT of the chest is suggested.     Records received:  EPIC     Images received:  PACs    Insurance Coverage:  [employer]    Evaluation for: abdominal mass,  recent CTA- heterogeneous mass in the central right hemiabdomen posterior to the duodenum and anterior to tzhe IVC. This appears distinct from the pancreatic head and liver, flow is concerning for neoplasm. In addition there is a 2.5 cm hepatic      Clinical History (per RN review):     Office visit with oncology 12/3/24 - note not complete    Referring provider telephone note 12/2/24  Pt updated that recent CTA showed abdominal  mass.      Per Dr. Jc order a MR abdomen with contrast and make a referral to oncology and GI. Pt updated that referrals were sent. Dr. Jc will move forward with the angiogram next week.    MD review date:   MD Decision for clinic consultation/Orders:            Referral updates/Patient contacted:

## 2024-12-03 NOTE — PROGRESS NOTES
[facility] Hematology and Oncology Progress Note    Patient: Janice Nowak  MRN: [id number]  Date of Service: Dec 3, 2024        Assessment and Plan:    Cancer Staging   No matching staging information was found for the patient.        ECOG Performance      Diagnosis:      Treatment:      Interim History:    ***    Review of Systems:    As above in the history.     Review of Systems otherwise Negative for:  General: chills, fever or night sweats  Psychological: anxiety or depression  Ophthalmic: blurry vision, double vision or loss of vision, vision change  ENT: epistaxis, oral lesions, hearing changes  Hematological and Lymphatic: bleeding, bruising, jaundice, swollen lymph nodes  Endocrine: hot flashes, unexpected weight changes  Respiratory: cough, hemoptysis, orthopnea or shortness of breath/DIAS  Cardiovascular: chest pain, edema, palpitations or PND  Gastrointestinal: abdominal pain, blood in stools, change in bowel habits, constipation, diarrhea or nausea/vomiting  Genito-Urinary: change in urinary stream, incontinence, frequency/urgency  Musculoskeletal: joint pain, stiffness, swelling, muscle pain  Neurological: dizziness, headaches, numbness/tingling  Dermatological: lumps and rash    Past History:    Past Medical History:   Diagnosis Date    Diabetes (H)     Hypertension     Sleep apnea     Thyroid disease      Physical Exam:    /58 (BP Location: Left arm, Patient Position: Sitting, Cuff Size: Adult Large)   Pulse 81   Temp 97.2  F (36.2  C) (Tympanic)   Resp 18   SpO2 96%     General: patient appears stated age of 79 year old. Nontoxic and in no distress.   HEENT: Head: atraumatic, normocephalic. Sclerae anicteric.  Chest:  Normal respiratory effort  Cardiac:  No edema.   Abdomen: abdomen is soft, non-distended  Extremities: normal tone and muscle bulk.  Skin: no lesions or rash on visible skin. Warm and dry.   CNS: alert and oriented. Grossly non-focal.   Psychiatric: normal mood and  affect.     Lab Results:    No results found for this or any previous visit (from the past week).  Imaging:    CTA Abdomen Pelvis Runoff w Contrast    Result Date: 11/27/2024  EXAM: CTA ABDOMEN PELVIS RUNOFF W CONTRAST LOCATION: [facility] DATE: 11/27/2024 INDICATION:  Diabetic ulcer of right heel associated with type 2 diabetes mellitus, with necrosis of bone (H), Diabetic ulcer of right heel associated with type 2 diabetes mellitus, with necrosis of bone (H) COMPARISON: Aortic ultrasound 11/8/2024 TECHNIQUE: Helical acquisition through the abdomen, pelvis, and bilateral lower extremities was performed during the arterial phase of contrast enhancement using IV Contrast. 2D and 3D reconstructions were performed by the CT technologist. Dose reduction  techniques were used. CONTRAST: 90ml isovue 370 FINDINGS: ABDOMEN ARTERIAL FINDINGS Abdominal aorta: Patent and normal caliber with moderate atherosclerotic burden. Celiac artery: Patent. SMA: Patent. Renal Arteries: Mild atherosclerotic narrowing of the renal artery origins which are otherwise patent distally. ALEC: Mild ostial narrowing, otherwise patent. RIGHT LOWER EXTREMITY ARTERIAL FINDINGS Common Iliac: At least mild atherosclerotic narrowing of the origin, otherwise patent. Diffuse atherosclerotic disease. External Iliac: Patent. Internal Iliac: At least mild ostial stenosis. Common Femoral: Patent with atherosclerosis resulting in approximately 50% luminal narrowing. Profunda: Ostial stenosis, otherwise patent. SFA: Moderate focal stenosis near the origin, otherwise patent. Popliteal: Patent. Tibioperoneal trunk: Patent. Anterior tibial: Occluded in the proximal calf. Peroneal: Patent to the level of the ankle. Posterior tibial: Patent to the level of the foot. LEFT LOWER EXTREMITY ARTERIAL FINDINGS Common Iliac: Severe atherosclerotic disease and occlusion at the origin with reconstitution distally just proximal to the bifurcation.  External Iliac: Atherosclerotic disease results in mild narrowing throughout the vessels course. Internal Iliac: Ostial stenosis, otherwise patent. Common Femoral: Atherosclerotic disease results in approximately 50% luminal stenosis. Profunda: Ostial stenosis, otherwise patent. SFA: Atherosclerotic disease results in severe focal stenosis proximally, otherwise patent. Popliteal: Mild atherosclerotic disease in the above-knee segment, the below-knee segment is patent. Tibioperoneal trunk: Patent. Anterior tibial: Occluded. Peroneal: Patent to the level of the ankle. Posterior tibial: Patent to the level of the foot. NON-ARTERIAL FINDINGS LUNG BASES: No pleural effusion. Scattered areas of scarring. A partially visualized opacity along the right major fissure is noted. ABDOMEN: Cholecystectomy. A hypodensity in the posterior right hepatic lobe is noted measuring 2.5 cm in diameter, incompletely evaluated on this exam.. Mild scarring or megaly measuring 14 cm craniocaudal. The adrenal glands and kidneys are normal. Mild  fatty atrophy of the pancreatic head. There is a heterogeneous mass along the anterior aspect of the IVC and posterior to the duodenum measuring 7.0 x 4.7 x 7.0 cm. Prominent lymph nodes are noted immediately adjacent to the mass. Normal caliber bowel loops. No ascites. PELVIS: Normal MUSCULOSKELETAL: Mild degenerative changes of the thoracolumbar spine and bilateral hips. No destructive osseous lesion     IMPRESSION: 1.  Severe aortoiliac atherosclerotic disease with interval short segment occlusion of the left common iliac artery with distal reconstitution. This appears new since the November 8 aortic ultrasound 2.  Bilateral SFA disease proximally. Two-vessel runoff bilaterally with occlusion of the ALEX's. 3.  There is a heterogeneous mass in the central right hemiabdomen posterior to the duodenum and anterior to tzhe IVC. This appears distinct from the pancreatic head and liver, flow is concerning  for neoplasm. In addition there is a 2.5 cm hepatic hypodensity which is incompletely evaluated on this exam. Abdominal MRI with contrast is recommended to further evaluate these findings. The abdominal mass would be amenable to endoscopic ultrasound-guided biopsy as indicated. 4.  Prominent retroperitoneal lymph nodes concerning for a metastatic process given the aforementioned findings. 5.  Partially visualized opacity along the right major fissure. Further evaluation with a CT of the chest is suggested.    US Aorta/Ivc/Iliac Duplex Complete    Result Date: 11/9/2024  Table formatting from the original result was not included. Aorta Ultrasound (Date: 11/08/24) Indication: Screening for abdominal aortic aneurysm Previous: None History: Previous Smoker, Hypertension, Diabetic, Hyperlipidemia, PAD, and Vascular Ulcers Technique: Duplex imaging is performed utilizing gray-scale, two dimensional images, and color-flow imaging. Doppler waveform analysis and spectral Doppler imagine is also performed. Waveforms: Triphasic= T, Biphasic= B, Monophasic=M  Diameter (cm) AP X Width Velocities Waveform Proximal Aorta:   2.1 X 2.7 81  T Mid Aorta:   1.9 X 1.7 84  B Distal Aorta:   1.6 X 1.2 40  M Right WENDY:   0.9 X 0.8 142  T Left WENDY:   0.9 X 0.7 134  T Location of Aneurysm:  N/A Comments: Hypoechoic, avascular, cystic lesion located in the pancreatic head space with no peristalsis or color flow seen measuring 7.8 x 4.0 cm.. Patient has hx of cholecystectomy. Recommend other imaging modality for a better view. Possible posterior pancreatic head lesion or cyst.  Impressions: No evidence of abdominal aortic aneurysm.  Cystic lesion adjacent to the pancreatic head.  Further evaluation with an MRCP is suggested. Reference: Criteria AAA Size (cm) Recommendation  Normal >2.5 - <3.0 Re-screen in 10 years Abnormal 3.0 - 3.9 Re-screen in 3 years Abnormal 4.0 - 4.9  Re-screen in 1 year Abnormal 5.0 - 5.4 Re-screen in 6 months/vascular  referral  Abnormal > 5.5 Elective repair/vascular referral      US Carotid Bilateral    Result Date: 11/9/2024  Table formatting from the original result was not included. BILATERAL CAROTID ULTRASOUND (Date: 11/08/24) Indication: Screening for carotid artery disease/Hx of right carotid endarterectomy. Carotid bruit. Previous: 09/16/2013 Symptoms: Hypertension and Previous Smoker Right Endarterectomy TECHNIQUE: Duplex exam performed utilizing 2D gray-scale imaging, Doppler interrogation with color-flow and spectral waveform analysis. Right Velocity  Chart (cm/sec) Location Right DISTAL CCA-PS 93 DISTAL CCA- ED 16 PROX ICA-PS 79 PROX ICA-ED 15 ECA-PS 57 ECA-ED 0 Vertebral- Antegrade 49 Subclavian A- Biphasic 196 Ratio ICA/CCA-PS 0.85 Ratio ICA/CCA-ED 0.93 Left Velocity  Chart (cm/sec) Location Left DISTAL CCA- DISTAL CCA- ED 20 PROX ICA- PROX ICA-ED 21 ECA- ECA-ED 0 Vertebral- Antegrade 36 Subclavian A- Biphasic 155 Ratio ICA/CCA-PS 0.99 Ratio ICA/CCA-ED 1.05 FINDINGS: RIGHT: There is minimal  atheromatous plaque.  LEFT: There is minimal atheromatous plaque.  RIGHT: Vertebral artery and subclavian artery waveforms are antegrade. LEFT: Vertebral artery and subclavian artery waveforms are antegrade. Impression:  1. Less than 50% diameter stenosis of the right ICA relative to the proximal ICA diameter. 2. Less than 50% diameter stenosis of the left ICA relative to the proximal ICA diameter. Evaluation based on SRU Consensus Conference Criteria for Internal Carotid Artery Stenosis for Implementation in [facility] Primary Parameters      Additional Parameters Degree of Stenosis, % ICA PSV, (cm/s)  Plaque Estimate, %* ICA/CCA PSV Ratio ICA EDV, (cm/s) Normal,  <180  None <2.0 <40 Mild <50% <180 < than 50% <2.0 <40 Moderate Disease 50-69%** 180-230 > than 50% 2.0-4.0  Severe->70% but less than near occlusion >230 > than 50% >4.0 >100 Near Occlusion High, Low or Undetectable  Visible Variable Variable Total Occlusion Undetectable Visible, no detectable lumen Not Applicable Not Applicable  *Plaque estimate (diameter reduction) with grayscale and color Doppler US. **-180 cm/sec and ICA/CCA PSV Ratio >= 2.0 is also consistent with 50-69% stenosis Modified from diagnostic criteria proposed by Society of Radiologists in Ultrasound (SRU) Consensus Conference2 Plaquetype Description Type 1 Uniformly echolucent Type 2 Predominantly echolucent >50% Type 3 Predominantly echogenic >50% Type 4 Uniformly echogenic Type 5 Unclassified due to poor visualization Ulcer Visible Ulcerative Plaque      Signed by: Kong Gleason MD

## 2024-12-03 NOTE — PROGRESS NOTES
[facility] Hematology and Oncology Consult Note      Patient: Janice Nowka  MRN: [id number]  Date of Service: Dec 3, 2024      We have been asked by Dr. Jc to evaluate Janice Nowak for new abdominal mass    Assessment/Plan:    1.  Abdominal mass with associated retroperitoneal lymphadenopathy  Janice is a 79-year-old with a new finding of abdominal mass from recent CTA on 11/27/2024.  There was also retroperitoneal lymphadenopathy.  A 2.5 cm hepatic hypodensity was also noted, however, unclear if this is associated.  Will order stat PET to evaluate for active disease and optimal biopsy location.  Will order biopsy based on results.  Will have follow-up in 2 weeks to discuss results and next steps.      2.  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  Type 2 diabetes  He is still recovering.  Almost completed with IV antibiotic course.  Wound VAC is still on.  He has follow-up with his surgeon tomorrow.    ECOG Performance: 3    Staging History:    Cancer Staging   No matching staging information was found for the patient.      History:    Janice is a 79-year-old male who was referred here after incidental finding of a mass in the central right hemiabdomen posterior to the duodenum and anterior to the IVC which was was found incidentally on a CTA.  He was recently hospitalized to 10/20/2024 - 11/1/2024 for osteomyelitis of the right calcaneus status post I&D and partial calcanectomy.  He is now in a wheelchair and still recovering with wound VAC and right leg boot.  Otherwise he reports feeling fatigue and low appetite for the past 1 month.  He has lost 5 pounds in the last month.  He denies any fevers, chills, or drenching night sweats.  He does not have any abdominal pain.  No bone or back pain.    He is following with his surgeon and has a postop follow-up tomorrow.  He has home care nurses come twice a week to help with the wound VAC.  He is  currently on IV antibiotics with PICC line but is almost completed.    He quit smoking in .  He reports a 20-pack-year history.  No recent alcohol but reports some binging in his younger years.  He lives alone in a senior living apartment.  He is able to do all his activities of daily living but is currently wheelchair-bound.  He is a  and served in [place].  He was exposed to agent orange.  He denies any family history or personal history of cancers.    Past History:  Past Medical History:   Diagnosis Date    Diabetes (H)     Hypertension     Sleep apnea     Thyroid disease     No family history on file.   [unfilled] Social History     Socioeconomic History    Marital status:      Spouse name: Not on file    Number of children: Not on file    Years of education: Not on file    Highest education level: Not on file   Occupational History    Not on file   Tobacco Use    Smoking status: Former     Current packs/day: 0.00     Types: Cigarettes     Quit date:      Years since quittin.9     Passive exposure: Never    Smokeless tobacco: Never   Vaping Use    Vaping status: Never Used   Substance and Sexual Activity    Alcohol use: Yes     Alcohol/week: 5.0 standard drinks of alcohol     Types: 5 Cans of beer per week    Drug use: Not Currently    Sexual activity: Not on file   Other Topics Concern    Not on file   Social History Narrative    Not on file     Social Drivers of Health     Financial Resource Strain: Low Risk  (10/28/2024)    Financial Resource Strain     Within the past 12 months, have you or your family members you live with been unable to get utilities (heat, electricity) when it was really needed?: No   Food Insecurity: Low Risk  (10/28/2024)    Food Insecurity     Within the past 12 months, did you worry that your food would run out before you got money to buy more?: No     Within the past 12 months, did the food you bought just not last and you didn t have money to get more?: No    Transportation Needs: Low Risk  (10/28/2024)    Transportation Needs     Within the past 12 months, has lack of transportation kept you from medical appointments, getting your medicines, non-medical meetings or appointments, work, or from getting things that you need?: No   Physical Activity: Not on file   Stress: Not on file   Social Connections: Not on file   Interpersonal Safety: Low Risk  (10/28/2024)    Interpersonal Safety     Do you feel physically and emotionally safe where you currently live?: Yes     Within the past 12 months, have you been hit, slapped, kicked or otherwise physically hurt by someone?: No     Within the past 12 months, have you been humiliated or emotionally abused in other ways by your partner or ex-partner?: No   Recent Concern: Interpersonal Safety - High Risk (10/9/2024)    Interpersonal Safety     Do you feel physically and emotionally safe where you currently live?: No     Within the past 12 months, have you been hit, slapped, kicked or otherwise physically hurt by someone?: No     Within the past 12 months, have you been humiliated or emotionally abused in other ways by your partner or ex-partner?: No   Housing Stability: High Risk (10/28/2024)    Housing Stability     Do you have housing? : No     Are you worried about losing your housing?: No        Allergies:    Allergies   Allergen Reactions    Exenatide Unknown    Heparin Unknown    Liraglutide Unknown    Lisinopril Cough    Morphine Unknown       Review of Systems:    As above in the history.     Physical Exam:    /58 (BP Location: Left arm, Patient Position: Sitting, Cuff Size: Adult Large)   Pulse 81   Temp 97.2  F (36.2  C) (Tympanic)   Resp 18   SpO2 96%       General: patient appears stated age of 79 year old. Nontoxic and in no distress.  In wheelchair.  Here with son.  HEENT: Head: atraumatic, normocephalic. Sclerae anicteric.  Chest: Lungs clear to auscultation bilaterally.  Normal respiratory  effort  Cardiac: Regular rate and rhythm.  No edema.   Abdomen: abdomen is non-distended  Extremities: normal tone and muscle bulk.   Skin: Multiple wounds on the left shin with Steri-Strips.  Right leg in boot with wound VAC.  CNS: alert and oriented. Grossly non-focal.   Psychiatric: normal mood and affect.     Lab Results:    No results found for this or any previous visit (from the past week).    Imaging Results:    CTA Abdomen Pelvis Runoff w Contrast    Result Date: 11/27/2024  EXAM: CTA ABDOMEN PELVIS RUNOFF W CONTRAST LOCATION: [facility] DATE: 11/27/2024 INDICATION:  Diabetic ulcer of right heel associated with type 2 diabetes mellitus, with necrosis of bone (H), Diabetic ulcer of right heel associated with type 2 diabetes mellitus, with necrosis of bone (H) COMPARISON: Aortic ultrasound 11/8/2024 TECHNIQUE: Helical acquisition through the abdomen, pelvis, and bilateral lower extremities was performed during the arterial phase of contrast enhancement using IV Contrast. 2D and 3D reconstructions were performed by the CT technologist. Dose reduction  techniques were used. CONTRAST: 90ml isovue 370 FINDINGS: ABDOMEN ARTERIAL FINDINGS Abdominal aorta: Patent and normal caliber with moderate atherosclerotic burden. Celiac artery: Patent. SMA: Patent. Renal Arteries: Mild atherosclerotic narrowing of the renal artery origins which are otherwise patent distally. ALEC: Mild ostial narrowing, otherwise patent. RIGHT LOWER EXTREMITY ARTERIAL FINDINGS Common Iliac: At least mild atherosclerotic narrowing of the origin, otherwise patent. Diffuse atherosclerotic disease. External Iliac: Patent. Internal Iliac: At least mild ostial stenosis. Common Femoral: Patent with atherosclerosis resulting in approximately 50% luminal narrowing. Profunda: Ostial stenosis, otherwise patent. SFA: Moderate focal stenosis near the origin, otherwise patent. Popliteal: Patent. Tibioperoneal trunk: Patent. Anterior  tibial: Occluded in the proximal calf. Peroneal: Patent to the level of the ankle. Posterior tibial: Patent to the level of the foot. LEFT LOWER EXTREMITY ARTERIAL FINDINGS Common Iliac: Severe atherosclerotic disease and occlusion at the origin with reconstitution distally just proximal to the bifurcation. External Iliac: Atherosclerotic disease results in mild narrowing throughout the vessels course. Internal Iliac: Ostial stenosis, otherwise patent. Common Femoral: Atherosclerotic disease results in approximately 50% luminal stenosis. Profunda: Ostial stenosis, otherwise patent. SFA: Atherosclerotic disease results in severe focal stenosis proximally, otherwise patent. Popliteal: Mild atherosclerotic disease in the above-knee segment, the below-knee segment is patent. Tibioperoneal trunk: Patent. Anterior tibial: Occluded. Peroneal: Patent to the level of the ankle. Posterior tibial: Patent to the level of the foot. NON-ARTERIAL FINDINGS LUNG BASES: No pleural effusion. Scattered areas of scarring. A partially visualized opacity along the right major fissure is noted. ABDOMEN: Cholecystectomy. A hypodensity in the posterior right hepatic lobe is noted measuring 2.5 cm in diameter, incompletely evaluated on this exam.. Mild scarring or megaly measuring 14 cm craniocaudal. The adrenal glands and kidneys are normal. Mild  fatty atrophy of the pancreatic head. There is a heterogeneous mass along the anterior aspect of the IVC and posterior to the duodenum measuring 7.0 x 4.7 x 7.0 cm. Prominent lymph nodes are noted immediately adjacent to the mass. Normal caliber bowel loops. No ascites. PELVIS: Normal MUSCULOSKELETAL: Mild degenerative changes of the thoracolumbar spine and bilateral hips. No destructive osseous lesion     IMPRESSION: 1.  Severe aortoiliac atherosclerotic disease with interval short segment occlusion of the left common iliac artery with distal reconstitution. This appears new since the November 8  aortic ultrasound 2.  Bilateral SFA disease proximally. Two-vessel runoff bilaterally with occlusion of the ALEX's. 3.  There is a heterogeneous mass in the central right hemiabdomen posterior to the duodenum and anterior to tzhe IVC. This appears distinct from the pancreatic head and liver, flow is concerning for neoplasm. In addition there is a 2.5 cm hepatic hypodensity which is incompletely evaluated on this exam. Abdominal MRI with contrast is recommended to further evaluate these findings. The abdominal mass would be amenable to endoscopic ultrasound-guided biopsy as indicated. 4.  Prominent retroperitoneal lymph nodes concerning for a metastatic process given the aforementioned findings. 5.  Partially visualized opacity along the right major fissure. Further evaluation with a CT of the chest is suggested.    US Aorta/Ivc/Iliac Duplex Complete    Result Date: 11/9/2024  Table formatting from the original result was not included. Aorta Ultrasound (Date: 11/08/24) Indication: Screening for abdominal aortic aneurysm Previous: None History: Previous Smoker, Hypertension, Diabetic, Hyperlipidemia, PAD, and Vascular Ulcers Technique: Duplex imaging is performed utilizing gray-scale, two dimensional images, and color-flow imaging. Doppler waveform analysis and spectral Doppler imagine is also performed. Waveforms: Triphasic= T, Biphasic= B, Monophasic=M  Diameter (cm) AP X Width Velocities Waveform Proximal Aorta:   2.1 X 2.7 81  T Mid Aorta:   1.9 X 1.7 84  B Distal Aorta:   1.6 X 1.2 40  M Right WENDY:   0.9 X 0.8 142  T Left WENDY:   0.9 X 0.7 134  T Location of Aneurysm:  N/A Comments: Hypoechoic, avascular, cystic lesion located in the pancreatic head space with no peristalsis or color flow seen measuring 7.8 x 4.0 cm.. Patient has hx of cholecystectomy. Recommend other imaging modality for a better view. Possible posterior pancreatic head lesion or cyst.  Impressions: No evidence of abdominal aortic aneurysm.   Cystic lesion adjacent to the pancreatic head.  Further evaluation with an MRCP is suggested. Reference: Criteria AAA Size (cm) Recommendation  Normal >2.5 - <3.0 Re-screen in 10 years Abnormal 3.0 - 3.9 Re-screen in 3 years Abnormal 4.0 - 4.9  Re-screen in 1 year Abnormal 5.0 - 5.4 Re-screen in 6 months/vascular referral  Abnormal > 5.5 Elective repair/vascular referral      US Carotid Bilateral    Result Date: 11/9/2024  Table formatting from the original result was not included. BILATERAL CAROTID ULTRASOUND (Date: 11/08/24) Indication: Screening for carotid artery disease/Hx of right carotid endarterectomy. Carotid bruit. Previous: 09/16/2013 Symptoms: Hypertension and Previous Smoker Right Endarterectomy TECHNIQUE: Duplex exam performed utilizing 2D gray-scale imaging, Doppler interrogation with color-flow and spectral waveform analysis. Right Velocity  Chart (cm/sec) Location Right DISTAL CCA-PS 93 DISTAL CCA- ED 16 PROX ICA-PS 79 PROX ICA-ED 15 ECA-PS 57 ECA-ED 0 Vertebral- Antegrade 49 Subclavian A- Biphasic 196 Ratio ICA/CCA-PS 0.85 Ratio ICA/CCA-ED 0.93 Left Velocity  Chart (cm/sec) Location Left DISTAL CCA- DISTAL CCA- ED 20 PROX ICA- PROX ICA-ED 21 ECA- ECA-ED 0 Vertebral- Antegrade 36 Subclavian A- Biphasic 155 Ratio ICA/CCA-PS 0.99 Ratio ICA/CCA-ED 1.05 FINDINGS: RIGHT: There is minimal  atheromatous plaque.  LEFT: There is minimal atheromatous plaque.  RIGHT: Vertebral artery and subclavian artery waveforms are antegrade. LEFT: Vertebral artery and subclavian artery waveforms are antegrade. Impression:  1. Less than 50% diameter stenosis of the right ICA relative to the proximal ICA diameter. 2. Less than 50% diameter stenosis of the left ICA relative to the proximal ICA diameter. Evaluation based on SRU Consensus Conference Criteria for Internal Carotid Artery Stenosis for Implementation in [facility]AC-Accredited [facility] Laboratories Primary Parameters      Additional Parameters Degree of  Stenosis, % ICA PSV, (cm/s)  Plaque Estimate, %* ICA/CCA PSV Ratio ICA EDV, (cm/s) Normal,  <180  None <2.0 <40 Mild <50% <180 < than 50% <2.0 <40 Moderate Disease 50-69%** 180-230 > than 50% 2.0-4.0  Severe->70% but less than near occlusion >230 > than 50% >4.0 >100 Near Occlusion High, Low or Undetectable Visible Variable Variable Total Occlusion Undetectable Visible, no detectable lumen Not Applicable Not Applicable  *Plaque estimate (diameter reduction) with grayscale and color Doppler US. **-180 cm/sec and ICA/CCA PSV Ratio >= 2.0 is also consistent with 50-69% stenosis Modified from diagnostic criteria proposed by Society of Radiologists in Ultrasound (SRU) Consensus Conference2 Plaquetype Description Type 1 Uniformly echolucent Type 2 Predominantly echolucent >50% Type 3 Predominantly echogenic >50% Type 4 Uniformly echogenic Type 5 Unclassified due to poor visualization Ulcer Visible Ulcerative Plaque      Pathology:    Signed by: Kong Gleason MD

## 2024-12-04 ENCOUNTER — OFFICE VISIT (OUTPATIENT)
Dept: VASCULAR SURGERY | Facility: CLINIC | Age: 79
End: 2024-12-04
Attending: PODIATRIST
Payer: COMMERCIAL

## 2024-12-04 ENCOUNTER — TELEPHONE (OUTPATIENT)
Dept: INFECTIOUS DISEASES | Facility: CLINIC | Age: 79
End: 2024-12-04

## 2024-12-04 VITALS — DIASTOLIC BLOOD PRESSURE: 75 MMHG | OXYGEN SATURATION: 97 % | SYSTOLIC BLOOD PRESSURE: 115 MMHG | HEART RATE: 89 BPM

## 2024-12-04 DIAGNOSIS — E11.621 DIABETIC ULCER OF RIGHT HEEL ASSOCIATED WITH TYPE 2 DIABETES MELLITUS, WITH NECROSIS OF BONE (H): Primary | ICD-10-CM

## 2024-12-04 DIAGNOSIS — L97.911 ULCER OF RIGHT LEG, LIMITED TO BREAKDOWN OF SKIN (H): ICD-10-CM

## 2024-12-04 DIAGNOSIS — L97.414 DIABETIC ULCER OF RIGHT HEEL ASSOCIATED WITH TYPE 2 DIABETES MELLITUS, WITH NECROSIS OF BONE (H): Primary | ICD-10-CM

## 2024-12-04 PROCEDURE — 15004 WOUND PREP F/N/HF/G: CPT | Performed by: PODIATRIST

## 2024-12-04 PROCEDURE — 11042 DBRDMT SUBQ TIS 1ST 20SQCM/<: CPT | Performed by: PODIATRIST

## 2024-12-04 PROCEDURE — 15275 SKIN SUB GRAFT FACE/NK/HF/G: CPT | Performed by: PODIATRIST

## 2024-12-04 ASSESSMENT — PAIN SCALES - GENERAL: PAINLEVEL_OUTOF10: NO PAIN (0)

## 2024-12-04 NOTE — PROGRESS NOTES
FOOT AND ANKLE SURGERY/PODIATRY Progress Note      ASSESSMENT:   Diabetic ulceration right heel into bone  Ulceration right leg into subcutaneous tissue  Acute osteomyelitis right calcaneus        TREATMENT:  -I discussed the patient that the right heel ulceration is stable without signs of infection.  We discussed application of TheraSkin graft today.  Patient consents application.    Sharp, excisional debridement of the wound into osseous tissue was performed using #15 blade for a total of 22.5 square centimeters. Debridement was done to reduce pressure, remove non-viable, devitalized tissue and promote wound healing. Patient tolerated this well.  Due to the slow healing rate of the ulcer and the diagnosis of Pressure Ulcer due to increased pressure} of the Right heel and is free of infection  Theraskin was recommended and consent was obtained for the application.  This is application # 1.   There is no evidence of osteomyelitis.  There is adequate blood flow for healing.       For the procedure, the patient was placed in a comfortable position with the wound bed exposed. Theraskin  application is being performed in a staged procedure in an attempt to achieve rapid wound closure. Tissue verified within acceptable temperature. The graft size is 38.8 square cm , total wound size is 22.5 with total applied area was 22sq cm and the estimated wastage was 0; any leftover product was applied in a layered fashion. Tissue identification number: [id number]. Expiration: 3/27/2029. The graft was prepared and applied following the  guidelines.  The graft was secured with staples and covered with a non-adherent contact layer and secured with staples.  Then,  a wet-to-dry dressing was applied.  Wound VAC should be applied either later today or tomorrow and set at 100 mmHg.  The patient will continue to utilize wheelchair for off loading.  Patient tolerated the procedure without any complications and was educated  and expressed an understanding of the proper wound treatment until their follow up.    -After discussion of risk factors, nursing staff removed dressing, cleansed wound and consent obtained 2% Lidocaine HCL jelly was applied, under clean conditions, the right leg ulceration(s) were debrided using currette.  Devitalized and nonviable tissue, along with any fibrin and slough, was removed to improve granulation tissue formation, stimulate wound healing, decrease overall bacteria load, disrupt biofilm formation and decrease edge senescence. Wound drainage was scant No. Total excisional debridement was 2.16 sq cm into the subcutaneous tissue with a depth of 0.2 cm.   Ulcers were improved afterwards and .  Measures were as noted on the flow sheet.  Medihoney with gauze dressing applied today which he will reapply every other day.    -He will follow-up in 1 week    Amol Patricio DPM  [facility]      HPI: Janice Nowak was seen again today for right heel and right leg ulcers.  Doing well today.  He is using wound VAC as directed on the right heel.    Past Medical History:   Diagnosis Date    Diabetes (H)     Hypertension     Sleep apnea     Thyroid disease        Past Surgical History:   Procedure Laterality Date    BACK SURGERY      BONE EXOSTOSIS EXCISION Right 10/24/2024    Procedure: PARTIAL CALCANECTOMY RIGHT FOOT;  Surgeon: Amol Patricio DPM;  Location: [facility] OR    INCISION AND DRAINAGE FOOT, COMBINED Right 10/24/2024    Procedure: INCISION AND DRAINAGE;  Surgeon: Amol Patricio DPM;  Location: [facility] OR    IRRIGATION AND DEBRIDEMENT FOOT, COMBINED Right 10/24/2024    Procedure: AND DEBRIDEMENT RIGHT HEEL,;  Surgeon: Amol Patricio DPM;  Location: [facility] OR    ORTHOPEDIC SURGERY      PICC SINGLE LUMEN PLACEMENT  10/30/2024    VASCULAR SURGERY         Allergies   Allergen Reactions    Exenatide Unknown    Heparin Unknown    Liraglutide  Unknown    Lisinopril Cough    Morphine Unknown         Current Outpatient Medications:     aspirin 81 MG EC tablet, Take 81 mg by mouth daily., Disp: , Rfl:     atorvastatin (LIPITOR) 80 MG tablet, Take 80 mg by mouth at bedtime., Disp: , Rfl:     clopidogrel (PLAVIX) 75 MG tablet, Take 75 mg by mouth daily., Disp: , Rfl:     co-enzyme Q-10 100 MG CAPS capsule, Take 200 mg by mouth daily., Disp: , Rfl:     DAPTOmycin 500 mg, Inject 500 mg over 30 minutes into the vein every 24 hours. Weekly labs: CBC with diff, CMP, ESR, CRP, CK total. Fax to Erica Nuñez MD at [phone] Infectious Disease. PICC line cares., Disp: , Rfl:     ertugliflozin (STEGLATRO) 5 MG TABS, Take 5 mg by mouth every morning., Disp: , Rfl:     insulin aspart (NOVOLOG FLEXPEN) 100 UNIT/ML pen, Inject 5 Units subcutaneously 3 times daily (with meals). Novolog Flexpen: 5 units with breakfast, 5 units with lunch, 5 units with dinner., Disp: 15 mL, Rfl: 0    insulin glargine (LANTUS PEN) 100 UNIT/ML pen, Inject 35 Units subcutaneously every morning., Disp: 15 mL, Rfl: 0    lactobacillus rhamnosus, GG, (CULTURELL) capsule, Take 1 capsule by mouth daily., Disp: , Rfl:     levothyroxine (SYNTHROID/LEVOTHROID) 50 MCG tablet, Take 50 mcg by mouth daily., Disp: , Rfl:     losartan (COZAAR) 50 MG tablet, Take 50 mg by mouth at bedtime., Disp: , Rfl:     metFORMIN (GLUCOPHAGE XR) 500 MG 24 hr tablet, Take 2,000 mg by mouth daily (with dinner)., Disp: , Rfl:     Multiple Vitamins-Minerals (PRESERVISION AREDS 2 PO), Take 2 capsules by mouth daily., Disp: , Rfl:     piperacillin sod-tazobactam (ZOSYN) SOLR injection, Inject 15 mLs (3.375 g) over 5 minutes into the vein via push every 8 hours. Weekly labs: CBC with diff, CMP, ESR, CRP, CK total. Fax to Erica Nuñez MD at [phone] Infectious Disease. PICC line cares., Disp: , Rfl:     spironolactone (ALDACTONE) 25 MG tablet, Take 25 mg by mouth daily., Disp: , Rfl:     vitamin C with B complex (B  COMPLEX-C) tablet, Take 1 tablet by mouth daily., Disp: , Rfl:     Review of Systems - 10 point Review of Systems is negative except for right heel ulcer which is noted in HPI.      OBJECTIVE:  /75   Pulse 89   SpO2 97%   General appearance: Patient is alert and fully cooperative with history & exam.  No sign of distress is noted during the visit.    Vascular: Dorsalis pedis non-palpableRight.  Dermatologic:    Negative Pressure Wound Therapy Heel Right (Active)       Wound Leg Abrasion(s) (Active)       Wound Leg Skin tear (Active)       Wound Elbow Skin tear (Active)       VASC Wound right heel (Active)   Pre Size Length 5 12/04/24 1000   Pre Size Width 4.5 12/04/24 1000   Pre Size Depth 0.8 12/04/24 1000   Pre Total Sq cm 22.5 12/04/24 1000       VASC Wound right lateral leg (Active)   Pre Size Length 1.2 12/04/24 1000   Pre Size Width 1.8 12/04/24 1000   Pre Size Depth 0.3 12/04/24 1000   Pre Total Sq cm 2.16 12/04/24 1000       VASC Wound left shin (Active)   Pre Size Length 3 12/04/24 1000   Pre Size Width 2 12/04/24 1000   Pre Size Depth 0.1 12/04/24 1000   Pre Total Sq cm 6 12/04/24 1000   Description scattered 12/04/24 1000       VASC Wound left knee (Active)   Description eschar 12/04/24 1000       Incision/Surgical Site with Packing 10/24/24 Right Heel Qty Placed: 1 (Active)   Majority of right heel ulceration has a granular base with small areas of fibrotic tissue and exposed bone of the calcaneus.  Mixed granular/fibrotic tissue ulceration proximal lateral right leg.  No erythema right lower extremity.  Neurologic: Diminished to light touch Right.  Musculoskeletal: Contracted digits noted Right.      Picture:

## 2024-12-04 NOTE — TELEPHONE ENCOUNTER
----- Message from Erica Nuñez sent at 12/4/2024  6:55 AM CST -----  Regarding: ID follow up  Patient needs ID follow up   Next available ok   Message from outpatient infusion Pharmacist was sent to [facility] clinic re: antibiotics and ID follow up - requesting ID follow up at [facility] with any available ID doctor at [facility].   Thank you

## 2024-12-06 ENCOUNTER — HOSPITAL ENCOUNTER (OUTPATIENT)
Dept: PET IMAGING | Facility: HOSPITAL | Age: 79
Discharge: HOME | End: 2024-12-06
Attending: INTERNAL MEDICINE | Admitting: RADIOLOGY
Payer: COMMERCIAL

## 2024-12-06 ENCOUNTER — PREP FOR PROCEDURE (OUTPATIENT)
Dept: GASTROENTEROLOGY | Facility: CLINIC | Age: 79
End: 2024-12-06

## 2024-12-06 DIAGNOSIS — R93.89 ABNORMAL FINDING ON IMAGING: Primary | ICD-10-CM

## 2024-12-06 DIAGNOSIS — R93.5 ABNORMAL FINDINGS ON DIAGNOSTIC IMAGING OF OTHER ABDOMINAL REGIONS, INCLUDING RETROPERITONEUM: ICD-10-CM

## 2024-12-06 LAB — GLUCOSE BLDC GLUCOMTR-MCNC: 87 MG/DL (ref 70–99)

## 2024-12-06 PROCEDURE — 78816 PET IMAGE W/CT FULL BODY: CPT | Mod: PI

## 2024-12-06 PROCEDURE — 82962 GLUCOSE BLOOD TEST: CPT

## 2024-12-06 PROCEDURE — 343N000001 HC RX 343 MED OP 636: Performed by: INTERNAL MEDICINE

## 2024-12-06 PROCEDURE — A9552 F18 FDG: HCPCS | Performed by: INTERNAL MEDICINE

## 2024-12-06 RX ORDER — FLUDEOXYGLUCOSE F 18 200 MCI/ML
8-18 INJECTION, SOLUTION INTRAVENOUS ONCE
Status: COMPLETED | OUTPATIENT
Start: 2024-12-06 | End: 2024-12-06

## 2024-12-06 RX ADMIN — FLUDEOXYGLUCOSE F 18 12.96 MILLICURIE: 200 INJECTION, SOLUTION INTRAVENOUS at 06:24

## 2024-12-09 ENCOUNTER — TELEPHONE (OUTPATIENT)
Dept: INFECTIOUS DISEASES | Facility: CLINIC | Age: 79
End: 2024-12-09

## 2024-12-09 ENCOUNTER — OFFICE VISIT (OUTPATIENT)
Dept: INFECTIOUS DISEASES | Facility: CLINIC | Age: 79
End: 2024-12-09
Payer: COMMERCIAL

## 2024-12-09 ENCOUNTER — TELEPHONE (OUTPATIENT)
Dept: VASCULAR SURGERY | Facility: CLINIC | Age: 79
End: 2024-12-09

## 2024-12-09 VITALS — SYSTOLIC BLOOD PRESSURE: 118 MMHG | HEART RATE: 87 BPM | OXYGEN SATURATION: 97 % | DIASTOLIC BLOOD PRESSURE: 70 MMHG

## 2024-12-09 DIAGNOSIS — L08.9 FOOT INFECTION: Primary | ICD-10-CM

## 2024-12-09 PROCEDURE — 99214 OFFICE O/P EST MOD 30 MIN: CPT | Performed by: INTERNAL MEDICINE

## 2024-12-09 RX ORDER — DOXYCYCLINE HYCLATE 100 MG
100 TABLET ORAL 2 TIMES DAILY
Qty: 42 TABLET | Refills: 0 | Status: SHIPPED | OUTPATIENT
Start: 2024-12-10 | End: 2024-12-31

## 2024-12-09 RX ORDER — AMOXICILLIN AND CLAVULANATE POTASSIUM 500; 125 MG/1; MG/1
1 TABLET, FILM COATED ORAL 2 TIMES DAILY
Qty: 42 TABLET | Refills: 0 | Status: SHIPPED | OUTPATIENT
Start: 2024-12-10 | End: 2024-12-31

## 2024-12-09 NOTE — PROGRESS NOTES
[facility]    Infectious Disease Progress Note    12/09/2024     Assessment & Plan   Janice Nowak is a 79 year old male who was admitted on 10/24/2024.       ASSESSMENT:  Right heel osteomyelitis- active issue, graft, improving. Podiatry following  Per Podiatry note dated 12/4/24 right heel ulceration is stable without signs of infection. We discussed application of TheraSkin graft   Bleeding from superficial wounds on Left leg- in clinic- patient refused to go to ER/Urgent care  Diabetes hypertension hyperlipidemia hypothyroidism  Poorly controlled diabetes mellitus  Peripheral vascular disease  Biopsy result:  Final Diagnosis   CALCANEUS, RIGHT, BIOPSY:        1.  ACUTE OSTEOMYELITIS AND MARROW GRANULATION TISSUE        2.  PERIOSTEAL SOFT TISSUE WITH GANGRENOUS NECROSIS AND PERIOSTEAL ABSCESS     Susceptibility data from last 90 days.  Collected Specimen Info Organism   10/24/24 Bone Resection from Foot, Right Bacteroides thetaiotaomicron     Mixed Anaerobic Organisms Present   10/24/24 Bone Resection from Foot, Right Staphylococcus cohnii       RECOMMENDATIONS:    Antibiotics daptomycin and Zosyn- complete on 12/10/24 and remove PICC  PO Augmetin and Doxycycline x 21 days- with close Podiatry follow up  Superficial bleeding from L leg   Follow culture results   Patient refused to go to Urgent care/ ER for leg wound. Patient said he wants to go home AMA and contact home nurse for wound care  Discussed with patient   Remove PICC on 12/10/24  Completed  6 weeks of IV antibiotics, from 10/24/24      Erica Nuñez MD  [facility]  [phone]    12/4 from Chart                    Photo from patient's chart 10/29/24      Interval History   Updated   Antibiotic discussed  Superficial abrasion on left lower leg - RN cleaned in clinic and dressed. Patient refused to go to urgent care/ER to evaluate for ongoing bleeding and wants to go home AMA      Physical Exam  "      BP: 118/70 Pulse: 87     SpO2: 97 %      Vitals:     Vital Signs with Ranges  Pulse:  [87] 87  BP: (118)/(70) 118/70  SpO2:  [97 %] 97 %    Constitutional: Awake, no apparent distress, tired  Lungs: normal breathing pattern, no crackles or wheezing  Cardiovascular: Edema  Abdomen: non distended  Skin: warm, ecchymosis  Dressing on foot- R  Left lower leg- bleeding from superficial wounds- cleaned and dressed   Neuro: deconditioned  Psych: able to answer questions        Medications   No current facility-administered medications for this visit.     No current facility-administered medications for this visit.       Data   All microbiology laboratory data reviewed.  Recent Labs   Lab Test 11/20/24 0926 11/18/24  0502 11/12/24  0615   WBC 7.0 5.9 10.6   HGB 9.4* 8.9* 10.6*   HCT 29.2* 28.0* 33.0*   MCV 89 90 90   * 102* 128*     Recent Labs   Lab Test 11/20/24 0926 11/18/24  0502 11/12/24  0615   CR 1.29* 1.14  1.14 1.27*     Recent Labs   Lab Test 11/20/24  0926   SED 77*     No lab results found.    Invalid input(s): \"UC\"    MICROBIOLOGY:    Reviewed    7-Day Micro Results       No results found for the last 168 hours.             RADIOLOGY:    Reviewed  POC US Guidance Needle Placement    Result Date: 10/24/2024  Ultrasound was performed as guidance to an anesthesia procedure.  Click \"PACS images\" hyperlink below to view any stored images.  For specific procedure details, view procedure note authored by anesthesia.    US Lower Extremity Arterial Duplex Bilateral    Result Date: 10/15/2024  Table formatting from the original result was not included. Arterial Duplex Ultrasound (Date: 10/15/24) Lower Extremity Artery Evaluation Indication: Surveillance Bilateral Leg Arterial: Decreased SONNY's. Left Leg Pain. Right Diabetic Heel Ulcer. Decreased pedal pulses. Previous: 2/6/2018 SONNY's History: Previous Smoker, Hypertension, Diabetic, Hyperlipidemia, PAD, and Vascular Ulcers Technique: Duplex imaging is " performed utilizing gray-scale, two-dimensional images, and color-flow imaging. Doppler waveform analysis and spectral Doppler imaging is also performed. LOWER EXTREMITY ARTERIAL DUPLEX EXAM WITH WAVEFORMS Right Leg:(cm/s) Location: Velocities Waveforms EIA:   109  B CFA:   111  B PFA:   202  B SFA Proximal:   89 /  72 B SFA Mid:   75  T SFA Distal: 94   T Popliteal Artery:   76 /  72 B PTA:   105   M ALEX:   33  M DPA:   38  M Waveforms: T=Triphasic, M=Monophasic, B=Biphasic Left Leg:(cm/s) Location: Velocities Waveforms EIA:   53  M CFA:   38  M PFA:   48  M SFA Proximal:   42 / 26 M SFA Mid:   23  M SFA Distal:   27  M Popliteal Artery:   32 /15  M PTA:   18    M ALEX:   5  M DPA:   7  M Waveforms: T=Triphasic, M=Monophasic, B=Biphasic Impression: Right Lower Extremity: Patent vasculature to the popliteal artery with multiphasic waveforms.  Transition to monophasic flow in the infrapopliteal vessels.  No focal stenosis by velocity criteria. Left Lower Extremity: Monophasic waveforms in the external iliac artery, suggesting iliac inflow disease.  The remaining vasculature is patent without focal stenosis. Reference: Category Normal 1-19% 20-49% 50-99% Occluded PSV <160 cm/sec without spectral broadening <160 cm/sec with spectral broadening Increased Increased Absent Flow Ratio N/A N/A < 2.0 >2.0 N/A Post-Stenotic Turbulence No No No Yes N/A      US Low Ext Arterial Dop Seg Pres w/o Exercise    Result Date: 10/15/2024  Table formatting from the original result was not included. BILATERAL RESTING ANKLE-BRACHIAL INDICES (SONNY'S) (Date: 10/15/24) Indication: Surveillance SONNY's: Left Leg Pain. Right Diabetic Heel Ulcer. Decreased pedal pulses. Previous: 2/6/2018 SONNY's History: Previous Smoker, Hypertension, Diabetic, Hyperlipidemia, PAD, and Vascular Ulcers  Resting SONNY's          Right: mmHg Index     Brachial: 167  Ankle-(PT): 127 0.74 Ankle-(DP): 124 0.73          Digit: 140 0.82               Left: mmHg Index      Brachial: 171  Ankle-(PT): >254 NC Ankle-(DP): 0 0          Digit: 25 0.15 Resting ankle-brachial index of 0.74 on the right. Toe Pressures of 140 mmHg and TBI of 0.82 Resting ankle-brachial index is non compressible  on the left. Toe Pressures of 25 mmHg and TBI of 0.15  VPR WAVEFORMS: The right volume plethysmography waveforms are mildly abnormal at the lower thigh level, mildly abnormal at the upper calf level and moderately abnormal at the ankle. The left volume plethysmography waveforms are severely abnormal at the lower thigh level, severely abnormal at the upper calf level and severely abnormal at the ankle.  Impression:  1. RIGHT LOWER EXTREMITY: SONNY is Abnormal with an SONNY of 0.74 indicating single level disease. Toe Pressures are Normal and adequate for wound healing with toe pressures of 140 mmHg. 2. LEFT LOWER EXTREMITY: SONNY is Non-diagnostic indicating vessel calcification. Toe Pressures are Abnormal and impaired for wound healing with toe pressures of 25 mmHg. Reference: Wound classification Grade SONNY Ankle Systolic Pressure Toe Pressures 0 > 0.80 > 100 mmHg > 60 mmHg 1 0.6 - 0.79 70 - 100 mmHg 40 - 59 mmHg 2 0.4 - 0.59 50-70 mmHg 30 - 39 mmHg 3 < 0.39 < 50 mmHg < 30 mmHg Digit Pressures DBI Disease Category > 0.70 Normal < 0.70 Abnormal > 30 mmHg Potential wound healing < 30 mmHg Impaired wound healing Ankle Brachial Pressures SONNY Disease Category > 1.3  Likely vessel calcification with monophasic waveforms, non-diagnostic 0.95-1.30 Normal with multiphasic waveforms 0.50-0.95 Single level disease 0.30-0.50 Multilevel disease < 0.30 Critical limb ischema Volume Plethysmography Recording (VPR) at all levels Normal Sharp systolic peak, fast upstroke, prominent dicrotic notch in wave Mild Sharp systolic peak, fast upstroke, absent dicrotic notch in wave Moderate Flattened systolic peak, slowed upstroke, absent dicrotic notch inwave Severe amplitude wave with = upslope and down slope Occluded Flat Line       MR Foot Right w/o Contrast    Result Date: 10/8/2024  EXAM: MR FOOT RIGHT W/O CONTRAST LOCATION: [facility] DATE: 10/8/2024 INDICATION: Diabetic foot ulcer COMPARISON: Radiograph 10/07/2024 TECHNIQUE: Unenhanced. FINDINGS: TENDONS: -Peroneal: Peroneus longus and brevis tendons are intact. No tendinopathy or tenosynovitis. No subluxation. -Medial: Posterior tibialis tendon is intact. No tendinopathy or tenosynovitis. Flexor digitorum longus and flexor hallucis longus tendons are normal. No tenosynovitis. -Anterior: Anterior tibialis, extensor hallucis longus, and extensor digitorum longus tendons are normal. No tenosynovitis. -Achilles: Minimal Achilles tendinopathy. No tear or peritendinitis. LIGAMENTS: -Anterior talofibular ligament: Intact. -Calcaneofibular ligament: Intact. -Posterior talofibular ligament: Intact. -Syndesmotic inferior tibiofibular ligaments: Intact. -Deltoid ligament complex: Effacement of fat in the deltoid ligament could represent prior sprain. The ligament remains intact. -Spring ligament complex: Intact. JOINTS AND BONES: -No fracture or contusion. -There is T2 hyperintense edema of the calcaneal tuberosity deep to the ulcer. There is mild linear T1 hypointense signal without confluent T1 hypointense replacement. -Severe talonavicular joint osteoarthritis with subchondral cystic change and remodeling of the navicular bone. SOFT TISSUES: -Plantar fascia: Chronic central band plantar fasciitis. No tear. -Sinus tarsi and tarsal tunnel: Normal. -Muscles: Subacute on chronic denervation atrophy of the visualized foot and distal leg musculature. -There is a soft tissue ulcer of the calcaneus with subjacent nonorganized subcutaneous edema. Edema abuts the calcaneal tuberosity. No organized/drainable collection.     IMPRESSION: 1.  Soft tissue ulcer of the heel with subcutaneous edema abutting the calcaneus compatible with cellulitis. No organized/drainable collection.  There is T2 hyperintense marrow edema of the subjacent calcaneal tuberosity with mild linear T1 hypointense signal but no confluent T1 hypointense replacement. Given location subjacent to an ulcer, these findings are compatible with a high likelihood though are not definitive of osteomyelitis. 2.  Severe talonavicular joint osteoarthritis.    Foot  XR, G/E 3 views, right    Result Date: 10/7/2024  EXAM: XR FOOT RIGHT G/E 3 VIEWS LOCATION: [facility] DATE: 10/7/2024 INDICATION: diabetic foot ulcer right heel COMPARISON: None.     IMPRESSION: Deep soft tissue ulceration along the posterior aspect of the heel. No focal osseous erosions or periostitis to suggest osteomyelitis. If there is persistent clinical concern for osteomyelitis, MRI is more sensitive.  Large plantar calcaneal spur. Moderate midfoot degenerative change most evident at the talonavicular joint where there are prominent dorsal osteophytes. Vascular calcifications extending into the toes. Bipartite medial hallux sesamoid.     Total Time Spent 35 minutes with >50% of the time spent in chart review, evaluation, management, counseling, education and care coordination.

## 2024-12-09 NOTE — TELEPHONE ENCOUNTER
[facility] Call Center    Phone Message    May a detailed message be left on voicemail: yes     Reason for Call: santiago home health nurse called and informed she was not able to complete labs at her visit with him today and is wanting to have him complete those at [facility] prior to his 2:45 visit, if so please call patient and informed him to come earlier thank you    Action Taken: Other: ID    Travel Screening: Not Applicable

## 2024-12-09 NOTE — TELEPHONE ENCOUNTER
Lexi from [facility] calls today stating that wound vac was placed back on on Friday. He went over the weekend with it, but when they tried to reapply it today, the wound vac stayed at zero. They tried troubleshooting it, and figured it was a machine error. She called [employer] and they are going to send out a new wound vac today or tomorrow. She will continue with daily wet-to-dry dressings until she is able to reapply wound vac.

## 2024-12-09 NOTE — PATIENT INSTRUCTIONS
Robert Camacho,  Thank you for taking the time to see us today. We will complete the IV antibiotics and remove PICC on 12/10/24.  Start oral augmentin and doxycycline on 12/10/24 for 3 weeks  Follow up with Dr. Patricio (podiatry)  ID follow up next available    Erica Nuñez MD  [facility]  Answering Service: [phone]  On-Call ID provider: [phone], option: 9

## 2024-12-10 ENCOUNTER — TELEPHONE (OUTPATIENT)
Dept: INFECTIOUS DISEASES | Facility: CLINIC | Age: 79
End: 2024-12-10
Payer: COMMERCIAL

## 2024-12-10 ENCOUNTER — TELEPHONE (OUTPATIENT)
Dept: VASCULAR SURGERY | Facility: CLINIC | Age: 79
End: 2024-12-10
Payer: COMMERCIAL

## 2024-12-10 NOTE — TELEPHONE ENCOUNTER
Health Call Center    Phone Message    May a detailed message be left on voicemail: yes     Reason for Call: Call received from MALI Barton with [facility] to check on a few items:     1.) Would like to confirm that the order to remove pt's PICC line has been faxed to their office (this would have been after pt's appt yesterday-12/9/24-with Dr. Nuñez)?    2.) Oralia is able to see that pt needs to have lab tests done today, but was not given specific orders regarding which tests need to be done?    Please call MALI Barton back at (ph) [phone].   Orders can be faxed to (f) [phone].         Action Taken: Other: Infectious Disease    Travel Screening: Not Applicable

## 2024-12-10 NOTE — TELEPHONE ENCOUNTER
Procedure: Right  Leg  Angiogram     Procedure Date :  12/12/24    Procedure Time :  10am    Arrival Time: 0900    Admission Type: Outpatient    Surgeon: Dr. Silvestre Jc    Procedure Location: [facility]:  [address] [place] [address] (phone: [phone], Fax: [phone])    Blood Thinners Address: yes    2 week Post Procedure Appointment with  Dr. Silvestre Jc and also ultrasound: 12/31    Pt informed to stop Metformin 48 hours post procedure. He confirmed that he will have a  to bring him there and home.

## 2024-12-11 ENCOUNTER — APPOINTMENT (OUTPATIENT)
Dept: CT IMAGING | Facility: HOSPITAL | Age: 79
DRG: 291 | End: 2024-12-11
Attending: EMERGENCY MEDICINE
Payer: COMMERCIAL

## 2024-12-11 ENCOUNTER — HOSPITAL ENCOUNTER (INPATIENT)
Facility: HOSPITAL | Age: 79
DRG: 291 | End: 2024-12-11
Attending: EMERGENCY MEDICINE | Admitting: FAMILY MEDICINE
Payer: COMMERCIAL

## 2024-12-11 DIAGNOSIS — L97.414 DIABETIC ULCER OF RIGHT HEEL ASSOCIATED WITH TYPE 2 DIABETES MELLITUS, WITH NECROSIS OF BONE (H): Primary | ICD-10-CM

## 2024-12-11 DIAGNOSIS — R06.09 DOE (DYSPNEA ON EXERTION): ICD-10-CM

## 2024-12-11 DIAGNOSIS — E11.621 DIABETIC ULCER OF RIGHT HEEL ASSOCIATED WITH TYPE 2 DIABETES MELLITUS, WITH NECROSIS OF BONE (H): ICD-10-CM

## 2024-12-11 DIAGNOSIS — S81.801D OPEN WOUND OF RIGHT LOWER EXTREMITY, SUBSEQUENT ENCOUNTER: ICD-10-CM

## 2024-12-11 DIAGNOSIS — L97.414 DIABETIC ULCER OF RIGHT HEEL ASSOCIATED WITH TYPE 2 DIABETES MELLITUS, WITH NECROSIS OF BONE (H): ICD-10-CM

## 2024-12-11 DIAGNOSIS — J90 PLEURAL EFFUSION: ICD-10-CM

## 2024-12-11 DIAGNOSIS — I10 ESSENTIAL HYPERTENSION: ICD-10-CM

## 2024-12-11 DIAGNOSIS — E11.621 DIABETIC ULCER OF RIGHT HEEL ASSOCIATED WITH TYPE 2 DIABETES MELLITUS, WITH NECROSIS OF BONE (H): Primary | ICD-10-CM

## 2024-12-11 DIAGNOSIS — R79.89 ELEVATED BRAIN NATRIURETIC PEPTIDE (BNP) LEVEL: ICD-10-CM

## 2024-12-11 DIAGNOSIS — I73.9 PERIPHERAL VASCULAR DISEASE (H): ICD-10-CM

## 2024-12-11 DIAGNOSIS — R79.89 ELEVATED TROPONIN: ICD-10-CM

## 2024-12-11 DIAGNOSIS — E11.51 TYPE 2 DIABETES MELLITUS WITH DIABETIC PERIPHERAL ANGIOPATHY WITHOUT GANGRENE, WITH LONG-TERM CURRENT USE OF INSULIN (H): ICD-10-CM

## 2024-12-11 DIAGNOSIS — I50.30 HEART FAILURE WITH PRESERVED EJECTION FRACTION, NYHA CLASS I (H): ICD-10-CM

## 2024-12-11 DIAGNOSIS — Z79.4 TYPE 2 DIABETES MELLITUS WITH DIABETIC PERIPHERAL ANGIOPATHY WITHOUT GANGRENE, WITH LONG-TERM CURRENT USE OF INSULIN (H): ICD-10-CM

## 2024-12-11 LAB
ALBUMIN SERPL BCG-MCNC: 3.8 G/DL (ref 3.5–5.2)
ALP SERPL-CCNC: 91 U/L (ref 40–150)
ALT SERPL W P-5'-P-CCNC: 31 U/L (ref 0–70)
ANION GAP SERPL CALCULATED.3IONS-SCNC: 9 MMOL/L (ref 7–15)
AST SERPL W P-5'-P-CCNC: 38 U/L (ref 0–45)
BASOPHILS # BLD AUTO: 0 10E3/UL (ref 0–0.2)
BASOPHILS NFR BLD AUTO: 1 %
BILIRUB DIRECT SERPL-MCNC: <0.2 MG/DL (ref 0–0.3)
BILIRUB SERPL-MCNC: 0.4 MG/DL
BUN SERPL-MCNC: 24.5 MG/DL (ref 8–23)
CALCIUM SERPL-MCNC: 9 MG/DL (ref 8.8–10.4)
CHLORIDE SERPL-SCNC: 105 MMOL/L (ref 98–107)
CREAT SERPL-MCNC: 1.31 MG/DL (ref 0.67–1.17)
D DIMER PPP FEU-MCNC: 0.78 UG/ML FEU (ref 0–0.5)
EGFRCR SERPLBLD CKD-EPI 2021: 56 ML/MIN/1.73M2
EOSINOPHIL # BLD AUTO: 0.1 10E3/UL (ref 0–0.7)
EOSINOPHIL NFR BLD AUTO: 2 %
ERYTHROCYTE [DISTWIDTH] IN BLOOD BY AUTOMATED COUNT: 17.3 % (ref 10–15)
GLUCOSE BLDC GLUCOMTR-MCNC: 108 MG/DL (ref 70–99)
GLUCOSE BLDC GLUCOMTR-MCNC: 53 MG/DL (ref 70–99)
GLUCOSE BLDC GLUCOMTR-MCNC: 57 MG/DL (ref 70–99)
GLUCOSE BLDC GLUCOMTR-MCNC: 78 MG/DL (ref 70–99)
GLUCOSE SERPL-MCNC: 104 MG/DL (ref 70–99)
HCO3 SERPL-SCNC: 25 MMOL/L (ref 22–29)
HCT VFR BLD AUTO: 34.7 % (ref 40–53)
HGB BLD-MCNC: 11 G/DL (ref 13.3–17.7)
IMM GRANULOCYTES # BLD: 0.1 10E3/UL
IMM GRANULOCYTES NFR BLD: 2 %
LYMPHOCYTES # BLD AUTO: 1.2 10E3/UL (ref 0.8–5.3)
LYMPHOCYTES NFR BLD AUTO: 16 %
MCH RBC QN AUTO: 28.3 PG (ref 26.5–33)
MCHC RBC AUTO-ENTMCNC: 31.7 G/DL (ref 31.5–36.5)
MCV RBC AUTO: 89 FL (ref 78–100)
MONOCYTES # BLD AUTO: 0.6 10E3/UL (ref 0–1.3)
MONOCYTES NFR BLD AUTO: 8 %
NEUTROPHILS # BLD AUTO: 5.4 10E3/UL (ref 1.6–8.3)
NEUTROPHILS NFR BLD AUTO: 72 %
NRBC # BLD AUTO: 0 10E3/UL
NRBC BLD AUTO-RTO: 0 /100
NT-PROBNP SERPL-MCNC: 3933 PG/ML (ref 0–1800)
PLATELET # BLD AUTO: 143 10E3/UL (ref 150–450)
POTASSIUM SERPL-SCNC: 4.3 MMOL/L (ref 3.4–5.3)
PROT SERPL-MCNC: 7.3 G/DL (ref 6.4–8.3)
RBC # BLD AUTO: 3.89 10E6/UL (ref 4.4–5.9)
SODIUM SERPL-SCNC: 139 MMOL/L (ref 135–145)
TROPONIN T SERPL HS-MCNC: 114 NG/L
TROPONIN T SERPL HS-MCNC: 120 NG/L
WBC # BLD AUTO: 7.4 10E3/UL (ref 4–11)

## 2024-12-11 PROCEDURE — 250N000013 HC RX MED GY IP 250 OP 250 PS 637: Performed by: FAMILY MEDICINE

## 2024-12-11 PROCEDURE — 250N000011 HC RX IP 250 OP 636: Performed by: EMERGENCY MEDICINE

## 2024-12-11 PROCEDURE — 82962 GLUCOSE BLOOD TEST: CPT

## 2024-12-11 PROCEDURE — 36415 COLL VENOUS BLD VENIPUNCTURE: CPT | Performed by: EMERGENCY MEDICINE

## 2024-12-11 PROCEDURE — 120N000004 HC R&B MS OVERFLOW

## 2024-12-11 PROCEDURE — 84484 ASSAY OF TROPONIN QUANT: CPT | Performed by: EMERGENCY MEDICINE

## 2024-12-11 PROCEDURE — 93005 ELECTROCARDIOGRAM TRACING: CPT | Performed by: EMERGENCY MEDICINE

## 2024-12-11 PROCEDURE — 83880 ASSAY OF NATRIURETIC PEPTIDE: CPT | Performed by: EMERGENCY MEDICINE

## 2024-12-11 PROCEDURE — 85379 FIBRIN DEGRADATION QUANT: CPT | Performed by: EMERGENCY MEDICINE

## 2024-12-11 PROCEDURE — 85004 AUTOMATED DIFF WBC COUNT: CPT | Performed by: EMERGENCY MEDICINE

## 2024-12-11 PROCEDURE — 99285 EMERGENCY DEPT VISIT HI MDM: CPT | Mod: 25

## 2024-12-11 PROCEDURE — 96374 THER/PROPH/DIAG INJ IV PUSH: CPT | Mod: 59

## 2024-12-11 PROCEDURE — 71275 CT ANGIOGRAPHY CHEST: CPT

## 2024-12-11 PROCEDURE — 85041 AUTOMATED RBC COUNT: CPT | Performed by: EMERGENCY MEDICINE

## 2024-12-11 PROCEDURE — 80048 BASIC METABOLIC PNL TOTAL CA: CPT | Performed by: EMERGENCY MEDICINE

## 2024-12-11 PROCEDURE — 82248 BILIRUBIN DIRECT: CPT

## 2024-12-11 RX ORDER — ASPIRIN 81 MG/1
81 TABLET ORAL DAILY
Status: DISCONTINUED | OUTPATIENT
Start: 2024-12-12 | End: 2024-12-15 | Stop reason: HOSPADM

## 2024-12-11 RX ORDER — LOSARTAN POTASSIUM 50 MG/1
50 TABLET ORAL AT BEDTIME
Status: DISCONTINUED | OUTPATIENT
Start: 2024-12-11 | End: 2024-12-15 | Stop reason: HOSPADM

## 2024-12-11 RX ORDER — ATORVASTATIN CALCIUM 40 MG/1
80 TABLET, FILM COATED ORAL AT BEDTIME
Status: DISCONTINUED | OUTPATIENT
Start: 2024-12-11 | End: 2024-12-15 | Stop reason: HOSPADM

## 2024-12-11 RX ORDER — DOXYCYCLINE 100 MG/1
100 CAPSULE ORAL 2 TIMES DAILY
Status: DISCONTINUED | OUTPATIENT
Start: 2024-12-11 | End: 2024-12-15 | Stop reason: HOSPADM

## 2024-12-11 RX ORDER — DOXYCYCLINE HYCLATE 20 MG
100 TABLET ORAL 2 TIMES DAILY
Status: DISCONTINUED | OUTPATIENT
Start: 2024-12-11 | End: 2024-12-11

## 2024-12-11 RX ORDER — POLYETHYLENE GLYCOL 3350 17 G/17G
17 POWDER, FOR SOLUTION ORAL 2 TIMES DAILY PRN
Status: DISCONTINUED | OUTPATIENT
Start: 2024-12-11 | End: 2024-12-15 | Stop reason: HOSPADM

## 2024-12-11 RX ORDER — AMOXICILLIN 250 MG
1 CAPSULE ORAL 2 TIMES DAILY PRN
Status: DISCONTINUED | OUTPATIENT
Start: 2024-12-11 | End: 2024-12-15 | Stop reason: HOSPADM

## 2024-12-11 RX ORDER — ACETAMINOPHEN 325 MG/1
650 TABLET ORAL EVERY 4 HOURS PRN
Status: DISCONTINUED | OUTPATIENT
Start: 2024-12-11 | End: 2024-12-15 | Stop reason: HOSPADM

## 2024-12-11 RX ORDER — SPIRONOLACTONE 25 MG/1
25 TABLET ORAL DAILY
Status: DISCONTINUED | OUTPATIENT
Start: 2024-12-12 | End: 2024-12-15 | Stop reason: HOSPADM

## 2024-12-11 RX ORDER — NICOTINE POLACRILEX 4 MG
15-30 LOZENGE BUCCAL
Status: DISCONTINUED | OUTPATIENT
Start: 2024-12-11 | End: 2024-12-15 | Stop reason: HOSPADM

## 2024-12-11 RX ORDER — ACETAMINOPHEN 650 MG/1
650 SUPPOSITORY RECTAL EVERY 4 HOURS PRN
Status: DISCONTINUED | OUTPATIENT
Start: 2024-12-11 | End: 2024-12-15 | Stop reason: HOSPADM

## 2024-12-11 RX ORDER — AMOXICILLIN AND CLAVULANATE POTASSIUM 500; 125 MG/1; MG/1
1 TABLET, FILM COATED ORAL 2 TIMES DAILY
Status: DISCONTINUED | OUTPATIENT
Start: 2024-12-11 | End: 2024-12-15 | Stop reason: HOSPADM

## 2024-12-11 RX ORDER — LEVOTHYROXINE SODIUM 25 UG/1
50 TABLET ORAL DAILY
Status: DISCONTINUED | OUTPATIENT
Start: 2024-12-12 | End: 2024-12-15 | Stop reason: HOSPADM

## 2024-12-11 RX ORDER — CLOPIDOGREL BISULFATE 75 MG/1
75 TABLET ORAL DAILY
Status: DISCONTINUED | OUTPATIENT
Start: 2024-12-12 | End: 2024-12-15 | Stop reason: HOSPADM

## 2024-12-11 RX ORDER — IOPAMIDOL 755 MG/ML
75 INJECTION, SOLUTION INTRAVASCULAR ONCE
Status: COMPLETED | OUTPATIENT
Start: 2024-12-11 | End: 2024-12-11

## 2024-12-11 RX ORDER — DEXTROSE MONOHYDRATE 25 G/50ML
25-50 INJECTION, SOLUTION INTRAVENOUS
Status: DISCONTINUED | OUTPATIENT
Start: 2024-12-11 | End: 2024-12-15 | Stop reason: HOSPADM

## 2024-12-11 RX ORDER — AMOXICILLIN 250 MG
2 CAPSULE ORAL 2 TIMES DAILY PRN
Status: DISCONTINUED | OUTPATIENT
Start: 2024-12-11 | End: 2024-12-15 | Stop reason: HOSPADM

## 2024-12-11 RX ORDER — FUROSEMIDE 10 MG/ML
60 INJECTION INTRAMUSCULAR; INTRAVENOUS ONCE
Status: COMPLETED | OUTPATIENT
Start: 2024-12-11 | End: 2024-12-11

## 2024-12-11 RX ORDER — LACTOBACILLUS RHAMNOSUS GG 10B CELL
1 CAPSULE ORAL DAILY
Status: DISCONTINUED | OUTPATIENT
Start: 2024-12-12 | End: 2024-12-15 | Stop reason: HOSPADM

## 2024-12-11 RX ORDER — LIDOCAINE 40 MG/G
CREAM TOPICAL
Status: DISCONTINUED | OUTPATIENT
Start: 2024-12-11 | End: 2024-12-15 | Stop reason: HOSPADM

## 2024-12-11 RX ADMIN — AMOXICILLIN AND CLAVULANATE POTASSIUM 1 TABLET: 500; 125 TABLET, FILM COATED ORAL at 22:47

## 2024-12-11 RX ADMIN — IOPAMIDOL 75 ML: 755 INJECTION, SOLUTION INTRAVENOUS at 19:05

## 2024-12-11 RX ADMIN — FUROSEMIDE 60 MG: 10 INJECTION, SOLUTION INTRAMUSCULAR; INTRAVENOUS at 20:03

## 2024-12-11 RX ADMIN — DOXYCYCLINE 100 MG: 100 CAPSULE ORAL at 22:47

## 2024-12-11 RX ADMIN — LOSARTAN POTASSIUM 50 MG: 50 TABLET, FILM COATED ORAL at 22:46

## 2024-12-11 RX ADMIN — ATORVASTATIN CALCIUM 80 MG: 40 TABLET, FILM COATED ORAL at 22:46

## 2024-12-11 ASSESSMENT — ACTIVITIES OF DAILY LIVING (ADL)
ADLS_ACUITY_SCORE: 58
ADLS_ACUITY_SCORE: 68
ADLS_ACUITY_SCORE: 58
ADLS_ACUITY_SCORE: 58

## 2024-12-11 ASSESSMENT — COLUMBIA-SUICIDE SEVERITY RATING SCALE - C-SSRS
2. HAVE YOU ACTUALLY HAD ANY THOUGHTS OF KILLING YOURSELF IN THE PAST MONTH?: NO
1. IN THE PAST MONTH, HAVE YOU WISHED YOU WERE DEAD OR WISHED YOU COULD GO TO SLEEP AND NOT WAKE UP?: NO
6. HAVE YOU EVER DONE ANYTHING, STARTED TO DO ANYTHING, OR PREPARED TO DO ANYTHING TO END YOUR LIFE?: NO

## 2024-12-11 NOTE — ED TRIAGE NOTES
Patient presents to ER with son.  Today, seen his primary MD and was referred here for concerns of a blood clot.  Has had severe shortness of breath with exertion for last several weeks.  Denies chest pain or leg pain.      Has wound vac on right heel.

## 2024-12-12 ENCOUNTER — APPOINTMENT (OUTPATIENT)
Dept: ULTRASOUND IMAGING | Facility: HOSPITAL | Age: 79
DRG: 291 | End: 2024-12-12
Payer: COMMERCIAL

## 2024-12-12 ENCOUNTER — APPOINTMENT (OUTPATIENT)
Dept: CARDIOLOGY | Facility: HOSPITAL | Age: 79
DRG: 291 | End: 2024-12-12
Payer: COMMERCIAL

## 2024-12-12 ENCOUNTER — TELEPHONE (OUTPATIENT)
Dept: VASCULAR SURGERY | Facility: CLINIC | Age: 79
End: 2024-12-12

## 2024-12-12 VITALS
OXYGEN SATURATION: 92 % | WEIGHT: 214 LBS | DIASTOLIC BLOOD PRESSURE: 59 MMHG | BODY MASS INDEX: 32.43 KG/M2 | SYSTOLIC BLOOD PRESSURE: 127 MMHG | HEIGHT: 68 IN | TEMPERATURE: 97.9 F | RESPIRATION RATE: 32 BRPM | HEART RATE: 85 BPM

## 2024-12-12 LAB
ALBUMIN SERPL BCG-MCNC: 3.5 G/DL (ref 3.5–5.2)
ALP SERPL-CCNC: 87 U/L (ref 40–150)
ALT SERPL W P-5'-P-CCNC: 29 U/L (ref 0–70)
ANION GAP SERPL CALCULATED.3IONS-SCNC: 9 MMOL/L (ref 7–15)
APPEARANCE FLD: CLEAR
AST SERPL W P-5'-P-CCNC: 34 U/L (ref 0–45)
BILIRUB SERPL-MCNC: 0.5 MG/DL
BUN SERPL-MCNC: 23.3 MG/DL (ref 8–23)
CALCIUM SERPL-MCNC: 8.7 MG/DL (ref 8.8–10.4)
CELL COUNT BODY FLUID SOURCE: NORMAL
CHLORIDE SERPL-SCNC: 104 MMOL/L (ref 98–107)
COLOR FLD: YELLOW
CREAT SERPL-MCNC: 1.29 MG/DL (ref 0.67–1.17)
EGFRCR SERPLBLD CKD-EPI 2021: 57 ML/MIN/1.73M2
ERYTHROCYTE [DISTWIDTH] IN BLOOD BY AUTOMATED COUNT: 17.2 % (ref 10–15)
GLUCOSE BLDC GLUCOMTR-MCNC: 111 MG/DL (ref 70–99)
GLUCOSE BLDC GLUCOMTR-MCNC: 154 MG/DL (ref 70–99)
GLUCOSE BLDC GLUCOMTR-MCNC: 193 MG/DL (ref 70–99)
GLUCOSE BLDC GLUCOMTR-MCNC: 60 MG/DL (ref 70–99)
GLUCOSE BLDC GLUCOMTR-MCNC: 74 MG/DL (ref 70–99)
GLUCOSE BLDC GLUCOMTR-MCNC: 95 MG/DL (ref 70–99)
GLUCOSE BODY FLUID SOURCE: NORMAL
GLUCOSE FLD-MCNC: 123 MG/DL
GLUCOSE SERPL-MCNC: 58 MG/DL (ref 70–99)
GRAM STAIN RESULT: NORMAL
HCO3 SERPL-SCNC: 26 MMOL/L (ref 22–29)
HCT VFR BLD AUTO: 32.8 % (ref 40–53)
HGB BLD-MCNC: 10.3 G/DL (ref 13.3–17.7)
HOLD SPECIMEN: NORMAL
INR PPP: 1.36 (ref 0.85–1.15)
LD BODY BODY FLUID SOURCE: NORMAL
LDH FLD L TO P-CCNC: 90 U/L
LDH SERPL L TO P-CCNC: 271 U/L (ref 0–250)
LVEF ECHO: NORMAL
MCH RBC QN AUTO: 28.1 PG (ref 26.5–33)
MCHC RBC AUTO-ENTMCNC: 31.4 G/DL (ref 31.5–36.5)
MCV RBC AUTO: 89 FL (ref 78–100)
PLATELET # BLD AUTO: 149 10E3/UL (ref 150–450)
POTASSIUM SERPL-SCNC: 3.9 MMOL/L (ref 3.4–5.3)
PROT FLD-MCNC: 2.7 G/DL
PROT SERPL-MCNC: 6.9 G/DL (ref 6.4–8.3)
PROTEIN BODY FLUID SOURCE: NORMAL
RBC # BLD AUTO: 3.67 10E6/UL (ref 4.4–5.9)
SODIUM SERPL-SCNC: 139 MMOL/L (ref 135–145)
WBC # BLD AUTO: 6.8 10E3/UL (ref 4–11)
WBC # FLD AUTO: NORMAL 10*3/UL

## 2024-12-12 PROCEDURE — 250N000012 HC RX MED GY IP 250 OP 636 PS 637

## 2024-12-12 PROCEDURE — 83615 LACTATE (LD) (LDH) ENZYME: CPT

## 2024-12-12 PROCEDURE — 250N000011 HC RX IP 250 OP 636

## 2024-12-12 PROCEDURE — G0463 HOSPITAL OUTPT CLINIC VISIT: HCPCS

## 2024-12-12 PROCEDURE — 82040 ASSAY OF SERUM ALBUMIN: CPT

## 2024-12-12 PROCEDURE — 82374 ASSAY BLOOD CARBON DIOXIDE: CPT

## 2024-12-12 PROCEDURE — 93306 TTE W/DOPPLER COMPLETE: CPT | Mod: 26 | Performed by: STUDENT IN AN ORGANIZED HEALTH CARE EDUCATION/TRAINING PROGRAM

## 2024-12-12 PROCEDURE — 120N000004 HC R&B MS OVERFLOW

## 2024-12-12 PROCEDURE — 88112 CYTOPATH CELL ENHANCE TECH: CPT | Mod: TC

## 2024-12-12 PROCEDURE — 89050 BODY FLUID CELL COUNT: CPT

## 2024-12-12 PROCEDURE — 250N000013 HC RX MED GY IP 250 OP 250 PS 637: Performed by: FAMILY MEDICINE

## 2024-12-12 PROCEDURE — 250N000011 HC RX IP 250 OP 636: Performed by: INTERNAL MEDICINE

## 2024-12-12 PROCEDURE — 85610 PROTHROMBIN TIME: CPT | Performed by: NURSE PRACTITIONER

## 2024-12-12 PROCEDURE — 87015 SPECIMEN INFECT AGNT CONCNTJ: CPT

## 2024-12-12 PROCEDURE — 85014 HEMATOCRIT: CPT

## 2024-12-12 PROCEDURE — 255N000002 HC RX 255 OP 636

## 2024-12-12 PROCEDURE — 272N000042 US THORACENTESIS

## 2024-12-12 PROCEDURE — 87070 CULTURE OTHR SPECIMN AEROBIC: CPT

## 2024-12-12 PROCEDURE — 82962 GLUCOSE BLOOD TEST: CPT

## 2024-12-12 PROCEDURE — 88305 TISSUE EXAM BY PATHOLOGIST: CPT | Mod: TC

## 2024-12-12 PROCEDURE — 32555 ASPIRATE PLEURA W/ IMAGING: CPT

## 2024-12-12 PROCEDURE — 84157 ASSAY OF PROTEIN OTHER: CPT

## 2024-12-12 PROCEDURE — 99223 1ST HOSP IP/OBS HIGH 75: CPT | Performed by: INTERNAL MEDICINE

## 2024-12-12 PROCEDURE — 36415 COLL VENOUS BLD VENIPUNCTURE: CPT

## 2024-12-12 PROCEDURE — 36415 COLL VENOUS BLD VENIPUNCTURE: CPT | Performed by: NURSE PRACTITIONER

## 2024-12-12 PROCEDURE — 99223 1ST HOSP IP/OBS HIGH 75: CPT | Mod: AI

## 2024-12-12 PROCEDURE — 87205 SMEAR GRAM STAIN: CPT

## 2024-12-12 PROCEDURE — 93970 EXTREMITY STUDY: CPT

## 2024-12-12 PROCEDURE — 82945 GLUCOSE OTHER FLUID: CPT

## 2024-12-12 PROCEDURE — 272N000710 US THORACENTESIS

## 2024-12-12 PROCEDURE — 0W993ZZ DRAINAGE OF RIGHT PLEURAL CAVITY, PERCUTANEOUS APPROACH: ICD-10-PCS | Performed by: RADIOLOGY

## 2024-12-12 RX ORDER — FUROSEMIDE 10 MG/ML
60 INJECTION INTRAMUSCULAR; INTRAVENOUS EVERY 12 HOURS
Status: DISCONTINUED | OUTPATIENT
Start: 2024-12-12 | End: 2024-12-13

## 2024-12-12 RX ORDER — ENOXAPARIN SODIUM 100 MG/ML
40 INJECTION SUBCUTANEOUS EVERY 24 HOURS
Status: DISCONTINUED | OUTPATIENT
Start: 2024-12-12 | End: 2024-12-15 | Stop reason: HOSPADM

## 2024-12-12 RX ADMIN — SPIRONOLACTONE 25 MG: 25 TABLET, FILM COATED ORAL at 08:20

## 2024-12-12 RX ADMIN — ENOXAPARIN SODIUM 40 MG: 40 INJECTION SUBCUTANEOUS at 16:58

## 2024-12-12 RX ADMIN — INSULIN ASPART 2 UNITS: 100 INJECTION, SOLUTION INTRAVENOUS; SUBCUTANEOUS at 17:09

## 2024-12-12 RX ADMIN — PERFLUTREN 2 ML: 6.52 INJECTION, SUSPENSION INTRAVENOUS at 12:33

## 2024-12-12 RX ADMIN — ATORVASTATIN CALCIUM 80 MG: 40 TABLET, FILM COATED ORAL at 21:17

## 2024-12-12 RX ADMIN — Medication 1 CAPSULE: at 08:21

## 2024-12-12 RX ADMIN — INSULIN GLARGINE 28 UNITS: 100 INJECTION, SOLUTION SUBCUTANEOUS at 08:22

## 2024-12-12 RX ADMIN — FUROSEMIDE 60 MG: 10 INJECTION, SOLUTION INTRAMUSCULAR; INTRAVENOUS at 16:57

## 2024-12-12 RX ADMIN — ASPIRIN 81 MG: 81 TABLET, COATED ORAL at 08:20

## 2024-12-12 RX ADMIN — LEVOTHYROXINE SODIUM 50 MCG: 0.03 TABLET ORAL at 08:21

## 2024-12-12 RX ADMIN — AMOXICILLIN AND CLAVULANATE POTASSIUM 1 TABLET: 500; 125 TABLET, FILM COATED ORAL at 08:21

## 2024-12-12 RX ADMIN — LOSARTAN POTASSIUM 50 MG: 50 TABLET, FILM COATED ORAL at 21:17

## 2024-12-12 RX ADMIN — CLOPIDOGREL BISULFATE 75 MG: 75 TABLET ORAL at 08:20

## 2024-12-12 RX ADMIN — AMOXICILLIN AND CLAVULANATE POTASSIUM 1 TABLET: 500; 125 TABLET, FILM COATED ORAL at 21:17

## 2024-12-12 RX ADMIN — DOXYCYCLINE 100 MG: 100 CAPSULE ORAL at 21:17

## 2024-12-12 RX ADMIN — DOXYCYCLINE 100 MG: 100 CAPSULE ORAL at 08:20

## 2024-12-12 ASSESSMENT — ACTIVITIES OF DAILY LIVING (ADL)
ADLS_ACUITY_SCORE: 68
ADLS_ACUITY_SCORE: 68
ADLS_ACUITY_SCORE: 64
ADLS_ACUITY_SCORE: 63
ADLS_ACUITY_SCORE: 64
ADLS_ACUITY_SCORE: 63
ADLS_ACUITY_SCORE: 64
ADLS_ACUITY_SCORE: 68
ADLS_ACUITY_SCORE: 64
ADLS_ACUITY_SCORE: 68
ADLS_ACUITY_SCORE: 64
ADLS_ACUITY_SCORE: 68
ADLS_ACUITY_SCORE: 63
ADLS_ACUITY_SCORE: 63
DEPENDENT_IADLS:: CLEANING;SHOPPING;TRANSPORTATION
ADLS_ACUITY_SCORE: 64
ADLS_ACUITY_SCORE: 68
ADLS_ACUITY_SCORE: 64
ADLS_ACUITY_SCORE: 68

## 2024-12-12 NOTE — MEDICATION SCRIBE - ADMISSION MEDICATION HISTORY
Medication Scribe Admission Medication History    Admission medication history is complete. The information provided in this note is only as accurate as the sources available at the time of the update.    Information Source(s): Patient and CareEverywhere/SureScripts via in-person    Pertinent Information:     Changes made to PTA medication list:  Added: None  Deleted: None  Changed: None    Allergies reviewed with patient and updates made in EHR: yes    Medication History Completed By: JHONNY BABB 12/11/2024 8:50 PM    PTA Med List   Medication Sig Last Dose/Taking    amoxicillin-clavulanate (AUGMENTIN) 500-125 MG tablet Take 1 tablet by mouth 2 times daily for 21 days. 12/11/2024 Morning    aspirin 81 MG EC tablet Take 81 mg by mouth daily. 12/10/2024 Evening    atorvastatin (LIPITOR) 80 MG tablet Take 80 mg by mouth at bedtime. 12/10/2024 Bedtime    clopidogrel (PLAVIX) 75 MG tablet Take 75 mg by mouth daily. 12/11/2024 Morning    co-enzyme Q-10 100 MG CAPS capsule Take 200 mg by mouth daily. 12/11/2024 Morning    doxycycline hyclate (VIBRA-TABS) 100 MG tablet Take 1 tablet (100 mg) by mouth 2 times daily for 21 days. 12/11/2024 Morning    ertugliflozin (STEGLATRO) 5 MG TABS Take 5 mg by mouth every morning. 12/11/2024 Morning    insulin aspart (NOVOLOG FLEXPEN) 100 UNIT/ML pen Inject 5 Units subcutaneously 3 times daily (with meals). Novolog Flexpen: 5 units with breakfast, 5 units with lunch, 5 units with dinner. Past Week    insulin glargine (LANTUS PEN) 100 UNIT/ML pen Inject 35 Units subcutaneously every morning. 12/11/2024 Morning    lactobacillus rhamnosus, GG, (CULTURELL) capsule Take 1 capsule by mouth daily. 12/11/2024 Morning    levothyroxine (SYNTHROID/LEVOTHROID) 50 MCG tablet Take 50 mcg by mouth daily. 12/11/2024 Morning    losartan (COZAAR) 50 MG tablet Take 50 mg by mouth at bedtime. 12/10/2024 Bedtime    metFORMIN (GLUCOPHAGE XR) 500 MG 24 hr tablet Take 1,000 mg by mouth 2 times daily  (with meals). 12/11/2024 Morning    Multiple Vitamins-Minerals (PRESERVISION AREDS 2 PO) Take 2 capsules by mouth daily. 12/11/2024 Morning    spironolactone (ALDACTONE) 25 MG tablet Take 25 mg by mouth daily. 12/11/2024 Morning    vitamin C with B complex (B COMPLEX-C) tablet Take 1 tablet by mouth daily. 12/11/2024 Morning

## 2024-12-12 NOTE — CONSULTS
"HEART CARE NOTE        Thank you, Dr. Ohara for asking the [facility] team to see Janice Nowak to evaluate ADHF.      Assessment/Recommendations     1.  Severe ADHF  Assessment / Plan  Hypervolemic on physical exam; diuresing well on current regimen - no changes at this time; continue to monitor UOP and renal function closely  Patient is high risk for adverse cardiac events 2/2 advanced age, elevated NTproBNP, renal dysfunction, DM2  GDMT once echo results final    2. DM2  Assessment / Plan  C/b foot ulcer and osteomyelitis  Management and supportive care per primary team    3. HTN  Assessment / Plan  Adequately controlled - no changes at this time    4. CKD  Assessment / Plan  Diuresis as above; continue to monitor UOP and renal function closely     Clinically Significant Risk Factors Present on Admission                 # Drug Induced Platelet Defect: home medication list includes an antiplatelet medication   # Hypertension: Noted on problem list          # DMII: A1C = 9.2 % (Ref range: <5.7 %) within past 6 months    # Obesity: Estimated body mass index is 32.54 kg/m  as calculated from the following:    Height as of this encounter: 1.727 m (5' 8\").    Weight as of this encounter: 97.1 kg (214 lb).       # Financial/Environmental Concerns:          Cardiomyopathy  Combined acute    Mixed disorder of acid-base balance, Other fluid overload, and Other disorders of electrolyte and fluid balance, not elsewhere classified    Acute kidney failure, unspecified  CKD POA List: Stage 3a (GFR 45-59)    85 minutes spent reviewing prior records (including documentation, laboratory studies, cardiac testing/imaging), history and physical exam, planning, and subsequent documentation.    History of Present Illness/Subjective    Mr. Janice Nowak is a 79 year old male with a PMHx significant for (per Epic notation) T2DM on insulin, PAD, CAD, HTN admitted on 12/11/2024 for several weeks of DIAS, " "concerning for CHF.  Of note, patient was recently on our service for diabetic foot ulcer and osteomyelitis s/p I&D, partial calcanectomy 10/24 to 11/1.     Today, Mr. Nowak endorsed progressive dyspnea with associated orthopnea; Management plan as detailed above    ECG: Personally reviewed. normal sinus rhythm, nonspecific ST and T waves changes, RBBB.    ECHO: results pending    CT Chest: personally reviewed 12/12/24  IMPRESSION:  1.  No pulmonary embolism.     2.  Large right pleural effusion with moderate to large left pleural effusion. Bibasilar consolidation.     3.  Emphysema.     4.  Incomplete visualization of hypodense mass in the posterior right hepatic lobe with bulky portacaval lymphadenopathy suspicious for neoplasm.     5.  Subcarinal lymphadenopathy. Mild AP window lymphadenopathy with scattered additional subcentimeter mediastinal nodes.    Telemetry: personally reviewed December 12, 2024; notable for sinus rhythm     Lab results: personally reviewed December 12, 2024; notable for CAROLINE    Medical history and pertinent documents reviewed in Care Everywhere please where applicable see details above          Physical Examination Review of Systems   /58 (BP Location: Right arm, Patient Position: Semi-De Leon's)   Pulse 75   Temp 98.2  F (36.8  C) (Oral)   Resp 20   Ht 1.727 m (5' 8\")   Wt 97.1 kg (214 lb)   SpO2 92%   BMI 32.54 kg/m    Body mass index is 32.54 kg/m .  Wt Readings from Last 3 Encounters:   12/11/24 97.1 kg (214 lb)   11/19/24 99.5 kg (219 lb 6.4 oz)   11/13/24 98.9 kg (218 lb)     General Appearance:   no distress, normal body habitus   ENT/Mouth: membranes moist, no oral lesions or bleeding gums.      EYES:  no scleral icterus, normal conjunctivae   Neck: no carotid bruits or thyromegaly   Chest/Lungs:   lungs are clear to auscultation, no rales or wheezing, equal chest wall expansion    Cardiovascular:   Regular. Normal first and second heart sounds with no murmurs, rubs, " or gallops; the carotid, radial and posterior tibial pulses are intact, + JVD and LE edema bilaterally    Abdomen:  no organomegaly, masses, bruits, or tenderness; bowel sounds are present   Extremities: no cyanosis or clubbing   Skin: no xanthelasma, warm.    Neurologic: NAD     Psychiatric: alert and oriented x3, calm     A complete 10 systems ROS was reviewed  And is negative except what is listed in the HPI.          Medical History  Surgical History Family History Social History   Past Medical History:   Diagnosis Date    Diabetes (H)     Hypertension     Sleep apnea     Thyroid disease     Past Surgical History:   Procedure Laterality Date    BACK SURGERY      BONE EXOSTOSIS EXCISION Right 10/24/2024    Procedure: PARTIAL CALCANECTOMY RIGHT FOOT;  Surgeon: Amol Patricio DPM;  [place] [facility] [place]    INCISION AND DRAINAGE FOOT, COMBINED Right 10/24/2024    Procedure: INCISION AND DRAINAGE;  Surgeon: Amol Patricio DPM;  [place] [facility] [place]    IRRIGATION AND DEBRIDEMENT FOOT, COMBINED Right 10/24/2024    Procedure: AND DEBRIDEMENT RIGHT HEEL,;  Surgeon: Amol Patricio DPM;  [place] [facility] [place]    ORTHOPEDIC SURGERY      PICC SINGLE LUMEN PLACEMENT  10/30/2024    VASCULAR SURGERY      no family history of premature coronary artery disease Social History     Socioeconomic History    Marital status:      Spouse name: Not on file    Number of children: Not on file    Years of education: Not on file    Highest education level: Not on file   Occupational History    Not on file   Tobacco Use    Smoking status: Former     Current packs/day: 0.00     Types: Cigarettes     Quit date:      Years since quittin.9     Passive exposure: Never    Smokeless tobacco: Never   Vaping Use    Vaping status: Never Used   Substance and Sexual Activity    Alcohol use: Yes     Alcohol/week: 5.0 standard drinks of alcohol     Types: 5 Cans of beer per week    Drug use: Not  Currently    Sexual activity: Not on file   Other Topics Concern    Not on file   Social History Narrative    Not on file     Social Drivers of Health     Financial Resource Strain: Low Risk  (10/28/2024)    Financial Resource Strain     Within the past 12 months, have you or your family members you live with been unable to get utilities (heat, electricity) when it was really needed?: No   Food Insecurity: Low Risk  (10/28/2024)    Food Insecurity     Within the past 12 months, did you worry that your food would run out before you got money to buy more?: No     Within the past 12 months, did the food you bought just not last and you didn t have money to get more?: No   Transportation Needs: Low Risk  (10/28/2024)    Transportation Needs     Within the past 12 months, has lack of transportation kept you from medical appointments, getting your medicines, non-medical meetings or appointments, work, or from getting things that you need?: No   Physical Activity: Not on file   Stress: Not on file   Social Connections: Not on file   Interpersonal Safety: Low Risk  (10/28/2024)    Interpersonal Safety     Do you feel physically and emotionally safe where you currently live?: Yes     Within the past 12 months, have you been hit, slapped, kicked or otherwise physically hurt by someone?: No     Within the past 12 months, have you been humiliated or emotionally abused in other ways by your partner or ex-partner?: No   Recent Concern: Interpersonal Safety - High Risk (10/9/2024)    Interpersonal Safety     Do you feel physically and emotionally safe where you currently live?: No     Within the past 12 months, have you been hit, slapped, kicked or otherwise physically hurt by someone?: No     Within the past 12 months, have you been humiliated or emotionally abused in other ways by your partner or ex-partner?: No   Housing Stability: High Risk (10/28/2024)    Housing Stability     Do you have housing? : No     Are you worried  "about losing your housing?: No           Lab Results    Chemistry/lipid CBC Cardiac Enzymes/BNP/TSH/INR   Lab Results   Component Value Date    CHOL 152 02/10/2020    HDL 63 02/10/2020    TRIG 119 02/10/2020    BUN 24.5 (H) 12/11/2024     12/11/2024    CO2 25 12/11/2024    Lab Results   Component Value Date    WBC 7.4 12/11/2024    HGB 11.0 (L) 12/11/2024    HCT 34.7 (L) 12/11/2024    MCV 89 12/11/2024     (L) 12/11/2024    No results found for: \"CKTOTAL\", \"CKMB\", \"TROPONINI\", \"BNP\", \"TSH\", \"INR\"  No results found for: \"CKTOTAL\", \"CKMB\", \"TROPONINI\"       Weight:    Wt Readings from Last 3 Encounters:   12/11/24 97.1 kg (214 lb)   11/19/24 99.5 kg (219 lb 6.4 oz)   11/13/24 98.9 kg (218 lb)       Allergies  Allergies   Allergen Reactions    Exenatide Unknown    Heparin Unknown    Liraglutide Unknown    Lisinopril Cough    Morphine Unknown         Surgical History  Past Surgical History:   Procedure Laterality Date    BACK SURGERY      BONE EXOSTOSIS EXCISION Right 10/24/2024    Procedure: PARTIAL CALCANECTOMY RIGHT FOOT;  Surgeon: Amol Patricio DPM;  Location: [facility] OR    INCISION AND DRAINAGE FOOT, COMBINED Right 10/24/2024    Procedure: INCISION AND DRAINAGE;  Surgeon: Amol Patricio DPM;  Location: [facility] OR    IRRIGATION AND DEBRIDEMENT FOOT, COMBINED Right 10/24/2024    Procedure: AND DEBRIDEMENT RIGHT HEEL,;  Surgeon: Amol Patricio DPM;  Location: [facility] OR    ORTHOPEDIC SURGERY      PICC SINGLE LUMEN PLACEMENT  10/30/2024    VASCULAR SURGERY         Social History  Tobacco:   History   Smoking Status    Former    Types: Cigarettes   Smokeless Tobacco    Never    Alcohol:   Social History    Substance and Sexual Activity      Alcohol use: Yes        Alcohol/week: 5.0 standard drinks of alcohol        Types: 5 Cans of beer per week   Illicit Drugs:   History   Drug Use Unknown       Family History  No family history on file.       Tory Tellez, " MD on 12/12/2024      cc: Jeannette April,

## 2024-12-12 NOTE — PLAN OF CARE
NURSING PROGRESS NOTE  Shift Summary      Date: December 12, 2024 2200-0700  Neuro/Musculoskeletal:  A&Ox4. Calm and pleasant towards writer and other staff. Receptive to all cares. Purposeful movement to all extremities.   Cardiac:  On TELE monitoring: NSR. VSS. Denied and offered no c/o CP.   Respiratory:  LS: Diminished throughout but absent of any adventitious sounds. SOB noted w/ exertion. Continued on RA, SpO2: 94%.   GI/:  BS: normoactive x4Q. Last BM: 12/11 per Pt report. Voiding spontaneously without difficulty. Primo fit utilized overnight for incontinent after IV Furosemide. Urine is clear yellow. See charting for totals.   Diet/Appetite:  Tolerating a 2g Na, no caffeine diet. On a 2,000 ml fluid restriction which pt is adhering to.   Activity:  A1 w/ standing at bedside. Has a PRAFO boot on R foot. Pt reports that he is w/c bound since sore started on R heel.   Pain:  Denied and offered no c/o pain.   Skin: NPWT to R heel. Scattered bruising on BUE and BLE. Open areas present on LLE shin/calf which Pt states is from his legs getting bumped while in w/c. Xeroform present on R shin wounds, Tubi  present to LLE.   LDAs + Drips/IVF:  PIV x1 to LUE: SL, dressing CDI.   Protocols/Labs:  Not on any electrolyte replacement protocols.     Scott Padilla RN        Goal Outcome Evaluation:  Problem: Comorbidity Management  Goal: Blood Glucose Levels Within Targeted Range  Outcome: Progressing  Intervention: Monitor and Manage Glycemia  Recent Flowsheet Documentation  Taken 12/12/2024 0300 by Scott Padilla, RN  Medication Review/Management: medications reviewed  Taken 12/11/2024 2200 by Scott Padilla, RN  Medication Review/Management: medications reviewed  Goal: Maintenance of Heart Failure Symptom Control  Outcome: Progressing  Intervention: Maintain Heart Failure Management  Recent Flowsheet Documentation  Taken 12/12/2024 0300 by Scott Padilla, RN  Medication Review/Management: medications  reviewed  Taken 12/11/2024 2200 by Scott Padilla, RN  Medication Review/Management: medications reviewed     Problem: Gas Exchange Impaired  Goal: Optimal Gas Exchange  Outcome: Progressing  Intervention: Optimize Oxygenation and Ventilation  Recent Flowsheet Documentation  Taken 12/12/2024 0350 by Scott Padilla, RN  Head of Bed (HOB) Positioning: HOB at 20-30 degrees  Taken 12/12/2024 0300 by Scott Padilla, RN  Head of Bed (HOB) Positioning: HOB at 20-30 degrees  Taken 12/11/2024 2200 by Scott Padilla, RN  Head of Bed (HOB) Positioning: HOB at 20-30 degrees

## 2024-12-12 NOTE — CONSULTS
"Care Management Initial Consult    General Information  Assessment completed with: PatientJanice  Type of CM/SW Visit: Initial Assessment    Primary Care Provider verified and updated as needed: Yes   Readmission within the last 30 days: no previous admission in last 30 days      Reason for Consult: discharge planning, community resources  Advance Care Planning: Advance Care Planning Reviewed: no concerns identified          Communication Assessment  Patient's communication style: spoken language (English or Bilingual)             Cognitive  Cognitive/Neuro/Behavioral: WDL                      Living Environment:   People in home: alone     Current living Arrangements: apartment, independent living facility ([facility])      Able to return to prior arrangements: yes  Living Arrangement Comments: \"My wife lives at [facility] also in Memory Care\".    Family/Social Support:  Care provided by: self, homecare agency  Provides care for: no one  Marital Status:   Support system: Wife, Children (2 sons)          Description of Support System: Supportive, Involved    Support Assessment: Adequate family and caregiver support, Adequate social supports, Patient communicates needs well met    Current Resources:   Patient receiving home care services: Yes  Skilled Home Care Services: Skilled Nursing, Home Health Aid, Physical Therapy, Occupational Therapy ([facility]. RN is doing wound cares 3 times a week for dressing changes. Also help from PT, OT, and bath aide.)     Community Resources: Home Care, DME, Housekeeping/Chore Agency (Facility helps with twice a month housekeeping.)  Equipment currently used at home: wheelchair, manual, shower chair, grab bar, tub/shower, orthosis, walker, rolling, cane, straight, glucometer (\"I use my wheelchair all the time now because I am supposed to be non-weight bearing on my foot. Right foot boot. I am able to pivot transfer to " "and from the wheelchair. When I can walk again I have a walker and cane at home\".)  Supplies currently used at home: Diabetic Supplies, Wound Care Supplies, Gloves, Oxygen Tubing/Supplies, Other (\"Wound vac and wound care supplies. CPAP at home that I don't use normally. Glasses\")    Employment/Financial:  Employment Status: retired     Employment/ Comments: \"no  history\"  Financial Concerns: none   Referral to Financial Worker: No       Does the patient's insurance plan have a 3 day qualifying hospital stay waiver?  No    Lifestyle & Psychosocial Needs:  Social Drivers of Health     Food Insecurity: Low Risk  (10/28/2024)    Food Insecurity     Within the past 12 months, did you worry that your food would run out before you got money to buy more?: No     Within the past 12 months, did the food you bought just not last and you didn t have money to get more?: No   Depression: Not at risk (11/13/2024)    PHQ-2     PHQ-2 Score: 0   Housing Stability: High Risk (10/28/2024)    Housing Stability     Do you have housing? : No     Are you worried about losing your housing?: No   Tobacco Use: Medium Risk (12/9/2024)    Patient History     Smoking Tobacco Use: Former     Smokeless Tobacco Use: Never     Passive Exposure: Never   Financial Resource Strain: Low Risk  (10/28/2024)    Financial Resource Strain     Within the past 12 months, have you or your family members you live with been unable to get utilities (heat, electricity) when it was really needed?: No   Alcohol Use: Not on file   Transportation Needs: Low Risk  (10/28/2024)    Transportation Needs     Within the past 12 months, has lack of transportation kept you from medical appointments, getting your medicines, non-medical meetings or appointments, work, or from getting things that you need?: No   Physical Activity: Not on file   Interpersonal Safety: Low Risk  (10/28/2024)    Interpersonal Safety     Do you feel physically and emotionally safe " "where you currently live?: Yes     Within the past 12 months, have you been hit, slapped, kicked or otherwise physically hurt by someone?: No     Within the past 12 months, have you been humiliated or emotionally abused in other ways by your partner or ex-partner?: No   Recent Concern: Interpersonal Safety - High Risk (10/9/2024)    Interpersonal Safety     Do you feel physically and emotionally safe where you currently live?: No     Within the past 12 months, have you been hit, slapped, kicked or otherwise physically hurt by someone?: No     Within the past 12 months, have you been humiliated or emotionally abused in other ways by your partner or ex-partner?: No   Stress: Not on file   Social Connections: Not on file   Health Literacy: Not on file       Functional Status:  Prior to admission patient needed assistance:   Dependent ADLs:: Wheelchair-independent, Bathing (\"Home Care is providing a bath aide\")  Dependent IADLs:: Cleaning, Shopping, Transportation (\"[employer] helps with housekeeping twice a month. I do my own laundry and meals. My sons can help with transportation\".)  Assesssment of Functional Status:  (unknown at this time)    Mental Health Status:  Mental Health Status: No Current Concerns       Chemical Dependency Status:  Chemical Dependency Status: No Current Concerns             Values/Beliefs:  Spiritual, Cultural Beliefs, Jewish Practices, Values that affect care: yes  Description of Beliefs that Will Affect Care: Scientology    Cultural/Jewish Practices Patient Routinely Participates In: ceremony, prayer       Discussed  Partnership in Safe Discharge Planning  document with patient/family: No    Additional Information:  Janice lives at [facility] alone. \"My wife lives at [facility] also in [facility] Care\".    He is independent with most ADLs and gets help with some IADLs. \"[employer] helps with housekeeping twice a month. I do my own laundry and " "meals. My sons can help with transportation\". He also has help from [facility] RN for 3 times a week wound care and dressing changes. Also PT, OT, and bath aide. Already has a wound vac in place.    \"I use my wheelchair all the time now because I am supposed to be non-weight bearing on my foot. Right foot boot. I am able to pivot transfer to and from the wheelchair. When I can walk again I have a walker and cane at home\".    Son to transport at discharge.    CM to follow for medical progression of care, discharge recommendations, and final discharge plan. Writer verified patient demographics and updated any changes needed in the patient chart.    1415 Received a call from Mariaelena SAMSON Care Manager from [facility] [phone] who is following with this patient. She called and told me, \"she is following this patient with a complex case and that he has a lot of follow up and has been doing a great job making it to all his appointment. He left the TCU and still had IV antibiotics and did a great job doing that on his own also. I do know that he had a graft placed on his heel wound recently by Dr. Patricio and he has an appointment 12/13/24 with Dr. Patricio to replace this graft and then keep the wound vac in place. I will try to call Dr. Patricio's office to let him know he is hospitalized to see if he can replace the graft while he is admitted. We will continue to follow his case upon discharge also.\"    1515 I was notified by Mariaelena SAMSON Care Manager from [facility] [phone] that Dr. Patricio is requesting we place a consult order for him to see and he will come to see the patient in the hospital. Heather Willard notified and she already placed the consult order. I also updated Janice of this fact so that he is less concerned about missing his appointment tomorrow because now it can be dealt with in the hospital.    Next Steps:   Plan: Cardiology following. Wound nurse to see. Awaiting PT/OT recommendations. Patient " "\"not interested in TCU and will only return home with Home Care help. I have plenty of help at home\".    Needs: Medical clearance. Resume Home Care services upon discharge.    Ria Rosenberg RN    "

## 2024-12-12 NOTE — CONSULTS
Interventional Radiology - Pre-Procedure Evaluation:  Inpatient - [facility]  12/12/2024     Procedure Requested: RLE angiogram  Requested by: Dr. Jc    History and Physical Reviewed: H&P documented within 30 days (by Tim Murry MD   on 12/11/24). I have personally reviewed the patient's medical history and have updated the medical record as necessary.    HPI: Janice Nowak is a 79 year old male with PMHx of T2DM on insulin, PAD, CAD, HTN admitted on 12/11/2024 for several weeks of DIAS, concerning for CHF. Patient also has recently been admitted for diabetic foot ulcer and osteomyelitis s/p I&D, partial calcanectomy 10/24 to 11/1. Patient is followed by IR, Dr. 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 Patient will be evaluated by cardiology team for heart failure. He will not be medically cleared for angiogram. Patient wishes to have procedure rescheduled as outpatient.     IMAGING:  EXAM: CTA ABDOMEN PELVIS RUNOFF W CONTRAST  LOCATION: [facility]  DATE: 11/27/2024     INDICATION:  Diabetic ulcer of right heel associated with type 2 diabetes mellitus, with necrosis of bone (H), Diabetic ulcer of right heel associated with type 2 diabetes mellitus, with necrosis of bone (H)  COMPARISON: Aortic ultrasound 11/8/2024  TECHNIQUE: Helical acquisition through the abdomen, pelvis, and bilateral lower extremities was performed during the arterial phase of contrast enhancement using IV Contrast. 2D and 3D reconstructions were performed by the CT technologist. Dose reduction   techniques were used.   CONTRAST: 90ml isovue 370     FINDINGS:     ABDOMEN ARTERIAL FINDINGS  Abdominal aorta: Patent and normal caliber with moderate atherosclerotic burden.  Celiac artery: Patent.  SMA: Patent.  Renal Arteries: Mild atherosclerotic narrowing of the renal artery origins which are otherwise patent  distally.  ALEC: Mild ostial narrowing, otherwise patent.     RIGHT LOWER EXTREMITY ARTERIAL FINDINGS  Common Iliac: At least mild atherosclerotic narrowing of the origin, otherwise patent. Diffuse atherosclerotic disease.  External Iliac: Patent.  Internal Iliac: At least mild ostial stenosis.  Common Femoral: Patent with atherosclerosis resulting in approximately 50% luminal narrowing.  Profunda: Ostial stenosis, otherwise patent.  SFA: Moderate focal stenosis near the origin, otherwise patent.  Popliteal: Patent.  Tibioperoneal trunk: Patent.  Anterior tibial: Occluded in the proximal calf.  Peroneal: Patent to the level of the ankle.  Posterior tibial: Patent to the level of the foot.     LEFT LOWER EXTREMITY ARTERIAL FINDINGS  Common Iliac: Severe atherosclerotic disease and occlusion at the origin with reconstitution distally just proximal to the bifurcation.  External Iliac: Atherosclerotic disease results in mild narrowing throughout the vessels course.  Internal Iliac: Ostial stenosis, otherwise patent.  Common Femoral: Atherosclerotic disease results in approximately 50% luminal stenosis.  Profunda: Ostial stenosis, otherwise patent.  SFA: Atherosclerotic disease results in severe focal stenosis proximally, otherwise patent.  Popliteal: Mild atherosclerotic disease in the above-knee segment, the below-knee segment is patent.  Tibioperoneal trunk: Patent.  Anterior tibial: Occluded.  Peroneal: Patent to the level of the ankle.  Posterior tibial: Patent to the level of the foot.     NON-ARTERIAL FINDINGS  LUNG BASES: No pleural effusion. Scattered areas of scarring. A partially visualized opacity along the right major fissure is noted.     ABDOMEN: Cholecystectomy. A hypodensity in the posterior right hepatic lobe is noted measuring 2.5 cm in diameter, incompletely evaluated on this exam.. Mild scarring or megaly measuring 14 cm craniocaudal. The adrenal glands and kidneys are normal. Mild   fatty atrophy of  "the pancreatic head. There is a heterogeneous mass along the anterior aspect of the IVC and posterior to the duodenum measuring 7.0 x 4.7 x 7.0 cm. Prominent lymph nodes are noted immediately adjacent to the mass. Normal caliber bowel   loops. No ascites.     PELVIS: Normal     MUSCULOSKELETAL: Mild degenerative changes of the thoracolumbar spine and bilateral hips. No destructive osseous lesion                                                                         IMPRESSION:  1.  Severe aortoiliac atherosclerotic disease with interval short segment occlusion of the left common iliac artery with distal reconstitution. This appears new since the November 8 aortic ultrasound     2.  Bilateral SFA disease proximally. Two-vessel runoff bilaterally with occlusion of the ALEX's.     3.  There is a heterogeneous mass in the central right hemiabdomen posterior to the duodenum and anterior to tzhe IVC. This appears distinct from the pancreatic head and liver, flow is concerning for neoplasm. In addition there is a 2.5 cm hepatic   hypodensity which is incompletely evaluated on this exam. Abdominal MRI with contrast is recommended to further evaluate these findings. The abdominal mass would be amenable to endoscopic ultrasound-guided biopsy as indicated.     4.  Prominent retroperitoneal lymph nodes concerning for a metastatic process given the aforementioned findings.     5.  Partially visualized opacity along the right major fissure. Further evaluation with a CT of the chest is suggested.    ANTICOAGULANTS/ANTIPLATELETS: ASA 81mg, plavix 75mg-no hold required.  ANTIBIOTICS: not indicated for IR procedure    ALLERGIES:  Allergies   Allergen Reactions    Exenatide Unknown    Heparin Unknown    Liraglutide Unknown    Lisinopril Cough    Morphine Unknown         LABS:  No results found for: \"INR\"   Hemoglobin   Date Value Ref Range Status   12/12/2024 10.3 (L) 13.3 - 17.7 g/dL Final     Platelet Count   Date Value Ref Range " "Status   12/12/2024 149 (L) 150 - 450 10e3/uL Final     Creatinine   Date Value Ref Range Status   12/12/2024 1.29 (H) 0.67 - 1.17 mg/dL Final     Potassium   Date Value Ref Range Status   12/12/2024 3.9 3.4 - 5.3 mmol/L Final   07/06/2020 4.7 3.5 - 5.0 mmol/L Final         EXAM:  /58 (BP Location: Right arm, Patient Position: [place]'s)   Pulse 79   Temp 98.2  F (36.8  C) (Oral)   Resp 21   Ht 1.727 m (5' 8\")   Wt 97.1 kg (214 lb)   SpO2 92%   BMI 32.54 kg/m    General: Stable. In no acute distress.    Neuro: Alert and oriented x 3. No focal deficits.  Psych: Appropriate mood and affect. Linear/coherent thought process.   Resp: Normal respirations. Lungs clear and diminished auscultation bilaterally.90% RA  Cardio: S1S2, regular rate and rhythm,   Abdomen: Soft, non-distended,   Skin: Warm and dry. RLE foot in bandage.      ASSESSMENT/PLAN:   CHF  PAD    Medicine primary  Cards consult pending  Will plan to reschedule RLE angiogram as OP when patient is medically cleared.  Discussed with patient and RN.    Total time spent on the date of the encounter: 50 minutes.      SILVIA HIRSCH CNP  Interventional Radiology   "

## 2024-12-12 NOTE — DISCHARGE INSTRUCTIONS
Wound location: R heel  Change Days: MWF  Supplies: Small black foam, oil emulsion gauze  Cleanse with: Saline, pat dry.  Suction: Continuous at -100 mmHg pressure.    Back up plan: If VAC malfunctions or unable to maintain seal: VAC dressing must be removed and reapplied within 2 hours of the incident. If floor staff is not able to reapply, place NS moistened gauze in wound bed and cover with appropriate dressing to keep wound bed moist.   Change wet-to-dry dressing BID and notify [facility] staff for reimplementation of VAC therapy when available.  Date canister. Chart canister output every shift.

## 2024-12-12 NOTE — ED PROVIDER NOTES
EMERGENCY DEPARTMENT ENCOUNTER      NAME: Janice Nowak  AGE: 79 year old male  YOB: 1945  MRN: [id number]  EVALUATION DATE & TIME: 2024  5:05 PM    PCP: Jeannette April    ED PROVIDER: Anna Norris MD    Chief Complaint   Patient presents with    Shortness of Breath         FINAL IMPRESSION:  1. DIAS (dyspnea on exertion)    2. Pleural effusion    3. Elevated troponin    4. Elevated brain natriuretic peptide (BNP) level    5. Peripheral vascular disease (H)          ED COURSE & MEDICAL DECISION MAKIN:00 PM I met with the patient, obtained history, performed an initial exam, and discussed options and plan for diagnostics and treatment here in the ED.  8:34 PM Spoke with Dr. CELESTE Murry from [place] [facility], [facility], regarding plan for admission. Patient is accepted for admission.    Pertinent Labs & Imaging studies reviewed. (See chart for details)  79 year old male with history of HTN, HLD, DM, vasculopath with known carotid and peripheral arterial disease, with a wound VAC on his lower extremity healing and ulcer and due for angiogram tomorrow for further management of same who presents to the Emergency Department for evaluation of progressive dyspnea on exertion.  Frankly his exertion is really minimal.  He is essentially wheelchair-bound and nonweightbearing on the right lower extremity due to the above.  His dyspnea on exertion is simply when he transfers himself from wheelchair to bed or wheelchair to chair excetra.  He has not however have any pain with this.  However obviously is a vasculopath and though he does not have any known history of ischemic heart disease certainly is at risk for same.  Differential includes new onset heart failure, pulmonary embolism, symptomatic anemia, arrhythmia.    Patient initially seen eval by myself in triage area due to boarding crisis.  Twelve-lead EKG shows sinus rhythm with right bundle branch block.  CBC, BMP notable for anemia,  actually up from baseline.  Baseline renal insufficiency.  Troponin elevated at 120, though on repeat is downtrending to 114.   BNP elevated approximately 4000.  No previous with which to compare.  D-dimer elevated at 0.78 so CT PE study obtained negative for PE but shows a large right and moderate to large left pleural effusion.  Emphysematous changes.  Hypodense mass in the liver with portal lymphadenopathy.  Subcarinal lymphadenopathy.  Question whether this is new onset heart failure versus potentially malignant effusions.  Patient was given 60 mg Lasix IV.  Case discussed with cardiology who shares my sentiments that patient does not warrant anticoagulation at this time but does warrant cardiac evaluation, echocardiogram, potential ischemic testing.  Patient would potentially benefit from a thoracentesis for both diagnostic and therapeutic purposes.  Will be admitted to medicine for further management.    ED Course as of 12/11/24 2255   Wed Dec 11, 2024   1627 Hemoglobin(!): 11.0  Up from prev   1640 D-Dimer Quantitative(!): 0.78   1642 Creatinine(!): 1.31  1.2 baseline   1650 N-Terminal Pro BNP Inpatient(!): 3,933  No prev   1709 Troponin T, High Sensitivity(!!): 120   1852 Troponin T, High Sensitivity(!!): 114  downtrending   1918 CT Chest Pulmonary Embolism w Contrast  CT independently interpreted by myself.  No visualized PE.  Bilateral pleural effusions with compressive atelectasis   1953 Spoke w Dr. Baker, cards    2001 GLUCOSE BY METER POCT(!): 53  Well allow po resus   2035 Admitted to Dr. Ohara       Medical Decision Making  Obtained supplemental history:Supplemental history obtained?: Documented in chart and Family Member/Significant Other  Reviewed external records: External records reviewed?: Documented in chart and Other: TheraSkin graft application procedure on 12/4/2024 and CTA abdomen/pelvis on 11/27/2024  Care impacted by chronic illness:Documented in Chart  Did you consider but not  order tests?: Work up considered but not performed and documented in chart, if applicable  Did you interpret images independently?: Independent interpretation of ECG and images noted in documentation, when applicable.  Consultation discussion with other provider:Did you involve another provider (consultant, , pharmacy, etc.)?: I discussed the care with another health care provider, see documentation for details.  Admit.    MIPS: CT Pulmonary Angiogram:The patient had an abnormal d-dimer.      At the conclusion of the encounter I discussed the results of all of the tests and the disposition. The questions were answered. The patient or family acknowledged understanding and was agreeable with the care plan.      MEDICATIONS GIVEN IN THE EMERGENCY:  Medications   amoxicillin-clavulanate (AUGMENTIN) 500-125 MG per tablet 1 tablet (1 tablet Oral $Given 12/11/24 2247)   aspirin EC tablet 81 mg (has no administration in time range)   atorvastatin (LIPITOR) tablet 80 mg (80 mg Oral $Given 12/11/24 2246)   clopidogrel (PLAVIX) tablet 75 mg (has no administration in time range)   lactobacillus rhamnosus (GG) (CULTURELL) capsule 1 capsule (has no administration in time range)   levothyroxine (SYNTHROID/LEVOTHROID) tablet 50 mcg (has no administration in time range)   losartan (COZAAR) tablet 50 mg (50 mg Oral $Given 12/11/24 2246)   spironolactone (ALDACTONE) tablet 25 mg (has no administration in time range)   lidocaine 1 % 0.1-1 mL (has no administration in time range)   lidocaine (LMX4) cream (has no administration in time range)   sodium chloride (PF) 0.9% PF flush 3 mL (3 mLs Intracatheter $Given 12/11/24 2249)   sodium chloride (PF) 0.9% PF flush 3 mL (has no administration in time range)   senna-docusate (SENOKOT-S/PERICOLACE) 8.6-50 MG per tablet 1 tablet (has no administration in time range)     Or   senna-docusate (SENOKOT-S/PERICOLACE) 8.6-50 MG per tablet 2 tablet (has no administration in time range)  "  acetaminophen (TYLENOL) tablet 650 mg (has no administration in time range)     Or   acetaminophen (TYLENOL) Suppository 650 mg (has no administration in time range)   polyethylene glycol (MIRALAX) Packet 17 g (has no administration in time range)   - MEDICATION INSTRUCTIONS - (has no administration in time range)   Continuing ACE inhibitor/ARB/ARNI from home medication list OR ACE inhibitor/ARB/ARNI order already placed during this visit (has no administration in time range)   Reason beta blocker not prescribed (has no administration in time range)   doxycycline monohydrate (MONODOX) capsule 100 mg (100 mg Oral $Given 12/11/24 2247)   glucose gel 15-30 g (has no administration in time range)     Or   dextrose 50 % injection 25-50 mL (has no administration in time range)     Or   glucagon injection 1 mg (has no administration in time range)   insulin glargine (LANTUS PEN) injection 28 Units (has no administration in time range)   insulin aspart (NovoLOG) injection (RAPID ACTING) (has no administration in time range)   insulin aspart (NovoLOG) injection (RAPID ACTING) (has no administration in time range)   iopamidol (ISOVUE-370) solution 75 mL (75 mLs Intravenous $Given 12/11/24 1905)   furosemide (LASIX) injection 60 mg (60 mg Intravenous $Given 12/11/24 2003)       NEW PRESCRIPTIONS STARTED AT TODAY'S ER VISIT  New Prescriptions    No medications on file          =================================================================    HPI    Patient information was obtained from: Patient, patient's sons    Use of Intrepreter: N/A       Janice Nowak is a 79 year old male with pertinent medical history of diabetic ulceration right heel into bone, ullceration right leg into subcutaneous tissue, acute osteomyelitis right calcaneus,     Per chart review, the patient had the TheraSkin graft applied to his right heel /foot on 12/4/2024 by Dr. Amol Patricio. It was noted \"debridement was done to reduce pressure, " "remove non-viable, devitalized tissue and promote wound healing. Patient tolerated this well. Due to the slow healing rate of the ulcer and the diagnosis of Pressure Ulcer due to increased pressure} of the Right heel and is free of infection, Theraskin was recommended and consent was obtained for the application. This is application # 1. There is no evidence of osteomyelitis. There is adequate blood flow for healing.\"    Per chart review, the patient had a CTA abdomen/pelvis done on 11/27/2024. This CT scan showed \"severe aortoiliac atherosclerotic disease with interval short segment occlusion of the left common iliac artery with distal reconstitution. Bilateral SFA disease proximally. Two-vessel runoff bilaterally with occlusion of the ALEX's. There is a heterogeneous mass in the central right hemiabdomen posterior to the duodenum and anterior to tzhe IVC. This appears distinct from the pancreatic head and liver, flow is concerning for neoplasm. In addition there is a 2.5 cm hepatic hypodensity which is incompletely evaluated on this exam. Abdominal MRI with contrast is recommended to further evaluate these findings. The abdominal mass would be amenable to endoscopic ultrasound-guided biopsy as indicated. Prominent retroperitoneal lymph nodes concerning for a metastatic process given the aforementioned findings. Partially visualized opacity along the right major fissure.\"    Patient reports he's been feeling short of breath, especially when he exerts himself the past couple of weeks (~3 weeks). Shortness of breath worsens when he lies down flat. He doesn't weight himself regularly. Notes he can't walk a few steps from his wheelchair to another chair or to his bed without feeling significantly short of breath. States when he mildly exerts himself, he needs about 4-5 minutes to recover secondary to difficulty breathing. He has a dry cough that is not productive. He denies having any congestion, fever, chills or chest " pain. He feels like his heart is racing intermittently, but he doesn't feel it is skipping beats. He noticed his right calf is more swollen than his LLE for months. He's been dealing with mobility issues and has been getting around by wheelchair to get from the chair to his bed.    Patient seen his PCP and was referred to the ED for concerns of a blood clot. He has had severe shortness of breath with exertion for last several weeks. No chest pain or leg pain. He has a wound vac to his right heel. He had a graft in his right heel. He follows up with Dr. Patricio.    He doesn't have any sharp, stabbing chest pain currently. Patient denies any dark, tarry or bloody stools. No history of blood clots. No history of HF. He is on Plavix. Patient lives alone but his sons helps him out. No other reported complaints or concerns at this time.      PAST MEDICAL HISTORY:  Past Medical History:   Diagnosis Date    Diabetes (H)     Hypertension     Sleep apnea     Thyroid disease        PAST SURGICAL HISTORY:  Past Surgical History:   Procedure Laterality Date    BACK SURGERY      BONE EXOSTOSIS EXCISION Right 10/24/2024    Procedure: PARTIAL CALCANECTOMY RIGHT FOOT;  Surgeon: Amol Patricio DPM;  [place] [facility] [place]    INCISION AND DRAINAGE FOOT, COMBINED Right 10/24/2024    Procedure: INCISION AND DRAINAGE;  Surgeon: Amol Patricio DPM;  [place] [facility] [place]    IRRIGATION AND DEBRIDEMENT FOOT, COMBINED Right 10/24/2024    Procedure: AND DEBRIDEMENT RIGHT HEEL,;  Surgeon: Amol Patricio DPM;  [place] [facility] [place]    ORTHOPEDIC SURGERY      PICC SINGLE LUMEN PLACEMENT  10/30/2024    VASCULAR SURGERY         CURRENT MEDICATIONS:    Prior to Admission Medications   Prescriptions Last Dose Informant Patient Reported? Taking?   Multiple Vitamins-Minerals (PRESERVISION AREDS 2 PO) 12/11/2024 Morning  Yes Yes   Sig: Take 2 capsules by mouth daily.   amoxicillin-clavulanate (AUGMENTIN) 500-125 MG  tablet 12/11/2024 Morning  No Yes   Sig: Take 1 tablet by mouth 2 times daily for 21 days.   aspirin 81 MG EC tablet 12/10/2024 Evening  Yes Yes   Sig: Take 81 mg by mouth daily.   atorvastatin (LIPITOR) 80 MG tablet 12/10/2024 Bedtime  Yes Yes   Sig: Take 80 mg by mouth at bedtime.   clopidogrel (PLAVIX) 75 MG tablet 12/11/2024 Morning  Yes Yes   Sig: Take 75 mg by mouth daily.   co-enzyme Q-10 100 MG CAPS capsule 12/11/2024 Morning  Yes Yes   Sig: Take 200 mg by mouth daily.   doxycycline hyclate (VIBRA-TABS) 100 MG tablet 12/11/2024 Morning  No Yes   Sig: Take 1 tablet (100 mg) by mouth 2 times daily for 21 days.   ertugliflozin (STEGLATRO) 5 MG TABS 12/11/2024 Morning  Yes Yes   Sig: Take 5 mg by mouth every morning.   insulin aspart (NOVOLOG FLEXPEN) 100 UNIT/ML pen Past Week  No Yes   Sig: Inject 5 Units subcutaneously 3 times daily (with meals). Novolog Flexpen: 5 units with breakfast, 5 units with lunch, 5 units with dinner.   insulin glargine (LANTUS PEN) 100 UNIT/ML pen 12/11/2024 Morning  No Yes   Sig: Inject 35 Units subcutaneously every morning.   lactobacillus rhamnosus, GG, (CULTURELL) capsule 12/11/2024 Morning  Yes Yes   Sig: Take 1 capsule by mouth daily.   levothyroxine (SYNTHROID/LEVOTHROID) 50 MCG tablet 12/11/2024 Morning  Yes Yes   Sig: Take 50 mcg by mouth daily.   losartan (COZAAR) 50 MG tablet 12/10/2024 Bedtime  Yes Yes   Sig: Take 50 mg by mouth at bedtime.   metFORMIN (GLUCOPHAGE XR) 500 MG 24 hr tablet 12/11/2024 Morning  Yes Yes   Sig: Take 1,000 mg by mouth 2 times daily (with meals).   spironolactone (ALDACTONE) 25 MG tablet 12/11/2024 Morning  Yes Yes   Sig: Take 25 mg by mouth daily.   vitamin C with B complex (B COMPLEX-C) tablet 12/11/2024 Morning  Yes Yes   Sig: Take 1 tablet by mouth daily.      Facility-Administered Medications: None       ALLERGIES:  Allergies   Allergen Reactions    Exenatide Unknown    Heparin Unknown    Liraglutide Unknown    Lisinopril Cough    Morphine  "Unknown       FAMILY HISTORY:  No family history on file.    SOCIAL HISTORY:  Social History     Tobacco Use    Smoking status: Former     Current packs/day: 0.00     Types: Cigarettes     Quit date:      Years since quittin.9     Passive exposure: Never    Smokeless tobacco: Never   Vaping Use    Vaping status: Never Used   Substance Use Topics    Alcohol use: Yes     Alcohol/week: 5.0 standard drinks of alcohol     Types: 5 Cans of beer per week    Drug use: Not Currently        VITALS:  Patient Vitals for the past 24 hrs:   BP Temp Temp src Pulse Resp SpO2 Height Weight   24 2251 (!) 140/66 97.4  F (36.3  C) Oral 78 24 90 % -- --   24 (!) 150/56 -- -- 81 21 -- -- --   24 -- -- -- 80 23 -- -- --   24 -- -- -- 85 21 -- -- --   24 (!) 168/71 -- -- 96 -- -- -- --   24 -- -- -- 84 19 94 % -- --   24 (!) 166/78 -- -- 85 23 90 % -- --   24 -- -- -- 81 24 -- -- --   24 -- -- -- 84 22 94 % -- --   24 -- -- -- 78 22 94 % -- --   24 1845 (!) 164/77 -- -- 80 -- 95 % -- --   24 1745 (!) 176/79 -- -- 82 -- 94 % -- --   24 1736 -- -- -- 84 21 94 % -- --   24 1711 (!) 163/68 -- -- 93 -- 93 % -- --   24 1424 (!) 140/70 97.9  F (36.6  C) Temporal 95 20 90 % 1.727 m (5' 8\") 97.1 kg (214 lb)       PHYSICAL EXAM    General Appearance: Chronically ill-appearing elderly male in no acute distress  Head:  Normocephalic  Eyes:  conjunctiva/corneas clear  ENT: membranes are moist without pallor  Neck:  Supple  Cardio:  Regular rate and rhythm, no murmur/gallop/rub  Pulm:  No respiratory distress, decreased in bilateral bases  Abdomen:  Soft, rotund, nontender  Extremities: Wound VAC to the right lower extremity.  There is mild swelling of the calf.  Unable to entirely visualize the skin of the calf due to associated wound VAC but no visualized erythema or warmth  Skin:  Skin warm, dry, no " monica  Neuro:  Alert and oriented ×3     RADIOLOGY/LABS:  Reviewed all pertinent imaging. Please see official radiology report. All pertinent labs reviewed and interpreted.    Results for orders placed or performed during the hospital encounter of 12/11/24   CT Chest Pulmonary Embolism w Contrast    Impression    IMPRESSION:  1.  No pulmonary embolism.    2.  Large right pleural effusion with moderate to large left pleural effusion. Bibasilar consolidation.    3.  Emphysema.    4.  Incomplete visualization of hypodense mass in the posterior right hepatic lobe with bulky portacaval lymphadenopathy suspicious for neoplasm.    5.  Subcarinal lymphadenopathy. Mild AP window lymphadenopathy with scattered additional subcentimeter mediastinal nodes.   Basic metabolic panel   Result Value Ref Range    Sodium 139 135 - 145 mmol/L    Potassium 4.3 3.4 - 5.3 mmol/L    Chloride 105 98 - 107 mmol/L    Carbon Dioxide (CO2) 25 22 - 29 mmol/L    Anion Gap 9 7 - 15 mmol/L    Urea Nitrogen 24.5 (H) 8.0 - 23.0 mg/dL    Creatinine 1.31 (H) 0.67 - 1.17 mg/dL    GFR Estimate 56 (L) >60 mL/min/1.73m2    Calcium 9.0 8.8 - 10.4 mg/dL    Glucose 104 (H) 70 - 99 mg/dL   Result Value Ref Range    Troponin T, High Sensitivity 120 (HH) <=22 ng/L   Nt probnp inpatient   Result Value Ref Range    N terminal Pro BNP Inpatient 3,933 (H) 0 - 1,800 pg/mL   D dimer quantitative   Result Value Ref Range    D-Dimer Quantitative 0.78 (H) 0.00 - 0.50 ug/mL FEU   CBC with platelets and differential   Result Value Ref Range    WBC Count 7.4 4.0 - 11.0 10e3/uL    RBC Count 3.89 (L) 4.40 - 5.90 10e6/uL    Hemoglobin 11.0 (L) 13.3 - 17.7 g/dL    Hematocrit 34.7 (L) 40.0 - 53.0 %    MCV 89 78 - 100 fL    MCH 28.3 26.5 - 33.0 pg    MCHC 31.7 31.5 - 36.5 g/dL    RDW 17.3 (H) 10.0 - 15.0 %    Platelet Count 143 (L) 150 - 450 10e3/uL    % Neutrophils 72 %    % Lymphocytes 16 %    % Monocytes 8 %    % Eosinophils 2 %    % Basophils 1 %    % Immature Granulocytes 2  %    NRBCs per 100 WBC 0 <1 /100    Absolute Neutrophils 5.4 1.6 - 8.3 10e3/uL    Absolute Lymphocytes 1.2 0.8 - 5.3 10e3/uL    Absolute Monocytes 0.6 0.0 - 1.3 10e3/uL    Absolute Eosinophils 0.1 0.0 - 0.7 10e3/uL    Absolute Basophils 0.0 0.0 - 0.2 10e3/uL    Absolute Immature Granulocytes 0.1 <=0.4 10e3/uL    Absolute NRBCs 0.0 10e3/uL   Result Value Ref Range    Troponin T, High Sensitivity 114 (HH) <=22 ng/L   Glucose by meter   Result Value Ref Range    GLUCOSE BY METER POCT 53 (L) 70 - 99 mg/dL   Glucose by meter   Result Value Ref Range    GLUCOSE BY METER POCT 57 (L) 70 - 99 mg/dL   Glucose by meter   Result Value Ref Range    GLUCOSE BY METER POCT 78 70 - 99 mg/dL   Hepatic panel   Result Value Ref Range    Protein Total 7.3 6.4 - 8.3 g/dL    Albumin 3.8 3.5 - 5.2 g/dL    Bilirubin Total 0.4 <=1.2 mg/dL    Alkaline Phosphatase 91 40 - 150 U/L    AST 38 0 - 45 U/L    ALT 31 0 - 70 U/L    Bilirubin Direct <0.20 0.00 - 0.30 mg/dL       EKG:  Performed at: 12/12/2024 16:15:05    Impression: Sinus rhythm. Right bundle branch block.    Rate: 98 BPM  Rhythm: Sinus  Axis: 109  WV Interval: 170 ms  QRS Interval: 146 ms  QTc Interval: 510 ms  ST Changes: No ST changes.  Comparison: When compared with ECG of 4/27/2013 19:22, Right bundle branch block is now present.  I have independently reviewed and interpreted the EKG(s) documented above.    The creation of this record is based on the scribe s observations of the work being performed by Anna Norris MD and the provider s statements to them. It was created on her behalf by Cece Norris, a trained medical scribe. This document has been checked and approved by the attending provider.    Anna Norris MD  Emergency Medicine  [facility] EMERGENCY DEPARTMENT  [address] [place] [id number] [phone]  Dept: [phone]      Anna Norris MD  12/11/24 7500

## 2024-12-12 NOTE — CONSULTS
[facility] Nurse Inpatient Assessment     Consulted for: R heel VAC    Summary: VAC dressing changed on Wednesday, next due tomorrow.    Patient History (according to provider note(s):    Chief Complaint  Dyspnea on exertion     History is obtained from the patient        History of Present Illness  Janice Nowak is a 79 year old male with PMHx of T2DM on insulin, PAD, CAD, HTN admitted on 12/11/2024 for several weeks of DIAS, concerning for CHF.       He reports several weeks of dyspnea on exertion.  He is wheelchair-bound at baseline due to a recent diabetic foot ulcer and osteomyelitis, requiring surgical interventions.  He has difficulty transferring in and out of his wheelchair and adjusting himself in bed.  Also has difficulty lying flat in bed.  He was being seen by his PCP today and informed her of this and how it has worsened.  She was concerned about PEs, which is why he was sent to the ED for further workup.     Patient denies fevers/chills, congestion/rhinorrhea, chest pain, N/V, abdominal pain, dysuria, diarrhea/constipation. He does report having a poor appetite since returning from the hospital.  Denies pain in his lower extremities.     Currently, following w/Dr. Gleason for mass found in his abdomen. Had a PET scan done recently. He's supposed to go back for a biopsy later this month.     Assessment:    VAC dressing intact, appropriate suction able to be maintained. Switched over to hospital pump now with admission to [facility].    Treatment Plan:   Wound location: R heel  Change Days: MWF  Supplies: Small black foam, oil emulsion gauze  Cleanse with: Saline, pat dry.  Suction: Continuous at -100 mmHg pressure.    Back up plan: If VAC malfunctions or unable to maintain seal: VAC dressing must be removed and reapplied within 2 hours of the incident. If floor staff is not able to reapply, place NS moistened gauze in wound bed and cover with appropriate dressing to keep  wound bed moist.   Change wet-to-dry dressing BID and notify WOC staff for reimplementation of VAC therapy when available.  Date canister. Chart canister output every shift.    The hospital VAC pump is not to be discharged with the patient.  Please do one of the following prior to discharge:  If a home VAC pump has been delivered, please apply the home pump to the dressing prior to patient discharge.    If the patient is discharging to LTAC or a TCU/SNF and there is a VAC pump waiting for the patient, close clamp and then disconnect the tubing for transfer, place tubing inside a glove.   If the patient is discharging to home or other facility and there is no VAC pump available for immediate placement, remove the VAC dressing and apply a wet-to-moist gauze dressing for transfer.    Orders: Written    RECOMMEND PRIMARY TEAM ORDER: None, at this time  Education provided: plan of care  Discussed plan of care with: Patient, Family, and Nurse  WOC nurse follow-up plan: Monday/WednesdayFriday  Notify WOC if wound(s) deteriorate.  Nursing to notify the Provider(s) and re-consult the WOC Nurse if new skin concern.    DATA:     Current support surface: Standard  Standard gel mattress (Isoflex)  Containment of urine/stool: Continent of bladder and Continent of bowel  BMI: Body mass index is 32.54 kg/m .   Active diet order: Orders Placed This Encounter      2 Gram Sodium Diet     Output: I/O last 3 completed shifts:  In: 120 [P.O.:120]  Out: 1900 [Urine:1900]     Labs:   Recent Labs   Lab 12/12/24  1008 12/12/24  0719   ALBUMIN  --  3.5   HGB  --  10.3*   INR 1.36*  --    WBC  --  6.8     Pressure injury risk assessment:   Sensory Perception: 3-->slightly limited  Moisture: 3-->occasionally moist  Activity: 2-->chairfast  Mobility: 3-->slightly limited  Nutrition: 3-->adequate  Friction and Shear: 3-->no apparent problem  Ramón Score: 17    Ela Vides MSN RN CWOCN  Pager no longer is use, please contact through [employer]ago    Avani group: [facility] [employer] [facility]

## 2024-12-12 NOTE — TELEPHONE ENCOUNTER
Patient had Theraskin applied 12/4, scheduled to see Dr. Patricio tomorrow for follow up however patient had been admitted to [facility] and is boarding in the ED for pleural effusions.    Mariaelena calling to see if Dr. Patricio will be able to see patient in the hospital or what instructions should be given to the nurses there?    Mariaelena care manager [phone]

## 2024-12-12 NOTE — H&P
[facility]    History and Physical - Hospitalist Service       Date of Admission:  12/11/2024    Assessment & Plan      Janice Nowak is a 79 year old male with PMHx of T2DM on insulin, PAD, CAD, HTN admitted on 12/11/2024 for several weeks of DIAS, concerning for CHF.  Of note, patient was recently on our service for diabetic foot ulcer and osteomyelitis s/p I&D, partial calcanectomy 10/24 to 11/1.    BL pleural effusion (R > L)  Concerns for CHF exacerbation  Patient presents with several weeks of progressively worsening dyspnea on exertion and orthopnea, concerning for DVT and PE. Initial workup notable for BNP 3933. CT PE negative, though did show large right pleural effusion, moderate to large left pleural effusion, and bibasilar consolidations.  WBC WNL, patient afebrile - low concerns for infection at this time. EKG on arrival negative for acute ischemic changes, trops 120 and 114 on repeat. ED provider curbsided cardiology who did not have concerns for ACS provoking possible CHF and did not recommend heparin, but would follow with patient in case angiography is needed. No known history of CAD, CHF, or arrhythmia. Patient does have several risk factors including diabetes, PAD, and HTN. Pulmonary effusions could also be related to hydrothorax given liver mass discussed below.   - Diuresis: s/p lasix 60mg one-time. Will adjust diuresis based in output.    - Continue PTA losartan 50mg d   - Continue PTA spironolactone   - Complete echo   - Strict I/Os  - Daily weights  - Cardiac tele  - Cardiac diet w/fluid restriction 1800mL  - Cards consult, recs appreciated  - Consider IR consult for thoracentesis w/fluid studies to confirm whether this is exudative vs transudative process.     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 Patient recently established w/Dr. Gleason at [facility] Heme/[facility]c. PET scan 12/6 concerning for R  "hepatic lobe neoplasm w/portacaval and retroperitoneal lymph node metastases. CT PE here reflective of this finding - stable, no new lesions noted. Abdomen is distended, fluid wave present, concerning for ascites.   -Added on hepatic panel, trend daily   -Patient reports having bx to be completed on 12/19  -Consider oncology consult     Hx of PAD  Diabetic foot ulcer and osteomyelitis s/p I&D and calcanectomy 10/24/2024  Hospitalized 10/24 - 11/1 for this. Wound vac still present w/boot. Patient denies fevers/chills and pain in BLE.   -Resume PTA abx course: augmentin 500-125mg BID and doxycycline 100mg BID (12/10 - 12/31 for 21d course)  -WOC consult for management of wound vac   -Resume ASA 81mg d + plavix 75mg d  -Resume PTA atorvastatin 80mg d    Chronic conditions:  T2DM  -Lantus 35 >> 28u qAM  -MDSS  -Hold PTA steglatro 5mg d  -Hold PTA metformin 1000mg Bid     HTN  CKD3a  -Resume PTA losartan 50mg d  -Resume PTA spironolactone 25mg d    Hypothyroidism  -Continue PTA levothyroxine 50mcg d            Diet: Combination Diet 2 gm NA Diet; No Caffeine Diet (and additional linked orders)  Fluid restriction 2000 ML FLUID (and additional linked orders)    DVT Prophylaxis: Pneumatic Compression Devices  Reyes Catheter: Not present  Lines: PRESENT             Cardiac Monitoring: ACTIVE order. Indication: Acute decompensated heart failure (48 hours)  Code Status: No CPR- Do NOT Intubate      Clinically Significant Risk Factors Present on Admission                 # Drug Induced Platelet Defect: home medication list includes an antiplatelet medication   # Hypertension: Noted on problem list          # DMII: A1C = 9.2 % (Ref range: <5.7 %) within past 6 months    # Obesity: Estimated body mass index is 32.54 kg/m  as calculated from the following:    Height as of this encounter: 1.727 m (5' 8\").    Weight as of this encounter: 97.1 kg (214 lb).       # Financial/Environmental Concerns:           Disposition Plan "     Medically Ready for Discharge: Anticipated in 2-4 Days         Patient's care to be discussed with primary medicine team in the morning.    Tim Murry MD  Hospitalist Service  [facility]  Securely message with [employer] (more info)  Text page via [employer] Paging/Directory     ______________________________________________________________________    Chief Complaint   Dyspnea on exertion    History is obtained from the patient    History of Present Illness   Janice Nowak is a 79 year old male with PMHx of T2DM on insulin, PAD, CAD, HTN admitted on 12/11/2024 for several weeks of DIAS, concerning for CHF.      He reports several weeks of dyspnea on exertion.  He is wheelchair-bound at baseline due to a recent diabetic foot ulcer and osteomyelitis, requiring surgical interventions.  He has difficulty transferring in and out of his wheelchair and adjusting himself in bed.  Also has difficulty lying flat in bed.  He was being seen by his PCP today and informed her of this and how it has worsened.  She was concerned about PEs, which is why he was sent to the ED for further workup.    Patient denies fevers/chills, congestion/rhinorrhea, chest pain, N/V, abdominal pain, dysuria, diarrhea/constipation. He does report having a poor appetite since returning from the hospital.  Denies pain in his lower extremities.    Currently, following w/Dr. Gleason for mass found in his abdomen. Had a PET scan done recently. He's supposed to go back for a biopsy later this month.       Past Medical History    Past Medical History:   Diagnosis Date    Diabetes (H)     Hypertension     Sleep apnea     Thyroid disease        Past Surgical History   Past Surgical History:   Procedure Laterality Date    BACK SURGERY      BONE EXOSTOSIS EXCISION Right 10/24/2024    Procedure: PARTIAL CALCANECTOMY RIGHT FOOT;  Surgeon: Amol Patricio DPM;  Location: [facility] OR    INCISION AND DRAINAGE FOOT, COMBINED Right  10/24/2024    Procedure: INCISION AND DRAINAGE;  Surgeon: Amol Patricio DPM;  Location: [facility] OR    IRRIGATION AND DEBRIDEMENT FOOT, COMBINED Right 10/24/2024    Procedure: AND DEBRIDEMENT RIGHT HEEL,;  Surgeon: Amol Patricio DPM;  Location: [facility] OR    ORTHOPEDIC SURGERY      PICC SINGLE LUMEN PLACEMENT  10/30/2024    VASCULAR SURGERY         Prior to Admission Medications   Prior to Admission Medications   Prescriptions Last Dose Informant Patient Reported? Taking?   Multiple Vitamins-Minerals (PRESERVISION AREDS 2 PO) 12/11/2024 Morning  Yes Yes   Sig: Take 2 capsules by mouth daily.   amoxicillin-clavulanate (AUGMENTIN) 500-125 MG tablet 12/11/2024 Morning  No Yes   Sig: Take 1 tablet by mouth 2 times daily for 21 days.   aspirin 81 MG EC tablet 12/10/2024 Evening  Yes Yes   Sig: Take 81 mg by mouth daily.   atorvastatin (LIPITOR) 80 MG tablet 12/10/2024 Bedtime  Yes Yes   Sig: Take 80 mg by mouth at bedtime.   clopidogrel (PLAVIX) 75 MG tablet 12/11/2024 Morning  Yes Yes   Sig: Take 75 mg by mouth daily.   co-enzyme Q-10 100 MG CAPS capsule 12/11/2024 Morning  Yes Yes   Sig: Take 200 mg by mouth daily.   doxycycline hyclate (VIBRA-TABS) 100 MG tablet 12/11/2024 Morning  No Yes   Sig: Take 1 tablet (100 mg) by mouth 2 times daily for 21 days.   ertugliflozin (STEGLATRO) 5 MG TABS 12/11/2024 Morning  Yes Yes   Sig: Take 5 mg by mouth every morning.   insulin aspart (NOVOLOG FLEXPEN) 100 UNIT/ML pen Past Week  No Yes   Sig: Inject 5 Units subcutaneously 3 times daily (with meals). Novolog Flexpen: 5 units with breakfast, 5 units with lunch, 5 units with dinner.   insulin glargine (LANTUS PEN) 100 UNIT/ML pen 12/11/2024 Morning  No Yes   Sig: Inject 35 Units subcutaneously every morning.   lactobacillus rhamnosus, GG, (CULTURELL) capsule 12/11/2024 Morning  Yes Yes   Sig: Take 1 capsule by mouth daily.   levothyroxine (SYNTHROID/LEVOTHROID) 50 MCG tablet 12/11/2024 Morning  Yes Yes    Sig: Take 50 mcg by mouth daily.   losartan (COZAAR) 50 MG tablet 12/10/2024 Bedtime  Yes Yes   Sig: Take 50 mg by mouth at bedtime.   metFORMIN (GLUCOPHAGE XR) 500 MG 24 hr tablet 12/11/2024 Morning  Yes Yes   Sig: Take 1,000 mg by mouth 2 times daily (with meals).   spironolactone (ALDACTONE) 25 MG tablet 12/11/2024 Morning  Yes Yes   Sig: Take 25 mg by mouth daily.   vitamin C with B complex (B COMPLEX-C) tablet 12/11/2024 Morning  Yes Yes   Sig: Take 1 tablet by mouth daily.      Facility-Administered Medications: None        Review of Systems    The 10 point Review of Systems is negative other than noted in the HPI or here.     Physical Exam   Vital Signs: Temp: 97.9  F (36.6  C) Temp src: Temporal BP: (!) 150/56 Pulse: 81   Resp: 21 SpO2: 94 % O2 Device: None (Room air)    Weight: 214 lbs 0 oz    Physical Exam  Constitutional:       General: He is not in acute distress.     Appearance: Normal appearance. He is not toxic-appearing.   HENT:      Head: Normocephalic and atraumatic.   Eyes:      General: No scleral icterus.  Cardiovascular:      Rate and Rhythm: Normal rate and regular rhythm.      Heart sounds: No murmur heard.     No friction rub. No gallop.   Pulmonary:      Effort: Pulmonary effort is normal. No respiratory distress.      Breath sounds: No wheezing, rhonchi or rales.      Comments: Diminished lung sounds bilaterally.   Abdominal:      General: There is distension.      Palpations: Abdomen is soft. There is no mass.      Tenderness: There is no abdominal tenderness. There is no rebound.      Comments: Fluid wave present.   Musculoskeletal:      Right lower leg: Edema present.      Left lower leg: Edema present.      Comments: Boot w/bandage wrapping and wound vac on RLE.    Skin:     General: Skin is warm and dry.   Neurological:      General: No focal deficit present.      Mental Status: He is alert and oriented to person, place, and time.   Psychiatric:         Mood and Affect: Mood normal.          Behavior: Behavior normal.           Medical Decision Making       ------------------ MEDICAL DECISION MAKING ------------------------------------------------------------------------------------------------------      Data     I have personally reviewed the following data over the past 24 hrs:    7.4  \   11.0 (L)   / 143 (L)     139 105 24.5 (H) /  108 (H)   4.3 25 1.31 (H) \     ALT: 31 AST: 38 AP: 91 TBILI: 0.4   ALB: 3.8 TOT PROTEIN: 7.3 LIPASE: N/A     Trop: 114 (HH) BNP: 3,933 (H)     INR:  N/A PTT:  N/A   D-dimer:  0.78 (H) Fibrinogen:  N/A       Imaging results reviewed over the past 24 hrs:   Recent Results (from the past 24 hours)   CT Chest Pulmonary Embolism w Contrast    Narrative    EXAM: CT CHEST PULMONARY EMBOLISM W CONTRAST  LOCATION: [facility]  DATE: 12/11/2024    INDICATION: SOB.  COMPARISON: None.  TECHNIQUE: CT chest pulmonary angiogram during arterial phase injection of IV contrast. Multiplanar reformats and MIP reconstructions were performed. Dose reduction techniques were used.   CONTRAST: 75 mL Isovue 370.    FINDINGS:  ANGIOGRAM CHEST: Pulmonary arteries are normal caliber and negative for pulmonary emboli. Thoracic aorta is negative for dissection. No CT evidence of right heart strain.    LUNGS AND PLEURA: Large right pleural effusion with moderate to large left pleural effusion. Emphysema. Consolidation within both lower lobes. Tiny calcified granuloma in the right upper lobe. Subpleural nodularity in the left upper lobe.    MEDIASTINUM/AXILLAE: Subcarinal lymphadenopathy. AP window lymph node enlargement.    CORONARY ARTERY CALCIFICATION: Severe.    UPPER ABDOMEN: Incomplete visualization of hypodense mass in the posterior right hepatic lobe with bulky portacaval lymphadenopathy.    MUSCULOSKELETAL: Normal.      Impression    IMPRESSION:  1.  No pulmonary embolism.    2.  Large right pleural effusion with moderate to large left pleural effusion.  Bibasilar consolidation.    3.  Emphysema.    4.  Incomplete visualization of hypodense mass in the posterior right hepatic lobe with bulky portacaval lymphadenopathy suspicious for neoplasm.    5.  Subcarinal lymphadenopathy. Mild AP window lymphadenopathy with scattered additional subcentimeter mediastinal nodes.

## 2024-12-12 NOTE — PLAN OF CARE
ALLERGY & IMMUNOLOGY CLINIC - INITIAL CONSULTATION      HISTORY OF PRESENT ILLNESS     Patient ID: Olinda Lombardi is a 48 y.o. female    CC: recurrent rash    HPI: Olinda Lombardi is a 48 y.o. female with cystic fibrosis, DM, and psoriasis, presenting for recurrent rash.   Patient was referred by Nitza Romero MD (ED).     Patient was last seen in [facility] allergy clinic by Dr. Alas in 5/2021.     She says she was changing her clothes one day, looked down at her thigh and noticed rash. She went to urgent care, got steroid shot, which she felt masked it. She says it then went to her throat, sore throat. Then she started thinking it might be her trikafta (her CF med she had been on for 4 years). So she stopped it for about 4 days (respiratory symptoms worsened). She then went to her pcp clinic. She wanted a urinalysis, but says they wouldn't order it. So she went to ED. She says her throat pain was starting to improve. She says her hands started itching after taking her adderall (with yellow dye). Then she was convinced that its yellow dye causing the rash. She says the rash spread to her stomach, neck, chest (despite not taking her medications). Then she thought it is [employer] causing the rash. She says she called [employer]. She says she is wondering if its chestnut. Then she thought maybe milk allergy. She says then she asked chat GPT. The other day, she got [employer] again. Rash again. She doesn't know how long it takes for the rash to start because she would have to look for it (doesn't notice it without seeing it), but says it could have been as soon as an hour. She says the rash lasts about 1.5 days. The rash usually isn't pruritic.   She tested negative for strep.     At [facility], she gets a caramel ribbon crunch cappuccino, with coconut milk and dairy whip cream.    She says the rash is always on the right thigh, a little on the left.   She thinks she has had the rash 7 or 8 times. She has  Goal Outcome Evaluation:      Plan of Care Reviewed With: patient          Outcome Evaluation: Back to Independent Living apartment with resumption of Home Care services at discharge.       currently been rash free for a couple of days.     When asked about GI symptoms, she says she has diarrhea at baseline. No vomiting with the rash. No respiratory symptoms other than when she was off her trikafta.     She says the throat pain almost felt more musculoskeletal.      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    Marginal corneal ulcer, left eye    Cystic fibrosis    Pancreatic insufficiency due to cystic fibrosis    Insomnia    Anxiety    Attention deficit disorder    Screening for malignant neoplasm of colon    Psoriasis    Body aches    Fatigue    SOB (shortness of breath)    Iron deficiency anemia due to chronic blood loss    Marital stress     Surgical Hx:   Past Surgical History:   Procedure Laterality Date    ADENOIDECTOMY      COLONOSCOPY N/A 12/18/2018    Procedure: COLONOSCOPY;  Surgeon: Ernestina Onofre MD;  Location: [facility];  Service: Endoscopy;  Laterality: N/A;    ESOPHAGOGASTRODUODENOSCOPY N/A 12/18/2018    Procedure: ESOPHAGOGASTRODUODENOSCOPY (EGD);  Surgeon: Ernestina Onofre MD;  Location: [facility];  Service: Endoscopy;  Laterality: N/A;    SINUS SURGERY       Medications:   Current Outpatient Medications on File Prior to Visit   Medication Sig Dispense Refill    ALPRAZolam (XANAX) 0.5 MG tablet Take 1-2 tablets by mouth at bedtime as needed for anxiety 60 tablet 2    ALPRAZolam (XANAX) 0.5 MG tablet Take 1 to 2 tablets by mouth at bedtime as needed for anxiety. 60 tablet 0    ALPRAZolam (XANAX) 0.5 MG tablet Take 1-2 tablet by mouth at bedtime as needed for anxiety 60 tablet 0    ALPRAZolam (XANAX) 0.5 MG tablet Take 1-2 tablet by mouth at bedtime as needed for anxiety 60 tablet 2    ARIPiprazole (ABILIFY)  2 MG Tab Take 1 tablet by mouth once a day 30 tablet 0    bisacodyL (DULCOLAX) 5 mg EC tablet As directed      bismuth subsalicylate (PINK BISMUTH ORAL) Take by mouth.      blood sugar diagnostic Strp To check BG 10 times daily 300 strip 11    blood-glucose meter kit To check BG 10  times daily, to use with insurance preferred meter 1 each 0    blood-glucose sensor (FREESTYLE SANTI 3 SENSOR) Delmy 2 each by Misc.(Non-Drug; Combo Route) route every 14 (fourteen) days. 2 each 11    chlorhexidine (PERIDEX) 0.12 % solution Swish for 30 seconds with 15 mL (one capful) of undiluted oral rinse after toothbrushing, then expectorate; repeat twice daily (morning and evening) until symptoms resolve. Use in addition to regular dental prophylaxis (cleaning). 473 mL 0    cholecalciferol, vitamin D3, 1,250 mcg (50,000 unit) capsule Take 50,000 Units by mouth.      cyanocobalamin (VITAMIN B-12) 1000 MCG tablet Take 100 mcg by mouth once daily.      dextroamphetamine-amphetamine (ADDERALL) 10 mg Tab Take 1 tablet by mouth once a day as needed 30 tablet 0    dextroamphetamine-amphetamine (ADDERALL) 10 mg Tab Take 1 tablet by mouth once a day as needed 30 tablet 0    dextroamphetamine-amphetamine (ADDERALL) 10 mg Tab Take 1 tablet by mouth three times daily begin on awakening. 90 tablet 0    dextroamphetamine-amphetamine (ADDERALL) 15 mg tablet Take 1 Tablet by mouth twice a day begin on awakening. 60 tablet 0    dextroamphetamine-amphetamine (ADDERALL) 15 mg tablet Take 1 Tablet by mouth Twice a day beginning on awakening 60 tablet 0    dextroamphetamine-amphetamine (ADDERALL) 5 mg Tab Take 1 tablet by mouth once a day as needed in the afternoon 30 tablet 0    dextroamphetamine-amphetamine (MYDAYIS) 37.5 mg CT24 Take 1 capsule by mouth once a day 5 each 0    dextroamphetamine-amphetamine (MYDAYIS) 50 mg CT24 Take 1 capsule by mouth once a day 30 each 0    dextroamphetamine-amphetamine (MYDAYIS) 50 mg CT24 Take 1 capsule by mouth once  "a day 30 each 0    famotidine-calcium carbonate-magnesium hydroxide (PEPCID COMPLETE) chewable tablet Take 1 tablet by mouth once daily.      ferrous sulfate 325 (65 FE) MG EC tablet Take 325 mg by mouth once daily.      flash glucose scanning reader (FREESTYLE SANTI 14 DAY READER) Misc 1 each by Misc.(Non-Drug; Combo Route) route once daily. 1 each 0    insulin aspart, niacinamide, (FIASP FLEXTOUCH U-100 INSULIN) 100 unit/mL (3 mL) InPn Inject 10 Units into the skin 3 (three) times daily with meals. 5 pen 11    levocetirizine (XYZAL) 5 MG tablet Take 5 mg by mouth daily as needed.      lipase-protease-amylase (ZENPEP) 40,000-126,000- 168,000 unit CpDR take 4 capsules by mouth with meals and 2 capsules with snacks for 30 days 420 capsule 2    lipase-protease-amylase (ZENPEP) 40,000-126,000- 168,000 unit CpDR Take 4-5 capsules by mouth with meals 3 times a day, also, take 2-3 capsules by mouth with snacks 3 times a day. 700 capsule 12    loteprednol (LOTEMAX) 0.5 % ophthalmic suspension Place 1 drop into the left eye 4 (four) times daily. 5 mL 0    magnesium oxide (MAG-OX) 250 mg magnesium Tab Take by mouth once.      pantoprazole (PROTONIX) 40 MG tablet Take 1 tablet (40 mg total) by mouth once daily. 30 tablet 0    pen needle, diabetic (NOVOFINE 32) 32 gauge x 1/4" Ndle Inject 1 each into the skin 3 (three) times daily before meals. 100 each 11    sertraline (ZOLOFT) 100 MG tablet Take 2.5 tablet by mouth once a day 225 tablet 0    sertraline (ZOLOFT) 100 MG tablet Take 2.5 tablet by mouth once a day 75 tablet 0    sertraline (ZOLOFT) 100 MG tablet Take 2.5 tablets (250 mg total) by mouth once daily. 13 tablet 0    sertraline (ZOLOFT) 100 MG tablet Take 2 tablets by mouth once a day. Take in addition to 50 mg tablet. 60 tablet 0    sertraline (ZOLOFT) 100 MG tablet Take 2 tablet by mouth once a day In addition to 50mg tablet 60 tablet 2    sertraline (ZOLOFT) 100 MG tablet Take 2 tablets by mouth once daily 180 " "tablet 0    sertraline (ZOLOFT) 50 MG tablet Take 1 tablet by mouth once daily 90 tablet 0    tobramycin-dexAMETHasone 0.3-0.1% (TOBRADEX) 0.3-0.1 % DrpS Place 1 drop into the left eye 3 (three) times daily.      traZODone (DESYREL) 100 MG tablet Take 1 tablet by mouth nightly as needed.      triamcinolone acetonide 0.1% (KENALOG) 0.1 % ointment Apply topically 2 (two) times daily. 30 g 0    TRIKAFTA 100-50-75 mg(d) /150 mg (n) TbSQ TAKE 2 ORANGE TABLETS IN THE MORNING AND 1 BLUE TABLET IN THE EVENING APPROXIMATELY 12 HOURS APART. TAKE WITH FAT-CONTAINING FOOD 4 tablet 11    ZENPEP 40,000-126,000- 168,000 unit CpDR TAKE 4 CAPSULES THREE TIMES A DAY WITH MEALS 1100 capsule 3     No current facility-administered medications on file prior to visit.     Drug Allergies:   Review of patient's allergies indicates:   Allergen Reactions    Milk containing products (dairy) Diarrhea    Penicillin     Soy Diarrhea    [place]      Social Hx:   Social History     Tobacco Use    Smoking status: Never     Passive exposure: Never    Smokeless tobacco: Never   Substance Use Topics    Alcohol use: Not Currently     Comment: 1-2 drinks a week     Drug use: No     Family Hx:   Family History   Problem Relation Name Age of Onset    Glaucoma Mother      Diabetes Mother      Cataracts Father      Heart disease Father      Macular degeneration Maternal Aunt      Breast cancer Maternal Grandmother      Colon cancer Neg Hx      Ovarian cancer Neg Hx      Celiac disease Neg Hx      Crohn's disease Neg Hx      Esophageal cancer Neg Hx      Inflammatory bowel disease Neg Hx      Irritable bowel syndrome Neg Hx      Liver cancer Neg Hx      Rectal cancer Neg Hx      Stomach cancer Neg Hx      Ulcerative colitis Neg Hx      Amblyopia Neg Hx      Blindness Neg Hx      Retinal detachment Neg Hx      Strabismus Neg Hx        PHYSICAL EXAM     VS: /83 (BP Location: Left arm, Patient Position: Sitting)   Pulse 84   Ht 5' 1" (1.549 m)   Wt " 60.7 kg (133 lb 13.1 oz)   LMP 11/09/2024   SpO2 99%   BMI 25.28 kg/m²   GENERAL: Alert, NAD, well-appearing  EYES: EOMI, no conjunctival injection, no discharge, no infraorbital shiners  NOSE: NT 2+ B/L, no stringing mucus, no polyps visualized  ORAL: MMM, no ulcers, no thrush  LUNGS: CTAB, no w/r/c, no increased WOB  HEART: RRR, normal S1/S2, no m/g/r  DERM: no rashes  NEURO: normal speech, normal gait, no facial asymmetry     LABORATORY STUDIES     Component      Latest Ref Rng 6/27/2024 11/11/2024   WBC      3.90 - 12.70 K/uL 5.83  4.44    RBC      4.00 - 5.40 M/uL 4.13  3.57 (L)    Hemoglobin      12.0 - 16.0 g/dL 12.4  10.9 (L)    Hematocrit      37.0 - 48.5 % 37.1  32.1 (L)    MCV      82 - 98 fL 90  90    MCH      27.0 - 31.0 pg 30.0  30.5    MCHC      32.0 - 36.0 g/dL 33.4  34.0    RDW      11.5 - 14.5 % 12.6  12.7    Platelet Count      150 - 450 K/uL 355  257    MPV      9.2 - 12.9 fL 9.7  8.9 (L)    Immature Granulocytes      0.0 - 0.5 %  0.5    Gran # (ANC)      1.8 - 7.7 K/uL  3.0    Immature Grans (Abs)      0.00 - 0.04 K/uL  0.02    Lymph #      1.0 - 4.8 K/uL  1.0    Mono #      0.3 - 1.0 K/uL  0.4    Eos #      0.0 - 0.5 K/uL  0.1    Baso #      0.00 - 0.20 K/uL  0.03    Differential Method  Automated    Sodium      136 - 145 mmol/L 138  138    Potassium      3.5 - 5.1 mmol/L 4.6  3.8    Chloride      95 - 110 mmol/L 104  106    CO2      23 - 29 mmol/L 25  27    Glucose      70 - 110 mg/dL 194 (H)  176 (H)    BUN      6 - 20 mg/dL 13  8    Creatinine      0.5 - 1.4 mg/dL 0.8  0.7    Calcium      8.7 - 10.5 mg/dL 10.1  8.7    PROTEIN TOTAL      6.0 - 8.4 g/dL 7.7  6.1    Albumin      3.5 - 5.2 g/dL 3.9  3.0 (L)    BILIRUBIN TOTAL      0.1 - 1.0 mg/dL 0.3  0.3    ALP      40 - 150 U/L 122  97    AST      10 - 40 U/L 23  15    ALT      10 - 44 U/L 38  15    eGFR      >60 mL/min/1.73 m^2 >60.0  >60.0    Anion Gap      8 - 16 mmol/L 9  5 (L)    HIV 1/2 Ag/Ab      Non-reactive   Non-reactive     Hepatitis C Ab      Non-reactive   Non-reactive       ALLERGEN TESTING     Immunocaps: ordered     IMAGING & OTHER DIAGNOSTICS     CXR 3/8/23:  FINDINGS:  Normal heart size.  Normal pulmonary vasculature.  No focal infiltrates.  No pleural effusions or pneumothorax.  Midline tracheal air column.  No acute bony abnormalities.  Mild thoracolumbar dextrocurvature.  Impression:  No active cardiac or pulmonary findings     CHART REVIEW     Reviewed ED note, urgent care note, old allergy note, labs, imaging.     ASSESSMENT & PLAN     Olinda Lombardi is a 48 y.o. female with     # Recurrent rash: Started early November, usually not pruritic, lasts about 1.5 days, and has happened 7-8 times. Photos show erythematous papules and plaques. Etiology unclear. Possibly related to her psoriasis. Appearance of rash, duration of rash, and lack of pruritus isn't consistent with urticaria. Appearance and lack of pruritus isn't consistent with atopic dermatitis. Lack of pruritus also makes allergic contact dermatitis unlikely (but not ruling out as possibility). Drug eruption is possibility, but given the intermittent nature of rash, would assume its a medication taken intermittently prn (and patient denies taking any medications like this). Counseled that appearance and duration aren't consistent with food allergy, but patient has made a correlation with her [facility] visits, so will test for cow's milk and coconut (although even if results positive, suspicion for food allergy would still be very low). Patient wanted additional food allergy testing due to long-standing GI problems, but explained that this would be unhelpful as it only evaluates for IgE-mediated food allergy (not food intolerances) and false positives are common.   -as I am unsure of etiology of the rash, recommending that patient see dermatology (referral has already been placed by another provider).  -immunocaps for cow's milk and coconut ordered. Will  communicate results through the portal.      Follow up: as needed    I spent a total of 80 minutes on the day of the visit.  This includes face to face time and non-face to face time preparing to see the patient (eg, review of tests), obtaining and/or reviewing separately obtained history, documenting clinical information in the electronic or other health record.    Angelita Dai MD  Allergy/Immunology

## 2024-12-12 NOTE — PROGRESS NOTES
[facility]    Progress Note - Hospitalist Service       Date of Admission:  12/11/2024    Assessment & Plan   Janice Nowak is a 79 year old male with PMHx of T2DM on insulin, PAD, CAD, HTN admitted on 12/11/2024 for several weeks of DIAS and LE edema. Concerning for new diagnosis of heart failure, work up in progress. Patient recently admitted for diabetic foot ulcer and osteomyelitis s/p ID and partial calcanectomy 10/24 - 11/1. Also recently incidentally identified hepatic lobe mass noted on US and confirmed on CT A/P 11/27; concerning for metastatic disease and planning for biopsy on 12/19.     Acute Respiratory Distress   Pleural Effusion   Presenting with dyspnea on exertion for multiple weeks. Was seen by PCP who recommended he be evaluated in the emergency department for DVT and PE rule out. CT-PE negative for PE but did show notable bilateral pleural effusion. No prior diagnosis of heart failure, but certainly high risk with known diagnoses of T2DM, CAD, PAD. BNP elevated to nearly 4000. Obtaining echo. Also certainly concern for malignancy leading to pleural effusion given recently identified liver mass.   - Cardiology consulted    - echo pending    - continue diuretics: IV lasix 60 mg BID    - GDMT pending echo results   - Continue cardiac telemetry   - Thoracentesis    - fluid studies pending     LE Edema, R>L   Presenting with dyspnea on exertion for multiple weeks. Was seen by PCP who recommended he be evaluated in the emergency department for DVT and PE rule out. CT PE negative. With persistent LE edema today (right>left) and recent surgery, will obtain LE US for DVT rule out.   - Bilateral LE US for DVT rule out   - Continue diuresis as above     Peripheral Artery Disease   Right diabetic foot ulcer and osteomyelitis s/p I&D and calcanectomy 10/24/2024  Hospitalized 10/24 - 11/1 for the above. Wound vac still present and had been on IV antibiotics up until recently.  Transitioned to oral antibiotics which will be continued on this admission. There was a plan for LE angiogram 12/12/24 - will reschedule this given patient is currently being worked up for heart failure and not medically optimized for surgical intervention.   - Podiatry consult   - Continue PTA antibiotics course: Augmentin 500-125mg BID and doxycycline 100mg BID (through 2/31 for 21 day course)  - WOC consult  - Continue ASA 81 mg, plavix 75 mg, atorvastatin 80mg daily   - Reschedule angiogram following discharge   - Bilateral LE US for DVT rule out, see above      Right hepatic lobe mass   Incidentally noted on US and confirmed on CT A/P 11/27. 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 CT PE on admission reveals stable disease. Has established with outpatient heme/onc. No further work up needed at this time, but probable malignancy raises concern for metastatic disease contributing to new pleural effusion - see above. Question for mild abdominal distension, but with no ascites called out on various recent imaging studies will hold off on pursuing paracentesis/   - Continue following with heme/onc on outpatient basis    - plan for liver biopsy 12/19   - Daily CMP   - Consider paracentesis if abdominal distension worsens      Chronic conditions:  T2DM  - Lantus 28 units qAM   - MDSS  - Hold PTA steglatro and metformin     HTN  CKD3a  - PTA losartan 50 mg + spironolactone 25 mg daily      Hypothyroidism  - PTA levothyroxine 50 mcg daily           Diet: Fluid restriction 1800 ML FLUID (and additional linked orders)  2 Gram Sodium Diet    DVT Prophylaxis: Enoxaparin (Lovenox) SQ  Reyes Catheter: Not present  Fluids: None - fluids restriction   Lines: None     Cardiac Monitoring: ACTIVE order. Indication: Acute decompensated heart failure (48 hours)  Code Status: No CPR- Do NOT Intubate      Clinically Significant Risk Factors Present on Admission     "            # Coagulation Defect: INR = 1.36 (Ref range: 0.85 - 1.15) and/or PTT = N/A, will monitor for bleeding  # Drug Induced Platelet Defect: home medication list includes an antiplatelet medication   # Hypertension: Noted on problem list      # Anemia: based on hgb <11      # DMII: A1C = 9.2 % (Ref range: <5.7 %) within past 6 months    # Obesity: Estimated body mass index is 32.54 kg/m  as calculated from the following:    Height as of this encounter: 1.727 m (5' 8\").    Weight as of this encounter: 97.1 kg (214 lb).         # Financial/Environmental Concerns: none         Social Drivers of Health   Housing Stability: High Risk (10/28/2024)    Housing Stability     Do you have housing? : No     Are you worried about losing your housing?: No   Tobacco Use: Medium Risk (12/9/2024)    Patient History     Smoking Tobacco Use: Former     Smokeless Tobacco Use: Never     Passive Exposure: Never   Interpersonal Safety: Low Risk  (10/28/2024)    Interpersonal Safety     Do you feel physically and emotionally safe where you currently live?: Yes     Within the past 12 months, have you been hit, slapped, kicked or otherwise physically hurt by someone?: No     Within the past 12 months, have you been humiliated or emotionally abused in other ways by your partner or ex-partner?: No   Recent Concern: Interpersonal Safety - High Risk (10/9/2024)    Interpersonal Safety     Do you feel physically and emotionally safe where you currently live?: No     Within the past 12 months, have you been hit, slapped, kicked or otherwise physically hurt by someone?: No     Within the past 12 months, have you been humiliated or emotionally abused in other ways by your partner or ex-partner?: No         Disposition Plan     Medically Ready for Discharge: Anticipated in 2-4 Days         The patient's care was discussed with the Attending Physician, Dr. Louie  .    Heather Brewer MD  Hospitalist [facility]  Securely message with [employer] (more info)  Text page via [facility] Paging/Directory   ______________________________________________________________________    Interval History   Doing well. Is comfortable on room air while seated/lying down. Agrees to heart failure work up. Reviewed recent history leading up to hospitalization, stating his SOB has been present for weeks and he came in because his primary doctor told him to.     Physical Exam   Vital Signs: Temp: 98  F (36.7  C) Temp src: Oral BP: (!) 149/69 Pulse: 85   Resp: 20 SpO2: 91 % O2 Device: None (Room air)    Weight: 214 lbs 0 oz  General: Sitting comfortably. No acute distress. On room air. Pleasant and cooperative.   HEENT: Conjunctivae are clear, nonicteric.   Respiratory: No respiratory distress. Poor air movement, but no crackles.   Cardiac: RRR. No murmurs appreciated.   Abdominal: Abdomen is soft and non-tender. Some concern for distension, patient states this is baseline.   Extremities: Right LE with wound vac in place. Mild LE edema, R>L.   Psychiatric: Good insight. Engaged in care plan.       Medical Decision Making   Please see A&P for additional details of medical decision making.      Data   ------------------------- PAST 24 HR DATA REVIEWED -----------------------------------------------

## 2024-12-12 NOTE — TELEPHONE ENCOUNTER
Call back to brandt Ferrell message regarding pt being seen inpatient. Message sent to Dr. Brewer, [facility], she will place consult order.    Amanda Skelton RN, CWOCN

## 2024-12-12 NOTE — PLAN OF CARE
Problem: Comorbidity Management  Goal: Blood Glucose Levels Within Targeted Range  Outcome: Progressing  Goal: Maintenance of Heart Failure Symptom Control  Outcome: Progressing     Problem: Gas Exchange Impaired  Goal: Optimal Gas Exchange  Outcome: Progressing  Intervention: Optimize Oxygenation and Ventilation  Recent Flowsheet Documentation  Taken 12/12/2024 0800 by Greg Teixeira, RN  Head of Bed (HOB) Positioning: HOB at 20-30 degrees   Goal Outcome Evaluation:       Pt AxOx4 on room air. Denies pain and nausea. RLE wound dressing and vac changed by WOC RN to hospital vac. Pulses intact. SR w/ BBB on tele. Hypoglycemic in the morning at start of shift, orange juice and breakfast given, blood sugar back to normal at recheck. LS diminished. Thoracentesis done and BLE US done, no DVT. Pt desatting intermittently down to 88 percent on room air for a few seconds after procedures done, placed on 2L NC.

## 2024-12-12 NOTE — TELEPHONE ENCOUNTER
Caller: Mariaelena nurse [facility]     Provider: CLYDE Patricio    Detailed reason for call: Would like a call back to discuss patient's skin graft and follow up plan; patient is currently waiting to be admitted to [facility] and was supposed to have an appointment with Dr. Patricio tomorrow.     Best phone number to contact: [phone]    Best time to contact: Any time    Ok to leave a detailed message: Yes    Ok to speak to authorized person if needed: No      (Noted to patient if reason is related to wound or incision, to please send a photo via email or [employer].)

## 2024-12-13 ENCOUNTER — APPOINTMENT (OUTPATIENT)
Dept: PHYSICAL THERAPY | Facility: HOSPITAL | Age: 79
DRG: 291 | End: 2024-12-13
Payer: COMMERCIAL

## 2024-12-13 ENCOUNTER — TELEPHONE (OUTPATIENT)
Dept: CARDIOLOGY | Facility: CLINIC | Age: 79
End: 2024-12-13
Payer: COMMERCIAL

## 2024-12-13 DIAGNOSIS — I50.9 ACUTE DECOMPENSATED HEART FAILURE (H): Primary | ICD-10-CM

## 2024-12-13 LAB
ALBUMIN SERPL BCG-MCNC: 3.4 G/DL (ref 3.5–5.2)
ALP SERPL-CCNC: 83 U/L (ref 40–150)
ALT SERPL W P-5'-P-CCNC: 27 U/L (ref 0–70)
ANION GAP SERPL CALCULATED.3IONS-SCNC: 9 MMOL/L (ref 7–15)
AST SERPL W P-5'-P-CCNC: 33 U/L (ref 0–45)
ATRIAL RATE - MUSE: 98 BPM
BILIRUB SERPL-MCNC: 0.5 MG/DL
BUN SERPL-MCNC: 29.9 MG/DL (ref 8–23)
CALCIUM SERPL-MCNC: 8.7 MG/DL (ref 8.8–10.4)
CHLORIDE SERPL-SCNC: 102 MMOL/L (ref 98–107)
CREAT SERPL-MCNC: 1.62 MG/DL (ref 0.67–1.17)
DIASTOLIC BLOOD PRESSURE - MUSE: NORMAL MMHG
EGFRCR SERPLBLD CKD-EPI 2021: 43 ML/MIN/1.73M2
ERYTHROCYTE [DISTWIDTH] IN BLOOD BY AUTOMATED COUNT: 17.2 % (ref 10–15)
GLUCOSE BLDC GLUCOMTR-MCNC: 112 MG/DL (ref 70–99)
GLUCOSE BLDC GLUCOMTR-MCNC: 130 MG/DL (ref 70–99)
GLUCOSE BLDC GLUCOMTR-MCNC: 143 MG/DL (ref 70–99)
GLUCOSE BLDC GLUCOMTR-MCNC: 217 MG/DL (ref 70–99)
GLUCOSE BLDC GLUCOMTR-MCNC: 92 MG/DL (ref 70–99)
GLUCOSE SERPL-MCNC: 95 MG/DL (ref 70–99)
HCO3 SERPL-SCNC: 28 MMOL/L (ref 22–29)
HCT VFR BLD AUTO: 33.1 % (ref 40–53)
HGB BLD-MCNC: 10.3 G/DL (ref 13.3–17.7)
INTERPRETATION ECG - MUSE: NORMAL
MCH RBC QN AUTO: 27.8 PG (ref 26.5–33)
MCHC RBC AUTO-ENTMCNC: 31.1 G/DL (ref 31.5–36.5)
MCV RBC AUTO: 90 FL (ref 78–100)
P AXIS - MUSE: 26 DEGREES
PLATELET # BLD AUTO: 141 10E3/UL (ref 150–450)
POTASSIUM SERPL-SCNC: 4.2 MMOL/L (ref 3.4–5.3)
PR INTERVAL - MUSE: 170 MS
PROT SERPL-MCNC: 6.9 G/DL (ref 6.4–8.3)
QRS DURATION - MUSE: 146 MS
QT - MUSE: 400 MS
QTC - MUSE: 510 MS
R AXIS - MUSE: 109 DEGREES
RBC # BLD AUTO: 3.7 10E6/UL (ref 4.4–5.9)
SODIUM SERPL-SCNC: 139 MMOL/L (ref 135–145)
SYSTOLIC BLOOD PRESSURE - MUSE: NORMAL MMHG
T AXIS - MUSE: 32 DEGREES
VENTRICULAR RATE- MUSE: 98 BPM
WBC # BLD AUTO: 7.2 10E3/UL (ref 4–11)

## 2024-12-13 PROCEDURE — 250N000011 HC RX IP 250 OP 636

## 2024-12-13 PROCEDURE — 250N000013 HC RX MED GY IP 250 OP 250 PS 637

## 2024-12-13 PROCEDURE — 250N000013 HC RX MED GY IP 250 OP 250 PS 637: Performed by: INTERNAL MEDICINE

## 2024-12-13 PROCEDURE — 97162 PT EVAL MOD COMPLEX 30 MIN: CPT | Mod: GP

## 2024-12-13 PROCEDURE — 250N000013 HC RX MED GY IP 250 OP 250 PS 637: Performed by: FAMILY MEDICINE

## 2024-12-13 PROCEDURE — 84155 ASSAY OF PROTEIN SERUM: CPT

## 2024-12-13 PROCEDURE — G0463 HOSPITAL OUTPT CLINIC VISIT: HCPCS | Mod: 25

## 2024-12-13 PROCEDURE — 99233 SBSQ HOSP IP/OBS HIGH 50: CPT | Mod: GC

## 2024-12-13 PROCEDURE — 36415 COLL VENOUS BLD VENIPUNCTURE: CPT

## 2024-12-13 PROCEDURE — 120N000004 HC R&B MS OVERFLOW

## 2024-12-13 PROCEDURE — 250N000011 HC RX IP 250 OP 636: Mod: JZ | Performed by: INTERNAL MEDICINE

## 2024-12-13 PROCEDURE — P9047 ALBUMIN (HUMAN), 25%, 50ML: HCPCS | Mod: JZ | Performed by: INTERNAL MEDICINE

## 2024-12-13 PROCEDURE — 85027 COMPLETE CBC AUTOMATED: CPT

## 2024-12-13 PROCEDURE — 82962 GLUCOSE BLOOD TEST: CPT

## 2024-12-13 PROCEDURE — 84520 ASSAY OF UREA NITROGEN: CPT

## 2024-12-13 PROCEDURE — 97605 NEG PRS WND THER DME<=50SQCM: CPT

## 2024-12-13 PROCEDURE — 99233 SBSQ HOSP IP/OBS HIGH 50: CPT | Performed by: INTERNAL MEDICINE

## 2024-12-13 RX ORDER — ALBUMIN (HUMAN) 12.5 G/50ML
50 SOLUTION INTRAVENOUS ONCE
Status: COMPLETED | OUTPATIENT
Start: 2024-12-13 | End: 2024-12-13

## 2024-12-13 RX ORDER — TORSEMIDE 20 MG/1
20 TABLET ORAL DAILY
Status: DISCONTINUED | OUTPATIENT
Start: 2024-12-13 | End: 2024-12-14

## 2024-12-13 RX ADMIN — TORSEMIDE 20 MG: 20 TABLET ORAL at 08:49

## 2024-12-13 RX ADMIN — ALBUMIN HUMAN 50 G: 0.25 SOLUTION INTRAVENOUS at 13:14

## 2024-12-13 RX ADMIN — ATORVASTATIN CALCIUM 80 MG: 40 TABLET, FILM COATED ORAL at 21:10

## 2024-12-13 RX ADMIN — ASPIRIN 81 MG: 81 TABLET, COATED ORAL at 08:49

## 2024-12-13 RX ADMIN — CLOPIDOGREL BISULFATE 75 MG: 75 TABLET ORAL at 08:50

## 2024-12-13 RX ADMIN — AMOXICILLIN AND CLAVULANATE POTASSIUM 1 TABLET: 500; 125 TABLET, FILM COATED ORAL at 08:49

## 2024-12-13 RX ADMIN — Medication 1 CAPSULE: at 11:41

## 2024-12-13 RX ADMIN — FUROSEMIDE 60 MG: 10 INJECTION, SOLUTION INTRAMUSCULAR; INTRAVENOUS at 00:30

## 2024-12-13 RX ADMIN — LEVOTHYROXINE SODIUM 50 MCG: 0.03 TABLET ORAL at 09:21

## 2024-12-13 RX ADMIN — DOXYCYCLINE 100 MG: 100 CAPSULE ORAL at 21:10

## 2024-12-13 RX ADMIN — INSULIN ASPART 1 UNITS: 100 INJECTION, SOLUTION INTRAVENOUS; SUBCUTANEOUS at 11:42

## 2024-12-13 RX ADMIN — Medication 5 MG: at 21:19

## 2024-12-13 RX ADMIN — EMPAGLIFLOZIN 10 MG: 10 TABLET, FILM COATED ORAL at 08:52

## 2024-12-13 RX ADMIN — SPIRONOLACTONE 25 MG: 25 TABLET, FILM COATED ORAL at 08:50

## 2024-12-13 RX ADMIN — ENOXAPARIN SODIUM 40 MG: 40 INJECTION SUBCUTANEOUS at 14:44

## 2024-12-13 RX ADMIN — AMOXICILLIN AND CLAVULANATE POTASSIUM 1 TABLET: 500; 125 TABLET, FILM COATED ORAL at 21:10

## 2024-12-13 RX ADMIN — DOXYCYCLINE 100 MG: 100 CAPSULE ORAL at 08:50

## 2024-12-13 RX ADMIN — Medication 5 MG: at 01:55

## 2024-12-13 ASSESSMENT — ACTIVITIES OF DAILY LIVING (ADL)
ADLS_ACUITY_SCORE: 64

## 2024-12-13 NOTE — PROGRESS NOTES
[facility] Nurse Inpatient Assessment     Consulted for: R heel VAC    Summary: VAC dressing changed on Wednesday, next due tomorrow.    Patient History (according to provider note(s):    Chief Complaint  Dyspnea on exertion     History is obtained from the patient        History of Present Illness  Janice Nowak is a 79 year old male with PMHx of T2DM on insulin, PAD, CAD, HTN admitted on 12/11/2024 for several weeks of DIAS, concerning for CHF.       He reports several weeks of dyspnea on exertion.  He is wheelchair-bound at baseline due to a recent diabetic foot ulcer and osteomyelitis, requiring surgical interventions.  He has difficulty transferring in and out of his wheelchair and adjusting himself in bed.  Also has difficulty lying flat in bed.  He was being seen by his PCP today and informed her of this and how it has worsened.  She was concerned about PEs, which is why he was sent to the ED for further workup.     Patient denies fevers/chills, congestion/rhinorrhea, chest pain, N/V, abdominal pain, dysuria, diarrhea/constipation. He does report having a poor appetite since returning from the hospital.  Denies pain in his lower extremities.     Currently, following w/Dr. Gleason for mass found in his abdomen. Had a PET scan done recently. He's supposed to go back for a biopsy later this month.     Assessment:    Negative pressure wound therapy applied to: R heel     Last photo: 12/13/24   Patient has a home VAC already - family aware to bring back with a canister for discharge purposes.    Wound base: graft/stapled oil emulsion gauze     Palpation of the wound bed: normal       Drainage: scant      Volume in cannister: < 50 mL     Last cannister change date: 12/12/24     Description of drainage: serosanguinous      Measurements (length x width x depth, in cm) Approximately 7 cm x 5 cm     Tunneling N/A      Undermining N/A   Periwound skin: Intact       Color: normal and  consistent with surrounding tissue       Temperature: normal    Odor: none   Pain: denies    Pain intervention prior to dressing change: patient tolerated well and slow and gentle cares   Treatment goal: Maintain (prevention of deterioration), Protection, and graft adherence  STATUS: initial assessment this admission  Supplies ordered: ordered small black foam    Number of foam pieces removed from a wound (excluding foam for bridge) :  1 piece plus bridge GranuFoam Black   Verified this matched the number of foam pieces applied last dressing change: Yes   Number of foam pieces packed into wound (excluding foam for bridge) : 1 piece plus bridge GranuFoam Black     R lateral leg wound (near top boot strap) - chronic appearing wound measuring approximately 2 cm x 1.5 cm x 0.5 cm with pale pink wound bed, no drainage noted.    Treatment Plan:   Wound location: R heel  Change Days: T/F  Supplies: Small black foam, oil emulsion gauze  Cleanse with: Saline, pat dry.  Suction: Continuous at -100 mmHg pressure.    Back up plan: If VAC malfunctions or unable to maintain seal: VAC dressing must be removed and reapplied within 2 hours of the incident. If floor staff is not able to reapply, place NS moistened gauze in wound bed and cover with appropriate dressing to keep wound bed moist.   Change wet-to-dry dressing BID and notify C staff for reimplementation of VAC therapy when available.  Date canister. Chart canister output every shift.    The hospital VAC pump is not to be discharged with the patient.  Please do one of the following prior to discharge:  If a home VAC pump has been delivered, please apply the home pump to the dressing prior to patient discharge.    If the patient is discharging to LTAC or a TCU/SNF and there is a VAC pump waiting for the patient, close clamp and then disconnect the tubing for transfer, place tubing inside a glove.   If the patient is discharging to home or other facility and there is no VAC  pump available for immediate placement, remove the VAC dressing and apply a wet-to-moist gauze dressing for transfer.    RLE lateral calf wound - T/F  Cleanse with saline, gently dry.  Cut a piece of honey alginate to fit wound bed; cover with Mepilex.    Orders: Reviewed, Written, and Updated    RECOMMEND PRIMARY TEAM ORDER: None, at this time  Education provided: plan of care  Discussed plan of care with: Patient and Nurse  WO nurse follow-up plan: Tuesday/Friday  Notify WOC if wound(s) deteriorate.  Nursing to notify the Provider(s) and re-consult the WO Nurse if new skin concern.    DATA:     Current support surface: Standard  Standard gel mattress (Isoflex)  Containment of urine/stool: Continent of bladder and Continent of bowel  BMI: Body mass index is 32.54 kg/m .   Active diet order: Orders Placed This Encounter      2 Gram Sodium Diet     Output: I/O last 3 completed shifts:  In: 693 [P.O.:690; I.V.:3]  Out: 2700 [Urine:2700]     Labs:   Recent Labs   Lab 12/13/24  0756 12/12/24  1008   ALBUMIN 3.4*  --    HGB 10.3*  --    INR  --  1.36*   WBC 7.2  --      Pressure injury risk assessment:   Sensory Perception: 3-->slightly limited  Moisture: 3-->occasionally moist  Activity: 2-->chairfast  Mobility: 3-->slightly limited  Nutrition: 3-->adequate  Friction and Shear: 2-->potential problem  Ramón Score: 16    Ela Vides, MSN RN CWOCN  Pager no longer is use, please contact through [employer] group: [facility]

## 2024-12-13 NOTE — CONSULTS
PATIENT HISTORY:  Janice Nowak is a 79 year old male whose medical history as noted below was admitted for pleural effusions.        I was asked to see Janice Nowak  by Heather Ramírez MD for evaluation of the right foot.  He recently was in hospital with for treatment of osteomyelitis of the right calcaneus and heel ulceration.  Had an I&D, partial calcanecetomy and ulcer debridement on 10/24/24 with Dr. Amol Patricio.  He has been followed in clinic, last seen earlier this month.  He relates that he missed his appointment this week as he is in hospital.       PAST MEDICAL HISTORY:   Past Medical History:   Diagnosis Date    Diabetes (H)     Hypertension     Sleep apnea     Thyroid disease         PAST SURGICAL HISTORY:   Past Surgical History:   Procedure Laterality Date    BACK SURGERY      BONE EXOSTOSIS EXCISION Right 10/24/2024    Procedure: PARTIAL CALCANECTOMY RIGHT FOOT;  Surgeon: Amol Patricio DPM;  Location: [facility] [place] OR    INCISION AND DRAINAGE FOOT, COMBINED Right 10/24/2024    Procedure: INCISION AND DRAINAGE;  Surgeon: Amol Patricio DPM;  Location: [facility] [place] OR    IRRIGATION AND DEBRIDEMENT FOOT, COMBINED Right 10/24/2024    Procedure: AND DEBRIDEMENT RIGHT HEEL,;  Surgeon: Amol Patricio DPM;  Location: [facility] [place] OR    ORTHOPEDIC SURGERY      PICC SINGLE LUMEN PLACEMENT  10/30/2024    VASCULAR SURGERY          MEDICATIONS:   Current Facility-Administered Medications:     - MEDICATION INSTRUCTIONS -, , Does not apply, DOES NOT GO TO Jeanmarie BOSTON Honoree, MD    acetaminophen (TYLENOL) tablet 650 mg, 650 mg, Oral, Q4H PRN **OR** acetaminophen (TYLENOL) Suppository 650 mg, 650 mg, Rectal, Q4H PRN, Tim Murry MD    albumin human 25 % injection 50 g, 50 g, Intravenous, Once, Tory Tellez MD    amoxicillin-clavulanate (AUGMENTIN) 500-125 MG per tablet 1 tablet, 1 tablet, Oral, BID, iSmón Bain MD, 1 tablet at 12/13/24 0849    aspirin EC  tablet 81 mg, 81 mg, Oral, Daily, Simón Bain MD, 81 mg at 12/13/24 0849    atorvastatin (LIPITOR) tablet 80 mg, 80 mg, Oral, At Bedtime, Simón Bain MD, 80 mg at 12/12/24 2117    clopidogrel (PLAVIX) tablet 75 mg, 75 mg, Oral, Daily, Simón Bain MD, 75 mg at 12/13/24 0850    Continuing ACE inhibitor/ARB/ARNI from home medication list OR ACE inhibitor/ARB/ARNI order already placed during this visit, , Does not apply, DOES NOT GO TO Jeanmarie BOSTON Honoree, MD    glucose gel 15-30 g, 15-30 g, Oral, Q15 Min PRN **OR** dextrose 50 % injection 25-50 mL, 25-50 mL, Intravenous, Q15 Min PRN **OR** glucagon injection 1 mg, 1 mg, Subcutaneous, Q15 Min PRN, Tim Murry MD    doxycycline monohydrate (MONODOX) capsule 100 mg, 100 mg, Oral, BID, Luz Ohara MD, 100 mg at 12/13/24 0850    [Held by provider] empagliflozin (JARDIANCE) tablet 10 mg, 10 mg, Oral, Daily, Tory Tellez MD, 10 mg at 12/13/24 0852    enoxaparin ANTICOAGULANT (LOVENOX) injection 40 mg, 40 mg, Subcutaneous, Q24H, Heather Brewer MD, 40 mg at 12/12/24 1658    insulin aspart (NovoLOG) injection (RAPID ACTING), 1-7 Units, Subcutaneous, TID AC, Tim Murry MD, 1 Units at 12/13/24 1142    insulin aspart (NovoLOG) injection (RAPID ACTING), 1-5 Units, Subcutaneous, At Bedtime, Tim Murry MD    insulin glargine (LANTUS PEN) injection 22 Units, 22 Units, Subcutaneous, QAM Daniella BUCIO Sarah, MD, 22 Units at 12/13/24 1142    lactobacillus rhamnosus (GG) (CULTURELL) capsule 1 capsule, 1 capsule, Oral, Daily, Simón Bain MD, 1 capsule at 12/13/24 1141    levothyroxine (SYNTHROID/LEVOTHROID) tablet 50 mcg, 50 mcg, Oral, Daily, Simón Bain MD, 50 mcg at 12/13/24 0921    lidocaine (LMX4) cream, , Topical, Q1H PRN, Tim Murry MD    lidocaine 1 % 0.1-1 mL, 0.1-1 mL, Other, Q1H PRN, Tim Murry MD    [Held by provider] losartan (COZAAR) tablet 50 mg, 50 mg, Oral, At Bedtime, Simón Bain MD, 50 mg at  12/12/24 2117    melatonin tablet 5 mg, 5 mg, Oral, At Bedtime PRN, Derrick Escalona MD, 5 mg at 12/13/24 0155    polyethylene glycol (MIRALAX) Packet 17 g, 17 g, Oral, BID PRN, Tim Murry MD    Reason beta blocker not prescribed, , Other, DOES NOT GO TO MAR, Tim Murry MD    senna-docusate (SENOKOT-S/PERICOLACE) 8.6-50 MG per tablet 1 tablet, 1 tablet, Oral, BID PRN **OR** senna-docusate (SENOKOT-S/PERICOLACE) 8.6-50 MG per tablet 2 tablet, 2 tablet, Oral, BID PRN, Tim Murry MD    sodium chloride (PF) 0.9% PF flush 3 mL, 3 mL, Intracatheter, Q8H, Tim Murry MD, 3 mL at 12/13/24 0853    sodium chloride (PF) 0.9% PF flush 3 mL, 3 mL, Intracatheter, q1 min prn, Tim Murry MD    [Held by provider] spironolactone (ALDACTONE) tablet 25 mg, 25 mg, Oral, Daily, Simón Bain MD, 25 mg at 12/13/24 0850    [Held by provider] torsemide (DEMADEX) tablet 20 mg, 20 mg, Oral, Daily, Tory Tellez MD, 20 mg at 12/13/24 0849    Current Outpatient Medications:     amoxicillin-clavulanate (AUGMENTIN) 500-125 MG tablet, Take 1 tablet by mouth 2 times daily for 21 days., Disp: 42 tablet, Rfl: 0    aspirin 81 MG EC tablet, Take 81 mg by mouth daily., Disp: , Rfl:     atorvastatin (LIPITOR) 80 MG tablet, Take 80 mg by mouth at bedtime., Disp: , Rfl:     clopidogrel (PLAVIX) 75 MG tablet, Take 75 mg by mouth daily., Disp: , Rfl:     co-enzyme Q-10 100 MG CAPS capsule, Take 200 mg by mouth daily., Disp: , Rfl:     doxycycline hyclate (VIBRA-TABS) 100 MG tablet, Take 1 tablet (100 mg) by mouth 2 times daily for 21 days., Disp: 42 tablet, Rfl: 0    ertugliflozin (STEGLATRO) 5 MG TABS, Take 5 mg by mouth every morning., Disp: , Rfl:     insulin aspart (NOVOLOG FLEXPEN) 100 UNIT/ML pen, Inject 5 Units subcutaneously 3 times daily (with meals). Novolog Flexpen: 5 units with breakfast, 5 units with lunch, 5 units with dinner., Disp: 15 mL, Rfl: 0    insulin glargine (LANTUS PEN) 100 UNIT/ML pen, Inject 35 Units  subcutaneously every morning., Disp: 15 mL, Rfl: 0    lactobacillus rhamnosus, GG, (CULTURELL) capsule, Take 1 capsule by mouth daily., Disp: , Rfl:     levothyroxine (SYNTHROID/LEVOTHROID) 50 MCG tablet, Take 50 mcg by mouth daily., Disp: , Rfl:     losartan (COZAAR) 50 MG tablet, Take 50 mg by mouth at bedtime., Disp: , Rfl:     metFORMIN (GLUCOPHAGE XR) 500 MG 24 hr tablet, Take 1,000 mg by mouth 2 times daily (with meals)., Disp: , Rfl:     Multiple Vitamins-Minerals (PRESERVISION AREDS 2 PO), Take 2 capsules by mouth daily., Disp: , Rfl:     spironolactone (ALDACTONE) 25 MG tablet, Take 25 mg by mouth daily., Disp: , Rfl:     vitamin C with B complex (B COMPLEX-C) tablet, Take 1 tablet by mouth daily., Disp: , Rfl:     Facility-Administered Medications Ordered in Other Encounters:     heparin 2 Units/mL in 0.9% NaCl (500 mL), 1 Bag, TABLE SOLN, Continuous PRN, Cynthia Duran, APRN CNP     ALLERGIES:    Allergies   Allergen Reactions    Exenatide Unknown    Heparin Unknown    Liraglutide Unknown    Lisinopril Cough    Morphine Unknown        SOCIAL HISTORY:   Social History     Socioeconomic History    Marital status:      Spouse name: Not on file    Number of children: Not on file    Years of education: Not on file    Highest education level: Not on file   Occupational History    Not on file   Tobacco Use    Smoking status: Former     Current packs/day: 0.00     Types: Cigarettes     Quit date:      Years since quittin.9     Passive exposure: Never    Smokeless tobacco: Never   Vaping Use    Vaping status: Never Used   Substance and Sexual Activity    Alcohol use: Yes     Alcohol/week: 5.0 standard drinks of alcohol     Types: 5 Cans of beer per week    Drug use: Not Currently    Sexual activity: Not on file   Other Topics Concern    Not on file   Social History Narrative    Not on file     Social Drivers of Health     Financial Resource Strain: Low Risk  (10/28/2024)    Financial Resource  "Strain     Within the past 12 months, have you or your family members you live with been unable to get utilities (heat, electricity) when it was really needed?: No   Food Insecurity: Low Risk  (10/28/2024)    Food Insecurity     Within the past 12 months, did you worry that your food would run out before you got money to buy more?: No     Within the past 12 months, did the food you bought just not last and you didn t have money to get more?: No   Transportation Needs: Low Risk  (10/28/2024)    Transportation Needs     Within the past 12 months, has lack of transportation kept you from medical appointments, getting your medicines, non-medical meetings or appointments, work, or from getting things that you need?: No   Physical Activity: Not on file   Stress: Not on file   Social Connections: Not on file   Interpersonal Safety: Low Risk  (10/28/2024)    Interpersonal Safety     Do you feel physically and emotionally safe where you currently live?: Yes     Within the past 12 months, have you been hit, slapped, kicked or otherwise physically hurt by someone?: No     Within the past 12 months, have you been humiliated or emotionally abused in other ways by your partner or ex-partner?: No   Recent Concern: Interpersonal Safety - High Risk (10/9/2024)    Interpersonal Safety     Do you feel physically and emotionally safe where you currently live?: No     Within the past 12 months, have you been hit, slapped, kicked or otherwise physically hurt by someone?: No     Within the past 12 months, have you been humiliated or emotionally abused in other ways by your partner or ex-partner?: No   Housing Stability: High Risk (10/28/2024)    Housing Stability     Do you have housing? : No     Are you worried about losing your housing?: No        FAMILY HISTORY: No family history on file.     EXAM:Vitals: BP (!) 146/67 (BP Location: Right arm)   Pulse 83   Temp 98.4  F (36.9  C) (Oral)   Resp 15   Ht 1.727 m (5' 8\")   Wt 97.1 kg " (214 lb)   SpO2 93%   BMI 32.54 kg/m    BMI= Body mass index is 32.54 kg/m .    LABS:      WBC: 7.2    General appearance: Patient is alert and fully cooperative with history & exam.  No sign of distress is noted during the visit.      Psychiatric: Affect is pleasant & appropriate.  Patient appears motivated to improve health.       Respiratory: Breathing is regular & unlabored while sitting.      HEENT: Hearing is intact to spoken word.  Speech is clear.  No gross evidence of visual impairment that would impact ambulation.       Right lower extremity with PRAFO boot, Dressing is clean,dry and intact.  Wound VAC is in place and functioning with hospital unit.       ASSESSMENT:PLAN:  79 year old male with history of Diabetic ulceration right heel into bone  Ulceration right leg into subcutaneous tissue  Acute osteomyelitis right calcaneus     Reviewed patient's chart in [facility].    PRAFO boot - continue to wear at all time  Continue with Wound VAC, appreciate WOC input  Keep dressing clean, dry and intact.    NWB to the RLE  Wound VAC is inplace and functioning  No intervention planned for the foot on this admission  He can follow up with Dr Patricio in clinic after discharge.  The office has been contacted as he missed his follow up appointment for this admission  All questions are answered.        Lourdes Delarosa DPM    12:37 PM

## 2024-12-13 NOTE — PROGRESS NOTES
"PT Evaluation   12/13/24 6200   Appointment Info   Signing Clinician's Name / Credentials (PT) Isabella Barger PT, DPT   Living Environment   People in Home alone   Current Living Arrangements apartment   Home Accessibility no concerns  (Elevator. Lives on 3rd floor.)   Self-Care   Usual Activity Tolerance moderate   Current Activity Tolerance moderate   Regular Exercise Other (see comments)  (has been having home therapies.)   Equipment Currently Used at Home wheelchair, manual;shower chair;grab bar, tub/shower;orthosis;walker, rolling;cane, straight;glucometer   Fall history within last six months no   Activity/Exercise/Self-Care Comment uses wheelchair at all times to maintain NWB RLE. Has been pivot transferring. States he has been able to maintain NWB RLE at home. States he has home therapies throughout the week.   General Information   Onset of Illness/Injury or Date of Surgery 12/11/24   Referring Physician Tim Murry MD   Patient/Family Therapy Goals Statement (PT) To go home   Pertinent History of Current Problem (include personal factors and/or comorbidities that impact the POC) Per chart: \"79 year old male with PMHx of T2DM on insulin, PAD, CAD, HTN admitted on 12/11/2024 for several weeks of DIAS and LE edema. Concerning for new diagnosis of heart failure,. Patient recently admitted for diabetic foot ulcer and osteomyelitis s/p ID and partial calcanectomy 10/24 - 11/1. Also recently incidentally identified hepatic lobe mass noted on US and confirmed on CT A/P 11/27; concerning for metastatic disease and planning for biopsy on 12/19.\"   Existing Precautions/Restrictions fall;weight bearing   Weight-Bearing Status - RLE nonweight-bearing   Cognition   Affect/Mental Status (Cognition) WFL   Follows Commands (Cognition) WNL   Safety Deficit (Cognition) insight into deficits/self-awareness;safety precautions follow-through/compliance   Posture    Posture Forward head position;Protracted shoulders   Range of " Motion (ROM)   Range of Motion ROM deficits secondary to surgical procedure   Strength (Manual Muscle Testing)   Strength (Manual Muscle Testing) Deficits observed during functional mobility   Bed Mobility   Bed Mobility supine-sit;sit-supine   Supine-Sit [place] (Bed Mobility) modified independence   Sit-Supine [place] (Bed Mobility) modified independence   Impairments Contributing to Impaired Bed Mobility decreased strength   Assistive Device (Bed Mobility) bed rails   Transfers   Transfers bed-chair transfer   Maintains Weight-bearing Status (Transfers) verbal cues to maintain;nonverbal cues (demo/gesture) to maintain;unable to maintain   Transfer Safety Concerns Noted inability to maintain weight-bearing restrictions w/o assist   Impairments Contributing to Impaired Transfers impaired balance;decreased strength   Bed-Chair Transfer   Bed-Chair [place] (Transfers) supervision;verbal cues;nonverbal cues (demo/gesture)   Comment, (Bed-Chair Transfer) safely completes squat pivot transfer however does not maintain precautions. Pt visibly putting weight through RLE - when asked, pt acknowledges that he is putting some weight through RLE. Reviewed importance of NWB RLE precautions as set by podiatrist, unclear if patient has been able to maintain at home.   Gait/Stairs (Locomotion)   [place] Level (Gait) unable to assess;not tested   Comment, (Gait/Stairs) Patient has not been ambulatory, has been using a manual wc and pivot transferring at home.   Balance   Balance other (describe)   Balance Comments close SBA with squat pivot transfer. Pt unable to maintain balance on LLE with pivot transfer, does not maintain NWB RLE precautions.   Clinical Impression   Criteria for Skilled Therapeutic Intervention Yes, treatment indicated   PT Diagnosis (PT) Impaired functional mobility   Influenced by the following impairments Impaired strength, balance, activity tolerance; DIAS; NWB RLE precautions    Functional limitations due to impairments transfers, endurance   Clinical Presentation (PT Evaluation Complexity) stable   Clinical Presentation Rationale Clinical judgment   Clinical Decision Making (Complexity) moderate complexity   Planned Therapy Interventions (PT) balance training;bed mobility training;gait training;home exercise program;neuromuscular re-education;patient/family education;stair training;strengthening;stretching;transfer training;progressive activity/exercise;home program guidelines   Risk & Benefits of therapy have been explained evaluation/treatment results reviewed;participants included;patient;participants voiced agreement with care plan   PT Total Evaluation Time   PT Eval, Moderate Complexity Minutes (52919) 10   Physical Therapy Goals   PT Frequency 7x/week   PT Predicted Duration/Target Date for Goal Attainment 12/20/24   PT Goals Transfers;Wheelchair Mobility;PT Goal 1   PT: Transfers Supervision/stand-by assist;Within precautions;Bed to/from chair  (NWB RLE)   PT: Wheelchair Mobility 50 feet;Caregiver SBA;manual wheelchair  (for functional endurance)   PT: Goal 1 Pt will be IND with BLE strengthening HEP for increasing safety with transfers, improve ability to maintain NWB RLE.   PT Discharge Planning   PT Plan NWB RLE, PRAFO: pivot transfers, assess wc mobility, wc mobility for functional endurance   PT Discharge Recommendation (DC Rec) home with assist;home with home care physical therapy   PT Rationale for DC Rec Anticipate once medically ready that patient will be safe to discharge home with previous levels of assistance and continuing his services with home care.   PT Brief overview of current status NWB RLE: SBA squat pivot EOB<>chair - unable to maintain nwb RLE   Physical Therapy Time and Intention   Total Session Time (sum of timed and untimed services) 10

## 2024-12-13 NOTE — PLAN OF CARE
1920-2300    Problem: Adult Inpatient Plan of Care  Goal: Plan of Care Review  Description: The Plan of Care Review/Shift note should be completed every shift.  The Outcome Evaluation is a brief statement about your assessment that the patient is improving, declining, or no change.  This information will be displayed automatically on your shift  note.  Outcome: Progressing  Flowsheets (Taken 12/12/2024 2343)  Plan of Care Reviewed With: patient     Problem: Comorbidity Management  Goal: Blood Glucose Levels Within Targeted Range  Outcome: Progressing  Intervention: Monitor and Manage Glycemia  Recent Flowsheet Documentation  Taken 12/12/2024 2009 by Camilla Sullivan, RN  Medication Review/Management: medications reviewed     Problem: Wound  Goal: Skin Health and Integrity  Outcome: Progressing  Intervention: Optimize Skin Protection  Recent Flowsheet Documentation  Taken 12/12/2024 2009 by Camilla Sullivan, RN  Activity Management:   activity adjusted per tolerance   activity encouraged  Goal: Optimal Wound Healing  Outcome: Progressing   Goal Outcome Evaluation:      Plan of Care Reviewed With: patient      Writer took over this pt after 1920. Pt alert and oriented times 4. Vitals stable. He is on 2 L NC and has been 92-95 %. Denied any pain. Wound vac intact. He has primofit and had 700 ml. Blood sugar at bedtime was 154 mg/dl. Tele NSR with BBB.

## 2024-12-13 NOTE — PROGRESS NOTES
HEART CARE NOTE          Assessment/Recommendations   1.  HFpEF c/b severe ADHF  Assessment / Plan  Transition to oral diuretic regimen; continue to monitor UOP and renal function closely  Patient is high risk for adverse cardiac events 2/2 advanced age, elevated NTproBNP, renal dysfunction, DM2  GDMT as detailed below; mainstay of treatment for HFpEF includes diuretics and adequate BP control (class I) and SGLT2-I (class 2a); additional medical therapy (ARNI, MRA, ARB) demonstrated less robust evidence for indication but may be considered per guideline recommendations (2b); no indication for BBlockers     Current Pharmacotherapy AHA Guideline-Directed Medical Therapy   Losartan 50 mg daily ARNI/ARB   Spironolactone 25 mg  MRA   SGLT2 inhibitor: Empagliflozin 10 mg daily SGLT2-I    Torsemide 20 mg daily Loop diuretic      2. DM2  Assessment / Plan  C/b foot ulcer and osteomyelitis  Management and supportive care per primary team     3. HTN  Assessment / Plan  Adequately controlled - no changes at this time     4. CKD  Assessment / Plan  Diuresis as above; continue to monitor UOP and renal function closely     Plan of care discussed on December 13, 2024 with patient at bedside, and primary team overseeing patient's care    Addendum:  CAROLINE noted on am labs; hold loop diuretic, SGTL2-I, ARB, aldosterone antagonist; give IV albumin bolus and continue to monitor UOP and renal function closely    History of Present Illness/Subjective    Mr. Janice Nowak is a 79 year old male with a PMHx significant for (per Epic notation) T2DM on insulin, PAD, CAD, HTN admitted on 12/11/2024 for several weeks of DIAS, concerning for CHF.  Of note, patient was recently on our service for diabetic foot ulcer and osteomyelitis s/p I&D, partial calcanectomy 10/24 to 11/1.      Today, Mr. Nowak denies acute cardiac events or complaints; Management plan as detailed above     ECG: Personally reviewed. normal sinus rhythm, nonspecific ST  "and T waves changes, RBBB.     ECHO: personally reviewed 12/13/24   The left ventricle is normal in size. There is mild concentric left  ventricular hypertrophy.  Left ventricular systolic function is normal. The visual ejection fraction is  55-60%. The inferior wall is mildly hypokinetic.  Grade II or moderate diastolic dysfunction.     The right ventricle is normal in size and function.  The left atrium is severely dilated. Right atrium not well visualized.  IVC diameter <2.1 cm collapsing >50% with sniff suggests a normal RA pressure  of 3 mmHg.  There is no comparison study available.     CT Chest: personally reviewed 12/12/24  IMPRESSION:  1.  No pulmonary embolism.     2.  Large right pleural effusion with moderate to large left pleural effusion. Bibasilar consolidation.     3.  Emphysema.     4.  Incomplete visualization of hypodense mass in the posterior right hepatic lobe with bulky portacaval lymphadenopathy suspicious for neoplasm.     5.  Subcarinal lymphadenopathy. Mild AP window lymphadenopathy with scattered additional subcentimeter mediastinal nodes.    Telemetry: personally reviewed December 13, 2024; notable for sinus rhythm     Lab results: personally reviewed December 13, 2024; notable for stable to improved renal function  Stabke to  Medical history and pertinent documents reviewed in Care Everywhere please where applicable see details above        Physical Examination Review of Systems   /52 (Patient Position: Semi-De Leon's, Cuff Size: Adult Regular)   Pulse 80   Temp 98.1  F (36.7  C) (Oral)   Resp 17   Ht 1.727 m (5' 8\")   Wt 97.1 kg (214 lb)   SpO2 95%   BMI 32.54 kg/m    Body mass index is 32.54 kg/m .  Wt Readings from Last 3 Encounters:   12/11/24 97.1 kg (214 lb)   11/19/24 99.5 kg (219 lb 6.4 oz)   11/13/24 98.9 kg (218 lb)     General Appearance:   no distress, normal body habitus   ENT/Mouth: membranes moist, no oral lesions or bleeding gums.      EYES:  no scleral " icterus, normal conjunctivae   Neck: no carotid bruits or thyromegaly   Chest/Lungs:   lungs are clear to auscultation, no rales or wheezing, equal chest wall expansion    Cardiovascular:   Regular. Normal first and second heart sounds with no murmurs, rubs, or gallops; the carotid, radial and posterior tibial pulses are intact, no JVD and trace-mild LE edema bilaterally    Abdomen:  no organomegaly, masses, bruits, or tenderness; bowel sounds are present   Extremities: no cyanosis or clubbing   Skin: no xanthelasma, warm.    Neurologic: NAD     Psychiatric: alert and oriented x3, calm     A complete 10 systems ROS was reviewed  And is negative except what is listed in the HPI.          Medical History  Surgical History Family History Social History   Past Medical History:   Diagnosis Date    Diabetes (H)     Hypertension     Sleep apnea     Thyroid disease     Past Surgical History:   Procedure Laterality Date    BACK SURGERY      BONE EXOSTOSIS EXCISION Right 10/24/2024    Procedure: PARTIAL CALCANECTOMY RIGHT FOOT;  Surgeon: Amol Patricio DPM;  Location: [facility]    INCISION AND DRAINAGE FOOT, COMBINED Right 10/24/2024    Procedure: INCISION AND DRAINAGE;  Surgeon: Amol Patricio DPM;  Location: [facility] OR    IRRIGATION AND DEBRIDEMENT FOOT, COMBINED Right 10/24/2024    Procedure: AND DEBRIDEMENT RIGHT HEEL,;  Surgeon: Amol Patricio DPM;  Location: [facility] OR    ORTHOPEDIC SURGERY      PICC SINGLE LUMEN PLACEMENT  10/30/2024    VASCULAR SURGERY      no family history of premature coronary artery disease Social History     Socioeconomic History    Marital status:      Spouse name: Not on file    Number of children: Not on file    Years of education: Not on file    Highest education level: Not on file   Occupational History    Not on file   Tobacco Use    Smoking status: Former     Current packs/day: 0.00     Types: Cigarettes     Quit date: 1983     Years since  quittin.9     Passive exposure: Never    Smokeless tobacco: Never   Vaping Use    Vaping status: Never Used   Substance and Sexual Activity    Alcohol use: Yes     Alcohol/week: 5.0 standard drinks of alcohol     Types: 5 Cans of beer per week    Drug use: Not Currently    Sexual activity: Not on file   Other Topics Concern    Not on file   Social History Narrative    Not on file     Social Drivers of Health     Financial Resource Strain: Low Risk  (10/28/2024)    Financial Resource Strain     Within the past 12 months, have you or your family members you live with been unable to get utilities (heat, electricity) when it was really needed?: No   Food Insecurity: Low Risk  (10/28/2024)    Food Insecurity     Within the past 12 months, did you worry that your food would run out before you got money to buy more?: No     Within the past 12 months, did the food you bought just not last and you didn t have money to get more?: No   Transportation Needs: Low Risk  (10/28/2024)    Transportation Needs     Within the past 12 months, has lack of transportation kept you from medical appointments, getting your medicines, non-medical meetings or appointments, work, or from getting things that you need?: No   Physical Activity: Not on file   Stress: Not on file   Social Connections: Not on file   Interpersonal Safety: Low Risk  (10/28/2024)    Interpersonal Safety     Do you feel physically and emotionally safe where you currently live?: Yes     Within the past 12 months, have you been hit, slapped, kicked or otherwise physically hurt by someone?: No     Within the past 12 months, have you been humiliated or emotionally abused in other ways by your partner or ex-partner?: No   Recent Concern: Interpersonal Safety - High Risk (10/9/2024)    Interpersonal Safety     Do you feel physically and emotionally safe where you currently live?: No     Within the past 12 months, have you been hit, slapped, kicked or otherwise physically  "hurt by someone?: No     Within the past 12 months, have you been humiliated or emotionally abused in other ways by your partner or ex-partner?: No   Housing Stability: High Risk (10/28/2024)    Housing Stability     Do you have housing? : No     Are you worried about losing your housing?: No           Lab Results    Chemistry/lipid CBC Cardiac Enzymes/BNP/TSH/INR   Lab Results   Component Value Date    CHOL 152 02/10/2020    HDL 63 02/10/2020    TRIG 119 02/10/2020    BUN 23.3 (H) 12/12/2024     12/12/2024    CO2 26 12/12/2024    Lab Results   Component Value Date    WBC 6.8 12/12/2024    HGB 10.3 (L) 12/12/2024    HCT 32.8 (L) 12/12/2024    MCV 89 12/12/2024     (L) 12/12/2024    Lab Results   Component Value Date    INR 1.36 (H) 12/12/2024     No results found for: \"CKTOTAL\", \"CKMB\", \"TROPONINI\"       Weight:    Wt Readings from Last 3 Encounters:   12/11/24 97.1 kg (214 lb)   11/19/24 99.5 kg (219 lb 6.4 oz)   11/13/24 98.9 kg (218 lb)       Allergies  Allergies   Allergen Reactions    Exenatide Unknown    Heparin Unknown    Liraglutide Unknown    Lisinopril Cough    Morphine Unknown         Surgical History  Past Surgical History:   Procedure Laterality Date    BACK SURGERY      BONE EXOSTOSIS EXCISION Right 10/24/2024    Procedure: PARTIAL CALCANECTOMY RIGHT FOOT;  Surgeon: Amol Patricio DPM;  Location: [facility] [place] [facility]    INCISION AND DRAINAGE FOOT, COMBINED Right 10/24/2024    Procedure: INCISION AND DRAINAGE;  Surgeon: Amol Patricio DPM;  Location: [facility] [place] [facility]    IRRIGATION AND DEBRIDEMENT FOOT, COMBINED Right 10/24/2024    Procedure: AND DEBRIDEMENT RIGHT HEEL,;  Surgeon: Amol Patricio DPM;  Location: [facility] [place] [facility]    ORTHOPEDIC SURGERY      PICC SINGLE LUMEN PLACEMENT  10/30/2024    VASCULAR SURGERY         Social History  Tobacco:   History   Smoking Status    Former    Types: Cigarettes   Smokeless Tobacco    Never    Alcohol:   Social History    Substance " and Sexual Activity      Alcohol use: Yes        Alcohol/week: 5.0 standard drinks of alcohol        Types: 5 Cans of beer per week   Illicit Drugs:   History   Drug Use Unknown       Family History  No family history on file.       Tory Tellez MD on 12/13/2024      cc: Anayeli Machado

## 2024-12-13 NOTE — PROGRESS NOTES
[facility]    Progress Note - Hospitalist Service       Date of Admission:  12/11/2024    Assessment & Plan   Janice Nowak is a 79 year old male with PMHx of T2DM on insulin, PAD, CAD, HTN admitted on 12/11/2024 for several weeks of DIAS and LE edema. Concerning for new diagnosis of heart failure,. Patient recently admitted for diabetic foot ulcer and osteomyelitis s/p ID and partial calcanectomy 10/24 - 11/1. Also recently incidentally identified hepatic lobe mass noted on US and confirmed on CT A/P 11/27; concerning for metastatic disease and planning for biopsy on 12/19.     Acute Respiratory Distress   HFpEF  Pleural Effusion s/p thoracentesis   Presenting with dyspnea on exertion for multiple weeks. Was seen by PCP who recommended he be evaluated in the emergency department for DVT and PE rule out. CT-PE negative for PE but did show notable bilateral pleural effusion. US negative for DVT.  No prior diagnosis of heart failure, but certainly high risk with known diagnoses of T2DM, CAD, PAD. Echo on this admission suggestive of HFpEF. Pleural effusion s/p thoracentesis consistent with transudative process, consistent with heart failure. However there is certainly concern for malignancy leading to pleural effusion given recently identified liver mass. Cytology pending.   - Cardiology consulted    - echo with preserved EF    - transition to oral diuretics: torsemide 20 mg daily    - losartan 50 mg daily    - spironolactone 25 mg daily    - jardiance 10 mg daily   - Continue cardiac telemetry   -  s/p thoracentesis    - transudative per lights criteria    - awaiting cytology to rule out malignancy     CAROLINE on CKD   Creatinine up to 1.62 following initiation of diuresis. Creatinine 1.31 on admission. Will require close monitoring in the setting of ongoing diuresis.   - continue to diuresis with close monitoring of CAROLINE   - daily BMP     LE Edema, R>L   DVT Ruled Out   Presenting with  dyspnea on exertion for multiple weeks. Was seen by PCP who recommended he be evaluated in the emergency department for DVT and PE rule out. CT PE negative. US DVT negative. Likely related to foot wound, see below.   - Continue diuresis, see above   - Continue foot wound management, see below     Peripheral Artery Disease   Right diabetic foot ulcer and osteomyelitis s/p I&D and calcanectomy 10/24/2024  Hospitalized 10/24 - 11/1 for the above. Wound vac still present and had been on IV antibiotics up until recently. Transitioned to oral antibiotics which will be continued on this admission. There was a plan for LE angiogram 12/12/24 - rescheduled given patient admitted with new HF exacerbation.   - Podiatry consult    - continue to wear boot at all times    - continue wound cares     - NWB to RLE    - no urgent interventions planned at this time, follow up with Dr Patricio on outpatient basis   - Continue PTA antibiotics course: Augmentin 500-125mg BID and doxycycline 100mg BID (through 2/31 for 21 day course)  - WOC consult  - Continue ASA 81 mg, plavix 75 mg, atorvastatin 80mg daily   - Reschedule angiogram following discharge      Right hepatic lobe mass   Incidentally noted on US and confirmed on CT A/P 11/27. 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 CT PE on admission reveals stable disease. Has established with outpatient heme/onc. No further work up needed at this time, but probable malignancy raises concern for metastatic disease contributing to new pleural effusion - see above. Question for mild abdominal distension, but with no ascites called out on various recent imaging studies will hold off on pursuing paracentesis.   - Continue following with heme/onc on outpatient basis    - plan for liver biopsy 12/19   - Daily CMP   - Consider paracentesis if abdominal distension worsens      Chronic conditions:  T2DM  - Lantus 28 units qAM   -  "MDSS  - Hold PTA steglatro and metformin     HTN  CKD3a  - PTA losartan 50 mg + spironolactone 25 mg daily      Hypothyroidism  - PTA levothyroxine 50 mcg daily           Diet: Fluid restriction 1800 ML FLUID (and additional linked orders)  2 Gram Sodium Diet    DVT Prophylaxis: Enoxaparin (Lovenox) SQ  Reyes Catheter: Not present  Fluids: None - fluids restriction   Lines: None     Cardiac Monitoring: ACTIVE order. Indication: Acute decompensated heart failure (48 hours)  Code Status: No CPR- Do NOT Intubate      Clinically Significant Risk Factors               # Hypoalbuminemia: Lowest albumin = 3.4 g/dL at 12/13/2024  7:56 AM, will monitor as appropriate  # Coagulation Defect: INR = 1.36 (Ref range: 0.85 - 1.15) and/or PTT = N/A, will monitor for bleeding   # Acute Kidney Injury, unspecified: based on a >150% or 0.3 mg/dL increase in last creatinine compared to past 90 day average, will monitor renal function  # Hypertension: Noted on problem list     # Acute Hypoxic Respiratory Failure: Documented O2 saturation < 90%. Continue supplemental oxygen as needed        # DMII: A1C = 9.2 % (Ref range: <5.7 %) within past 6 months, PRESENT ON ADMISSION  # Obesity: Estimated body mass index is 32.54 kg/m  as calculated from the following:    Height as of this encounter: 1.727 m (5' 8\").    Weight as of this encounter: 97.1 kg (214 lb)., PRESENT ON ADMISSION       # Financial/Environmental Concerns: none         Social Drivers of Health   Housing Stability: High Risk (10/28/2024)    Housing Stability     Do you have housing? : No     Are you worried about losing your housing?: No   Tobacco Use: Medium Risk (12/9/2024)    Patient History     Smoking Tobacco Use: Former     Smokeless Tobacco Use: Never     Passive Exposure: Never   Interpersonal Safety: Low Risk  (10/28/2024)    Interpersonal Safety     Do you feel physically and emotionally safe where you currently live?: Yes     Within the past 12 months, have you been " hit, slapped, kicked or otherwise physically hurt by someone?: No     Within the past 12 months, have you been humiliated or emotionally abused in other ways by your partner or ex-partner?: No   Recent Concern: Interpersonal Safety - High Risk (10/9/2024)    Interpersonal Safety     Do you feel physically and emotionally safe where you currently live?: No     Within the past 12 months, have you been hit, slapped, kicked or otherwise physically hurt by someone?: No     Within the past 12 months, have you been humiliated or emotionally abused in other ways by your partner or ex-partner?: No         Disposition Plan     Medically Ready for Discharge: Anticipated in 2-4 Days         The patient's care was discussed with the Attending Physician, Dr. Louie  .    Heather Brewer MD  Hospitalist Service  [facility]  Securely message with [employer] (more info)  Text page via [facility] Paging/Directory   ______________________________________________________________________    Interval History   Doing well. On 4L of oxygen at time of meeting. No significant respiratory distress. Per cardiology, patient to remain admitted for ongoing diuresis and management of CAROLINE.     Physical Exam   Vital Signs: Temp: 97.9  F (36.6  C) Temp src: Oral BP: (!) 142/67 Pulse: 84   Resp: 18 SpO2: 93 % O2 Device: None (Room air) Oxygen Delivery: 3 LPM  Weight: 214 lbs 0 oz  General: Sitting comfortably. No acute distress.Pleasant and cooperative.   HEENT: Conjunctivae are clear, nonicteric.   Respiratory: On 4L.  No respiratory distress. Poor air movement, but no crackles or wheezing.   Cardiac: RRR. No murmurs appreciated.   Abdominal: Abdomen is soft and non-tender. Some concern for distension, patient states this is baseline.   Extremities: Right LE with wound vac in place. Mild LE edema, improving.   Psychiatric: Good insight. Engaged in care plan.     Medical Decision Making   Please see A&P for additional details of medical  decision making.      Data   ------------------------- PAST 24 HR DATA REVIEWED -----------------------------------------------

## 2024-12-14 ENCOUNTER — HEALTH MAINTENANCE LETTER (OUTPATIENT)
Age: 79
End: 2024-12-14

## 2024-12-14 ENCOUNTER — APPOINTMENT (OUTPATIENT)
Dept: PHYSICAL THERAPY | Facility: HOSPITAL | Age: 79
DRG: 291 | End: 2024-12-14
Payer: COMMERCIAL

## 2024-12-14 LAB
ALBUMIN SERPL BCG-MCNC: 3.9 G/DL (ref 3.5–5.2)
ALP SERPL-CCNC: 79 U/L (ref 40–150)
ALT SERPL W P-5'-P-CCNC: 25 U/L (ref 0–70)
ANION GAP SERPL CALCULATED.3IONS-SCNC: 11 MMOL/L (ref 7–15)
AST SERPL W P-5'-P-CCNC: 26 U/L (ref 0–45)
BILIRUB SERPL-MCNC: 0.6 MG/DL
BUN SERPL-MCNC: 33.1 MG/DL (ref 8–23)
CALCIUM SERPL-MCNC: 9 MG/DL (ref 8.8–10.4)
CHLORIDE SERPL-SCNC: 103 MMOL/L (ref 98–107)
CREAT SERPL-MCNC: 1.26 MG/DL (ref 0.67–1.17)
EGFRCR SERPLBLD CKD-EPI 2021: 58 ML/MIN/1.73M2
ERYTHROCYTE [DISTWIDTH] IN BLOOD BY AUTOMATED COUNT: 16.7 % (ref 10–15)
GLUCOSE BLDC GLUCOMTR-MCNC: 136 MG/DL (ref 70–99)
GLUCOSE BLDC GLUCOMTR-MCNC: 169 MG/DL (ref 70–99)
GLUCOSE BLDC GLUCOMTR-MCNC: 183 MG/DL (ref 70–99)
GLUCOSE BLDC GLUCOMTR-MCNC: 236 MG/DL (ref 70–99)
GLUCOSE BLDC GLUCOMTR-MCNC: 237 MG/DL (ref 70–99)
GLUCOSE SERPL-MCNC: 130 MG/DL (ref 70–99)
HCO3 SERPL-SCNC: 27 MMOL/L (ref 22–29)
HCT VFR BLD AUTO: 34 % (ref 40–53)
HGB BLD-MCNC: 10.6 G/DL (ref 13.3–17.7)
MCH RBC QN AUTO: 27.6 PG (ref 26.5–33)
MCHC RBC AUTO-ENTMCNC: 31.2 G/DL (ref 31.5–36.5)
MCV RBC AUTO: 89 FL (ref 78–100)
PLATELET # BLD AUTO: 132 10E3/UL (ref 150–450)
POTASSIUM SERPL-SCNC: 4 MMOL/L (ref 3.4–5.3)
PROT SERPL-MCNC: 7.1 G/DL (ref 6.4–8.3)
RBC # BLD AUTO: 3.84 10E6/UL (ref 4.4–5.9)
SODIUM SERPL-SCNC: 141 MMOL/L (ref 135–145)
WBC # BLD AUTO: 6.7 10E3/UL (ref 4–11)

## 2024-12-14 PROCEDURE — 250N000013 HC RX MED GY IP 250 OP 250 PS 637: Performed by: FAMILY MEDICINE

## 2024-12-14 PROCEDURE — 250N000011 HC RX IP 250 OP 636

## 2024-12-14 PROCEDURE — 82310 ASSAY OF CALCIUM: CPT

## 2024-12-14 PROCEDURE — 99232 SBSQ HOSP IP/OBS MODERATE 35: CPT | Mod: GC

## 2024-12-14 PROCEDURE — 250N000013 HC RX MED GY IP 250 OP 250 PS 637

## 2024-12-14 PROCEDURE — 120N000004 HC R&B MS OVERFLOW

## 2024-12-14 PROCEDURE — 82962 GLUCOSE BLOOD TEST: CPT

## 2024-12-14 PROCEDURE — 85014 HEMATOCRIT: CPT

## 2024-12-14 PROCEDURE — 36415 COLL VENOUS BLD VENIPUNCTURE: CPT

## 2024-12-14 PROCEDURE — 99232 SBSQ HOSP IP/OBS MODERATE 35: CPT | Performed by: INTERNAL MEDICINE

## 2024-12-14 PROCEDURE — 250N000013 HC RX MED GY IP 250 OP 250 PS 637: Performed by: INTERNAL MEDICINE

## 2024-12-14 PROCEDURE — 97530 THERAPEUTIC ACTIVITIES: CPT | Mod: GP

## 2024-12-14 RX ORDER — TORSEMIDE 20 MG/1
20 TABLET ORAL DAILY
Status: DISCONTINUED | OUTPATIENT
Start: 2024-12-14 | End: 2024-12-15 | Stop reason: HOSPADM

## 2024-12-14 RX ADMIN — DOXYCYCLINE 100 MG: 100 CAPSULE ORAL at 19:22

## 2024-12-14 RX ADMIN — CLOPIDOGREL BISULFATE 75 MG: 75 TABLET ORAL at 08:10

## 2024-12-14 RX ADMIN — ASPIRIN 81 MG: 81 TABLET, COATED ORAL at 08:09

## 2024-12-14 RX ADMIN — LEVOTHYROXINE SODIUM 50 MCG: 0.03 TABLET ORAL at 08:10

## 2024-12-14 RX ADMIN — ATORVASTATIN CALCIUM 80 MG: 40 TABLET, FILM COATED ORAL at 21:08

## 2024-12-14 RX ADMIN — INSULIN ASPART 1 UNITS: 100 INJECTION, SOLUTION INTRAVENOUS; SUBCUTANEOUS at 17:49

## 2024-12-14 RX ADMIN — Medication 3 MG: at 03:25

## 2024-12-14 RX ADMIN — ENOXAPARIN SODIUM 40 MG: 40 INJECTION SUBCUTANEOUS at 13:45

## 2024-12-14 RX ADMIN — TORSEMIDE 20 MG: 20 TABLET ORAL at 11:50

## 2024-12-14 RX ADMIN — DOXYCYCLINE 100 MG: 100 CAPSULE ORAL at 08:10

## 2024-12-14 RX ADMIN — AMOXICILLIN AND CLAVULANATE POTASSIUM 1 TABLET: 500; 125 TABLET, FILM COATED ORAL at 19:22

## 2024-12-14 RX ADMIN — Medication 1 CAPSULE: at 08:10

## 2024-12-14 RX ADMIN — AMOXICILLIN AND CLAVULANATE POTASSIUM 1 TABLET: 500; 125 TABLET, FILM COATED ORAL at 08:10

## 2024-12-14 RX ADMIN — INSULIN ASPART 2 UNITS: 100 INJECTION, SOLUTION INTRAVENOUS; SUBCUTANEOUS at 12:46

## 2024-12-14 ASSESSMENT — ACTIVITIES OF DAILY LIVING (ADL)
ADLS_ACUITY_SCORE: 64
ADLS_ACUITY_SCORE: 43
ADLS_ACUITY_SCORE: 64
ADLS_ACUITY_SCORE: 43
ADLS_ACUITY_SCORE: 64
ADLS_ACUITY_SCORE: 43
ADLS_ACUITY_SCORE: 64
ADLS_ACUITY_SCORE: 43
ADLS_ACUITY_SCORE: 64
ADLS_ACUITY_SCORE: 43
ADLS_ACUITY_SCORE: 64
ADLS_ACUITY_SCORE: 64
ADLS_ACUITY_SCORE: 43

## 2024-12-14 NOTE — PLAN OF CARE
Problem: Gas Exchange Impaired  Goal: Optimal Gas Exchange  Outcome: Progressing  Intervention: Optimize Oxygenation and Ventilation  Recent Flowsheet Documentation  Taken 12/14/2024 1542 by Tamie Quintanilla RN  Head of Bed (HOB) Positioning: HOB at 20-30 degrees     Problem: Wound  Goal: Optimal Wound Healing  Outcome: Progressing   Goal Outcome Evaluation:      Plan of Care Reviewed With: patient    Overall Patient Progress: improvingOverall Patient Progress: improving     Pt A/Ox4, on RA during day 4L oxymask while sleeping, NSR w/ BBB, denies pain, assistx1 w/ gait belt and walker. ACHS BG checks.     R heel wound vac in place, suction set to 100. No output.   L shin wound care completed.       Tamie Quintanilla, RN

## 2024-12-14 NOTE — PLAN OF CARE
Problem: Adult Inpatient Plan of Care  Goal: Absence of Hospital-Acquired Illness or Injury  Intervention: Prevent Skin Injury  Recent Flowsheet Documentation  Taken 12/13/2024 1600 by Bharathi Skelton RN  Body Position: position changed independently  Taken 12/13/2024 1200 by Bharathi Skelton RN  Body Position: position changed independently  Taken 12/13/2024 0800 by Bharathi Skelton RN  Body Position: position changed independently     Problem: Adult Inpatient Plan of Care  Goal: Absence of Hospital-Acquired Illness or Injury  Intervention: Prevent Infection  Recent Flowsheet Documentation  Taken 12/13/2024 1600 by Bharathi Skelton RN  Infection Prevention:   hand hygiene promoted   rest/sleep promoted  Taken 12/13/2024 1200 by Bharathi Skelton RN  Infection Prevention:   hand hygiene promoted   rest/sleep promoted  Taken 12/13/2024 0800 by Bharathi Skelton RN  Infection Prevention:   hand hygiene promoted   rest/sleep promoted   Goal Outcome Evaluation:  Patient A&Ox4, makes needs known. Vitally stable on 2-3 liters nasal cannula while asleep. Denies pain. Up in chair in morning. Difficult time being comfortable in room. Denies pain. Wound vac changed by WOC RN. Wound addressed on right ankle, placed medihoney and mepilex. Turned and repositioned q2h and as needed per comfort. Male external catheter used in AM but taken off per patient request.

## 2024-12-14 NOTE — PROVIDER NOTIFICATION
ED room 36    Pt name Herrera Camacho. Patient requesting sleep aid. Please advice. Geoff IBRAHIM RN

## 2024-12-14 NOTE — PLAN OF CARE
Problem: Skin Injury Risk Increased  Goal: Skin Health and Integrity  Outcome: Progressing  Intervention: Optimize Skin Protection  Problem: Gas Exchange Impaired  Goal: Optimal Gas Exchange  Outcome: Progressing  Intervention: Optimize Oxygenation and Ventilation  Problem: Wound  Goal: Absence of Infection Signs and Symptoms  Outcome: Progressing    Patient denies pain. VSS. On 3L of O2 nasal cannula. Wound VAC in place. CMS intact. On PO abx.    Patient educated on plan of care. Answered all questions and concerns. Verbalized understanding, plan of care ongoing.

## 2024-12-14 NOTE — PROGRESS NOTES
[facility] [place]    Progress Note - Hospitalist Service       Date of Admission:  12/11/2024    Assessment & Plan   Janice Nowak is a 79 year old male with PMHx of T2DM on insulin, PAD, CAD, HTN admitted on 12/11/2024 for several weeks of DIAS and LE edema, new diagnosis of HFpEF. Patient recently admitted for diabetic foot ulcer and osteomyelitis s/p ID and partial calcanectomy 10/24 - 11/1. Also recently incidentally identified hepatic lobe mass noted on US and confirmed on CT A/P 11/27; concerning for metastatic disease and planning for biopsy on 12/19. Remains admitted for ongoing monitoring of fluid status, possible discharge tomorrow. Resistant to TCU.      Acute Respiratory Distress - resolved  HFpEF  Pleural Effusion s/p thoracentesis   Presenting with dyspnea on exertion for multiple weeks. Was seen by PCP who recommended he be evaluated in the emergency department for DVT and PE rule out. CT-PE negative for PE but did show notable bilateral pleural effusion. US negative for DVT.  No prior diagnosis of heart failure, but certainly high risk with known diagnoses of T2DM, CAD, PAD. Echo on this admission suggestive of HFpEF. Pleural effusion s/p thoracentesis consistent with transudative process, consistent with heart failure. However there is certainly concern for malignancy leading to pleural effusion given recently identified liver mass. Cytology pending. Transitioned from IV to oral diuresis.   - Cardiology following    - continue torsemide 20 mg daily    - holding losartan 50 mg daily, spironolactone 25 mg daily, jardiance 10 mg daily   - telemetry  -  s/p thoracentesis    - transudative per lights criteria    - awaiting cytology to rule out malignancy     CAROLINE on CKD - improved  Creatinine up to 1.62 following initiation of diuresis. Creatinine 1.31 on admission. Improved to 1.26 this AM.  - continue to diuresis with close monitoring of CAROLINE   - daily BMP     LE Edema, R>L    DVT Ruled Out   Presenting with dyspnea on exertion for multiple weeks. Was seen by PCP who recommended he be evaluated in the emergency department for DVT and PE rule out. CT PE negative. US DVT negative. Likely related to foot wound, see below.   - Continue diuresis, see above   - Continue foot wound management, see below     Peripheral Artery Disease   Right diabetic foot ulcer and osteomyelitis s/p I&D and calcanectomy 10/24/2024  Hospitalized 10/24 - 11/1 for the above. Wound vac still present and had been on IV antibiotics up until recently. Transitioned to oral antibiotics which will be continued on this admission. There was a plan for LE angiogram 12/12/24 - rescheduled given patient admitted with new HF exacerbation.   - Podiatry consult    - continue to wear boot at all times    - continue wound cares     - NWB to RLE    - no urgent interventions planned at this time, follow up with Dr Patricio on outpatient basis   - Continue PTA antibiotics course: Augmentin 500-125mg BID and doxycycline 100mg BID (through 2/31 for 21 day course)  - WOC consulted  - Continue ASA 81 mg, plavix 75 mg, atorvastatin 80mg daily   - Reschedule angiogram following discharge      Right hepatic lobe mass   Incidentally noted on US and confirmed on CT A/P 11/27. 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 CT PE on admission reveals stable disease. Has established with outpatient heme/onc. No further work up needed at this time, but probable malignancy raises concern for metastatic disease contributing to new pleural effusion - see above. Question for mild abdominal distension, but with no ascites called out on various recent imaging studies will hold off on pursuing paracentesis.   - Continue following with heme/onc on outpatient basis    - plan for liver biopsy 12/19   - Daily CMP   - Consider paracentesis if abdominal distension worsens      Chronic  "conditions:  T2DM  - Lantus 28 units qAM   - MDSS  - Hold PTA steglatro and metformin     HTN  CKD3a  Cards planning to add low-dose BB as we continue to hold anti-HTN for kidney function.   - holding PTA losartan 50 mg + spironolactone 25 mg daily      Hypothyroidism  - PTA levothyroxine 50 mcg daily           Diet: Fluid restriction 1800 ML FLUID (and additional linked orders)  2 Gram Sodium Diet    DVT Prophylaxis: Enoxaparin (Lovenox) SQ  Reyes Catheter: Not present  Fluids: None - fluids restriction   Lines: None     Cardiac Monitoring: ACTIVE order. Indication: Acute decompensated heart failure (48 hours)  Code Status: No CPR- Do NOT Intubate      Clinically Significant Risk Factors               # Hypoalbuminemia: Lowest albumin = 3.4 g/dL at 12/13/2024  7:56 AM, will monitor as appropriate  # Coagulation Defect: INR = 1.36 (Ref range: 0.85 - 1.15) and/or PTT = N/A, will monitor for bleeding    # Hypertension: Noted on problem list           # DMII: A1C = 9.2 % (Ref range: <5.7 %) within past 6 months, PRESENT ON ADMISSION  # Obesity: Estimated body mass index is 32.54 kg/m  as calculated from the following:    Height as of this encounter: 1.727 m (5' 8\").    Weight as of this encounter: 97.1 kg (214 lb)., PRESENT ON ADMISSION       # Financial/Environmental Concerns: none         Social Drivers of Health   Housing Stability: High Risk (10/28/2024)    Housing Stability     Do you have housing? : No     Are you worried about losing your housing?: No   Tobacco Use: Medium Risk (12/9/2024)    Patient History     Smoking Tobacco Use: Former     Smokeless Tobacco Use: Never     Passive Exposure: Never   Interpersonal Safety: Low Risk  (10/28/2024)    Interpersonal Safety     Do you feel physically and emotionally safe where you currently live?: Yes     Within the past 12 months, have you been hit, slapped, kicked or otherwise physically hurt by someone?: No     Within the past 12 months, have you been humiliated " "or emotionally abused in other ways by your partner or ex-partner?: No   Recent Concern: Interpersonal Safety - High Risk (10/9/2024)    Interpersonal Safety     Do you feel physically and emotionally safe where you currently live?: No     Within the past 12 months, have you been hit, slapped, kicked or otherwise physically hurt by someone?: No     Within the past 12 months, have you been humiliated or emotionally abused in other ways by your partner or ex-partner?: No         Disposition Plan     Medically Ready for Discharge: Possibly tomorrow         The patient's care was discussed with the Attending Physician, Dr. Louie  .    Claudy Pak MD  Hospitalist Service  [facility]  Securely message with [employer] (more info)  Text page via [facility] Paging/Directory   ______________________________________________________________________    Interval History   NAEON.   Breathing fine.   Tolerating PO intake.   Wanting to go home, cards \"told me tomorrow.\"     Physical Exam   Vital Signs: Temp: 97.6  F (36.4  C) Temp src: Oral BP: 128/62 Pulse: 85   Resp: 19 SpO2: 95 % O2 Device: Nasal cannula Oxygen Delivery: 2 LPM  Weight: 214 lbs 0 oz  General: Sitting comfortably. No acute distress.Pleasant and cooperative.   HEENT: Conjunctivae are clear, nonicteric.   Respiratory: On RA. No respiratory distress. Poor air movement, but no crackles or wheezing.   Cardiac: RRR. No murmurs appreciated.   Abdominal: Abdomen is soft and non-tender. Some concern for distension, patient states this is baseline.   Extremities: Right LE with wound vac in place. Mild LE edema, improving.   Psychiatric: Good insight. Engaged in care plan.     Medical Decision Making   Please see A&P for additional details of medical decision making.      Data   ------------------------- PAST 24 HR DATA REVIEWED -----------------------------------------------  "

## 2024-12-14 NOTE — PROGRESS NOTES
Care Management Follow Up    Length of Stay (days): 3    Expected Discharge Date: 12/15/2024     Concerns to be Addressed: discharge planning     Patient plan of care discussed at interdisciplinary rounds: No    Anticipated Discharge Disposition: back to [facility].               Anticipated Discharge Services: Home Care, House[facility] -- both previously arranged so will resume on discharge.  Anticipated Discharge DME: Wound Vac - already has home wound vac. Will need hospital vac changed to his home vac at time of discharge.     Patient/family educated on Medicare website which has current facility and service quality ratings: no, not applicable.   Education Provided on the Discharge Plan: Yes  Patient/Family in Agreement with the Plan:  yes    Referrals Placed by CM/SW: None  Private pay costs discussed: Not applicable    Discussed  Partnership in Safe Discharge Planning  document with patient/family: No     Handoff Completed: No, handoff not indicated or clinically appropriate    Additional Information:  Received phone call today from Dr. Pasha henry Care Management know patient will likely be ready for discharge tomorrow, 12/15.     HomeCare  Already open to [facility]   I sent message via Epic on 12/14/24 at 2:45 pm informing them patient will likely be ready for discharge on 12/15  Will need discharge orders sent at time of discharge.  Mariaelena, Complex Care  with [facility]  I left voicemail on 12/14/24 at 2:50 pm informing patient will likely be ready for discharge on 12/15  Son to bring home Vac Supplies and transport  Per Janice, they know to bring his home vac when they come to get him  [facility]  Main  Number: [phone]  Voicemail left for Health Director Maine Remy and left SHARON phone #[phone] as call back number.   [facility] so  okay for patient to return back without prior approval from facility.     Next Steps:   Send discharge orders to HomeCare Agency when they are completed  Determine time son will  for discharge  On oxygen when I saw patient on evening of 12/14. Spoke with bedside RN - she is planning to attempt to wean him off O2 as he does NOT have oxygen at home.       Ysabel Matias RN  [facility] ED Care Manager

## 2024-12-14 NOTE — PLAN OF CARE
"  Problem: Adult Inpatient Plan of Care  Goal: Plan of Care Review  Description: The Plan of Care Review/Shift note should be completed every shift.  The Outcome Evaluation is a brief statement about your assessment that the patient is improving, declining, or no change.  This information will be displayed automatically on your shift  note.  Outcome: Progressing     Problem: Adult Inpatient Plan of Care  Goal: Patient-Specific Goal (Individualized)  Description: You can add care plan individualizations to a care plan. Examples of Individualization might be:  \"Parent requests to be called daily at 9am for status\", \"I have a hard time hearing out of my right ear\", or \"Do not touch me to wake me up as it startles  me\".  Outcome: Progressing   Goal Outcome Evaluation:         Patient denies pain. Vital signs stable. On 3L of O2 nasal cannula. Wound VAC in place. CMS intact. On PO abx. Tolerating diet.                 "

## 2024-12-14 NOTE — PLAN OF CARE
Problem: Adult Inpatient Plan of Care  Goal: Plan of Care Review  Description: The Plan of Care Review/Shift note should be completed every shift.  The Outcome Evaluation is a brief statement about your assessment that the patient is improving, declining, or no change.  This information will be displayed automatically on your shift  note.  Outcome: Progressing  Flowsheets (Taken 12/14/2024 9445)  Plan of Care Reviewed With: patient  Overall Patient Progress: improving   Goal Outcome Evaluation:      Plan of Care Reviewed With: patient    Overall Patient Progress: improvingOverall Patient Progress: improving         Admitted inpatient with DIAS. DX: HEPEF, Pleural effusion, Thoracentesis was done yesterday.  Patient endorsed to breathing little littler better than before. On 3 liters nc . Oxygen use is not baseline for this patient. Cardiology following.  WOC consults for wounds. Patient on vac therapy at pressure of 100. Infrequent non productive cough. Tele monitoring. NSR with bundle branch block.  Uses the urinal.  Diabetic, AC and HS blood glucose. Spot check on this shift was 167 mg/dl. 1800 ml fluid restriction.

## 2024-12-14 NOTE — PROGRESS NOTES
[facility]  Cardiology     Progress Note: Danielito Pretty MD    Primary Care: Dr. Machado, April    Primary Cardiology: Mimi Tellez MD    Assessment:       HFpEF: New diagnosis for the patient with LVEF 55-60%.  The patient hemodynamically stable but has some hypertension.  Patient with a net diuresis of 3.5 L thus far but yesterday I's and O's were equal.  CKD with renal function currently stable.  Today the patient demonstrates ongoing mild volume overload.  Patient would like to go home today, but I reviewed his current clinical status and he agrees to stay.  We also had a discussion regarding the critical need for following a 2 g sodium restricted diet  Essential hypertension: The patient's blood pressure is not at target on his current medications.  Losartan has been on hold due to fluctuating renal function.  Patient would be a candidate for addition of low-dose beta-blocker therapy to assist in blood pressure control as he has adequate heart rate.       Active Problems:    DIAS (dyspnea on exertion)     LOS: 3 days       Recommendations:  Oral diuretic will be administered today as creatinine has come back down to baseline.  Add low-dose beta-blocker for improved blood pressure control  Reassess renal function and clinical status tomorrow with possible discharge.    Time spent: I have spent 40 minutes with the patient, and >50% of which was spent on coordination of care and counseling as outlined above.      Subjective:  Janice Nowak (79 year old male) is new to me, chart is reviewed and the patient is examined.  The patient reports that he slept reasonably well he is much less short of breath supine.  He would like to be discharged today but after discussion understands that we need to convert him to an oral regimen prior to discharge.    Current Outpatient Medications   Medication Sig Dispense Refill    amoxicillin-clavulanate (AUGMENTIN) 500-125 MG tablet Take 1 tablet by mouth 2  times daily for 21 days. 42 tablet 0    aspirin 81 MG EC tablet Take 81 mg by mouth daily.      atorvastatin (LIPITOR) 80 MG tablet Take 80 mg by mouth at bedtime.      clopidogrel (PLAVIX) 75 MG tablet Take 75 mg by mouth daily.      co-enzyme Q-10 100 MG CAPS capsule Take 200 mg by mouth daily.      doxycycline hyclate (VIBRA-TABS) 100 MG tablet Take 1 tablet (100 mg) by mouth 2 times daily for 21 days. 42 tablet 0    ertugliflozin (STEGLATRO) 5 MG TABS Take 5 mg by mouth every morning.      insulin aspart (NOVOLOG FLEXPEN) 100 UNIT/ML pen Inject 5 Units subcutaneously 3 times daily (with meals). Novolog Flexpen: 5 units with breakfast, 5 units with lunch, 5 units with dinner. 15 mL 0    insulin glargine (LANTUS PEN) 100 UNIT/ML pen Inject 35 Units subcutaneously every morning. 15 mL 0    lactobacillus rhamnosus, GG, (CULTURELL) capsule Take 1 capsule by mouth daily.      levothyroxine (SYNTHROID/LEVOTHROID) 50 MCG tablet Take 50 mcg by mouth daily.      losartan (COZAAR) 50 MG tablet Take 50 mg by mouth at bedtime.      metFORMIN (GLUCOPHAGE XR) 500 MG 24 hr tablet Take 1,000 mg by mouth 2 times daily (with meals).      Multiple Vitamins-Minerals (PRESERVISION AREDS 2 PO) Take 2 capsules by mouth daily.      spironolactone (ALDACTONE) 25 MG tablet Take 25 mg by mouth daily.      vitamin C with B complex (B COMPLEX-C) tablet Take 1 tablet by mouth daily.         Objective:   Vital signs in last 24 hours:  Temp:  [97.3  F (36.3  C)-98  F (36.7  C)] 98  F (36.7  C)  Pulse:  [80-87] 82  Resp:  [18-32] 22  BP: (104-162)/(55-81) 162/81  SpO2:  [90 %-96 %] 96 %  Weight:   [unfilled]   Weight change:       Physical Exam:  General: The patient is alert oriented to person place and situation.  The patient is in no acute distress at the time of my evaluation.  Eyes: Pupils are equal, round, and reactive to light.  Conjunctiva and sclera are clear.  ENT: Oral mucosa is moist and without redness. No evident nasal  discharge.  Pulmonary: Lungs are notable for some scant rales in the bases bilaterally.      Cardiovascular exam: Rhythm is regular. S1 and S2 are normal. No apparent kellie. No significant murmur is present. JVP is mildly elevated.  Lower extremities demonstrate no significant edema. Distal pulses are diminished on the left (boot in place on the right).    Abdomen is soft, nontender, with no organomegaly. Bowel sounds are present.  Musculoskeletal: Spine is straight. Extremities without deformity disease right foot is in a boot).  Neuro: Gait is not observed as the patient is at bedrest.     Skin is warm, dry, and otherwise intact.        Cardiographics:   Echocardiogram 12/12/2024  The left ventricle is normal in size. There is mild concentric left  ventricular hypertrophy.  Left ventricular systolic function is normal. The visual ejection fraction is  55-60%. The inferior wall is mildly hypokinetic.  Grade II or moderate diastolic dysfunction.     The right ventricle is normal in size and function.  The left atrium is severely dilated. Right atrium not well visualized.  IVC diameter <2.1 cm collapsing >50% with sniff suggests a normal RA pressure  of 3 mmHg.  There is no comparison study available.    Radiology:      Lab Results:   Lab Results   Component Value Date     12/14/2024    CO2 27 12/14/2024    CO2 22 07/06/2020    BUN 33.1 12/14/2024    BUN 30 07/06/2020     Lab Results   Component Value Date    WBC 6.7 12/14/2024    HGB 10.6 12/14/2024    HCT 34.0 12/14/2024    MCV 89 12/14/2024     12/14/2024     Lab Results   Component Value Date    INR 1.36 12/12/2024       Outside record review:

## 2024-12-14 NOTE — PLAN OF CARE
Occupational Therapy: Orders received. Chart reviewed and discussed with care team.? Occupational Therapy not indicated due to per PT, pt has no OT needs..? Defer discharge recommendations to OT.? Will complete orders.

## 2024-12-15 VITALS
DIASTOLIC BLOOD PRESSURE: 53 MMHG | TEMPERATURE: 98.4 F | OXYGEN SATURATION: 93 % | HEART RATE: 88 BPM | HEIGHT: 68 IN | SYSTOLIC BLOOD PRESSURE: 103 MMHG | WEIGHT: 206.2 LBS | RESPIRATION RATE: 18 BRPM | BODY MASS INDEX: 31.25 KG/M2

## 2024-12-15 LAB
ALBUMIN SERPL BCG-MCNC: 3.8 G/DL (ref 3.5–5.2)
ALP SERPL-CCNC: 78 U/L (ref 40–150)
ALT SERPL W P-5'-P-CCNC: 26 U/L (ref 0–70)
ANION GAP SERPL CALCULATED.3IONS-SCNC: 12 MMOL/L (ref 7–15)
AST SERPL W P-5'-P-CCNC: 32 U/L (ref 0–45)
BILIRUB SERPL-MCNC: 0.6 MG/DL
BUN SERPL-MCNC: 40.5 MG/DL (ref 8–23)
CALCIUM SERPL-MCNC: 8.8 MG/DL (ref 8.8–10.4)
CHLORIDE SERPL-SCNC: 99 MMOL/L (ref 98–107)
CREAT SERPL-MCNC: 1.45 MG/DL (ref 0.67–1.17)
EGFRCR SERPLBLD CKD-EPI 2021: 49 ML/MIN/1.73M2
ERYTHROCYTE [DISTWIDTH] IN BLOOD BY AUTOMATED COUNT: 16.6 % (ref 10–15)
GLUCOSE BLDC GLUCOMTR-MCNC: 178 MG/DL (ref 70–99)
GLUCOSE BLDC GLUCOMTR-MCNC: 259 MG/DL (ref 70–99)
GLUCOSE SERPL-MCNC: 169 MG/DL (ref 70–99)
HCO3 SERPL-SCNC: 28 MMOL/L (ref 22–29)
HCT VFR BLD AUTO: 32.4 % (ref 40–53)
HGB BLD-MCNC: 10.3 G/DL (ref 13.3–17.7)
MAGNESIUM SERPL-MCNC: 2 MG/DL (ref 1.7–2.3)
MCH RBC QN AUTO: 27.8 PG (ref 26.5–33)
MCHC RBC AUTO-ENTMCNC: 31.8 G/DL (ref 31.5–36.5)
MCV RBC AUTO: 87 FL (ref 78–100)
PLATELET # BLD AUTO: 126 10E3/UL (ref 150–450)
POTASSIUM SERPL-SCNC: 4 MMOL/L (ref 3.4–5.3)
PROT SERPL-MCNC: 7.1 G/DL (ref 6.4–8.3)
RBC # BLD AUTO: 3.71 10E6/UL (ref 4.4–5.9)
SODIUM SERPL-SCNC: 139 MMOL/L (ref 135–145)
WBC # BLD AUTO: 6.5 10E3/UL (ref 4–11)

## 2024-12-15 PROCEDURE — 85027 COMPLETE CBC AUTOMATED: CPT

## 2024-12-15 PROCEDURE — 250N000013 HC RX MED GY IP 250 OP 250 PS 637: Performed by: FAMILY MEDICINE

## 2024-12-15 PROCEDURE — 250N000013 HC RX MED GY IP 250 OP 250 PS 637: Performed by: INTERNAL MEDICINE

## 2024-12-15 PROCEDURE — 36415 COLL VENOUS BLD VENIPUNCTURE: CPT

## 2024-12-15 PROCEDURE — 84295 ASSAY OF SERUM SODIUM: CPT

## 2024-12-15 PROCEDURE — 99238 HOSP IP/OBS DSCHRG MGMT 30/<: CPT | Mod: GC

## 2024-12-15 PROCEDURE — 99232 SBSQ HOSP IP/OBS MODERATE 35: CPT | Performed by: INTERNAL MEDICINE

## 2024-12-15 PROCEDURE — 83735 ASSAY OF MAGNESIUM: CPT

## 2024-12-15 RX ORDER — TORSEMIDE 20 MG/1
20 TABLET ORAL DAILY
Qty: 30 TABLET | Refills: 1 | Status: SHIPPED | OUTPATIENT
Start: 2024-12-16

## 2024-12-15 RX ADMIN — INSULIN ASPART 1 UNITS: 100 INJECTION, SOLUTION INTRAVENOUS; SUBCUTANEOUS at 12:50

## 2024-12-15 RX ADMIN — CLOPIDOGREL BISULFATE 75 MG: 75 TABLET ORAL at 08:24

## 2024-12-15 RX ADMIN — AMOXICILLIN AND CLAVULANATE POTASSIUM 1 TABLET: 500; 125 TABLET, FILM COATED ORAL at 08:24

## 2024-12-15 RX ADMIN — LEVOTHYROXINE SODIUM 50 MCG: 0.03 TABLET ORAL at 08:24

## 2024-12-15 RX ADMIN — TORSEMIDE 20 MG: 20 TABLET ORAL at 08:24

## 2024-12-15 RX ADMIN — DOXYCYCLINE 100 MG: 100 CAPSULE ORAL at 08:24

## 2024-12-15 RX ADMIN — Medication 1 CAPSULE: at 08:24

## 2024-12-15 RX ADMIN — INSULIN ASPART 1 UNITS: 100 INJECTION, SOLUTION INTRAVENOUS; SUBCUTANEOUS at 08:26

## 2024-12-15 RX ADMIN — ASPIRIN 81 MG: 81 TABLET, COATED ORAL at 08:24

## 2024-12-15 ASSESSMENT — ACTIVITIES OF DAILY LIVING (ADL)
ADLS_ACUITY_SCORE: 43

## 2024-12-15 NOTE — PROGRESS NOTES
Physical Therapy Discharge Summary    Reason for therapy discharge:    Discharged to home with home therapy.    Progress towards therapy goal(s). See goals on Care Plan in [facility] electronic health record for goal details.  Goals not met.  Barriers to achieving goals:   discharge from facility.    Therapy recommendation(s):    Continued therapy is recommended.  Rationale/Recommendations:  Home PT.

## 2024-12-15 NOTE — PROGRESS NOTES
Care Management Discharge Note    Discharge Date: 12/15/2024       Discharge Disposition: Home, Home Care, DME    Discharge Services: Home Care    Discharge DME: Wound Vac    Discharge Transportation: family or friend will provide    Private pay costs discussed: Not applicable    Does the patient's insurance plan have a 3 day qualifying hospital stay waiver?  No    PAS Confirmation Code: NA  Patient/family educated on Medicare website which has current facility and service quality ratings: yes    Education Provided on the Discharge Plan: Yes  Persons Notified of Discharge Plans: Pt  Patient/Family in Agreement with the Plan: yes    Handoff Referral Completed: No, handoff not indicated or clinically appropriate    Additional Information:  Pt to discharge home and resume PT/OT/RN/HHA home care - [facility]. Family to transport. SW faxed orders to home care agency.     BACILIO Cisneros

## 2024-12-15 NOTE — PLAN OF CARE
"  Problem: Adult Inpatient Plan of Care  Goal: Plan of Care Review  Description: The Plan of Care Review/Shift note should be completed every shift.  The Outcome Evaluation is a brief statement about your assessment that the patient is improving, declining, or no change.  This information will be displayed automatically on your shift  note.  Outcome: Met  Goal: Patient-Specific Goal (Individualized)  Description: You can add care plan individualizations to a care plan. Examples of Individualization might be:  \"Parent requests to be called daily at 9am for status\", \"I have a hard time hearing out of my right ear\", or \"Do not touch me to wake me up as it startles  me\".  Outcome: Met  Goal: Absence of Hospital-Acquired Illness or Injury  Outcome: Met  Intervention: Identify and Manage Fall Risk  Recent Flowsheet Documentation  Taken 12/15/2024 0830 by Melanie Richardson RN  Safety Promotion/Fall Prevention: activity supervised  Intervention: Prevent Skin Injury  Recent Flowsheet Documentation  Taken 12/15/2024 0830 by Melanie Richardson RN  Skin Protection: adhesive use limited  Intervention: Prevent Infection  Recent Flowsheet Documentation  Taken 12/15/2024 0830 by Melanie Richardson RN  Infection Prevention: hand hygiene promoted  Goal: Optimal Comfort and Wellbeing  Outcome: Met  Goal: Readiness for Transition of Care  Outcome: Met     Problem: Skin Injury Risk Increased  Goal: Skin Health and Integrity  Outcome: Met  Intervention: Plan: Nurse Driven Intervention: Moisture Management  Recent Flowsheet Documentation  Taken 12/15/2024 0830 by Melanie Richardson RN  Moisture Interventions: Encourage regular toileting  Intervention: Plan: Nurse Driven Intervention: Friction and Shear  Recent Flowsheet Documentation  Taken 12/15/2024 0830 by Melanie Richardson RN  Friction/Shear Interventions: HOB 30 degrees or less  Intervention: Optimize Skin Protection  Recent Flowsheet Documentation  Taken 12/15/2024 0830 " by Melanie Richardson RN  Pressure Reduction Techniques: frequent weight shift encouraged  Pressure Reduction Devices: other (see comments)  Skin Protection: adhesive use limited  Activity Management: activity adjusted per tolerance     Problem: Comorbidity Management  Goal: Blood Glucose Levels Within Targeted Range  Outcome: Met  Intervention: Monitor and Manage Glycemia  Recent Flowsheet Documentation  Taken 12/15/2024 0830 by Melanie Richardson RN  Medication Review/Management: medications reviewed  Goal: Maintenance of Heart Failure Symptom Control  Outcome: Met  Intervention: Maintain Heart Failure Management  Recent Flowsheet Documentation  Taken 12/15/2024 0830 by Melanie Richardson RN  Medication Review/Management: medications reviewed     Problem: Gas Exchange Impaired  Goal: Optimal Gas Exchange  Outcome: Met     Problem: Wound  Goal: Optimal Coping  Outcome: Met  Intervention: Support Patient and Family Response  Recent Flowsheet Documentation  Taken 12/15/2024 0830 by Melanie Richardson RN  Family/Support System Care: self-care encouraged  Goal: Optimal Functional Ability  Outcome: Met  Intervention: Optimize Functional Ability  Recent Flowsheet Documentation  Taken 12/15/2024 0830 by Melanie Richardson RN  Assistive Device Utilized:   walker   gait belt  Activity Management: activity adjusted per tolerance  Activity Assistance Provided: assistance, 1 person  Goal: Absence of Infection Signs and Symptoms  Outcome: Met  Goal: Improved Oral Intake  Outcome: Met  Goal: Optimal Pain Control and Function  Outcome: Met  Goal: Skin Health and Integrity  Outcome: Met  Intervention: Optimize Skin Protection  Recent Flowsheet Documentation  Taken 12/15/2024 0830 by Melanie Richardson RN  Pressure Reduction Techniques: frequent weight shift encouraged  Pressure Reduction Devices: other (see comments)  Activity Management: activity adjusted per tolerance  Goal: Optimal Wound Healing  Outcome: Met   Goal  Outcome Evaluation:       Discharging home today, Son to transport. Wound vac was changed back to home wound vac. Discharge paperwork was reviewed with patient and medications were sent to personal pharmacy.

## 2024-12-15 NOTE — PROGRESS NOTES
[facility]  Cardiology      Progress Note: Danielito Pretty MD    Primary Care: Dr. Machado, April    Primary Cardiology: Mimi Tellez MD    Assessment:       HFpEF: New diagnosis for the patient with LVEF 55-60%.  The patient hemodynamically stable but has some hypertension.  Patient with a net diuresis of 3.5 L thus far but yesterday I's and O's were equal.  CKD with mild bump again in his creatinine today.  Today the patient demonstrates that he is euvolemic.  Patient strongly desires to go home today agree, but he will close outpatient follow-up as he has a increased risk for potential exacerbation.  We also had a discussion regarding the critical need for following a 2 g sodium restricted diet  Essential hypertension: The patient's blood pressure is better today. Losartan has been on hold due to fluctuating renal function.  Patient would be a candidate for addition of low-dose beta-blocker therapy to assist in blood pressure control as he has adequate heart rate.     Active Problems:    DIAS (dyspnea on exertion)     LOS: 4 days       Recommendations:  Okay to discharge today from cardiology perspective, but will require close follow-up including BMP drawn on Tuesday follow-up in the heart failure clinic in the next 7-10 days (I have placed that order, and clinic will call him).  Continue to hold losartan and spironolactone as outpatient until renal function is stabilized.  I reinforced the need for the patient to follow a 2 g sodium restricted diet.    Time spent: I have spent 35 minutes with the patient, and >50% of which was spent on coordination of care and counseling as outlined above.      Subjective:  Janice Nowak (79 year old male) is seen for follow-up for his acute exacerbation of diastolic heart failure and hypertension.  The patient reports that he feels better again today.  He is not having any difficulty with his medications.    No current outpatient medications on file.        Objective:   Vital signs in last 24 hours:  Temp:  [97.6  F (36.4  C)-98.9  F (37.2  C)] 98.4  F (36.9  C)  Pulse:  [78-88] 88  Resp:  [16-21] 18  BP: (103-149)/(50-68) 103/53  SpO2:  [84 %-96 %] 93 %  Weight:   [unfilled]   Weight change:       Physical Exam:  General: The patient is alert oriented to person place and situation.  The patient is in no acute distress at the time of my evaluation.  Eyes: Pupils are equal, round, and reactive to light.  Conjunctiva and sclera are clear.  ENT: Oral mucosa is moist and without redness. No evident nasal discharge.  Pulmonary: Lungs are clear bilaterally with no rales, rhonchi, or wheezes.    Cardiovascular exam: Rhythm is regular. S1 and S2 are normal. No apparent kellie. No significant murmur is present. JVP is normal. Lower extremities demonstrate no significant edema. Distal pulses are diminished.    Abdomen is soft, nontender, with no organomegaly. Bowel sounds are present.  Musculoskeletal: Spine is straight. Extremities without deformity.  Neuro: Gait is observed as the patient is resting in a chair.     Skin is warm, dry, and otherwise intact.        Cardiographics:   Cardiac telemetry, personally reviewed demonstrates normal sinus rhythm.    Radiology:      Lab Results:   Lab Results   Component Value Date     12/15/2024    CO2 28 12/15/2024    CO2 22 07/06/2020    BUN 40.5 12/15/2024    BUN 30 07/06/2020     Lab Results   Component Value Date    WBC 6.5 12/15/2024    HGB 10.3 12/15/2024    HCT 32.4 12/15/2024    MCV 87 12/15/2024     12/15/2024     Lab Results   Component Value Date    INR 1.36 12/12/2024       Outside record review:

## 2024-12-15 NOTE — PLAN OF CARE
"  Problem: Adult Inpatient Plan of Care  Goal: Plan of Care Review  Description: The Plan of Care Review/Shift note should be completed every shift.  The Outcome Evaluation is a brief statement about your assessment that the patient is improving, declining, or no change.  This information will be displayed automatically on your shift  note.  Outcome: Progressing  Goal: Patient-Specific Goal (Individualized)  Description: You can add care plan individualizations to a care plan. Examples of Individualization might be:  \"Parent requests to be called daily at 9am for status\", \"I have a hard time hearing out of my right ear\", or \"Do not touch me to wake me up as it startles  me\".  Outcome: Progressing  Goal: Absence of Hospital-Acquired Illness or Injury  Outcome: Progressing  Intervention: Identify and Manage Fall Risk  Recent Flowsheet Documentation  Taken 12/15/2024 0400 by Aayush Rod, RN  Safety Promotion/Fall Prevention:   activity supervised   safety round/check completed   room organization consistent   room near nurse's station   room door open   patient and family education   nonskid shoes/slippers when out of bed   mobility aid in reach   lighting adjusted   increase visualization of patient   increased rounding and observation   clutter free environment maintained  Taken 12/15/2024 0000 by Aayush Rod, RN  Safety Promotion/Fall Prevention:   activity supervised   safety round/check completed   room organization consistent   room near nurse's station   room door open   patient and family education   nonskid shoes/slippers when out of bed   mobility aid in reach   lighting adjusted   increase visualization of patient   increased rounding and observation   clutter free environment maintained  Intervention: Prevent Skin Injury  Recent Flowsheet Documentation  Taken 12/15/2024 0400 by Aayush Rod, RN  Body Position:   left   right   turned   heels elevated  Skin Protection: " adhesive use limited  Taken 12/15/2024 0200 by Aayush Rod RN  Body Position:   left   right   turned   heels elevated  Taken 12/15/2024 0000 by Aayush Rod RN  Body Position:   left   right   turned   heels elevated  Skin Protection: adhesive use limited  Intervention: Prevent and Manage VTE (Venous Thromboembolism) Risk  Recent Flowsheet Documentation  Taken 12/15/2024 0400 by Aayush Rod RN  VTE Prevention/Management: SCDs off (sequential compression devices)  Taken 12/15/2024 0000 by Aayush Rod RN  VTE Prevention/Management: SCDs off (sequential compression devices)  Intervention: Prevent Infection  Recent Flowsheet Documentation  Taken 12/15/2024 0400 by Aayush Rod RN  Infection Prevention:   hand hygiene promoted   rest/sleep promoted   personal protective equipment utilized   single patient room provided   visitors restricted/screened  Taken 12/15/2024 0000 by Aayush Rod RN  Infection Prevention:   hand hygiene promoted   rest/sleep promoted   personal protective equipment utilized   single patient room provided   visitors restricted/screened  Goal: Optimal Comfort and Wellbeing  Outcome: Progressing  Intervention: Monitor Pain and Promote Comfort  Recent Flowsheet Documentation  Taken 12/15/2024 0400 by Aayush Rod RN  Pain Management Interventions:   care clustered   distraction   diversional activity provided   emotional support   environmental changes   pain management plan reviewed with patient/caregiver   pillow support provided   quiet environment facilitated   relaxation techniques promoted   repositioned   rest  Taken 12/15/2024 0000 by Aayush Rod RN  Pain Management Interventions:   care clustered   distraction   diversional activity provided   emotional support   environmental changes   pain management plan reviewed with patient/caregiver   pillow support provided   quiet environment facilitated    relaxation techniques promoted   repositioned   rest  Goal: Readiness for Transition of Care  Outcome: Progressing   Goal Outcome Evaluation:    Cardiac:  NSR with BBB, rate 70-80's.   Respiratory:  Rate 16-20, non-labored.  Sat's: Maintaining mid 90's at RA.  LS: Diminished in bases, bilat.  Neuro:  WNL  GI:  Passing flatus.  BS: Present X4 quadrants.  Wound: Osteomyelitis/Ulcer R LE.  Wound vac in place.  Site: healing well.  :  Voiding.  Minimal UOP (See I/O).  Pain:  Denies.  Ambulation:  Up with heavy assist X2.  Labs:  AM labs pending.  Plan:  Telemetry, I/O, weights.  Pending possible discharge today.

## 2024-12-15 NOTE — DISCHARGE SUMMARY
"[facility]  Discharge Summary - Medicine & Pediatrics       Date of Admission:  12/11/2024  Date of Discharge:  12/15/2024  Discharging Provider: Dr. Cruz, Dr. Louie  Discharge Service: Hospitalist Service    Discharge Diagnoses   Acute Respiratory Distress, resolved  Pleural Effusion s/p Thoracentesis, improving  Heart Failure with Preserved Ejection Fraction, exacerbation, improving  Essential hypertension, stable  Acute Kidney Injury  Chronic Kidney Disease  R diabetic foot ullcer and osteomyelitis s/p I&D and calcanectomy 10/24/24      Clinically Significant Risk Factors     # DMII: A1C = 9.2 % (Ref range: <5.7 %) within past 6 months  # Obesity: Estimated body mass index is 31.35 kg/m  as calculated from the following:    Height as of this encounter: 1.727 m (5' 8\").    Weight as of this encounter: 93.5 kg (206 lb 3.2 oz).       Follow-ups Needed After Discharge   Follow-up Appointments       Hospital Follow-up with Existing Primary Care Provider (PCP)      Please see details below         Schedule Primary Care visit within: 3-5 Days (Urgent)   Recommended labs and Imaging (to be ordered by Primary Care Provider): BMP - 12/17/24 - assess kidney function               Unresulted Labs Ordered in the Past 30 Days of this Admission       Date and Time Order Name Status Description    12/12/2024  1:32 PM Cytology, non-gynecologic In process     12/12/2024  1:32 PM Pleural fluid Aerobic Bacterial Culture Routine With Gram Stain Preliminary         These results will be followed up by PCP    Discharge Disposition   Discharged to home  Condition at discharge: Stable    Hospital Course   Janice Nowak is a 79 year old male with PMHx of T2DM on insulin, PAD, CAD, HTN admitted on 12/11/2024 for several weeks of DIAS and LE edema, new diagnosis of HFpEF. Patient recently admitted for diabetic foot ulcer and osteomyelitis s/p ID and partial calcanectomy 10/24 - 11/1. Also recently incidentally " identified hepatic lobe mass noted on US and confirmed on CT A/P 11/27; concerning for metastatic disease and planning for biopsy on 12/19.   The following problems were addressed during his hospitalization:    Acute Respiratory Distress - resolved  HFpEF  Pleural Effusion s/p thoracentesis   Presenting with dyspnea on exertion for multiple weeks. Was seen by PCP who recommended he be evaluated in the emergency department for DVT and PE rule out. CT-PE negative for PE but did show notable bilateral pleural effusion. US negative for DVT.  No prior diagnosis of heart failure, but certainly high risk with known diagnoses of T2DM, CAD, PAD. Echo on this admission suggestive of HFpEF. Pleural effusion s/p thoracentesis consistent with transudative process, consistent with heart failure. However there is certainly concern for malignancy leading to pleural effusion given recently identified liver mass. Cytology pending. Transitioned from IV to oral diuresis.   - Cardiology following               - continue torsemide 20 mg daily at discharge              - Continue holding losartan 50 mg daily, spironolactone 25 mg daily, jardiance 10 mg at discharge   -Follow up with [facility] within 7-10 days OP per cardiology  - telemetry  -  s/p thoracentesis               - transudative per lights criteria               - awaiting cytology to rule out malignancy      CAROLINE on CKD - improved  Creatinine up to 1.62 following initiation of diuresis. Creatinine 1.31 on admission. Cr 1.45 on day of discharge, will need repeat BMP at PCP follow up (by 12/17/24)     LE Edema, R>L   DVT Ruled Out   Presenting with dyspnea on exertion for multiple weeks. Was seen by PCP who recommended he be evaluated in the emergency department for DVT and PE rule out. CT PE negative. US DVT negative. Likely related to foot wound, see below.   - Continue diuresis, see above   - Continue foot wound management, see below      Peripheral Artery Disease    Right diabetic foot ulcer and osteomyelitis s/p I&D and calcanectomy 10/24/2024  Hospitalized 10/24 - 11/1 for the above. Wound vac still present and had been on IV antibiotics up until recently. Transitioned to oral antibiotics which will be continued on this admission. There was a plan for LE angiogram 12/12/24 - rescheduled given patient admitted with new HF exacerbation.   - Podiatry consult               - continue to wear boot at all times               - continue wound cares                - NWB to RLE               - no urgent interventions planned at this time, follow up with Dr Patricio on outpatient basis   - Continue PTA antibiotics course: Augmentin 500-125mg BID and doxycycline 100mg BID (through 2/31 for 21 day course)  - WOC consulted  - Continue ASA 81 mg, plavix 75 mg, atorvastatin 80mg daily   - Reschedule angiogram following discharge      Right hepatic lobe mass   Incidentally noted on US and confirmed on CT A/P 11/27. 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 CT PE on admission reveals stable disease. Has established with outpatient heme/onc. No further work up needed at this time, but probable malignancy raises concern for metastatic disease contributing to new pleural effusion - see above. Question for mild abdominal distension, but with no ascites called out on various recent imaging studies will hold off on pursuing paracentesis.   - Continue following with heme/onc on outpatient basis               - plan for liver biopsy 12/19      Chronic conditions:  T2DM  - Lantus 22 units qAM at discharge  - Continue novolog 5 units TID w/meals  - Continue PTA steglatro and metformin at discharge     HTN  CKD3a  Cards planning to add low-dose BB as we continue to hold anti-HTN for kidney function.   - holding PTA losartan 50 mg + spironolactone 25 mg daily   -Continue Torsemide at discharge     Hypothyroidism  - PTA levothyroxine 50  mcg daily     Consultations This Hospital Stay   CORE CLINIC EVALUATION IP CONSULT  CARDIOLOGY IP CONSULT  PHYSICAL THERAPY ADULT IP CONSULT  OCCUPATIONAL THERAPY ADULT IP CONSULT  CARE MANAGEMENT / SOCIAL WORK IP CONSULT  WOUND OSTOMY CONTINENCE NURSE  IP CONSULT  PODIATRY IP CONSULT  CARE MANAGEMENT / SOCIAL WORK IP CONSULT    Code Status   No CPR- Do NOT Intubate       The patient was discussed with Dr. Liban Cruz MD  [facility] Team [facility] HEART CARE  [address] [place] [id number]  Phone: [phone]  Fax: [phone]  ______________________________________________________________________    Physical Exam   Vital Signs: Temp: 98.4  F (36.9  C) Temp src: Oral BP: 103/53 Pulse: 88   Resp: 18 SpO2: 93 % O2 Device: None (Room air) Oxygen Delivery: 4 LPM  Weight: 206 lbs 3.2 oz    General: Sitting comfortably. No acute distress.Pleasant and cooperative.   HEENT: Conjunctivae are clear, nonicteric.   Respiratory: On RA. No respiratory distress. Poor air movement, but no crackles or wheezing.   Cardiac: RRR. No murmurs appreciated.   Abdominal: Abdomen is soft and non-tender. Some concern for distension, patient states this is baseline.   Extremities: Right LE with wound vac in place. Mild LE edema, improving.   Psychiatric: Good insight. Engaged in care plan.          Primary Care Physician   Anayeli Machado    Discharge Orders      Reason for your hospital stay    Heart Failure with Preserved Ejection fraction, improving  Acute Kidney Injury in setting of chronic kidney disease     Activity    Your activity upon discharge: activity as tolerated     Diet    Follow this diet upon discharge: Current Diet:Orders Placed This Encounter      Fluid restriction 1800 ML FLUID      2 Gram Sodium Diet     Hospital Follow-up with Existing Primary Care Provider (PCP)    Please see details below            Significant Results and Procedures   Most Recent 3  CBC's:  Recent Labs   Lab Test 12/15/24  0445 12/14/24  0803 12/13/24  0756   WBC 6.5 6.7 7.2   HGB 10.3* 10.6* 10.3*   MCV 87 89 90   * 132* 141*     Most Recent 3 BMP's:  Recent Labs   Lab Test 12/15/24  0826 12/15/24  0445 12/14/24  2100 12/14/24  1239 12/14/24  0803 12/13/24  0802 12/13/24  0756   NA  --  139  --   --  141  --  139   POTASSIUM  --  4.0  --   --  4.0  --  4.2   CHLORIDE  --  99  --   --  103  --  102   CO2  --  28  --   --  27  --  28   BUN  --  40.5*  --   --  33.1*  --  29.9*   CR  --  1.45*  --   --  1.26*  --  1.62*   ANIONGAP  --  12  --   --  11  --  9   AYAN  --  8.8  --   --  9.0  --  8.7*   * 169* 237*   < > 130*   < > 95    < > = values in this interval not displayed.     Most Recent 2 LFT's:  Recent Labs   Lab Test 12/15/24  0445 12/14/24  0803   AST 32 26   ALT 26 25   ALKPHOS 78 79   BILITOTAL 0.6 0.6     Most Recent 3 Creatinines:  Recent Labs   Lab Test 12/15/24  0445 12/14/24  0803 12/13/24  0756   CR 1.45* 1.26* 1.62*     Most Recent 3 Hemoglobins:  Recent Labs   Lab Test 12/15/24  0445 12/14/24  0803 12/13/24  0756   HGB 10.3* 10.6* 10.3*     Most Recent 3 Troponin's:No lab results found.  7-Day Micro Results       Collected Updated Procedure Result Status      12/12/2024 1613 12/12/2024 2121 Cell count with differential fluid [[id number]]    Pleural fluid from Pleural Cavity    Final result Component Value   No component results            12/12/2024 1613 12/14/2024 1218 Pleural fluid Aerobic Bacterial Culture Routine With Gram Stain [[id number]]    Pleural fluid from Pleural Cavity    Preliminary result Component Value   Culture No Growth  [P]    Gram Stain Result No organisms seen  [P]     2+ WBC seen  [P]                12/12/2024 1613 12/12/2024 2229 Glucose fluid [[id number]]    Pleural fluid from Pleural Cavity    Final result Component Value Units   Glucose Fluid Source Pleural Cavity, Right    Glucose fluid 123 mg/dL            12/12/2024 1613  12/12/2024 2229 Lactate dehydrogenase fluid [[id number]]    Pleural fluid from Pleural Cavity    Final result Component Value Units   LD Fluid Source Pleural Cavity, Right    Lactate dehydrogenase fluid 90 U/L            12/12/2024 1613 12/12/2024 2229 Protein fluid [[id number]]    Pleural fluid from Pleural Cavity    Final result Component Value Units   Protein Fluid Source Pleural Cavity, Right    Protein Total Fluid 2.7 g/dL            12/12/2024 1613 12/13/2024 0723 Cytology, non-gynecologic [[id number]]   Pleural fluid from Pleural Cavity    In process Component Value   No component results            12/12/2024 1613 12/12/2024 2121 Cell Count Body Fluid [[id number]]    Pleural fluid from Pleural Cavity    Final result Component Value   Color Yellow   Clarity Clear   Cell Count Fluid Source Pleural Cavity, Right   Total Nucleated Cells --   Specimen clotted. Cell count not performed.            12/12/2024 1613 12/12/2024 2121 Differential Body Fluid [[id number]]    Pleural fluid from Pleural Cavity    Final result Component Value   No component results                  Most Recent TSH and T4:No lab results found.  Most Recent Hemoglobin A1c:  Recent Labs   Lab Test 10/24/24  1841   A1C 9.2*   ,   Results for orders placed or performed during the hospital encounter of 12/11/24   CT Chest Pulmonary Embolism w Contrast    Narrative    EXAM: CT CHEST PULMONARY EMBOLISM W CONTRAST  LOCATION: [facility]  DATE: 12/11/2024    INDICATION: SOB.  COMPARISON: None.  TECHNIQUE: CT chest pulmonary angiogram during arterial phase injection of IV contrast. Multiplanar reformats and MIP reconstructions were performed. Dose reduction techniques were used.   CONTRAST: 75 mL Isovue 370.    FINDINGS:  ANGIOGRAM CHEST: Pulmonary arteries are normal caliber and negative for pulmonary emboli. Thoracic aorta is negative for dissection. No CT evidence of right heart strain.    LUNGS AND PLEURA: Large  right pleural effusion with moderate to large left pleural effusion. Emphysema. Consolidation within both lower lobes. Tiny calcified granuloma in the right upper lobe. Subpleural nodularity in the left upper lobe.    MEDIASTINUM/AXILLAE: Subcarinal lymphadenopathy. AP window lymph node enlargement.    CORONARY ARTERY CALCIFICATION: Severe.    UPPER ABDOMEN: Incomplete visualization of hypodense mass in the posterior right hepatic lobe with bulky portacaval lymphadenopathy.    MUSCULOSKELETAL: Normal.      Impression    IMPRESSION:  1.  No pulmonary embolism.    2.  Large right pleural effusion with moderate to large left pleural effusion. Bibasilar consolidation.    3.  Emphysema.    4.  Incomplete visualization of hypodense mass in the posterior right hepatic lobe with bulky portacaval lymphadenopathy suspicious for neoplasm.    5.  Subcarinal lymphadenopathy. Mild AP window lymphadenopathy with scattered additional subcentimeter mediastinal nodes.   US Lower Extremity Venous Duplex Bilateral    Narrative    EXAM: US LOWER EXTREMITY VENOUS DUPLEX BILATERAL  LOCATION: [facility]  DATE: 12/12/2024    INDICATION: Patient with recent washout of right foot, now with mild swelling to right LE and worsening DIAS.  COMPARISON: None.  TECHNIQUE: Venous Duplex ultrasound of bilateral lower extremities with and without compression, augmentation and duplex. Color flow and spectral Doppler with waveform analysis performed.    FINDINGS: Exam includes the common femoral, femoral, popliteal veins as well as segmentally visualized deep calf veins and greater saphenous vein.     RIGHT: No deep vein thrombosis. No superficial thrombophlebitis. No popliteal cyst.    LEFT: No deep vein thrombosis. No superficial thrombophlebitis. No popliteal cyst.      Impression    IMPRESSION:  1.  No deep venous thrombosis in the bilateral lower extremities.   US Thoracentesis    Narrative    EXAM:   1. RIGHT  THORACENTESIS  2. ULTRASOUND GUIDANCE  LOCATION: [facility]  DATE: 2024    INDICATION: Pleural effusion.    PROCEDURE: Informed consent obtained. Time out performed. The chest was prepped and draped in sterile fashion. 7 mL of 1 % lidocaine was infused into the local soft tissues. Under direct ultrasound guidance, a 5 K[place] catheter system was placed into the   pleural effusion.     1.3 liters of clear yellow fluid were removed and sent to lab, if requested.    Patient tolerated procedure well.    Ultrasound imaging was obtained and placed in the patient's permanent medical record.      Impression    IMPRESSION:  Status post right ultrasound-guided thoracentesis.    Reference CPT Code: 11780   Echocardiogram Complete     Value    LVEF  55-60%    Narrative    [id number]  [facility]  [id number]  [id number] [address]     Name: MANOJ MARTINO  MRN: [id number]  : 1945  Study Date: 2024 10:21 AM  Age: 79 yrs  Gender: Male  Patient Location: [facility]  Reason For Study: DIAS  Ordering Physician: SINAN POND  Performed By: YENNY     BSA: 2.1 m2  Height: 68 in  Weight: 214 lb  HR: 78  BP: 119/58 mmHg  ______________________________________________________________________________  Procedure  Complete Echo Adult. Definity (NDC #16761-956) given intravenously. Adequate  quality two-dimensional was performed and interpreted. There is no comparison  study available.  ______________________________________________________________________________  Interpretation Summary     The left ventricle is normal in size. There is mild concentric left  ventricular hypertrophy.  Left ventricular systolic function is normal. The visual ejection fraction is  55-60%. The inferior wall is mildly hypokinetic.  Grade II or moderate diastolic dysfunction.     The right ventricle is normal in size and function.  The left atrium is severely dilated.  Right atrium not well visualized.  IVC diameter <2.1 cm collapsing >50% with sniff suggests a normal RA pressure  of 3 mmHg.  There is no comparison study available.  ______________________________________________________________________________  Left Ventricle  The left ventricle is normal in size. There is mild concentric left  ventricular hypertrophy. Left ventricular systolic function is normal. The  visual ejection fraction is 55-60%. Grade II or moderate diastolic  dysfunction. The inferior wall is mildly hypokinetic.     Right Ventricle  The right ventricle is normal in size and function.     Atria  The left atrium is severely dilated. Right atrium not well visualized.     Mitral Valve  Mitral valve leaflets appear normal. There is mild mitral annular  calcification. There is trace mitral regurgitation. There is mild mitral  stenosis.     Tricuspid Valve  Tricuspid valve leaflets appear normal. There is trace tricuspid  regurgitation. Right ventricular systolic pressure could not be approximated  due to inadequate tricuspid regurgitation.     Aortic Valve  The aortic valve is trileaflet. Mild aortic valve calcification is present. No  aortic regurgitation is present. No aortic stenosis is present.     Pulmonic Valve  The pulmonic valve is not well visualized. There is trace pulmonic valvular  regurgitation.     Vessels  The aorta root is normal. IVC diameter <2.1 cm collapsing >50% with sniff  suggests a normal RA pressure of 3 mmHg.     Pericardium  There is no pericardial effusion.     Rhythm  Sinus rhythm was noted.  ______________________________________________________________________________  MMode/2D Measurements & Calculations     IVSd: 1.3 cm  LVIDd: 4.7 cm  LVIDs: 3.2 cm  LVPWd: 1.3 cm  FS: 32.1 %  LV mass(C)d: 237.1 grams  LV mass(C)dI: 112.6 grams/m2  Ao root diam: 3.3 cm  asc Aorta Diam: 3.2 cm  LVOT diam: 2.4 cm  LVOT area: 4.4 cm2  Ao root diam index Ht(cm/m): 1.9  Ao root diam index BSA  (cm/m2): 1.6  Asc Ao diam index BSA (cm/m2): 1.5  Asc Ao diam index Ht(cm/m): 1.8  EF Biplane: 50.6 %  LA Volume (BP): 110.0 ml     LA Volume Index (BP): 52.1 ml/m2  LA Volume Indexed (AL/bp): 56.5 ml/m2  RWT: 0.55  TAPSE: 2.0 cm     Time Measurements  MM HR: 79.0 BPM     Doppler Measurements & Calculations  MV E max zander: 151.0 cm/sec  MV A max zander: 90.3 cm/sec  MV E/A: 1.7  MV max P.2 mmHg  MV mean PG: 3.7 mmHg  MV V2 VTI: 33.6 cm  MVA(VTI): 3.0 cm2  MV dec slope: 691.5 cm/sec2  MV dec time: 0.22 sec  Ao V2 max: 135.1 cm/sec  Ao max P.0 mmHg  Ao V2 mean: 91.2 cm/sec  Ao mean PG: 3.8 mmHg  Ao V2 VTI: 30.5 cm  GEMINI(I,D): 3.3 cm2  GEMINI(V,D): 3.2 cm2  LV V1 max PG: 3.7 mmHg  LV V1 max: 96.5 cm/sec  LV V1 VTI: 22.5 cm  SV(LVOT): 99.5 ml  SI(LVOT): 47.3 ml/m2  Pulm Sys Zander: 26.3 cm/sec  Pulm Arellano Zander: 39.1 cm/sec  Pulm A Revs Zander: 26.0 cm/sec  Pulm S/D: 0.67  AV Zander Ratio (DI): 0.71  GEMINI Index (cm2/m2): 1.5  E/E': 25.2  E/E' av.9  Lateral E/e': 20.5  Medial E/e': 25.3     Peak E' Zander: 6.0 cm/sec  RV S Zander: 10.8 cm/sec     ______________________________________________________________________________  Report approved by: Simón Rucker MD on 2024 12:44 PM             Discharge Medications   Current Discharge Medication List        START taking these medications    Details   torsemide (DEMADEX) 20 MG tablet Take 1 tablet (20 mg) by mouth daily.  Qty: 30 tablet, Refills: 1    Associated Diagnoses: Essential hypertension; Heart failure with preserved ejection fraction, NYHA class I (H)           CONTINUE these medications which have NOT CHANGED    Details   amoxicillin-clavulanate (AUGMENTIN) 500-125 MG tablet Take 1 tablet by mouth 2 times daily for 21 days.  Qty: 42 tablet, Refills: 0    Associated Diagnoses: Foot infection      aspirin 81 MG EC tablet Take 81 mg by mouth daily.      atorvastatin (LIPITOR) 80 MG tablet Take 80 mg by mouth at bedtime.      clopidogrel (PLAVIX) 75 MG tablet Take 75 mg by  mouth daily.      co-enzyme Q-10 100 MG CAPS capsule Take 200 mg by mouth daily.      doxycycline hyclate (VIBRA-TABS) 100 MG tablet Take 1 tablet (100 mg) by mouth 2 times daily for 21 days.  Qty: 42 tablet, Refills: 0    Associated Diagnoses: Foot infection      ertugliflozin (STEGLATRO) 5 MG TABS Take 5 mg by mouth every morning.      insulin aspart (NOVOLOG FLEXPEN) 100 UNIT/ML pen Inject 5 Units subcutaneously 3 times daily (with meals). Novolog Flexpen: 5 units with breakfast, 5 units with lunch, 5 units with dinner.  Qty: 15 mL, Refills: 0    Associated Diagnoses: Type 2 diabetes mellitus with diabetic peripheral angiopathy without gangrene, with long-term current use of insulin (H)      insulin glargine (LANTUS PEN) 100 UNIT/ML pen Inject 35 Units subcutaneously every morning.  Qty: 15 mL, Refills: 0    Comments: If Lantus is not covered by insurance, may substitute Basaglar or Semglee or other insulin glargine product per insurance preference at same dose and frequency.    Associated Diagnoses: Type 2 diabetes mellitus with diabetic peripheral angiopathy without gangrene, with long-term current use of insulin (H)      lactobacillus rhamnosus, GG, (CULTURELL) capsule Take 1 capsule by mouth daily.      levothyroxine (SYNTHROID/LEVOTHROID) 50 MCG tablet Take 50 mcg by mouth daily.      metFORMIN (GLUCOPHAGE XR) 500 MG 24 hr tablet Take 1,000 mg by mouth 2 times daily (with meals).      Multiple Vitamins-Minerals (PRESERVISION AREDS 2 PO) Take 2 capsules by mouth daily.      vitamin C with B complex (B COMPLEX-C) tablet Take 1 tablet by mouth daily.           STOP taking these medications       losartan (COZAAR) 50 MG tablet Comments:   Reason for Stopping:         spironolactone (ALDACTONE) 25 MG tablet Comments:   Reason for Stopping:             Allergies   Allergies   Allergen Reactions    Exenatide Unknown    Heparin Unknown    Liraglutide Unknown    Lisinopril Cough    Morphine Unknown       Toi MOODY  MD Anthony  PGY-2  [facility]  [facility]  December 15, 2024

## 2024-12-16 ENCOUNTER — PATIENT OUTREACH (OUTPATIENT)
Dept: CARE COORDINATION | Facility: CLINIC | Age: 79
End: 2024-12-16
Payer: COMMERCIAL

## 2024-12-16 DIAGNOSIS — I50.9 ACUTE DECOMPENSATED HEART FAILURE (H): Primary | ICD-10-CM

## 2024-12-16 LAB
PATH REPORT.COMMENTS IMP SPEC: NORMAL
PATH REPORT.FINAL DX SPEC: NORMAL
PATH REPORT.GROSS SPEC: NORMAL
PATH REPORT.MICROSCOPIC SPEC OTHER STN: NORMAL

## 2024-12-16 PROCEDURE — 88305 TISSUE EXAM BY PATHOLOGIST: CPT | Mod: 26 | Performed by: PATHOLOGY

## 2024-12-16 PROCEDURE — 88112 CYTOPATH CELL ENHANCE TECH: CPT | Mod: 26 | Performed by: PATHOLOGY

## 2024-12-16 NOTE — PROGRESS NOTES
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, L97.414     Most Recent Discharge Date: 12/15/2024   Most Recent Discharge Diagnosis: DIAS (dyspnea on exertion) - R06.09  Pleural effusion - J90  Elevated troponin - R79.89  Elevated brain natriuretic peptide (BNP) level - R79.89  Peripheral vascular disease (H) - I73.9  Diabetic ulcer of right heel associated with type 2 diabetes mellitus, 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 (Red Flag symptoms escalate to triage hotline per guidelines): Improved  Do you know how to contact your clinic care team if you have future questions or changes to your health status? : Yes  Does the patient have their discharge instructions? : Yes  Does the patient have questions regarding their discharge instructions? : No  Were you started on any new medications or were there changes to any " of your previous medications? : Yes  Does the patient have all of their medications?: Yes  Do you have questions regarding any of your medications? : No  Do you have all of your needed medical supplies or equipment (DME)?  (i.e. oxygen tank, CPAP, cane, etc.): Yes    Post-op ([facility] Only)  If the patient had a surgery or procedure, do they have any questions for a nurse?: No           Follow up Plan     Discharge Follow-Up  Discharge follow up appointment scheduled in alignment with recommended follow up timeframe or Transitions of Risk Category? (Low = within 30 days; Moderate= within 14 days; High= within 7 days): Yes  Discharge Follow Up Appointment Date: 12/17/24  Discharge Follow Up Appointment Scheduled with?: Specialty Care Provider    Future Appointments   Date Time Provider Department Center   12/17/2024  8:30 AM Kong Gleason MD [facility]   12/26/2024  8:00 AM [facility]   12/27/2024 10:20 [facility]   1/8/2025  9:50 AM Justine Jeff, CNP [facility]   1/8/2025 10:30 AM [facility] HCC HEART FAILURE RN [facility]   1/9/2025  8:30 AM [facility] VC US 1 MDUS [facility]   1/9/2025  9:30 AM [facility] VC US 1 [facility]   1/14/2025  8:00 AM Silvestre Jc MD [facility]   1/27/2025  2:00 PM Erica Nuñez MD MDINDS [facility]       Outpatient Plan as outlined on AVS reviewed with patient.    For any urgent concerns, please contact our 24 hour nurse triage line: [phone] ([phone])       Treasure Sierra MA

## 2024-12-17 ENCOUNTER — PATIENT OUTREACH (OUTPATIENT)
Dept: GASTROENTEROLOGY | Facility: CLINIC | Age: 79
End: 2024-12-17

## 2024-12-17 ENCOUNTER — LAB (OUTPATIENT)
Dept: INFUSION THERAPY | Facility: HOSPITAL | Age: 79
End: 2024-12-17
Payer: COMMERCIAL

## 2024-12-17 ENCOUNTER — ONCOLOGY VISIT (OUTPATIENT)
Dept: ONCOLOGY | Facility: HOSPITAL | Age: 79
End: 2024-12-17
Attending: INTERNAL MEDICINE
Payer: COMMERCIAL

## 2024-12-17 VITALS
SYSTOLIC BLOOD PRESSURE: 140 MMHG | OXYGEN SATURATION: 98 % | RESPIRATION RATE: 16 BRPM | TEMPERATURE: 97.8 F | HEART RATE: 83 BPM | HEIGHT: 68 IN | DIASTOLIC BLOOD PRESSURE: 63 MMHG | BODY MASS INDEX: 31.35 KG/M2

## 2024-12-17 DIAGNOSIS — R16.0 MASS OF RIGHT LOBE OF LIVER: ICD-10-CM

## 2024-12-17 DIAGNOSIS — I50.9 CONGESTIVE HEART FAILURE, UNSPECIFIED HF CHRONICITY, UNSPECIFIED HEART FAILURE TYPE (H): ICD-10-CM

## 2024-12-17 DIAGNOSIS — I50.9 ACUTE DECOMPENSATED HEART FAILURE (H): ICD-10-CM

## 2024-12-17 DIAGNOSIS — R19.00 ABDOMINAL MASS, UNSPECIFIED ABDOMINAL LOCATION: Primary | ICD-10-CM

## 2024-12-17 DIAGNOSIS — E11.621 DIABETIC ULCER OF RIGHT HEEL ASSOCIATED WITH TYPE 2 DIABETES MELLITUS, WITH NECROSIS OF BONE (H): ICD-10-CM

## 2024-12-17 DIAGNOSIS — L97.414 DIABETIC ULCER OF RIGHT HEEL ASSOCIATED WITH TYPE 2 DIABETES MELLITUS, WITH NECROSIS OF BONE (H): ICD-10-CM

## 2024-12-17 DIAGNOSIS — Z01.818 PRE-OP EXAM: Primary | ICD-10-CM

## 2024-12-17 LAB
ANION GAP SERPL CALCULATED.3IONS-SCNC: 11 MMOL/L (ref 7–15)
BUN SERPL-MCNC: 50.5 MG/DL (ref 8–23)
CALCIUM SERPL-MCNC: 9.8 MG/DL (ref 8.8–10.4)
CHLORIDE SERPL-SCNC: 95 MMOL/L (ref 98–107)
CREAT SERPL-MCNC: 1.25 MG/DL (ref 0.67–1.17)
EGFRCR SERPLBLD CKD-EPI 2021: 59 ML/MIN/1.73M2
GLUCOSE SERPL-MCNC: 142 MG/DL (ref 70–99)
HCO3 SERPL-SCNC: 28 MMOL/L (ref 22–29)
POTASSIUM SERPL-SCNC: 4.4 MMOL/L (ref 3.4–5.3)
SODIUM SERPL-SCNC: 134 MMOL/L (ref 135–145)

## 2024-12-17 PROCEDURE — 99214 OFFICE O/P EST MOD 30 MIN: CPT | Mod: FS | Performed by: INTERNAL MEDICINE

## 2024-12-17 PROCEDURE — 80048 BASIC METABOLIC PNL TOTAL CA: CPT

## 2024-12-17 PROCEDURE — G0463 HOSPITAL OUTPT CLINIC VISIT: HCPCS | Performed by: INTERNAL MEDICINE

## 2024-12-17 PROCEDURE — G2211 COMPLEX E/M VISIT ADD ON: HCPCS | Mod: FS | Performed by: INTERNAL MEDICINE

## 2024-12-17 PROCEDURE — 36415 COLL VENOUS BLD VENIPUNCTURE: CPT

## 2024-12-17 RX ORDER — MECLIZINE HYDROCHLORIDE 25 MG/1
50 TABLET ORAL 3 TIMES DAILY PRN
COMMUNITY

## 2024-12-17 ASSESSMENT — PAIN SCALES - GENERAL: PAINLEVEL_OUTOF10: NO PAIN (0)

## 2024-12-17 NOTE — PROGRESS NOTES
"Oncology Rooming Note    December 17, 2024 8:28 AM   Janice Nowak is a 79 year old male who presents for:    Chief Complaint   Patient presents with    Oncology Clinic Visit     Return visit to discuss PET results      Initial Vitals: BP (!) 140/63 (BP Location: Right arm, Patient Position: Sitting, Cuff Size: Adult Large)   Pulse 83   Temp 97.8  F (36.6  C) (Tympanic)   Resp 16   Ht 1.727 m (5' 8\")   SpO2 98%   BMI 31.35 kg/m   Estimated body mass index is 31.35 kg/m  as calculated from the following:    Height as of this encounter: 1.727 m (5' 8\").    Weight as of 12/15/24: 93.5 kg (206 lb 3.2 oz). Body surface area is 2.12 meters squared.  No Pain (0) Comment: Data Unavailable   No LMP for male patient.  Allergies reviewed: Yes  Medications reviewed: Yes    Medications: Medication refills not needed today.  Pharmacy name entered into [facility]: [id number] - [place] [id number] [address]    Frailty Screening:   Is the patient here for a new oncology consult visit in cancer care? 2. No      Clinical concerns:       KAY GARCIA CMA              "

## 2024-12-17 NOTE — TELEPHONE ENCOUNTER
"PAN reviewed with question about pt recent hospital admission/discharge 12/11 - 12/15. Also whether medication Ertuglifozin (steglatro) would need to be held for 4 days in advance of procedure.     Per Dr Medina's review \"This will need to be rescheduled at the least in light of the medication. Apparently this has a risk of DKA with surgery/anesthesia.     In addition, this should wait 2-3 weeks after this admission to ensure that the pt is stable enough to have to procedure from a respiratory and cardiac standpoint.\"    Contacted pt to discuss. Pt in agreement with rescheduling to 1/2/25.    Pre op plan: Virtual PAC appointment 12/20/24 at 3pm    Blood thinner -  plavix, will hold for 5 days, to start holding on 12/28  ASA - 81mg  Diabetic - ertuglifozin, anesthesia guidelines to hold for 4 days, to start holding 12/29.  Insulin and metformin, hold day of procedure  Any meds by injection or mouth for weight loss or diabetes-no     Patient Education r/t procedure: mychart    Pt has an angiogram planned for 12/26 with Dr Jc's office, looking at the blood flow in relation to the wound on that foot. Pt would like this EUS procedure be a priority and asked that Dr Fernandez's be included in communication about the delayed EUS procedure.    Luz Garcia, RN, BSN,   [facility]  Care coordinator         "

## 2024-12-17 NOTE — TELEPHONE ENCOUNTER
FUTURE VISIT INFORMATION      SURGERY INFORMATION:   Dr. Medina     RECORDS REQUESTED FROM:       Primary Care Provider: Anayeli Machado MD     Pertinent Medical History: SABRINA, hypertension, carotid arterial disease, occlusion and stenosis of unspecified carotid artery     Most recent EKG+ Tracin24    Most recent ECHO: 24

## 2024-12-17 NOTE — PROGRESS NOTES
[facility] Hematology and Oncology Progress Note    Patient: Janice Nowak  MRN: [id number]  Date of Service: Dec 17, 2024    Assessment/Plan:    1.  Abdominal mass with associated retroperitoneal lymphadenopathy  Janice is a 79-year-old with a new finding of abdominal mass from recent CTA on 11/27/2024.  There was also retroperitoneal lymphadenopathy.  A 2.5 cm hepatic hypodensity was also noted, however, unclear if this is associated.  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  Discussed with patient that the liver and lymph node mass do not seem to be related as one is significantly more active than the other.  He is scheduled to get endoscopic ultrasound biopsy of lymph node mass on 1/2/2024.  Will order MRI to evaluate liver lesion for hepatocellular carcinoma.  Will have him follow-up in 3 weeks to discuss results.    2.  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  Type 2 diabetes  He is still recovering.  Wound VAC is still on.  He continues to follow with ID.  He switched from IV to oral antibiotics with plans for 4 more weeks of treatment.      4. New diagnosis of HFpEF  Hospitalized 12/11/24-12/15/24 for new diagnosis heart failure. Now on torsemide.  Significant improvement in shortness of breath after thoracentesis in the hospital.  Leg swelling has now normalized.    Medical decision Making:  Today's visit was centered around a cancer diagnosis in the setting of additional medical comorbidities. Complex medical decision making was required. The risk of additional morbidity without further treatment is high.   High-risk medications were managed:    ECOG Performance: 3    Staging History:     Cancer Staging   No matching staging information was found for the patient.      Oncology History:    11/27/24: CTA showed incidental  finding of a mass in the central right hemiabdomen posterior to the duodenum and anterior to the IVC.     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    Interval History:    He is here with his 2 sons for follow-up after PET scan.      He was hospitalized 24-12/15/24 for new diagnosis of HFpEF.  He was discharged on  and is returned to his apartment alone.  His breathing and leg swelling have much improved and he is now on torsemide.  He recently switched from IV to oral antibiotics for his osteomyelitis.  He still has home care nurses come to help with his wound VAC.  Continues to have low appetite.  No fevers or chills.  No new bone or back pain.        Past History:  Past Medical History:   Diagnosis Date    Diabetes (H)     Hypertension     Sleep apnea     Thyroid disease     No family history on file.   [unfilled] Social History     Socioeconomic History    Marital status:      Spouse name: Not on file    Number of children: Not on file    Years of education: Not on file    Highest education level: Not on file   Occupational History    Not on file   Tobacco Use    Smoking status: Former     Current packs/day: 0.00     Types: Cigarettes     Quit date:      Years since quittin.9     Passive exposure: Never    Smokeless tobacco: Never   Vaping Use    Vaping status: Never Used   Substance and Sexual Activity    Alcohol use: Yes     Alcohol/week: 5.0 standard drinks of alcohol     Types: 5 Cans of beer per week    Drug use: Not Currently    Sexual activity: Not on file   Other Topics Concern    Not on file   Social History Narrative    Not on file     Social Drivers of Health     Financial Resource Strain: Low Risk  (2024)    Financial Resource Strain     Within the past 12 months, have you or your family members you live with been unable to get utilities (heat,  electricity) when it was really needed?: No   Food Insecurity: Low Risk  (12/14/2024)    Food Insecurity     Within the past 12 months, did you worry that your food would run out before you got money to buy more?: No     Within the past 12 months, did the food you bought just not last and you didn t have money to get more?: No   Transportation Needs: Low Risk  (12/14/2024)    Transportation Needs     Within the past 12 months, has lack of transportation kept you from medical appointments, getting your medicines, non-medical meetings or appointments, work, or from getting things that you need?: No   Physical Activity: Not on file   Stress: Not on file   Social Connections: Not on file   Interpersonal Safety: Low Risk  (12/14/2024)    Interpersonal Safety     Do you feel physically and emotionally safe where you currently live?: Yes     Within the past 12 months, have you been hit, slapped, kicked or otherwise physically hurt by someone?: No     Within the past 12 months, have you been humiliated or emotionally abused in other ways by your partner or ex-partner?: No   Recent Concern: Interpersonal Safety - High Risk (10/9/2024)    Interpersonal Safety     Do you feel physically and emotionally safe where you currently live?: No     Within the past 12 months, have you been hit, slapped, kicked or otherwise physically hurt by someone?: No     Within the past 12 months, have you been humiliated or emotionally abused in other ways by your partner or ex-partner?: No   Housing Stability: Low Risk  (12/14/2024)    Housing Stability     Do you have housing? : Yes     Are you worried about losing your housing?: No   Recent Concern: Housing Stability - High Risk (10/28/2024)    Housing Stability     Do you have housing? : No     Are you worried about losing your housing?: No        Allergies:    Allergies   Allergen Reactions    Exenatide Unknown    Heparin Unknown    Liraglutide Unknown    Lisinopril Cough    Morphine Unknown  "      Review of Systems:    As above in the history.     Physical Exam:    BP (!) 140/63 (BP Location: Right arm, Patient Position: Sitting, Cuff Size: Adult Large)   Pulse 83   Temp 97.8  F (36.6  C) (Tympanic)   Resp 16   Ht 1.727 m (5' 8\")   SpO2 98%   BMI 31.35 kg/m        General: patient appears stated age of 79 year old. Nontoxic and in no distress.  In wheelchair.  Here with his sons.  Appears fatigued  HEENT: Head: atraumatic, normocephalic. Sclerae anicteric.  Chest: Lungs clear to auscultation bilaterally.  Normal respiratory effort  Cardiac: Regular rate and rhythm.  No edema.   Abdomen: abdomen is non-distended  Extremities: normal tone and muscle bulk.   Skin: Right leg in boot with wound VAC.  CNS: alert and oriented. Grossly non-focal.   Psychiatric: normal mood and affect.     Lab Results:    Recent Results (from the past week)   Basic metabolic panel   Result Value Ref Range    Sodium 139 135 - 145 mmol/L    Potassium 4.3 3.4 - 5.3 mmol/L    Chloride 105 98 - 107 mmol/L    Carbon Dioxide (CO2) 25 22 - 29 mmol/L    Anion Gap 9 7 - 15 mmol/L    Urea Nitrogen 24.5 (H) 8.0 - 23.0 mg/dL    Creatinine 1.31 (H) 0.67 - 1.17 mg/dL    GFR Estimate 56 (L) >60 mL/min/1.73m2    Calcium 9.0 8.8 - 10.4 mg/dL    Glucose 104 (H) 70 - 99 mg/dL   Troponin T, High Sensitivity   Result Value Ref Range    Troponin T, High Sensitivity 120 (HH) <=22 ng/L   Nt probnp inpatient   Result Value Ref Range    N terminal Pro BNP Inpatient 3,933 (H) 0 - 1,800 pg/mL   D dimer quantitative   Result Value Ref Range    D-Dimer Quantitative 0.78 (H) 0.00 - 0.50 ug/mL FEU   CBC with platelets and differential   Result Value Ref Range    WBC Count 7.4 4.0 - 11.0 10e3/uL    RBC Count 3.89 (L) 4.40 - 5.90 10e6/uL    Hemoglobin 11.0 (L) 13.3 - 17.7 g/dL    Hematocrit 34.7 (L) 40.0 - 53.0 %    MCV 89 78 - 100 fL    MCH 28.3 26.5 - 33.0 pg    MCHC 31.7 31.5 - 36.5 g/dL    RDW 17.3 (H) 10.0 - 15.0 %    Platelet Count 143 (L) 150 - 450 " 10e3/uL    % Neutrophils 72 %    % Lymphocytes 16 %    % Monocytes 8 %    % Eosinophils 2 %    % Basophils 1 %    % Immature Granulocytes 2 %    NRBCs per 100 WBC 0 <1 /100    Absolute Neutrophils 5.4 1.6 - 8.3 10e3/uL    Absolute Lymphocytes 1.2 0.8 - 5.3 10e3/uL    Absolute Monocytes 0.6 0.0 - 1.3 10e3/uL    Absolute Eosinophils 0.1 0.0 - 0.7 10e3/uL    Absolute Basophils 0.0 0.0 - 0.2 10e3/uL    Absolute Immature Granulocytes 0.1 <=0.4 10e3/uL    Absolute NRBCs 0.0 10e3/uL   ECG 12-LEAD WITH MUSE (LHE)   Result Value Ref Range    Systolic Blood Pressure  mmHg    Diastolic Blood Pressure  mmHg    Ventricular Rate 98 BPM    Atrial Rate 98 BPM    NE Interval 170 ms    QRS Duration 146 ms     ms    QTc 510 ms    P Axis 26 degrees    R AXIS 109 degrees    T Axis 32 degrees    Interpretation ECG       Sinus rhythm  Right bundle branch block  Abnormal ECG  When compared with ECG of 27-Apr-2013 19:22,  Right bundle branch block is now Present  Confirmed by SEE ED PROVIDER NOTE FOR, ECG INTERPRETATION ([id number]),  ERIKA CORTES ([id number]) on 12/13/2024 1:03:09 AM     Troponin T, High Sensitivity   Result Value Ref Range    Troponin T, High Sensitivity 114 (HH) <=22 ng/L   Hepatic panel   Result Value Ref Range    Protein Total 7.3 6.4 - 8.3 g/dL    Albumin 3.8 3.5 - 5.2 g/dL    Bilirubin Total 0.4 <=1.2 mg/dL    Alkaline Phosphatase 91 40 - 150 U/L    AST 38 0 - 45 U/L    ALT 31 0 - 70 U/L    Bilirubin Direct <0.20 0.00 - 0.30 mg/dL   Glucose by meter   Result Value Ref Range    GLUCOSE BY METER POCT 53 (L) 70 - 99 mg/dL   Glucose by meter   Result Value Ref Range    GLUCOSE BY METER POCT 57 (L) 70 - 99 mg/dL   Glucose by meter   Result Value Ref Range    GLUCOSE BY METER POCT 78 70 - 99 mg/dL   Glucose by meter   Result Value Ref Range    GLUCOSE BY METER POCT 108 (H) 70 - 99 mg/dL   Glucose by meter   Result Value Ref Range    GLUCOSE BY METER POCT 111 (H) 70 - 99 mg/dL   Comprehensive metabolic panel   Result  Value Ref Range    Sodium 139 135 - 145 mmol/L    Potassium 3.9 3.4 - 5.3 mmol/L    Carbon Dioxide (CO2) 26 22 - 29 mmol/L    Anion Gap 9 7 - 15 mmol/L    Urea Nitrogen 23.3 (H) 8.0 - 23.0 mg/dL    Creatinine 1.29 (H) 0.67 - 1.17 mg/dL    GFR Estimate 57 (L) >60 mL/min/1.73m2    Calcium 8.7 (L) 8.8 - 10.4 mg/dL    Chloride 104 98 - 107 mmol/L    Glucose 58 (L) 70 - 99 mg/dL    Alkaline Phosphatase 87 40 - 150 U/L    AST 34 0 - 45 U/L    ALT 29 0 - 70 U/L    Protein Total 6.9 6.4 - 8.3 g/dL    Albumin 3.5 3.5 - 5.2 g/dL    Bilirubin Total 0.5 <=1.2 mg/dL   CBC with platelets   Result Value Ref Range    WBC Count 6.8 4.0 - 11.0 10e3/uL    RBC Count 3.67 (L) 4.40 - 5.90 10e6/uL    Hemoglobin 10.3 (L) 13.3 - 17.7 g/dL    Hematocrit 32.8 (L) 40.0 - 53.0 %    MCV 89 78 - 100 fL    MCH 28.1 26.5 - 33.0 pg    MCHC 31.4 (L) 31.5 - 36.5 g/dL    RDW 17.2 (H) 10.0 - 15.0 %    Platelet Count 149 (L) 150 - 450 10e3/uL   Lactate Dehydrogenase   Result Value Ref Range    Lactate Dehydrogenase 271 (H) 0 - 250 U/L   Glucose by meter   Result Value Ref Range    GLUCOSE BY METER POCT 60 (L) 70 - 99 mg/dL   Glucose by meter   Result Value Ref Range    GLUCOSE BY METER POCT 95 70 - 99 mg/dL   INR   Result Value Ref Range    INR 1.36 (H) 0.85 - 1.15   Echocardiogram Complete   Result Value Ref Range    LVEF  55-60%    Glucose by meter   Result Value Ref Range    GLUCOSE BY METER POCT 74 70 - 99 mg/dL   Pleural fluid Aerobic Bacterial Culture Routine With Gram Stain    Specimen: Pleural Cavity; Pleural fluid   Result Value Ref Range    Culture No growth after 4 days     Gram Stain Result No organisms seen     Gram Stain Result 2+ WBC seen    Glucose fluid   Result Value Ref Range    Glucose Fluid Source Pleural Cavity, Right     Glucose fluid 123 mg/dL   Lactate dehydrogenase fluid   Result Value Ref Range    LD Fluid Source Pleural Cavity, Right     Lactate dehydrogenase fluid 90 U/L   Protein fluid   Result Value Ref Range    Protein  Fluid Source Pleural Cavity, Right     Protein Total Fluid 2.7 g/dL   Cytology, non-gynecologic   Result Value Ref Range    Final Diagnosis       Specimen A     Interpretation:    Negative for malignancy    RIGHT PLEURAL FLUID, ULTRASOUND-GUIDED THORACENTESIS:        - NEGATIVE FOR MALIGNANT CELLS     Adequacy:   Satisfactory for evaluation          Gross Description       A(A). Pleural Cavity, :A. Pleural Cavity, , Pleural Fluid:  Received 100 ml of clear, yellow fluid, processed as 1 Pap stained Autocyte,  1 Tee stained cytospin and one hematoxylin and eosin stained cell block.                Microscopic Description       Concentrated cytologic smears and cellblock sections show numerous mesothelial cells with mild background chronic inflammation.  Malignant cells are not identified.      Performing Labs       The technical component of this testing was completed at [facility].     Stain controls for all stains resulted within this report have been reviewed and show appropriate reactivity.      Cell Count Body Fluid   Result Value Ref Range    Color Yellow Colorless, Yellow    Clarity Clear Clear    Cell Count Fluid Source Pleural Cavity, Right     Total Nucleated Cells     Extra Body Fluid/CSF Collection   Result Value Ref Range    Hold Specimen JIC    Glucose by meter   Result Value Ref Range    GLUCOSE BY METER POCT 193 (H) 70 - 99 mg/dL   Glucose by meter   Result Value Ref Range    GLUCOSE BY METER POCT 154 (H) 70 - 99 mg/dL   Glucose by meter   Result Value Ref Range    GLUCOSE BY METER POCT 112 (H) 70 - 99 mg/dL   Comprehensive metabolic panel   Result Value Ref Range    Sodium 139 135 - 145 mmol/L    Potassium 4.2 3.4 - 5.3 mmol/L    Carbon Dioxide (CO2) 28 22 - 29 mmol/L    Anion Gap 9 7 - 15 mmol/L    Urea Nitrogen 29.9 (H) 8.0 - 23.0 mg/dL    Creatinine 1.62 (H) 0.67 - 1.17 mg/dL    GFR Estimate 43 (L) >60 mL/min/1.73m2     Calcium 8.7 (L) 8.8 - 10.4 mg/dL    Chloride 102 98 - 107 mmol/L    Glucose 95 70 - 99 mg/dL    Alkaline Phosphatase 83 40 - 150 U/L    AST 33 0 - 45 U/L    ALT 27 0 - 70 U/L    Protein Total 6.9 6.4 - 8.3 g/dL    Albumin 3.4 (L) 3.5 - 5.2 g/dL    Bilirubin Total 0.5 <=1.2 mg/dL   CBC with platelets   Result Value Ref Range    WBC Count 7.2 4.0 - 11.0 10e3/uL    RBC Count 3.70 (L) 4.40 - 5.90 10e6/uL    Hemoglobin 10.3 (L) 13.3 - 17.7 g/dL    Hematocrit 33.1 (L) 40.0 - 53.0 %    MCV 90 78 - 100 fL    MCH 27.8 26.5 - 33.0 pg    MCHC 31.1 (L) 31.5 - 36.5 g/dL    RDW 17.2 (H) 10.0 - 15.0 %    Platelet Count 141 (L) 150 - 450 10e3/uL   Glucose by meter   Result Value Ref Range    GLUCOSE BY METER POCT 92 70 - 99 mg/dL   Glucose by meter   Result Value Ref Range    GLUCOSE BY METER POCT 143 (H) 70 - 99 mg/dL   Glucose by meter   Result Value Ref Range    GLUCOSE BY METER POCT 130 (H) 70 - 99 mg/dL   Glucose by meter   Result Value Ref Range    GLUCOSE BY METER POCT 217 (H) 70 - 99 mg/dL   Glucose by meter   Result Value Ref Range    GLUCOSE BY METER POCT 169 (H) 70 - 99 mg/dL   Glucose by meter   Result Value Ref Range    GLUCOSE BY METER POCT 136 (H) 70 - 99 mg/dL   Comprehensive metabolic panel   Result Value Ref Range    Sodium 141 135 - 145 mmol/L    Potassium 4.0 3.4 - 5.3 mmol/L    Carbon Dioxide (CO2) 27 22 - 29 mmol/L    Anion Gap 11 7 - 15 mmol/L    Urea Nitrogen 33.1 (H) 8.0 - 23.0 mg/dL    Creatinine 1.26 (H) 0.67 - 1.17 mg/dL    GFR Estimate 58 (L) >60 mL/min/1.73m2    Calcium 9.0 8.8 - 10.4 mg/dL    Chloride 103 98 - 107 mmol/L    Glucose 130 (H) 70 - 99 mg/dL    Alkaline Phosphatase 79 40 - 150 U/L    AST 26 0 - 45 U/L    ALT 25 0 - 70 U/L    Protein Total 7.1 6.4 - 8.3 g/dL    Albumin 3.9 3.5 - 5.2 g/dL    Bilirubin Total 0.6 <=1.2 mg/dL   CBC with platelets   Result Value Ref Range    WBC Count 6.7 4.0 - 11.0 10e3/uL    RBC Count 3.84 (L) 4.40 - 5.90 10e6/uL    Hemoglobin 10.6 (L) 13.3 - 17.7 g/dL     Hematocrit 34.0 (L) 40.0 - 53.0 %    MCV 89 78 - 100 fL    MCH 27.6 26.5 - 33.0 pg    MCHC 31.2 (L) 31.5 - 36.5 g/dL    RDW 16.7 (H) 10.0 - 15.0 %    Platelet Count 132 (L) 150 - 450 10e3/uL   Glucose by meter   Result Value Ref Range    GLUCOSE BY METER POCT 236 (H) 70 - 99 mg/dL   Glucose by meter   Result Value Ref Range    GLUCOSE BY METER POCT 183 (H) 70 - 99 mg/dL   Glucose by meter   Result Value Ref Range    GLUCOSE BY METER POCT 237 (H) 70 - 99 mg/dL   Comprehensive metabolic panel   Result Value Ref Range    Sodium 139 135 - 145 mmol/L    Potassium 4.0 3.4 - 5.3 mmol/L    Carbon Dioxide (CO2) 28 22 - 29 mmol/L    Anion Gap 12 7 - 15 mmol/L    Urea Nitrogen 40.5 (H) 8.0 - 23.0 mg/dL    Creatinine 1.45 (H) 0.67 - 1.17 mg/dL    GFR Estimate 49 (L) >60 mL/min/1.73m2    Calcium 8.8 8.8 - 10.4 mg/dL    Chloride 99 98 - 107 mmol/L    Glucose 169 (H) 70 - 99 mg/dL    Alkaline Phosphatase 78 40 - 150 U/L    AST 32 0 - 45 U/L    ALT 26 0 - 70 U/L    Protein Total 7.1 6.4 - 8.3 g/dL    Albumin 3.8 3.5 - 5.2 g/dL    Bilirubin Total 0.6 <=1.2 mg/dL   CBC with platelets   Result Value Ref Range    WBC Count 6.5 4.0 - 11.0 10e3/uL    RBC Count 3.71 (L) 4.40 - 5.90 10e6/uL    Hemoglobin 10.3 (L) 13.3 - 17.7 g/dL    Hematocrit 32.4 (L) 40.0 - 53.0 %    MCV 87 78 - 100 fL    MCH 27.8 26.5 - 33.0 pg    MCHC 31.8 31.5 - 36.5 g/dL    RDW 16.6 (H) 10.0 - 15.0 %    Platelet Count 126 (L) 150 - 450 10e3/uL   Magnesium   Result Value Ref Range    Magnesium 2.0 1.7 - 2.3 mg/dL   Glucose by meter   Result Value Ref Range    GLUCOSE BY METER POCT 178 (H) 70 - 99 mg/dL   Glucose by meter   Result Value Ref Range    GLUCOSE BY METER POCT 259 (H) 70 - 99 mg/dL   Basic metabolic panel   Result Value Ref Range    Sodium 134 (L) 135 - 145 mmol/L    Potassium 4.4 3.4 - 5.3 mmol/L    Chloride 95 (L) 98 - 107 mmol/L    Carbon Dioxide (CO2) 28 22 - 29 mmol/L    Anion Gap 11 7 - 15 mmol/L    Urea Nitrogen 50.5 (H) 8.0 - 23.0 mg/dL    Creatinine  1.25 (H) 0.67 - 1.17 mg/dL    GFR Estimate 59 (L) >60 mL/min/1.73m2    Calcium 9.8 8.8 - 10.4 mg/dL    Glucose 142 (H) 70 - 99 mg/dL       Imaging Results:    US Lower Extremity Venous Duplex Bilateral    Result Date: 12/12/2024  EXAM: US LOWER EXTREMITY VENOUS DUPLEX BILATERAL LOCATION: [facility] DATE: 12/12/2024 INDICATION: Patient with recent washout of right foot, now with mild swelling to right LE and worsening DIAS. COMPARISON: None. TECHNIQUE: Venous Duplex ultrasound of bilateral lower extremities with and without compression, augmentation and duplex. Color flow and spectral Doppler with waveform analysis performed. FINDINGS: Exam includes the common femoral, femoral, popliteal veins as well as segmentally visualized deep calf veins and greater saphenous vein. RIGHT: No deep vein thrombosis. No superficial thrombophlebitis. No popliteal cyst. LEFT: No deep vein thrombosis. No superficial thrombophlebitis. No popliteal cyst.     IMPRESSION: 1.  No deep venous thrombosis in the bilateral lower extremities.    US Thoracentesis    Result Date: 12/12/2024  EXAM: 1. RIGHT THORACENTESIS 2. ULTRASOUND GUIDANCE LOCATION: [facility] DATE: 12/12/2024 INDICATION: Pleural effusion. PROCEDURE: Informed consent obtained. Time out performed. The chest was prepped and draped in sterile fashion. 7 mL of 1 % lidocaine was infused into the local soft tissues. Under direct ultrasound guidance, a 5 [place] catheter system was placed into the  pleural effusion. 1.3 liters of clear yellow fluid were removed and sent to lab, if requested. Patient tolerated procedure well. Ultrasound imaging was obtained and placed in the patient's permanent medical record.     IMPRESSION: Status post right ultrasound-guided thoracentesis. Reference CPT Code: 68026    Echocardiogram Complete    Result Date: 12/12/2024  [id number]^THOR^RembertEE  [facility] [address] [place] [address]  Name: MANOJ MARTINO MRN: [id number] : 1945 Study Date: 2024 10:21 AM Age: 79 yrs Gender: Male Patient Location: [facility] Reason For Study: DIAS Ordering Physician: SINAN POND Performed By: YENNY  BSA: 2.1 m2 Height: 68 in Weight: 214 lb HR: 78 BP: 119/58 mmHg ______________________________________________________________________________ Procedure Complete Echo Adult. Definity (NDC #90573-232) given intravenously. Adequate quality two-dimensional was performed and interpreted. There is no comparison study available. ______________________________________________________________________________ Interpretation Summary  The left ventricle is normal in size. There is mild concentric left ventricular hypertrophy. Left ventricular systolic function is normal. The visual ejection fraction is 55-60%. The inferior wall is mildly hypokinetic. Grade II or moderate diastolic dysfunction.  The right ventricle is normal in size and function. The left atrium is severely dilated. Right atrium not well visualized. IVC diameter <2.1 cm collapsing >50% with sniff suggests a normal RA pressure of 3 mmHg. There is no comparison study available. ______________________________________________________________________________ Left Ventricle The left ventricle is normal in size. There is mild concentric left ventricular hypertrophy. Left ventricular systolic function is normal. The visual ejection fraction is 55-60%. Grade II or moderate diastolic dysfunction. The inferior wall is mildly hypokinetic.  Right Ventricle The right ventricle is normal in size and function.  Atria The left atrium is severely dilated. Right atrium not well visualized.  Mitral Valve Mitral valve leaflets appear normal. There is mild mitral annular calcification. There is trace mitral regurgitation. There is mild mitral stenosis.  Tricuspid Valve Tricuspid valve leaflets appear normal. There is trace tricuspid  regurgitation. Right ventricular systolic pressure could not be approximated due to inadequate tricuspid regurgitation.  Aortic Valve The aortic valve is trileaflet. Mild aortic valve calcification is present. No aortic regurgitation is present. No aortic stenosis is present.  Pulmonic Valve The pulmonic valve is not well visualized. There is trace pulmonic valvular regurgitation.  Vessels The aorta root is normal. IVC diameter <2.1 cm collapsing >50% with sniff suggests a normal RA pressure of 3 mmHg.  Pericardium There is no pericardial effusion.  Rhythm Sinus rhythm was noted. ______________________________________________________________________________ MMode/2D Measurements & Calculations  IVSd: 1.3 cm LVIDd: 4.7 cm LVIDs: 3.2 cm LVPWd: 1.3 cm FS: 32.1 % LV mass(C)d: 237.1 grams LV mass(C)dI: 112.6 grams/m2 Ao root diam: 3.3 cm asc Aorta Diam: 3.2 cm LVOT diam: 2.4 cm LVOT area: 4.4 cm2 Ao root diam index Ht(cm/m): 1.9 Ao root diam index BSA (cm/m2): 1.6 Asc Ao diam index BSA (cm/m2): 1.5 Asc Ao diam index Ht(cm/m): 1.8 EF Biplane: 50.6 % LA Volume (BP): 110.0 ml  LA Volume Index (BP): 52.1 ml/m2 LA Volume Indexed (AL/bp): 56.5 ml/m2 RWT: 0.55 TAPSE: 2.0 cm  Time Measurements MM HR: 79.0 BPM  Doppler Measurements & Calculations MV E max zander: 151.0 cm/sec MV A max zander: 90.3 cm/sec MV E/A: 1.7 MV max P.2 mmHg MV mean PG: 3.7 mmHg MV V2 VTI: 33.6 cm MVA(VTI): 3.0 cm2 MV dec slope: 691.5 cm/sec2 MV dec time: 0.22 sec Ao V2 max: 135.1 cm/sec Ao max P.0 mmHg Ao V2 mean: 91.2 cm/sec Ao mean PG: 3.8 mmHg Ao V2 VTI: 30.5 cm GEMINI(I,D): 3.3 cm2 GEMINI(V,D): 3.2 cm2 LV V1 max PG: 3.7 mmHg LV V1 max: 96.5 cm/sec LV V1 VTI: 22.5 cm SV(LVOT): 99.5 ml SI(LVOT): 47.3 ml/m2 Pulm Sys Zander: 26.3 cm/sec Pulm Arellano Zander: 39.1 cm/sec Pulm A Revs Zander: 26.0 cm/sec Pulm S/D: 0.67 AV Zander Ratio (DI): 0.71 GEMINI Index (cm2/m2): 1.5 E/E': 25.2 E/E' av.9 Lateral E/e': 20.5 Medial E/e': 25.3  Peak E' Zander: 6.0 cm/sec RV S Zander: 10.8 cm/sec   ______________________________________________________________________________ Report approved by: Simón Rucker MD on 12/12/2024 12:44 PM       CT Chest Pulmonary Embolism w Contrast    Result Date: 12/11/2024  EXAM: CT CHEST PULMONARY EMBOLISM W CONTRAST LOCATION: [facility] DATE: 12/11/2024 INDICATION: SOB. COMPARISON: None. TECHNIQUE: CT chest pulmonary angiogram during arterial phase injection of IV contrast. Multiplanar reformats and MIP reconstructions were performed. Dose reduction techniques were used. CONTRAST: 75 mL Isovue 370. FINDINGS: ANGIOGRAM CHEST: Pulmonary arteries are normal caliber and negative for pulmonary emboli. Thoracic aorta is negative for dissection. No CT evidence of right heart strain. LUNGS AND PLEURA: Large right pleural effusion with moderate to large left pleural effusion. Emphysema. Consolidation within both lower lobes. Tiny calcified granuloma in the right upper lobe. Subpleural nodularity in the left upper lobe. MEDIASTINUM/AXILLAE: Subcarinal lymphadenopathy. AP window lymph node enlargement. CORONARY ARTERY CALCIFICATION: Severe. UPPER ABDOMEN: Incomplete visualization of hypodense mass in the posterior right hepatic lobe with bulky portacaval lymphadenopathy. MUSCULOSKELETAL: Normal.     IMPRESSION: 1.  No pulmonary embolism. 2.  Large right pleural effusion with moderate to large left pleural effusion. Bibasilar consolidation. 3.  Emphysema. 4.  Incomplete visualization of hypodense mass in the posterior right hepatic lobe with bulky portacaval lymphadenopathy suspicious for neoplasm. 5.  Subcarinal lymphadenopathy. Mild AP window lymphadenopathy with scattered additional subcentimeter mediastinal nodes.    PET Oncology Whole Body    Result Date: 12/6/2024  EXAM: PET ONCOLOGY WHOLE BODY LOCATION: [facility] DATE: 12/6/2024 INDICATION: Abnormal findings on diagnostic imaging of other abdominal regions, including  retroperitoneum COMPARISON: CT the abdomen pelvis dated 11/27/2024 CONTRAST: None TECHNIQUE: Serum glucose level 87 mg/dL. One hour post intravenous administration of 13.0 mCi F-18 FDG, PET imaging was performed from the skull vertex to feet, utilizing attenuation correction with concurrent axial CT and PET/CT image fusion. Dose reduction techniques were used. PET/CT FINDINGS: Ill-defined mildly FDG avid lesion in the posterior right hepatic lobe measuring approximately 3.8 x 2.6 cm (Max SUV 5.7) with large FDG avid portacaval lymph node with areas of in-situ necrosis measuring 6.4 x 5.2 cm (Max SUV 24.3) and additional subcentimeter retroperitoneal lymph nodes (Max SUV 3.0 in a retrocaval lymph node) suspicious for liver neoplasm with portacaval and retroperitoneal lymph node metastases FDG avid ulceration in the lateral right lower leg below-the-knee (Max SUV 3.1) and involving the right heel (Max SUV 4.3) likely representing inflammatory/infectious processes. Mildly FDG avid airspace opacities in the periphery of the upper lobes, which are likely inflammatory in nature. Degenerative shoulder and hip synovitis. CT FINDINGS: Mild senescent intercranial changes. Postoperative change of the bilateral lenses. Moderate carotid artery bifurcation calcification. Moderate to large pleural effusions and adjacent atelectatic change. Moderate coronary artery calcium. Cholecystectomy. Pelvic phleboliths. Multilevel degenerative changes of the spine.     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    CTA Abdomen Pelvis Runoff w Contrast    Result Date: 11/27/2024  EXAM: CTA ABDOMEN PELVIS RUNOFF W CONTRAST LOCATION: [facility] DATE: 11/27/2024 INDICATION:  Diabetic ulcer of right heel associated with type 2 diabetes mellitus, with necrosis of bone (H), Diabetic ulcer of right heel associated with type 2 diabetes  mellitus, with necrosis of bone (H) COMPARISON: Aortic ultrasound 11/8/2024 TECHNIQUE: Helical acquisition through the abdomen, pelvis, and bilateral lower extremities was performed during the arterial phase of contrast enhancement using IV Contrast. 2D and 3D reconstructions were performed by the CT technologist. Dose reduction  techniques were used. CONTRAST: 90ml isovue 370 FINDINGS: ABDOMEN ARTERIAL FINDINGS Abdominal aorta: Patent and normal caliber with moderate atherosclerotic burden. Celiac artery: Patent. SMA: Patent. Renal Arteries: Mild atherosclerotic narrowing of the renal artery origins which are otherwise patent distally. ALEC: Mild ostial narrowing, otherwise patent. RIGHT LOWER EXTREMITY ARTERIAL FINDINGS Common Iliac: At least mild atherosclerotic narrowing of the origin, otherwise patent. Diffuse atherosclerotic disease. External Iliac: Patent. Internal Iliac: At least mild ostial stenosis. Common Femoral: Patent with atherosclerosis resulting in approximately 50% luminal narrowing. Profunda: Ostial stenosis, otherwise patent. SFA: Moderate focal stenosis near the origin, otherwise patent. Popliteal: Patent. Tibioperoneal trunk: Patent. Anterior tibial: Occluded in the proximal calf. Peroneal: Patent to the level of the ankle. Posterior tibial: Patent to the level of the foot. LEFT LOWER EXTREMITY ARTERIAL FINDINGS Common Iliac: Severe atherosclerotic disease and occlusion at the origin with reconstitution distally just proximal to the bifurcation. External Iliac: Atherosclerotic disease results in mild narrowing throughout the vessels course. Internal Iliac: Ostial stenosis, otherwise patent. Common Femoral: Atherosclerotic disease results in approximately 50% luminal stenosis. Profunda: Ostial stenosis, otherwise patent. SFA: Atherosclerotic disease results in severe focal stenosis proximally, otherwise patent. Popliteal: Mild atherosclerotic disease in the above-knee segment, the below-knee  segment is patent. Tibioperoneal trunk: Patent. Anterior tibial: Occluded. Peroneal: Patent to the level of the ankle. Posterior tibial: Patent to the level of the foot. NON-ARTERIAL FINDINGS LUNG BASES: No pleural effusion. Scattered areas of scarring. A partially visualized opacity along the right major fissure is noted. ABDOMEN: Cholecystectomy. A hypodensity in the posterior right hepatic lobe is noted measuring 2.5 cm in diameter, incompletely evaluated on this exam.. Mild scarring or megaly measuring 14 cm craniocaudal. The adrenal glands and kidneys are normal. Mild  fatty atrophy of the pancreatic head. There is a heterogeneous mass along the anterior aspect of the IVC and posterior to the duodenum measuring 7.0 x 4.7 x 7.0 cm. Prominent lymph nodes are noted immediately adjacent to the mass. Normal caliber bowel loops. No ascites. PELVIS: Normal MUSCULOSKELETAL: Mild degenerative changes of the thoracolumbar spine and bilateral hips. No destructive osseous lesion     IMPRESSION: 1.  Severe aortoiliac atherosclerotic disease with interval short segment occlusion of the left common iliac artery with distal reconstitution. This appears new since the November 8 aortic ultrasound 2.  Bilateral SFA disease proximally. Two-vessel runoff bilaterally with occlusion of the ALEX's. 3.  There is a heterogeneous mass in the central right hemiabdomen posterior to the duodenum and anterior to tzhe IVC. This appears distinct from the pancreatic head and liver, flow is concerning for neoplasm. In addition there is a 2.5 cm hepatic hypodensity which is incompletely evaluated on this exam. Abdominal MRI with contrast is recommended to further evaluate these findings. The abdominal mass would be amenable to endoscopic ultrasound-guided biopsy as indicated. 4.  Prominent retroperitoneal lymph nodes concerning for a metastatic process given the aforementioned findings. 5.  Partially visualized opacity along the right major  fissure. Further evaluation with a CT of the chest is suggested.       Signed by: Raisa Martinez PA-C

## 2024-12-17 NOTE — Clinical Note
12/17/2024      Janice Nowak  [address]  [facility] [address]      Dear Colleague,    Thank you for referring your patient, Janice Nowak, to the [facility]. Please see a copy of my visit note below.    [facility] Hematology and Oncology Consult Note    Patient: Janice Nowak  MRN: [id number]  Date of Service: Dec 17, 2024      We have been asked by Dr. Jc to evaluate Janice Nowak for new abdominal mass    Assessment/Plan:    1.  Abdominal mass with associated retroperitoneal lymphadenopathy  Janice is a 79-year-old with a new finding of abdominal mass from recent CTA on 11/27/2024.  There was also retroperitoneal lymphadenopathy.  A 2.5 cm hepatic hypodensity was also noted, however, unclear if this is associated.  Will order stat PET to evaluate for active disease and optimal biopsy location.  Will order biopsy based on results.  Will have follow-up in 2 weeks to discuss results and next steps.      PET 12/6/24     2.  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  Type 2 diabetes  He is still recovering.  Almost completed with IV antibiotic course.  Wound VAC is still on.  He has follow-up with his surgeon tomorrow.    4. New diagnosis of HFpEF  Hospitalized 12/11/24-12/15/24 for new diagnosis heart failure. Now on torsemide    Medical decision Making:  Today's visit was centered around a cancer diagnosis in the setting of additional medical comorbidities. Complex medical decision making was required. The risk of additional morbidity without further treatment is high.   High-risk medications were managed:    ECOG Performance: 3    Staging History:     Cancer Staging   No matching staging information was found for the patient.      Oncology History:    11/27/24: CTA showed incidental finding of a mass in the central right hemiabdomen posterior to the duodenum and anterior to the IVC.      He was recently hospitalized to 10/20/2024 - 2024 for osteomyelitis of the right calcaneus status post I&D and partial calcanectomy.  He is now in a wheelchair and still recovering with wound VAC and right leg boot.  Otherwise he reports feeling fatigue and low appetite for the past 1 month.  He has lost 5 pounds in the last month.  He denies any fevers, chills, or drenching night sweats.  He does not have any abdominal pain.  No bone or back pain.    He is following with his surgeon and has a postop follow-up tomorrow.  He has home care nurses come twice a week to help with the wound VAC.  He is currently on IV antibiotics with PICC line but is almost completed.    He quit smoking in .  He reports a 20-pack-year history.  No recent alcohol but reports some binging in his younger years.  He lives alone in a senior living apartment.  He is able to do all his activities of daily living but is currently wheelchair-bound.  He is a  and served in [place].  He was exposed to agent orange.  He denies any family history or personal history of cancers.    Interval History:    Janice is here for follow up.     He was hospitalized 24-12/15/24 for new diagnosis of HFpEF.     Past History:  Past Medical History:   Diagnosis Date    Diabetes (H)     Hypertension     Sleep apnea     Thyroid disease     No family history on file.   [unfilled] Social History     Socioeconomic History    Marital status:      Spouse name: Not on file    Number of children: Not on file    Years of education: Not on file    Highest education level: Not on file   Occupational History    Not on file   Tobacco Use    Smoking status: Former     Current packs/day: 0.00     Types: Cigarettes     Quit date:      Years since quittin.9     Passive exposure: Never    Smokeless tobacco: Never   Vaping Use    Vaping status: Never Used   Substance and Sexual Activity    Alcohol use: Yes     Alcohol/week: 5.0 standard drinks of  alcohol     Types: 5 Cans of beer per week    Drug use: Not Currently    Sexual activity: Not on file   Other Topics Concern    Not on file   Social History Narrative    Not on file     Social Drivers of Health     Financial Resource Strain: Low Risk  (12/14/2024)    Financial Resource Strain     Within the past 12 months, have you or your family members you live with been unable to get utilities (heat, electricity) when it was really needed?: No   Food Insecurity: Low Risk  (12/14/2024)    Food Insecurity     Within the past 12 months, did you worry that your food would run out before you got money to buy more?: No     Within the past 12 months, did the food you bought just not last and you didn t have money to get more?: No   Transportation Needs: Low Risk  (12/14/2024)    Transportation Needs     Within the past 12 months, has lack of transportation kept you from medical appointments, getting your medicines, non-medical meetings or appointments, work, or from getting things that you need?: No   Physical Activity: Not on file   Stress: Not on file   Social Connections: Not on file   Interpersonal Safety: Low Risk  (12/14/2024)    Interpersonal Safety     Do you feel physically and emotionally safe where you currently live?: Yes     Within the past 12 months, have you been hit, slapped, kicked or otherwise physically hurt by someone?: No     Within the past 12 months, have you been humiliated or emotionally abused in other ways by your partner or ex-partner?: No   Recent Concern: Interpersonal Safety - High Risk (10/9/2024)    Interpersonal Safety     Do you feel physically and emotionally safe where you currently live?: No     Within the past 12 months, have you been hit, slapped, kicked or otherwise physically hurt by someone?: No     Within the past 12 months, have you been humiliated or emotionally abused in other ways by your partner or ex-partner?: No   Housing Stability: Low Risk  (12/14/2024)    Housing  Stability     Do you have housing? : Yes     Are you worried about losing your housing?: No   Recent Concern: Housing Stability - High Risk (10/28/2024)    Housing Stability     Do you have housing? : No     Are you worried about losing your housing?: No        Allergies:    Allergies   Allergen Reactions    Exenatide Unknown    Heparin Unknown    Liraglutide Unknown    Lisinopril Cough    Morphine Unknown       Review of Systems:    As above in the history.     Physical Exam:    There were no vitals taken for this visit.      General: patient appears stated age of 79 year old. Nontoxic and in no distress.  In wheelchair.  Here with son.  HEENT: Head: atraumatic, normocephalic. Sclerae anicteric.  Chest: Lungs clear to auscultation bilaterally.  Normal respiratory effort  Cardiac: Regular rate and rhythm.  No edema.   Abdomen: abdomen is non-distended  Extremities: normal tone and muscle bulk.   Skin: Multiple wounds on the left shin with Steri-Strips.  Right leg in boot with wound VAC.  CNS: alert and oriented. Grossly non-focal.   Psychiatric: normal mood and affect.     Lab Results:    Recent Results (from the past week)   Basic metabolic panel   Result Value Ref Range    Sodium 139 135 - 145 mmol/L    Potassium 4.3 3.4 - 5.3 mmol/L    Chloride 105 98 - 107 mmol/L    Carbon Dioxide (CO2) 25 22 - 29 mmol/L    Anion Gap 9 7 - 15 mmol/L    Urea Nitrogen 24.5 (H) 8.0 - 23.0 mg/dL    Creatinine 1.31 (H) 0.67 - 1.17 mg/dL    GFR Estimate 56 (L) >60 mL/min/1.73m2    Calcium 9.0 8.8 - 10.4 mg/dL    Glucose 104 (H) 70 - 99 mg/dL   Troponin T, High Sensitivity   Result Value Ref Range    Troponin T, High Sensitivity 120 (HH) <=22 ng/L   Nt probnp inpatient   Result Value Ref Range    N terminal Pro BNP Inpatient 3,933 (H) 0 - 1,800 pg/mL   D dimer quantitative   Result Value Ref Range    D-Dimer Quantitative 0.78 (H) 0.00 - 0.50 ug/mL FEU   CBC with platelets and differential   Result Value Ref Range    WBC Count 7.4 4.0 -  11.0 10e3/uL    RBC Count 3.89 (L) 4.40 - 5.90 10e6/uL    Hemoglobin 11.0 (L) 13.3 - 17.7 g/dL    Hematocrit 34.7 (L) 40.0 - 53.0 %    MCV 89 78 - 100 fL    MCH 28.3 26.5 - 33.0 pg    MCHC 31.7 31.5 - 36.5 g/dL    RDW 17.3 (H) 10.0 - 15.0 %    Platelet Count 143 (L) 150 - 450 10e3/uL    % Neutrophils 72 %    % Lymphocytes 16 %    % Monocytes 8 %    % Eosinophils 2 %    % Basophils 1 %    % Immature Granulocytes 2 %    NRBCs per 100 WBC 0 <1 /100    Absolute Neutrophils 5.4 1.6 - 8.3 10e3/uL    Absolute Lymphocytes 1.2 0.8 - 5.3 10e3/uL    Absolute Monocytes 0.6 0.0 - 1.3 10e3/uL    Absolute Eosinophils 0.1 0.0 - 0.7 10e3/uL    Absolute Basophils 0.0 0.0 - 0.2 10e3/uL    Absolute Immature Granulocytes 0.1 <=0.4 10e3/uL    Absolute NRBCs 0.0 10e3/uL   ECG 12-LEAD WITH MUSE (LHE)   Result Value Ref Range    Systolic Blood Pressure  mmHg    Diastolic Blood Pressure  mmHg    Ventricular Rate 98 BPM    Atrial Rate 98 BPM    TN Interval 170 ms    QRS Duration 146 ms     ms    QTc 510 ms    P Axis 26 degrees    R AXIS 109 degrees    T Axis 32 degrees    Interpretation ECG       Sinus rhythm  Right bundle branch block  Abnormal ECG  When compared with ECG of 27-Apr-2013 19:22,  Right bundle branch block is now Present  Confirmed by SEE ED PROVIDER NOTE FOR, ECG INTERPRETATION ([id number]),  ERIKA CORTES ([id number]) on 12/13/2024 1:03:09 AM     Troponin T, High Sensitivity   Result Value Ref Range    Troponin T, High Sensitivity 114 (HH) <=22 ng/L   Hepatic panel   Result Value Ref Range    Protein Total 7.3 6.4 - 8.3 g/dL    Albumin 3.8 3.5 - 5.2 g/dL    Bilirubin Total 0.4 <=1.2 mg/dL    Alkaline Phosphatase 91 40 - 150 U/L    AST 38 0 - 45 U/L    ALT 31 0 - 70 U/L    Bilirubin Direct <0.20 0.00 - 0.30 mg/dL   Glucose by meter   Result Value Ref Range    GLUCOSE BY METER POCT 53 (L) 70 - 99 mg/dL   Glucose by meter   Result Value Ref Range    GLUCOSE BY METER POCT 57 (L) 70 - 99 mg/dL   Glucose by meter   Result Value  Ref Range    GLUCOSE BY METER POCT 78 70 - 99 mg/dL   Glucose by meter   Result Value Ref Range    GLUCOSE BY METER POCT 108 (H) 70 - 99 mg/dL   Glucose by meter   Result Value Ref Range    GLUCOSE BY METER POCT 111 (H) 70 - 99 mg/dL   Comprehensive metabolic panel   Result Value Ref Range    Sodium 139 135 - 145 mmol/L    Potassium 3.9 3.4 - 5.3 mmol/L    Carbon Dioxide (CO2) 26 22 - 29 mmol/L    Anion Gap 9 7 - 15 mmol/L    Urea Nitrogen 23.3 (H) 8.0 - 23.0 mg/dL    Creatinine 1.29 (H) 0.67 - 1.17 mg/dL    GFR Estimate 57 (L) >60 mL/min/1.73m2    Calcium 8.7 (L) 8.8 - 10.4 mg/dL    Chloride 104 98 - 107 mmol/L    Glucose 58 (L) 70 - 99 mg/dL    Alkaline Phosphatase 87 40 - 150 U/L    AST 34 0 - 45 U/L    ALT 29 0 - 70 U/L    Protein Total 6.9 6.4 - 8.3 g/dL    Albumin 3.5 3.5 - 5.2 g/dL    Bilirubin Total 0.5 <=1.2 mg/dL   CBC with platelets   Result Value Ref Range    WBC Count 6.8 4.0 - 11.0 10e3/uL    RBC Count 3.67 (L) 4.40 - 5.90 10e6/uL    Hemoglobin 10.3 (L) 13.3 - 17.7 g/dL    Hematocrit 32.8 (L) 40.0 - 53.0 %    MCV 89 78 - 100 fL    MCH 28.1 26.5 - 33.0 pg    MCHC 31.4 (L) 31.5 - 36.5 g/dL    RDW 17.2 (H) 10.0 - 15.0 %    Platelet Count 149 (L) 150 - 450 10e3/uL   Lactate Dehydrogenase   Result Value Ref Range    Lactate Dehydrogenase 271 (H) 0 - 250 U/L   Glucose by meter   Result Value Ref Range    GLUCOSE BY METER POCT 60 (L) 70 - 99 mg/dL   Glucose by meter   Result Value Ref Range    GLUCOSE BY METER POCT 95 70 - 99 mg/dL   INR   Result Value Ref Range    INR 1.36 (H) 0.85 - 1.15   Echocardiogram Complete   Result Value Ref Range    LVEF  55-60%    Glucose by meter   Result Value Ref Range    GLUCOSE BY METER POCT 74 70 - 99 mg/dL   Pleural fluid Aerobic Bacterial Culture Routine With Gram Stain    Specimen: Pleural Cavity; Pleural fluid   Result Value Ref Range    Culture No growth after 4 days     Gram Stain Result No organisms seen     Gram Stain Result 2+ WBC seen    Glucose fluid   Result Value  Ref Range    Glucose Fluid Source Pleural Cavity, Right     Glucose fluid 123 mg/dL   Lactate dehydrogenase fluid   Result Value Ref Range    LD Fluid Source Pleural Cavity, Right     Lactate dehydrogenase fluid 90 U/L   Protein fluid   Result Value Ref Range    Protein Fluid Source Pleural Cavity, Right     Protein Total Fluid 2.7 g/dL   Cytology, non-gynecologic   Result Value Ref Range    Final Diagnosis       Specimen A     Interpretation:    Negative for malignancy    RIGHT PLEURAL FLUID, ULTRASOUND-GUIDED THORACENTESIS:        - NEGATIVE FOR MALIGNANT CELLS     Adequacy:   Satisfactory for evaluation          Gross Description       A(A). Pleural Cavity, :A. Pleural Cavity, , Pleural Fluid:  Received 100 ml of clear, yellow fluid, processed as 1 Pap stained Autocyte,  1 Tee stained cytospin and one hematoxylin and eosin stained cell block.                Microscopic Description       Concentrated cytologic smears and cellblock sections show numerous mesothelial cells with mild background chronic inflammation.  Malignant cells are not identified.      Performing Labs       The technical component of this testing was completed at [facility].     Stain controls for all stains resulted within this report have been reviewed and show appropriate reactivity.      Cell Count Body Fluid   Result Value Ref Range    Color Yellow Colorless, Yellow    Clarity Clear Clear    Cell Count Fluid Source Pleural Cavity, Right     Total Nucleated Cells     Extra Body Fluid/CSF Collection   Result Value Ref Range    Hold Specimen JIC    Glucose by meter   Result Value Ref Range    GLUCOSE BY METER POCT 193 (H) 70 - 99 mg/dL   Glucose by meter   Result Value Ref Range    GLUCOSE BY METER POCT 154 (H) 70 - 99 mg/dL   Glucose by meter   Result Value Ref Range    GLUCOSE BY METER POCT 112 (H) 70 - 99 mg/dL   Comprehensive metabolic panel   Result Value Ref  Range    Sodium 139 135 - 145 mmol/L    Potassium 4.2 3.4 - 5.3 mmol/L    Carbon Dioxide (CO2) 28 22 - 29 mmol/L    Anion Gap 9 7 - 15 mmol/L    Urea Nitrogen 29.9 (H) 8.0 - 23.0 mg/dL    Creatinine 1.62 (H) 0.67 - 1.17 mg/dL    GFR Estimate 43 (L) >60 mL/min/1.73m2    Calcium 8.7 (L) 8.8 - 10.4 mg/dL    Chloride 102 98 - 107 mmol/L    Glucose 95 70 - 99 mg/dL    Alkaline Phosphatase 83 40 - 150 U/L    AST 33 0 - 45 U/L    ALT 27 0 - 70 U/L    Protein Total 6.9 6.4 - 8.3 g/dL    Albumin 3.4 (L) 3.5 - 5.2 g/dL    Bilirubin Total 0.5 <=1.2 mg/dL   CBC with platelets   Result Value Ref Range    WBC Count 7.2 4.0 - 11.0 10e3/uL    RBC Count 3.70 (L) 4.40 - 5.90 10e6/uL    Hemoglobin 10.3 (L) 13.3 - 17.7 g/dL    Hematocrit 33.1 (L) 40.0 - 53.0 %    MCV 90 78 - 100 fL    MCH 27.8 26.5 - 33.0 pg    MCHC 31.1 (L) 31.5 - 36.5 g/dL    RDW 17.2 (H) 10.0 - 15.0 %    Platelet Count 141 (L) 150 - 450 10e3/uL   Glucose by meter   Result Value Ref Range    GLUCOSE BY METER POCT 92 70 - 99 mg/dL   Glucose by meter   Result Value Ref Range    GLUCOSE BY METER POCT 143 (H) 70 - 99 mg/dL   Glucose by meter   Result Value Ref Range    GLUCOSE BY METER POCT 130 (H) 70 - 99 mg/dL   Glucose by meter   Result Value Ref Range    GLUCOSE BY METER POCT 217 (H) 70 - 99 mg/dL   Glucose by meter   Result Value Ref Range    GLUCOSE BY METER POCT 169 (H) 70 - 99 mg/dL   Glucose by meter   Result Value Ref Range    GLUCOSE BY METER POCT 136 (H) 70 - 99 mg/dL   Comprehensive metabolic panel   Result Value Ref Range    Sodium 141 135 - 145 mmol/L    Potassium 4.0 3.4 - 5.3 mmol/L    Carbon Dioxide (CO2) 27 22 - 29 mmol/L    Anion Gap 11 7 - 15 mmol/L    Urea Nitrogen 33.1 (H) 8.0 - 23.0 mg/dL    Creatinine 1.26 (H) 0.67 - 1.17 mg/dL    GFR Estimate 58 (L) >60 mL/min/1.73m2    Calcium 9.0 8.8 - 10.4 mg/dL    Chloride 103 98 - 107 mmol/L    Glucose 130 (H) 70 - 99 mg/dL    Alkaline Phosphatase 79 40 - 150 U/L    AST 26 0 - 45 U/L    ALT 25 0 - 70 U/L     Protein Total 7.1 6.4 - 8.3 g/dL    Albumin 3.9 3.5 - 5.2 g/dL    Bilirubin Total 0.6 <=1.2 mg/dL   CBC with platelets   Result Value Ref Range    WBC Count 6.7 4.0 - 11.0 10e3/uL    RBC Count 3.84 (L) 4.40 - 5.90 10e6/uL    Hemoglobin 10.6 (L) 13.3 - 17.7 g/dL    Hematocrit 34.0 (L) 40.0 - 53.0 %    MCV 89 78 - 100 fL    MCH 27.6 26.5 - 33.0 pg    MCHC 31.2 (L) 31.5 - 36.5 g/dL    RDW 16.7 (H) 10.0 - 15.0 %    Platelet Count 132 (L) 150 - 450 10e3/uL   Glucose by meter   Result Value Ref Range    GLUCOSE BY METER POCT 236 (H) 70 - 99 mg/dL   Glucose by meter   Result Value Ref Range    GLUCOSE BY METER POCT 183 (H) 70 - 99 mg/dL   Glucose by meter   Result Value Ref Range    GLUCOSE BY METER POCT 237 (H) 70 - 99 mg/dL   Comprehensive metabolic panel   Result Value Ref Range    Sodium 139 135 - 145 mmol/L    Potassium 4.0 3.4 - 5.3 mmol/L    Carbon Dioxide (CO2) 28 22 - 29 mmol/L    Anion Gap 12 7 - 15 mmol/L    Urea Nitrogen 40.5 (H) 8.0 - 23.0 mg/dL    Creatinine 1.45 (H) 0.67 - 1.17 mg/dL    GFR Estimate 49 (L) >60 mL/min/1.73m2    Calcium 8.8 8.8 - 10.4 mg/dL    Chloride 99 98 - 107 mmol/L    Glucose 169 (H) 70 - 99 mg/dL    Alkaline Phosphatase 78 40 - 150 U/L    AST 32 0 - 45 U/L    ALT 26 0 - 70 U/L    Protein Total 7.1 6.4 - 8.3 g/dL    Albumin 3.8 3.5 - 5.2 g/dL    Bilirubin Total 0.6 <=1.2 mg/dL   CBC with platelets   Result Value Ref Range    WBC Count 6.5 4.0 - 11.0 10e3/uL    RBC Count 3.71 (L) 4.40 - 5.90 10e6/uL    Hemoglobin 10.3 (L) 13.3 - 17.7 g/dL    Hematocrit 32.4 (L) 40.0 - 53.0 %    MCV 87 78 - 100 fL    MCH 27.8 26.5 - 33.0 pg    MCHC 31.8 31.5 - 36.5 g/dL    RDW 16.6 (H) 10.0 - 15.0 %    Platelet Count 126 (L) 150 - 450 10e3/uL   Magnesium   Result Value Ref Range    Magnesium 2.0 1.7 - 2.3 mg/dL   Glucose by meter   Result Value Ref Range    GLUCOSE BY METER POCT 178 (H) 70 - 99 mg/dL   Glucose by meter   Result Value Ref Range    GLUCOSE BY METER POCT 259 (H) 70 - 99 mg/dL       Imaging  Results:    US Lower Extremity Venous Duplex Bilateral    Result Date: 2024  EXAM: US LOWER EXTREMITY VENOUS DUPLEX BILATERAL LOCATION: [facility] DATE: 2024 INDICATION: Patient with recent washout of right foot, now with mild swelling to right LE and worsening DIAS. COMPARISON: None. TECHNIQUE: Venous Duplex ultrasound of bilateral lower extremities with and without compression, augmentation and duplex. Color flow and spectral Doppler with waveform analysis performed. FINDINGS: Exam includes the common femoral, femoral, popliteal veins as well as segmentally visualized deep calf veins and greater saphenous vein. RIGHT: No deep vein thrombosis. No superficial thrombophlebitis. No popliteal cyst. LEFT: No deep vein thrombosis. No superficial thrombophlebitis. No popliteal cyst.     IMPRESSION: 1.  No deep venous thrombosis in the bilateral lower extremities.    US Thoracentesis    Result Date: 2024  EXAM: 1. RIGHT THORACENTESIS 2. ULTRASOUND GUIDANCE LOCATION: [facility] DATE: 2024 INDICATION: Pleural effusion. PROCEDURE: Informed consent obtained. Time out performed. The chest was prepped and draped in sterile fashion. 7 mL of 1 % lidocaine was infused into the local soft tissues. Under direct ultrasound guidance, a 5 Kazakh catheter system was placed into the  pleural effusion. 1.3 liters of clear yellow fluid were removed and sent to lab, if requested. Patient tolerated procedure well. Ultrasound imaging was obtained and placed in the patient's permanent medical record.     IMPRESSION: Status post right ultrasound-guided thoracentesis. Reference CPT Code: 47997    Echocardiogram Complete    Result Date: 2024  [id number]^THOR^[place] [address]  Name: MANOJ MARTINO MRN: [id number] : 1945 Study Date: 2024 10:21 AM Age: 79 yrs Gender: Male Patient  Location: [facility] Reason For Study: DIAS Ordering Physician: SINAN POND Performed By: YENNY  BSA: 2.1 m2 Height: 68 in Weight: 214 lb HR: 78 BP: 119/58 mmHg ______________________________________________________________________________ Procedure Complete Echo Adult. Definity (NDC #67156-761) given intravenously. Adequate quality two-dimensional was performed and interpreted. There is no comparison study available. ______________________________________________________________________________ Interpretation Summary  The left ventricle is normal in size. There is mild concentric left ventricular hypertrophy. Left ventricular systolic function is normal. The visual ejection fraction is 55-60%. The inferior wall is mildly hypokinetic. Grade II or moderate diastolic dysfunction.  The right ventricle is normal in size and function. The left atrium is severely dilated. Right atrium not well visualized. IVC diameter <2.1 cm collapsing >50% with sniff suggests a normal RA pressure of 3 mmHg. There is no comparison study available. ______________________________________________________________________________ Left Ventricle The left ventricle is normal in size. There is mild concentric left ventricular hypertrophy. Left ventricular systolic function is normal. The visual ejection fraction is 55-60%. Grade II or moderate diastolic dysfunction. The inferior wall is mildly hypokinetic.  Right Ventricle The right ventricle is normal in size and function.  Atria The left atrium is severely dilated. Right atrium not well visualized.  Mitral Valve Mitral valve leaflets appear normal. There is mild mitral annular calcification. There is trace mitral regurgitation. There is mild mitral stenosis.  Tricuspid Valve Tricuspid valve leaflets appear normal. There is trace tricuspid regurgitation. Right ventricular systolic pressure could not be approximated due to inadequate tricuspid regurgitation.  Aortic Valve The aortic valve is  trileaflet. Mild aortic valve calcification is present. No aortic regurgitation is present. No aortic stenosis is present.  Pulmonic Valve The pulmonic valve is not well visualized. There is trace pulmonic valvular regurgitation.  Vessels The aorta root is normal. IVC diameter <2.1 cm collapsing >50% with sniff suggests a normal RA pressure of 3 mmHg.  Pericardium There is no pericardial effusion.  Rhythm Sinus rhythm was noted. ______________________________________________________________________________ MMode/2D Measurements & Calculations  IVSd: 1.3 cm LVIDd: 4.7 cm LVIDs: 3.2 cm LVPWd: 1.3 cm FS: 32.1 % LV mass(C)d: 237.1 grams LV mass(C)dI: 112.6 grams/m2 Ao root diam: 3.3 cm asc Aorta Diam: 3.2 cm LVOT diam: 2.4 cm LVOT area: 4.4 cm2 Ao root diam index Ht(cm/m): 1.9 Ao root diam index BSA (cm/m2): 1.6 Asc Ao diam index BSA (cm/m2): 1.5 Asc Ao diam index Ht(cm/m): 1.8 EF Biplane: 50.6 % LA Volume (BP): 110.0 ml  LA Volume Index (BP): 52.1 ml/m2 LA Volume Indexed (AL/bp): 56.5 ml/m2 RWT: 0.55 TAPSE: 2.0 cm  Time Measurements MM HR: 79.0 BPM  Doppler Measurements & Calculations MV E max zander: 151.0 cm/sec MV A max zander: 90.3 cm/sec MV E/A: 1.7 MV max P.2 mmHg MV mean PG: 3.7 mmHg MV V2 VTI: 33.6 cm MVA(VTI): 3.0 cm2 MV dec slope: 691.5 cm/sec2 MV dec time: 0.22 sec Ao V2 max: 135.1 cm/sec Ao max P.0 mmHg Ao V2 mean: 91.2 cm/sec Ao mean PG: 3.8 mmHg Ao V2 VTI: 30.5 cm GEMINI(I,D): 3.3 cm2 GEMINI(V,D): 3.2 cm2 LV V1 max PG: 3.7 mmHg LV V1 max: 96.5 cm/sec LV V1 VTI: 22.5 cm SV(LVOT): 99.5 ml SI(LVOT): 47.3 ml/m2 Pulm Sys Zander: 26.3 cm/sec Pulm Arellano Zander: 39.1 cm/sec Pulm A Revs Zander: 26.0 cm/sec Pulm S/D: 0.67 AV Zander Ratio (DI): 0.71 GEMINI Index (cm2/m2): 1.5 E/E': 25.2 E/E' av.9 Lateral E/e': 20.5 Medial E/e': 25.3  Peak E' Zander: 6.0 cm/sec RV S Zander: 10.8 cm/sec  ______________________________________________________________________________ Report approved by: Simón Rucker MD on 2024 12:44 PM       CT Chest  Pulmonary Embolism w Contrast    Result Date: 12/11/2024  EXAM: CT CHEST PULMONARY EMBOLISM W CONTRAST LOCATION: [facility] DATE: 12/11/2024 INDICATION: SOB. COMPARISON: None. TECHNIQUE: CT chest pulmonary angiogram during arterial phase injection of IV contrast. Multiplanar reformats and MIP reconstructions were performed. Dose reduction techniques were used. CONTRAST: 75 mL Isovue 370. FINDINGS: ANGIOGRAM CHEST: Pulmonary arteries are normal caliber and negative for pulmonary emboli. Thoracic aorta is negative for dissection. No CT evidence of right heart strain. LUNGS AND PLEURA: Large right pleural effusion with moderate to large left pleural effusion. Emphysema. Consolidation within both lower lobes. Tiny calcified granuloma in the right upper lobe. Subpleural nodularity in the left upper lobe. MEDIASTINUM/AXILLAE: Subcarinal lymphadenopathy. AP window lymph node enlargement. CORONARY ARTERY CALCIFICATION: Severe. UPPER ABDOMEN: Incomplete visualization of hypodense mass in the posterior right hepatic lobe with bulky portacaval lymphadenopathy. MUSCULOSKELETAL: Normal.     IMPRESSION: 1.  No pulmonary embolism. 2.  Large right pleural effusion with moderate to large left pleural effusion. Bibasilar consolidation. 3.  Emphysema. 4.  Incomplete visualization of hypodense mass in the posterior right hepatic lobe with bulky portacaval lymphadenopathy suspicious for neoplasm. 5.  Subcarinal lymphadenopathy. Mild AP window lymphadenopathy with scattered additional subcentimeter mediastinal nodes.    PET Oncology Whole Body    Result Date: 12/6/2024  EXAM: PET ONCOLOGY WHOLE BODY LOCATION: [facility] DATE: 12/6/2024 INDICATION: Abnormal findings on diagnostic imaging of other abdominal regions, including retroperitoneum COMPARISON: CT the abdomen pelvis dated 11/27/2024 CONTRAST: None TECHNIQUE: Serum glucose level 87 mg/dL. One hour post intravenous administration of  13.0 mCi F-18 FDG, PET imaging was performed from the skull vertex to feet, utilizing attenuation correction with concurrent axial CT and PET/CT image fusion. Dose reduction techniques were used. PET/CT FINDINGS: Ill-defined mildly FDG avid lesion in the posterior right hepatic lobe measuring approximately 3.8 x 2.6 cm (Max SUV 5.7) with large FDG avid portacaval lymph node with areas of in-situ necrosis measuring 6.4 x 5.2 cm (Max SUV 24.3) and additional subcentimeter retroperitoneal lymph nodes (Max SUV 3.0 in a retrocaval lymph node) suspicious for liver neoplasm with portacaval and retroperitoneal lymph node metastases FDG avid ulceration in the lateral right lower leg below-the-knee (Max SUV 3.1) and involving the right heel (Max SUV 4.3) likely representing inflammatory/infectious processes. Mildly FDG avid airspace opacities in the periphery of the upper lobes, which are likely inflammatory in nature. Degenerative shoulder and hip synovitis. CT FINDINGS: Mild senescent intercranial changes. Postoperative change of the bilateral lenses. Moderate carotid artery bifurcation calcification. Moderate to large pleural effusions and adjacent atelectatic change. Moderate coronary artery calcium. Cholecystectomy. Pelvic phleboliths. Multilevel degenerative changes of the spine.     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    CTA Abdomen Pelvis Runoff w Contrast    Result Date: 11/27/2024  EXAM: CTA ABDOMEN PELVIS RUNOFF W CONTRAST LOCATION: [facility] DATE: 11/27/2024 INDICATION:  Diabetic ulcer of right heel associated with type 2 diabetes mellitus, with necrosis of bone (H), Diabetic ulcer of right heel associated with type 2 diabetes mellitus, with necrosis of bone (H) COMPARISON: Aortic ultrasound 11/8/2024 TECHNIQUE: Helical acquisition through the abdomen, pelvis, and bilateral lower extremities  was performed during the arterial phase of contrast enhancement using IV Contrast. 2D and 3D reconstructions were performed by the CT technologist. Dose reduction  techniques were used. CONTRAST: 90ml isovue 370 FINDINGS: ABDOMEN ARTERIAL FINDINGS Abdominal aorta: Patent and normal caliber with moderate atherosclerotic burden. Celiac artery: Patent. SMA: Patent. Renal Arteries: Mild atherosclerotic narrowing of the renal artery origins which are otherwise patent distally. ALEC: Mild ostial narrowing, otherwise patent. RIGHT LOWER EXTREMITY ARTERIAL FINDINGS Common Iliac: At least mild atherosclerotic narrowing of the origin, otherwise patent. Diffuse atherosclerotic disease. External Iliac: Patent. Internal Iliac: At least mild ostial stenosis. Common Femoral: Patent with atherosclerosis resulting in approximately 50% luminal narrowing. Profunda: Ostial stenosis, otherwise patent. SFA: Moderate focal stenosis near the origin, otherwise patent. Popliteal: Patent. Tibioperoneal trunk: Patent. Anterior tibial: Occluded in the proximal calf. Peroneal: Patent to the level of the ankle. Posterior tibial: Patent to the level of the foot. LEFT LOWER EXTREMITY ARTERIAL FINDINGS Common Iliac: Severe atherosclerotic disease and occlusion at the origin with reconstitution distally just proximal to the bifurcation. External Iliac: Atherosclerotic disease results in mild narrowing throughout the vessels course. Internal Iliac: Ostial stenosis, otherwise patent. Common Femoral: Atherosclerotic disease results in approximately 50% luminal stenosis. Profunda: Ostial stenosis, otherwise patent. SFA: Atherosclerotic disease results in severe focal stenosis proximally, otherwise patent. Popliteal: Mild atherosclerotic disease in the above-knee segment, the below-knee segment is patent. Tibioperoneal trunk: Patent. Anterior tibial: Occluded. Peroneal: Patent to the level of the ankle. Posterior tibial: Patent to the level of the foot.  NON-ARTERIAL FINDINGS LUNG BASES: No pleural effusion. Scattered areas of scarring. A partially visualized opacity along the right major fissure is noted. ABDOMEN: Cholecystectomy. A hypodensity in the posterior right hepatic lobe is noted measuring 2.5 cm in diameter, incompletely evaluated on this exam.. Mild scarring or megaly measuring 14 cm craniocaudal. The adrenal glands and kidneys are normal. Mild  fatty atrophy of the pancreatic head. There is a heterogeneous mass along the anterior aspect of the IVC and posterior to the duodenum measuring 7.0 x 4.7 x 7.0 cm. Prominent lymph nodes are noted immediately adjacent to the mass. Normal caliber bowel loops. No ascites. PELVIS: Normal MUSCULOSKELETAL: Mild degenerative changes of the thoracolumbar spine and bilateral hips. No destructive osseous lesion     IMPRESSION: 1.  Severe aortoiliac atherosclerotic disease with interval short segment occlusion of the left common iliac artery with distal reconstitution. This appears new since the November 8 aortic ultrasound 2.  Bilateral SFA disease proximally. Two-vessel runoff bilaterally with occlusion of the ALEX's. 3.  There is a heterogeneous mass in the central right hemiabdomen posterior to the duodenum and anterior to tzhe IVC. This appears distinct from the pancreatic head and liver, flow is concerning for neoplasm. In addition there is a 2.5 cm hepatic hypodensity which is incompletely evaluated on this exam. Abdominal MRI with contrast is recommended to further evaluate these findings. The abdominal mass would be amenable to endoscopic ultrasound-guided biopsy as indicated. 4.  Prominent retroperitoneal lymph nodes concerning for a metastatic process given the aforementioned findings. 5.  Partially visualized opacity along the right major fissure. Further evaluation with a CT of the chest is suggested.     Pathology:    Signed by: Raisa Martinez PA-C    Oncology Rooming Note    December 17, 2024 8:28 AM  "  Janice Nowak is a 79 year old male who presents for:    Chief Complaint   Patient presents with    Oncology Clinic Visit     Return visit to discuss PET results      Initial Vitals: BP (!) 140/63 (BP Location: Right arm, Patient Position: Sitting, Cuff Size: Adult Large)   Pulse 83   Temp 97.8  F (36.6  C) (Tympanic)   Resp 16   Ht 1.727 m (5' 8\")   SpO2 98%   BMI 31.35 kg/m   Estimated body mass index is 31.35 kg/m  as calculated from the following:    Height as of this encounter: 1.727 m (5' 8\").    Weight as of 12/15/24: 93.5 kg (206 lb 3.2 oz). Body surface area is 2.12 meters squared.  No Pain (0) Comment: Data Unavailable   No LMP for male patient.  Allergies reviewed: Yes  Medications reviewed: Yes    Medications: Medication refills not needed today.  Pharmacy name entered into [employer]: [id number] - [place] [id number] [facility]     Frailty Screening:   Is the patient here for a new oncology consult visit in cancer care? 2. No      Clinical concerns:       KAY GARCIA CMA                  Again, thank you for allowing me to participate in the care of your patient.        Sincerely,        Kong Gleason MD  "

## 2024-12-17 NOTE — LETTER
"1.727 m (5' 8\").    Weight as of 12/15/24: 93.5 kg (206 lb 3.2 oz). Body surface area is 2.12 meters squared.  No Pain (0) Comment: Data Unavailable   No LMP for male patient.  Allergies reviewed: Yes  Medications reviewed: Yes    Medications: Medication refills not needed today.  Pharmacy name entered into [employer]: [facility] #[id number] - [place] [address] [address]    Frailty Screening:   Is the patient here for a new oncology consult visit in cancer care? 2. No      Clinical concerns:       KAY GARCIA CMA                Again, thank you for allowing me to participate in the care of your patient.        Sincerely,        Kong Gleason MD  "

## 2024-12-18 ENCOUNTER — HOSPITAL ENCOUNTER (OUTPATIENT)
Dept: MRI IMAGING | Facility: HOSPITAL | Age: 79
Discharge: HOME | End: 2024-12-18
Payer: COMMERCIAL

## 2024-12-18 DIAGNOSIS — R16.0 MASS OF RIGHT LOBE OF LIVER: ICD-10-CM

## 2024-12-18 LAB
BACTERIA PLR CULT: NO GROWTH
GRAM STAIN RESULT: NORMAL
GRAM STAIN RESULT: NORMAL

## 2024-12-18 PROCEDURE — 255N000002 HC RX 255 OP 636

## 2024-12-18 PROCEDURE — 74183 MRI ABD W/O CNTR FLWD CNTR: CPT

## 2024-12-18 PROCEDURE — A9585 GADOBUTROL INJECTION: HCPCS

## 2024-12-18 RX ORDER — GADOBUTROL 604.72 MG/ML
0.1 INJECTION INTRAVENOUS ONCE
Status: COMPLETED | OUTPATIENT
Start: 2024-12-18 | End: 2024-12-18

## 2024-12-18 RX ADMIN — GADOBUTROL 9 ML: 604.72 INJECTION INTRAVENOUS at 11:24

## 2024-12-20 ENCOUNTER — ANESTHESIA EVENT (OUTPATIENT)
Dept: SURGERY | Facility: CLINIC | Age: 79
End: 2024-12-20
Payer: COMMERCIAL

## 2024-12-20 ENCOUNTER — VIRTUAL VISIT (OUTPATIENT)
Dept: SURGERY | Facility: CLINIC | Age: 79
End: 2024-12-20
Payer: COMMERCIAL

## 2024-12-20 ENCOUNTER — PRE VISIT (OUTPATIENT)
Dept: SURGERY | Facility: CLINIC | Age: 79
End: 2024-12-20

## 2024-12-20 VITALS — BODY MASS INDEX: 31.22 KG/M2 | HEIGHT: 68 IN | WEIGHT: 206 LBS

## 2024-12-20 DIAGNOSIS — R19.00 ABDOMINAL MASS, UNSPECIFIED ABDOMINAL LOCATION: ICD-10-CM

## 2024-12-20 DIAGNOSIS — Z01.818 PREOP EXAMINATION: Primary | ICD-10-CM

## 2024-12-20 DIAGNOSIS — R93.89 ABNORMAL FINDING ON IMAGING: ICD-10-CM

## 2024-12-20 PROCEDURE — 99204 OFFICE O/P NEW MOD 45 MIN: CPT | Mod: 24 | Performed by: NURSE PRACTITIONER

## 2024-12-20 ASSESSMENT — LIFESTYLE VARIABLES: TOBACCO_USE: 1

## 2024-12-20 NOTE — PROGRESS NOTES
Janice is a 79 year old who is being evaluated via a billable video visit.      How would you like to obtain your AVS? Kayden Salgado   Janice is a 79 year old, presenting for the following health issues:  Pre-Op Exam (/)    CB Gore LPN

## 2024-12-20 NOTE — PROGRESS NOTES
Interventional Radiology - Pre-Procedure Evaluation:  Outpatient - [facility]  12/26/2024     Procedure Requested: RLE angiogram  Requested by: Dr. Jc     History and Physical Reviewed: H&P documented within 30 days (by Tim Murry MD on 12/11/2024). I have personally reviewed the patient's medical history and have updated the medical record as necessary.    HPI: Janice Nowak is a 79 year old male with a PMH of DM II, CAD, HTN, CHF, smoker, PAD, and recent admission for diabetic foot ulcer and osteomyelitis s/p I&D, partial calcanectomy (10/24-11/01/2024) who presents for a RIGHT lower extremity angiogram. Now with right heel diabetic ulcer with necrosis to bone. Follows with Dr. Jc, last seen 10/22/2024. Presenting for RLE angiogram.    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 We will use argatroban today per discussion with Dr. Jc.     IMAGING:  EXAM: CTA ABDOMEN PELVIS RUNOFF W CONTRAST  LOCATION: [facility]  DATE: 11/27/2024     INDICATION:  Diabetic ulcer of right heel associated with type 2 diabetes mellitus, with necrosis of bone (H), Diabetic ulcer of right heel associated with type 2 diabetes mellitus, with necrosis of bone (H)  COMPARISON: Aortic ultrasound 11/8/2024  TECHNIQUE: Helical acquisition through the abdomen, pelvis, and bilateral lower extremities was performed during the arterial phase of contrast enhancement using IV Contrast. 2D and 3D reconstructions were performed by the CT technologist. Dose reduction   techniques were used.   CONTRAST: 90ml isovue 370     FINDINGS:     ABDOMEN ARTERIAL FINDINGS  Abdominal aorta: Patent and normal caliber with moderate atherosclerotic burden.  Celiac artery: Patent.  SMA: Patent.  Renal Arteries: Mild atherosclerotic narrowing of the renal artery origins which are otherwise patent distally.  ALEC: Mild ostial narrowing, otherwise patent.     RIGHT LOWER EXTREMITY ARTERIAL  FINDINGS  Common Iliac: At least mild atherosclerotic narrowing of the origin, otherwise patent. Diffuse atherosclerotic disease.  External Iliac: Patent.  Internal Iliac: At least mild ostial stenosis.  Common Femoral: Patent with atherosclerosis resulting in approximately 50% luminal narrowing.  Profunda: Ostial stenosis, otherwise patent.  SFA: Moderate focal stenosis near the origin, otherwise patent.  Popliteal: Patent.  Tibioperoneal trunk: Patent.  Anterior tibial: Occluded in the proximal calf.  Peroneal: Patent to the level of the ankle.  Posterior tibial: Patent to the level of the foot.     LEFT LOWER EXTREMITY ARTERIAL FINDINGS  Common Iliac: Severe atherosclerotic disease and occlusion at the origin with reconstitution distally just proximal to the bifurcation.  External Iliac: Atherosclerotic disease results in mild narrowing throughout the vessels course.  Internal Iliac: Ostial stenosis, otherwise patent.  Common Femoral: Atherosclerotic disease results in approximately 50% luminal stenosis.  Profunda: Ostial stenosis, otherwise patent.  SFA: Atherosclerotic disease results in severe focal stenosis proximally, otherwise patent.  Popliteal: Mild atherosclerotic disease in the above-knee segment, the below-knee segment is patent.  Tibioperoneal trunk: Patent.  Anterior tibial: Occluded.  Peroneal: Patent to the level of the ankle.  Posterior tibial: Patent to the level of the foot.     NON-ARTERIAL FINDINGS  LUNG BASES: No pleural effusion. Scattered areas of scarring. A partially visualized opacity along the right major fissure is noted.     ABDOMEN: Cholecystectomy. A hypodensity in the posterior right hepatic lobe is noted measuring 2.5 cm in diameter, incompletely evaluated on this exam.. Mild scarring or megaly measuring 14 cm craniocaudal. The adrenal glands and kidneys are normal. Mild   fatty atrophy of the pancreatic head. There is a heterogeneous mass along the anterior aspect of the IVC and  posterior to the duodenum measuring 7.0 x 4.7 x 7.0 cm. Prominent lymph nodes are noted immediately adjacent to the mass. Normal caliber bowel   loops. No ascites.     PELVIS: Normal     MUSCULOSKELETAL: Mild degenerative changes of the thoracolumbar spine and bilateral hips. No destructive osseous lesion                                                                         IMPRESSION:  1.  Severe aortoiliac atherosclerotic disease with interval short segment occlusion of the left common iliac artery with distal reconstitution. This appears new since the November 8 aortic ultrasound     2.  Bilateral SFA disease proximally. Two-vessel runoff bilaterally with occlusion of the ALEX's.     3.  There is a heterogeneous mass in the central right hemiabdomen posterior to the duodenum and anterior to tzhe IVC. This appears distinct from the pancreatic head and liver, flow is concerning for neoplasm. In addition there is a 2.5 cm hepatic   hypodensity which is incompletely evaluated on this exam. Abdominal MRI with contrast is recommended to further evaluate these findings. The abdominal mass would be amenable to endoscopic ultrasound-guided biopsy as indicated.     4.  Prominent retroperitoneal lymph nodes concerning for a metastatic process given the aforementioned findings.     5.  Partially visualized opacity along the right major fissure. Further evaluation with a CT of the chest is suggested.    NPO: Midnight  ANTICOAGULANTS/ANTIPLATELETS:   Plavix, no hold required  ASA 81 mg  ANTIBIOTICS: Not needed    ALLERGIES:  Allergies   Allergen Reactions    Exenatide Unknown    Heparin Unknown    Liraglutide Unknown    Lisinopril Cough    Morphine Unknown         LABS:  INR   Date Value Ref Range Status   12/26/2024 1.21 (H) 0.85 - 1.15 Final      Hemoglobin   Date Value Ref Range Status   12/26/2024 12.4 (L) 13.3 - 17.7 g/dL Final     Platelet Count   Date Value Ref Range Status   12/26/2024 151 150 - 450 10e3/uL Final      Creatinine   Date Value Ref Range Status   12/26/2024 1.29 (H) 0.67 - 1.17 mg/dL Final     Potassium   Date Value Ref Range Status   12/26/2024 3.5 3.4 - 5.3 mmol/L Final   07/06/2020 4.7 3.5 - 5.0 mmol/L Final         EXAM:  BP (!) 140/73 (BP Location: Left arm)   Pulse 83   Temp 98.2  F (36.8  C) (Oral)   Resp 20   SpO2 95%   General: Stable. In no acute distress.    Neuro: Alert and oriented x 3. No focal deficits.  Psych: Appropriate mood and affect. Linear/coherent thought process.   Resp: Normal respirations. Lungs clear to auscultation bilaterally.  Cardio: S1S2, regular rate and rhythm, without murmur, clicks or rubs  Vascular: RLE DP and LLE PT dopplerable. Unable to doppler LLE DP. Wound vac in place over RLE PT.  Skin: Warm and dry. Wound vac on RLE foot.      PRE-SEDATION ASSESSMENT:  Mallampati Airway Classification:  III - Only soft palate is visible. Intubation is predicted to be difficult  Previous reaction to anesthesia/sedation:  No  Sedation plan based on assessment: Moderate (conscious) sedation  ASA Classification: Class 3 - SEVERE SYSTEMIC DISEASE, DEFINITE FUNCTIONAL LIMITATIONS.   Code Status: FULL CODE      ASSESSMENT/PLAN:   RIGHT lower extremity angiogram with possible intervention with sedation.    Procedural education reviewed with patient/family in detail including, but not limited to risks, benefits and alternatives with understanding verbalized by patient/family.    Total time spent on the date of the encounter: 20 minutes.    SILVIA ELLINGTON CNP  Interventional Radiology

## 2024-12-20 NOTE — H&P
Pre-Operative H & P     CC:  Preoperative exam to assess for increased cardiopulmonary risk while undergoing surgery and anesthesia.    Date of Encounter: 12/20/2024  Primary Care Physician:  Anayeli Machado     Reason for visit:   Encounter Diagnoses   Name Primary?    Preop examination Yes    Abdominal mass, unspecified abdominal location     Abnormal finding on imaging        HPI  Janice Nowak is a 79 year old male who presents for pre-operative H & P in preparation for  Procedure Information       Case: [id number] Date/Time: 01/02/25 1040    Procedure: ENDOSCOPIC ULTRASOUND, ESOPHAGOSCOPY / UPPER GASTROINTESTINAL TRACT (GI) (Esophagus)    Anesthesia type: MAC    Diagnosis: Abnormal finding on imaging [R93.89]    Pre-op diagnosis: Abnormal finding on imaging [R93.89]    [facility]    Providers: Kong Medina MD            Janice Nowak is a 79 year old male with heart failure, hypertension, PAD, recent pleural effusion, sleep apnea, anemia, thrombocytopenia, charcot foot, chronic right heel diabetic wound, hypothyroidism, obesity, diabetes, and CKD that has a central right hemiabdomen mass.  This was found on imaging done last month for vascular evaluation.  The above listed procedure has been recommended for further evaluation.    History is obtained from the patient and chart review    Hx of abnormal bleeding or anti-platelet use: plavix and aspirin      Past Medical History  Past Medical History:   Diagnosis Date    Diabetes (H)     Hypertension     Sleep apnea     Thyroid disease        Past Surgical History  Past Surgical History:   Procedure Laterality Date    BACK SURGERY      BONE EXOSTOSIS EXCISION Right 10/24/2024    Procedure: PARTIAL CALCANECTOMY RIGHT FOOT;  Surgeon: Amol Patricio DPM;  [place] [facility] [place]    INCISION AND DRAINAGE FOOT, COMBINED Right 10/24/2024    Procedure: INCISION AND DRAINAGE;  Surgeon: Amol Patricio DPM;  Location:   [facility] [place]    IRRIGATION AND DEBRIDEMENT FOOT, COMBINED Right 10/24/2024    Procedure: AND DEBRIDEMENT RIGHT HEEL,;  Surgeon: Amol Patricio DPM;  Location: [facility] OR    ORTHOPEDIC SURGERY      PICC SINGLE LUMEN PLACEMENT  10/30/2024    VASCULAR SURGERY         Prior to Admission Medications  Current Outpatient Medications   Medication Sig Dispense Refill    amoxicillin-clavulanate (AUGMENTIN) 500-125 MG tablet Take 1 tablet by mouth 2 times daily for 21 days. 42 tablet 0    aspirin 81 MG EC tablet Take 81 mg by mouth every evening.      atorvastatin (LIPITOR) 80 MG tablet Take 80 mg by mouth at bedtime.      clopidogrel (PLAVIX) 75 MG tablet Take 75 mg by mouth every morning.      co-enzyme Q-10 100 MG CAPS capsule Take 200 mg by mouth every evening.      doxycycline hyclate (VIBRA-TABS) 100 MG tablet Take 1 tablet (100 mg) by mouth 2 times daily for 21 days. 42 tablet 0    ertugliflozin (STEGLATRO) 5 MG TABS Take 5 mg by mouth every morning.      insulin aspart (NOVOLOG FLEXPEN) 100 UNIT/ML pen Inject 5 Units subcutaneously 3 times daily (with meals). Novolog Flexpen: 5 units with breakfast, 5 units with lunch, 5 units with dinner. 15 mL 0    insulin glargine (LANTUS PEN) 100 UNIT/ML pen Inject 22 Units subcutaneously every morning (before breakfast). (Patient taking differently: Inject 100 Units subcutaneously every morning (before breakfast).) 15 mL 1    lactobacillus rhamnosus, GG, (CULTURELL) capsule Take 1 capsule by mouth every morning.      levothyroxine (SYNTHROID/LEVOTHROID) 50 MCG tablet Take 50 mcg by mouth every morning.      meclizine (ANTIVERT) 25 MG tablet Take 50 mg by mouth 3 times daily as needed (Vertigo).      metFORMIN (GLUCOPHAGE XR) 500 MG 24 hr tablet Take 1,000 mg by mouth 2 times daily (with meals).      Multiple Vitamins-Minerals (PRESERVISION AREDS 2 PO) Take 2 capsules by mouth 2 times daily.      torsemide (DEMADEX) 20 MG tablet Take 1 tablet (20 mg) by mouth daily.  (Patient taking differently: Take 20 mg by mouth daily at 2 pm.) 30 tablet 1    vitamin C with B complex (B COMPLEX-C) tablet Take 1 tablet by mouth every morning.         Allergies  Allergies   Allergen Reactions    Exenatide Unknown    Heparin Unknown    Liraglutide Unknown    Lisinopril Cough    Morphine Unknown       Social History  Social History     Socioeconomic History    Marital status:      Spouse name: Not on file    Number of children: Not on file    Years of education: Not on file    Highest education level: Not on file   Occupational History    Not on file   Tobacco Use    Smoking status: Former     Current packs/day: 0.00     Types: Cigarettes     Quit date:      Years since quittin.0     Passive exposure: Never    Smokeless tobacco: Never   Vaping Use    Vaping status: Never Used   Substance and Sexual Activity    Alcohol use: Not Currently     Alcohol/week: 5.0 standard drinks of alcohol     Types: 5 Cans of beer per week    Drug use: Not Currently    Sexual activity: Not on file   Other Topics Concern    Not on file   Social History Narrative    Not on file     Social Drivers of Health     Financial Resource Strain: Low Risk  (2024)    Financial Resource Strain     Within the past 12 months, have you or your family members you live with been unable to get utilities (heat, electricity) when it was really needed?: No   Food Insecurity: Low Risk  (2024)    Food Insecurity     Within the past 12 months, did you worry that your food would run out before you got money to buy more?: No     Within the past 12 months, did the food you bought just not last and you didn t have money to get more?: No   Transportation Needs: Low Risk  (2024)    Transportation Needs     Within the past 12 months, has lack of transportation kept you from medical appointments, getting your medicines, non-medical meetings or appointments, work, or from getting things that you need?: No   Physical  Activity: Not on file   Stress: Not on file   Social Connections: Not on file   Interpersonal Safety: Low Risk  (12/14/2024)    Interpersonal Safety     Do you feel physically and emotionally safe where you currently live?: Yes     Within the past 12 months, have you been hit, slapped, kicked or otherwise physically hurt by someone?: No     Within the past 12 months, have you been humiliated or emotionally abused in other ways by your partner or ex-partner?: No   Recent Concern: Interpersonal Safety - High Risk (10/9/2024)    Interpersonal Safety     Do you feel physically and emotionally safe where you currently live?: No     Within the past 12 months, have you been hit, slapped, kicked or otherwise physically hurt by someone?: No     Within the past 12 months, have you been humiliated or emotionally abused in other ways by your partner or ex-partner?: No   Housing Stability: Low Risk  (12/14/2024)    Housing Stability     Do you have housing? : Yes     Are you worried about losing your housing?: No   Recent Concern: Housing Stability - High Risk (10/28/2024)    Housing Stability     Do you have housing? : No     Are you worried about losing your housing?: No       Family History  Family History   Problem Relation Age of Onset    Anesthesia Reaction No family hx of     Thrombosis No family hx of        Review of Systems  The complete review of systems is negative other than noted in the HPI or here.   Anesthesia Evaluation   Pt has had prior anesthetic.     No history of anesthetic complications       ROS/MED HX  ENT/Pulmonary: Comment: Bilateral pleural effusion s/p thoracentesis 12/15/24 - all respiratory symptoms resolved now.      (+) sleep apnea, doesn't use CPAP,              tobacco use, Past use,                    (-) recent URI   Neurologic:     (+)    peripheral neuropathy,                            Cardiovascular: Comment: RLE diabetic wound - wound vac in place.  RLE angiogram pending 12/26/24.   Following with Dr. Jc     Severe aortoiliac atherosclerotic disease     (+) Dyslipidemia hypertension- Peripheral Vascular Disease-- Other.  CAD -  - -   Taking blood thinners   CHF                           Previous cardiac testing   Echo: Date: 12/11/24 Results:  Interpretation Summary     The left ventricle is normal in size. There is mild concentric left  ventricular hypertrophy.  Left ventricular systolic function is normal. The visual ejection fraction is  55-60%. The inferior wall is mildly hypokinetic.  Grade II or moderate diastolic dysfunction.     The right ventricle is normal in size and function.  The left atrium is severely dilated. Right atrium not well visualized.  IVC diameter <2.1 cm collapsing >50% with sniff suggests a normal RA pressure  of 3 mmHg.  There is no comparison study available.    Stress Test:  Date: Results:    ECG Reviewed:  Date: 12/11/24 Results:  Sinus rhythm   Right bundle branch block   Abnormal ECG     Cath:  Date: Results:      METS/Exercise Tolerance: 3 - Able to walk 1-2 blocks without stopping Comment: Does occupational therapy and physical therapy in the home - sitting.  Denies any exertional dyspnea or angina with ADLs, occupational therapy, physical therapy or wheeling himself in his wheelchair.    Hematologic: Comments: Elevated INR  thrombocytopenia    (+)      anemia,          Musculoskeletal:  - neg musculoskeletal ROS     GI/Hepatic: Comment: Abdominal mass      Renal/Genitourinary:     (+) renal disease, type: CRI,            Endo:     (+)  type II DM, Last HgA1c: 9.2, date: 10/24/24, Using insulin, - not using insulin pump.    thyroid problem, hypothyroidism,    Obesity,       Psychiatric/Substance Use:  - neg psychiatric ROS     Infectious Disease:  - neg infectious disease ROS     Malignancy:  - neg malignancy ROS     Other: Comment: Diabetic charcot foot     (+)  , ,, other significant disability Wheelchair bound         Virtual visit -  No vitals were  obtained    Physical Exam  Constitutional: Awake, alert, cooperative, no apparent distress, and appears stated age.  Eyes: Pupils equal  HENT: Normocephalic  Respiratory: non labored breathing   Neurologic: Awake, alert, oriented to name, place and time.   Neuropsychiatric: Calm, cooperative. Normal affect.      Prior Labs/Diagnostic Studies   All labs and imaging personally reviewed     EKG/ stress test - if available please see in ROS above     Component      Latest Ref Rng 10/24/2024  6:41 PM 12/12/2024  10:08 AM 12/15/2024  4:45 AM 12/17/2024  9:22 AM   Sodium      135 - 145 mmol/L    134 (L)    Potassium      3.4 - 5.3 mmol/L    4.4    Chloride      98 - 107 mmol/L    95 (L)    Carbon Dioxide (CO2)      22 - 29 mmol/L    28    Anion Gap      7 - 15 mmol/L    11    Urea Nitrogen      8.0 - 23.0 mg/dL    50.5 (H)    Creatinine      0.67 - 1.17 mg/dL    1.25 (H)    GFR Estimate      >60 mL/min/1.73m2    59 (L)    Calcium      8.8 - 10.4 mg/dL    9.8    Glucose      70 - 99 mg/dL    142 (H)    WBC      4.0 - 11.0 10e3/uL   6.5     RBC Count      4.40 - 5.90 10e6/uL   3.71 (L)     Hemoglobin      13.3 - 17.7 g/dL   10.3 (L)     Hematocrit      40.0 - 53.0 %   32.4 (L)     MCV      78 - 100 fL   87     MCH      26.5 - 33.0 pg   27.8     MCHC      31.5 - 36.5 g/dL   31.8     RDW      10.0 - 15.0 %   16.6 (H)     Platelet Count      150 - 450 10e3/uL   126 (L)     Estimated Average Glucose      <117 mg/dL 217 (H)       Hemoglobin A1C      <5.7 % 9.2 (H)       INR      0.85 - 1.15   1.36 (H)      Magnesium      1.7 - 2.3 mg/dL   2.0        Legend:  (H) High  (L) Low  The patient's records and results personally reviewed by this provider.     Outside records reviewed from: Care Everywhere      Assessment    aJnice Nowak is a 79 year old male seen as a PAC referral for risk assessment and optimization for anesthesia.    Plan/Recommendations  Pt will be optimized for the proposed procedure.  See below for details on  "the assessment, risk, and preoperative recommendations    NEUROLOGY  - No history of TIA, CVA or seizure    -Post Op delirium risk factors:  Age and High co-morbid index    ENT  - No current airway concerns.  Will need to be reassessed day of surgery.  Mallampati: Unable to assess  TM: Unable to assess    CARDIAC  - No history of CAD and Afib  - s/p recent admission for pleural effusion with questionable new diagnosis of heart failure.  Patient was referred to cardiology for further evaluation and is scheduled for that consultation on 1/8/25 (after this above procedure).  - cardiac testing as above.   - hold torsemide DOS    - following with Dr. Jc in vascular for PAD.  Has a chronic right heel wound - scheduled for a RLE angiogram on 12/26/24.  Please see that report once available.      - METS (Metabolic Equivalents)  Patient CANNOT perform 4 METS exercise without symptoms             Total Score: 1    Functional Capacity: Unable to complete 4 METS      RCRI-Moderate risk: Class 3  6.6% complication rate             Total Score: 2    RCRI: CHF    RCRI: Diabetes        PULMONARY  - Obstructive Sleep Apnea  SABRINA without home CPAP use.    - s/p admission for pleural effusion s/p thoracentesis.  All symptoms resolved.       - Denies asthma or inhaler use  - Tobacco History    History   Smoking Status    Former    Types: Cigarettes   Smokeless Tobacco    Never       GI  - denies GERD  -   PONV Low Risk  Total Score: 1           1 AN PONV: Patient is not a current smoker        /RENAL  - CKD - labs as above.      ENDOCRINE    - BMI: Estimated body mass index is 31.32 kg/m  as calculated from the following:    Height as of this encounter: 1.727 m (5' 8\").    Weight as of this encounter: 93.4 kg (206 lb).  Obesity (BMI >30)  - Diabetes  Diabetes Type 2, insulin dependent. Hold morning oral hypoglycemic medications and short acting insulin DOS. Take 80% of last scheduled dose of long-acting insulin prior to procedure.  " Hold Steglarto 4 days prior to surgery. Recommend close monitoring of the patient's blood glucose levels throughout the perioperative period.    HEME  VTE Low Risk 0.5%             Total Score: 3    VTE: Greater than 59 yrs old    VTE: Male      - hold plavix 5 days prior to surgery  - hold aspirin the day before and DOS    MSK  - uses a manual wheelchair for mobility. Can self transfer.     Skin  - chronic right heel diabetic wound.  Following with podiatry and Dr. Jc in vascular.  Wound vac in place.          Different anesthesia methods/types have been discussed with the patient, but they are aware that the final plan will be decided by the assigned anesthesia provider on the date of service.      The patient is optimized for their procedure. AVS with information on surgery time/arrival time, meds and NPO status given by nursing staff. No further diagnostic testing indicated.    Please refer to the physical examination documented by the anesthesiologist in the anesthesia record on the day of surgery.    Video-Visit Details    Type of service:  Video Visit    Provider received verbal consent for a Video Visit from the patient? Yes   Video Start Time:  1450  Video End Time: 1503    Originating Location (pt. Location): Home    Distant Location (provider location):  Off-site  Mode of Communication:  Video Conference via [facility]    On the day of service:     Prep time: 20 minutes  Visit time: 13 minutes  Documentation time: 17 minutes  ------------------------------------------  Total time: 50 minutes      SILVIA Toussaint CNP  Preoperative Assessment Center  [facility]  Clinic and [facility] Center  Phone: [phone]  Fax: [phone]

## 2024-12-20 NOTE — PATIENT INSTRUCTIONS
Preparing for Your Surgery      Name:  Janice Nowak   MRN:  [id number]   :  1945   Today's Date:  2024       Arriving for surgery:  Surgery date:  24  Arrival time:  08:40 AM      Please come to:      [facility] [employer] [facility]    [address] [place]  [address]     The [facility] [employer] Patient/Visitor Ramp is at [address]. Patients and visitors who self-park will receive the reduced hospital parking rate. If the Patient /Visitor Ramp is full, please follow the signs to the [employer] car park located at the main hospital entrance.       parking is available (24 hours/ 7 days a week)      Discounted parking pass options are available for patients and visitors. They can be purchased at the [employer] desk at the [facility] hospital entrance.     -    Stop at the security desk and they will direct surgery patients to the Surgery Check in and Family Lounge. [phone]        - If you need directions, a wheelchair or an escort please stop at the Information/security desk in the lobby.     What can I eat or drink?  -  You may eat and drink normally up to 8 hours prior to arrival time.  -  You may have clear liquids until 2 hours prior to arrival time.     Examples of clear liquids:  Water  Clear broth  Juices (apple, white grape, white cranberry  and cider) without pulp  Noncarbonated, powder based beverages  (lemonade and Ebenezer-Aid)  Sodas (Sprite, 7-Up, ginger ale and seltzer)  Coffee or tea (without milk or cream)  Gatorade    -  No Alcohol or cannabis products for at least 24 hours before surgery.     Which medicines can I take?    Hold Multivitamins for 7 days before surgery.  Hold Supplements (Co Q 10) for 7 days before surgery.  Hold Ibuprofen (Advil, Motrin) for 1 day(s) before surgery--unless otherwise directed by surgeon.  Hold Naproxen (Aleve) for 4 days before surgery.    HOLD  PLAVIX X 5 DAYS BEFORE SURGERY    HOLD ASPIRIN THE DAY BEFORE SURGERY    HOLD ENTOGLIFLOZIN (steglatro) X 4 DAYS BEFORE SURGERY    TAKE 80% OF LAST DOSE OF LANTUS INSULIN BEFORE SURGERY = 80 UNITS of glargine (lantus) insulin am of surgery    -  DO NOT take these medications the day of surgery:  Novolog insulin, metformin, lactobacillus, Vitamin C and B.    -  PLEASE TAKE these medications the day of surgery:  Tylenol if needed; take levothyroxine.    How do I prepare myself?  - Please take 2 showers (one the night prior to surgery and one the morning of surgery) using Scrubcare or Hibiclens soap.    Use this soap only from the neck to your toes. Avoid genital area      Leave the soap on your skin for one minute--then rinse thoroughly.      You may use your own shampoo and conditioner. No other hair products.   - Please remove all jewelry and body piercings.  - No lotions, deodorants or fragrance.  - No makeup or fingernail polish.   - Bring your ID and insurance card.    -If you use a CPAP machine, please bring the CPAP machine, tubing, and mask to hospital.    -If you have a Deep Brain Stimulator, Spinal Cord Stimulator, or any Neuro Stimulator device---you must bring the remote control to the hospital.      ALL PATIENTS GOING HOME THE SAME DAY OF SURGERY ARE REQUIRED TO HAVE A RESPONSIBLE ADULT TO DRIVE AND BE IN ATTENDANCE WITH THEM FOR 24 HOURS FOLLOWING SURGERY.    Covid testing policy as of 12/06/2022  Your surgeon will notify and schedule you for a COVID test if one is needed before surgery--please direct any questions or COVID symptoms to your surgeon      Questions or Concerns:    - For any questions regarding the day of surgery or your hospital stay, please contact the Pre Admission Nursing Office at [phone].       - If you have health changes between today and your surgery, please call your surgeon.       - For questions after surgery, please call your surgeons office.           Current Visitor  Guidelines    You may have 2 visitors in the pre op area.    Visiting hours: 8 a.m. to 8:30 p.m.    Patients confirmed or suspected to have symptoms of COVID 19 or flu:     No visitors allowed for adult patients.   Children (under age 18) can have 1 named visitor.     People who are sick or showing symptoms of COVID 19 or flu:    Are not allowed to visit patients--we can only make exceptions in special situations.       Please follow these guidelines for your visit:          Please maintain social distance          Masking is optional--however at times you may be asked to wear a mask for the safety of yourself and others     Clean your hands with alcohol hand . Do this when you arrive at and leave the building and patient room,    And again after you touch your mask or anything in the room.     Go directly to and from the room you are visiting.     Stay in the patient s room during your visit. Limit going to other places in the hospital as much as possible     Leave bags and jackets at home or in the car.     For everyone s health, please don t come and go during your visit. That includes for smoking   during your visit.

## 2024-12-26 ENCOUNTER — TRANSFERRED RECORDS (OUTPATIENT)
Dept: HEALTH INFORMATION MANAGEMENT | Facility: CLINIC | Age: 79
End: 2024-12-26

## 2024-12-26 ENCOUNTER — HOSPITAL ENCOUNTER (OUTPATIENT)
Dept: INTERVENTIONAL RADIOLOGY/VASCULAR | Facility: HOSPITAL | Age: 79
End: 2024-12-26
Attending: RADIOLOGY
Payer: COMMERCIAL

## 2024-12-26 VITALS
HEART RATE: 78 BPM | RESPIRATION RATE: 16 BRPM | SYSTOLIC BLOOD PRESSURE: 131 MMHG | TEMPERATURE: 98.2 F | OXYGEN SATURATION: 94 % | DIASTOLIC BLOOD PRESSURE: 63 MMHG

## 2024-12-26 DIAGNOSIS — L97.414 DIABETIC ULCER OF RIGHT HEEL ASSOCIATED WITH TYPE 2 DIABETES MELLITUS, WITH NECROSIS OF BONE (H): ICD-10-CM

## 2024-12-26 DIAGNOSIS — I70.239: ICD-10-CM

## 2024-12-26 DIAGNOSIS — E11.621 DIABETIC ULCER OF RIGHT HEEL ASSOCIATED WITH TYPE 2 DIABETES MELLITUS, WITH NECROSIS OF BONE (H): ICD-10-CM

## 2024-12-26 LAB
ACT BLD: 198 SECONDS (ref 74–150)
ACT BLD: 202 SECONDS (ref 74–150)
ACT BLD: 202 SECONDS (ref 74–150)
ACT BLD: 215 SECONDS (ref 74–150)
ACT BLD: 223 SECONDS (ref 74–150)
ANION GAP SERPL CALCULATED.3IONS-SCNC: 11 MMOL/L (ref 7–15)
BUN SERPL-MCNC: 35 MG/DL (ref 8–23)
CALCIUM SERPL-MCNC: 9.6 MG/DL (ref 8.8–10.4)
CHLORIDE SERPL-SCNC: 98 MMOL/L (ref 98–107)
CREAT SERPL-MCNC: 1.29 MG/DL (ref 0.67–1.17)
EGFRCR SERPLBLD CKD-EPI 2021: 57 ML/MIN/1.73M2
ERYTHROCYTE [DISTWIDTH] IN BLOOD BY AUTOMATED COUNT: 15.5 % (ref 10–15)
GLUCOSE SERPL-MCNC: 99 MG/DL (ref 70–99)
HCO3 SERPL-SCNC: 30 MMOL/L (ref 22–29)
HCT VFR BLD AUTO: 37.7 % (ref 40–53)
HGB BLD-MCNC: 12.4 G/DL (ref 13.3–17.7)
INR PPP: 1.21 (ref 0.85–1.15)
MCH RBC QN AUTO: 28.4 PG (ref 26.5–33)
MCHC RBC AUTO-ENTMCNC: 32.9 G/DL (ref 31.5–36.5)
MCV RBC AUTO: 87 FL (ref 78–100)
PLATELET # BLD AUTO: 151 10E3/UL (ref 150–450)
POTASSIUM SERPL-SCNC: 3.5 MMOL/L (ref 3.4–5.3)
RBC # BLD AUTO: 4.36 10E6/UL (ref 4.4–5.9)
SODIUM SERPL-SCNC: 139 MMOL/L (ref 135–145)
WBC # BLD AUTO: 9.1 10E3/UL (ref 4–11)

## 2024-12-26 PROCEDURE — 272N000567 HC SHEATH CR4

## 2024-12-26 PROCEDURE — 272N000500 HC NEEDLE CR2

## 2024-12-26 PROCEDURE — 99152 MOD SED SAME PHYS/QHP 5/>YRS: CPT

## 2024-12-26 PROCEDURE — 85610 PROTHROMBIN TIME: CPT | Performed by: NURSE PRACTITIONER

## 2024-12-26 PROCEDURE — 250N000009 HC RX 250: Performed by: NURSE PRACTITIONER

## 2024-12-26 PROCEDURE — 85347 COAGULATION TIME ACTIVATED: CPT

## 2024-12-26 PROCEDURE — 272N000566 HC SHEATH CR3

## 2024-12-26 PROCEDURE — 82374 ASSAY BLOOD CARBON DIOXIDE: CPT | Performed by: NURSE PRACTITIONER

## 2024-12-26 PROCEDURE — 255N000002 HC RX 255 OP 636: Performed by: RADIOLOGY

## 2024-12-26 PROCEDURE — C1769 GUIDE WIRE: HCPCS

## 2024-12-26 PROCEDURE — 272N000302 HC DEVICE INFLATION CR5

## 2024-12-26 PROCEDURE — 82565 ASSAY OF CREATININE: CPT | Performed by: NURSE PRACTITIONER

## 2024-12-26 PROCEDURE — 250N000011 HC RX IP 250 OP 636

## 2024-12-26 PROCEDURE — 80048 BASIC METABOLIC PNL TOTAL CA: CPT | Performed by: NURSE PRACTITIONER

## 2024-12-26 PROCEDURE — 85018 HEMOGLOBIN: CPT | Performed by: NURSE PRACTITIONER

## 2024-12-26 PROCEDURE — 36415 COLL VENOUS BLD VENIPUNCTURE: CPT | Performed by: NURSE PRACTITIONER

## 2024-12-26 RX ORDER — ARGATROBAN 1 MG/ML
10-40 INJECTION, SOLUTION INTRAVENOUS EVERY 10 MIN PRN
Status: ACTIVE | OUTPATIENT
Start: 2024-12-26

## 2024-12-26 RX ORDER — FLUMAZENIL 0.1 MG/ML
0.2 INJECTION, SOLUTION INTRAVENOUS
Status: ACTIVE | OUTPATIENT
Start: 2024-12-26

## 2024-12-26 RX ORDER — NICOTINE POLACRILEX 4 MG
15-30 LOZENGE BUCCAL
Status: ACTIVE | OUTPATIENT
Start: 2024-12-26

## 2024-12-26 RX ORDER — NALOXONE HYDROCHLORIDE 0.4 MG/ML
0.4 INJECTION, SOLUTION INTRAMUSCULAR; INTRAVENOUS; SUBCUTANEOUS
Status: ACTIVE | OUTPATIENT
Start: 2024-12-26

## 2024-12-26 RX ORDER — DEXTROSE MONOHYDRATE 25 G/50ML
25-50 INJECTION, SOLUTION INTRAVENOUS
Status: ACTIVE | OUTPATIENT
Start: 2024-12-26

## 2024-12-26 RX ORDER — ARGATROBAN 1 MG/ML
40 INJECTION, SOLUTION INTRAVENOUS ONCE
Status: COMPLETED | OUTPATIENT
Start: 2024-12-26 | End: 2024-12-26

## 2024-12-26 RX ORDER — IODIXANOL 320 MG/ML
200 INJECTION, SOLUTION INTRAVASCULAR ONCE
Status: COMPLETED | OUTPATIENT
Start: 2024-12-26 | End: 2024-12-26

## 2024-12-26 RX ORDER — FENTANYL CITRATE 50 UG/ML
25-50 INJECTION, SOLUTION INTRAMUSCULAR; INTRAVENOUS EVERY 5 MIN PRN
Status: ACTIVE | OUTPATIENT
Start: 2024-12-26

## 2024-12-26 RX ORDER — NALOXONE HYDROCHLORIDE 0.4 MG/ML
0.2 INJECTION, SOLUTION INTRAMUSCULAR; INTRAVENOUS; SUBCUTANEOUS
Status: ACTIVE | OUTPATIENT
Start: 2024-12-26

## 2024-12-26 RX ORDER — LIDOCAINE 40 MG/G
CREAM TOPICAL
Status: ACTIVE | OUTPATIENT
Start: 2024-12-26

## 2024-12-26 RX ORDER — ONDANSETRON 2 MG/ML
4 INJECTION INTRAMUSCULAR; INTRAVENOUS
Status: ACTIVE | OUTPATIENT
Start: 2024-12-26

## 2024-12-26 RX ADMIN — LIDOCAINE HYDROCHLORIDE 1 ML: 10 INJECTION, SOLUTION INFILTRATION; PERINEURAL at 09:07

## 2024-12-26 RX ADMIN — MIDAZOLAM HYDROCHLORIDE 0.5 MG: 1 INJECTION, SOLUTION INTRAMUSCULAR; INTRAVENOUS at 10:28

## 2024-12-26 RX ADMIN — ARGATROBAN 3740 MCG: 1 INJECTION, SOLUTION INTRAVENOUS at 08:57

## 2024-12-26 RX ADMIN — MIDAZOLAM HYDROCHLORIDE 0.5 MG: 1 INJECTION, SOLUTION INTRAMUSCULAR; INTRAVENOUS at 08:31

## 2024-12-26 RX ADMIN — ARGATROBAN 3740 MCG: 1 INJECTION, SOLUTION INTRAVENOUS at 09:57

## 2024-12-26 RX ADMIN — ARGATROBAN 3000 MCG: 1 INJECTION, SOLUTION INTRAVENOUS at 09:15

## 2024-12-26 RX ADMIN — FENTANYL CITRATE 25 MCG: 50 INJECTION, SOLUTION INTRAMUSCULAR; INTRAVENOUS at 09:02

## 2024-12-26 RX ADMIN — ARGATROBAN 2000 MCG: 1 INJECTION, SOLUTION INTRAVENOUS at 10:20

## 2024-12-26 RX ADMIN — FENTANYL CITRATE 25 MCG: 50 INJECTION, SOLUTION INTRAMUSCULAR; INTRAVENOUS at 11:13

## 2024-12-26 RX ADMIN — MIDAZOLAM HYDROCHLORIDE 0.5 MG: 1 INJECTION, SOLUTION INTRAMUSCULAR; INTRAVENOUS at 09:18

## 2024-12-26 RX ADMIN — IODIXANOL 100 ML: 320 INJECTION, SOLUTION INTRAVASCULAR at 11:55

## 2024-12-26 RX ADMIN — FENTANYL CITRATE 25 MCG: 50 INJECTION, SOLUTION INTRAMUSCULAR; INTRAVENOUS at 09:36

## 2024-12-26 NOTE — PROVIDER NOTIFICATION
Patient and family verbalized understanding of discharge instructions. Patient did sit up in chair longer than 30 minutes without difficulty. Groin site clean dry no bleeding noted. Legs were redressed with kerlix and boot was in place.

## 2024-12-26 NOTE — PROCEDURES
[facility]    Procedure: IR Procedure Note    Date/Time: 12/26/2024 12:19 PM    Performed by: Silvestre Jc MD  Authorized by: Silvestre Jc MD  IR Fellow Physician:    Pre Procedure Diagnosis: CLTI  Post Procedure Diagnosis: CLTI    UNIVERSAL PROTOCOL   Site Marked: NA  Prior Images Obtained and Reviewed:  Yes  Required items: Required blood products, implants, devices and special equipment available    Patient identity confirmed:  Arm band, provided demographic data, hospital-assigned identification number and verbally with patient  Patient was reevaluated immediately before administering moderate or deep sedation or anesthesia  Confirmation Checklist:  Correct equipment/implants were available, procedure was appropriate and matched the consent or emergent situation, relevant allergies and patient's identity using two indicators  Time out: Immediately prior to the procedure a time out was called    Universal Protocol: the Joint Commission Universal Protocol was followed    Preparation: Patient was prepped and draped in usual sterile fashion       ANESTHESIA    Anesthesia:  Local infiltration  Local Anesthetic:  Lidocaine 1% without epinephrine      SEDATION    Patient Sedated: No    See dictated procedure note for full details.  Findings: RLE angiogram via antegrade access    Specimens: none    Procedural Complications: None    Condition: Stable      PROCEDURE    Patient Tolerance:  Patient tolerated the procedure well with no immediate complications  Length of time physician/provider present for 1:1 monitoring during sedation:  0 min and 217-231 min

## 2024-12-26 NOTE — DISCHARGE INSTRUCTIONS
Angiogram Discharge Instructions:    You had an angiogram procedure. An angiogram is a procedure thatuses x-rays to take pictures of your blood vessels. A long, flexible tube or catheter is inserted through the blood stream (through the procedure site) to help deliver contrast (dye) into the arteries so they can be visibleon the x-ray. Angiograms are used to evaluate and treat possible blockages or other disease in the arterial system. Please follow the below instructions after your angiogram, including monitoring of your procedure site.    Care instructions after angiogram procedure:    - If you received sedation for your procedure, do not drive or operate heavy machinery for the rest of the day.    - Do not lift objects greater than 10 poundsfor 2 days following angiogram procedure.    - Avoid excessive exercise and straining for 2 days.    - Avoid tub baths, pools, hot tubs and Jacuzzis for 3 days or until procedure site is well healed.    - Youmay shower beginning tomorrow. Do not scrub procedure site until well healed; pat dry.    - Return to your normal activities as you tolerate after the 2 day restriction.    - You can expect to return to work 1-2days after your procedure - depending on the nature of your profession.    - It is normal to have some tenderness and minimal swelling at procedure puncture site. A small area of discoloration may be present.Tenderness typically subsides in 1-2 days. A small knot may also be present at puncture site for 6-8 weeks. This can be a normal part of the healing process.    Medications and other post-procedure care:    -If you had a blockage opened please make sure to fill your prescription for any new medication, such as Plavix, that you may have been prescribed and take it everyday    - If you have kidney function issues, please makesure to hydrate yourself well for the next two days by drinking lots of fluids to help clear your body of the dye used. If you have heart  problems such as heart failure, this may have to be moderated.    - If you are onMetformin, please do not resume it for 48hrs.    Follow up:    - Follow up with your vascular surgery team at the [facility] vascular center A follow up appointment should already be arranged for you.If you are unsure of your appointment or do not have a follow up appointment in the next 2-3 weeks, please call our office at [phone]. You will need to have an ultrasound 2-3 weeks after your angiogram and should bescheduled at the time of your follow up appt.    Further follow up will be based on ultrasound results. Typical follow up is every 3 months for the first year, then every 6 months to one year thereafter.    seek medical evaluation for:    - If you develop fevers (greater than 101 F (38.3C)).    - If you develop increasing pain, redness, purulent drainage, tenderness, or swelling at procedure site.    If you experience any bleeding from procedure/puncture site: lie down, firmly apply pressure to puncture site and call 911.    - Seek emergent evaluation if you experience any new leg/arm pain, discoloration ornumbness.    Call the vascular center at [phone] with questions/concerns or if you have any of the above symptoms.

## 2024-12-26 NOTE — PRE-PROCEDURE
GENERAL PRE-PROCEDURE:   Procedure:  RLE angiogram  Date/Time:  12/26/2024 8:04 AM    Written consent obtained?: Yes    Risks and benefits: Risks, benefits and alternatives were discussed    Consent given by:  Patient  Patient states understanding of procedure being performed: Yes    Patient's understanding of procedure matches consent: Yes    Procedure consent matches procedure scheduled: Yes    Expected level of sedation:  Moderate  Appropriately NPO:  Yes  ASA Class:  3  Mallampati  :  Grade 3- soft palate visible, posterior pharyngeal wall not visible  Lungs:  Lungs clear with good breath sounds bilaterally  Heart:  Normal heart sounds and rate  History & Physical reviewed:  History and physical reviewed and no updates needed  Statement of review:  I have reviewed the lab findings, diagnostic data, medications, and the plan for sedation

## 2024-12-26 NOTE — IR NOTE
Patient Name: Janice Nowak  Medical Record Number: [id number]  Today's Date: 12/26/2024    Procedure: Angiogram  Proceduralist: Dr Jc    Procedure Start: 0843  Procedure end: 1131  Sedation medications administered: 1.5 mg midazolam and 75 mcg fentanyl   Sedation time: 113 minutes

## 2024-12-27 PROBLEM — L97.415: Status: ACTIVE | Noted: 2024-12-27

## 2024-12-27 PROBLEM — E08.621: Status: ACTIVE | Noted: 2024-12-27

## 2025-01-01 ENCOUNTER — SURGERY (OUTPATIENT)
Age: 80
End: 2025-01-01
Payer: COMMERCIAL

## 2025-01-01 PROCEDURE — 15004 WOUND PREP F/N/HF/G: CPT | Performed by: PODIATRIST

## 2025-01-01 PROCEDURE — 15275 SKIN SUB GRAFT FACE/NK/HF/G: CPT | Performed by: PODIATRIST

## 2025-01-01 PROCEDURE — 15002 WOUND PREP TRK/ARM/LEG: CPT | Performed by: PODIATRIST

## 2025-01-01 PROCEDURE — 15276 SKIN SUB GRAFT F/N/HF/G ADDL: CPT | Performed by: PODIATRIST

## 2025-01-01 PROCEDURE — 15271 SKIN SUB GRAFT TRNK/ARM/LEG: CPT | Performed by: PODIATRIST

## 2025-01-01 PROCEDURE — 15272 SKIN SUB GRAFT T/A/L ADD-ON: CPT | Performed by: PODIATRIST

## 2025-01-01 RX ADMIN — BUPIVACAINE HYDROCHLORIDE 14 ML: 5 INJECTION, SOLUTION PERINEURAL at 16:08

## 2025-01-01 RX ADMIN — STERILE WATER 1000 ML: 100 IRRIGANT IRRIGATION at 16:10

## 2025-01-02 ENCOUNTER — HOSPITAL ENCOUNTER (OUTPATIENT)
Facility: CLINIC | Age: 80
Discharge: HOME | End: 2025-01-02
Attending: INTERNAL MEDICINE | Admitting: INTERNAL MEDICINE
Payer: COMMERCIAL

## 2025-01-02 ENCOUNTER — ANESTHESIA (OUTPATIENT)
Dept: SURGERY | Facility: CLINIC | Age: 80
End: 2025-01-02
Payer: COMMERCIAL

## 2025-01-02 ENCOUNTER — TELEPHONE (OUTPATIENT)
Dept: VASCULAR SURGERY | Facility: CLINIC | Age: 80
End: 2025-01-02

## 2025-01-02 VITALS
DIASTOLIC BLOOD PRESSURE: 65 MMHG | SYSTOLIC BLOOD PRESSURE: 108 MMHG | RESPIRATION RATE: 18 BRPM | TEMPERATURE: 98.8 F | HEART RATE: 82 BPM | WEIGHT: 205.69 LBS | OXYGEN SATURATION: 98 % | HEIGHT: 68 IN | BODY MASS INDEX: 31.17 KG/M2

## 2025-01-02 LAB
GLUCOSE BLDC GLUCOMTR-MCNC: 103 MG/DL (ref 70–99)
GLUCOSE BLDC GLUCOMTR-MCNC: 70 MG/DL (ref 70–99)
GLUCOSE BLDC GLUCOMTR-MCNC: 90 MG/DL (ref 70–99)

## 2025-01-02 PROCEDURE — 360N000075 HC SURGERY LEVEL 2, PER MIN: Performed by: INTERNAL MEDICINE

## 2025-01-02 PROCEDURE — 272N000001 HC OR GENERAL SUPPLY STERILE: Performed by: INTERNAL MEDICINE

## 2025-01-02 PROCEDURE — 88342 IMHCHEM/IMCYTCHM 1ST ANTB: CPT | Mod: 26 | Performed by: PATHOLOGY

## 2025-01-02 PROCEDURE — 88172 CYTP DX EVAL FNA 1ST EA SITE: CPT | Mod: 26 | Performed by: PATHOLOGY

## 2025-01-02 PROCEDURE — 88342 IMHCHEM/IMCYTCHM 1ST ANTB: CPT | Mod: TC | Performed by: INTERNAL MEDICINE

## 2025-01-02 PROCEDURE — 250N000011 HC RX IP 250 OP 636: Performed by: NURSE ANESTHETIST, CERTIFIED REGISTERED

## 2025-01-02 PROCEDURE — 250N000009 HC RX 250: Performed by: NURSE ANESTHETIST, CERTIFIED REGISTERED

## 2025-01-02 PROCEDURE — 88173 CYTOPATH EVAL FNA REPORT: CPT | Mod: 26 | Performed by: PATHOLOGY

## 2025-01-02 PROCEDURE — 82962 GLUCOSE BLOOD TEST: CPT

## 2025-01-02 PROCEDURE — 710N000012 HC RECOVERY PHASE 2, PER MINUTE: Performed by: INTERNAL MEDICINE

## 2025-01-02 PROCEDURE — 370N000017 HC ANESTHESIA TECHNICAL FEE, PER MIN: Performed by: INTERNAL MEDICINE

## 2025-01-02 PROCEDURE — 258N000003 HC RX IP 258 OP 636: Performed by: NURSE ANESTHETIST, CERTIFIED REGISTERED

## 2025-01-02 PROCEDURE — 272N000002 HC OR SUPPLY OTHER OPNP: Performed by: INTERNAL MEDICINE

## 2025-01-02 PROCEDURE — 88341 IMHCHEM/IMCYTCHM EA ADD ANTB: CPT | Mod: TC | Performed by: INTERNAL MEDICINE

## 2025-01-02 PROCEDURE — 88305 TISSUE EXAM BY PATHOLOGIST: CPT | Mod: 26 | Performed by: PATHOLOGY

## 2025-01-02 PROCEDURE — 88341 IMHCHEM/IMCYTCHM EA ADD ANTB: CPT | Mod: 26 | Performed by: PATHOLOGY

## 2025-01-02 PROCEDURE — 999N000141 HC STATISTIC PRE-PROCEDURE NURSING ASSESSMENT: Performed by: INTERNAL MEDICINE

## 2025-01-02 RX ORDER — NALOXONE HYDROCHLORIDE 0.4 MG/ML
0.2 INJECTION, SOLUTION INTRAMUSCULAR; INTRAVENOUS; SUBCUTANEOUS
Status: DISCONTINUED | OUTPATIENT
Start: 2025-01-02 | End: 2025-01-02 | Stop reason: HOSPADM

## 2025-01-02 RX ORDER — ONDANSETRON 4 MG/1
4 TABLET, ORALLY DISINTEGRATING ORAL EVERY 6 HOURS PRN
Status: DISCONTINUED | OUTPATIENT
Start: 2025-01-02 | End: 2025-01-02 | Stop reason: HOSPADM

## 2025-01-02 RX ORDER — AMOXICILLIN AND CLAVULANATE POTASSIUM 500; 125 MG/1; MG/1
1 TABLET, FILM COATED ORAL 2 TIMES DAILY
COMMUNITY
End: 2025-01-08

## 2025-01-02 RX ORDER — ONDANSETRON 2 MG/ML
4 INJECTION INTRAMUSCULAR; INTRAVENOUS EVERY 6 HOURS PRN
Status: DISCONTINUED | OUTPATIENT
Start: 2025-01-02 | End: 2025-01-02 | Stop reason: HOSPADM

## 2025-01-02 RX ORDER — ONDANSETRON 2 MG/ML
INJECTION INTRAMUSCULAR; INTRAVENOUS PRN
Status: DISCONTINUED | OUTPATIENT
Start: 2025-01-02 | End: 2025-01-02

## 2025-01-02 RX ORDER — SODIUM CHLORIDE, SODIUM LACTATE, POTASSIUM CHLORIDE, CALCIUM CHLORIDE 600; 310; 30; 20 MG/100ML; MG/100ML; MG/100ML; MG/100ML
INJECTION, SOLUTION INTRAVENOUS CONTINUOUS PRN
Status: DISCONTINUED | OUTPATIENT
Start: 2025-01-02 | End: 2025-01-02

## 2025-01-02 RX ORDER — LIDOCAINE 40 MG/G
CREAM TOPICAL
Status: DISCONTINUED | OUTPATIENT
Start: 2025-01-02 | End: 2025-01-02 | Stop reason: HOSPADM

## 2025-01-02 RX ORDER — PROPOFOL 10 MG/ML
INJECTION, EMULSION INTRAVENOUS CONTINUOUS PRN
Status: DISCONTINUED | OUTPATIENT
Start: 2025-01-02 | End: 2025-01-02

## 2025-01-02 RX ORDER — FLUMAZENIL 0.1 MG/ML
0.2 INJECTION, SOLUTION INTRAVENOUS
Status: DISCONTINUED | OUTPATIENT
Start: 2025-01-02 | End: 2025-01-02 | Stop reason: HOSPADM

## 2025-01-02 RX ORDER — NALOXONE HYDROCHLORIDE 0.4 MG/ML
0.4 INJECTION, SOLUTION INTRAMUSCULAR; INTRAVENOUS; SUBCUTANEOUS
Status: DISCONTINUED | OUTPATIENT
Start: 2025-01-02 | End: 2025-01-02 | Stop reason: HOSPADM

## 2025-01-02 RX ORDER — LIDOCAINE HYDROCHLORIDE 20 MG/ML
INJECTION, SOLUTION INFILTRATION; PERINEURAL PRN
Status: DISCONTINUED | OUTPATIENT
Start: 2025-01-02 | End: 2025-01-02

## 2025-01-02 RX ORDER — NICOTINE POLACRILEX 4 MG
15-30 LOZENGE BUCCAL
Status: DISCONTINUED | OUTPATIENT
Start: 2025-01-02 | End: 2025-01-02 | Stop reason: HOSPADM

## 2025-01-02 RX ORDER — PROCHLORPERAZINE MALEATE 5 MG/1
5 TABLET ORAL EVERY 6 HOURS PRN
Status: DISCONTINUED | OUTPATIENT
Start: 2025-01-02 | End: 2025-01-02 | Stop reason: HOSPADM

## 2025-01-02 RX ORDER — DOXYCYCLINE HYCLATE 100 MG
100 TABLET ORAL 2 TIMES DAILY
COMMUNITY
End: 2025-01-08

## 2025-01-02 RX ORDER — DEXTROSE MONOHYDRATE 25 G/50ML
25-50 INJECTION, SOLUTION INTRAVENOUS
Status: DISCONTINUED | OUTPATIENT
Start: 2025-01-02 | End: 2025-01-02 | Stop reason: HOSPADM

## 2025-01-02 RX ADMIN — PROPOFOL 75 MCG/KG/MIN: 10 INJECTION, EMULSION INTRAVENOUS at 11:10

## 2025-01-02 RX ADMIN — PROPOFOL 20 MG: 10 INJECTION, EMULSION INTRAVENOUS at 11:33

## 2025-01-02 RX ADMIN — SODIUM CHLORIDE, POTASSIUM CHLORIDE, SODIUM LACTATE AND CALCIUM CHLORIDE: 600; 310; 30; 20 INJECTION, SOLUTION INTRAVENOUS at 11:01

## 2025-01-02 RX ADMIN — PROPOFOL 20 MG: 10 INJECTION, EMULSION INTRAVENOUS at 11:29

## 2025-01-02 RX ADMIN — ONDANSETRON 4 MG: 2 INJECTION INTRAMUSCULAR; INTRAVENOUS at 11:10

## 2025-01-02 RX ADMIN — PROPOFOL 20 MG: 10 INJECTION, EMULSION INTRAVENOUS at 11:23

## 2025-01-02 RX ADMIN — LIDOCAINE HYDROCHLORIDE 60 MG: 20 INJECTION, SOLUTION INFILTRATION; PERINEURAL at 11:10

## 2025-01-02 RX ADMIN — TOPICAL ANESTHETIC 1 SPRAY: 200 SPRAY DENTAL; PERIODONTAL at 11:01

## 2025-01-02 ASSESSMENT — ACTIVITIES OF DAILY LIVING (ADL)
ADLS_ACUITY_SCORE: 55
ADLS_ACUITY_SCORE: 56
ADLS_ACUITY_SCORE: 56
ADLS_ACUITY_SCORE: 55
ADLS_ACUITY_SCORE: 56

## 2025-01-02 NOTE — DISCHARGE INSTRUCTIONS
Contacting your Doctor -   To contact a doctor, call Dr Medina at the  GI clinic at [phone] or:  [phone] and ask for the resident on call for Gastroenterology (answered 24 hours a day)   Emergency Department:  [facility]: [phone]  [facility]: [phone] 911 if you are in need of immediate or emergent help

## 2025-01-02 NOTE — BRIEF OP NOTE
[facility]    Brief Operative Note    Pre-operative diagnosis: Abnormal finding on imaging [R93.89]  Post-operative diagnosis Same    Procedure: ENDOSCOPIC ULTRASOUND, ESOPHAGOSCOPY / UPPER GASTROINTESTINAL TRACT (GI) with Fine Needle Aspiration, N/A - Esophagus    Surgeon: Surgeons and Role:     * Kong Medina MD - Primary  Anesthesia: MAC   Estimated Blood Loss: 0 mL from 1/2/2025 11:07 AM to 1/2/2025 11:59 AM      Drains: None  Specimens:   ID Type Source Tests Collected by Time Destination   1 : Nikki Hepatis Lymph Node Fine Needle Aspiration Liver NON-GYNECOLOGIC CYTOLOGY Kong Medina MD 1/2/2025 11:46 AM      Findings:     Normal EGD. No esophageal or gastric varices.  EUS showed a large 9 cm portahepatis lymph node. Needle biopsy performed x 4 passes using a 22 ga AcuCore needle and prelim showed lesional material.    The liver was small and heterogenous. It was hypechoic with numerous small hypoechoic nodules. The right sided mass could not be visualized.    The exam was discontinued after biopsy and before complete pancreatic and biliary exam due to desaturation requiring bag ventillation. I elected to not pursue intubation to complete the exam due to the apparently adequate tissue sampling.  Complications: None.  Implants: * No implants in log *    ELISEO Medina MD  Professor of Medicine  Division of Gastroenterology, Hepatology and Nutrition  [facility]            
Agree with above, chart reviewed, case discussed, supervision provided. Pt had an episode of agitation overnight, was not verbally redirectable and she continued to refuse PO meds. Pt required IM meds for safety. On exam, she remains disorganized, illogical, paranoid, irritable. No Si/HI. PT is not safe for discharge, is impulsive, psychotic, and a danger others, requires inpt level of care at this time.

## 2025-01-02 NOTE — ANESTHESIA CARE TRANSFER NOTE
Patient: Janice Nowak    Procedure: Procedure(s):  ENDOSCOPIC ULTRASOUND, ESOPHAGOSCOPY / UPPER GASTROINTESTINAL TRACT (GI) with Fine Needle Aspiration       Diagnosis: Abnormal finding on imaging [R93.89]  Diagnosis Additional Information: No value filed.    Anesthesia Type:   No value filed.     Note:    Oropharynx: oropharynx clear of all foreign objects and spontaneously breathing  Level of Consciousness: awake    Level of Supplemental Oxygen (L/min / FiO2): 4  Independent Airway: airway patency satisfactory and stable  Dentition: dentition unchanged  Vital Signs Stable: post-procedure vital signs reviewed and stable  Report to RN Given: handoff report given  Patient transferred to: Phase II    Handoff Report: Identifed the Patient, Identified the Reponsible Provider, Reviewed the pertinent medical history, Discussed the surgical course, Reviewed Intra-OP anesthesia mangement and issues during anesthesia, Set expectations for post-procedure period and Allowed opportunity for questions and acknowledgement of understanding      Vitals:  Vitals Value Taken Time   /120    Temp     Pulse 82 01/02/25 1202   Resp 16    SpO2 94 % 01/02/25 1205   Vitals shown include unfiled device data.    Electronically Signed By: SIVLIA Bradley CRNA  January 2, 2025  12:06 PM

## 2025-01-02 NOTE — ANESTHESIA POSTPROCEDURE EVALUATION
Patient: Janice Nowak    Procedure: Procedure(s):  ENDOSCOPIC ULTRASOUND, ESOPHAGOSCOPY / UPPER GASTROINTESTINAL TRACT (GI) with Fine Needle Aspiration       Anesthesia Type:  MAC    Note:  Disposition: Outpatient   Postop Pain Control: Uneventful            Sign Out: Well controlled pain   PONV: No   Neuro/Psych: Uneventful            Sign Out: Acceptable/Baseline neuro status   Airway/Respiratory: Uneventful            Sign Out: Acceptable/Baseline resp. status   CV/Hemodynamics: Uneventful            Sign Out: Acceptable CV status; No obvious hypovolemia; No obvious fluid overload   Other NRE: NONE   DID A NON-ROUTINE EVENT OCCUR? YES    Event details/Postop Comments:  Desaturation episode at end of case. Able to restore sats with mask and PEEP/CPAP. Sats 98% on RA in phase 2           Last vitals:  Vitals Value Taken Time   /65 01/02/25 1230   Temp 37.2  C (98.9  F) 01/02/25 1205   Pulse 82 01/02/25 1205   Resp 18 01/02/25 1205   SpO2 98 % 01/02/25 1231   Vitals shown include unfiled device data.    Electronically Signed By: Tanner Latham MD  January 2, 2025  12:32 PM

## 2025-01-02 NOTE — ANESTHESIA PREPROCEDURE EVALUATION
Anesthesia Pre-Procedure Evaluation    Patient: Janice Nowak   MRN: [id number] : 1945        Procedure : Procedure(s):  ENDOSCOPIC ULTRASOUND, ESOPHAGOSCOPY / UPPER GASTROINTESTINAL TRACT (GI) with Fine Needle Aspiration          Past Medical History:   Diagnosis Date    Diabetes (H)     Hypertension     Sleep apnea     Thyroid disease       Past Surgical History:   Procedure Laterality Date    BACK SURGERY      BONE EXOSTOSIS EXCISION Right 10/24/2024    Procedure: PARTIAL CALCANECTOMY RIGHT FOOT;  Surgeon: Amol Patricio DPM;  Location: [facility] [place], [place] OR    INCISION AND DRAINAGE FOOT, COMBINED Right 10/24/2024    Procedure: INCISION AND DRAINAGE;  Surgeon: Amol Patricio DPM;  Location: [facility] [place], [place] OR    IR LOWER EXTREMITY ANGIOGRAM RIGHT  2024    IRRIGATION AND DEBRIDEMENT FOOT, COMBINED Right 10/24/2024    Procedure: AND DEBRIDEMENT RIGHT HEEL,;  Surgeon: Amol Patricio DPM;  Location: [facility] [place], [place] OR    ORTHOPEDIC SURGERY      PICC SINGLE LUMEN PLACEMENT  10/30/2024    VASCULAR SURGERY        Allergies   Allergen Reactions    Exenatide Unknown    Heparin Unknown    Liraglutide Unknown    Lisinopril Cough    Morphine Unknown      Social History     Tobacco Use    Smoking status: Former     Current packs/day: 0.00     Types: Cigarettes     Quit date:      Years since quittin.0     Passive exposure: Never    Smokeless tobacco: Never   Substance Use Topics    Alcohol use: Not Currently     Alcohol/week: 5.0 standard drinks of alcohol     Types: 5 Cans of beer per week      Wt Readings from Last 1 Encounters:   25 93.3 kg (205 lb 11 oz)        Anesthesia Evaluation   Pt has had prior anesthetic.     No history of anesthetic complications       ROS/MED HX  ENT/Pulmonary: Comment: Recent bilateral pleural effusions s/p thoracentesis with improvement in SOB and DIAS, no issues since    (+) sleep apnea,                                       Neurologic:      "  Cardiovascular: Comment: R carotid stenosis s/p CEA  RBBB    TTE 12/12/2024:  LVEF 55-60%  G2DD  RV normal    (+) Dyslipidemia hypertension- -   -  - -                                      METS/Exercise Tolerance: 3 - Able to walk 1-2 blocks without stopping    Hematologic:       Musculoskeletal: Comment: Nonhealing foot and leg ulcers      GI/Hepatic: Comment: Abdominal mass      Renal/Genitourinary:       Endo:     (+)  type II DM,   Using insulin,     thyroid problem, hypothyroidism,           Psychiatric/Substance Use:       Infectious Disease:       Malignancy:       Other:            Physical Exam    Airway        Mallampati: III   TM distance: > 3 FB   Neck ROM: full   Mouth opening: > 3 cm    Respiratory Devices and Support         Dental     Comment: 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        Cardiovascular          Rhythm and rate: regular and normal     Pulmonary           breath sounds clear to auscultation           OUTSIDE LABS:  CBC:   Lab Results   Component Value Date    WBC 9.1 12/26/2024    WBC 6.5 12/15/2024    HGB 12.4 (L) 12/26/2024    HGB 10.3 (L) 12/15/2024    HCT 37.7 (L) 12/26/2024    HCT 32.4 (L) 12/15/2024     12/26/2024     (L) 12/15/2024     BMP:   Lab Results   Component Value Date     12/26/2024     (L) 12/17/2024    POTASSIUM 3.5 12/26/2024    POTASSIUM 4.4 12/17/2024    CHLORIDE 98 12/26/2024    CHLORIDE 95 (L) 12/17/2024    CO2 30 (H) 12/26/2024    CO2 28 12/17/2024    BUN 35.0 (H) 12/26/2024    BUN 50.5 (H) 12/17/2024    CR 1.29 (H) 12/26/2024    CR 1.25 (H) 12/17/2024    GLC 90 01/02/2025    GLC 99 12/26/2024     COAGS:   Lab Results   Component Value Date    INR 1.21 (H) 12/26/2024     POC: No results found for: \"BGM\", \"HCG\", \"HCGS\"  HEPATIC:   Lab Results   Component Value Date    ALBUMIN 3.8 12/15/2024    PROTTOTAL 7.1 12/15/2024    ALT 26 12/15/2024    AST 32 12/15/2024    ALKPHOS 78 12/15/2024    BILITOTAL 0.6 12/15/2024     OTHER:   Lab Results   Component " "Value Date    A1C 9.2 (H) 10/24/2024    AYAN 9.6 12/26/2024    PHOS 3.7 10/25/2024    MAG 2.0 12/15/2024    LIPASE 27 11/12/2024    AMYLASE 51 11/12/2024    SED 77 (H) 11/20/2024       Anesthesia Plan    ASA Status:  3    NPO Status:  NPO Appropriate    Anesthesia Type: MAC.              Consents    Anesthesia Plan(s) and associated risks, benefits, and realistic alternatives discussed. Questions answered and patient/representative(s) expressed understanding.     - Discussed:     - Discussed with:  Patient      - Extended Intubation/Ventilatory Support Discussed: No.      - Patient is DNR/DNI Status: No     Use of blood products discussed: No .     Postoperative Care            Comments:               Tanner Latham MD    I have reviewed the pertinent notes and labs in the chart from the past 30 days and (re)examined the patient.  Any updates or changes from those notes are reflected in this note.             # Drug Induced Platelet Defect: home medication list includes an antiplatelet medication   # Hypertension: Noted on problem list          # DMII: A1C = 9.2 % (Ref range: <5.7 %) within past 6 months    # Obesity: Estimated body mass index is 31.27 kg/m  as calculated from the following:    Height as of this encounter: 1.727 m (5' 8\").    Weight as of this encounter: 93.3 kg (205 lb 11 oz).       # Financial/Environmental Concerns:          "

## 2025-01-02 NOTE — TELEPHONE ENCOUNTER
LVMTCB #1 to move appt to 1/21 with Babita [phone]      ----- Message from Kirsten ARENAS sent at 1/2/2025  3:04 PM CST -----  Please push follow-up with Babita to 1/21- should see Sheng visit on 1/15 to see wounds.

## 2025-01-03 LAB — UPPER EUS: NORMAL

## 2025-01-07 LAB
PATH REPORT.COMMENTS IMP SPEC: ABNORMAL
PATH REPORT.COMMENTS IMP SPEC: YES
PATH REPORT.FINAL DX SPEC: ABNORMAL
PATH REPORT.GROSS SPEC: ABNORMAL
PATH REPORT.MICROSCOPIC SPEC OTHER STN: ABNORMAL
PATH REPORT.RELEVANT HX SPEC: ABNORMAL

## 2025-01-07 NOTE — PROGRESS NOTES
[facility]  [address], [place] [address]  Office: [phone]     Assessment/Recommendations   Assessment: Mr. Nowak presents to [facility] today for post hospitalization heart failure  visit.    # Acute on Chronic diastolic heart failure/HFpEF (EF 55-60% per echo 12/12/2024)  Stage C. NYHA Class II.  -New dx of HFpEF  -Recent CT chest notable for bilateral pleural effusion s/p thoracentesis  -Echo showed LVH, LVEF 55-60% with moderate diastolic dysfunction, NtproBNP elevated to 3933  -Discharge from hospital weight was 206 lbs    His weight on the clinic scale today is 190 lbs. He does not have a scale at home. Upon exam, patient is well-compensated.    The mainstays of therapy for HFpEF include volume management, blood pressure control, treating underlying sleep apnea if present, treatment of underlying CAD if within the goals of care, weight management, exercise training, rate control for atrial fibrillation as well as consideration for a rhythm control strategy, and consideration for clinical trials. SGLT2 inhibitors are currently the only medications that have proven evidence with benefit against HFpEF, and have received a class 2a recommendation. Sacubitril/valsartan (Entresto) and spironolactone have also had some evidence as well, receiving class 2b recommendation. Importantly, recommendation for beta blockers have been removed as there is some evidence for harm, thus preferential to avoid if possible.    BP: controlled   Fluid status: Euvolemic; Diuretic: torsemide 20 mg, no KCL supp  Aldosterone antagonist: On hold for CAROLINE  SGLT2i: unclear if patient is taking jardiance? Hospital notes indicate, on hold for CAROLINE (note also prescribed ertugliflozin and may be taking this instead, if so, would consider transition to jardiance for cardiovascular benefit)  Ischemia evaluation: Consider ischemic work-up given new heart  failure  NSAID use: contraindicated    Heart failure education including medication compliance and lifestyle management discussed today: low sodium diet <2g Na/day, fluid intake <2L/day, daily weight monitoring, and physical activity as tolerated. Additionally, patient met with the HF CORE nurse clinician, MALI Levy, for in depth heart failure teaching.    # Hypertension  -BP today 118/64  -Per hospital notes consider low dose BB while losartan is on hold, BP is well controlled, not indicated at this time    # CKD IIIa (eGFR 57 12/26/2024)  -Baseline Cr ~1.1-1.3. Most recent Cr 1.29 (12/26)    # PAD  -Right diabetic foot ulcer and osteomyelitis s/p I&D and calcanectomy 10/24/2024   -Following with Podiatry, ID, and wound clinic  -ASA 81 mg, plavix 75 mg, atorvastatin 80mg daily     # SABRINA  -Has not been wearing CPAP for a few weeks d/t discomfort, advised to resume as tolerated    # Diabetes Mellitus  -Most recent A1C 9.2 (10/24/2024)   -Metformin, ertugliflozin (steglatro), lantus, aspart  -Jardiance? Listed as hospital medication, need clarify which SGLT2i if patient is this taking at home  -Statin for primary ASCVD prevention  -Managed by PCP     # Right hepatic lobe mass   -Incidentally noted on US and confirmed on CT A/P 11/27. 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 Has established with outpatient heme/onc.  -Liver lymph node FNA on January 2 positive for malignancy.   -Oncology ordered MRI to further evaluate liver lesion- needs to be scheduled  -Hem/Onc follow-up scheduled 1/10    Plan:  -BMP and NtproBNP today, may consider restarting low dose spironolactone  -Clarify which SGLT2i patient is taking at home, jardiance would be ideal for cardiovascular benefit    Follow up in the Heart Failure Clinic Dr. Tellez in 2-3 months      The longitudinal plan of care for the condition(s) below were addressed during this visit. Due to the added  complexity in care, I will continue to support Mr. Nowak in the subsequent management of this condition(s) and with the ongoing continuity of care of this condition(s): HFpEF     History of Present Illness/Subjective    Mr. Nowak is a 79 year old male with a past medical history significant for DM2, PAD, CAD, HTN, HFpEF, and SABRINA. Today patient presents to [facility] for post hospitalization heart failure visit.    Patient recently admitted for diabetic foot ulcer and osteomyelitis s/p ID and partial calcanectomy 10/24 - 11/1. Also recently incidentally identified hepatic lobe mass noted on US and confirmed on CT A/P 11/27; concerning for metastatic disease. Liver lymph node FNA on January 2 positive for malignancy.      Hospitalization, [facility] 12/11-12/15/2024. Presented with several weeks of DIAS and LE edema. Found to have new dx of HFpEF. CT chest notable for bilateral pleural effusion s/p thoracentesis consistent with transudative process, consistent with heart failure, pleural fluid cytology was negative for malignant cells. Echo showed LVH, LVEF 55-60% with moderate diastolic dysfunction. NtproBNP elevated to 3933. Required IV diuresis. Discharged on torsemide 20 mg daily. Losartan and spironolactone were held upon discharge due to CAROLINE. Discharge from hospital weight was 206 lbs. Jardiance (empagliflozin) held during hospitalization for CAROLINE. PTA Steglatro (ertugliflozin) resumed upon discharge?     Today, patient is accompanied by his son. Patient denies chest pain, palpitations, lightheadedness/dizziness, shortness of breath, DIAS, orthopnea, PND, fatigue/activity intolerance, abdominal fullness/bloating, and LE edema.  He reports that breathing has significantly improved since hospitalization. He mentions that he is now being evaluated for cancer treatment.    He is unable to recall names of medicine during visit today. Will need to clarify which SGLT2i patient is  taking at home. He does not have a BP cuff or a scale at home. He complains of decreased appetite recently and is meeting with his PCP today to further evaluate this.       Recent test results & labs below (personally reviewed):    Echocardiogram 12/12/2024  Interpretation Summary  The left ventricle is normal in size. There is mild concentric left  ventricular hypertrophy.  Left ventricular systolic function is normal. The visual ejection fraction is  55-60%. The inferior wall is mildly hypokinetic.  Grade II or moderate diastolic dysfunction.  The right ventricle is normal in size and function.  The left atrium is severely dilated. Right atrium not well visualized.  IVC diameter <2.1 cm collapsing >50% with sniff suggests a normal RA pressure  of 3 mmHg.  There is no comparison study available.    CTA Abdomen/pelvis 11/27/2024  IMPRESSION:  1.  Severe aortoiliac atherosclerotic disease with interval short segment occlusion of the left common iliac artery with distal reconstitution. This appears new since the November 8 aortic ultrasound  2.  Bilateral SFA disease proximally. Two-vessel runoff bilaterally with occlusion of the ALEX's.  3.  There is a heterogeneous mass in the central right hemiabdomen posterior to the duodenum and anterior to tzhe IVC. This appears distinct from the pancreatic head and liver, flow is concerning for neoplasm. In addition there is a 2.5 cm hepatic   hypodensity which is incompletely evaluated on this exam. Abdominal MRI with contrast is recommended to further evaluate these findings. The abdominal mass would be amenable to endoscopic ultrasound-guided biopsy as indicated.  4.  Prominent retroperitoneal lymph nodes concerning for a metastatic process given the aforementioned findings.  5.  Partially visualized opacity along the right major fissure. Further evaluation with a CT of the chest is suggested.     Physical Examination Review of Systems   /64 (BP Location: Right arm,  "Patient Position: Sitting, Cuff Size: Adult Regular)   Pulse 91   Resp 19   Ht 1.727 m (5' 8\")   Wt 86.2 kg (190 lb)   BMI 28.89 kg/m    Body mass index is 28.89 kg/m .  Wt Readings from Last 3 Encounters:   01/08/25 86.2 kg (190 lb)   01/02/25 93.3 kg (205 lb 11 oz)   12/20/24 93.4 kg (206 lb)     General Appearance:   Appears comfortable, in no acute distress   HEENT: Eyes symmetrical, no discharge or icterus bilaterally. Mucous membranes moist and without lesions   Cardiovascular: RRR, +S1S2, no murmur, rub, or gallop. JVP not visible at 90 degrees/difficult exam d/t limited mobility      Respiratory:   Respirations regular, even, and unlabored. Lungs CTA throughout   GI:  Soft and rounded   Musculoskeletal: No joint swelling or tenderness   Extremities   No cyanosis. no peripheral edema. R foot wrapped in drsg w/boot   Skin: Warm, no xanthelasma, no jaundice, no rashes. LE scabs/healing lesions.    Neurologic: Alert and oriented X3, no focal deficits   Psychiatric: calm and cooperative                                             Negative unless noted in HPI     Medical History  Surgical History Family History Social History   Past Medical History:   Diagnosis Date    Diabetes (H)     Hypertension     Sleep apnea     Thyroid disease     Past Surgical History:   Procedure Laterality Date    BACK SURGERY      BONE EXOSTOSIS EXCISION Right 10/24/2024    Procedure: PARTIAL CALCANECTOMY RIGHT FOOT;  Surgeon: Amol Patricio DPM;  Location: [facility] OR    ENDOSCOPIC ULTRASOUND UPPER GASTROINTESTINAL TRACT (GI) N/A 1/2/2025    Procedure: ENDOSCOPIC ULTRASOUND, ESOPHAGOSCOPY / UPPER GASTROINTESTINAL TRACT (GI) with Fine Needle Aspiration;  Surgeon: Kong Medina MD;  Location: [facility] OR    INCISION AND DRAINAGE FOOT, COMBINED Right 10/24/2024    Procedure: INCISION AND DRAINAGE;  Surgeon: Amol Patricio DPM;  Location: [facility] OR    IR LOWER EXTREMITY ANGIOGRAM RIGHT  12/26/2024    " IRRIGATION AND DEBRIDEMENT FOOT, COMBINED Right 10/24/2024    Procedure: AND DEBRIDEMENT RIGHT HEEL,;  Surgeon: Amol Patricio DPM;  Location: [facility] OR    ORTHOPEDIC SURGERY      PICC SINGLE LUMEN PLACEMENT  10/30/2024    VASCULAR SURGERY      Family History   Problem Relation Age of Onset    Anesthesia Reaction No family hx of     Thrombosis No family hx of     Social History     Socioeconomic History    Marital status:      Spouse name: Not on file    Number of children: Not on file    Years of education: Not on file    Highest education level: Not on file   Occupational History    Not on file   Tobacco Use    Smoking status: Former     Current packs/day: 0.00     Types: Cigarettes     Quit date:      Years since quittin.0     Passive exposure: Never    Smokeless tobacco: Never   Vaping Use    Vaping status: Never Used   Substance and Sexual Activity    Alcohol use: Not Currently     Alcohol/week: 5.0 standard drinks of alcohol     Types: 5 Cans of beer per week    Drug use: Not Currently    Sexual activity: Not on file   Other Topics Concern    Not on file   Social History Narrative    Not on file     Social Drivers of Health     Financial Resource Strain: Low Risk  (2024)    Financial Resource Strain     Within the past 12 months, have you or your family members you live with been unable to get utilities (heat, electricity) when it was really needed?: No   Food Insecurity: Low Risk  (2024)    Food Insecurity     Within the past 12 months, did you worry that your food would run out before you got money to buy more?: No     Within the past 12 months, did the food you bought just not last and you didn t have money to get more?: No   Transportation Needs: Low Risk  (2024)    Transportation Needs     Within the past 12 months, has lack of transportation kept you from medical appointments, getting your medicines, non-medical meetings or appointments, work, or from  getting things that you need?: No   Physical Activity: Not on file   Stress: Not on file   Social Connections: Not on file   Interpersonal Safety: Low Risk  (1/2/2025)    Interpersonal Safety     Do you feel physically and emotionally safe where you currently live?: Yes     Within the past 12 months, have you been hit, slapped, kicked or otherwise physically hurt by someone?: No     Within the past 12 months, have you been humiliated or emotionally abused in other ways by your partner or ex-partner?: No   Recent Concern: Interpersonal Safety - High Risk (10/9/2024)    Interpersonal Safety     Do you feel physically and emotionally safe where you currently live?: No     Within the past 12 months, have you been hit, slapped, kicked or otherwise physically hurt by someone?: No     Within the past 12 months, have you been humiliated or emotionally abused in other ways by your partner or ex-partner?: No   Housing Stability: Low Risk  (12/14/2024)    Housing Stability     Do you have housing? : Yes     Are you worried about losing your housing?: No   Recent Concern: Housing Stability - High Risk (10/28/2024)    Housing Stability     Do you have housing? : No     Are you worried about losing your housing?: No        Medications  Allergies   Current Outpatient Medications   Medication Sig Dispense Refill    aspirin 81 MG EC tablet Take 81 mg by mouth every evening.      atorvastatin (LIPITOR) 80 MG tablet Take 80 mg by mouth at bedtime.      clopidogrel (PLAVIX) 75 MG tablet Take 75 mg by mouth every morning.      co-enzyme Q-10 100 MG CAPS capsule Take 200 mg by mouth every evening.      ertugliflozin (STEGLATRO) 5 MG TABS Take 5 mg by mouth every morning.      insulin aspart (NOVOLOG FLEXPEN) 100 UNIT/ML pen Inject 5 Units subcutaneously 3 times daily (with meals). Novolog Flexpen: 5 units with breakfast, 5 units with lunch, 5 units with dinner. (Patient taking differently: Inject 5 Units subcutaneously 3 times daily  (with meals). Novolog Flexpen: 5 units with breakfast, 5 units with lunch, 5 units with dinner. Takes as needed with each meal.) 15 mL 0    insulin glargine (LANTUS PEN) 100 UNIT/ML pen Inject 22 Units subcutaneously every morning (before breakfast). (Patient taking differently: Inject 50 Units subcutaneously every morning (before breakfast).) 15 mL 1    lactobacillus rhamnosus, GG, (CULTURELL) capsule Take 1 capsule by mouth every morning.      levothyroxine (SYNTHROID/LEVOTHROID) 50 MCG tablet Take 50 mcg by mouth every morning.      meclizine (ANTIVERT) 25 MG tablet Take 50 mg by mouth 3 times daily as needed (Vertigo).      metFORMIN (GLUCOPHAGE XR) 500 MG 24 hr tablet Take 1,000 mg by mouth 2 times daily (with meals).      Multiple Vitamins-Minerals (PRESERVISION AREDS 2 PO) Take 2 capsules by mouth 2 times daily.      torsemide (DEMADEX) 20 MG tablet Take 1 tablet (20 mg) by mouth daily. (Patient taking differently: Take 20 mg by mouth daily at 2 pm.) 30 tablet 1    vitamin C with B complex (B COMPLEX-C) tablet Take 1 tablet by mouth every morning.      Allergies   Allergen Reactions    Exenatide Unknown    Heparin Unknown    Liraglutide Unknown    Lisinopril Cough    Morphine Unknown         Lab Results    Chemistry/lipid CBC Cardiac Enzymes/BNP/TSH/INR   Lab Results   Component Value Date    CHOL 152 02/10/2020    HDL 63 02/10/2020    TRIG 119 02/10/2020    BUN 35.0 (H) 2024     2024    CO2 30 (H) 2024    Lab Results   Component Value Date    WBC 9.1 2024    HGB 12.4 (L) 2024    HCT 37.7 (L) 2024    MCV 87 2024     2024    Lab Results   Component Value Date    INR 1.21 (H) 2024                Justine Jeff, DNP, APRN, CNP  [facility] [place]   Clinic and [phone]  Fax: [phone]  Heart Failure Nurses: [phone]

## 2025-01-08 ENCOUNTER — APPOINTMENT (OUTPATIENT)
Dept: CARDIOLOGY | Facility: CLINIC | Age: 80
End: 2025-01-08
Payer: COMMERCIAL

## 2025-01-08 ENCOUNTER — OFFICE VISIT (OUTPATIENT)
Dept: CARDIOLOGY | Facility: CLINIC | Age: 80
End: 2025-01-08
Payer: COMMERCIAL

## 2025-01-08 VITALS
WEIGHT: 190 LBS | HEIGHT: 68 IN | HEART RATE: 91 BPM | BODY MASS INDEX: 28.79 KG/M2 | SYSTOLIC BLOOD PRESSURE: 118 MMHG | RESPIRATION RATE: 19 BRPM | DIASTOLIC BLOOD PRESSURE: 64 MMHG

## 2025-01-08 DIAGNOSIS — I50.9 ACUTE DECOMPENSATED HEART FAILURE (H): ICD-10-CM

## 2025-01-08 DIAGNOSIS — I10 ESSENTIAL HYPERTENSION: ICD-10-CM

## 2025-01-08 DIAGNOSIS — R16.0 LIVER MASS, RIGHT LOBE: ICD-10-CM

## 2025-01-08 DIAGNOSIS — I50.33 ACUTE ON CHRONIC HEART FAILURE WITH PRESERVED EJECTION FRACTION (H): Primary | ICD-10-CM

## 2025-01-08 DIAGNOSIS — I73.9 PAD (PERIPHERAL ARTERY DISEASE): ICD-10-CM

## 2025-01-08 DIAGNOSIS — N18.31 STAGE 3A CHRONIC KIDNEY DISEASE (H): ICD-10-CM

## 2025-01-08 LAB
ANION GAP SERPL CALCULATED.3IONS-SCNC: 14 MMOL/L (ref 7–15)
BUN SERPL-MCNC: 54.2 MG/DL (ref 8–23)
CALCIUM SERPL-MCNC: 9.3 MG/DL (ref 8.8–10.4)
CHLORIDE SERPL-SCNC: 92 MMOL/L (ref 98–107)
CREAT SERPL-MCNC: 1.36 MG/DL (ref 0.67–1.17)
EGFRCR SERPLBLD CKD-EPI 2021: 53 ML/MIN/1.73M2
GLUCOSE SERPL-MCNC: 299 MG/DL (ref 70–99)
HCO3 SERPL-SCNC: 30 MMOL/L (ref 22–29)
NT-PROBNP SERPL-MCNC: 3529 PG/ML (ref 0–1800)
POTASSIUM SERPL-SCNC: 3.4 MMOL/L (ref 3.4–5.3)
SODIUM SERPL-SCNC: 136 MMOL/L (ref 135–145)

## 2025-01-08 PROCEDURE — 83880 ASSAY OF NATRIURETIC PEPTIDE: CPT | Performed by: NURSE PRACTITIONER

## 2025-01-08 PROCEDURE — 80048 BASIC METABOLIC PNL TOTAL CA: CPT | Performed by: NURSE PRACTITIONER

## 2025-01-08 PROCEDURE — 36415 COLL VENOUS BLD VENIPUNCTURE: CPT | Performed by: NURSE PRACTITIONER

## 2025-01-08 NOTE — PROGRESS NOTES
Patient and son Michael are provided HF education today in clinic visit. The definition of Heart Failure is illustrated for them and the pathophysiology of why he is retaining fluid is explained in detail.   He is encouraged to perform daily weights, the son is not sure he will do these, but he will try to insist on this. Has HC Nursing as well which may weekly perform wts.   [employer] plan application is provided to them and they will complete and return to our office for provider completion/ submission.   Emphasis on the phone numbers for accessing our office and communication needs for best care.  Mildred ÁLVAREZ RN BSN, CHFN, PCCN-K

## 2025-01-08 NOTE — PATIENT INSTRUCTIONS
It was a pleasure to see you today at the [facility].     Recommendations:  1. The nurse will call you to review your lab results.     Follow-up in the heart failure clinic with Dr. Tellez in 2-3 months.     Please call with questions/concerns and/or if you experience new or worsening heart failure symptoms (shortness of breath, weight gain, fluid retention/lower extremity swelling, and/or reduced activity tolerance).    Heart Failure Nurse Line: [phone] (M-F 8a-430p)  24-hour HF Nurse Line: [phone] (weekends, evenings, holidays)      Justine Jeff CNP  [facility] [place]   Clinic and [phone]  Fax: [phone]  Heart Failure Nurses: [phone]       What is the [facility]?     The [facility] is a heart failure specialty clinic within [facility]. You will work with your cardiologist, nurse practitioner, and nurses to carefully adjust medications and learn how to live with heart failure.The Heart FailureProgram will help you:    Better understand your chronic heart condition  Feel better and avoid hospital stays    Monitoring for Symptoms     Call the Heart Failure Phone Line ([phone]) if you have any of these symptoms:   Increased shortness of breath/shortness of breath at rest or worsening shortness of breath with activity  Unable to lie down due to difficulty breathing or needing to sit upright for sleep  Waking up at night with difficulty breathing  Weight gain of 2 pounds in a day for 2 days in a row OR weight gain of 5 pounds in 1 week  Increased swelling in your ankles or legs  Dizziness or lightheadedness    Medications     Take your medications as prescribed  Bring all your medications in their original bottles to every appointment  Avoid non-steroidal anti-inflammatory medications (Advil, Aleve, Ibuprofen, Naprosyn, Naproxen, Celebrex)  Do not stop  taking your medications or begin taking over-the-counter or herbal medications without first talking to your doctor or nurse practitioner    Diet & Lifestyle     Limit sodium/salt to 2000 mg daily   Read food labels for sodium content  Do not add salt when cooking or add salt to your food at the table  Limit fluid ~50-60 oz fluid/day  Weigh yourself every day and record in your daily weight log   Call if you gain 2 pounds or more in one day OR 5 pounds in 1 week  Bring daily weight log to every appointment  Stay active, pace yourself, listen to yourbody, and rest when tired  Elevate your legs if they are swollen. Ask about using compression/support stockings  Stop smoking  Lose weight if you are overweight  Avoid drinking alcohol or limit amount  Follow with your primary care provider, stay up to date on your immunizations including flu and pneumonia vaccines

## 2025-01-08 NOTE — LETTER
1/8/2025 April MD Jeannette  [facility] [address]  [facility] [address]    RE: Janice Nowak       Dear Colleague,     I had the pleasure of seeing Janice Nowak in the [facility].      [facility]  [address], [place] [address]  Office: [phone]     Assessment/Recommendations   Assessment: Mr. Nowak presents to [facility] today for post hospitalization heart failure  visit.    # Acute on Chronic diastolic heart failure/HFpEF (EF 55-60% per echo 12/12/2024)  Stage C. NYHA Class II.  -New dx of HFpEF  -Recent CT chest notable for bilateral pleural effusion s/p thoracentesis  -Echo showed LVH, LVEF 55-60% with moderate diastolic dysfunction, NtproBNP elevated to 3933  -Discharge from hospital weight was 206 lbs    His weight on the clinic scale today is 190 lbs. He does not have a scale at home. Upon exam, patient is well-compensated.    The mainstays of therapy for HFpEF include volume management, blood pressure control, treating underlying sleep apnea if present, treatment of underlying CAD if within the goals of care, weight management, exercise training, rate control for atrial fibrillation as well as consideration for a rhythm control strategy, and consideration for clinical trials. SGLT2 inhibitors are currently the only medications that have proven evidence with benefit against HFpEF, and have received a class 2a recommendation. Sacubitril/valsartan (Entresto) and spironolactone have also had some evidence as well, receiving class 2b recommendation. Importantly, recommendation for beta blockers have been removed as there is some evidence for harm, thus preferential to avoid if possible.    BP: controlled   Fluid status: Euvolemic; Diuretic: torsemide 20 mg, no KCL supp  Aldosterone antagonist: On hold for CAROLINE  SGLT2i: unclear if patient is taking jardiance? Hospital notes  indicate, on hold for CAROLINE (note also prescribed ertugliflozin and may be taking this instead, if so, would consider transition to jardiance for cardiovascular benefit)  Ischemia evaluation: Consider ischemic work-up given new heart failure  NSAID use: contraindicated    Heart failure education including medication compliance and lifestyle management discussed today: low sodium diet <2g Na/day, fluid intake <2L/day, daily weight monitoring, and physical activity as tolerated. Additionally, patient met with the HF CORE nurse clinician, MALI Levy, for in depth heart failure teaching.    # Hypertension  -BP today 118/64  -Per hospital notes consider low dose BB while losartan is on hold, BP is well controlled, not indicated at this time    # CKD IIIa (eGFR 57 12/26/2024)  -Baseline Cr ~1.1-1.3. Most recent Cr 1.29 (12/26)    # PAD  -Right diabetic foot ulcer and osteomyelitis s/p I&D and calcanectomy 10/24/2024   -Following with Podiatry, ID, and wound clinic  -ASA 81 mg, plavix 75 mg, atorvastatin 80mg daily     # SABRINA  -Has not been wearing CPAP for a few weeks d/t discomfort, advised to resume as tolerated    # Diabetes Mellitus  -Most recent A1C 9.2 (10/24/2024)   -Metformin, ertugliflozin (steglatro), lantus, aspart  -Jardiance? Listed as hospital medication, need clarify which SGLT2i if patient is this taking at home  -Statin for primary ASCVD prevention  -Managed by PCP     # Right hepatic lobe mass   -Incidentally noted on US and confirmed on CT A/P 11/27. 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 Has established with outpatient heme/onc.  -Liver lymph node FNA on January 2 positive for malignancy.   -Oncology ordered MRI to further evaluate liver lesion- needs to be scheduled  -Hem/Onc follow-up scheduled 1/10    Plan:  -BMP and NtproBNP today, may consider restarting low dose spironolactone  -Clarify which SGLT2i patient is taking at home,  jardiance would be ideal for cardiovascular benefit    Follow up in the Heart Failure Clinic Dr. Tellez in 2-3 months      The longitudinal plan of care for the condition(s) below were addressed during this visit. Due to the added complexity in care, I will continue to support Mr. Nowak in the subsequent management of this condition(s) and with the ongoing continuity of care of this condition(s): HFpEF     History of Present Illness/Subjective    Mr. Nowak is a 79 year old male with a past medical history significant for DM2, PAD, CAD, HTN, HFpEF, and SABRINA. Today patient presents to [facility] for post hospitalization heart failure visit.    Patient recently admitted for diabetic foot ulcer and osteomyelitis s/p ID and partial calcanectomy 10/24 - 11/1. Also recently incidentally identified hepatic lobe mass noted on US and confirmed on CT A/P 11/27; concerning for metastatic disease. Liver lymph node FNA on January 2 positive for malignancy.      Hospitalization, [facility] 12/11-12/15/2024. Presented with several weeks of DIAS and LE edema. Found to have new dx of HFpEF. CT chest notable for bilateral pleural effusion s/p thoracentesis consistent with transudative process, consistent with heart failure, pleural fluid cytology was negative for malignant cells. Echo showed LVH, LVEF 55-60% with moderate diastolic dysfunction. NtproBNP elevated to 3933. Required IV diuresis. Discharged on torsemide 20 mg daily. Losartan and spironolactone were held upon discharge due to CAROLINE. Discharge from hospital weight was 206 lbs. Jardiance (empagliflozin) held during hospitalization for CAROLINE. PTA Steglatro (ertugliflozin) resumed upon discharge?     Today, patient is accompanied by his son. Patient denies chest pain, palpitations, lightheadedness/dizziness, shortness of breath, DIAS, orthopnea, PND, fatigue/activity intolerance, abdominal fullness/bloating, and LE edema.  He reports that  breathing has significantly improved since hospitalization. He mentions that he is now being evaluated for cancer treatment.    He is unable to recall names of medicine during visit today. Will need to clarify which SGLT2i patient is taking at home. He does not have a BP cuff or a scale at home. He complains of decreased appetite recently and is meeting with his PCP today to further evaluate this.       Recent test results & labs below (personally reviewed):    Echocardiogram 12/12/2024  Interpretation Summary  The left ventricle is normal in size. There is mild concentric left  ventricular hypertrophy.  Left ventricular systolic function is normal. The visual ejection fraction is  55-60%. The inferior wall is mildly hypokinetic.  Grade II or moderate diastolic dysfunction.  The right ventricle is normal in size and function.  The left atrium is severely dilated. Right atrium not well visualized.  IVC diameter <2.1 cm collapsing >50% with sniff suggests a normal RA pressure  of 3 mmHg.  There is no comparison study available.    CTA Abdomen/pelvis 11/27/2024  IMPRESSION:  1.  Severe aortoiliac atherosclerotic disease with interval short segment occlusion of the left common iliac artery with distal reconstitution. This appears new since the November 8 aortic ultrasound  2.  Bilateral SFA disease proximally. Two-vessel runoff bilaterally with occlusion of the ALEX's.  3.  There is a heterogeneous mass in the central right hemiabdomen posterior to the duodenum and anterior to tzhe IVC. This appears distinct from the pancreatic head and liver, flow is concerning for neoplasm. In addition there is a 2.5 cm hepatic   hypodensity which is incompletely evaluated on this exam. Abdominal MRI with contrast is recommended to further evaluate these findings. The abdominal mass would be amenable to endoscopic ultrasound-guided biopsy as indicated.  4.  Prominent retroperitoneal lymph nodes concerning for a metastatic process given  "the aforementioned findings.  5.  Partially visualized opacity along the right major fissure. Further evaluation with a CT of the chest is suggested.     Physical Examination Review of Systems   /64 (BP Location: Right arm, Patient Position: Sitting, Cuff Size: Adult Regular)   Pulse 91   Resp 19   Ht 1.727 m (5' 8\")   Wt 86.2 kg (190 lb)   BMI 28.89 kg/m    Body mass index is 28.89 kg/m .  Wt Readings from Last 3 Encounters:   01/08/25 86.2 kg (190 lb)   01/02/25 93.3 kg (205 lb 11 oz)   12/20/24 93.4 kg (206 lb)     General Appearance:   Appears comfortable, in no acute distress   HEENT: Eyes symmetrical, no discharge or icterus bilaterally. Mucous membranes moist and without lesions   Cardiovascular: RRR, +S1S2, no murmur, rub, or gallop. JVP not visible at 90 degrees/difficult exam d/t limited mobility      Respiratory:   Respirations regular, even, and unlabored. Lungs CTA throughout   GI:  Soft and rounded   Musculoskeletal: No joint swelling or tenderness   Extremities   No cyanosis. no peripheral edema. R foot wrapped in drsg w/boot   Skin: Warm, no xanthelasma, no jaundice, no rashes. LE scabs/healing lesions.    Neurologic: Alert and oriented X3, no focal deficits   Psychiatric: calm and cooperative                                             Negative unless noted in HPI     Medical History  Surgical History Family History Social History   Past Medical History:   Diagnosis Date     Diabetes (H)      Hypertension      Sleep apnea      Thyroid disease     Past Surgical History:   Procedure Laterality Date     BACK SURGERY       BONE EXOSTOSIS EXCISION Right 10/24/2024    Procedure: PARTIAL CALCANECTOMY RIGHT FOOT;  Surgeon: Amol Patricio DPM;  [facility]     ENDOSCOPIC ULTRASOUND UPPER GASTROINTESTINAL TRACT (GI) N/A 1/2/2025    Procedure: ENDOSCOPIC ULTRASOUND, ESOPHAGOSCOPY / UPPER GASTROINTESTINAL TRACT (GI) with Fine Needle Aspiration;  Surgeon: Kong Medina, " MD;  Location: [facility] OR     INCISION AND DRAINAGE FOOT, COMBINED Right 10/24/2024    Procedure: INCISION AND DRAINAGE;  Surgeon: Amol Patricio DPM;  Location: [facility] OR     IR LOWER EXTREMITY ANGIOGRAM RIGHT  2024     IRRIGATION AND DEBRIDEMENT FOOT, COMBINED Right 10/24/2024    Procedure: AND DEBRIDEMENT RIGHT HEEL,;  Surgeon: Amol Patricio DPM;  Location: [facility] OR     ORTHOPEDIC SURGERY       PICC SINGLE LUMEN PLACEMENT  10/30/2024     VASCULAR SURGERY      Family History   Problem Relation Age of Onset     Anesthesia Reaction No family hx of      Thrombosis No family hx of     Social History     Socioeconomic History     Marital status:      Spouse name: Not on file     Number of children: Not on file     Years of education: Not on file     Highest education level: Not on file   Occupational History     Not on file   Tobacco Use     Smoking status: Former     Current packs/day: 0.00     Types: Cigarettes     Quit date:      Years since quittin.0     Passive exposure: Never     Smokeless tobacco: Never   Vaping Use     Vaping status: Never Used   Substance and Sexual Activity     Alcohol use: Not Currently     Alcohol/week: 5.0 standard drinks of alcohol     Types: 5 Cans of beer per week     Drug use: Not Currently     Sexual activity: Not on file   Other Topics Concern     Not on file   Social History Narrative     Not on file     Social Drivers of Health     Financial Resource Strain: Low Risk  (2024)    Financial Resource Strain      Within the past 12 months, have you or your family members you live with been unable to get utilities (heat, electricity) when it was really needed?: No   Food Insecurity: Low Risk  (2024)    Food Insecurity      Within the past 12 months, did you worry that your food would run out before you got money to buy more?: No      Within the past 12 months, did the food you bought just not last and you didn t have money to  get more?: No   Transportation Needs: Low Risk  (12/14/2024)    Transportation Needs      Within the past 12 months, has lack of transportation kept you from medical appointments, getting your medicines, non-medical meetings or appointments, work, or from getting things that you need?: No   Physical Activity: Not on file   Stress: Not on file   Social Connections: Not on file   Interpersonal Safety: Low Risk  (1/2/2025)    Interpersonal Safety      Do you feel physically and emotionally safe where you currently live?: Yes      Within the past 12 months, have you been hit, slapped, kicked or otherwise physically hurt by someone?: No      Within the past 12 months, have you been humiliated or emotionally abused in other ways by your partner or ex-partner?: No   Recent Concern: Interpersonal Safety - High Risk (10/9/2024)    Interpersonal Safety      Do you feel physically and emotionally safe where you currently live?: No      Within the past 12 months, have you been hit, slapped, kicked or otherwise physically hurt by someone?: No      Within the past 12 months, have you been humiliated or emotionally abused in other ways by your partner or ex-partner?: No   Housing Stability: Low Risk  (12/14/2024)    Housing Stability      Do you have housing? : Yes      Are you worried about losing your housing?: No   Recent Concern: Housing Stability - High Risk (10/28/2024)    Housing Stability      Do you have housing? : No      Are you worried about losing your housing?: No        Medications  Allergies   Current Outpatient Medications   Medication Sig Dispense Refill     aspirin 81 MG EC tablet Take 81 mg by mouth every evening.       atorvastatin (LIPITOR) 80 MG tablet Take 80 mg by mouth at bedtime.       clopidogrel (PLAVIX) 75 MG tablet Take 75 mg by mouth every morning.       co-enzyme Q-10 100 MG CAPS capsule Take 200 mg by mouth every evening.       ertugliflozin (STEGLATRO) 5 MG TABS Take 5 mg by mouth every morning.        insulin aspart (NOVOLOG FLEXPEN) 100 UNIT/ML pen Inject 5 Units subcutaneously 3 times daily (with meals). Novolog Flexpen: 5 units with breakfast, 5 units with lunch, 5 units with dinner. (Patient taking differently: Inject 5 Units subcutaneously 3 times daily (with meals). Novolog Flexpen: 5 units with breakfast, 5 units with lunch, 5 units with dinner. Takes as needed with each meal.) 15 mL 0     insulin glargine (LANTUS PEN) 100 UNIT/ML pen Inject 22 Units subcutaneously every morning (before breakfast). (Patient taking differently: Inject 50 Units subcutaneously every morning (before breakfast).) 15 mL 1     lactobacillus rhamnosus, GG, (CULTURELL) capsule Take 1 capsule by mouth every morning.       levothyroxine (SYNTHROID/LEVOTHROID) 50 MCG tablet Take 50 mcg by mouth every morning.       meclizine (ANTIVERT) 25 MG tablet Take 50 mg by mouth 3 times daily as needed (Vertigo).       metFORMIN (GLUCOPHAGE XR) 500 MG 24 hr tablet Take 1,000 mg by mouth 2 times daily (with meals).       Multiple Vitamins-Minerals (PRESERVISION AREDS 2 PO) Take 2 capsules by mouth 2 times daily.       torsemide (DEMADEX) 20 MG tablet Take 1 tablet (20 mg) by mouth daily. (Patient taking differently: Take 20 mg by mouth daily at 2 pm.) 30 tablet 1     vitamin C with B complex (B COMPLEX-C) tablet Take 1 tablet by mouth every morning.      Allergies   Allergen Reactions     Exenatide Unknown     Heparin Unknown     Liraglutide Unknown     Lisinopril Cough     Morphine Unknown         Lab Results    Chemistry/lipid CBC Cardiac Enzymes/BNP/TSH/INR   Lab Results   Component Value Date    CHOL 152 02/10/2020    HDL 63 02/10/2020    TRIG 119 02/10/2020    BUN 35.0 (H) 12/26/2024     12/26/2024    CO2 30 (H) 12/26/2024    Lab Results   Component Value Date    WBC 9.1 12/26/2024    HGB 12.4 (L) 12/26/2024    HCT 37.7 (L) 12/26/2024    MCV 87 12/26/2024     12/26/2024    Lab Results   Component Value Date    INR 1.21 (H)  2024                Justine Jeff, LADAN, APRN, CNP  [facility] [place]   Clinic and [phone]  Fax: [phone]  Heart Failure Nurses: [phone]      Thank you for allowing me to participate in the care of your patient.      Sincerely,     VIRAL Arizmendi [facility]  cc:   Tory Tellez MD  [address] [place] [address]

## 2025-01-09 ENCOUNTER — TELEPHONE (OUTPATIENT)
Dept: CARDIOLOGY | Facility: CLINIC | Age: 80
End: 2025-01-09

## 2025-01-09 ENCOUNTER — ANCILLARY PROCEDURE (OUTPATIENT)
Dept: VASCULAR ULTRASOUND | Facility: CLINIC | Age: 80
End: 2025-01-09
Attending: RADIOLOGY
Payer: MEDICAID

## 2025-01-09 ENCOUNTER — ANCILLARY PROCEDURE (OUTPATIENT)
Dept: VASCULAR ULTRASOUND | Facility: CLINIC | Age: 80
End: 2025-01-09
Attending: RADIOLOGY
Payer: COMMERCIAL

## 2025-01-09 DIAGNOSIS — I50.30 HEART FAILURE WITH PRESERVED EJECTION FRACTION, NYHA CLASS I (H): ICD-10-CM

## 2025-01-09 DIAGNOSIS — I10 ESSENTIAL HYPERTENSION: ICD-10-CM

## 2025-01-09 DIAGNOSIS — L97.414 DIABETIC ULCER OF RIGHT HEEL ASSOCIATED WITH TYPE 2 DIABETES MELLITUS, WITH NECROSIS OF BONE (H): ICD-10-CM

## 2025-01-09 DIAGNOSIS — E11.621 DIABETIC ULCER OF RIGHT HEEL ASSOCIATED WITH TYPE 2 DIABETES MELLITUS, WITH NECROSIS OF BONE (H): ICD-10-CM

## 2025-01-09 PROCEDURE — 93926 LOWER EXTREMITY STUDY: CPT | Mod: 26 | Performed by: STUDENT IN AN ORGANIZED HEALTH CARE EDUCATION/TRAINING PROGRAM

## 2025-01-09 PROCEDURE — 93923 UPR/LXTR ART STDY 3+ LVLS: CPT

## 2025-01-09 PROCEDURE — 93923 UPR/LXTR ART STDY 3+ LVLS: CPT | Mod: 26 | Performed by: STUDENT IN AN ORGANIZED HEALTH CARE EDUCATION/TRAINING PROGRAM

## 2025-01-09 PROCEDURE — 93926 LOWER EXTREMITY STUDY: CPT | Mod: RT

## 2025-01-09 RX ORDER — TORSEMIDE 10 MG/1
10 TABLET ORAL DAILY
Qty: 90 TABLET | Refills: 3 | Status: ON HOLD | OUTPATIENT
Start: 2025-01-09 | End: 2025-02-11

## 2025-01-09 NOTE — TELEPHONE ENCOUNTER
Called patient, relayed update, reviewed importance of Wt, report HF S/S, and to call the HF nurse line.  Adding our phone number in his MyChart    Wt: doesn't have a scale knows to get one when picking up new prescription of torsemide.    HF S/S: reports some swelling in abdomin and legs, with feeling of tiredness.     Patient is taking:  Jardiance 10 MG. Take 1 tablet (10 mg) daily.  MAR updated     MAR update:  Torsemide 10 MG.  Take 1 tablet (10 mg) daily    BMP ordered and anticipated 1/23/25    Thank you     Conor Hurt RN    ------------------------------------------    Justine Jeff, CNP  P [facility] Core  Cr elevated but near suspected baseline ~1.1-1.3. BUN elevated. BNP elevated (slightly down from 3933 during hospitalization).     Let's try decreasing torsemide to 10 mg daily. Please ensure he is drinking adequate fluids.     Recheck BMP in 2-week.     Please inquire if patient is taking Jardiance (empagliflozin) OR Steglatro (ertugliflozin) at home and update med list to reflect.     Encourage daily weights and sx monitoring at home. This is the best way for him to report back to us.

## 2025-01-09 NOTE — TELEPHONE ENCOUNTER
----- Message from Justine Jeff sent at 1/9/2025 10:42 AM CST -----  Cr elevated but near suspected baseline ~1.1-1.3. BUN elevated. BNP elevated (slightly down from 3933 during hospitalization). Let's try decreasing torsemide to 10 mg daily. Please ensure he is drinking adequate fluids. Recheck BMP in 2-week. Please inquire if patient is taking Jardiance (empagliflozin) OR Steglatro (ertugliflozin) at home and update med list to reflect. Encourage daily weights and sx monitoring at home. This is the best way for him to report back to us.

## 2025-01-09 NOTE — RESULT ENCOUNTER NOTE
Called patient and provided update in a Tele encounter.      Heart Failure Nurse phone number for  [facility] is [phone]    Sending this to his MyChart    Thank you    Conor Hurt RN

## 2025-01-09 NOTE — TELEPHONE ENCOUNTER
Patient reports only taking Jardiance.  Medication is now listed in his MAR.    Thank  you    Conor Hurt RN    -------------------    Did you confirm that he is not taking ertugliflozin (steglatro)? Please update med list to reflect.

## 2025-01-10 ENCOUNTER — ONCOLOGY VISIT (OUTPATIENT)
Dept: ONCOLOGY | Facility: HOSPITAL | Age: 80
End: 2025-01-10
Payer: COMMERCIAL

## 2025-01-10 VITALS
HEART RATE: 93 BPM | RESPIRATION RATE: 20 BRPM | SYSTOLIC BLOOD PRESSURE: 128 MMHG | DIASTOLIC BLOOD PRESSURE: 59 MMHG | OXYGEN SATURATION: 99 % | TEMPERATURE: 98.7 F | BODY MASS INDEX: 28.8 KG/M2 | HEIGHT: 68 IN | WEIGHT: 190.04 LBS

## 2025-01-10 DIAGNOSIS — C22.0 HEPATOCELLULAR CARCINOMA (H): Primary | ICD-10-CM

## 2025-01-10 PROCEDURE — G2211 COMPLEX E/M VISIT ADD ON: HCPCS | Performed by: INTERNAL MEDICINE

## 2025-01-10 PROCEDURE — G0463 HOSPITAL OUTPT CLINIC VISIT: HCPCS | Performed by: INTERNAL MEDICINE

## 2025-01-10 PROCEDURE — 99215 OFFICE O/P EST HI 40 MIN: CPT | Performed by: INTERNAL MEDICINE

## 2025-01-10 RX ORDER — LORAZEPAM 2 MG/ML
0.5 INJECTION INTRAMUSCULAR EVERY 4 HOURS PRN
OUTPATIENT
Start: 2025-01-20

## 2025-01-10 ASSESSMENT — PAIN SCALES - GENERAL: PAINLEVEL_OUTOF10: MODERATE PAIN (5)

## 2025-01-10 NOTE — PROGRESS NOTES
[facility] Hematology and Oncology Progress Note    Patient: Janice Nowak  MRN: [id number]  Date of Service: Omer 10, 2025        Assessment and Plan:    1.  Hepatobiliary cancer: I met with the patient and his son.  We reviewed the pathology from poor hepatic lymph node fine-needle aspirate.  This showed metastatic carcinoma.  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  This would be consistent with his imaging findings.  The dilemma here is that these 2 cancers are treated quite differently.  The patient would like to start on treatment.  Other complicating factors are as chronic medical problems and leg ulcerations.  As I think it would be easier for him to tolerate immunotherapy as opposed to chemotherapy and going to start him on atezolizumab and bevacizumab.  Watch closely for bleeding from his leg wounds.  She is were all discussed with the patient and his family members.  I also reviewed staging.  I reviewed with him that he has metastatic, stage IV disease and that treatment is palliative in nature.  I reviewed with him that surgery is not likely going to be an option and that the goals of therapy are to control the cancer and prevent or delay symptoms 11 and ultimately help him live longer.  Questions were answered.  Will plan on starting in a week or 2.  We will send his tumor for cancer type ID testing, next generation sequencing testing and also a second pathologic opinion.  If it turns out we do not have enough tissue then we may have to consider core needle biopsy of the liver mass.     2.  Chronic leg ulcerations: Likely secondary to decreased perfusion.  Recently completed a course of IV antibiotics for osteomyelitis.  Will refer him to vascular for wound care and follow-up.  I think he is only seen in interventional radiology.  Continues to have a wound VAC in place on the right lower  extremity.    Medical decision Making:  I spent 44 minutes in the care of this patient today, which included time necessary for preparation for the visit, face to face time with the patient, communication of recommendations to the care team, and documentation time.  Today's visit was centered around a cancer diagnosis in the setting of additional medical comorbidities. Complex medical decision making was required. The risk of additional morbidity without further treatment is high.     ECOG Performance  1    Diagnosis:    1.  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  Diagnosis obtained from abdominal lymph node biopsy January 2, 2025.    Treatment:    No cancer directed treatment to date.    Interim History:    Janice returns today for a follow-up visit.  He is here to review the results of an abdominal mass biopsy.  He notes that he has occasional abdominal pain.  No nausea or vomiting.  Some decreased appetite.  No worsening lower extremity edema.  Still has some skin ulcerations on the bilateral lower extremities.    Review of Systems:    As above in the history.     Review of Systems otherwise Negative for:  General: chills, fever or night sweats  Psychological: anxiety or depression  Ophthalmic: blurry vision, double vision or loss of vision, vision change  ENT: epistaxis, oral lesions, hearing changes  Hematological and Lymphatic: bleeding, bruising, jaundice, swollen lymph nodes  Endocrine: hot flashes, unexpected weight changes  Respiratory: cough, hemoptysis, orthopnea or shortness of breath/DIAS  Cardiovascular: chest pain, edema, palpitations or PND  Gastrointestinal: abdominal pain, blood in stools, change in bowel habits, constipation, diarrhea or nausea/vomiting  Genito-Urinary: change in urinary stream, incontinence, frequency/urgency  Musculoskeletal: joint pain, stiffness, swelling, muscle pain  Neurological: dizziness, headaches,  numbness/tingling  Dermatological: lumps and rash    Past History:    Past Medical History:   Diagnosis Date    Diabetes (H)     Hypertension     Sleep apnea     Thyroid disease      Physical Exam:    There were no vitals taken for this visit.    General: patient appears stated age of 79 year old. Nontoxic and in no distress.   HEENT: Head: atraumatic, normocephalic. Sclerae anicteric.  Chest:  Normal respiratory effort  Cardiac:  No edema.   Abdomen: abdomen is non-distended  Extremities: normal tone and muscle bulk.  Skin: He has ulcerations with eschar on the toes bilaterally.  Also on the left ankle.  There is a wound VAC in place on the right lower extremity.  CNS: alert and oriented. Grossly non-focal.   Psychiatric: normal mood and affect.     Lab Results:    Recent Results (from the past week)   Basic metabolic panel   Result Value Ref Range    Sodium 136 135 - 145 mmol/L    Potassium 3.4 3.4 - 5.3 mmol/L    Chloride 92 (L) 98 - 107 mmol/L    Carbon Dioxide (CO2) 30 (H) 22 - 29 mmol/L    Anion Gap 14 7 - 15 mmol/L    Urea Nitrogen 54.2 (H) 8.0 - 23.0 mg/dL    Creatinine 1.36 (H) 0.67 - 1.17 mg/dL    GFR Estimate 53 (L) >60 mL/min/1.73m2    Calcium 9.3 8.8 - 10.4 mg/dL    Glucose 299 (H) 70 - 99 mg/dL   N terminal pro BNP outpatient   Result Value Ref Range    N Terminal Pro BNP Outpatient 3,529 (H) 0 - 1,800 pg/mL     Imaging:    IR Lower Extremity Angiogram Right    Result Date: 12/27/2024  LOCATION: [facility] DATE: 12/26/2024 PROCEDURE: RIGHT LOWER EXTREMITY DIAGNOSTIC ARTERIOGRAPHY, ROTATIONAL ATHERECTOMY OF THE RIGHT SUPERFICIAL FEMORAL, POPLITEAL, POSTERIOR TIBIAL AND PERONEAL ARTERIES, BALLOON ANGIOPLASTY OF THE SUPERFICIAL FEMORAL, POPLITEAL, POSTERIOR TIBIAL AND ANTERIOR TIBIAL ARTERIES, ULTRASOUND GUIDANCE FOR VASCULAR ACCESSX2, MODERATE SEDATION INTERVENTIONAL RADIOLOGIST: Silvestre Jc MD INDICATION: 79-year-old male with nonhealing right heel wound/critical limb  threatening ischemia, plan for right lower extremity angiogram with possible intervention. CONSENT: The risks, benefits and alternatives of the procedure were discussed with the patient  in detail. All questions were answered. Informed consent was given to proceed with the procedure. MODERATE SEDATION: Versed 1.5 mg IV; Fentanyl 75 mcg IV. During the time out, immediately prior to the administration of medications, the patient was reassessed for adequacy to receive conscious sedation.  Under physician supervision, Versed and fentanyl  were administered for moderate sedation. Pulse oximetry, heart rate and blood pressure were continuously monitored by an independent trained observer. The physician spent 228 minutes of face-to-face sedation time with the patient. CONTRAST: 100 cc Visipaque ADDITIONAL MEDICATIONS: Argatroban FLUOROSCOPIC TIME: 53.1 minutes. RADIATION DOSE: Air Kerma: 689 mGy. COMPLICATIONS: No immediate complications. UNIVERSAL PROTOCOL: The operative site was marked and any prior imaging was reviewed. Required items including blood products, implants, devices and special equipment was made available. Patient identity was confirmed either verbally, with demographic information, hospital assigned identification or other identification markers. A timeout was performed immediately prior to the procedure. STERILE BARRIER TECHNIQUE: Maximum sterile barrier technique was used. Cutaneous antisepsis was performed at the operative site with application of 2% chlorhexidine and large sterile drape. Prior to the procedure, the  and assistant performed hand hygiene and wore hat, mask, sterile gown, and sterile gloves during the entire procedure. PROCEDURE:  Access was achieved into the right common femoral artery in an antegrade fashion utilizing real-time ultrasound guidance and a micropuncture access kit. Imaging demonstrates a patent and compressible artery. Permanent images were stored to the patient's  medical record. Conversion was made for a 5 Yemeni vascular sheath, which was attached to a continuous saline infusion. A diagnostic right lower extremity venogram was obtained. Imaging demonstrates patent right common femoral, profunda femoral, superficial femoral and popliteal arteries. There is moderate stenosis involving the distal superficial femoral artery and above-knee popliteal artery. Occlusion of the proximal anterior tibial artery. The posterior tibial artery is patent to the foot with moderate to severe stenosis involving the mid vessel. Occlusion of the mid and distal peroneal artery. Occluded dorsalis  pedis artery. Systemic anticoagulation was performed with Argatroban and monitored with serial activated clotting times. Exchange is made for a 5 Yemeni x 23 cm vascular sheath. An angled 018 crossing catheter and guidewire were used to selectively catheterize the anterior tibial artery. Contrast injection demonstrates multi segment occlusions involving the anterior tibial  artery. There is inability to cross the occluded vessel from an antegrade fashion. The crossing catheter was retracted and sector catheterization and angiography of the peroneal artery was performed. Imaging demonstrates areas of occlusion involving the  mid and distal vessel. Exchange is made for an 014 Viperwire which was positioned in the distal peroneal artery. The crossing catheter was removed. Over-the-wire exchange is made for a 1.25 Solid CSI atherectomy catheter. Rotational atherectomy of disease in the distal superficial femoral and above-knee popliteal arteries was performed. Rotational atherectomy of disease in the peroneal artery was performed. The guidewire was retracted and selective catheterization of the posterior tibial artery was performed. The guidewire was advanced to the distal vessel and focal rotational atherectomy of the high-grade stenosis involving the mid posterior tibial artery was performed. The  atherectomy catheter was removed and over the wire exchange is made for a 3 mm x 2 cm angioplasty balloon and angioplasty of stenosis in the mid posterior tibial artery was performed. Post intervention angiogram demonstrates no significant residual stenosis in the posterior tibial artery. There is persistent residual stenosis and disease involving the distal peroneal artery. Real-time ultrasound imaging of the right dorsalis pedis with artery was performed. Imaging demonstrates a occluded vessel. A permanent image was stored to the patient's medical record. A pediatric micropuncture needle was advanced under real-time ultrasound guidance and advanced into the right dorsalis pedis artery. A guidewire was advanced to the distal anterior tibial artery. Exchange is made for the inner 3 Mauritanian micropuncture sheath. Attempts to advance past the occluded anterior tibial artery via retrograde pedal access were unsuccessful. Utilizing the antegrade femoral access, selective catheterization of the occluded right anterior tibial artery was again performed. The crossing catheter and guidewire were subsequently used to cross the diseased vessel to the dorsalis pedis artery. Positioning within the true lumen was confirmed by injecting a small volume of contrast. Exchange is made for a 3 mm x 20 cm angioplasty balloon and angioplasty of the anterior tibial artery was performed. Post angioplasty imaging demonstrates improved flow through the proximal and mid anterior tibial artery, however, there is limited outflow at the foot. Over-the-wire exchange is made for a 6 mm x 15 cm InPact drug-eluting balloon and drug coated balloon angioplasty of the distal superficial femoral and above-knee popliteal artery was performed. Post into plasty imaging demonstrate no significant residual stenosis within the distal superficial femoral above-knee popliteal arteries. At this point, the procedure was considered complete. The sheath was removed  and hemostasis was achieved with the assistance of an Angio-Seal closure device.     IMPRESSION:  1. Right lower extremity angiogram via antegrade access demonstrates moderate stenosis involving the distal superficial femoral and above-knee popliteal arteries. One vessel runoff to the foot via the posterior tibial artery with moderate stenosis involving the mid vessel. Occlusions involving the anterior tibial and peroneal arteries. 2. Successful rotational atherectomy and drug coated balloon angioplasty of disease in the distal superficial femoral and above-knee popliteal arteries. Successful rotational atherectomy and angioplasty of stenosis in the mid posterior tibial artery. Status post rotational atherectomy of the peroneal artery. 3. Unsuccessful recanalization of the anterior tibial artery via antegrade and retrograde pedal access. PLAN: Two hours of bedrest post sheath removal. Plan for short interval clinic follow-up to evaluate response.    MR Liver wo & w Contrast    Result Date: 12/18/2024  EXAM: MR LIVER W/O and W CONTRAST LOCATION: [facility] DATE: 12/18/2024 INDICATION: Indeterminate liver mass. COMPARISON: CTA chest 12/11/2024, PET/CT 12/6/2024 and CTA abdomen and pelvis with lower extremity runoff 11/27/2024. TECHNIQUE: Routine MRI liver protocol including T1 in/out phase, diffusion, multiplane T2, and dynamic T1 with IV contrast.  CONTRAST: Gadavist 9mL. FINDINGS: LIVER: Liver has morphologic features of cirrhosis with mild hepatic contour irregularity and asymmetric enlargement of the lateral segment left hepatic lobe and caudate lobe. Mild diffuse hepatic steatosis. The 3.5 x 3.2 x 2.7 cm hepatic segment VII mass hepatic segment VII does have some arterial phase enhancement but no washout and there is some overlying capsular retraction which can suggest chronicity. BILE DUCTS: Normal caliber intra and extrahepatic bile ducts with smooth tapering at the ampulla and no  obstructing stone, sludge, stricture or mass. GALLBLADDER: Cholecystectomy. SPLEEN: Mild splenomegaly 14.5 cm. PANCREAS AND PANCREATIC DUCT: Several less than 10 mm thin-walled, nonenhancing cysts in the with no solid component. Pancreas is otherwise normal. No solid mass or inflammatory change. Normal caliber main pancreatic duct with classic ductal anatomy. KIDNEYS AND ADRENAL GLANDS: Normal. LYMPH NODES: The 9.5 x 6.3 x 5.0 cm mass in the portocaval space is likely malignant but organ of origin is uncertain and an could be an enlarged lymph node but could also be arising from the duodenum or potentially represent a neuroendocrine tumor. Lymph nodes otherwise normal. ADDITIONAL FINDINGS: Right pleural effusion has completely resolved and left pleural effusion has nearly completely resolved.     IMPRESSION: 1.  Hepatic cirrhosis and steatosis. 2.  A 3.5 x 3.2 x 2.7 cm hepatic segment VII mass is indeterminate but does not have specific features of HCC, therefore best classified LR-M. Consider follow-up liver imaging after biopsy of the portocaval space mass described below. 3.  The 9.5 x 6.3 x 5.0 cm mass in the portocaval space is likely malignant but organ of origin is uncertain and could be an enlarged lymph node but could also be arising from the duodenum or potentially represent a neuroendocrine tumor. Correlation with  serum tumor markers may be helpful prior to scheduled endoscopic ultrasound-guided biopsy. LR-M = Probably or definitely malignant but not hepatocellular carcinoma specific REFERENCE: LI-RADS CT/MRI:  LR-M = Probably or definitely malignant but not hepatocellular carcinoma specific LR-TIV = Definite tumor in vein LR-5 = Definitely hepatocellular carcinoma (some concordant with OPTN 5, see below) LR-4 = Probably hepatocellular carcinoma LR-3 = Intermediate probability of malignancy LR-2 = Probably benign LR-1 = Definitely benign LR-NC = Cannot be categorized due to image degradation or omission   REFERENCE: Revisions of international consensus [place] guidelines for the management of IPMN of the pancreas. Pancreatology 2017;17(5):738-753. Largest cyst less than 10 mm: CT or MRI/MRCP in 6 months and then every 2 years if no change.     US Lower Extremity Venous Duplex Bilateral    Result Date: 12/12/2024  EXAM: US LOWER EXTREMITY VENOUS DUPLEX BILATERAL LOCATION: [facility] DATE: 12/12/2024 INDICATION: Patient with recent washout of right foot, now with mild swelling to right LE and worsening DIAS. COMPARISON: None. TECHNIQUE: Venous Duplex ultrasound of bilateral lower extremities with and without compression, augmentation and duplex. Color flow and spectral Doppler with waveform analysis performed. FINDINGS: Exam includes the common femoral, femoral, popliteal veins as well as segmentally visualized deep calf veins and greater saphenous vein. RIGHT: No deep vein thrombosis. No superficial thrombophlebitis. No popliteal cyst. LEFT: No deep vein thrombosis. No superficial thrombophlebitis. No popliteal cyst.     IMPRESSION: 1.  No deep venous thrombosis in the bilateral lower extremities.    US Thoracentesis    Result Date: 12/12/2024  EXAM: 1. RIGHT THORACENTESIS 2. ULTRASOUND GUIDANCE LOCATION: [facility] DATE: 12/12/2024 INDICATION: Pleural effusion. PROCEDURE: Informed consent obtained. Time out performed. The chest was prepped and draped in sterile fashion. 7 mL of 1 % lidocaine was infused into the local soft tissues. Under direct ultrasound guidance, a 5 Slovenian catheter system was placed into the  pleural effusion. 1.3 liters of clear yellow fluid were removed and sent to lab, if requested. Patient tolerated procedure well. Ultrasound imaging was obtained and placed in the patient's permanent medical record.     IMPRESSION: Status post right ultrasound-guided thoracentesis. Reference CPT Code: 44854    Echocardiogram Complete    Result Date:  2024  [id number]^SALTY^[place] [address]  Name: MANOJ MARITNO MRN: [id number] : 1945 Study Date: 2024 10:21 AM Age: 79 yrs Gender: Male Patient Location: [facility] Reason For Study: DIAS Ordering Physician: SINAN POND Performed By: YENNY  BSA: 2.1 m2 Height: 68 in Weight: 214 lb HR: 78 BP: 119/58 mmHg ______________________________________________________________________________ Procedure Complete Echo Adult. Definity (NDC #55774-105) given intravenously. Adequate quality two-dimensional was performed and interpreted. There is no comparison study available. ______________________________________________________________________________ Interpretation Summary  The left ventricle is normal in size. There is mild concentric left ventricular hypertrophy. Left ventricular systolic function is normal. The visual ejection fraction is 55-60%. The inferior wall is mildly hypokinetic. Grade II or moderate diastolic dysfunction.  The right ventricle is normal in size and function. The left atrium is severely dilated. Right atrium not well visualized. IVC diameter <2.1 cm collapsing >50% with sniff suggests a normal RA pressure of 3 mmHg. There is no comparison study available. ______________________________________________________________________________ Left Ventricle The left ventricle is normal in size. There is mild concentric left ventricular hypertrophy. Left ventricular systolic function is normal. The visual ejection fraction is 55-60%. Grade II or moderate diastolic dysfunction. The inferior wall is mildly hypokinetic.  Right Ventricle The right ventricle is normal in size and function.  Atria The left atrium is severely dilated. Right atrium not well visualized.  Mitral Valve Mitral valve leaflets appear normal. There is mild mitral annular calcification. There is trace mitral regurgitation. There is mild mitral stenosis.   Tricuspid Valve Tricuspid valve leaflets appear normal. There is trace tricuspid regurgitation. Right ventricular systolic pressure could not be approximated due to inadequate tricuspid regurgitation.  Aortic Valve The aortic valve is trileaflet. Mild aortic valve calcification is present. No aortic regurgitation is present. No aortic stenosis is present.  Pulmonic Valve The pulmonic valve is not well visualized. There is trace pulmonic valvular regurgitation.  Vessels The aorta root is normal. IVC diameter <2.1 cm collapsing >50% with sniff suggests a normal RA pressure of 3 mmHg.  Pericardium There is no pericardial effusion.  Rhythm Sinus rhythm was noted. ______________________________________________________________________________ MMode/2D Measurements & Calculations  IVSd: 1.3 cm LVIDd: 4.7 cm LVIDs: 3.2 cm LVPWd: 1.3 cm FS: 32.1 % LV mass(C)d: 237.1 grams LV mass(C)dI: 112.6 grams/m2 Ao root diam: 3.3 cm asc Aorta Diam: 3.2 cm LVOT diam: 2.4 cm LVOT area: 4.4 cm2 Ao root diam index Ht(cm/m): 1.9 Ao root diam index BSA (cm/m2): 1.6 Asc Ao diam index BSA (cm/m2): 1.5 Asc Ao diam index Ht(cm/m): 1.8 EF Biplane: 50.6 % LA Volume (BP): 110.0 ml  LA Volume Index (BP): 52.1 ml/m2 LA Volume Indexed (AL/bp): 56.5 ml/m2 RWT: 0.55 TAPSE: 2.0 cm  Time Measurements MM HR: 79.0 BPM  Doppler Measurements & Calculations MV E max zander: 151.0 cm/sec MV A max zander: 90.3 cm/sec MV E/A: 1.7 MV max P.2 mmHg MV mean PG: 3.7 mmHg MV V2 VTI: 33.6 cm MVA(VTI): 3.0 cm2 MV dec slope: 691.5 cm/sec2 MV dec time: 0.22 sec Ao V2 max: 135.1 cm/sec Ao max P.0 mmHg Ao V2 mean: 91.2 cm/sec Ao mean PG: 3.8 mmHg Ao V2 VTI: 30.5 cm GEMINI(I,D): 3.3 cm2 GEMINI(V,D): 3.2 cm2 LV V1 max PG: 3.7 mmHg LV V1 max: 96.5 cm/sec LV V1 VTI: 22.5 cm SV(LVOT): 99.5 ml SI(LVOT): 47.3 ml/m2 Pulm Sys Zander: 26.3 cm/sec Pulm Arellano Zander: 39.1 cm/sec Pulm A Revs Zander: 26.0 cm/sec Pulm S/D: 0.67 AV Zander Ratio (DI): 0.71 GEMINI Index (cm2/m2): 1.5 E/E': 25.2 E/E' av.9  Lateral E/e': 20.5 Medial E/e': 25.3  Peak E' Zander: 6.0 cm/sec RV S Zander: 10.8 cm/sec  ______________________________________________________________________________ Report approved by: Simón Rucker MD on 12/12/2024 12:44 PM       CT Chest Pulmonary Embolism w Contrast    Result Date: 12/11/2024  EXAM: CT CHEST PULMONARY EMBOLISM W CONTRAST LOCATION: [facility] DATE: 12/11/2024 INDICATION: SOB. COMPARISON: None. TECHNIQUE: CT chest pulmonary angiogram during arterial phase injection of IV contrast. Multiplanar reformats and MIP reconstructions were performed. Dose reduction techniques were used. CONTRAST: 75 mL Isovue 370. FINDINGS: ANGIOGRAM CHEST: Pulmonary arteries are normal caliber and negative for pulmonary emboli. Thoracic aorta is negative for dissection. No CT evidence of right heart strain. LUNGS AND PLEURA: Large right pleural effusion with moderate to large left pleural effusion. Emphysema. Consolidation within both lower lobes. Tiny calcified granuloma in the right upper lobe. Subpleural nodularity in the left upper lobe. MEDIASTINUM/AXILLAE: Subcarinal lymphadenopathy. AP window lymph node enlargement. CORONARY ARTERY CALCIFICATION: Severe. UPPER ABDOMEN: Incomplete visualization of hypodense mass in the posterior right hepatic lobe with bulky portacaval lymphadenopathy. MUSCULOSKELETAL: Normal.     IMPRESSION: 1.  No pulmonary embolism. 2.  Large right pleural effusion with moderate to large left pleural effusion. Bibasilar consolidation. 3.  Emphysema. 4.  Incomplete visualization of hypodense mass in the posterior right hepatic lobe with bulky portacaval lymphadenopathy suspicious for neoplasm. 5.  Subcarinal lymphadenopathy. Mild AP window lymphadenopathy with scattered additional subcentimeter mediastinal nodes.       Signed by: Kong Gleason MD

## 2025-01-10 NOTE — PROGRESS NOTES
"Oncology Rooming Note    January 10, 2025 9:22 AM   Janice Nowak is a 79 year old male who presents for:    Chief Complaint   Patient presents with    Oncology Clinic Visit     Return visit with prior MRI review     Initial Vitals: /59 (BP Location: Right arm, Patient Position: Sitting, Cuff Size: Adult Regular)   Pulse 93   Temp 98.7  F (37.1  C) (Tympanic)   Resp 20   Ht 1.727 m (5' 8\")   Wt 86.2 kg (190 lb 0.6 oz)   SpO2 99%   BMI 28.89 kg/m   Estimated body mass index is 28.89 kg/m  as calculated from the following:    Height as of this encounter: 1.727 m (5' 8\").    Weight as of this encounter: 86.2 kg (190 lb 0.6 oz). Body surface area is 2.03 meters squared.  Moderate Pain (5) Comment: Data Unavailable   No LMP for male patient.  Allergies reviewed: Yes  Medications reviewed: Yes    Medications: Medication refills not needed today.  Pharmacy name entered into [facility]: [id number] - [place] [id number] [address]    Frailty Screening:   Is the patient here for a new oncology consult visit in cancer care? 2. No      Clinical concerns:       KAY GARCIA CMA              "

## 2025-01-10 NOTE — LETTER
1/10/2025      Janice Nowak  [address]  [facility] [address]      Dear Colleague,    Thank you for referring your patient, Janice Nowak, to the [facility]. Please see a copy of my visit note below.    [facility] Hematology and Oncology Progress Note    Patient: Janice Nwoak  MRN: [id number]  Date of Service: Omer 10, 2025        Assessment and Plan:    1.  Hepatobiliary cancer: I met with the patient and his son.  We reviewed the pathology from poor hepatic lymph node fine-needle aspirate.  This showed metastatic carcinoma.  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  This would be consistent with his imaging findings.  The dilemma here is that these 2 cancers are treated quite differently.  The patient would like to start on treatment.  Other complicating factors are as chronic medical problems and leg ulcerations.  As I think it would be easier for him to tolerate immunotherapy as opposed to chemotherapy and going to start him on atezolizumab and bevacizumab.  Watch closely for bleeding from his leg wounds.  She is were all discussed with the patient and his family members.  I also reviewed staging.  I reviewed with him that he has metastatic, stage IV disease and that treatment is palliative in nature.  I reviewed with him that surgery is not likely going to be an option and that the goals of therapy are to control the cancer and prevent or delay symptoms 11 and ultimately help him live longer.  Questions were answered.  Will plan on starting in a week or 2.  We will send his tumor for cancer type ID testing, next generation sequencing testing and also a second pathologic opinion.  If it turns out we do not have enough tissue then we may have to consider core needle biopsy of the liver mass.     2.  Chronic leg ulcerations: Likely  secondary to decreased perfusion.  Recently completed a course of IV antibiotics for osteomyelitis.  Will refer him to vascular for wound care and follow-up.  I think he is only seen in interventional radiology.  Continues to have a wound VAC in place on the right lower extremity.    Medical decision Making:  I spent 44 minutes in the care of this patient today, which included time necessary for preparation for the visit, face to face time with the patient, communication of recommendations to the care team, and documentation time.  Today's visit was centered around a cancer diagnosis in the setting of additional medical comorbidities. Complex medical decision making was required. The risk of additional morbidity without further treatment is high.     ECOG Performance  1    Diagnosis:    1.  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  Diagnosis obtained from abdominal lymph node biopsy January 2, 2025.    Treatment:    No cancer directed treatment to date.    Interim History:    Janice returns today for a follow-up visit.  He is here to review the results of an abdominal mass biopsy.  He notes that he has occasional abdominal pain.  No nausea or vomiting.  Some decreased appetite.  No worsening lower extremity edema.  Still has some skin ulcerations on the bilateral lower extremities.    Review of Systems:    As above in the history.     Review of Systems otherwise Negative for:  General: chills, fever or night sweats  Psychological: anxiety or depression  Ophthalmic: blurry vision, double vision or loss of vision, vision change  ENT: epistaxis, oral lesions, hearing changes  Hematological and Lymphatic: bleeding, bruising, jaundice, swollen lymph nodes  Endocrine: hot flashes, unexpected weight changes  Respiratory: cough, hemoptysis, orthopnea or shortness of breath/DIAS  Cardiovascular: chest pain, edema, palpitations or PND  Gastrointestinal: abdominal pain, blood in stools,  change in bowel habits, constipation, diarrhea or nausea/vomiting  Genito-Urinary: change in urinary stream, incontinence, frequency/urgency  Musculoskeletal: joint pain, stiffness, swelling, muscle pain  Neurological: dizziness, headaches, numbness/tingling  Dermatological: lumps and rash    Past History:    Past Medical History:   Diagnosis Date     Diabetes (H)      Hypertension      Sleep apnea      Thyroid disease      Physical Exam:    There were no vitals taken for this visit.    General: patient appears stated age of 79 year old. Nontoxic and in no distress.   HEENT: Head: atraumatic, normocephalic. Sclerae anicteric.  Chest:  Normal respiratory effort  Cardiac:  No edema.   Abdomen: abdomen is non-distended  Extremities: normal tone and muscle bulk.  Skin: He has ulcerations with eschar on the toes bilaterally.  Also on the left ankle.  There is a wound VAC in place on the right lower extremity.  CNS: alert and oriented. Grossly non-focal.   Psychiatric: normal mood and affect.     Lab Results:    Recent Results (from the past week)   Basic metabolic panel   Result Value Ref Range    Sodium 136 135 - 145 mmol/L    Potassium 3.4 3.4 - 5.3 mmol/L    Chloride 92 (L) 98 - 107 mmol/L    Carbon Dioxide (CO2) 30 (H) 22 - 29 mmol/L    Anion Gap 14 7 - 15 mmol/L    Urea Nitrogen 54.2 (H) 8.0 - 23.0 mg/dL    Creatinine 1.36 (H) 0.67 - 1.17 mg/dL    GFR Estimate 53 (L) >60 mL/min/1.73m2    Calcium 9.3 8.8 - 10.4 mg/dL    Glucose 299 (H) 70 - 99 mg/dL   N terminal pro BNP outpatient   Result Value Ref Range    N Terminal Pro BNP Outpatient 3,529 (H) 0 - 1,800 pg/mL     Imaging:    IR Lower Extremity Angiogram Right    Result Date: 12/27/2024  LOCATION: [facility] DATE: 12/26/2024 PROCEDURE: RIGHT LOWER EXTREMITY DIAGNOSTIC ARTERIOGRAPHY, ROTATIONAL ATHERECTOMY OF THE RIGHT SUPERFICIAL FEMORAL, POPLITEAL, POSTERIOR TIBIAL AND PERONEAL ARTERIES, BALLOON ANGIOPLASTY OF THE SUPERFICIAL FEMORAL,  POPLITEAL, POSTERIOR TIBIAL AND ANTERIOR TIBIAL ARTERIES, ULTRASOUND GUIDANCE FOR VASCULAR ACCESSX2, MODERATE SEDATION INTERVENTIONAL RADIOLOGIST: Silvestre Jc MD INDICATION: 79-year-old male with nonhealing right heel wound/critical limb threatening ischemia, plan for right lower extremity angiogram with possible intervention. CONSENT: The risks, benefits and alternatives of the procedure were discussed with the patient  in detail. All questions were answered. Informed consent was given to proceed with the procedure. MODERATE SEDATION: Versed 1.5 mg IV; Fentanyl 75 mcg IV. During the time out, immediately prior to the administration of medications, the patient was reassessed for adequacy to receive conscious sedation.  Under physician supervision, Versed and fentanyl  were administered for moderate sedation. Pulse oximetry, heart rate and blood pressure were continuously monitored by an independent trained observer. The physician spent 228 minutes of face-to-face sedation time with the patient. CONTRAST: 100 cc Visipaque ADDITIONAL MEDICATIONS: Argatroban FLUOROSCOPIC TIME: 53.1 minutes. RADIATION DOSE: Air Kerma: 689 mGy. COMPLICATIONS: No immediate complications. UNIVERSAL PROTOCOL: The operative site was marked and any prior imaging was reviewed. Required items including blood products, implants, devices and special equipment was made available. Patient identity was confirmed either verbally, with demographic information, hospital assigned identification or other identification markers. A timeout was performed immediately prior to the procedure. STERILE BARRIER TECHNIQUE: Maximum sterile barrier technique was used. Cutaneous antisepsis was performed at the operative site with application of 2% chlorhexidine and large sterile drape. Prior to the procedure, the  and assistant performed hand hygiene and wore hat, mask, sterile gown, and sterile gloves during the entire procedure. PROCEDURE:  Access was  achieved into the right common femoral artery in an antegrade fashion utilizing real-time ultrasound guidance and a micropuncture access kit. Imaging demonstrates a patent and compressible artery. Permanent images were stored to the patient's medical record. Conversion was made for a 5 Romansh vascular sheath, which was attached to a continuous saline infusion. A diagnostic right lower extremity venogram was obtained. Imaging demonstrates patent right common femoral, profunda femoral, superficial femoral and popliteal arteries. There is moderate stenosis involving the distal superficial femoral artery and above-knee popliteal artery. Occlusion of the proximal anterior tibial artery. The posterior tibial artery is patent to the foot with moderate to severe stenosis involving the mid vessel. Occlusion of the mid and distal peroneal artery. Occluded dorsalis  pedis artery. Systemic anticoagulation was performed with Argatroban and monitored with serial activated clotting times. Exchange is made for a 5 Romansh x 23 cm vascular sheath. An angled 018 crossing catheter and guidewire were used to selectively catheterize the anterior tibial artery. Contrast injection demonstrates multi segment occlusions involving the anterior tibial  artery. There is inability to cross the occluded vessel from an antegrade fashion. The crossing catheter was retracted and sector catheterization and angiography of the peroneal artery was performed. Imaging demonstrates areas of occlusion involving the  mid and distal vessel. Exchange is made for an 014 Viperwire which was positioned in the distal peroneal artery. The crossing catheter was removed. Over-the-wire exchange is made for a 1.25 Solid CSI atherectomy catheter. Rotational atherectomy of disease in the distal superficial femoral and above-knee popliteal arteries was performed. Rotational atherectomy of disease in the peroneal artery was performed. The guidewire was retracted and  selective catheterization of the posterior tibial artery was performed. The guidewire was advanced to the distal vessel and focal rotational atherectomy of the high-grade stenosis involving the mid posterior tibial artery was performed. The atherectomy catheter was removed and over the wire exchange is made for a 3 mm x 2 cm angioplasty balloon and angioplasty of stenosis in the mid posterior tibial artery was performed. Post intervention angiogram demonstrates no significant residual stenosis in the posterior tibial artery. There is persistent residual stenosis and disease involving the distal peroneal artery. Real-time ultrasound imaging of the right dorsalis pedis with artery was performed. Imaging demonstrates a occluded vessel. A permanent image was stored to the patient's medical record. A pediatric micropuncture needle was advanced under real-time ultrasound guidance and advanced into the right dorsalis pedis artery. A guidewire was advanced to the distal anterior tibial artery. Exchange is made for the inner 3 Togolese micropuncture sheath. Attempts to advance past the occluded anterior tibial artery via retrograde pedal access were unsuccessful. Utilizing the antegrade femoral access, selective catheterization of the occluded right anterior tibial artery was again performed. The crossing catheter and guidewire were subsequently used to cross the diseased vessel to the dorsalis pedis artery. Positioning within the true lumen was confirmed by injecting a small volume of contrast. Exchange is made for a 3 mm x 20 cm angioplasty balloon and angioplasty of the anterior tibial artery was performed. Post angioplasty imaging demonstrates improved flow through the proximal and mid anterior tibial artery, however, there is limited outflow at the foot. Over-the-wire exchange is made for a 6 mm x 15 cm InPact drug-eluting balloon and drug coated balloon angioplasty of the distal superficial femoral and above-knee  popliteal artery was performed. Post into plasty imaging demonstrate no significant residual stenosis within the distal superficial femoral above-knee popliteal arteries. At this point, the procedure was considered complete. The sheath was removed and hemostasis was achieved with the assistance of an Angio-Seal closure device.     IMPRESSION:  1. Right lower extremity angiogram via antegrade access demonstrates moderate stenosis involving the distal superficial femoral and above-knee popliteal arteries. One vessel runoff to the foot via the posterior tibial artery with moderate stenosis involving the mid vessel. Occlusions involving the anterior tibial and peroneal arteries. 2. Successful rotational atherectomy and drug coated balloon angioplasty of disease in the distal superficial femoral and above-knee popliteal arteries. Successful rotational atherectomy and angioplasty of stenosis in the mid posterior tibial artery. Status post rotational atherectomy of the peroneal artery. 3. Unsuccessful recanalization of the anterior tibial artery via antegrade and retrograde pedal access. PLAN: Two hours of bedrest post sheath removal. Plan for short interval clinic follow-up to evaluate response.    MR Liver wo & w Contrast    Result Date: 12/18/2024  EXAM: MR LIVER W/O and W CONTRAST LOCATION: [facility] DATE: 12/18/2024 INDICATION: Indeterminate liver mass. COMPARISON: CTA chest 12/11/2024, PET/CT 12/6/2024 and CTA abdomen and pelvis with lower extremity runoff 11/27/2024. TECHNIQUE: Routine MRI liver protocol including T1 in/out phase, diffusion, multiplane T2, and dynamic T1 with IV contrast.  CONTRAST: Gadavist 9mL. FINDINGS: LIVER: Liver has morphologic features of cirrhosis with mild hepatic contour irregularity and asymmetric enlargement of the lateral segment left hepatic lobe and caudate lobe. Mild diffuse hepatic steatosis. The 3.5 x 3.2 x 2.7 cm hepatic segment VII mass hepatic segment  VII does have some arterial phase enhancement but no washout and there is some overlying capsular retraction which can suggest chronicity. BILE DUCTS: Normal caliber intra and extrahepatic bile ducts with smooth tapering at the ampulla and no obstructing stone, sludge, stricture or mass. GALLBLADDER: Cholecystectomy. SPLEEN: Mild splenomegaly 14.5 cm. PANCREAS AND PANCREATIC DUCT: Several less than 10 mm thin-walled, nonenhancing cysts in the with no solid component. Pancreas is otherwise normal. No solid mass or inflammatory change. Normal caliber main pancreatic duct with classic ductal anatomy. KIDNEYS AND ADRENAL GLANDS: Normal. LYMPH NODES: The 9.5 x 6.3 x 5.0 cm mass in the portocaval space is likely malignant but organ of origin is uncertain and an could be an enlarged lymph node but could also be arising from the duodenum or potentially represent a neuroendocrine tumor. Lymph nodes otherwise normal. ADDITIONAL FINDINGS: Right pleural effusion has completely resolved and left pleural effusion has nearly completely resolved.     IMPRESSION: 1.  Hepatic cirrhosis and steatosis. 2.  A 3.5 x 3.2 x 2.7 cm hepatic segment VII mass is indeterminate but does not have specific features of HCC, therefore best classified LR-M. Consider follow-up liver imaging after biopsy of the portocaval space mass described below. 3.  The 9.5 x 6.3 x 5.0 cm mass in the portocaval space is likely malignant but organ of origin is uncertain and could be an enlarged lymph node but could also be arising from the duodenum or potentially represent a neuroendocrine tumor. Correlation with  serum tumor markers may be helpful prior to scheduled endoscopic ultrasound-guided biopsy. LR-M = Probably or definitely malignant but not hepatocellular carcinoma specific REFERENCE: LI-RADS CT/MRI:  LR-M = Probably or definitely malignant but not hepatocellular carcinoma specific LR-TIV = Definite tumor in vein LR-5 = Definitely hepatocellular carcinoma  (some concordant with OPTN 5, see below) LR-4 = Probably hepatocellular carcinoma LR-3 = Intermediate probability of malignancy LR-2 = Probably benign LR-1 = Definitely benign LR-NC = Cannot be categorized due to image degradation or omission  REFERENCE: Revisions of international consensus [place] guidelines for the management of IPMN of the pancreas. Pancreatology 2017;17(5):738-753. Largest cyst less than 10 mm: CT or MRI/MRCP in 6 months and then every 2 years if no change.     US Lower Extremity Venous Duplex Bilateral    Result Date: 12/12/2024  EXAM: US LOWER EXTREMITY VENOUS DUPLEX BILATERAL LOCATION: [facility] DATE: 12/12/2024 INDICATION: Patient with recent washout of right foot, now with mild swelling to right LE and worsening DIAS. COMPARISON: None. TECHNIQUE: Venous Duplex ultrasound of bilateral lower extremities with and without compression, augmentation and duplex. Color flow and spectral Doppler with waveform analysis performed. FINDINGS: Exam includes the common femoral, femoral, popliteal veins as well as segmentally visualized deep calf veins and greater saphenous vein. RIGHT: No deep vein thrombosis. No superficial thrombophlebitis. No popliteal cyst. LEFT: No deep vein thrombosis. No superficial thrombophlebitis. No popliteal cyst.     IMPRESSION: 1.  No deep venous thrombosis in the bilateral lower extremities.    US Thoracentesis    Result Date: 12/12/2024  EXAM: 1. RIGHT THORACENTESIS 2. ULTRASOUND GUIDANCE LOCATION: [facility] DATE: 12/12/2024 INDICATION: Pleural effusion. PROCEDURE: Informed consent obtained. Time out performed. The chest was prepped and draped in sterile fashion. 7 mL of 1 % lidocaine was infused into the local soft tissues. Under direct ultrasound guidance, a 5 Algerian catheter system was placed into the  pleural effusion. 1.3 liters of clear yellow fluid were removed and sent to lab, if requested. Patient tolerated  procedure well. Ultrasound imaging was obtained and placed in the patient's permanent medical record.     IMPRESSION: Status post right ultrasound-guided thoracentesis. Reference CPT Code: 40008    Echocardiogram Complete    Result Date: 2024  [id number]^SALTY^[place] [address]  Name: MANOJ MARTINO MRN: [id number] : 1945 Study Date: 2024 10:21 AM Age: 79 yrs Gender: Male Patient Location: [facility] Reason For Study: DIAS Ordering Physician: SINAN POND Performed By: YENNY  BSA: 2.1 m2 Height: 68 in Weight: 214 lb HR: 78 BP: 119/58 mmHg ______________________________________________________________________________ Procedure Complete Echo Adult. Definity (NDC #04463-790) given intravenously. Adequate quality two-dimensional was performed and interpreted. There is no comparison study available. ______________________________________________________________________________ Interpretation Summary  The left ventricle is normal in size. There is mild concentric left ventricular hypertrophy. Left ventricular systolic function is normal. The visual ejection fraction is 55-60%. The inferior wall is mildly hypokinetic. Grade II or moderate diastolic dysfunction.  The right ventricle is normal in size and function. The left atrium is severely dilated. Right atrium not well visualized. IVC diameter <2.1 cm collapsing >50% with sniff suggests a normal RA pressure of 3 mmHg. There is no comparison study available. ______________________________________________________________________________ Left Ventricle The left ventricle is normal in size. There is mild concentric left ventricular hypertrophy. Left ventricular systolic function is normal. The visual ejection fraction is 55-60%. Grade II or moderate diastolic dysfunction. The inferior wall is mildly hypokinetic.  Right Ventricle The right ventricle is normal in size and function.   Atria The left atrium is severely dilated. Right atrium not well visualized.  Mitral Valve Mitral valve leaflets appear normal. There is mild mitral annular calcification. There is trace mitral regurgitation. There is mild mitral stenosis.  Tricuspid Valve Tricuspid valve leaflets appear normal. There is trace tricuspid regurgitation. Right ventricular systolic pressure could not be approximated due to inadequate tricuspid regurgitation.  Aortic Valve The aortic valve is trileaflet. Mild aortic valve calcification is present. No aortic regurgitation is present. No aortic stenosis is present.  Pulmonic Valve The pulmonic valve is not well visualized. There is trace pulmonic valvular regurgitation.  Vessels The aorta root is normal. IVC diameter <2.1 cm collapsing >50% with sniff suggests a normal RA pressure of 3 mmHg.  Pericardium There is no pericardial effusion.  Rhythm Sinus rhythm was noted. ______________________________________________________________________________ MMode/2D Measurements & Calculations  IVSd: 1.3 cm LVIDd: 4.7 cm LVIDs: 3.2 cm LVPWd: 1.3 cm FS: 32.1 % LV mass(C)d: 237.1 grams LV mass(C)dI: 112.6 grams/m2 Ao root diam: 3.3 cm asc Aorta Diam: 3.2 cm LVOT diam: 2.4 cm LVOT area: 4.4 cm2 Ao root diam index Ht(cm/m): 1.9 Ao root diam index BSA (cm/m2): 1.6 Asc Ao diam index BSA (cm/m2): 1.5 Asc Ao diam index Ht(cm/m): 1.8 EF Biplane: 50.6 % LA Volume (BP): 110.0 ml  LA Volume Index (BP): 52.1 ml/m2 LA Volume Indexed (AL/bp): 56.5 ml/m2 RWT: 0.55 TAPSE: 2.0 cm  Time Measurements MM HR: 79.0 BPM  Doppler Measurements & Calculations MV E max andie: 151.0 cm/sec MV A max andie: 90.3 cm/sec MV E/A: 1.7 MV max P.2 mmHg MV mean PG: 3.7 mmHg MV V2 VTI: 33.6 cm MVA(VTI): 3.0 cm2 MV dec slope: 691.5 cm/sec2 MV dec time: 0.22 sec Ao V2 max: 135.1 cm/sec Ao max P.0 mmHg Ao V2 mean: 91.2 cm/sec Ao mean PG: 3.8 mmHg Ao V2 VTI: 30.5 cm GEMINI(I,D): 3.3 cm2 GEMINI(V,D): 3.2 cm2 LV V1 max PG: 3.7 mmHg LV V1 max:  96.5 cm/sec LV V1 VTI: 22.5 cm SV(LVOT): 99.5 ml SI(LVOT): 47.3 ml/m2 Pulm Sys Zander: 26.3 cm/sec Pulm Arellano Zander: 39.1 cm/sec Pulm A Revs Zander: 26.0 cm/sec Pulm S/D: 0.67 AV Zander Ratio (DI): 0.71 GEMINI Index (cm2/m2): 1.5 E/E': 25.2 E/E' av.9 Lateral E/e': 20.5 Medial E/e': 25.3  Peak E' Zander: 6.0 cm/sec RV S Zander: 10.8 cm/sec  ______________________________________________________________________________ Report approved by: Simón Rucker MD on 2024 12:44 PM       CT Chest Pulmonary Embolism w Contrast    Result Date: 2024  EXAM: CT CHEST PULMONARY EMBOLISM W CONTRAST LOCATION: [facility] DATE: 2024 INDICATION: SOB. COMPARISON: None. TECHNIQUE: CT chest pulmonary angiogram during arterial phase injection of IV contrast. Multiplanar reformats and MIP reconstructions were performed. Dose reduction techniques were used. CONTRAST: 75 mL Isovue 370. FINDINGS: ANGIOGRAM CHEST: Pulmonary arteries are normal caliber and negative for pulmonary emboli. Thoracic aorta is negative for dissection. No CT evidence of right heart strain. LUNGS AND PLEURA: Large right pleural effusion with moderate to large left pleural effusion. Emphysema. Consolidation within both lower lobes. Tiny calcified granuloma in the right upper lobe. Subpleural nodularity in the left upper lobe. MEDIASTINUM/AXILLAE: Subcarinal lymphadenopathy. AP window lymph node enlargement. CORONARY ARTERY CALCIFICATION: Severe. UPPER ABDOMEN: Incomplete visualization of hypodense mass in the posterior right hepatic lobe with bulky portacaval lymphadenopathy. MUSCULOSKELETAL: Normal.     IMPRESSION: 1.  No pulmonary embolism. 2.  Large right pleural effusion with moderate to large left pleural effusion. Bibasilar consolidation. 3.  Emphysema. 4.  Incomplete visualization of hypodense mass in the posterior right hepatic lobe with bulky portacaval lymphadenopathy suspicious for neoplasm. 5.  Subcarinal lymphadenopathy. Mild AP  "window lymphadenopathy with scattered additional subcentimeter mediastinal nodes.       Signed by: Kong Gleason MD      Oncology Rooming Note    January 10, 2025 9:22 AM   Janice Nowak is a 79 year old male who presents for:    Chief Complaint   Patient presents with     Oncology Clinic Visit     Return visit with prior MRI review     Initial Vitals: /59 (BP Location: Right arm, Patient Position: Sitting, Cuff Size: Adult Regular)   Pulse 93   Temp 98.7  F (37.1  C) (Tympanic)   Resp 20   Ht 1.727 m (5' 8\")   Wt 86.2 kg (190 lb 0.6 oz)   SpO2 99%   BMI 28.89 kg/m   Estimated body mass index is 28.89 kg/m  as calculated from the following:    Height as of this encounter: 1.727 m (5' 8\").    Weight as of this encounter: 86.2 kg (190 lb 0.6 oz). Body surface area is 2.03 meters squared.  Moderate Pain (5) Comment: Data Unavailable   No LMP for male patient.  Allergies reviewed: Yes  Medications reviewed: Yes    Medications: Medication refills not needed today.  Pharmacy name entered into [facility]: [id number] [place] [address] [facility]     Frailty Screening:   Is the patient here for a new oncology consult visit in cancer care? 2. No      Clinical concerns:       KAY GARCIA CMA                Again, thank you for allowing me to participate in the care of your patient.        Sincerely,        Kong Gleason MD    Electronically signed"

## 2025-01-13 ENCOUNTER — TELEPHONE (OUTPATIENT)
Dept: ONCOLOGY | Facility: HOSPITAL | Age: 80
End: 2025-01-13
Payer: COMMERCIAL

## 2025-01-13 DIAGNOSIS — C22.0 HEPATOCELLULAR CARCINOMA (H): Primary | ICD-10-CM

## 2025-01-13 NOTE — TELEPHONE ENCOUNTER
I received a phone call today from Christopher.  He is calling because he saw Dr. Gleason on Friday and was told he needed to have a port placed prior to starting infusion.  He is scheduled to start his treatment on 1/20.  He called and spoke to scheduling to get his port placement scheduled but they told him there was no order.  I let him know I would get a message over to LEYLA Liang to assist with coordinating his care.  The patient can be reached at [phone].    Cindi Arthur RN on 1/13/2025 at 2:00 PM

## 2025-01-14 ENCOUNTER — PATIENT OUTREACH (OUTPATIENT)
Dept: ONCOLOGY | Facility: HOSPITAL | Age: 80
End: 2025-01-14
Payer: COMMERCIAL

## 2025-01-14 NOTE — PROGRESS NOTES
[facility]: Cancer Care                                                                                          Called patient to give him the IR scheduling line to call and schedule his port placement. Patient expressed gratitude.     Signature:  Azul Aguilar RN

## 2025-01-15 ENCOUNTER — MYC MEDICAL ADVICE (OUTPATIENT)
Dept: INTERVENTIONAL RADIOLOGY/VASCULAR | Facility: CLINIC | Age: 80
End: 2025-01-15
Payer: COMMERCIAL

## 2025-01-15 ENCOUNTER — OFFICE VISIT (OUTPATIENT)
Dept: VASCULAR SURGERY | Facility: CLINIC | Age: 80
End: 2025-01-15
Attending: PODIATRIST
Payer: COMMERCIAL

## 2025-01-15 ENCOUNTER — PATIENT OUTREACH (OUTPATIENT)
Dept: ONCOLOGY | Facility: HOSPITAL | Age: 80
End: 2025-01-15

## 2025-01-15 ENCOUNTER — PREP FOR PROCEDURE (OUTPATIENT)
Dept: OTHER | Facility: CLINIC | Age: 80
End: 2025-01-15

## 2025-01-15 ENCOUNTER — TELEPHONE (OUTPATIENT)
Dept: VASCULAR SURGERY | Facility: CLINIC | Age: 80
End: 2025-01-15

## 2025-01-15 VITALS
HEART RATE: 90 BPM | RESPIRATION RATE: 16 BRPM | OXYGEN SATURATION: 100 % | SYSTOLIC BLOOD PRESSURE: 107 MMHG | TEMPERATURE: 97.5 F | DIASTOLIC BLOOD PRESSURE: 69 MMHG

## 2025-01-15 DIAGNOSIS — L97.411 DIABETIC ULCER OF RIGHT HEEL ASSOCIATED WITH TYPE 2 DIABETES MELLITUS, LIMITED TO BREAKDOWN OF SKIN (H): Primary | ICD-10-CM

## 2025-01-15 DIAGNOSIS — E11.621 DIABETIC ULCER OF TOE OF LEFT FOOT ASSOCIATED WITH TYPE 2 DIABETES MELLITUS, LIMITED TO BREAKDOWN OF SKIN (H): Primary | ICD-10-CM

## 2025-01-15 DIAGNOSIS — L97.411 DIABETIC ULCER OF RIGHT HEEL ASSOCIATED WITH TYPE 2 DIABETES MELLITUS, LIMITED TO BREAKDOWN OF SKIN (H): ICD-10-CM

## 2025-01-15 DIAGNOSIS — L97.911 ULCER OF RIGHT LEG, LIMITED TO BREAKDOWN OF SKIN (H): ICD-10-CM

## 2025-01-15 DIAGNOSIS — E11.621 DIABETIC ULCER OF RIGHT HEEL ASSOCIATED WITH TYPE 2 DIABETES MELLITUS, LIMITED TO BREAKDOWN OF SKIN (H): Primary | ICD-10-CM

## 2025-01-15 DIAGNOSIS — E11.621 DIABETIC ULCER OF RIGHT HEEL ASSOCIATED WITH TYPE 2 DIABETES MELLITUS, LIMITED TO BREAKDOWN OF SKIN (H): ICD-10-CM

## 2025-01-15 DIAGNOSIS — L97.521 DIABETIC ULCER OF TOE OF LEFT FOOT ASSOCIATED WITH TYPE 2 DIABETES MELLITUS, LIMITED TO BREAKDOWN OF SKIN (H): Primary | ICD-10-CM

## 2025-01-15 DIAGNOSIS — R09.89 OTHER SPECIFIED SYMPTOMS AND SIGNS INVOLVING THE CIRCULATORY AND RESPIRATORY SYSTEMS: ICD-10-CM

## 2025-01-15 DIAGNOSIS — I70.244 ATHEROSCLEROSIS OF NATIVE ARTERIES OF LEFT LEG WITH ULCERATION OF HEEL AND MIDFOOT (H): ICD-10-CM

## 2025-01-15 PROCEDURE — 11045 DBRDMT SUBQ TISS EACH ADDL: CPT | Performed by: PODIATRIST

## 2025-01-15 PROCEDURE — 97597 DBRDMT OPN WND 1ST 20 CM/<: CPT | Performed by: PODIATRIST

## 2025-01-15 PROCEDURE — 11042 DBRDMT SUBQ TIS 1ST 20SQCM/<: CPT | Performed by: PODIATRIST

## 2025-01-15 ASSESSMENT — PAIN SCALES - GENERAL: PAINLEVEL_OUTOF10: MODERATE PAIN (5)

## 2025-01-15 NOTE — PROGRESS NOTES
Interventional Radiology - Pre-Procedure Note:  Outpatient - [facility]  01/16/2025     Procedure Requested: Port Placement, No laterality delineated on order request.  Requested by: Kong Gleason MD     2nd Procedure: LLE angiogram   Requested by: Dr. Jc    History and Physical Reviewed: H&P documented within 30 days (by Kong Gleason MD  on 1/10/25). I have personally reviewed the patient's medical history and have updated the medical record as necessary.    Past Medical History:   Diagnosis Date    Diabetes (H)     Hypertension     Sleep apnea     Thyroid disease         Brief HPI: Janice Nowak is a 79 year old male diagnosed with HFpEF, DM II, CAD, HTN, CHF, smoker, PAD. He is followed by Dr. Gleason for Hepatocellular Carcinoma with plans for chemotherapy. Port placement requested.    Patient is followed by Dr. Jc for PAD, nonhealing foot ulcesrs. recent admission for diabetic foot ulcer and osteomyelitis s/p I&D, partial calcanectomy (10/24-11/01/2024). Recent admission with CHF.  Previous RLE angiogram 12/26/24. Patient is followed by Dr. Patricio, podiatry with plan for additional surgery on Left leg. Request for left leg angiogram     Imaging:  CTA abd/pelvis 11/27/24  Narrative & Impression   EXAM: CTA ABDOMEN PELVIS RUNOFF W CONTRAST  LOCATION: [facility]  DATE: 11/27/2024     INDICATION:  Diabetic ulcer of right heel associated with type 2 diabetes mellitus, with necrosis of bone (H), Diabetic ulcer of right heel associated with type 2 diabetes mellitus, with necrosis of bone (H)  COMPARISON: Aortic ultrasound 11/8/2024  TECHNIQUE: Helical acquisition through the abdomen, pelvis, and bilateral lower extremities was performed during the arterial phase of contrast enhancement using IV Contrast. 2D and 3D reconstructions were performed by the CT technologist. Dose reduction   techniques were used.   CONTRAST: 90ml isovue 370      FINDINGS:     ABDOMEN ARTERIAL FINDINGS  Abdominal aorta: Patent and normal caliber with moderate atherosclerotic burden.  Celiac artery: Patent.  SMA: Patent.  Renal Arteries: Mild atherosclerotic narrowing of the renal artery origins which are otherwise patent distally.  ALEC: Mild ostial narrowing, otherwise patent.     RIGHT LOWER EXTREMITY ARTERIAL FINDINGS  Common Iliac: At least mild atherosclerotic narrowing of the origin, otherwise patent. Diffuse atherosclerotic disease.  External Iliac: Patent.  Internal Iliac: At least mild ostial stenosis.  Common Femoral: Patent with atherosclerosis resulting in approximately 50% luminal narrowing.  Profunda: Ostial stenosis, otherwise patent.  SFA: Moderate focal stenosis near the origin, otherwise patent.  Popliteal: Patent.  Tibioperoneal trunk: Patent.  Anterior tibial: Occluded in the proximal calf.  Peroneal: Patent to the level of the ankle.  Posterior tibial: Patent to the level of the foot.     LEFT LOWER EXTREMITY ARTERIAL FINDINGS  Common Iliac: Severe atherosclerotic disease and occlusion at the origin with reconstitution distally just proximal to the bifurcation.  External Iliac: Atherosclerotic disease results in mild narrowing throughout the vessels course.  Internal Iliac: Ostial stenosis, otherwise patent.  Common Femoral: Atherosclerotic disease results in approximately 50% luminal stenosis.  Profunda: Ostial stenosis, otherwise patent.  SFA: Atherosclerotic disease results in severe focal stenosis proximally, otherwise patent.  Popliteal: Mild atherosclerotic disease in the above-knee segment, the below-knee segment is patent.  Tibioperoneal trunk: Patent.  Anterior tibial: Occluded.  Peroneal: Patent to the level of the ankle.  Posterior tibial: Patent to the level of the foot.     NON-ARTERIAL FINDINGS  LUNG BASES: No pleural effusion. Scattered areas of scarring. A partially visualized opacity along the right major fissure is noted.     ABDOMEN:  Cholecystectomy. A hypodensity in the posterior right hepatic lobe is noted measuring 2.5 cm in diameter, incompletely evaluated on this exam.. Mild scarring or megaly measuring 14 cm craniocaudal. The adrenal glands and kidneys are normal. Mild   fatty atrophy of the pancreatic head. There is a heterogeneous mass along the anterior aspect of the IVC and posterior to the duodenum measuring 7.0 x 4.7 x 7.0 cm. Prominent lymph nodes are noted immediately adjacent to the mass. Normal caliber bowel   loops. No ascites.     PELVIS: Normal     MUSCULOSKELETAL: Mild degenerative changes of the thoracolumbar spine and bilateral hips. No destructive osseous lesion                                                                         IMPRESSION:  1.  Severe aortoiliac atherosclerotic disease with interval short segment occlusion of the left common iliac artery with distal reconstitution. This appears new since the November 8 aortic ultrasound     2.  Bilateral SFA disease proximally. Two-vessel runoff bilaterally with occlusion of the ALEX's.     3.  There is a heterogeneous mass in the central right hemiabdomen posterior to the duodenum and anterior to tzhe IVC. This appears distinct from the pancreatic head and liver, flow is concerning for neoplasm. In addition there is a 2.5 cm hepatic   hypodensity which is incompletely evaluated on this exam. Abdominal MRI with contrast is recommended to further evaluate these findings. The abdominal mass would be amenable to endoscopic ultrasound-guided biopsy as indicated.     4.  Prominent retroperitoneal lymph nodes concerning for a metastatic process given the aforementioned findings.     5.  Partially visualized opacity along the right major fissure. Further evaluation with a CT of the chest is suggested.          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 We will use argatroban today per discussion with Dr. Jc.     NPO: Midnight.  ANTICOAGULANTS: Aspirin 81 mg PO daily - No  hold required per SIR guidelines.  Plavix 75 mg daily - No hold required per SIR guidelines.  ANTIBIOTICS: Ancef sign and held for procedure...    ALLERGIES  Allergies   Allergen Reactions    Exenatide Unknown    Heparin Unknown    Liraglutide Unknown    Lisinopril Cough    Morphine Unknown         LABS:  INR   Date Value Ref Range Status   01/16/2025 1.16 (H) 0.85 - 1.15 Final      Hemoglobin   Date Value Ref Range Status   01/16/2025 12.1 (L) 13.3 - 17.7 g/dL Final     Platelet Count   Date Value Ref Range Status   01/16/2025 206 150 - 450 10e3/uL Final     Creatinine   Date Value Ref Range Status   01/08/2025 1.36 (H) 0.67 - 1.17 mg/dL Final     Potassium   Date Value Ref Range Status   01/08/2025 3.4 3.4 - 5.3 mmol/L Final   07/06/2020 4.7 3.5 - 5.0 mmol/L Final         EXAM:  BP (!) 173/81 (BP Location: Left arm)   Pulse 84   Resp 20   SpO2 100%   General: Stable. In no acute distress.    Neuro: Alert and oriented x 3. Speech clear. No focal deficits.  Psych: Appropriate mood and affect. Cooperative. Answering questions appropriately.  Resp: Normal respirations. Unlabored breathing. Lungs clear to auscultation bilaterally.  Cardio: S1S2, regular rate and rhythm, without murmur, clicks or rubs  Skin: Warm and dry. Without excoriations, ecchymosis, erythema, lesions or open sores on upper chest and neck.  LLE red, ulcerations. Pedal doppler pulse.      Pre-Sedation Assessment:  Mallampati Airway Classification:  II - Faucial pillars and soft palate may be seen, but uvula is masked by the base of the tongue  Previous reaction to anesthesia/sedation:  No  Sedation plan based on assessment: Moderate (conscious) sedation  ASA Classification: Class 3 - SEVERE SYSTEMIC DISEASE, DEFINITE FUNCTIONAL LIMITATIONS.   Code Status: Full Code intra procedure, per discussion with patient.         ASSESSMENT/PLAN:   #Hepatocellular Carcinoma   PAD, nonhealing diabetic foot ulcers.    Port placement with sedation.  LLE  angiogram with possible intervention with moderate sedation    Procedural education reviewed with patient/family in detail including, but not limited to risks, benefits and alternatives with understanding verbalized by patient/family.    Through chart review and discussion with patient there is no contraindication for right chest port placement. Anticipating likely right chest port placement, though ultimately laterality to be determined by interventionalist.     Total time spent on the date of the encounter: 45 minutes.      SILVIA HIRSCH CNP  Interventional Radiology

## 2025-01-15 NOTE — PATIENT INSTRUCTIONS
Janice,    Your surgery with [facility] has been scheduled. Please read thoroughly and follow instructions.     Procedure:   I&D with possible skin graft application on right heel and leg  Procedure Date :   TBD- tentatively 1/23/25.   *A surgery nurse will call you 1-2 days before surgery to inform you of the time of arrival for surgery.  Surgeon:   Dr. Amol Patricio  Admission Type:   Outpatient  Procedure Location:   [facility]:  [address] [place] [id number] (phone: [phone], Fax: [phone])    Please park in Lot A. Enter through the main entrance. Check in at the Welcome Desk and you will be directed to the surgery unit.     Post Operative Appointment: SEE APPOINTMENTS       Until surgery- hold VAC and apply dry gauze to heel and right leg.     Left foot- betadine and dry gauze    Preparation Instructions to complete:    []  You will need a Pre-op Physical within 30 days before surgery with your primary care provider. This exam is required by the anesthesiologist to ensure a safe surgical experience.    Failure  to obtain your pre-op physical will lead to cancellation of your surgery  Call them right away to schedule this. Please ensure your Preoperative Physical is faxed to the Hospital (numbers listed above)    [] Preoperative Medication Instructions  We would like you to stop your anticoagulation medications 3-5 days before surgery HOWEVER contact your prescribing provider for instructions before discontinuing any medications. (Examples: Coumadin, Plavix, Xarelto, Eliquis, Pradaxa, Effient, Brilinta) If you are on Coumadin, we would like the goal INR  1.4.  IT IS OK TO STAY ON ASPIRIN PRIOR TO SURGERY.   Hold Ibuprofen 24 hours prior.   Hold Herbal Supplements and vitamins 14 days prior.   Stop Cialis, Levitra and Viagra 2-3 days before surgery.   Please discuss with your primary care provider if you need to hold any blood pressure  medications or others.   If you take these diabetic medications, please discuss with your primary doctor and follow the hold instructions:   []  Hold seven (7) days prior for once weekly injectable doses [semaglutide (Ozempic, Wegovy), dulaglutide (Trulicity), exenatide ER (Bydureon), tirzepatide (Mounjaro)]  []  Hold the day before and day of for once daily injectable GLP-1 agonists [exenatide (Byetta), liraglutide (Saxenda, Victoza)]  []  Hold seven (7) days for oral semaglutide (Rybelsus)     [] Fasting Requirements  Nothing to eat for 8 hours before surgery unless instructed differently by the surgery nurse.  You may have clear liquids up to two hours before your arrival time (coffee, tea, water, or Gatorade. No cream or milk)  If your primary care provider has instructed you to continue oral medications, you may take them on the morning of surgery with a small sip of water.    No alcohol or smoking after 12:00am the day of surgery    []  COVID-19 test is no longer needed  If you are experiencing COVID symptoms or have tested positive for COVID-19 within 14 days of procedure date, reach out to the care team to reschedule please.     [] Contact your insurance regarding coverage  If you would like a Good Karina Estimate for your upcoming procedure at [facility] Location, contact Cost of Care Estimates   Advocates are available Monday through Friday 8am - 5pm   [phone]  You may also submit a request online through your [employer] account.  For all self-pay, estimate, or anesthesia billing questions at [facility], the contact information is below:  Who to contact: Angelina MOODY  [facility] [place], operated by [facility] number: [phone]  Prepay number: [phone]    [] DO NOT BRING FMLA WITH TO SURGERY.  These should be sent to the provider's office by fax to [phone].     [] Day of Surgery  Medications - Take as indicated with sips of water.   Wear comfortable loose fitting clothes. Wear your  glasses-Not contacts. Do not wear jewelry and remove body piercing's. Surgery may be cancelled if they are not removed.   You may have 1 family member wait in the lobby during your surgery. Visitor restrictions are subject to change. Please verify with the surgery nurse when they call.   If same day surgery-Have a someone come with you to surgery that can help you understand the surgeon's instructions, drive you home and stay with you overnight the first night.    [] If the community sees a new COVID-19 surge, your procedure may need to be postponed. We will contact you if this happens.    [] You will receive a call from a surgery nurse 1-3 days prior to surgery. They will go over more details with you.         ** AFTER SURGERY INSTRUCTIONS **      [] WEIGHT BEARING: You are to remain NON WEIGHT BEARING on your right foot NON WEIGHT BEARING MEANS NO PRESSURE ON YOUR FOOT OR HEEL AT ANY TIME FOR ANY REASON!    [] BOOT: Prior to surgery you should already have a PRAFO BOOT which you will need to WEAR THIS AT ALL TIMES EVEN WHILE IN BED Please bring this with you on the day of surgery.    [] AMBULATION: You should already have a A WHEELCHAIR to help you transport after surgery. Please also bring this with you on the day of surgery.    [] DRESSING: During surgery Dr. Patricio will apply a gauze dressing to your foot. This will remain intact until the wound vac is applied. The wound vac should be applied within 24 hours after surgery.    [] ELEVATION: It is important that you elevate your affected foot after surgery. Proper elevation is raising your foot above your waist. The fluid in your lower extremities needs to get back to your heart so it can get pumped to your kidneys and expelled through urination. So if you notice you have to go to the bathroom more frequently when you are elevating your leg this is a good sign that it is working.       [] PROTEIN: It is important that you increase your protein intake after  surgery. Protein is essential for wound healing. We recommend you take a protein supplement twice per day. This is in addition to your normal diet. Examples of protein supplements are Ensure, Boost, Glucerna (if you are diabetic), or protein powder. You can purchase these at your local retailer or grocery store.    [] CAST COVER: It is NOT OKAY to get your surgical site wet while bathing, you will need to purchase a cast cover to protect your foot from getting wet. You can purchase this at [facility] or your local pharmacy.       Notify our office right away, if you have any changes in your health status, or if you develop a cold, flu, diarrhea, infection, fever or sore throat before your scheduled surgery date. We can be reached at [phone]   Monday-Friday 8 am-4:30 pm if you have any questions.     Thank you for trusting us with your care!    After surgery:     Right leg and heel:  - Wound Vac Instructions post Theraskin application:     1. Apply wound vac to the right heel and right leg within 24 hours from time of surgery. Wound vac must be set at 100 mmHg continuous suction.     2. LEAVE WOUND VAC IN PLACE UNTIL AFTER 1 WEEK POST OP FOLLOW UP APPOINTMENT.  Home care nurse to re-apply vac after the 1 wk follow-up appointment.     3. Apply Cavilon no-sting barrier wipe to the skin surrounding the wound to protect from drainage/maceration.     4. Apply drape to gunnar wound. Cut strip of drape and apply to skin if bridging isneeded; plan this area in advance; should not be over bony prominence.     5. Cut the foam to fit the size of the wound.     6. Apply foam over the ADAPTIC TOUCH (or similar non-adherent dressing) THAT IS STAPLED/SUTURED IN PLACE OVER the WOUND/GRAFT SITE. DO NOT REMOVE NON-ADHERENT.      7. Cut narrow strip of foam if bridging if needed.     8. Cover foam with drape to obtain air tight seal.     9.Cut opening the size of a quarter for where the suction pad will be applied.      10. Apply Suction pad.     11. 100mmHG suction continuous.     [employer] Contact [employer] can be reached at [phone], 24 hours aday 7 days a week     - If you do not have a back up plan in place:     If the negative pressure wound therapy malfunctions or unable to maintain seal: dressing must be removed and reapplied within 2 hours of the incident. If unable to reapply negative pressure wound dressing, place Normal Saline moistened gauze in wound bed and cover with appropriate dressing to keep wound bed moist.  Change wet-to-moist dressing two times aday until healthcare staff can re-implement negative pressure therapy. Change canister at least weekly.  [employer] Contact [facility] can be reached at [phone], 24 hours a day 7 days a week

## 2025-01-15 NOTE — TELEPHONE ENCOUNTER
Surgery Scheduled    Writer attmepted to reach pt via telephone, LMTCB.  Sending message via mPATH.    Surgery/Procedure: INCISION AND DRAINAGE bilateral lower extremities with application of TheraSkin graft     CPT: 49414     Equipment: TheraSkin graft, misonix     Location: [facility]:  [address] [place] [address] (phone: [phone], Fax: [phone])    Please park in Lot A. Enter through the main entrance. Check in at the Welcome Desk and you will be directed to the surgery unit.     Date: 1/23/2025    Time: 11:05 AM    Admission Type: Outpatient    Surgeon: Dr. Amol Patricio    OR Confirmed & :  Yes with Jailyn on 1/15/2025    Entered on provider calendar:  Yes    Post Op: See appt desk    Wound Vac Needed:  Yes    Home Care Needed: No    Blood Thinners Addressed:  Routing message to nursing to address meds    Stress Test Clearance: NO

## 2025-01-15 NOTE — PROGRESS NOTES
[facility]: Cancer Care Plan of Care Education Note                                    Discussion with Patient:                                                      Writer provided chemotherapy education for the patient and answered all of the patient's questions to the best of my ability. Writer explained what to expect on the first day of chemotherapy, signs and symptoms to watch out for and when to call the clinic, and educated the patient on the actual immunotherapy infusion that he will be receiving.      Goals          General     Functional (pt-stated)      Notes - Note created  1/15/2025  1:25 PM by Azul Aguilar RN     Goal Statement: I want to maintain or improve my current ability for my activities of daily living.   Date Goal set: 1/15/2025  Barriers: disease burden  Strengths: support, health awareness, and involvement with Care Team  Date to Achieve By: ongoing  Patient expressed understanding of goal:  Yes   Action steps to achieve this goal:  I will rest when needed.  I will communicate with my care team if not able to maintain ADLs.                Assessment:                                                      Assessment completed with:: Patient    Plan of Care Education   Yearly learning assessment completed?: Yes (see Education tab)  Does patient understand diagnosis?: Yes  Does patient understand treatment plan/regimen?: Yes  Preparing for treatment:: Reviewed treatment preparation information with patient (vascular access, day of chemo, visitor policy, what to bring, etc.)  Vascular access education provided for:: Port  Side effect education:: Diarrhea/Constipation, Fatigue, Immune-mediated effects, Infection, Lab value monitoring (anemia, neutropenia, thrombocytopenia), Nausea/Vomiting, Mouth sores, Mylosuppression, Skin changes  Safety/self care at home reviewed with patient:: Yes  Coping - concerns/fears reviewed with patient:: Yes  Plan of Care:: ELIO follow-up appointment, Lab  appointment, Imaging, MD follow-up appointment, Treatment schedule  When to call provider:: Bleeding, Increased shortness of breath, New/worsening pain, Shaking chills, Temperature >100.4F, Uncontrolled diarrhea/constipation, Uncontrolled nausea/vomiting  Reasons for deferring treatment reviewed with patient:: Yes  Additional education provided for: : Neutropenic precautions  Procedure education provided for: : Port/PICC placement    Evaluation of Learning  Patient Education Provided: Yes  Readiness:: Acceptance  Method:: Booklet/Handout, Explanation  Response:: Verbalizes understanding      Intervention/Education provided during outreach:                                                       Follow up call in 1-2 weeks  Patient to follow up as scheduled at next appt  Patient to call/[employer] message with updates  Confirmed patient has clinic and triage numbers    Signature:  Azul Aguilar RN

## 2025-01-15 NOTE — PROGRESS NOTES
FOOT AND ANKLE SURGERY/PODIATRY Progress Note      ASSESSMENT:   Diabetic ulceration right heel into subcutaneous tissue   Ulceration right leg into subcutaneous tissue  Acute osteomyelitis right calcaneus  Diabetic ulceration left hallux into epidermis/dermis  Diabetic ulceration left foot into epidermis/dermis        TREATMENT:  -I discussed with the patient today that the ulceration along the dorsal lateral right leg has significant amount of slough/nonviable tissue.  Ulceration right heel has improved than previous visit with healthy granular tissue.  Ulcerations along the dorsal left hallux and left midfoot have nonviable tissue.    -Most recent SONNY report indicates a left TBI of 21 mmHg.    -I have sent a message to Dr. Jc to inquire as to any additional intervention for left lower extremity.    -Based on the above, we discussed TheraSkin graft which was been approved.  We discussed application along the right leg and right heel ulcerations with use of wound VAC postoperatively.  We also discussed possible application on the left hallux and left midfoot pending input from Dr. Jc.  Postop course to include nonweightbearing on the right foot at all times.  Risk include but not limited to need for additional surgical intervention including additional skin graft application.  All questions invited and answered.  Patient consents to surgery.    -After discussion of risk factors, nursing staff removed dressing, cleansed wound and consent obtained 2% Lidocaine HCL jelly was applied, under clean conditions, the right foot and leg ulceration(s) were debrided using currette.  Devitalized and nonviable tissue, along with any fibrin and slough, was removed to improve granulation tissue formation, stimulate wound healing, decrease overall bacteria load, disrupt biofilm formation and decrease edge senescence. Wound drainage was small Serosanguinous. Total excisional debridement was 32.8 sq cm into the subcutaneous  tissue with a depth of 0.3 cm.   Ulcers were improved afterwards and .  Measures were as noted on the flow sheet.  Dry gauze applied today which we reapplied daily.    -After discussion of risk factors, nursing staff removed dressing, cleansed wound and consent obtained 2% Lidocaine HCL jelly was applied, under clean conditions, the left midfoot ulceration(s) were debrided using currette.  Devitalized and nonviable tissue, along with any fibrin and slough, was removed to improve granulation tissue formation, stimulate wound healing, decrease overall bacteria load, disrupt biofilm formation and decrease edge senescence. Wound drainage was scant Serous. Total excisional debridement was 9.9 sq cm from the epidermis/dermis area with a depth of 0.2 cm.   Ulcers were improved afterwards and .  Measures were as noted on the flow sheet.  Gauze dressing applied today to be reapplied daily.    -I will ask my office to coordinate outpatient surgery at [facility]    Amol Patricio DPM  [facility]      HPI: Janice Nowak was seen again today for bilateral lower extremity ulcerations.  Patient reports that he is try to remain nonweightbearing on his right foot and is able to maintain this at home with a wheelchair.    Past Medical History:   Diagnosis Date    Diabetes (H)     Hypertension     Sleep apnea     Thyroid disease        Past Surgical History:   Procedure Laterality Date    BACK SURGERY      BONE EXOSTOSIS EXCISION Right 10/24/2024    Procedure: PARTIAL CALCANECTOMY RIGHT FOOT;  Surgeon: Amol Patricio DPM;  Location: [facility] OR    ENDOSCOPIC ULTRASOUND UPPER GASTROINTESTINAL TRACT (GI) N/A 1/2/2025    Procedure: ENDOSCOPIC ULTRASOUND, ESOPHAGOSCOPY / UPPER GASTROINTESTINAL TRACT (GI) with Fine Needle Aspiration;  Surgeon: Kong Medina MD;  Location: [facility] OR    INCISION AND DRAINAGE FOOT, COMBINED Right 10/24/2024    Procedure: INCISION AND  DRAINAGE;  Surgeon: Amol Patricio DPM;  Location: [facility] OR    IR LOWER EXTREMITY ANGIOGRAM RIGHT  12/26/2024    IRRIGATION AND DEBRIDEMENT FOOT, COMBINED Right 10/24/2024    Procedure: AND DEBRIDEMENT RIGHT HEEL,;  Surgeon: Amol Patricio DPM;  Location: [facility] OR    ORTHOPEDIC SURGERY      PICC SINGLE LUMEN PLACEMENT  10/30/2024    VASCULAR SURGERY         Allergies   Allergen Reactions    Exenatide Unknown    Heparin Unknown    Liraglutide Unknown    Lisinopril Cough    Morphine Unknown         Current Outpatient Medications:     aspirin 81 MG EC tablet, Take 81 mg by mouth every evening., Disp: , Rfl:     atorvastatin (LIPITOR) 80 MG tablet, Take 80 mg by mouth at bedtime., Disp: , Rfl:     clopidogrel (PLAVIX) 75 MG tablet, Take 75 mg by mouth every morning., Disp: , Rfl:     co-enzyme Q-10 100 MG CAPS capsule, Take 200 mg by mouth every evening., Disp: , Rfl:     empagliflozin (JARDIANCE) 10 MG TABS tablet, Take 1 tablet (10 mg) by mouth daily., Disp: 90 tablet, Rfl: 1    insulin aspart (NOVOLOG FLEXPEN) 100 UNIT/ML pen, Inject 5 Units subcutaneously 3 times daily (with meals). Novolog Flexpen: 5 units with breakfast, 5 units with lunch, 5 units with dinner. (Patient taking differently: Inject 5 Units subcutaneously 3 times daily (with meals). Novolog Flexpen: 5 units with breakfast, 5 units with lunch, 5 units with dinner. Takes as needed with each meal.), Disp: 15 mL, Rfl: 0    insulin glargine (LANTUS PEN) 100 UNIT/ML pen, Inject 22 Units subcutaneously every morning (before breakfast). (Patient taking differently: Inject 50 Units subcutaneously every morning (before breakfast).), Disp: 15 mL, Rfl: 1    lactobacillus rhamnosus, GG, (CULTURELL) capsule, Take 1 capsule by mouth every morning., Disp: , Rfl:     levothyroxine (SYNTHROID/LEVOTHROID) 50 MCG tablet, Take 50 mcg by mouth every morning., Disp: , Rfl:     meclizine (ANTIVERT) 25 MG tablet, Take 50 mg by mouth 3 times  daily as needed (Vertigo)., Disp: , Rfl:     metFORMIN (GLUCOPHAGE XR) 500 MG 24 hr tablet, Take 1,000 mg by mouth 2 times daily (with meals)., Disp: , Rfl:     Multiple Vitamins-Minerals (PRESERVISION AREDS 2 PO), Take 2 capsules by mouth 2 times daily., Disp: , Rfl:     torsemide (DEMADEX) 10 MG tablet, Take 1 tablet (10 mg) by mouth daily., Disp: 90 tablet, Rfl: 3    vitamin C with B complex (B COMPLEX-C) tablet, Take 1 tablet by mouth every morning., Disp: , Rfl:     Review of Systems - 10 point Review of Systems is negative except for bilateral lower extremity ulcers which is noted in HPI.      OBJECTIVE:  /69   Pulse 90   Temp 97.5  F (36.4  C)   Resp 16   SpO2 100%   General appearance: Patient is alert and fully cooperative with history & exam.  No sign of distress is noted during the visit.    Vascular: Dorsalis pedis non-palpableBilateral.  Dermatologic:    Negative Pressure Wound Therapy Heel Right (Active)   Cycle Off 01/02/25 1020       Wound Leg Abrasion(s) (Active)       Wound Leg Skin tear (Active)       Wound Elbow Skin tear (Active)       Wound Leg Abrasion(s) (Active)   Wound Bed Dry scab;Scabbing;Pink;Red 01/02/25 1020   Debbie-wound Assessment Erythema;Excoriated 01/02/25 1020   Drainage Amount None 01/02/25 1020   Dressing Open to air 01/02/25 1020       VASC Wound right heel (Active)   Pre Size Length 4 01/15/25 1000   Pre Size Width 3.5 01/15/25 1000   Pre Size Depth 0.2 01/15/25 1000   Pre Total Sq cm 14 01/15/25 1000   Post Size Length 4 12/27/24 1000   Post Size Width 4 12/27/24 1000   Post Size Depth 0.4 12/27/24 1000   Post Total Sq cm 8 12/27/24 1000       VASC Wound right lateral leg (Active)   Pre Size Length 4.7 01/15/25 1000   Pre Size Width 4 01/15/25 1000   Pre Size Depth 0.3 01/15/25 1000   Pre Total Sq cm 18.8 01/15/25 1000   Post Size Length 1.2 12/04/24 1000   Post Size Width 1.8 12/04/24 1000   Post Size Depth 0.3 12/04/24 1000   Post Total Sq cm 2.16 12/04/24 1000        VASC Wound left shin (Active)   Pre Size Length 3 12/04/24 1000   Pre Size Width 2 12/04/24 1000   Pre Size Depth 0.1 12/04/24 1000   Pre Total Sq cm 6 12/04/24 1000   Post Size Length 3 12/04/24 1000   Post Size Width 2 12/04/24 1000   Post Size Depth 0.1 12/04/24 1000   Post Total Sq cm 6 12/04/24 1000   Description scattered/scab 01/15/25 1000       VASC Wound left knee (Active)   Description eschar 01/15/25 1000       VASC Wound Left great toe (Active)   Pre Size Length 1.5 01/15/25 1000   Pre Size Width 1.3 01/15/25 1000   Pre Size Depth 0.1 01/15/25 1000   Pre Total Sq cm 1.95 01/15/25 1000       VASC Wound Left great toe med (Active)   Pre Size Length 0.4 12/27/24 1000   Pre Size Width 0.4 12/27/24 1000   Pre Size Depth 0.1 12/27/24 1000   Pre Total Sq cm 0.16 12/27/24 1000       VASC Wound left dorsal foot (Active)   Pre Size Length 3 01/15/25 1000   Pre Size Width 3.3 01/15/25 1000   Pre Size Depth 0.2 01/15/25 1000   Pre Total Sq cm 9.9 01/15/25 1000       Incision/Surgical Site with Packing 10/24/24 Right Heel Qty Placed: 1 (Active)   Incision Assessment UTV 01/02/25 1230   Dressing Foam 01/02/25 1020   Significant amount of slough/nonviable tissue along ulceration anterior lateral right leg.  Granular base right heel ulceration.  Nonviable tissue along dorsal left hallux and dorsal left midfoot ulceration.  No erythema bilateral lower extremities.  Neurologic: Diminished to light touch Bilateral.  Musculoskeletal: Contracted digits noted Bilateral.      Picture:

## 2025-01-16 ENCOUNTER — HOSPITAL ENCOUNTER (OUTPATIENT)
Dept: INTERVENTIONAL RADIOLOGY/VASCULAR | Facility: HOSPITAL | Age: 80
Discharge: HOME | End: 2025-01-16
Attending: RADIOLOGY
Payer: COMMERCIAL

## 2025-01-16 ENCOUNTER — HOSPITAL ENCOUNTER (OUTPATIENT)
Facility: HOSPITAL | Age: 80
Setting detail: OBSERVATION
Discharge: HOME | End: 2025-01-17
Attending: EMERGENCY MEDICINE | Admitting: EMERGENCY MEDICINE
Payer: COMMERCIAL

## 2025-01-16 ENCOUNTER — HOSPITAL ENCOUNTER (OUTPATIENT)
Dept: INTERVENTIONAL RADIOLOGY/VASCULAR | Facility: HOSPITAL | Age: 80
Discharge: HOME | End: 2025-01-16
Attending: INTERNAL MEDICINE
Payer: COMMERCIAL

## 2025-01-16 ENCOUNTER — PATIENT OUTREACH (OUTPATIENT)
Dept: ONCOLOGY | Facility: HOSPITAL | Age: 80
End: 2025-01-16

## 2025-01-16 ENCOUNTER — APPOINTMENT (OUTPATIENT)
Dept: CT IMAGING | Facility: HOSPITAL | Age: 80
End: 2025-01-16
Attending: EMERGENCY MEDICINE
Payer: COMMERCIAL

## 2025-01-16 ENCOUNTER — APPOINTMENT (OUTPATIENT)
Dept: RADIOLOGY | Facility: HOSPITAL | Age: 80
End: 2025-01-16
Attending: EMERGENCY MEDICINE
Payer: COMMERCIAL

## 2025-01-16 VITALS
DIASTOLIC BLOOD PRESSURE: 64 MMHG | RESPIRATION RATE: 15 BRPM | SYSTOLIC BLOOD PRESSURE: 137 MMHG | TEMPERATURE: 98.3 F | OXYGEN SATURATION: 95 % | HEART RATE: 72 BPM

## 2025-01-16 VITALS
TEMPERATURE: 97.3 F | RESPIRATION RATE: 15 BRPM | OXYGEN SATURATION: 99 % | DIASTOLIC BLOOD PRESSURE: 81 MMHG | HEART RATE: 72 BPM | SYSTOLIC BLOOD PRESSURE: 175 MMHG

## 2025-01-16 DIAGNOSIS — L97.911 ULCER OF RIGHT LEG, LIMITED TO BREAKDOWN OF SKIN (H): Primary | ICD-10-CM

## 2025-01-16 DIAGNOSIS — C22.0 HEPATOCELLULAR CARCINOMA (H): ICD-10-CM

## 2025-01-16 DIAGNOSIS — I70.244 ATHEROSCLEROSIS OF NATIVE ARTERIES OF LEFT LEG WITH ULCERATION OF HEEL AND MIDFOOT (H): ICD-10-CM

## 2025-01-16 DIAGNOSIS — E11.621 DIABETIC ULCER OF RIGHT HEEL ASSOCIATED WITH TYPE 2 DIABETES MELLITUS, WITH NECROSIS OF BONE (H): ICD-10-CM

## 2025-01-16 DIAGNOSIS — E11.621 DIABETIC ULCER OF TOE OF LEFT FOOT ASSOCIATED WITH TYPE 2 DIABETES MELLITUS, LIMITED TO BREAKDOWN OF SKIN (H): ICD-10-CM

## 2025-01-16 DIAGNOSIS — L97.414 DIABETIC ULCER OF RIGHT HEEL ASSOCIATED WITH TYPE 2 DIABETES MELLITUS, WITH NECROSIS OF BONE (H): ICD-10-CM

## 2025-01-16 DIAGNOSIS — R55 SYNCOPE, UNSPECIFIED SYNCOPE TYPE: ICD-10-CM

## 2025-01-16 DIAGNOSIS — R79.89 ELEVATED TROPONIN: ICD-10-CM

## 2025-01-16 DIAGNOSIS — D58.2 ABNORMAL HEMOGLOBIN (HGB): ICD-10-CM

## 2025-01-16 DIAGNOSIS — L97.521 DIABETIC ULCER OF TOE OF LEFT FOOT ASSOCIATED WITH TYPE 2 DIABETES MELLITUS, LIMITED TO BREAKDOWN OF SKIN (H): ICD-10-CM

## 2025-01-16 LAB
ABO + RH BLD: NORMAL
ACT BLD: 264 SECONDS (ref 74–150)
ACT BLD: 479 SECONDS (ref 74–150)
ALBUMIN SERPL BCG-MCNC: 3.3 G/DL (ref 3.5–5.2)
ALP SERPL-CCNC: 92 U/L (ref 40–150)
ALT SERPL W P-5'-P-CCNC: 35 U/L (ref 0–70)
ANION GAP SERPL CALCULATED.3IONS-SCNC: 10 MMOL/L (ref 7–15)
ANION GAP SERPL CALCULATED.3IONS-SCNC: 13 MMOL/L (ref 7–15)
APTT PPP: 46 SECONDS (ref 22–38)
AST SERPL W P-5'-P-CCNC: 36 U/L (ref 0–45)
BASOPHILS # BLD AUTO: 0 10E3/UL (ref 0–0.2)
BASOPHILS NFR BLD AUTO: 0 %
BILIRUB SERPL-MCNC: 0.4 MG/DL
BLD GP AB SCN SERPL QL: NEGATIVE
BUN SERPL-MCNC: 41.5 MG/DL (ref 8–23)
BUN SERPL-MCNC: 46.1 MG/DL (ref 8–23)
CALCIUM SERPL-MCNC: 9.1 MG/DL (ref 8.8–10.4)
CALCIUM SERPL-MCNC: 9.9 MG/DL (ref 8.8–10.4)
CHLORIDE SERPL-SCNC: 94 MMOL/L (ref 98–107)
CHLORIDE SERPL-SCNC: 99 MMOL/L (ref 98–107)
CREAT SERPL-MCNC: 1 MG/DL (ref 0.67–1.17)
CREAT SERPL-MCNC: 1.14 MG/DL (ref 0.67–1.17)
EGFRCR SERPLBLD CKD-EPI 2021: 66 ML/MIN/1.73M2
EGFRCR SERPLBLD CKD-EPI 2021: 77 ML/MIN/1.73M2
EOSINOPHIL # BLD AUTO: 0.1 10E3/UL (ref 0–0.7)
EOSINOPHIL NFR BLD AUTO: 1 %
ERYTHROCYTE [DISTWIDTH] IN BLOOD BY AUTOMATED COUNT: 15.5 % (ref 10–15)
ERYTHROCYTE [DISTWIDTH] IN BLOOD BY AUTOMATED COUNT: 15.7 % (ref 10–15)
GLUCOSE BLDC GLUCOMTR-MCNC: 102 MG/DL (ref 70–99)
GLUCOSE BLDC GLUCOMTR-MCNC: 112 MG/DL (ref 70–99)
GLUCOSE BLDC GLUCOMTR-MCNC: 87 MG/DL (ref 70–99)
GLUCOSE SERPL-MCNC: 102 MG/DL (ref 70–99)
GLUCOSE SERPL-MCNC: 134 MG/DL (ref 70–99)
HCO3 SERPL-SCNC: 28 MMOL/L (ref 22–29)
HCO3 SERPL-SCNC: 33 MMOL/L (ref 22–29)
HCT VFR BLD AUTO: 33.3 % (ref 40–53)
HCT VFR BLD AUTO: 37 % (ref 40–53)
HGB BLD-MCNC: 10.7 G/DL (ref 13.3–17.7)
HGB BLD-MCNC: 12.1 G/DL (ref 13.3–17.7)
HOLD SPECIMEN: NORMAL
IMM GRANULOCYTES # BLD: 0.2 10E3/UL
IMM GRANULOCYTES NFR BLD: 2 %
INR PPP: 1.16 (ref 0.85–1.15)
INR PPP: 2.04 (ref 0.85–1.15)
LYMPHOCYTES # BLD AUTO: 1.3 10E3/UL (ref 0.8–5.3)
LYMPHOCYTES NFR BLD AUTO: 11 %
MCH RBC QN AUTO: 27.1 PG (ref 26.5–33)
MCH RBC QN AUTO: 27.5 PG (ref 26.5–33)
MCHC RBC AUTO-ENTMCNC: 32.1 G/DL (ref 31.5–36.5)
MCHC RBC AUTO-ENTMCNC: 32.7 G/DL (ref 31.5–36.5)
MCV RBC AUTO: 84 FL (ref 78–100)
MCV RBC AUTO: 84 FL (ref 78–100)
MONOCYTES # BLD AUTO: 0.8 10E3/UL (ref 0–1.3)
MONOCYTES NFR BLD AUTO: 7 %
NEUTROPHILS # BLD AUTO: 9.4 10E3/UL (ref 1.6–8.3)
NEUTROPHILS NFR BLD AUTO: 80 %
NRBC # BLD AUTO: 0 10E3/UL
NRBC BLD AUTO-RTO: 0 /100
PLATELET # BLD AUTO: 187 10E3/UL (ref 150–450)
PLATELET # BLD AUTO: 206 10E3/UL (ref 150–450)
POTASSIUM SERPL-SCNC: 3.1 MMOL/L (ref 3.4–5.3)
POTASSIUM SERPL-SCNC: 3.5 MMOL/L (ref 3.4–5.3)
PROT SERPL-MCNC: 7.6 G/DL (ref 6.4–8.3)
RBC # BLD AUTO: 3.95 10E6/UL (ref 4.4–5.9)
RBC # BLD AUTO: 4.4 10E6/UL (ref 4.4–5.9)
SODIUM SERPL-SCNC: 137 MMOL/L (ref 135–145)
SODIUM SERPL-SCNC: 140 MMOL/L (ref 135–145)
SPECIMEN EXP DATE BLD: NORMAL
TROPONIN T SERPL HS-MCNC: 103 NG/L
TROPONIN T SERPL HS-MCNC: 97 NG/L
WBC # BLD AUTO: 11.7 10E3/UL (ref 4–11)
WBC # BLD AUTO: 11.8 10E3/UL (ref 4–11)

## 2025-01-16 PROCEDURE — 85025 COMPLETE CBC W/AUTO DIFF WBC: CPT | Performed by: NURSE PRACTITIONER

## 2025-01-16 PROCEDURE — 272N000566 HC SHEATH CR3

## 2025-01-16 PROCEDURE — 99285 EMERGENCY DEPT VISIT HI MDM: CPT | Mod: 25

## 2025-01-16 PROCEDURE — 96360 HYDRATION IV INFUSION INIT: CPT | Mod: 59

## 2025-01-16 PROCEDURE — 93005 ELECTROCARDIOGRAM TRACING: CPT | Performed by: EMERGENCY MEDICINE

## 2025-01-16 PROCEDURE — C1788 PORT, INDWELLING, IMP: HCPCS

## 2025-01-16 PROCEDURE — 82962 GLUCOSE BLOOD TEST: CPT

## 2025-01-16 PROCEDURE — 272N000500 HC NEEDLE CR2

## 2025-01-16 PROCEDURE — 85610 PROTHROMBIN TIME: CPT | Performed by: NURSE PRACTITIONER

## 2025-01-16 PROCEDURE — 36415 COLL VENOUS BLD VENIPUNCTURE: CPT | Performed by: EMERGENCY MEDICINE

## 2025-01-16 PROCEDURE — 84155 ASSAY OF PROTEIN SERUM: CPT | Performed by: RADIOLOGY

## 2025-01-16 PROCEDURE — 250N000011 HC RX IP 250 OP 636: Mod: JZ | Performed by: RADIOLOGY

## 2025-01-16 PROCEDURE — 71250 CT THORAX DX C-: CPT

## 2025-01-16 PROCEDURE — 250N000011 HC RX IP 250 OP 636: Performed by: NURSE PRACTITIONER

## 2025-01-16 PROCEDURE — 99152 MOD SED SAME PHYS/QHP 5/>YRS: CPT

## 2025-01-16 PROCEDURE — 86850 RBC ANTIBODY SCREEN: CPT | Performed by: RADIOLOGY

## 2025-01-16 PROCEDURE — 99153 MOD SED SAME PHYS/QHP EA: CPT

## 2025-01-16 PROCEDURE — 75710 ARTERY X-RAYS ARM/LEG: CPT | Mod: LT

## 2025-01-16 PROCEDURE — 85610 PROTHROMBIN TIME: CPT | Performed by: EMERGENCY MEDICINE

## 2025-01-16 PROCEDURE — 255N000002 HC RX 255 OP 636: Performed by: RADIOLOGY

## 2025-01-16 PROCEDURE — 272N000116 HC CATH CR1

## 2025-01-16 PROCEDURE — C1769 GUIDE WIRE: HCPCS

## 2025-01-16 PROCEDURE — 84132 ASSAY OF SERUM POTASSIUM: CPT | Performed by: RADIOLOGY

## 2025-01-16 PROCEDURE — 86900 BLOOD TYPING SEROLOGIC ABO: CPT | Performed by: RADIOLOGY

## 2025-01-16 PROCEDURE — 80048 BASIC METABOLIC PNL TOTAL CA: CPT | Performed by: NURSE PRACTITIONER

## 2025-01-16 PROCEDURE — 272N000123 HC CATH CR9

## 2025-01-16 PROCEDURE — 36415 COLL VENOUS BLD VENIPUNCTURE: CPT | Performed by: NURSE PRACTITIONER

## 2025-01-16 PROCEDURE — 272N000302 HC DEVICE INFLATION CR5

## 2025-01-16 PROCEDURE — 250N000009 HC RX 250: Performed by: RADIOLOGY

## 2025-01-16 PROCEDURE — C1760 CLOSURE DEV, VASC: HCPCS

## 2025-01-16 PROCEDURE — 36591 DRAW BLOOD OFF VENOUS DEVICE: CPT | Performed by: RADIOLOGY

## 2025-01-16 PROCEDURE — 85027 COMPLETE CBC AUTOMATED: CPT | Performed by: RADIOLOGY

## 2025-01-16 PROCEDURE — 250N000013 HC RX MED GY IP 250 OP 250 PS 637: Performed by: RADIOLOGY

## 2025-01-16 PROCEDURE — 85730 THROMBOPLASTIN TIME PARTIAL: CPT | Performed by: EMERGENCY MEDICINE

## 2025-01-16 PROCEDURE — 84484 ASSAY OF TROPONIN QUANT: CPT | Performed by: EMERGENCY MEDICINE

## 2025-01-16 PROCEDURE — 85347 COAGULATION TIME ACTIVATED: CPT

## 2025-01-16 PROCEDURE — 258N000003 HC RX IP 258 OP 636: Performed by: EMERGENCY MEDICINE

## 2025-01-16 PROCEDURE — 85018 HEMOGLOBIN: CPT | Performed by: NURSE PRACTITIONER

## 2025-01-16 PROCEDURE — G0378 HOSPITAL OBSERVATION PER HR: HCPCS

## 2025-01-16 PROCEDURE — 71046 X-RAY EXAM CHEST 2 VIEWS: CPT

## 2025-01-16 RX ORDER — METHYLPREDNISOLONE SODIUM SUCCINATE 40 MG/ML
40 INJECTION INTRAMUSCULAR; INTRAVENOUS
Start: 2025-01-20

## 2025-01-16 RX ORDER — CEFAZOLIN SODIUM/WATER 2 G/20 ML
2 SYRINGE (ML) INTRAVENOUS
Status: COMPLETED | OUTPATIENT
Start: 2025-01-16 | End: 2025-01-16

## 2025-01-16 RX ORDER — ONDANSETRON 2 MG/ML
4 INJECTION INTRAMUSCULAR; INTRAVENOUS
Status: ACTIVE | OUTPATIENT
Start: 2025-01-16

## 2025-01-16 RX ORDER — EPINEPHRINE 1 MG/ML
0.3 INJECTION, SOLUTION INTRAMUSCULAR; SUBCUTANEOUS EVERY 5 MIN PRN
OUTPATIENT
Start: 2025-01-20

## 2025-01-16 RX ORDER — ACETAMINOPHEN 325 MG/1
975 TABLET ORAL ONCE
Status: COMPLETED | OUTPATIENT
Start: 2025-01-16 | End: 2025-01-16

## 2025-01-16 RX ORDER — DIPHENHYDRAMINE HYDROCHLORIDE 50 MG/ML
50 INJECTION INTRAMUSCULAR; INTRAVENOUS
Start: 2025-01-20

## 2025-01-16 RX ORDER — NALOXONE HYDROCHLORIDE 0.4 MG/ML
0.2 INJECTION, SOLUTION INTRAMUSCULAR; INTRAVENOUS; SUBCUTANEOUS
Status: ACTIVE | OUTPATIENT
Start: 2025-01-16

## 2025-01-16 RX ORDER — AMOXICILLIN 250 MG
1 CAPSULE ORAL 2 TIMES DAILY PRN
Status: DISCONTINUED | OUTPATIENT
Start: 2025-01-16 | End: 2025-01-17 | Stop reason: HOSPADM

## 2025-01-16 RX ORDER — ALBUTEROL SULFATE 0.83 MG/ML
2.5 SOLUTION RESPIRATORY (INHALATION)
OUTPATIENT
Start: 2025-01-20

## 2025-01-16 RX ORDER — INSULIN GLARGINE 100 [IU]/ML
50 INJECTION, SOLUTION SUBCUTANEOUS EVERY MORNING
COMMUNITY

## 2025-01-16 RX ORDER — CLOPIDOGREL BISULFATE 75 MG/1
300 TABLET ORAL ONCE
Status: DISCONTINUED | OUTPATIENT
Start: 2025-01-16 | End: 2025-01-16

## 2025-01-16 RX ORDER — ALBUTEROL SULFATE 90 UG/1
1-2 INHALANT RESPIRATORY (INHALATION)
Start: 2025-01-20

## 2025-01-16 RX ORDER — DIPHENHYDRAMINE HYDROCHLORIDE 50 MG/ML
25 INJECTION INTRAMUSCULAR; INTRAVENOUS
Start: 2025-01-20

## 2025-01-16 RX ORDER — ARGATROBAN 1 MG/ML
350 INJECTION, SOLUTION INTRAVENOUS ONCE
Status: COMPLETED | OUTPATIENT
Start: 2025-01-16 | End: 2025-01-16

## 2025-01-16 RX ORDER — ROSUVASTATIN CALCIUM 40 MG/1
40 TABLET, COATED ORAL DAILY
COMMUNITY

## 2025-01-16 RX ORDER — ACETAMINOPHEN 325 MG/1
650 TABLET ORAL EVERY 4 HOURS PRN
Status: DISCONTINUED | OUTPATIENT
Start: 2025-01-16 | End: 2025-01-17 | Stop reason: HOSPADM

## 2025-01-16 RX ORDER — IODIXANOL 320 MG/ML
200 INJECTION, SOLUTION INTRAVASCULAR ONCE
Status: COMPLETED | OUTPATIENT
Start: 2025-01-16 | End: 2025-01-16

## 2025-01-16 RX ORDER — FLUMAZENIL 0.1 MG/ML
0.2 INJECTION, SOLUTION INTRAVENOUS
Status: ACTIVE | OUTPATIENT
Start: 2025-01-16

## 2025-01-16 RX ORDER — NALOXONE HYDROCHLORIDE 0.4 MG/ML
0.4 INJECTION, SOLUTION INTRAMUSCULAR; INTRAVENOUS; SUBCUTANEOUS
Status: ACTIVE | OUTPATIENT
Start: 2025-01-16

## 2025-01-16 RX ORDER — AMOXICILLIN 250 MG
2 CAPSULE ORAL 2 TIMES DAILY PRN
Status: DISCONTINUED | OUTPATIENT
Start: 2025-01-16 | End: 2025-01-17 | Stop reason: HOSPADM

## 2025-01-16 RX ORDER — LIDOCAINE 40 MG/G
CREAM TOPICAL
Status: DISCONTINUED | OUTPATIENT
Start: 2025-01-16 | End: 2025-01-17 | Stop reason: HOSPADM

## 2025-01-16 RX ORDER — ACETAMINOPHEN 650 MG/1
650 SUPPOSITORY RECTAL EVERY 4 HOURS PRN
Status: DISCONTINUED | OUTPATIENT
Start: 2025-01-16 | End: 2025-01-17 | Stop reason: HOSPADM

## 2025-01-16 RX ORDER — CALCIUM CARBONATE 500 MG/1
1000 TABLET, CHEWABLE ORAL 4 TIMES DAILY PRN
Status: DISCONTINUED | OUTPATIENT
Start: 2025-01-16 | End: 2025-01-17 | Stop reason: HOSPADM

## 2025-01-16 RX ORDER — FENTANYL CITRATE 50 UG/ML
25-50 INJECTION, SOLUTION INTRAMUSCULAR; INTRAVENOUS EVERY 5 MIN PRN
Status: ACTIVE | OUTPATIENT
Start: 2025-01-16

## 2025-01-16 RX ORDER — LIDOCAINE 40 MG/G
CREAM TOPICAL
Status: ACTIVE | OUTPATIENT
Start: 2025-01-16

## 2025-01-16 RX ORDER — MEPERIDINE HYDROCHLORIDE 25 MG/ML
25 INJECTION INTRAMUSCULAR; INTRAVENOUS; SUBCUTANEOUS
OUTPATIENT
Start: 2025-01-20

## 2025-01-16 RX ADMIN — FENTANYL CITRATE 25 MCG: 50 INJECTION, SOLUTION INTRAMUSCULAR; INTRAVENOUS at 13:00

## 2025-01-16 RX ADMIN — SODIUM CHLORIDE 500 ML: 9 INJECTION, SOLUTION INTRAVENOUS at 17:54

## 2025-01-16 RX ADMIN — FENTANYL CITRATE 25 MCG: 50 INJECTION, SOLUTION INTRAMUSCULAR; INTRAVENOUS at 12:44

## 2025-01-16 RX ADMIN — MIDAZOLAM HYDROCHLORIDE 0.5 MG: 1 INJECTION, SOLUTION INTRAMUSCULAR; INTRAVENOUS at 12:41

## 2025-01-16 RX ADMIN — ACETAMINOPHEN 975 MG: 325 TABLET ORAL at 16:43

## 2025-01-16 RX ADMIN — ARGATROBAN 25 MCG/KG/MIN: 50 INJECTION INTRAVENOUS at 13:55

## 2025-01-16 RX ADMIN — FENTANYL CITRATE 25 MCG: 50 INJECTION, SOLUTION INTRAMUSCULAR; INTRAVENOUS at 13:58

## 2025-01-16 RX ADMIN — FENTANYL CITRATE 25 MCG: 50 INJECTION, SOLUTION INTRAMUSCULAR; INTRAVENOUS at 12:22

## 2025-01-16 RX ADMIN — MIDAZOLAM HYDROCHLORIDE 0.5 MG: 1 INJECTION, SOLUTION INTRAMUSCULAR; INTRAVENOUS at 12:55

## 2025-01-16 RX ADMIN — MIDAZOLAM HYDROCHLORIDE 0.5 MG: 1 INJECTION, SOLUTION INTRAMUSCULAR; INTRAVENOUS at 12:18

## 2025-01-16 RX ADMIN — MIDAZOLAM HYDROCHLORIDE 0.5 MG: 1 INJECTION, SOLUTION INTRAMUSCULAR; INTRAVENOUS at 13:06

## 2025-01-16 RX ADMIN — Medication 2 G: at 12:06

## 2025-01-16 RX ADMIN — FENTANYL CITRATE 25 MCG: 50 INJECTION, SOLUTION INTRAMUSCULAR; INTRAVENOUS at 14:15

## 2025-01-16 RX ADMIN — FENTANYL CITRATE 25 MCG: 50 INJECTION, SOLUTION INTRAMUSCULAR; INTRAVENOUS at 13:22

## 2025-01-16 RX ADMIN — FENTANYL CITRATE 25 MCG: 50 INJECTION, SOLUTION INTRAMUSCULAR; INTRAVENOUS at 14:21

## 2025-01-16 RX ADMIN — IODIXANOL 40 ML: 320 INJECTION, SOLUTION INTRAVASCULAR at 14:44

## 2025-01-16 RX ADMIN — FENTANYL CITRATE 25 MCG: 50 INJECTION, SOLUTION INTRAMUSCULAR; INTRAVENOUS at 12:36

## 2025-01-16 RX ADMIN — ARGATROBAN 30170 MCG: 1 INJECTION, SOLUTION INTRAVENOUS at 13:56

## 2025-01-16 RX ADMIN — MIDAZOLAM HYDROCHLORIDE 0.5 MG: 1 INJECTION, SOLUTION INTRAMUSCULAR; INTRAVENOUS at 14:03

## 2025-01-16 RX ADMIN — MIDAZOLAM HYDROCHLORIDE 0.5 MG: 1 INJECTION, SOLUTION INTRAMUSCULAR; INTRAVENOUS at 12:24

## 2025-01-16 RX ADMIN — ANTICOAGULANT CITRATE DEXTROSE SOLUTION FORMULA A 5 ML: 12.25; 11; 3.65 SOLUTION INTRAVENOUS at 12:28

## 2025-01-16 RX ADMIN — IOHEXOL 20 ML: 350 INJECTION, SOLUTION INTRAVENOUS at 14:44

## 2025-01-16 ASSESSMENT — COLUMBIA-SUICIDE SEVERITY RATING SCALE - C-SSRS
1. IN THE PAST MONTH, HAVE YOU WISHED YOU WERE DEAD OR WISHED YOU COULD GO TO SLEEP AND NOT WAKE UP?: NO
2. HAVE YOU ACTUALLY HAD ANY THOUGHTS OF KILLING YOURSELF IN THE PAST MONTH?: NO
6. HAVE YOU EVER DONE ANYTHING, STARTED TO DO ANYTHING, OR PREPARED TO DO ANYTHING TO END YOUR LIFE?: NO

## 2025-01-16 ASSESSMENT — ACTIVITIES OF DAILY LIVING (ADL)
ADLS_ACUITY_SCORE: 58

## 2025-01-16 NOTE — ED NOTES
Report from MALI Coombs from IR.  Assessed right chest port, bruised, Open to air.  Assessed Bilateral angiogram thigh pokes, bleeding controlled, tender to touch, soft on palpation.

## 2025-01-16 NOTE — ED NOTES
Expected Patient Referral to ED  5:14 PM    Referring Clinic/Provider:  Interventional Radiology    Reason for referral/Clinical facts:  80y/o male with chronic wounds and metastatic hepatocellular cancer.  Had port placed today.  Hgb dropped 2 points after the procedure.  Radiology is performing CT before sending to ER.      Recommendations provided:  Send to ED for further evaluation    Caller was informed that this institution does possess the capabilities and/or resources to provide for patient and should be transferred to our facility.    Discussed that if direct admit is sought and any hurdles are encountered, this ED would be happy to see the patient and evaluate.    Informed caller that recommendations provided are recommendations based only on the facts provided and that they responsible to accept or reject the advice, or to seek a formal in person consultation as needed and that this ED will see/treat patient should they arrive.      JESSENIA VALDEZ DO  [facility] EMERGENCY DEPARTMENT  [address] [place] [id number]  [phone]       Jessenia Valdez DO  01/16/25 [id number]

## 2025-01-16 NOTE — PROGRESS NOTES
[facility]: Cancer Care                                                                                          Writer called [facility] because triage got a voice message from [facility] questioning a hospice referral that they received via fax from the cancer clinic from Dr. Gleason. Intake staff member from [facility] stated that they believe the hospice referral may have been sent in error. Writer stated that on our end, it does not show that a hospice referral was placed by Dr. Gleason and the patient does not need hospice services at this time. Intake staff from [facility] verbalized understanding and made note of that on the referral.     Signature:  Azul Aguilar RN

## 2025-01-16 NOTE — ED TRIAGE NOTES
Patient brought from IR, had right Port placement for recent cancer diagnosis, not sure what kind yet, in early stages, also had LLE occlusion with no pulse and had Angiogram today for multiple wounds on BLE. Has LLE pulses now after kissing stents.  When trying to move him while recovering he had syncopal episode.  No injuries.  Patient is DM and uses insulin, sugars were 80 in IR, gave orange juice, in triage 102.  He has sensor.  Hypertensive throughout procedure per IR, son is at bedside, patient is independent and A & O.       Triage Assessment (Adult)       Row Name 01/16/25 2086          Triage Assessment    Airway WDL WDL        Respiratory WDL    Respiratory WDL WDL        Skin Circulation/Temperature WDL    Skin Circulation/Temperature WDL WDL        Cardiac WDL    Cardiac WDL WDL        Peripheral/Neurovascular WDL    Peripheral Neurovascular WDL WDL        Cognitive/Neuro/Behavioral WDL    Cognitive/Neuro/Behavioral WDL WDL        [place] Coma Scale    Best Eye Response 3-->(E3) to speech     Best Motor Response 6-->(M6) obeys commands     Best Verbal Response 5-->(V5) oriented     [place] Coma Scale Score 14

## 2025-01-16 NOTE — ED PROVIDER NOTES
EMERGENCY DEPARTMENT ENCOUNTER      NAME: Janice Nowak  AGE: 79 year old male  YOB: 1945  MRN: [id number]  EVALUATION DATE & TIME: 2025  5:24 PM    PCP: Jeannette April    ED PROVIDER: Kong Valdez D.O.      Chief Complaint   Patient presents with    Syncope       FINAL IMPRESSION:  1. Syncope, unspecified syncope type    2. Elevated troponin    3. Abnormal hemoglobin (Hgb)        ED COURSE & MEDICAL DECISION MAKIN:31 PM I met with the patient to gather history and to perform my initial exam. I discussed the plan for care while in the Emergency Department.  9:41 PM I spoke with Dr. Benz from Cardiology. No need for anticoagulation, trend troponin's. They do not believe for any intervention regarding troponin's.   9:56 PM Spoke with Dr. Salomon from [facility]. Made plans for admission.          Pertinent Labs & Imaging studies reviewed. (See chart for details)  79 year old male presents to the Emergency Department for evaluation of syncopal episode while at interventional radiology today.  The interventional radiologist did report that it occurred unprovoked while sitting.  They did note that he had a drop in his hemoglobin from earlier in the day, therefore they did decide to perform CT imaging abdomen.  There is no evidence of retroperitoneal hemorrhage, or active GI bleed that they could see on CT imaging and they sent him to the emergency department.  In the emergency department EKG does not show any evidence of ischemia arrhythmia.  2 troponins were performed, first 1 was 97 with a second 1 at 103.  Therefore I consulted with cardiology, who did not believe that the patient required anticoagulation and just recommended trending troponins.  Additionally with this hemoglobin drop that we had from earlier today, the patient and family both reporting no black or bloody stools, we will require trending of his hemoglobin to determine if this continues to decline.  Patient is  otherwise stable at this time and no obvious evidence of infection.  We had no chest pain or shortness of breath to suggest PE.  CT scan was performed due to odd findings on chest x-ray however it does appear that these are consistent with his known malignancy.  Therefore we will admit to the resident service for further management.    Medical Decision Making  Obtained supplemental history:Supplemental history obtained?: Other: Family Member  Reviewed external records: External records reviewed?: Prior Imaging: CT abdomen/pelvis, IR lower extremity angiogram on 1/16/2025  Care impacted by chronic illness:Cancer/Chemotherapy, Diabetes, Hyperlipidemia, and Hypertension  Did you consider but not order tests?: Work up considered but not performed and documented in chart, if applicable  Did you interpret images independently?: Independent interpretation of ECG and images noted in documentation, when applicable.  Consultation discussion with other provider:Did you involve another provider (consultant, , pharmacy, etc.)?: I discussed the care with another health care provider, see documentation for details.  Admit.    MIPS: Not Applicable        At the conclusion of the encounter I discussed the results of all of the tests and the disposition. The questions were answered. The patient or family acknowledged understanding and was agreeable with the care plan.      HPI    Patient information was obtained from: Family & Patient     Use of : N/A        Janice Nowak is a 79 year old male who presents for syncope.     The patient had his port placed and angiogram performed today. While at the IR clinic, he was getting up from his chair, when he had a syncopal episode and his hemoglobin dropped. Family reports that he has had a dry cough for awhile.     Patient reports that he feels weak. Reports no fevers or vomiting. No black or blood stool or pain.     Per Chart Review IR visit 1/16/2025: Imaging CT  abdomen/pelvis revealed no retroperitoneal hemorrhage, and III-defined hypodense mass in the right hepatic lobe with bulky upper right retroperitoneal mass. 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  Labs CBC revealed hemoglobin dropped from 12.1 at 10:44 AM to 10.7 at 4:00 pm. CMP glucose dropped from 134 to 102. WBC at 11.8.     PAST MEDICAL HISTORY:  Past Medical History:   Diagnosis Date    Diabetes (H)     Hypertension     Sleep apnea     Thyroid disease        PAST SURGICAL HISTORY:  Past Surgical History:   Procedure Laterality Date    BACK SURGERY      BONE EXOSTOSIS EXCISION Right 10/24/2024    Procedure: PARTIAL CALCANECTOMY RIGHT FOOT;  Surgeon: Amol Patricio DPM;  Location: [facility] OR    ENDOSCOPIC ULTRASOUND UPPER GASTROINTESTINAL TRACT (GI) N/A 1/2/2025    Procedure: ENDOSCOPIC ULTRASOUND, ESOPHAGOSCOPY / UPPER GASTROINTESTINAL TRACT (GI) with Fine Needle Aspiration;  Surgeon: Kong Medina MD;  Location: [facility] OR    INCISION AND DRAINAGE FOOT, COMBINED Right 10/24/2024    Procedure: INCISION AND DRAINAGE;  Surgeon: Amol Patricio DPM;  Location: [facility] OR    IR LOWER EXTREMITY ANGIOGRAM RIGHT  12/26/2024    IRRIGATION AND DEBRIDEMENT FOOT, COMBINED Right 10/24/2024    Procedure: AND DEBRIDEMENT RIGHT HEEL,;  Surgeon: Amol Patricio DPM;  Location: [facility] OR    ORTHOPEDIC SURGERY      PICC SINGLE LUMEN PLACEMENT  10/30/2024    VASCULAR SURGERY           CURRENT MEDICATIONS:    No current facility-administered medications for this encounter.     Current Outpatient Medications   Medication Sig Dispense Refill    aspirin 81 MG EC tablet Take 81 mg by mouth every evening.      clopidogrel (PLAVIX) 75 MG tablet Take 75 mg by mouth every morning.      empagliflozin (JARDIANCE) 10 MG TABS tablet Take 1 tablet (10 mg) by mouth daily. 90 tablet 1    insulin aspart (NOVOLOG  PEN) 100 UNIT/ML pen Inject 5 Units subcutaneously 3 times daily as needed for high blood sugar (With Meals). Novolog Flexpen: 5 units with breakfast, 5 units with lunch, 5 units with dinner. Takes as needed with each meal.      insulin glargine (LANTUS VIAL) 100 UNIT/ML vial Inject 50 Units subcutaneously every morning.      levothyroxine (SYNTHROID/LEVOTHROID) 50 MCG tablet Take 50 mcg by mouth every morning.      meclizine (ANTIVERT) 25 MG tablet Take 50 mg by mouth 3 times daily as needed (Vertigo).      metFORMIN (GLUCOPHAGE XR) 500 MG 24 hr tablet Take 1,000 mg by mouth 2 times daily (with meals).      rosuvastatin (CRESTOR) 40 MG tablet Take 40 mg by mouth daily.      torsemide (DEMADEX) 10 MG tablet Take 1 tablet (10 mg) by mouth daily. 90 tablet 3    co-enzyme Q-10 100 MG CAPS capsule Take 200 mg by mouth every evening.      lactobacillus rhamnosus, GG, (CULTURELL) capsule Take 1 capsule by mouth every morning.      Multiple Vitamins-Minerals (PRESERVISION AREDS 2 PO) Take 2 capsules by mouth 2 times daily.      vitamin C with B complex (B COMPLEX-C) tablet Take 1 tablet by mouth every morning.       Facility-Administered Medications Ordered in Other Encounters   Medication Dose Route Frequency Provider Last Rate Last Admin    anticoagulant citrate flush 5-10 mL  5-10 mL Intracatheter Q1H PRN Silvestre Jc MD        anticoagulant citrate flush 5-10 mL  5-10 mL Intracatheter Q24H PRN Silvestre Jc MD        anticoagulant citrate flush 5-10 mL  5-10 mL Intracatheter Q28 Days Silvestre Jc MD   5 mL at 01/16/25 1228    argatroban (ACOVA) 1 mg/mL ANTICOAGULANT infusion  25 mcg/kg/min Intravenous Continuous Silvestre Jc MD   Stopped at 01/16/25 1405    argatroban (ACOVA) 50 mg in 1000 mL NS (IR TABLE SOLUTION)  50 mg TABLE SOLN Q5 Min PRN Aria Mccallum APRN CNP        fentaNYL (PF) (SUBLIMAZE) injection 25-50 mcg  25-50 mcg Intravenous Q5 Min PRN Aria Mccallum APRN  CNP   25 mcg at 01/16/25 1421    flumazenil (ROMAZICON) injection 0.2 mg  0.2 mg Intravenous q1 min prn Aria Mccallum APRN CNP        lidocaine (LMX4) cream   Topical Q1H PRN Aria Mccallum APRN CNP        lidocaine (LMX4) cream   Topical Q1H PRN Aria Mccallum APRN CNP        lidocaine 1 % 0.1-1 mL  0.1-1 mL Other Q1H PRN Aria Mccallum APRN CNP        lidocaine 1 % 0.1-1 mL  0.1-1 mL Other Q1H PRN Aria Mccallum APRN CNP        midazolam (VERSED) injection 0.5-2 mg  0.5-2 mg Intravenous Q4 Min PRN Aria Mccallum APRN CNP   0.5 mg at 01/16/25 1403    naloxone (NARCAN) injection 0.2 mg  0.2 mg Intravenous Q2 Min PRN Aria Mccallum APRN CNP        Or    naloxone (NARCAN) injection 0.4 mg  0.4 mg Intravenous Q2 Min PRN Aria Mccallum APRN CNP        Or    naloxone (NARCAN) injection 0.2 mg  0.2 mg Intramuscular Q2 Min PRN Aria Mccallum APRN CNP        Or    naloxone (NARCAN) injection 0.4 mg  0.4 mg Intramuscular Q2 Min PRN Aria Mccallum APRN CNP        ondansetron (ZOFRAN) injection 4 mg  4 mg Intravenous Once PRN Aria Mccallum APRN CNP        sodium chloride (PF) 0.9% PF flush 10-20 mL  10-20 mL Intracatheter q1 min prn Silvestre Jc MD        sodium chloride (PF) 0.9% PF flush 10-20 mL  10-20 mL Intracatheter Q1H PRN Silvestre Jc MD        sodium chloride (PF) 0.9% PF flush 10-20 mL  10-20 mL Intracatheter Q28 Days Silvestre Jc MD        sodium chloride (PF) 0.9% PF flush 3 mL  3 mL Intracatheter Q8H Aria Mccallum APRN CNP        sodium chloride (PF) 0.9% PF flush 3 mL  3 mL Intracatheter q1 min prn Aria Mccallum APRN CNP        sodium chloride (PF) 0.9% PF flush 3 mL  3 mL Intracatheter Q8H Aria Mccallum APRN CNP        sodium chloride (PF) 0.9% PF flush 3 mL  3 mL Intracatheter q1 min prn  Aria Mccallum, APRN CNP             ALLERGIES:  Allergies   Allergen Reactions    Exenatide Unknown    Heparin Unknown    Liraglutide Unknown    Lisinopril Cough    Morphine Unknown       FAMILY HISTORY:  Family History   Problem Relation Age of Onset    Anesthesia Reaction No family hx of     Thrombosis No family hx of        SOCIAL HISTORY:  Social History     Socioeconomic History    Marital status:    Tobacco Use    Smoking status: Former     Current packs/day: 0.00     Types: Cigarettes     Quit date:      Years since quittin.0     Passive exposure: Never    Smokeless tobacco: Never   Vaping Use    Vaping status: Never Used   Substance and Sexual Activity    Alcohol use: Not Currently     Alcohol/week: 5.0 standard drinks of alcohol     Types: 5 Cans of beer per week    Drug use: Not Currently     Social Drivers of Health     Financial Resource Strain: Low Risk  (2024)    Financial Resource Strain     Within the past 12 months, have you or your family members you live with been unable to get utilities (heat, electricity) when it was really needed?: No   Food Insecurity: Low Risk  (2024)    Food Insecurity     Within the past 12 months, did you worry that your food would run out before you got money to buy more?: No     Within the past 12 months, did the food you bought just not last and you didn t have money to get more?: No   Transportation Needs: Low Risk  (2024)    Transportation Needs     Within the past 12 months, has lack of transportation kept you from medical appointments, getting your medicines, non-medical meetings or appointments, work, or from getting things that you need?: No   Interpersonal Safety: Low Risk  (2025)    Interpersonal Safety     Do you feel physically and emotionally safe where you currently live?: Yes     Within the past 12 months, have you been hit, slapped, kicked or otherwise physically hurt by someone?: No     Within the past  12 months, have you been humiliated or emotionally abused in other ways by your partner or ex-partner?: No   Housing Stability: Low Risk  (12/14/2024)    Housing Stability     Do you have housing? : Yes     Are you worried about losing your housing?: No   Recent Concern: Housing Stability - High Risk (10/28/2024)    Housing Stability     Do you have housing? : No     Are you worried about losing your housing?: No       VITALS:  Patient Vitals for the past 24 hrs:   BP Temp Temp src Pulse Resp SpO2 Weight   01/16/25 1900 134/62 -- -- 78 -- 97 % --   01/16/25 1837 (!) 145/65 -- -- 75 16 97 % --   01/16/25 1800 (!) 146/62 -- -- 75 14 98 % --   01/16/25 1726 (!) 182/83 97.8  F (36.6  C) Oral 75 16 96 % 86.2 kg (190 lb)       PHYSICAL EXAM    VITAL SIGNS: /62   Pulse 78   Temp 97.8  F (36.6  C) (Oral)   Resp 16   Wt 86.2 kg (190 lb)   SpO2 97%   BMI 28.89 kg/m      General Appearance: Tired-appearing, no acute distress   Head:  Normocephalic, without obvious abnormality, atraumatic  Eyes:  PERRL, conjunctiva/corneas clear, EOM's intact,  ENT:  Lips, mucosa, and tongue normal, membranes are dry without pallor  Neck:  Normal ROM, symmetrical, trachea midline    Cardio:  Regular rate and rhythm, no murmur, rub or gallop, 2+ pulses symmetric in all extremities  Pulm:  Clear to auscultation bilaterally, respirations unlabored,  Abdomen:  Soft, non-tender, no rebound or guarding.  Musculoskeletal: Full ROM, no edema, no cyanosis, good ROM of major joints  Integument:  Warm, Dry, No erythema, No rash.    Neurologic:  Alert & oriented.  No focal deficits appreciated.    Psychiatric:  Affect normal, Judgment normal, Mood normal.      LABS  Results for orders placed or performed during the hospital encounter of 01/16/25 (from the past 24 hours)   Troponin T, High Sensitivity   Result Value Ref Range    Troponin T, High Sensitivity 97 (H) <=22 ng/L   INR   Result Value Ref Range    INR 2.04 (H) 0.85 - 1.15   PTT    Result Value Ref Range    aPTT 46 (H) 22 - 38 Seconds   Chest XR,  PA & LAT    Narrative    EXAM: XR CHEST 2 VIEWS  LOCATION: [facility]  DATE: 1/16/2025    INDICATION: Syncope.  COMPARISON: 7/22/2014; CT chest 12/11/2014.      Impression    IMPRESSION: No acute airspace consolidation. Scattered nodular opacities of the right hemithorax, likely pleural/fissural based, when correlation is made to previous CT imaging. Possible cavitary nodular opacity at the left suprahilar region,   incompletely imaged and evaluated. Dedicated chest CT imaging is recommended.    No pleural effusion or pneumothorax.    Upper limits of normal heart size. Atherosclerotic calcifications of the thoracoabdominal aorta.    Right chest wall port catheter has tip overlying the distal SVC.   Chest CT w/o contrast    Narrative    EXAM: CT CHEST W/O CONTRAST  LOCATION: [facility]  DATE: 1/16/2025    INDICATION: Possible cavitary lesion on chest x-ray.  COMPARISON: Chest radiograph performed earlier today. Chest CT 12/11/2024.  TECHNIQUE: CT chest without IV contrast. Multiplanar reformats were obtained. Dose reduction techniques were used.  CONTRAST: None.    FINDINGS:   LUNGS AND PLEURA: Moderate upper lung predominant emphysematous changes in both lungs. Cavitary nodule in the right middle lobe abutting the right major and minor fissures measures 3.3 x 1.6 cm. This finding was obscured by consolidation on the previous   exam. Subpleural 0.4 cm nodule in the left lower lobe (series 4 image 209), also obscured by consolidation on the previous exam. Small right upper lobe calcified granuloma. Mild fibrotic changes about the periphery of both lung bases. No pleural   effusions.    MEDIASTINUM/AXILLAE: Right Port-A-Cath, with tip in the upper right atrium. No pericardial effusion. No enlarged lymph nodes in the chest. Severe atherosclerotic aortoiliac calcification.    CORONARY ARTERY  CALCIFICATION: Severe.    UPPER ABDOMEN: Cholecystectomy. Bulky adenopathy in the periportal region is partially included on this exam, but appears similar to previous. The spleen is partially included on this study, but again appears mildly enlarged. Nodularity of the liver   contour suggests underlying cirrhosis. Previously described right hepatic lobe mass is not well defined on this noncontrast exam.    MUSCULOSKELETAL: Degenerative changes in the thoracic spine.      Impression    IMPRESSION:   1.  Cavitary nodule in the right middle lobe abutting the right major and minor fissures measures 3.3 x 1.6 cm. This finding was obscured by consolidation on the previous exam. This finding is suspicious for malignancy.  2.  Subpleural 0.4 cm nodule in the left lower lobe, also obscured on the previous exam.  3.  Moderate upper lung predominant emphysematous changes in both lungs.  4.  Bulky adenopathy in the periportal region of the upper abdomen is partially included on this exam, but appears similar to previous, and is again suspicious for malignancy.   Troponin T, High Sensitivity   Result Value Ref Range    Troponin T, High Sensitivity 103 (HH) <=22 ng/L         RADIOLOGY  Chest CT w/o contrast   Final Result   IMPRESSION:    1.  Cavitary nodule in the right middle lobe abutting the right major and minor fissures measures 3.3 x 1.6 cm. This finding was obscured by consolidation on the previous exam. This finding is suspicious for malignancy.   2.  Subpleural 0.4 cm nodule in the left lower lobe, also obscured on the previous exam.   3.  Moderate upper lung predominant emphysematous changes in both lungs.   4.  Bulky adenopathy in the periportal region of the upper abdomen is partially included on this exam, but appears similar to previous, and is again suspicious for malignancy.      Chest XR,  PA & LAT   Final Result   IMPRESSION: No acute airspace consolidation. Scattered nodular opacities of the right hemithorax,  likely pleural/fissural based, when correlation is made to previous CT imaging. Possible cavitary nodular opacity at the left suprahilar region,    incompletely imaged and evaluated. Dedicated chest CT imaging is recommended.      No pleural effusion or pneumothorax.      Upper limits of normal heart size. Atherosclerotic calcifications of the thoracoabdominal aorta.      Right chest wall port catheter has tip overlying the distal SVC.             EKG:    Rate: 70 bpm  Rhythm: Normal Sinus Rhythm  Axis: Normal  Interval: Normal  Conduction: RBBB  QRS: Wide  ST: Normal  T-wave: Normal  QT: Not prolonged  Comparison EKG: No significant change when compared 11 December 2024  Impression:  No acute ischemic change   I have independently reviewed and interpreted today's EKG, pending Cardiologist read      MEDICATIONS GIVEN IN THE EMERGENCY:  Medications   sodium chloride 0.9% BOLUS 500 mL (0 mLs Intravenous Stopped 1/16/25 1901)       NEW PRESCRIPTIONS STARTED AT TODAY'S ER VISIT  New Prescriptions    No medications on file        I, Elisabet Walker, am serving as a scribe to document services personally performed by Kong Valdez D.O., based on my observations and the provider's statements to me.  I, Kong Valdez D.O., attest that Elisabet Walker is acting in a scribe capacity, has observed my performance of the services and has documented them in accordance with my direction.     Kong Valdez D.O.  Emergency Medicine  [facility] EMERGENCY DEPARTMENT  [address] [place] [id number]  [phone]  Dept: [phone]       Kong Valdez,   01/16/25 2112

## 2025-01-16 NOTE — PROGRESS NOTES
Patient Name: Janice Nowak  Medical Record Number: [id number]  Today's Date: 1/16/2025    Procedure: Port placement/ Left leg Angio  Proceduralist: Dr. Jc      Sedation medications administered: 3 mg midazolam and 200 mcg fentanyl   Sedation time: 135 minutes        Other Notes: Pt arrived to IR room 1 from Pre holding room. Consent reviewed. Pt denies any questions or concerns regarding procedure. Pt positioned Supine and monitored per protocol. Right sided port procedure completed followed by Left leg angio. Pt tolerated procedure without any noted complications. VSS and documented on flowsheet. Pt moved to post procedure room.  Family at bedside and updated on procedure and plan of care.

## 2025-01-16 NOTE — PROGRESS NOTES
Dr. Jc at bedside.  Pt indicating pain in right groin, shivering.  VSS.  No change in groin site.  New orders received.

## 2025-01-16 NOTE — PRE-PROCEDURE
GENERAL PRE-PROCEDURE:   Procedure:  Port placement, LLE angiogram with moderate sedation  Date/Time:  1/16/2025 11:07 AM    Written consent obtained?: Yes    Risks and benefits: Risks, benefits and alternatives were discussed    Consent given by:  Patient  Patient states understanding of procedure being performed: Yes    Patient's understanding of procedure matches consent: Yes    Procedure consent matches procedure scheduled: Yes    Expected level of sedation:  Moderate  Appropriately NPO:  Yes  ASA Class:  3  Mallampati  :  Grade 2- soft palate, base of uvula, tonsillar pillars, and portion of posterior pharyngeal wall visible  Lungs:  Lungs clear with good breath sounds bilaterally  Heart:  Normal heart sounds and rate  History & Physical reviewed:  History and physical reviewed and no updates needed  Statement of review:  I have reviewed the lab findings, diagnostic data, medications, and the plan for sedation

## 2025-01-16 NOTE — IR NOTE
Pt up to chair for last 30 min. Check. Pt stated getting a bit light headed. B/P read 114/90. Pt eyes open but did not respond. Pt assisted back to bed. Pt woke up able to state where he was and why he was here. Rechecked vitals and notified MD. Pt transferred to CT then Emergency room.     While assisting pt back to bed pt obtained skin tears on right forearm along with left knee scab opened up. Dressings applied to areas.    Report given to Emergency RN.

## 2025-01-16 NOTE — PROCEDURES
[facility]    Procedure: IR Procedure Note    Date/Time: 1/16/2025 3:13 PM    Performed by: Silvestre Jc MD  Authorized by: Silvestre Jc MD  IR Fellow Physician:    Pre Procedure Diagnosis: CLTI  Post Procedure Diagnosis: CLTI    UNIVERSAL PROTOCOL   Site Marked: NA  Prior Images Obtained and Reviewed:  Yes  Required items: Required blood products, implants, devices and special equipment available    Patient identity confirmed:  Arm band, provided demographic data, hospital-assigned identification number and verbally with patient  Patient was reevaluated immediately before administering moderate or deep sedation or anesthesia  Confirmation Checklist:  Correct equipment/implants were available, procedure was appropriate and matched the consent or emergent situation, relevant allergies and patient's identity using two indicators  Time out: Immediately prior to the procedure a time out was called    Universal Protocol: the Joint Commission Universal Protocol was followed    Preparation: Patient was prepped and draped in usual sterile fashion       ANESTHESIA    Anesthesia:  Local infiltration  Local Anesthetic:  Lidocaine 1% without epinephrine      SEDATION    Patient Sedated: No    See dictated procedure note for full details.  Findings: Successful recanalization of the occluded left WENDY/EIA, placement of B/L kissing iliac stents. S/P right internal jugular port placement    Specimens: none    Procedural Complications: None    Condition: Stable      PROCEDURE    Patient Tolerance:  Patient tolerated the procedure well with no immediate complications  Length of time physician/provider present for 1:1 monitoring during sedation:  128-141 min

## 2025-01-17 VITALS
TEMPERATURE: 98.7 F | RESPIRATION RATE: 19 BRPM | DIASTOLIC BLOOD PRESSURE: 74 MMHG | SYSTOLIC BLOOD PRESSURE: 155 MMHG | HEART RATE: 85 BPM | WEIGHT: 190 LBS | OXYGEN SATURATION: 100 % | BODY MASS INDEX: 28.89 KG/M2

## 2025-01-17 LAB
ANION GAP SERPL CALCULATED.3IONS-SCNC: 9 MMOL/L (ref 7–15)
BUN SERPL-MCNC: 32.2 MG/DL (ref 8–23)
CALCIUM SERPL-MCNC: 9 MG/DL (ref 8.8–10.4)
CHLORIDE SERPL-SCNC: 100 MMOL/L (ref 98–107)
CREAT SERPL-MCNC: 0.91 MG/DL (ref 0.67–1.17)
EGFRCR SERPLBLD CKD-EPI 2021: 86 ML/MIN/1.73M2
ERYTHROCYTE [DISTWIDTH] IN BLOOD BY AUTOMATED COUNT: 15.7 % (ref 10–15)
FLUAV RNA SPEC QL NAA+PROBE: NEGATIVE
FLUBV RNA RESP QL NAA+PROBE: NEGATIVE
GLUCOSE BLDC GLUCOMTR-MCNC: 118 MG/DL (ref 70–99)
GLUCOSE BLDC GLUCOMTR-MCNC: 150 MG/DL (ref 70–99)
GLUCOSE SERPL-MCNC: 108 MG/DL (ref 70–99)
HCO3 SERPL-SCNC: 29 MMOL/L (ref 22–29)
HCT VFR BLD AUTO: 32.7 % (ref 40–53)
HGB BLD-MCNC: 10.7 G/DL (ref 13.3–17.7)
LACTATE SERPL-SCNC: 1.1 MMOL/L (ref 0.7–2)
MCH RBC QN AUTO: 27.4 PG (ref 26.5–33)
MCHC RBC AUTO-ENTMCNC: 32.7 G/DL (ref 31.5–36.5)
MCV RBC AUTO: 84 FL (ref 78–100)
NT-PROBNP SERPL-MCNC: 3027 PG/ML (ref 0–1800)
PLATELET # BLD AUTO: 143 10E3/UL (ref 150–450)
POTASSIUM SERPL-SCNC: 3.4 MMOL/L (ref 3.4–5.3)
POTASSIUM SERPL-SCNC: 3.4 MMOL/L (ref 3.4–5.3)
RBC # BLD AUTO: 3.91 10E6/UL (ref 4.4–5.9)
RSV RNA SPEC NAA+PROBE: NEGATIVE
SARS-COV-2 RNA RESP QL NAA+PROBE: NEGATIVE
SODIUM SERPL-SCNC: 138 MMOL/L (ref 135–145)
TROPONIN T SERPL HS-MCNC: 108 NG/L
TROPONIN T SERPL HS-MCNC: 108 NG/L
WBC # BLD AUTO: 9.1 10E3/UL (ref 4–11)

## 2025-01-17 PROCEDURE — 87637 SARSCOV2&INF A&B&RSV AMP PRB: CPT

## 2025-01-17 PROCEDURE — 85041 AUTOMATED RBC COUNT: CPT

## 2025-01-17 PROCEDURE — 83605 ASSAY OF LACTIC ACID: CPT

## 2025-01-17 PROCEDURE — 82435 ASSAY OF BLOOD CHLORIDE: CPT

## 2025-01-17 PROCEDURE — 250N000013 HC RX MED GY IP 250 OP 250 PS 637: Performed by: FAMILY MEDICINE

## 2025-01-17 PROCEDURE — 99222 1ST HOSP IP/OBS MODERATE 55: CPT | Performed by: INTERNAL MEDICINE

## 2025-01-17 PROCEDURE — 80048 BASIC METABOLIC PNL TOTAL CA: CPT | Performed by: FAMILY MEDICINE

## 2025-01-17 PROCEDURE — 84484 ASSAY OF TROPONIN QUANT: CPT

## 2025-01-17 PROCEDURE — 82962 GLUCOSE BLOOD TEST: CPT

## 2025-01-17 PROCEDURE — 250N000013 HC RX MED GY IP 250 OP 250 PS 637

## 2025-01-17 PROCEDURE — 85014 HEMATOCRIT: CPT

## 2025-01-17 PROCEDURE — 83880 ASSAY OF NATRIURETIC PEPTIDE: CPT | Performed by: INTERNAL MEDICINE

## 2025-01-17 PROCEDURE — 250N000012 HC RX MED GY IP 250 OP 636 PS 637

## 2025-01-17 PROCEDURE — 36415 COLL VENOUS BLD VENIPUNCTURE: CPT

## 2025-01-17 PROCEDURE — 82947 ASSAY GLUCOSE BLOOD QUANT: CPT

## 2025-01-17 PROCEDURE — G0378 HOSPITAL OBSERVATION PER HR: HCPCS

## 2025-01-17 RX ORDER — LACTOBACILLUS RHAMNOSUS GG 10B CELL
1 CAPSULE ORAL EVERY MORNING
Status: DISCONTINUED | OUTPATIENT
Start: 2025-01-17 | End: 2025-01-17 | Stop reason: HOSPADM

## 2025-01-17 RX ORDER — MULTIVITAMIN WITH IRON
1 TABLET ORAL EVERY MORNING
Status: DISCONTINUED | OUTPATIENT
Start: 2025-01-17 | End: 2025-01-17 | Stop reason: HOSPADM

## 2025-01-17 RX ORDER — CLOPIDOGREL BISULFATE 75 MG/1
75 TABLET ORAL EVERY MORNING
Status: DISCONTINUED | OUTPATIENT
Start: 2025-01-17 | End: 2025-01-17 | Stop reason: HOSPADM

## 2025-01-17 RX ORDER — ASPIRIN 81 MG/1
81 TABLET ORAL EVERY EVENING
Status: DISCONTINUED | OUTPATIENT
Start: 2025-01-17 | End: 2025-01-17 | Stop reason: HOSPADM

## 2025-01-17 RX ORDER — ROSUVASTATIN CALCIUM 40 MG/1
40 TABLET, COATED ORAL DAILY
Status: DISCONTINUED | OUTPATIENT
Start: 2025-01-17 | End: 2025-01-17 | Stop reason: HOSPADM

## 2025-01-17 RX ORDER — DEXTROSE MONOHYDRATE 25 G/50ML
25-50 INJECTION, SOLUTION INTRAVENOUS
Status: DISCONTINUED | OUTPATIENT
Start: 2025-01-17 | End: 2025-01-17 | Stop reason: HOSPADM

## 2025-01-17 RX ORDER — METFORMIN HYDROCHLORIDE 500 MG/1
1000 TABLET, EXTENDED RELEASE ORAL 2 TIMES DAILY WITH MEALS
Status: DISCONTINUED | OUTPATIENT
Start: 2025-01-17 | End: 2025-01-17 | Stop reason: HOSPADM

## 2025-01-17 RX ORDER — LEVOTHYROXINE SODIUM 25 UG/1
50 TABLET ORAL
Status: DISCONTINUED | OUTPATIENT
Start: 2025-01-17 | End: 2025-01-17 | Stop reason: HOSPADM

## 2025-01-17 RX ORDER — MECLIZINE HCL 12.5 MG 12.5 MG/1
50 TABLET ORAL 3 TIMES DAILY PRN
Status: DISCONTINUED | OUTPATIENT
Start: 2025-01-17 | End: 2025-01-17 | Stop reason: HOSPADM

## 2025-01-17 RX ORDER — NICOTINE POLACRILEX 4 MG
15-30 LOZENGE BUCCAL
Status: DISCONTINUED | OUTPATIENT
Start: 2025-01-17 | End: 2025-01-17 | Stop reason: HOSPADM

## 2025-01-17 RX ORDER — VIT C/E/ZN/COPPR/LUTEIN/ZEAXAN 60 MG-6 MG
1 CAPSULE ORAL DAILY
Status: DISCONTINUED | OUTPATIENT
Start: 2025-01-17 | End: 2025-01-17 | Stop reason: HOSPADM

## 2025-01-17 RX ORDER — TORSEMIDE 5 MG/1
10 TABLET ORAL DAILY
Status: DISCONTINUED | OUTPATIENT
Start: 2025-01-17 | End: 2025-01-17 | Stop reason: HOSPADM

## 2025-01-17 RX ORDER — POTASSIUM CHLORIDE 1500 MG/1
40 TABLET, EXTENDED RELEASE ORAL ONCE
Status: COMPLETED | OUTPATIENT
Start: 2025-01-17 | End: 2025-01-17

## 2025-01-17 RX ADMIN — LEVOTHYROXINE SODIUM 50 MCG: 0.03 TABLET ORAL at 07:06

## 2025-01-17 RX ADMIN — POTASSIUM CHLORIDE 40 MEQ: 1500 TABLET, EXTENDED RELEASE ORAL at 03:09

## 2025-01-17 RX ADMIN — INSULIN GLARGINE 28 UNITS: 100 INJECTION, SOLUTION SUBCUTANEOUS at 09:06

## 2025-01-17 RX ADMIN — TORSEMIDE 10 MG: 5 TABLET ORAL at 09:05

## 2025-01-17 RX ADMIN — ROSUVASTATIN 40 MG: 40 TABLET, FILM COATED ORAL at 09:05

## 2025-01-17 ASSESSMENT — ACTIVITIES OF DAILY LIVING (ADL)
ADLS_ACUITY_SCORE: 61
ADLS_ACUITY_SCORE: 58
ADLS_ACUITY_SCORE: 61
DEPENDENT_IADLS:: CLEANING;SHOPPING;TRANSPORTATION
ADLS_ACUITY_SCORE: 58
ADLS_ACUITY_SCORE: 61

## 2025-01-17 NOTE — DISCHARGE INSTRUCTIONS
Port Placement Procedure Discharge Instructions:  You had a port placed. A port is a small medical device that is placed under the skin and is connected to a vein with a catheter (thin, flexible tube). Ports can be used to administer IV medications (including chemotherapy), fluids or blood products or for blood lab draws. Please follow the below instructions after your procedure:    Care Instructions:  - If you received sedation for your procedure, do not drive or operate heavy machinery for the rest of the day.  - You may shower beginning tomorrow (post procedure day #1). Do not scrub site until well healed, pat dry gently with a towel.  - You likely have skin adhesive over your port site. Skin adhesive works like a bandage to keep the site covered and protected. Do not use antibiotic ointment or creams/lotions over adhesive as it can break it down. The skin adhesive will peel off on its own (typically in 5-14 days).  - Avoid submerging the port site under water (ex: tub baths, lakes, hot tubs and pools) for 10 days or until your site is well healed.  - You may have some discomfort, minimal swelling, redness and/or bruising at your port site/procedure site. You may take over the counter pain medication for discomfort (follow the package directions) or apply an ice pack wrapped in a towel over the site (rotating 20 minutes with ice pack on and 20 minutes with ice pack off) for comfort as needed. It can take several days for these to resolve.  - Avoid heavy lifting (greater than 10 pounds) and strenuous activities for 2-3 days following your procedure.  - If you experience significant bleeding at site, apply pressure with hands above the clavicle bone, sit upright and seek immediate medical assistance.  - Ports need to be flushed approximately every 4 weeks, if not being used more frequently. Follow up with the provider who ordered your port placement for further instructions for this.    If you experience the  following seek medical evaluation:  - Uncontrolled bleeding from port site  - Fever (greater than 100 )  - Purulent (yellow/green/foul smelling) drainage from port insertion site  - Increasing pain at port site  - Increasing redness at port site  - Increasing swelling    INTERVENTIONAL RADIOLOGY DEPARTMENT  Procedure Physician: Dr Menon      Date of procedure: 1/16/2025  Telephone Numbers:  [phone]    Monday-Friday 7:30 am to 4:00 pm  [phone] After 4:00 pm Monday-Friday, Weekends & Holidays.   Ask for the Interventional Radiologist on call.  Someone is on call 24 hrs/day  [facility] toll free number: [phone] Monday-Friday 8:00 am to 4:30 pm  [facility] Emergency Dept: [phone]    IF YOU ARE EXPERIENCING A MEDICAL EMERGENCY PLEASE CALL 911       Angiogram Discharge Instructions:  You had an angiogram procedure. An angiogram is a procedure that uses x-rays to take pictures of your blood vessels. A long, flexible tube or catheter is inserted through the blood stream (through the procedure site) to help deliver contrast (dye) into the arteries so they can be visible on the x-ray. Angiograms are used to evaluate possible blockages in the arterial system. Please follow the below instructions after your angiogram, including monitoring of your procedure site.    Care instructions after angiogram procedure:  -  If you received sedation for your procedure, do not drive or operate heavy machinery for the rest of the day.  -  Do not lift objects greater than 10 pounds for 3 days following angiogram procedure.  -  Avoid excessive exercise and straining for 3 days.   -  Avoid tub baths, pools, hot tubs and Jacuzzis for 3 days or until procedure site is well healed.   -  You may shower beginning tomorrow. Do not scrub procedure site until well healed; pat dry.  -  Return to your normal activities as you tolerate after the 3 day restriction.  -  You can expect to return to work 1-3 days after your procedure - depending on  the nature of your profession.  -  It is normal to have some tenderness and minimal swelling at procedure puncture site. A small area of discoloration may be present. Tenderness typically subsides in 1-2 days. A small knot may also be present at puncture site for 6-8 weeks. This can be a normal part of the healing process.     Follow up:  - Follow up with your vascular surgeon or the ordering provider. [facility] may contact you to help arrange for additional follow up.    Please seek medical evaluation for:  - If you develop fevers (greater than 101 F (38.3C)).  - If you develop increasing pain, redness, purulent drainage, tenderness, or swelling at procedure site.   - If you experience any bleeding from procedure/puncture site: lie down, firmly apply pressure to puncture site and call 911.  - Seek emergent evaluation if you experience any new leg/arm pain, discoloration or numbness.

## 2025-01-17 NOTE — ED NOTES
Thigh poke sites are covered with bandaids, soft to palpation with no obvious signs of bleeding. Pt denies pain with palpation.

## 2025-01-17 NOTE — PLAN OF CARE
Goal Outcome Evaluation:      Plan of Care Reviewed With: patient, child          Outcome Evaluation: Likely home at discharge and [facility] will continue to follow him.

## 2025-01-17 NOTE — ED NOTES
Pt attempted to stand at bedside with assist of ED tech. Pt reports feeling weak and was assisted back to bed. Pt used an urinal in the bed. 700ml straw yellow urine out.

## 2025-01-17 NOTE — DISCHARGE SUMMARY
"[facility]  Discharge Summary - Medicine & Pediatrics       Date of Admission:  1/16/2025  Date of Discharge:  1/17/2025  Discharging Provider: Dr. Tariq, Dr. 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    Weight as of this encounter: 86.2 kg (190 lb).       Follow-ups Needed After Discharge   Follow-up Appointments       Follow Up      Follow up with cardiology, within 5-7 days. for hospital follow- up.  The following labs/tests are recommended: consider stress test outpatient.        Hospital Follow-up with Existing Primary Care Provider (PCP)      Please see details below         Schedule Primary Care visit within: 7 Days               Unresulted Labs Ordered in the Past 30 Days of this Admission       No orders found for last 31 day(s).        These results will be followed up by Jeannette, April    Discharge Disposition   Discharged to home  Condition at discharge: Stable    Hospital Course   Janice Nowak was admitted on 1/16/2025 for a syncopal event after port placement and BLE angiogram.  The following problems were addressed during his hospitalization:    Syncopal Event   Elevated troponin  Occurred following IR port placement procedure on day of admission. Patient denies any chest pain, palpitations, or shortness of breath associated. Initial concern for active bleed associated given drop in hemoglobin in IR office (12.2 > 10.7), however the patient does not have any evidence of current active bleeds and denies any melena, " hematochezia, hematuria. Most likely orthostatic as patient was lying down for several hours after his procedures and got up too quickly. Alternative diagnoses: arrhythmia, orthostatic hypotension (has had low appetite), PE (Wells score of 1 2/2 malignancy, low risk). ACS (among other differential diagnoses) considered, workup showing troponin with mild elevation but EKG without ischemic changes. Cardiology consulted, patient declining workup while admitted.  - cardiology consult, appreciate recs   - declining inpatient angiogram or stress testing, strongly recommend outpatient   - follow-up within 1 week with cardiology to set up  - encourage PO intake     Hepatobiliary Cancer   Concern for Metastasis to Lungs   Relatively new diagnosis, began with incidental liver mass noted in 11/2024. Underwent IR guided biopsy on 1/2/25 which confirmed hepatobiliary carcinoma. Met with oncology on 1/10/25 who discussed recommendation for palliative immunotherapy. Was having port placed on day of admission, which resulted in syncopal event. CT chest on admission show nodules and bulky adenopathy concerning for malignancy.   - first immunotherapy treatment scheduled Monday 1/20  - follow up with oncology on outpatient basis      Normocytic Anemia   No evidence of active bleed. Appears stable with labs one month prior. Suspect anemia of chronic disease in the setting of known malignancy, but will continue to monitor for any evidence of bleeding.   - follow-up outpatient     Leukocytosis - resolved  Mild white count to 11.8 on admission. Patient afebrile. No URI symptoms or GI symptoms. No sick contacts. Normalized by discharge, likely reactive in setting of syncopal event   - follow-up outpatient     Hypokalemia - resolved  Likely in the setting of poor PO intake.   - potassium protocol      Type II Diabetes   Home regimen: 50 units lantus qAM + 5 units novolog with meals + metformin 1000 BID + Jardiance 10 mg.   - Lantus 28 units  qAM   - medium resistance sliding scale      PAD   Chronic Leg Ulcers   Osteomyelitis s/p right calcanectomy   Hospitalizations October-November 2024 for this concern. Underwent surgical intervention for osteo. Lower extremity angiograms being pursued on outpatient basis. Appears LLE angio also done with IR on day of admission with stent pacement. Also continues to follow with Dr Patricio   - restart PTA meds     HFpEF   Relatively new diagnosis made during 12/2024 hospitalization. Was associated with transudative pleural effusion s/po thoracentesis (negative cytology). Appears euvolemic today. No pleural effusion called out on chest imaging on this admission.   - restart PTA meds     Hypothyroidism   - continue PTA levothyroxine      Goals of Care   Patient electing code status DNR/DNI. He is adamant on night of admission that he would like to limit his time in the hospital. Especially in the context of known stage IV cancer, continue goals of care conversation. Patient is decisional at this time.   - DNR/DNI     Consultations This Hospital Stay   CARDIOLOGY IP CONSULT  CARE MANAGEMENT / SOCIAL WORK IP CONSULT    Code Status   No CPR- Do NOT Intubate       The patient was discussed with Dr. Duglas Tariq MD  [facility] EMERGENCY DEPARTMENT  [facility] [facility] [address]  Phone: [phone]  ______________________________________________________________________    Physical Exam   Vital Signs: Temp: 98.6  F (37  C) Temp src: Oral BP: (!) 155/74 Pulse: 86   Resp: 20 SpO2: 95 % O2 Device: None (Room air)    Weight: 190 lbs 0 oz    General: Alert, no acute distress  Skin: clean, dry, and intact. Port wall site appears clean, dry, and intact without obvious erythema or edema.  HEENT: normocephalic, atraumatic, spontaneous eye movement, no injection or icterus, ears and nose normal, no neck masses  Resp: normal work of breathing, no wheezing  Cardio: RRR, S1 and S2  present.  Abdomen: nondistended, no masses  Extremities: no erythema, no edema  Psych: normal mood, normal affect        Primary Care Physician   Anayeli Machado    Discharge Orders      Reason for your hospital stay    You were hospitalized for your episode of syncope (passing out). You declined cardiac workup while admitted but are now ok to return home.     Activity    Your activity upon discharge: activity as tolerated     Follow Up    Follow up with cardiology, within 5-7 days. for hospital follow- up.  The following labs/tests are recommended: consider stress test outpatient.     Diet    Follow this diet upon discharge: Current Diet:Orders Placed This Encounter      Snacks/Supplements Adult: Ensure Enlive; Between Meals      Combination Diet Regular Diet Adult     Hospital Follow-up with Existing Primary Care Provider (PCP)    Please see details below            Significant Results and Procedures   Most Recent 3 CBC's:  Recent Labs   Lab Test 01/17/25  0720 01/16/25  1601 01/16/25  1044   WBC 9.1 11.8* 11.7*   HGB 10.7* 10.7* 12.1*   MCV 84 84 84   * 187 206     Most Recent 3 BMP's:  Recent Labs   Lab Test 01/17/25  0801 01/17/25  0720 01/17/25  0220 01/17/25  0058 01/16/25  1656 01/16/25  1601 01/16/25  1453 01/16/25  1044   NA  --  138  --   --   --  140  --  137   POTASSIUM  --  3.4 3.4  --   --  3.1*  --  3.5   CHLORIDE  --  100  --   --   --  99  --  94*   CO2  --  29  --   --   --  28  --  33*   BUN  --  32.2*  --   --   --  41.5*  --  46.1*   CR  --  0.91  --   --   --  1.00  --  1.14   ANIONGAP  --  9  --   --   --  13  --  10   AYAN  --  9.0  --   --   --  9.1  --  9.9   * 108*  --  150*   < > 102*   < > 134*    < > = values in this interval not displayed.     Most Recent 2 LFT's:  Recent Labs   Lab Test 01/16/25  1601 12/15/24  0445   AST 36 32   ALT 35 26   ALKPHOS 92 78   BILITOTAL 0.4 0.6     Most Recent 3 INR's:  Recent Labs   Lab Test 01/16/25  1801 01/16/25  1044 12/26/24  0709    INR 2.04* 1.16* 1.21*     Most Recent INR's and Anticoagulation Dosing History:  Anticoagulation Dose History          Latest Ref Rng & Units 12/12/2024 12/26/2024 1/16/2025   Recent Dosing and Labs   INR 0.85 - 1.15 1.36  1.21  2.04  1.16       Details          Multiple values from one day are sorted in reverse-chronological order             Most Recent 3 Creatinines:  Recent Labs   Lab Test 01/17/25  0720 01/16/25  1601 01/16/25  1044   CR 0.91 1.00 1.14     Most Recent 3 Hemoglobins:  Recent Labs   Lab Test 01/17/25  0720 01/16/25  1601 01/16/25  1044   HGB 10.7* 10.7* 12.1*     Most Recent 3 Troponin's:No lab results found.  Most Recent 3 BNP's:  Recent Labs   Lab Test 01/17/25  0720 01/08/25  1041 12/11/24  1610   NTBNPI 3,027*  --  3,933*   NTBNP  --  3,529*  --      Most Recent D-dimer:  Recent Labs   Lab Test 12/11/24  1610   DD 0.78*     Most Recent Cholesterol Panel:  Recent Labs   Lab Test 02/10/20  1026   CHOL 152   LDL 65   HDL 63   TRIG 119     7-Day Micro Results       Collected Updated Procedure Result Status      01/17/2025 0016 01/17/2025 0057 Influenza A/B, RSV and SARS-CoV2 PCR (COVID-19) Nasopharyngeal [[id number]]    Swab from Nasopharyngeal    Final result Component Value   Influenza A PCR Negative   Influenza B PCR Negative   RSV PCR Negative   SARS CoV2 PCR Negative   NEGATIVE: SARS-CoV-2 (COVID-19) RNA not detected, presumed negative.                  Most Recent TSH and T4:No lab results found.  Most Recent Hemoglobin A1c:  Recent Labs   Lab Test 10/24/24  1841   A1C 9.2*     Most Recent 6 glucoses:  Recent Labs   Lab Test 01/17/25  0801 01/17/25  0720 01/17/25  0058 01/16/25  1721 01/16/25  1656 01/16/25  1601   * 108* 150* 102* 87 102*     Most Recent Urinalysis:No lab results found.  Most Recent ABG:No lab results found.  Most Recent ESR & CRP:  Recent Labs   Lab Test 11/20/24  0926   SED 77*   CRPI 14.20*     Most Recent Anemia Panel:  Recent Labs   Lab Test 01/17/25  0720    WBC 9.1   HGB 10.7*   HCT 32.7*   MCV 84   *     Most Recent CPK:  Recent Labs   Lab Test 11/20/24  0926 11/12/24  0615 11/04/24  0622   CKT 73 148 199   ,   Results for orders placed or performed during the hospital encounter of 01/16/25   Chest XR,  PA & LAT    Narrative    EXAM: XR CHEST 2 VIEWS  LOCATION: [facility]  DATE: 1/16/2025    INDICATION: Syncope.  COMPARISON: 7/22/2014; CT chest 12/11/2014.      Impression    IMPRESSION: No acute airspace consolidation. Scattered nodular opacities of the right hemithorax, likely pleural/fissural based, when correlation is made to previous CT imaging. Possible cavitary nodular opacity at the left suprahilar region,   incompletely imaged and evaluated. Dedicated chest CT imaging is recommended.    No pleural effusion or pneumothorax.    Upper limits of normal heart size. Atherosclerotic calcifications of the thoracoabdominal aorta.    Right chest wall port catheter has tip overlying the distal SVC.   Chest CT w/o contrast    Narrative    EXAM: CT CHEST W/O CONTRAST  LOCATION: [facility]  DATE: 1/16/2025    INDICATION: Possible cavitary lesion on chest x-ray.  COMPARISON: Chest radiograph performed earlier today. Chest CT 12/11/2024.  TECHNIQUE: CT chest without IV contrast. Multiplanar reformats were obtained. Dose reduction techniques were used.  CONTRAST: None.    FINDINGS:   LUNGS AND PLEURA: Moderate upper lung predominant emphysematous changes in both lungs. Cavitary nodule in the right middle lobe abutting the right major and minor fissures measures 3.3 x 1.6 cm. This finding was obscured by consolidation on the previous   exam. Subpleural 0.4 cm nodule in the left lower lobe (series 4 image 209), also obscured by consolidation on the previous exam. Small right upper lobe calcified granuloma. Mild fibrotic changes about the periphery of both lung bases. No pleural   effusions.    MEDIASTINUM/AXILLAE: Right  Port-A-Cath, with tip in the upper right atrium. No pericardial effusion. No enlarged lymph nodes in the chest. Severe atherosclerotic aortoiliac calcification.    CORONARY ARTERY CALCIFICATION: Severe.    UPPER ABDOMEN: Cholecystectomy. Bulky adenopathy in the periportal region is partially included on this exam, but appears similar to previous. The spleen is partially included on this study, but again appears mildly enlarged. Nodularity of the liver   contour suggests underlying cirrhosis. Previously described right hepatic lobe mass is not well defined on this noncontrast exam.    MUSCULOSKELETAL: Degenerative changes in the thoracic spine.      Impression    IMPRESSION:   1.  Cavitary nodule in the right middle lobe abutting the right major and minor fissures measures 3.3 x 1.6 cm. This finding was obscured by consolidation on the previous exam. This finding is suspicious for malignancy.  2.  Subpleural 0.4 cm nodule in the left lower lobe, also obscured on the previous exam.  3.  Moderate upper lung predominant emphysematous changes in both lungs.  4.  Bulky adenopathy in the periportal region of the upper abdomen is partially included on this exam, but appears similar to previous, and is again suspicious for malignancy.       Discharge Medications   Current Discharge Medication List        CONTINUE these medications which have NOT CHANGED    Details   aspirin 81 MG EC tablet Take 81 mg by mouth every evening.      clopidogrel (PLAVIX) 75 MG tablet Take 75 mg by mouth every morning.      empagliflozin (JARDIANCE) 10 MG TABS tablet Take 1 tablet (10 mg) by mouth daily.  Qty: 90 tablet, Refills: 1    Associated Diagnoses: Heart failure with preserved ejection fraction, NYHA class I (H)      insulin aspart (NOVOLOG PEN) 100 UNIT/ML pen Inject 5 Units subcutaneously 3 times daily as needed for high blood sugar (With Meals). Novolog Flexpen: 5 units with breakfast, 5 units with lunch, 5 units with dinner. Takes as  needed with each meal.      insulin glargine (LANTUS VIAL) 100 UNIT/ML vial Inject 50 Units subcutaneously every morning.      levothyroxine (SYNTHROID/LEVOTHROID) 50 MCG tablet Take 50 mcg by mouth every morning.      meclizine (ANTIVERT) 25 MG tablet Take 50 mg by mouth 3 times daily as needed (Vertigo).      metFORMIN (GLUCOPHAGE XR) 500 MG 24 hr tablet Take 1,000 mg by mouth 2 times daily (with meals).      rosuvastatin (CRESTOR) 40 MG tablet Take 40 mg by mouth daily.      torsemide (DEMADEX) 10 MG tablet Take 1 tablet (10 mg) by mouth daily.  Qty: 90 tablet, Refills: 3    Associated Diagnoses: Essential hypertension; Heart failure with preserved ejection fraction, NYHA class I (H)      co-enzyme Q-10 100 MG CAPS capsule Take 200 mg by mouth every evening.      lactobacillus rhamnosus, GG, (CULTURELL) capsule Take 1 capsule by mouth every morning.      Multiple Vitamins-Minerals (PRESERVISION AREDS 2 PO) Take 2 capsules by mouth 2 times daily.      vitamin C with B complex (B COMPLEX-C) tablet Take 1 tablet by mouth every morning.           Allergies   Allergies   Allergen Reactions    Exenatide Unknown    Heparin Unknown    Liraglutide Unknown    Lisinopril Cough    Morphine Unknown

## 2025-01-17 NOTE — PHARMACY-ADMISSION MEDICATION HISTORY
Pharmacist Admission Medication History    Admission medication history is complete. The information provided in this note is only as accurate as the sources available at the time of the update.    Information Source(s): Patient, Clinic records, [facility] and [facility]  via in-person    Pertinent Information: Patient was able to confirm current medications and last known administration times. Patient's Irbesartan and Spironolactone orders are on hold until advised by PCP.    Changes made to PTA medication list:  Added: Rosuvastatin  Deleted: Atorvastatin,   Changed: Lantus and Novolog    Allergies reviewed with patient and updates made in EHR: yes    Medication History Completed By: Dino Fry [facility] 1/16/2025 6:49 PM    PTA Med List   Medication Sig Note Last Dose/Taking    aspirin 81 MG EC tablet Take 81 mg by mouth every evening.  1/15/2025 Evening    clopidogrel (PLAVIX) 75 MG tablet Take 75 mg by mouth every morning.  1/15/2025 Morning    empagliflozin (JARDIANCE) 10 MG TABS tablet Take 1 tablet (10 mg) by mouth daily.  1/15/2025 Morning    insulin aspart (NOVOLOG PEN) 100 UNIT/ML pen Inject 5 Units subcutaneously 3 times daily as needed for high blood sugar (With Meals). Novolog Flexpen: 5 units with breakfast, 5 units with lunch, 5 units with dinner. Takes as needed with each meal.  Taking As Needed    insulin glargine (LANTUS VIAL) 100 UNIT/ML vial Inject 50 Units subcutaneously every morning. 1/16/2025: Had 40 units this morning 1/16/2025 Morning    levothyroxine (SYNTHROID/LEVOTHROID) 50 MCG tablet Take 50 mcg by mouth every morning.  1/16/2025 Morning    meclizine (ANTIVERT) 25 MG tablet Take 50 mg by mouth 3 times daily as needed (Vertigo).  Taking As Needed    metFORMIN (GLUCOPHAGE XR) 500 MG 24 hr tablet Take 1,000 mg by mouth 2 times daily (with meals).  1/15/2025 Evening    rosuvastatin (CRESTOR) 40 MG tablet Take 40 mg by mouth daily.  1/15/2025 Evening    torsemide (DEMADEX) 10  MG tablet Take 1 tablet (10 mg) by mouth daily.  1/15/2025 Noon

## 2025-01-17 NOTE — ED NOTES
Thigh poke sites are covered with bandaids. No obvious signs of bleeding, sites are tender to touch and soft.

## 2025-01-17 NOTE — PLAN OF CARE
PRIMARY DIAGNOSIS: SYNCOPE  OUTPATIENT/OBSERVATION GOALS TO BE MET BEFORE DISCHARGE:  1. Orthostatic performed: No    2. Diagnostic testing complete & at baseline neurologic testing: N/A    3. Cleared by consultants (if involved): N/A    4. Interpretation of cardiac rhythm per telemetry tech: NSR     5. Tolerating adequate PO diet and medications: Yes    6. Return to near baseline physical activity or neurologic status: Yes    Discharge Planner Nurse   Safe discharge environment identified: Yes  Barriers to discharge: No       Entered by: Luli Martinez RN 01/17/2025 4:33 AM   Luli Martinez RN    Please review provider order for any additional goals.   Nurse to notify provider when observation goals have been met and patient is ready for discharge.Goal Outcome Evaluation:

## 2025-01-17 NOTE — CONSULTS
HEART CARE NOTE        Thank you, Dr. Bain, for asking the [facility] Heart Care team to see Janice ROY Eliu to evaluate elevated troponin.      Assessment/Recommendations   1.  HFpEF   Assessment / Plan  Near euvolemia on physical exam; NTproBNP stable to improved - no changes to outpatient diuretic regimen recommended at this time; continue to monitor UOP and renal function closely  Patient is high risk for adverse cardiac events 2/2 advanced age, elevated NTproBNP, renal dysfunction, DM2  GDMT as detailed below; mainstay of treatment for HFpEF includes diuretics and adequate BP control (class I) and SGLT2-I (class 2a); additional medical therapy (ARNI, MRA, ARB) demonstrated less robust evidence for indication but may be considered per guideline recommendations (2b); no indication for BBlockers      Current Pharmacotherapy AHA Guideline-Directed Medical Therapy   Losartan 50 mg daily - not taking ARNI/ARB   Spironolactone 25 mg - not taking  MRA   SGLT2 inhibitor: Empagliflozin 10 mg daily - not taking SGLT2-I    Torsemide 20 mg daily Loop diuretic       2. DM2  Assessment / Plan  Management and supportive care per primary team  Currently on ISS     3. HTN  Assessment / Plan  Adequately controlled - no additional recommendations at this time     4. CKD  Assessment / Plan  Renal function stable at baseline; Diuresis as above; continue to monitor UOP and renal function closely      5. Syncope  Assessment / Plan  Repeat echo pending; continue to monitor on telemetry; continue to monitor and replete electrolytes as indicated; further work-up/evaluation per primary team    6. NSTEMI  Assessment / Plan  Denies chest pain or anginal equivalents  Declines further cardiac testing/interventions at this time   Continue aggressive risk factor modifiations    Clinically Significant Risk Factors Present on Admission        # Hypokalemia: Lowest K = 3.1 mmol/L in last 2 days, will replace as needed   #  "Hypochloremia: Lowest Cl = 94 mmol/L in last 2 days, will monitor as appropriate      # Hypoalbuminemia: Lowest albumin = 3.3 g/dL at 1/16/2025  4:01 PM, will monitor as appropriate  # Coagulation Defect: INR = 2.04 (Ref range: 0.85 - 1.15) and/or PTT = 46 Seconds (Ref range: 22 - 38 Seconds), will monitor for bleeding  # Drug Induced Platelet Defect: home medication list includes an antiplatelet medication   # Hypertension: Noted on problem list      # Anemia: based on hgb <11      # DMII: A1C = 9.2 % (Ref range: <5.7 %) within past 6 months    # Overweight: Estimated body mass index is 28.89 kg/m  as calculated from the following:    Height as of 1/10/25: 1.727 m (5' 8\").    Weight as of this encounter: 86.2 kg (190 lb).       # Financial/Environmental Concerns:          Cardiomyopathy    Not present on admission      60 minutes spent reviewing prior records (including documentation, laboratory studies, cardiac testing/imaging), history and physical exam, planning, and subsequent documentation.      History of Present Illness/Subjective    Mr. Janice Nowak is a 79 year old male with a PMHx significant for (per Epic notation) type II diabetes, CAD, hypertension, HFpEF, PAD, chronic leg ulcers and osteomyelitis, recently diagnosed stage IV hepatobiliary cancer (planning to pursue palliative immunotherapy). Was having an outpatient port placement procedure + LLE angiogram earlier on the day of admission where he had a witnessed syncopal event following procedure.     Today, Mr. Nowak denies acute cardiac events or complaints; Management plan as detailed above    ECG: Personally reviewed. normal sinus rhythm, nonspecific ST and T waves changes, RBBB.    ECHO (personnaly Reviewed on 1/17/25): repeat study pending  The left ventricle is normal in size. There is mild concentric left  ventricular hypertrophy.  Left ventricular systolic function is normal. The visual ejection fraction is  55-60%. The inferior wall " is mildly hypokinetic.  Grade II or moderate diastolic dysfunction.     The right ventricle is normal in size and function.  The left atrium is severely dilated. Right atrium not well visualized.  IVC diameter <2.1 cm collapsing >50% with sniff suggests a normal RA pressure  of 3 mmHg.  There is no comparison study available.    Telemetry: personally reviewed January 17, 2025; notable for sinus     Lab results: personally reviewed January 17, 2025; notable for renal function wnl    Medical history and pertinent documents reviewed in Care Everywhere please where applicable see details above          Physical Examination Review of Systems   BP (!) 149/71   Pulse 82   Temp 98.6  F (37  C) (Oral)   Resp 20   Wt 86.2 kg (190 lb)   SpO2 95%   BMI 28.89 kg/m    Body mass index is 28.89 kg/m .  Wt Readings from Last 3 Encounters:   01/16/25 86.2 kg (190 lb)   01/10/25 86.2 kg (190 lb 0.6 oz)   01/08/25 86.2 kg (190 lb)     General Appearance:   no distress, normal body habitus   ENT/Mouth: membranes moist, no oral lesions or bleeding gums.      EYES:  no scleral icterus, normal conjunctivae   Neck: no carotid bruits or thyromegaly   Chest/Lungs:   lungs are clear to auscultation, no rales or wheezing, equal chest wall expansion    Cardiovascular:   Regular. Normal first and second heart sounds with no murmurs, rubs, or gallops; the carotid, radial and posterior tibial pulses are intact, no JVD or LE edema bilaterally    Abdomen:  no organomegaly, masses, bruits, or tenderness; bowel sounds are present   Extremities: no cyanosis or clubbing   Skin: no xanthelasma, warm.    Neurologic: NAD     Psychiatric: alert and oriented x3, calm     A complete 10 systems ROS was reviewed  And is negative except what is listed in the HPI.          Medical History  Surgical History Family History Social History   Past Medical History:   Diagnosis Date    Diabetes (H)     Hypertension     Sleep apnea     Thyroid disease     Past  Surgical History:   Procedure Laterality Date    BACK SURGERY      BONE EXOSTOSIS EXCISION Right 10/24/2024    Procedure: PARTIAL CALCANECTOMY RIGHT FOOT;  Surgeon: Amol Patricio DPM;  Location: [facility]    ENDOSCOPIC ULTRASOUND UPPER GASTROINTESTINAL TRACT (GI) N/A 2025    Procedure: ENDOSCOPIC ULTRASOUND, ESOPHAGOSCOPY / UPPER GASTROINTESTINAL TRACT (GI) with Fine Needle Aspiration;  Surgeon: Kong Medina MD;  Location: [facility] OR    INCISION AND DRAINAGE FOOT, COMBINED Right 10/24/2024    Procedure: INCISION AND DRAINAGE;  Surgeon: Amol Patricio DPM;  Location: [facility]    IR LOWER EXTREMITY ANGIOGRAM RIGHT  2024    IRRIGATION AND DEBRIDEMENT FOOT, COMBINED Right 10/24/2024    Procedure: AND DEBRIDEMENT RIGHT HEEL,;  Surgeon: Amol Patricio DPM;  Location: [facility]    ORTHOPEDIC SURGERY      PICC SINGLE LUMEN PLACEMENT  10/30/2024    VASCULAR SURGERY      no family history of premature coronary artery disease Social History     Socioeconomic History    Marital status:      Spouse name: Not on file    Number of children: Not on file    Years of education: Not on file    Highest education level: Not on file   Occupational History    Not on file   Tobacco Use    Smoking status: Former     Current packs/day: 0.00     Types: Cigarettes     Quit date:      Years since quittin.0     Passive exposure: Never    Smokeless tobacco: Never   Vaping Use    Vaping status: Never Used   Substance and Sexual Activity    Alcohol use: Not Currently     Alcohol/week: 5.0 standard drinks of alcohol     Types: 5 Cans of beer per week    Drug use: Not Currently    Sexual activity: Not on file   Other Topics Concern    Not on file   Social History Narrative    Not on file     Social Drivers of Health     Financial Resource Strain: Low Risk  (2024)    Financial Resource Strain     Within the past 12 months, have you or your family members you live with  "been unable to get utilities (heat, electricity) when it was really needed?: No   Food Insecurity: Low Risk  (12/14/2024)    Food Insecurity     Within the past 12 months, did you worry that your food would run out before you got money to buy more?: No     Within the past 12 months, did the food you bought just not last and you didn t have money to get more?: No   Transportation Needs: Low Risk  (12/14/2024)    Transportation Needs     Within the past 12 months, has lack of transportation kept you from medical appointments, getting your medicines, non-medical meetings or appointments, work, or from getting things that you need?: No   Physical Activity: Not on file   Stress: Not on file   Social Connections: Not on file   Interpersonal Safety: Low Risk  (1/16/2025)    Interpersonal Safety     Do you feel physically and emotionally safe where you currently live?: Yes     Within the past 12 months, have you been hit, slapped, kicked or otherwise physically hurt by someone?: No     Within the past 12 months, have you been humiliated or emotionally abused in other ways by your partner or ex-partner?: No   Housing Stability: Low Risk  (12/14/2024)    Housing Stability     Do you have housing? : Yes     Are you worried about losing your housing?: No   Recent Concern: Housing Stability - High Risk (10/28/2024)    Housing Stability     Do you have housing? : No     Are you worried about losing your housing?: No           Lab Results    Chemistry/lipid CBC Cardiac Enzymes/BNP/TSH/INR   Lab Results   Component Value Date    CHOL 152 02/10/2020    HDL 63 02/10/2020    TRIG 119 02/10/2020    BUN 41.5 (H) 01/16/2025     01/16/2025    CO2 28 01/16/2025    Lab Results   Component Value Date    WBC 9.1 01/17/2025    HGB 10.7 (L) 01/17/2025    HCT 32.7 (L) 01/17/2025    MCV 84 01/17/2025     (L) 01/17/2025    Lab Results   Component Value Date    INR 2.04 (H) 01/16/2025     No results found for: \"CKTOTAL\", \"CKMB\", " "\"TROPONINI\"       Weight:    Wt Readings from Last 3 Encounters:   01/16/25 86.2 kg (190 lb)   01/10/25 86.2 kg (190 lb 0.6 oz)   01/08/25 86.2 kg (190 lb)       Allergies  Allergies   Allergen Reactions    Exenatide Unknown    Heparin Unknown    Liraglutide Unknown    Lisinopril Cough    Morphine Unknown         Surgical History  Past Surgical History:   Procedure Laterality Date    BACK SURGERY      BONE EXOSTOSIS EXCISION Right 10/24/2024    Procedure: PARTIAL CALCANECTOMY RIGHT FOOT;  Surgeon: Amol Patricio DPM;  Location: [facility]    ENDOSCOPIC ULTRASOUND UPPER GASTROINTESTINAL TRACT (GI) N/A 1/2/2025    Procedure: ENDOSCOPIC ULTRASOUND, ESOPHAGOSCOPY / UPPER GASTROINTESTINAL TRACT (GI) with Fine Needle Aspiration;  Surgeon: Kong Medina MD;  Location: [facility] OR    INCISION AND DRAINAGE FOOT, COMBINED Right 10/24/2024    Procedure: INCISION AND DRAINAGE;  Surgeon: Amol Patricio DPM;  Location: [facility]    IR LOWER EXTREMITY ANGIOGRAM RIGHT  12/26/2024    IRRIGATION AND DEBRIDEMENT FOOT, COMBINED Right 10/24/2024    Procedure: AND DEBRIDEMENT RIGHT HEEL,;  Surgeon: Amol Patricio DPM;  Location: [facility]    ORTHOPEDIC SURGERY      PICC SINGLE LUMEN PLACEMENT  10/30/2024    VASCULAR SURGERY         Social History  Tobacco:   History   Smoking Status    Former    Types: Cigarettes   Smokeless Tobacco    Never    Alcohol:   Social History    Substance and Sexual Activity      Alcohol use: Not Currently        Alcohol/week: 5.0 standard drinks of alcohol        Types: 5 Cans of beer per week   Illicit Drugs:   History   Drug Use Unknown       Family History  Family History   Problem Relation Age of Onset    Anesthesia Reaction No family hx of     Thrombosis No family hx of           Tory Tellez MD on 1/17/2025      cc: Jeannette April,     "

## 2025-01-17 NOTE — ED NOTES
[facility] ED Handoff Report    ED Chief Complaint: Syncope    ED Diagnosis:  (R55) Syncope, unspecified syncope type  Comment: Pt had syncopal episode after IR procedure while sitting in chair.  Plan: Continue to monitor.    (R79.89) Elevated troponin  Comment: Pt had IR procedures completed today.  Plan: Continue to monitor.    (D58.2) Abnormal hemoglobin (Hgb)  Comment: Continue to Monitor.  Plan: Continue to Monitor.       PMH:    Past Medical History:   Diagnosis Date    Diabetes (H)     Hypertension     Sleep apnea     Thyroid disease         Code Status:  Prior     Falls Risk: Yes Band: Applied    Current Living Situation/Residence: lives alone     Elimination Status: Continent: Yes     Activity Level: 2 assist, pt typically uses a wheelchair at home.    Patients Preferred Language:  English     Needed: No    Vital Signs:  /63   Pulse 77   Temp 97.8  F (36.6  C) (Oral)   Resp 16   Wt 86.2 kg (190 lb)   SpO2 97%   BMI 28.89 kg/m       Cardiac Rhythm: NSR    Pain Score: 0/10    Is the Patient Confused:  No    Last Food or Drink: 01/16/25 at     Focused Assessment:  Pt was brought down from IR for a syncopal episode while sitting in a chair. Pt was in IR for a R port cath placement for chemotherapy and Angiogram of lower extremities. Pt did not have a pulse in LLE prior to procedure, pulses have returned post-procedure. Pt's thigh poke sites are covered with bandaids, no obvious signs of bleeding, soft to palpation and pt denies pain.    Tests Performed: Done: Labs and Imaging    Treatments Provided:  Fluids and pain management.    Family Dynamics/Concerns: No    Family Updated On Visitor Policy: Yes    Plan of Care Communicated to Family: Yes    Who Was Updated about Plan of Care: Pt and son    Belongings Checklist Done and Signed by Patient: N/A    Medications sent with patient: N/A    Covid: asymptomatic , negative    Additional Information: Pt has been able to tolerate oral  intake of ice chips.    RN: Lisa Hudson RN   1/16/2025 10:54 PM

## 2025-01-17 NOTE — PROGRESS NOTES
Patient discharged to home.  Son at bedside.  Discharge instructions given to patient and his son.  Patient belongings and instructions sent with the patient.  Patient IV removed prior to discharge.  Patient VSS and angio sites intact.  Patient will be seen by [facility] nurse at home.  No changes made to patients medications.

## 2025-01-17 NOTE — ED NOTES
Pt was not comfortable and ask for pillows and to be turn. Pt is now comfortable and will try to sleep.

## 2025-01-17 NOTE — CONSULTS
"Care Management Initial Consult    General Information  Assessment completed with: PatientJanice  Type of CM/SW Visit: Initial Assessment    Primary Care Provider verified and updated as needed: Yes   Readmission within the last 30 days: no previous admission in last 30 days      Reason for Consult: discharge planning  Advance Care Planning: Advance Care Planning Reviewed: no concerns identified          Communication Assessment  Patient's communication style: spoken language (English or Bilingual)             Cognitive  Cognitive/Neuro/Behavioral: WDL                      Living Environment:   People in home: alone     Current living Arrangements: apartment, independent living facility ([facility])      Able to return to prior arrangements: yes  Living Arrangement Comments: \"My wife lives at [facility] also in Memory Care\".    Family/Social Support:  Care provided by: self, homecare agency, child(arti)  Provides care for: no one  Marital Status:   Support system: Children, Wife, Other (specify) (Home Care, 2 sons)          Description of Support System: Supportive, Involved    Support Assessment: Adequate family and caregiver support, Adequate social supports, Patient communicates needs well met    Current Resources:   Patient receiving home care services: Yes  Skilled Home Care Services: Skilled Nursing, Home Health Aid, Physical Therapy, Occupational Therapy ([facility]. RN is doing wound cares 3 times a week for dressing changes. Also help from PT, OT, and bath aide.)     Community Resources: Home Care, DME, Housekeeping/[employer] (Facility helps with twice a month housekeeping.)  Equipment currently used at home: wheelchair, manual, shower chair, grab bar, tub/shower, orthosis, walker, rolling, cane, straight, glucometer (\"I use my wheelchair all the time now because I am supposed to be non-weight bearing on my foot. Right foot boot. I am able to " "pivot transfer to and from the wheelchair. When I can walk again I have a walker and cane at home\".)  Supplies currently used at home: Diabetic Supplies, Wound Care Supplies, Gloves, Oxygen Tubing/Supplies, Other (\"Wound vac is off right now. It is planned to be replaced sometime next week. I still wrap the wonds with wound care supplies. CPAP at home that I don't use normally. Glasses\")    Employment/Financial:  Employment Status: retired     Employment/ Comments: \"no  history\"  Financial Concerns: none   Referral to Financial Worker: No       Does the patient's insurance plan have a 3 day qualifying hospital stay waiver?  No    Lifestyle & Psychosocial Needs:  Social Drivers of Health     Food Insecurity: Low Risk  (12/14/2024)    Food Insecurity     Within the past 12 months, did you worry that your food would run out before you got money to buy more?: No     Within the past 12 months, did the food you bought just not last and you didn t have money to get more?: No   Depression: Not at risk (11/13/2024)    PHQ-2     PHQ-2 Score: 0   Housing Stability: Low Risk  (12/14/2024)    Housing Stability     Do you have housing? : Yes     Are you worried about losing your housing?: No   Recent Concern: Housing Stability - High Risk (10/28/2024)    Housing Stability     Do you have housing? : No     Are you worried about losing your housing?: No   Tobacco Use: Medium Risk (1/15/2025)    Patient History     Smoking Tobacco Use: Former     Smokeless Tobacco Use: Never     Passive Exposure: Never   Financial Resource Strain: Low Risk  (12/14/2024)    Financial Resource Strain     Within the past 12 months, have you or your family members you live with been unable to get utilities (heat, electricity) when it was really needed?: No   Alcohol Use: Not on file   Transportation Needs: Low Risk  (12/14/2024)    Transportation Needs     Within the past 12 months, has lack of transportation kept you from medical " "appointments, getting your medicines, non-medical meetings or appointments, work, or from getting things that you need?: No   Physical Activity: Not on file   Interpersonal Safety: Low Risk  (1/16/2025)    Interpersonal Safety     Do you feel physically and emotionally safe where you currently live?: Yes     Within the past 12 months, have you been hit, slapped, kicked or otherwise physically hurt by someone?: No     Within the past 12 months, have you been humiliated or emotionally abused in other ways by your partner or ex-partner?: No   Stress: Not on file   Social Connections: Not on file   Health Literacy: Not on file       Functional Status:  Prior to admission patient needed assistance:   Dependent ADLs:: Wheelchair-independent, Bathing (\"Home Care is providing a bath aide\")  Dependent IADLs:: Cleaning, Shopping, Transportation (\"[facility] helps with housekeeping twice a month. I do my own laundry and meals. My sons can help with transportation\".)  Assesssment of Functional Status: At functional baseline    Mental Health Status:  Mental Health Status: No Current Concerns       Chemical Dependency Status:  Chemical Dependency Status: No Current Concerns             Values/Beliefs:  Spiritual, Cultural Beliefs, Yarsani Practices, Values that affect care: no       Cultural/Yarsani Practices Patient Routinely Participates In: ceremony, prayer  Values/Beliefs Comment: Ralph    Discussed  Partnership in Safe Discharge Planning  document with patient/family: No    Additional Information:  Janice lives at [facility] alone. \"My wife lives at [facility] also in Memory Care\".     He is independent with most ADLs and gets help with some IADLs. \"[facility] helps with housekeeping twice a month. I do my own laundry and meals. My sons can help with transportation\". He also has help from [facility] RN for 3 times a week wound care and dressing changes. Also PT, " "OT, and bath aide.     \"Wound vac is off right now. It is planned to be replaced sometime next week. I still wrap the wonds with wound care supplies. Usually the RN from Home Care helps with this\".     \"I use my wheelchair all the time now because I am supposed to be non-weight bearing on my foot. Right foot boot. I am able to pivot transfer to and from the wheelchair. When I can walk again I have a walker and cane at home\".     Son to transport at discharge.    CAI was given and discussed. All questions were answered.     CM to follow for medical progression of care, discharge recommendations, and final discharge plan. Writer verified patient demographics and updated any changes needed in the patient chart.     Mariaelena SAMSON Care Manager from [facility] [phone] who is following with this patient. She did call and touch base with me. She states, \"we are now following him every week. We have recommended to the son that they think about increasing him to Assisted Level of care at least for now. He has outpatient surgery scheduled 1/23/25 with Dr. Patricio and that is when he will get the wound vac back in place. We know that he will probably refuse TCU again after surgery, but we are working to connect with the son to increase his services. I will connect with [facility] also to assure they will see him today at home\".    Next Steps:    Awaiting Cardiology plan/recommendations. Patient \"not interested in TCU and will only return home with Home Care help. I have plenty of help at home\". He wants to leave \"right now\".     Needs: Medical clearance. Resume Home Care services upon discharge.    Ria Rosenebrg RN    Care Management Discharge Note    Discharge Date: 01/18/2025, he wants to leave today. MD has not yet seen him today.     Discharge Disposition: Home, Home Care    Discharge Services: Home Care, Housekeeping/[employer]    Discharge DME: nothing new     Discharge Transportation: family or friend will " "provide, son already here to transport.    Private pay costs discussed: Not applicable    Does the patient's insurance plan have a 3 day qualifying hospital stay waiver?  No    PAS Confirmation Code:    Patient/family educated on Medicare website which has current facility and service quality ratings: yes    Education Provided on the Discharge Plan: Yes  Persons Notified of Discharge Plans: Janice and son  Patient/Family in Agreement with the Plan:  yes, He \"really wants to leave right now\".    Handoff Referral Completed: No, handoff not indicated or clinically appropriate    Additional Information:  See above.    Ria Rosenberg RN      "

## 2025-01-17 NOTE — H&P
[facility]    History and Physical - Hospitalist Service       Date of Admission:  1/16/2025    Assessment & Plan      Janice Nowak is a 79 year old male admitted on 1/16/2025. PMH notable for: type II diabetes, CAD, hypertension, HFpEF, PAD, chronic leg ulcers and osteomyelitis, recently diagnosed stage IV hepatobiliary cancer (planning to pursue palliative immunotherapy). Was having an outpatient port placement procedure + LLE angiogram earlier on the day of admission where he had a witnessed syncopal event following procedure. Sent in to emergency department for further evaluation.     Syncopal Event   Occurred following IR port placement procedure on day of admission. Patient denies any chest pain, palpitations, or shortness of breath associated. Initial concern for active bleed associated given drop in hemoglobin in IR office (12.2 > 10.7), however the patient does not have any evidence of current active bleeds and denies any melena, hematochezia, hematuria. Alternative diagnoses: arrhythmia, orthostatic hypotension (has had low appetite), PE (Wells score of 1 2/2 malignancy, low risk). ACS considered as well, troponin with mild elevation but EKG without ischemic changes. ED provider spoke with cardiology who recommended trending troponin and holding on initiation of heparin given concern for bleed.   - daily CBC to trend hemoglobin   - ED provider discussed with cardiology    - trend troponin: 97 > 103 > 108   - telemetry    - request formal cardiology consult   - orthostatics, continue to encourage PO intake in the setting of poor appetite (ensure per patient request)   - will hold on D-dimer and CT PE, consider if recurrent syncopal event or change to vitals/respiratory status     Hepatobiliary Cancer   Concern for Metastasis to Lungs   Relatively new diagnosis, began with incidental liver mass noted in 11/2024. Underwent IR guided biopsy on 1/2/25 which confirmed  hepatobiliary carcinoma. Met with oncology on 1/10/25 who discussed recommendation for palliative immunotherapy. Was having port placed on day of admission, which resulted in syncopal event. CT chest on admission show nodules and bulky adenopathy concerning for malignancy.   - first immunotherapy treatment scheduled Monday 1/20  - follow up with oncology on outpatient basis   - goals of care discussion introduced, see below     Normocytic Anemia   No evidence of active bleed. Appears stable with labs one month prior. Suspect anemia of chronic disease in the setting of known malignancy, but will continue to monitor for any evidence of bleeding.   - daily CBC     Leukocytosis   Mild white count to 11.8 on admission. Patient afebrile. No URI symptoms or GI symptoms. No sick contacts.   - Trend CBC   - Add lactic   - covid, flu, RSV     Hypokalemia   Likely in the setting of poor PO intake.   - potassium protocol     Type II Diabetes   Home regimen: 50 units lantus qAM + 5 units novolog with meals + metformin 1000 BID + Jardiance 10 mg.   - Lantus 28 units qAM   - medium resistance sliding scale     PAD   Chronic Leg Ulcers   Osteomyelitis s/p right calcanectomy   Hospitalizations October-November 2024 for this concern. Underwent surgical intervention for osteo. Lower extremity angiograms being pursued on outpatient basis. Appears LLE angio also done with IR on day of admission with stent pacement. Also continues to follow with Dr Patricio   - hold PTA plavix, asa given concern for bleed (low threshold to restart)   - continue PTA atorvastatin     HFpEF   Relatively new diagnosis made during 12/2024 hospitalization. Was associated with transudative pleural effusion s/po thoracentesis (negative cytology). Appears euvolemic today. No pleural effusion called out on chest imaging on this admission.   - continue PTA torsemide 10 mg   - holding PTA Jardiance     Hypothyroidism   - continue PTA levothyroxine     Goals of Care  "  Patient electing code status DNR/DNI. He is adamant on night of admission that he would like to limit his time in the hospital. Especially in the context of known stage IV cancer, continue goals of care conversation. Patient is decisional at this time.   - DNR/DNI        Diet: Combination Diet Regular Diet Adult  DVT Prophylaxis: Pneumatic Compression Devices with concern for possible bleed   Reyes Catheter: Not present  Fluids: Encourage PO   Lines: PRESENT             Cardiac Monitoring: ACTIVE order. Indication: Syncope- low cardiac risk (24 hours)  Code Status: No CPR- Do NOT Intubate    Clinically Significant Risk Factors Present on Admission        # Hypokalemia: Lowest K = 3.1 mmol/L in last 2 days, will replace as needed   # Hypochloremia: Lowest Cl = 94 mmol/L in last 2 days, will monitor as appropriate      # Hypoalbuminemia: Lowest albumin = 3.3 g/dL at 1/16/2025  4:01 PM, will monitor as appropriate    # Coagulation Defect: INR = 2.04 (Ref range: 0.85 - 1.15) and/or PTT = 46 Seconds (Ref range: 22 - 38 Seconds), will monitor for bleeding  # Drug Induced Platelet Defect: home medication list includes an antiplatelet medication   # Hypertension: Noted on problem list      # Anemia: based on hgb <11      # DMII: A1C = 9.2 % (Ref range: <5.7 %) within past 6 months    # Overweight: Estimated body mass index is 28.89 kg/m  as calculated from the following:    Height as of 1/10/25: 1.727 m (5' 8\").    Weight as of this encounter: 86.2 kg (190 lb).         # Financial/Environmental Concerns:           Disposition Plan      Expected Discharge Date: 01/17/2025                The patient's care  to be discussed at morning report.       Heather Brewer MD  Hospitalist Service  [facility]  Securely message with [facility] (more info)  Text page via [employer] Paging/Directory   ______________________________________________________________________    Chief Complaint   Syncope     History of Present " Illness   Janice Nowak is a 79 year old male who presented from IR after a witnessed syncopal event. Noted to have a drift in his hemoglobin as well. Patient feels fine at the time of my interview, frustrated to be in hospital. Denies any associated chest pain, shortness of breath, room spinning prior to fall. This has not occurred before and has not occurred since. Denies any blood in his stool or dark tarry stool. Son present with patient as well. Patient continues to live independently.     Past Medical History    Past Medical History:   Diagnosis Date    Diabetes (H)     Hypertension     Sleep apnea     Thyroid disease      Past Surgical History   Past Surgical History:   Procedure Laterality Date    BACK SURGERY      BONE EXOSTOSIS EXCISION Right 10/24/2024    Procedure: PARTIAL CALCANECTOMY RIGHT FOOT;  Surgeon: Amol Patricio DPM;  Location: [facility] [place]    ENDOSCOPIC ULTRASOUND UPPER GASTROINTESTINAL TRACT (GI) N/A 1/2/2025    Procedure: ENDOSCOPIC ULTRASOUND, ESOPHAGOSCOPY / UPPER GASTROINTESTINAL TRACT (GI) with Fine Needle Aspiration;  Surgeon: Kong Medina MD;  Location: [facility] OR    INCISION AND DRAINAGE FOOT, COMBINED Right 10/24/2024    Procedure: INCISION AND DRAINAGE;  Surgeon: Amol Patricio DPM;  Location: [facility] [place]    IR LOWER EXTREMITY ANGIOGRAM RIGHT  12/26/2024    IRRIGATION AND DEBRIDEMENT FOOT, COMBINED Right 10/24/2024    Procedure: AND DEBRIDEMENT RIGHT HEEL,;  Surgeon: Amol Patricio DPM;  Location: [facility] [place]    ORTHOPEDIC SURGERY      PICC SINGLE LUMEN PLACEMENT  10/30/2024    VASCULAR SURGERY       Prior to Admission Medications   Prior to Admission Medications   Prescriptions Last Dose Informant Patient Reported? Taking?   Multiple Vitamins-Minerals (PRESERVISION AREDS 2 PO)   Yes No   Sig: Take 2 capsules by mouth 2 times daily.   aspirin 81 MG EC tablet 1/15/2025 Evening  Yes Yes   Sig: Take 81 mg by mouth every evening.    clopidogrel (PLAVIX) 75 MG tablet 1/15/2025 Morning  Yes Yes   Sig: Take 75 mg by mouth every morning.   co-enzyme Q-10 100 MG CAPS capsule   Yes No   Sig: Take 200 mg by mouth every evening.   empagliflozin (JARDIANCE) 10 MG TABS tablet 1/15/2025 Morning  No Yes   Sig: Take 1 tablet (10 mg) by mouth daily.   insulin aspart (NOVOLOG PEN) 100 UNIT/ML pen   Yes Yes   Sig: Inject 5 Units subcutaneously 3 times daily as needed for high blood sugar (With Meals). Novolog Flexpen: 5 units with breakfast, 5 units with lunch, 5 units with dinner. Takes as needed with each meal.   insulin glargine (LANTUS VIAL) 100 UNIT/ML vial 1/16/2025 Morning  Yes Yes   Sig: Inject 50 Units subcutaneously every morning.   lactobacillus rhamnosus, GG, (CULTURELL) capsule   Yes No   Sig: Take 1 capsule by mouth every morning.   levothyroxine (SYNTHROID/LEVOTHROID) 50 MCG tablet 1/16/2025 Morning  Yes Yes   Sig: Take 50 mcg by mouth every morning.   meclizine (ANTIVERT) 25 MG tablet   Yes Yes   Sig: Take 50 mg by mouth 3 times daily as needed (Vertigo).   metFORMIN (GLUCOPHAGE XR) 500 MG 24 hr tablet 1/15/2025 Evening  Yes Yes   Sig: Take 1,000 mg by mouth 2 times daily (with meals).   rosuvastatin (CRESTOR) 40 MG tablet 1/15/2025 Evening  Yes Yes   Sig: Take 40 mg by mouth daily.   torsemide (DEMADEX) 10 MG tablet 1/15/2025 Noon  No Yes   Sig: Take 1 tablet (10 mg) by mouth daily.   vitamin C with B complex (B COMPLEX-C) tablet   Yes No   Sig: Take 1 tablet by mouth every morning.      Facility-Administered Medications: None         Physical Exam   Vital Signs: Temp: 97.8  F (36.6  C) Temp src: Oral BP: 132/63 Pulse: 77   Resp: 16 SpO2: 97 % O2 Device: None (Room air)    Weight: 190 lbs 0 oz  General: Lying in bed. In no acute distress. Son present.   HEENT: Conjunctivae are clear, nonicteric.   Respiratory: No respiratory distress. Lung sounds are clear without rales, ronchi, or wheezes.   Cardiac: RRR. No murmurs.   Abdominal: Abdomen is  soft and non-tender without distention.  Extremities: Upper and lower extremities grossly normal.  Skin: Scattered rashes throughout.   Neurological: Motor function is grossly normal.  Psychiatric: Good insight.    Medical Decision Making   Please see A&P for additional details of medical decision making.      Data   ------------------------- PAST 24 HR DATA REVIEWED -----------------------------------------------

## 2025-01-19 ENCOUNTER — PATIENT OUTREACH (OUTPATIENT)
Dept: CARE COORDINATION | Facility: CLINIC | Age: 80
End: 2025-01-19
Payer: COMMERCIAL

## 2025-01-19 NOTE — PROGRESS NOTES
"  [facility] Resource [place]: Transitions of Care Outreach  Chief Complaint   Patient presents with    Clinic Care Coordination - Post Hospital       Most Recent Admission Date: 1/16/2025   Most Recent Admission Diagnosis:      Most Recent Discharge Date: 1/17/2025   Most Recent Discharge Diagnosis: Syncope, unspecified syncope type - R55  Elevated troponin - R79.89  Abnormal hemoglobin (Hgb) - D58.2     Transitions of Care Assessment    Discharge Assessment  How are you doing now that you are home?: Patient states he feels \"sadi slow\". States he just woke up not long ago.  Feels like things are a little better since leaving the hospital, not as tired as he was.  How are your symptoms? (Red Flag symptoms escalate to triage hotline per guidelines): Improved  Do you know how to contact your clinic care team if you have future questions or changes to your health status? : Yes  Does the patient have their discharge instructions? : Yes  Does the patient have questions regarding their discharge instructions? : No  Were you started on any new medications or were there changes to any of your previous medications? : No  Does the patient have all of their medications?: Yes  Do you have questions regarding any of your medications? : No  Do you have all of your needed medical supplies or equipment (DME)?  (i.e. oxygen tank, CPAP, cane, etc.): Yes         Post-op (Clinicians Only)  Did the patient have surgery or a procedure: Yes  Drainage: No  Bleeding: none  Fever: No  Chills: No  Redness: No  Warmth: No  Swelling: No  Incision site pain: No  Eating & Drinking: eating and drinking without complaints/concerns  PO Intake: regular diet (Drinks ensures and eats apples. RN discussed getting enough protein and eating small meals throughout the day. Patient seemed receptive and stated he will try it.)  Additional Symptoms: decreased appetite (Discussed smaller, frequent meals)  Bowel Function: constipation (States his " normal.)  Date of last BM: 01/18/25  Urinary Status: voiding without complaint/concerns    Follow up Plan     Discharge Follow-Up  Discharge follow up appointment scheduled in alignment with recommended follow up timeframe or Transitions of Risk Category? (Low = within 30 days; Moderate= within 14 days; High= within 7 days): Yes  Discharge Follow Up Appointment Date: 01/22/25  Discharge Follow Up Appointment Scheduled with?: Primary Care Provider    Future Appointments   Date Time Provider Department Center   1/20/2025  8:30 AM [facility]   1/20/2025  9:00 AM [facility] CHEMO CHAIR [facility]   1/31/2025  8:40 [facility]   2/10/2025  9:30 AM [facility] CHEMO LAB DRAW 2 [facility]   2/10/2025 10:00 AM Isis Perez APRN CNP [facility]   2/10/2025 10:30 AM [facility] CHEMO CHAIR [facility]   2/12/2025 11:20 AM [facility]   2[facility]12/2025 12:00 [facility]   2/12/2025  1:00 PM [facility]   2/12/2025  1:40 PM [facility]   2/18/2025  8:00 AM Silvestre Jc MD [facility]   3/3/2025 10:30 AM [facility] CHEMO CHAIR [facility]   3/6/2025 11:50 AM Tory Tellez MD [facility]       Outpatient Plan as outlined on AVS reviewed with patient.    For any urgent concerns, please contact our 24 hour nurse triage line: [phone] ([phone])       Chelsie Ayoub RN

## 2025-01-20 ENCOUNTER — INFUSION THERAPY VISIT (OUTPATIENT)
Dept: INFUSION THERAPY | Facility: HOSPITAL | Age: 80
End: 2025-01-20
Payer: COMMERCIAL

## 2025-01-20 ENCOUNTER — LAB REQUISITION (OUTPATIENT)
Dept: LAB | Facility: CLINIC | Age: 80
End: 2025-01-20
Payer: COMMERCIAL

## 2025-01-20 VITALS
DIASTOLIC BLOOD PRESSURE: 63 MMHG | TEMPERATURE: 98.1 F | HEART RATE: 88 BPM | RESPIRATION RATE: 18 BRPM | OXYGEN SATURATION: 99 % | SYSTOLIC BLOOD PRESSURE: 138 MMHG

## 2025-01-20 DIAGNOSIS — C22.0 HEPATOCELLULAR CARCINOMA (H): Primary | ICD-10-CM

## 2025-01-20 DIAGNOSIS — Z98.890 HISTORY OF VASCULAR ACCESS DEVICE: ICD-10-CM

## 2025-01-20 LAB
ALBUMIN SERPL BCG-MCNC: 3.3 G/DL (ref 3.5–5.2)
ALBUMIN UR-MCNC: 20 MG/DL
ALP SERPL-CCNC: 97 U/L (ref 40–150)
ALT SERPL W P-5'-P-CCNC: 44 U/L (ref 0–70)
ANION GAP SERPL CALCULATED.3IONS-SCNC: 11 MMOL/L (ref 7–15)
AST SERPL W P-5'-P-CCNC: 51 U/L (ref 0–45)
BILIRUB SERPL-MCNC: 0.4 MG/DL
BUN SERPL-MCNC: 41.8 MG/DL (ref 8–23)
CALCIUM SERPL-MCNC: 8.9 MG/DL (ref 8.8–10.4)
CHLORIDE SERPL-SCNC: 98 MMOL/L (ref 98–107)
CREAT SERPL-MCNC: 1.2 MG/DL (ref 0.67–1.17)
EGFRCR SERPLBLD CKD-EPI 2021: 62 ML/MIN/1.73M2
GLUCOSE SERPL-MCNC: 191 MG/DL (ref 70–99)
HCO3 SERPL-SCNC: 28 MMOL/L (ref 22–29)
POTASSIUM SERPL-SCNC: 3.9 MMOL/L (ref 3.4–5.3)
PROT SERPL-MCNC: 7.4 G/DL (ref 6.4–8.3)
SODIUM SERPL-SCNC: 137 MMOL/L (ref 135–145)
TSH SERPL DL<=0.005 MIU/L-ACNC: 3.87 UIU/ML (ref 0.3–4.2)

## 2025-01-20 PROCEDURE — 88342 IMHCHEM/IMCYTCHM 1ST ANTB: CPT | Performed by: INTERNAL MEDICINE

## 2025-01-20 PROCEDURE — 36591 DRAW BLOOD OFF VENOUS DEVICE: CPT | Performed by: INTERNAL MEDICINE

## 2025-01-20 PROCEDURE — 258N000003 HC RX IP 258 OP 636: Performed by: INTERNAL MEDICINE

## 2025-01-20 PROCEDURE — 88341 IMHCHEM/IMCYTCHM EA ADD ANTB: CPT | Performed by: INTERNAL MEDICINE

## 2025-01-20 PROCEDURE — 96413 CHEMO IV INFUSION 1 HR: CPT

## 2025-01-20 PROCEDURE — 96417 CHEMO IV INFUS EACH ADDL SEQ: CPT

## 2025-01-20 PROCEDURE — 84443 ASSAY THYROID STIM HORMONE: CPT | Performed by: INTERNAL MEDICINE

## 2025-01-20 PROCEDURE — 81003 URINALYSIS AUTO W/O SCOPE: CPT | Performed by: INTERNAL MEDICINE

## 2025-01-20 PROCEDURE — 82374 ASSAY BLOOD CARBON DIOXIDE: CPT | Performed by: INTERNAL MEDICINE

## 2025-01-20 PROCEDURE — 82947 ASSAY GLUCOSE BLOOD QUANT: CPT | Performed by: INTERNAL MEDICINE

## 2025-01-20 PROCEDURE — 250N000011 HC RX IP 250 OP 636: Performed by: INTERNAL MEDICINE

## 2025-01-20 PROCEDURE — 88323 CONSLTJ&REPRT MATRL PREP SLD: CPT | Performed by: INTERNAL MEDICINE

## 2025-01-20 RX ORDER — MEPERIDINE HYDROCHLORIDE 25 MG/ML
25 INJECTION INTRAMUSCULAR; INTRAVENOUS; SUBCUTANEOUS
Status: DISCONTINUED | OUTPATIENT
Start: 2025-01-20 | End: 2025-01-20 | Stop reason: HOSPADM

## 2025-01-20 RX ORDER — DIPHENHYDRAMINE HYDROCHLORIDE 50 MG/ML
25 INJECTION INTRAMUSCULAR; INTRAVENOUS
Status: DISCONTINUED | OUTPATIENT
Start: 2025-01-20 | End: 2025-01-20 | Stop reason: HOSPADM

## 2025-01-20 RX ORDER — DIPHENHYDRAMINE HYDROCHLORIDE 50 MG/ML
50 INJECTION INTRAMUSCULAR; INTRAVENOUS
Status: DISCONTINUED | OUTPATIENT
Start: 2025-01-20 | End: 2025-01-20 | Stop reason: HOSPADM

## 2025-01-20 RX ORDER — ALBUTEROL SULFATE 0.83 MG/ML
2.5 SOLUTION RESPIRATORY (INHALATION)
Status: DISCONTINUED | OUTPATIENT
Start: 2025-01-20 | End: 2025-01-20 | Stop reason: HOSPADM

## 2025-01-20 RX ORDER — METHYLPREDNISOLONE SODIUM SUCCINATE 40 MG/ML
40 INJECTION INTRAMUSCULAR; INTRAVENOUS
Status: DISCONTINUED | OUTPATIENT
Start: 2025-01-20 | End: 2025-01-20 | Stop reason: HOSPADM

## 2025-01-20 RX ORDER — EPINEPHRINE 1 MG/ML
0.3 INJECTION, SOLUTION INTRAMUSCULAR; SUBCUTANEOUS EVERY 5 MIN PRN
Status: DISCONTINUED | OUTPATIENT
Start: 2025-01-20 | End: 2025-01-20 | Stop reason: HOSPADM

## 2025-01-20 RX ORDER — ALBUTEROL SULFATE 90 UG/1
1-2 INHALANT RESPIRATORY (INHALATION)
Status: DISCONTINUED | OUTPATIENT
Start: 2025-01-20 | End: 2025-01-20 | Stop reason: HOSPADM

## 2025-01-20 RX ADMIN — SODIUM CHLORIDE 1300 MG: 9 INJECTION, SOLUTION INTRAVENOUS at 10:51

## 2025-01-20 RX ADMIN — ATEZOLIZUMAB 1200 MG: 1200 INJECTION, SOLUTION INTRAVENOUS at 09:52

## 2025-01-20 RX ADMIN — SODIUM CHLORIDE 250 ML: 9 INJECTION, SOLUTION INTRAVENOUS at 09:09

## 2025-01-20 ASSESSMENT — PAIN SCALES - GENERAL: PAINLEVEL_OUTOF10: NO PAIN (0)

## 2025-01-20 NOTE — PROGRESS NOTES
Infusion Nursing Note:  Janice Nowak presents today for C#1 D#1 treatment regimen.    Patient seen by provider today: No   present during visit today: Not Applicable.    Note: Patient assessed and vital signs stable. Family has several questions regarding testing that was sent for 2nd opinion. Message sent to RNCC Azul who stated she will connect with Dr. Gleason tomorrow and get back to patient and family. Administered treatment as ordered per provider. Tolerated well without issue. Patient has allergy to heparin. Port flushed with 20ml saline only and deaccessed (if issues with port clogging provider can order sodium citrate instead).    Intravenous Access:  Labs drawn without difficulty.  Implanted Port.    Treatment Conditions:  Lab Results   Component Value Date     01/20/2025    POTASSIUM 3.9 01/20/2025    MAG 2.0 12/15/2024    CR 1.20 (H) 01/20/2025    AYAN 8.9 01/20/2025    BILITOTAL 0.4 01/20/2025    ALBUMIN 3.3 (L) 01/20/2025    ALT 44 01/20/2025    AST 51 (H) 01/20/2025       Results reviewed, labs MET treatment parameters, ok to proceed with treatment.  Urine: met treatment parameter.      Post Infusion Assessment:  Patient tolerated infusions without incident.  Blood return noted pre and post infusion.  Site patent and intact, free from redness, edema or discomfort.  No evidence of extravasations.  Access discontinued per protocol.       Discharge Plan:   Patient and/or family verbalized understanding of discharge instructions and all questions answered.  Patient discharged in stable condition accompanied by: son and daughter-in-law.  Departure Mode: Wheelchair.      OLIVIA WADDELL RN

## 2025-01-21 ENCOUNTER — TELEPHONE (OUTPATIENT)
Dept: VASCULAR SURGERY | Facility: CLINIC | Age: 80
End: 2025-01-21
Payer: COMMERCIAL

## 2025-01-21 LAB
Lab: NORMAL
SPECIMEN STATUS: NORMAL
TEST NAME: NORMAL

## 2025-01-21 NOTE — TELEPHONE ENCOUNTER
Call from [place] at [facility] to see if there are any changes in wound plan of care since surgery was rescheduled to next week. Reviewed wound care plan current in chart from 1/15/25 and this was faxed over at her request to [phone].    Amanda Skelton RN, CWOCN

## 2025-01-22 LAB
BKR LAB AP ADD'L TEST STATUS: NORMAL
BKR PATH ADDL TEST FINAL COMMENTS: NORMAL

## 2025-01-24 RX ORDER — LOSARTAN POTASSIUM 50 MG/1
50 TABLET ORAL AT BEDTIME
COMMUNITY

## 2025-01-24 RX ORDER — SPIRONOLACTONE 25 MG/1
25 TABLET ORAL DAILY
COMMUNITY

## 2025-01-24 RX ORDER — MULTIVIT WITH MINERALS/LUTEIN
1000 TABLET ORAL 2 TIMES DAILY
COMMUNITY

## 2025-01-24 RX ORDER — ATORVASTATIN CALCIUM 80 MG/1
80 TABLET, FILM COATED ORAL AT BEDTIME
Status: ON HOLD | COMMUNITY
End: 2025-01-28

## 2025-01-24 RX ORDER — ERTUGLIFLOZIN 5 MG/1
1 TABLET, FILM COATED ORAL DAILY
Status: ON HOLD | COMMUNITY
End: 2025-01-28

## 2025-01-24 RX ORDER — IRBESARTAN 300 MG/1
300 TABLET ORAL DAILY
Status: ON HOLD | COMMUNITY
End: 2025-01-28

## 2025-01-26 ENCOUNTER — ANESTHESIA EVENT (OUTPATIENT)
Dept: SURGERY | Facility: HOSPITAL | Age: 80
End: 2025-01-26
Payer: COMMERCIAL

## 2025-01-27 ENCOUNTER — HOSPITAL ENCOUNTER (OUTPATIENT)
Facility: HOSPITAL | Age: 80
Discharge: HOME | End: 2025-01-28
Attending: PODIATRIST | Admitting: PODIATRIST
Payer: COMMERCIAL

## 2025-01-27 ENCOUNTER — ANESTHESIA (OUTPATIENT)
Dept: SURGERY | Facility: HOSPITAL | Age: 80
End: 2025-01-27
Payer: COMMERCIAL

## 2025-01-27 DIAGNOSIS — E11.621 DIABETIC ULCER OF RIGHT HEEL ASSOCIATED WITH TYPE 2 DIABETES MELLITUS, LIMITED TO BREAKDOWN OF SKIN (H): ICD-10-CM

## 2025-01-27 DIAGNOSIS — L97.411 DIABETIC ULCER OF RIGHT HEEL ASSOCIATED WITH TYPE 2 DIABETES MELLITUS, LIMITED TO BREAKDOWN OF SKIN (H): ICD-10-CM

## 2025-01-27 DIAGNOSIS — E11.51 TYPE 2 DIABETES MELLITUS WITH DIABETIC PERIPHERAL ANGIOPATHY WITHOUT GANGRENE, WITH LONG-TERM CURRENT USE OF INSULIN (H): Primary | ICD-10-CM

## 2025-01-27 DIAGNOSIS — Z79.4 TYPE 2 DIABETES MELLITUS WITH DIABETIC PERIPHERAL ANGIOPATHY WITHOUT GANGRENE, WITH LONG-TERM CURRENT USE OF INSULIN (H): Primary | ICD-10-CM

## 2025-01-27 PROBLEM — E03.9 HYPOTHYROIDISM, UNSPECIFIED: Status: RESOLVED | Noted: 2024-10-29 | Resolved: 2025-01-27

## 2025-01-27 LAB
ATRIAL RATE - MUSE: 70 BPM
DIASTOLIC BLOOD PRESSURE - MUSE: 70 MMHG
GLUCOSE BLDC GLUCOMTR-MCNC: 138 MG/DL (ref 70–99)
GLUCOSE BLDC GLUCOMTR-MCNC: 58 MG/DL (ref 70–99)
GLUCOSE BLDC GLUCOMTR-MCNC: 72 MG/DL (ref 70–99)
GLUCOSE BLDC GLUCOMTR-MCNC: 73 MG/DL (ref 70–99)
GLUCOSE BLDC GLUCOMTR-MCNC: 88 MG/DL (ref 70–99)
INTERPRETATION ECG - MUSE: NORMAL
P AXIS - MUSE: 65 DEGREES
PR INTERVAL - MUSE: 188 MS
QRS DURATION - MUSE: 170 MS
QT - MUSE: 484 MS
QTC - MUSE: 522 MS
R AXIS - MUSE: 82 DEGREES
SYSTOLIC BLOOD PRESSURE - MUSE: 153 MMHG
T AXIS - MUSE: 67 DEGREES
VENTRICULAR RATE- MUSE: 70 BPM

## 2025-01-27 PROCEDURE — 370N000017 HC ANESTHESIA TECHNICAL FEE, PER MIN: Performed by: PODIATRIST

## 2025-01-27 PROCEDURE — 710N000012 HC RECOVERY PHASE 2, PER MINUTE: Performed by: PODIATRIST

## 2025-01-27 PROCEDURE — 360N000075 HC SURGERY LEVEL 2, PER MIN: Performed by: PODIATRIST

## 2025-01-27 PROCEDURE — 710N000009 HC RECOVERY PHASE 1, LEVEL 1, PER MIN: Performed by: PODIATRIST

## 2025-01-27 PROCEDURE — 250N000011 HC RX IP 250 OP 636: Performed by: PODIATRIST

## 2025-01-27 PROCEDURE — 82962 GLUCOSE BLOOD TEST: CPT

## 2025-01-27 PROCEDURE — 272N000001 HC OR GENERAL SUPPLY STERILE: Performed by: PODIATRIST

## 2025-01-27 PROCEDURE — 64447 NJX AA&/STRD FEMORAL NRV IMG: CPT | Mod: RT,XU

## 2025-01-27 PROCEDURE — 99232 SBSQ HOSP IP/OBS MODERATE 35: CPT | Performed by: INTERNAL MEDICINE

## 2025-01-27 PROCEDURE — 258N000003 HC RX IP 258 OP 636: Performed by: STUDENT IN AN ORGANIZED HEALTH CARE EDUCATION/TRAINING PROGRAM

## 2025-01-27 PROCEDURE — 250N000011 HC RX IP 250 OP 636: Performed by: STUDENT IN AN ORGANIZED HEALTH CARE EDUCATION/TRAINING PROGRAM

## 2025-01-27 PROCEDURE — 250N000013 HC RX MED GY IP 250 OP 250 PS 637: Performed by: PODIATRIST

## 2025-01-27 PROCEDURE — 250N000011 HC RX IP 250 OP 636: Mod: JZ | Performed by: STUDENT IN AN ORGANIZED HEALTH CARE EDUCATION/TRAINING PROGRAM

## 2025-01-27 PROCEDURE — 250N000011 HC RX IP 250 OP 636: Performed by: NURSE ANESTHETIST, CERTIFIED REGISTERED

## 2025-01-27 PROCEDURE — 250N000025 HC SEVOFLURANE, PER MIN: Performed by: PODIATRIST

## 2025-01-27 PROCEDURE — 999N000141 HC STATISTIC PRE-PROCEDURE NURSING ASSESSMENT: Performed by: PODIATRIST

## 2025-01-27 PROCEDURE — 258N000001 HC RX 258: Performed by: PAIN MEDICINE

## 2025-01-27 PROCEDURE — 64445 NJX AA&/STRD SCIATIC NRV IMG: CPT | Mod: RT,XU

## 2025-01-27 PROCEDURE — 258N000003 HC RX IP 258 OP 636: Performed by: NURSE ANESTHETIST, CERTIFIED REGISTERED

## 2025-01-27 PROCEDURE — 250N000013 HC RX MED GY IP 250 OP 250 PS 637: Performed by: INTERNAL MEDICINE

## 2025-01-27 PROCEDURE — 250N000009 HC RX 250: Performed by: PODIATRIST

## 2025-01-27 PROCEDURE — 120N000013 HC R&B IMCU

## 2025-01-27 PROCEDURE — 250N000009 HC RX 250: Performed by: NURSE ANESTHETIST, CERTIFIED REGISTERED

## 2025-01-27 DEVICE — GRAFT EPIDERMIS MESHED THERASKIN CRYO 2X3" PER SQ CM- 39: Type: IMPLANTABLE DEVICE | Site: FOOT | Status: FUNCTIONAL

## 2025-01-27 DEVICE — GRAFT SKIN 26SQ CM EXTRACELLULAR 4X6.5CM PER SQ CM 26TS: Type: IMPLANTABLE DEVICE | Site: FOOT | Status: FUNCTIONAL

## 2025-01-27 DEVICE — GRAFT EPIDERMIS MESHED THERASKIN CRYO 1X2" PER SQ CM- 13: Type: IMPLANTABLE DEVICE | Site: FOOT | Status: FUNCTIONAL

## 2025-01-27 RX ORDER — PROCHLORPERAZINE MALEATE 5 MG/1
5 TABLET ORAL EVERY 6 HOURS PRN
Status: DISCONTINUED | OUTPATIENT
Start: 2025-01-27 | End: 2025-01-28 | Stop reason: HOSPADM

## 2025-01-27 RX ORDER — HYDROMORPHONE HCL IN WATER/PF 6 MG/30 ML
0.2 PATIENT CONTROLLED ANALGESIA SYRINGE INTRAVENOUS
Status: DISCONTINUED | OUTPATIENT
Start: 2025-01-27 | End: 2025-01-28 | Stop reason: HOSPADM

## 2025-01-27 RX ORDER — HYDROMORPHONE HCL IN WATER/PF 6 MG/30 ML
0.4 PATIENT CONTROLLED ANALGESIA SYRINGE INTRAVENOUS EVERY 5 MIN PRN
Status: DISCONTINUED | OUTPATIENT
Start: 2025-01-27 | End: 2025-01-27 | Stop reason: HOSPADM

## 2025-01-27 RX ORDER — ATORVASTATIN CALCIUM 40 MG/1
80 TABLET, FILM COATED ORAL AT BEDTIME
Status: DISCONTINUED | OUTPATIENT
Start: 2025-01-27 | End: 2025-01-28 | Stop reason: HOSPADM

## 2025-01-27 RX ORDER — DEXAMETHASONE SODIUM PHOSPHATE 4 MG/ML
4 INJECTION, SOLUTION INTRA-ARTICULAR; INTRALESIONAL; INTRAMUSCULAR; INTRAVENOUS; SOFT TISSUE
Status: DISCONTINUED | OUTPATIENT
Start: 2025-01-27 | End: 2025-01-27 | Stop reason: HOSPADM

## 2025-01-27 RX ORDER — PROPOFOL 10 MG/ML
INJECTION, EMULSION INTRAVENOUS PRN
Status: DISCONTINUED | OUTPATIENT
Start: 2025-01-27 | End: 2025-01-27

## 2025-01-27 RX ORDER — NALOXONE HYDROCHLORIDE 0.4 MG/ML
0.1 INJECTION, SOLUTION INTRAMUSCULAR; INTRAVENOUS; SUBCUTANEOUS
Status: DISCONTINUED | OUTPATIENT
Start: 2025-01-27 | End: 2025-01-27 | Stop reason: HOSPADM

## 2025-01-27 RX ORDER — ONDANSETRON 4 MG/1
4 TABLET, ORALLY DISINTEGRATING ORAL EVERY 30 MIN PRN
Status: DISCONTINUED | OUTPATIENT
Start: 2025-01-27 | End: 2025-01-27 | Stop reason: HOSPADM

## 2025-01-27 RX ORDER — AMOXICILLIN 250 MG
1 CAPSULE ORAL 2 TIMES DAILY
Status: DISCONTINUED | OUTPATIENT
Start: 2025-01-27 | End: 2025-01-28 | Stop reason: HOSPADM

## 2025-01-27 RX ORDER — NICOTINE POLACRILEX 4 MG
15-30 LOZENGE BUCCAL
Status: DISCONTINUED | OUTPATIENT
Start: 2025-01-27 | End: 2025-01-28 | Stop reason: HOSPADM

## 2025-01-27 RX ORDER — OXYCODONE HYDROCHLORIDE 5 MG/1
5-10 TABLET ORAL EVERY 4 HOURS PRN
Qty: 12 TABLET | Refills: 0 | Status: SHIPPED | OUTPATIENT
Start: 2025-01-27

## 2025-01-27 RX ORDER — SODIUM CHLORIDE, SODIUM LACTATE, POTASSIUM CHLORIDE, CALCIUM CHLORIDE 600; 310; 30; 20 MG/100ML; MG/100ML; MG/100ML; MG/100ML
INJECTION, SOLUTION INTRAVENOUS CONTINUOUS
Status: DISCONTINUED | OUTPATIENT
Start: 2025-01-27 | End: 2025-01-27 | Stop reason: HOSPADM

## 2025-01-27 RX ORDER — ONDANSETRON 2 MG/ML
4 INJECTION INTRAMUSCULAR; INTRAVENOUS EVERY 6 HOURS PRN
Status: DISCONTINUED | OUTPATIENT
Start: 2025-01-27 | End: 2025-01-28 | Stop reason: HOSPADM

## 2025-01-27 RX ORDER — NALOXONE HYDROCHLORIDE 0.4 MG/ML
0.4 INJECTION, SOLUTION INTRAMUSCULAR; INTRAVENOUS; SUBCUTANEOUS
Status: DISCONTINUED | OUTPATIENT
Start: 2025-01-27 | End: 2025-01-28 | Stop reason: HOSPADM

## 2025-01-27 RX ORDER — VIT C/E/ZN/COPPR/LUTEIN/ZEAXAN 60 MG-6 MG
1 CAPSULE ORAL 2 TIMES DAILY
Status: DISCONTINUED | OUTPATIENT
Start: 2025-01-27 | End: 2025-01-28 | Stop reason: HOSPADM

## 2025-01-27 RX ORDER — LIDOCAINE 40 MG/G
CREAM TOPICAL
Status: DISCONTINUED | OUTPATIENT
Start: 2025-01-27 | End: 2025-01-27 | Stop reason: HOSPADM

## 2025-01-27 RX ORDER — DEXTROSE MONOHYDRATE 25 G/50ML
25-50 INJECTION, SOLUTION INTRAVENOUS
Status: DISCONTINUED | OUTPATIENT
Start: 2025-01-27 | End: 2025-01-27

## 2025-01-27 RX ORDER — HYDROMORPHONE HCL IN WATER/PF 6 MG/30 ML
0.2 PATIENT CONTROLLED ANALGESIA SYRINGE INTRAVENOUS EVERY 5 MIN PRN
Status: DISCONTINUED | OUTPATIENT
Start: 2025-01-27 | End: 2025-01-27 | Stop reason: HOSPADM

## 2025-01-27 RX ORDER — LOSARTAN POTASSIUM 50 MG/1
50 TABLET ORAL AT BEDTIME
Status: DISCONTINUED | OUTPATIENT
Start: 2025-01-27 | End: 2025-01-28 | Stop reason: HOSPADM

## 2025-01-27 RX ORDER — OXYCODONE HYDROCHLORIDE 5 MG/1
10 TABLET ORAL EVERY 4 HOURS PRN
Status: DISCONTINUED | OUTPATIENT
Start: 2025-01-27 | End: 2025-01-28 | Stop reason: HOSPADM

## 2025-01-27 RX ORDER — ASPIRIN 81 MG/1
81 TABLET ORAL EVERY EVENING
Status: DISCONTINUED | OUTPATIENT
Start: 2025-01-27 | End: 2025-01-28 | Stop reason: HOSPADM

## 2025-01-27 RX ORDER — ONDANSETRON 2 MG/ML
INJECTION INTRAMUSCULAR; INTRAVENOUS PRN
Status: DISCONTINUED | OUTPATIENT
Start: 2025-01-27 | End: 2025-01-27

## 2025-01-27 RX ORDER — MECLIZINE HYDROCHLORIDE 25 MG/1
25 TABLET ORAL 3 TIMES DAILY PRN
Status: DISCONTINUED | OUTPATIENT
Start: 2025-01-27 | End: 2025-01-28 | Stop reason: HOSPADM

## 2025-01-27 RX ORDER — OXYCODONE HYDROCHLORIDE 5 MG/1
5 TABLET ORAL EVERY 4 HOURS PRN
Status: DISCONTINUED | OUTPATIENT
Start: 2025-01-27 | End: 2025-01-28 | Stop reason: HOSPADM

## 2025-01-27 RX ORDER — ONDANSETRON 2 MG/ML
4 INJECTION INTRAMUSCULAR; INTRAVENOUS EVERY 30 MIN PRN
Status: DISCONTINUED | OUTPATIENT
Start: 2025-01-27 | End: 2025-01-27 | Stop reason: HOSPADM

## 2025-01-27 RX ORDER — IRBESARTAN 300 MG/1
300 TABLET ORAL DAILY
Status: DISCONTINUED | OUTPATIENT
Start: 2025-01-28 | End: 2025-01-27

## 2025-01-27 RX ORDER — POLYETHYLENE GLYCOL 3350 17 G/17G
17 POWDER, FOR SOLUTION ORAL DAILY
Status: DISCONTINUED | OUTPATIENT
Start: 2025-01-28 | End: 2025-01-28 | Stop reason: HOSPADM

## 2025-01-27 RX ORDER — LIDOCAINE 40 MG/G
CREAM TOPICAL
Status: DISCONTINUED | OUTPATIENT
Start: 2025-01-27 | End: 2025-01-28 | Stop reason: HOSPADM

## 2025-01-27 RX ORDER — FLUMAZENIL 0.1 MG/ML
0.2 INJECTION, SOLUTION INTRAVENOUS
Status: DISCONTINUED | OUTPATIENT
Start: 2025-01-27 | End: 2025-01-27 | Stop reason: HOSPADM

## 2025-01-27 RX ORDER — FENTANYL CITRATE 50 UG/ML
25-100 INJECTION, SOLUTION INTRAMUSCULAR; INTRAVENOUS
Status: DISCONTINUED | OUTPATIENT
Start: 2025-01-27 | End: 2025-01-27 | Stop reason: HOSPADM

## 2025-01-27 RX ORDER — DEXAMETHASONE SODIUM PHOSPHATE 10 MG/ML
4 INJECTION, SOLUTION INTRAMUSCULAR; INTRAVENOUS
Status: DISCONTINUED | OUTPATIENT
Start: 2025-01-27 | End: 2025-01-27 | Stop reason: HOSPADM

## 2025-01-27 RX ORDER — CEFAZOLIN SODIUM/WATER 2 G/20 ML
2 SYRINGE (ML) INTRAVENOUS
Status: COMPLETED | OUTPATIENT
Start: 2025-01-27 | End: 2025-01-27

## 2025-01-27 RX ORDER — HYDROMORPHONE HCL IN WATER/PF 6 MG/30 ML
0.4 PATIENT CONTROLLED ANALGESIA SYRINGE INTRAVENOUS
Status: DISCONTINUED | OUTPATIENT
Start: 2025-01-27 | End: 2025-01-28 | Stop reason: HOSPADM

## 2025-01-27 RX ORDER — LEVOTHYROXINE SODIUM 25 UG/1
50 TABLET ORAL
Status: DISCONTINUED | OUTPATIENT
Start: 2025-01-28 | End: 2025-01-28 | Stop reason: HOSPADM

## 2025-01-27 RX ORDER — BUPIVACAINE HYDROCHLORIDE 5 MG/ML
INJECTION, SOLUTION EPIDURAL; INTRACAUDAL
Status: COMPLETED | OUTPATIENT
Start: 2025-01-27 | End: 2025-01-27

## 2025-01-27 RX ORDER — DEXTROSE MONOHYDRATE 25 G/50ML
25 INJECTION, SOLUTION INTRAVENOUS ONCE
Status: COMPLETED | OUTPATIENT
Start: 2025-01-27 | End: 2025-01-27

## 2025-01-27 RX ORDER — FENTANYL CITRATE 50 UG/ML
25 INJECTION, SOLUTION INTRAMUSCULAR; INTRAVENOUS EVERY 5 MIN PRN
Status: DISCONTINUED | OUTPATIENT
Start: 2025-01-27 | End: 2025-01-27 | Stop reason: HOSPADM

## 2025-01-27 RX ORDER — LIDOCAINE HYDROCHLORIDE 10 MG/ML
INJECTION, SOLUTION INFILTRATION; PERINEURAL PRN
Status: DISCONTINUED | OUTPATIENT
Start: 2025-01-27 | End: 2025-01-27

## 2025-01-27 RX ORDER — DEXTROSE MONOHYDRATE 25 G/50ML
25-50 INJECTION, SOLUTION INTRAVENOUS
Status: DISCONTINUED | OUTPATIENT
Start: 2025-01-27 | End: 2025-01-28 | Stop reason: HOSPADM

## 2025-01-27 RX ORDER — BUPIVACAINE HYDROCHLORIDE 5 MG/ML
INJECTION, SOLUTION PERINEURAL PRN
Status: DISCONTINUED | OUTPATIENT
Start: 2025-01-27 | End: 2025-01-27 | Stop reason: HOSPADM

## 2025-01-27 RX ORDER — BISACODYL 10 MG
10 SUPPOSITORY, RECTAL RECTAL DAILY PRN
Status: DISCONTINUED | OUTPATIENT
Start: 2025-01-30 | End: 2025-01-28 | Stop reason: HOSPADM

## 2025-01-27 RX ORDER — ONDANSETRON 4 MG/1
4 TABLET, ORALLY DISINTEGRATING ORAL EVERY 6 HOURS PRN
Status: DISCONTINUED | OUTPATIENT
Start: 2025-01-27 | End: 2025-01-28 | Stop reason: HOSPADM

## 2025-01-27 RX ORDER — NICOTINE POLACRILEX 4 MG
15-30 LOZENGE BUCCAL
Status: DISCONTINUED | OUTPATIENT
Start: 2025-01-27 | End: 2025-01-27

## 2025-01-27 RX ORDER — ACETAMINOPHEN 325 MG/1
975 TABLET ORAL EVERY 8 HOURS
Status: DISCONTINUED | OUTPATIENT
Start: 2025-01-27 | End: 2025-01-28 | Stop reason: HOSPADM

## 2025-01-27 RX ORDER — OXYCODONE HYDROCHLORIDE 5 MG/1
5 TABLET ORAL
Status: DISCONTINUED | OUTPATIENT
Start: 2025-01-27 | End: 2025-01-27 | Stop reason: HOSPADM

## 2025-01-27 RX ORDER — FENTANYL CITRATE 50 UG/ML
50 INJECTION, SOLUTION INTRAMUSCULAR; INTRAVENOUS EVERY 5 MIN PRN
Status: DISCONTINUED | OUTPATIENT
Start: 2025-01-27 | End: 2025-01-27 | Stop reason: HOSPADM

## 2025-01-27 RX ORDER — NALOXONE HYDROCHLORIDE 0.4 MG/ML
0.2 INJECTION, SOLUTION INTRAMUSCULAR; INTRAVENOUS; SUBCUTANEOUS
Status: DISCONTINUED | OUTPATIENT
Start: 2025-01-27 | End: 2025-01-28 | Stop reason: HOSPADM

## 2025-01-27 RX ORDER — CEFAZOLIN SODIUM/WATER 2 G/20 ML
2 SYRINGE (ML) INTRAVENOUS SEE ADMIN INSTRUCTIONS
Status: DISCONTINUED | OUTPATIENT
Start: 2025-01-27 | End: 2025-01-27 | Stop reason: HOSPADM

## 2025-01-27 RX ADMIN — BUPIVACAINE HYDROCHLORIDE 20 ML: 5 INJECTION, SOLUTION EPIDURAL; INTRACAUDAL; PERINEURAL at 15:00

## 2025-01-27 RX ADMIN — ACETAMINOPHEN 975 MG: 325 TABLET ORAL at 22:11

## 2025-01-27 RX ADMIN — FENTANYL CITRATE 50 MCG: 50 INJECTION, SOLUTION INTRAMUSCULAR; INTRAVENOUS at 14:53

## 2025-01-27 RX ADMIN — SENNOSIDES, DOCUSATE SODIUM 1 TABLET: 50; 8.6 TABLET, FILM COATED ORAL at 22:11

## 2025-01-27 RX ADMIN — DEXTROSE MONOHYDRATE 25 ML: 25 INJECTION, SOLUTION INTRAVENOUS at 18:20

## 2025-01-27 RX ADMIN — Medication 1 CAPSULE: at 22:23

## 2025-01-27 RX ADMIN — ATORVASTATIN CALCIUM 80 MG: 40 TABLET, FILM COATED ORAL at 22:24

## 2025-01-27 RX ADMIN — LOSARTAN POTASSIUM 50 MG: 50 TABLET, FILM COATED ORAL at 22:11

## 2025-01-27 RX ADMIN — ASPIRIN 81 MG: 81 TABLET, COATED ORAL at 22:24

## 2025-01-27 RX ADMIN — LIDOCAINE HYDROCHLORIDE 4 ML: 10 INJECTION, SOLUTION INFILTRATION; PERINEURAL at 16:04

## 2025-01-27 RX ADMIN — SODIUM CHLORIDE, POTASSIUM CHLORIDE, SODIUM LACTATE AND CALCIUM CHLORIDE: 600; 310; 30; 20 INJECTION, SOLUTION INTRAVENOUS at 13:53

## 2025-01-27 RX ADMIN — PHENYLEPHRINE HYDROCHLORIDE 100 MCG: 10 INJECTION INTRAVENOUS at 16:18

## 2025-01-27 RX ADMIN — PROPOFOL 120 MG: 10 INJECTION, EMULSION INTRAVENOUS at 16:04

## 2025-01-27 RX ADMIN — ONDANSETRON 4 MG: 2 INJECTION INTRAMUSCULAR; INTRAVENOUS at 16:26

## 2025-01-27 RX ADMIN — PHENYLEPHRINE HYDROCHLORIDE 0.2 MCG/KG/MIN: 10 INJECTION INTRAVENOUS at 16:21

## 2025-01-27 RX ADMIN — Medication 2 G: at 16:00

## 2025-01-27 RX ADMIN — BUPIVACAINE HYDROCHLORIDE 10 ML: 5 INJECTION, SOLUTION EPIDURAL; INTRACAUDAL at 15:05

## 2025-01-27 ASSESSMENT — ACTIVITIES OF DAILY LIVING (ADL)
ADLS_ACUITY_SCORE: 38
ADLS_ACUITY_SCORE: 59
ADLS_ACUITY_SCORE: 38
ADLS_ACUITY_SCORE: 59
ADLS_ACUITY_SCORE: 38
ADLS_ACUITY_SCORE: 39
ADLS_ACUITY_SCORE: 59
ADLS_ACUITY_SCORE: 38
ADLS_ACUITY_SCORE: 38

## 2025-01-27 ASSESSMENT — ENCOUNTER SYMPTOMS: SEIZURES: 0

## 2025-01-27 NOTE — INTERVAL H&P NOTE
"I have reviewed the surgical (or preoperative) H&P that is linked to this encounter, and examined the patient. There are no significant changes    Clinical Conditions Present on Arrival:  Clinically Significant Risk Factors Present on Admission        # Hypokalemia: Lowest K = 3.1 mmol/L in last 30 days, will replace as needed   # Hypochloremia: Lowest Cl = 92 mmol/L in last 30 days, will monitor as appropriate      # Hypoalbuminemia: Lowest albumin = 3.3 g/dL in the past 30 days , will monitor as appropriate   # Coagulation Defect: INR = 2.04 (Ref range: 0.85 - 1.15) and/or PTT = 46 Seconds (Ref range: 22 - 38 Seconds), will monitor for bleeding  # Drug Induced Platelet Defect: home medication list includes an antiplatelet medication      # DMII: A1C = N/A within past 6 months  # Overweight: Estimated body mass index is 28.89 kg/m  as calculated from the following:    Height as of 1/10/25: 1.727 m (5' 8\").    Weight as of 1/16/25: 86.2 kg (190 lb).       "

## 2025-01-27 NOTE — ANESTHESIA PREPROCEDURE EVALUATION
Anesthesia Pre-Procedure Evaluation    Patient: Janice Nowak   MRN: [id number] : 1945        Procedure : Procedure(s):  INCISION AND DRAINAGE bilateral lower extremities with application of TheraSkin graft          Past Medical History:   Diagnosis Date    Coronary artery disease     Diabetes (H)     Hypertension     Pleural effusion     Sleep apnea     Thyroid disease       Past Surgical History:   Procedure Laterality Date    BACK SURGERY      BONE EXOSTOSIS EXCISION Right 10/24/2024    Procedure: PARTIAL CALCANECTOMY RIGHT FOOT;  Surgeon: Amol Patricio DPM;  [place] [facility] [place] [place]    CHOLECYSTECTOMY      ENDOSCOPIC ULTRASOUND UPPER GASTROINTESTINAL TRACT (GI) N/A 2025    Procedure: ENDOSCOPIC ULTRASOUND, ESOPHAGOSCOPY / UPPER GASTROINTESTINAL TRACT (GI) with Fine Needle Aspiration;  Surgeon: Kong Medina MD;  Location: [facility] OR    INCISION AND DRAINAGE FOOT, COMBINED Right 10/24/2024    Procedure: INCISION AND DRAINAGE;  Surgeon: Amol Patricio DPM;  [place] [facility] [place]    IR CHEST PORT PLACEMENT > 5 YRS OF AGE  2025    IR LOWER EXTREMITY ANGIOGRAM LEFT  2025    IR LOWER EXTREMITY ANGIOGRAM RIGHT  2024    IRRIGATION AND DEBRIDEMENT FOOT, COMBINED Right 10/24/2024    Procedure: AND DEBRIDEMENT RIGHT HEEL,;  Surgeon: Amol aPtricio DPM;  [place] [facility] [place]    ORTHOPEDIC SURGERY      PICC SINGLE LUMEN PLACEMENT  10/30/2024    VASCULAR SURGERY        Allergies   Allergen Reactions    Exenatide Unknown    Heparin Unknown    Liraglutide Unknown    Lisinopril Cough    Morphine Unknown      Social History     Tobacco Use    Smoking status: Former     Current packs/day: 0.00     Types: Cigarettes     Quit date:      Years since quittin.1     Passive exposure: Never    Smokeless tobacco: Never   Substance Use Topics    Alcohol use: Not Currently     Alcohol/week: 5.0 standard drinks of alcohol     Types: 5 Cans of beer per week      Comment: once in awhile      Wt Readings from Last 1 Encounters:   01/16/25 86.2 kg (190 lb)        Anesthesia Evaluation   Pt has had prior anesthetic.     No history of anesthetic complications       ROS/MED HX  ENT/Pulmonary:     (+) sleep apnea,                                       Neurologic:  - neg neurologic ROS  (-) no seizures, no CVA and no TIA   Cardiovascular: Comment: 12/2024 TTE  The left ventricle is normal in size. There is mild concentric left  ventricular hypertrophy.  Left ventricular systolic function is normal. The visual ejection fraction is  55-60%. The inferior wall is mildly hypokinetic.  Grade II or moderate diastolic dysfunction.     The right ventricle is normal in size and function.  The left atrium is severely dilated. Right atrium not well visualized.  IVC diameter <2.1 cm collapsing >50% with sniff suggests a normal RA pressure  of 3 mmHg.  There is no comparison study available.      Syncope- pt did not complete stress test. Syncopal episode felt to be most likely related to orthostatic hypotension     PAD    (+)  hypertension- -  CAD -  - -      CHF       fainting (syncope).                         METS/Exercise Tolerance:     Hematologic:     (+)      anemia,          Musculoskeletal:       GI/Hepatic:    (-) GERD   Renal/Genitourinary:       Endo:     (+)  type II DM,        thyroid problem,         (-) Type I DM   Psychiatric/Substance Use:       Infectious Disease: Comment: Osteomyelitis       Malignancy:   (+) Malignancy, History of GI.    Other:            Physical Exam    Airway        Mallampati: III   TM distance: > 3 FB   Neck ROM: full   Mouth opening: > 3 cm    Respiratory Devices and Support         Dental       (+) Multiple visibly decayed, broken teeth    B=Bridge, C=Chipped, L=Loose, M=Missing    Cardiovascular   cardiovascular exam normal       Rhythm and rate: regular and normal     Pulmonary   pulmonary exam normal        breath sounds clear to auscultation  "          OUTSIDE LABS:  CBC:   Lab Results   Component Value Date    WBC 9.1 01/17/2025    WBC 11.8 (H) 01/16/2025    HGB 10.7 (L) 01/17/2025    HGB 10.7 (L) 01/16/2025    HCT 32.7 (L) 01/17/2025    HCT 33.3 (L) 01/16/2025     (L) 01/17/2025     01/16/2025     BMP:   Lab Results   Component Value Date     01/20/2025     01/17/2025    POTASSIUM 3.9 01/20/2025    POTASSIUM 3.4 01/17/2025    CHLORIDE 98 01/20/2025    CHLORIDE 100 01/17/2025    CO2 28 01/20/2025    CO2 29 01/17/2025    BUN 41.8 (H) 01/20/2025    BUN 32.2 (H) 01/17/2025    CR 1.20 (H) 01/20/2025    CR 0.91 01/17/2025    GLC 88 01/27/2025     (H) 01/20/2025     COAGS:   Lab Results   Component Value Date    PTT 46 (H) 01/16/2025    INR 2.04 (H) 01/16/2025     POC: No results found for: \"BGM\", \"HCG\", \"HCGS\"  HEPATIC:   Lab Results   Component Value Date    ALBUMIN 3.3 (L) 01/20/2025    PROTTOTAL 7.4 01/20/2025    ALT 44 01/20/2025    AST 51 (H) 01/20/2025    ALKPHOS 97 01/20/2025    BILITOTAL 0.4 01/20/2025     OTHER:   Lab Results   Component Value Date    LACT 1.1 01/17/2025    A1C 9.2 (H) 10/24/2024    AYAN 8.9 01/20/2025    PHOS 3.7 10/25/2024    MAG 2.0 12/15/2024    LIPASE 27 11/12/2024    AMYLASE 51 11/12/2024    TSH 3.87 01/20/2025    SED 77 (H) 11/20/2024       Anesthesia Plan    ASA Status:  3       Anesthesia Type: General.     - Airway: LMA   Induction: Intravenous.   Maintenance: Inhalation.        Consents    Anesthesia Plan(s) and associated risks, benefits, and realistic alternatives discussed. Questions answered and patient/representative(s) expressed understanding.     - Discussed: Risks, Benefits and Alternatives for BOTH SEDATION and the PROCEDURE were discussed     - Discussed with:  Patient            Postoperative Care    Pain management: IV analgesics, Oral pain medications, Peripheral nerve block (Single Shot).   PONV prophylaxis: Ondansetron (or other 5HT-3), Dexamethasone or Solumedrol " "    Comments:    Other Comments: Pt agrees to full code status for the gunnar op period            Skyler Hernadez MD    I have reviewed the pertinent notes and labs in the chart from the past 30 days and (re)examined the patient.  Any updates or changes from those notes are reflected in this note.    Clinically Significant Risk Factors Present on Admission                 # Drug Induced Platelet Defect: home medication list includes an antiplatelet medication   # Hypertension: Noted on problem list          # DMII: A1C = N/A within past 6 months    # Overweight: Estimated body mass index is 28.89 kg/m  as calculated from the following:    Height as of 1/10/25: 1.727 m (5' 8\").    Weight as of 1/16/25: 86.2 kg (190 lb).       # Financial/Environmental Concerns:             "

## 2025-01-27 NOTE — ANESTHESIA PROCEDURE NOTES
Airway       Patient location during procedure: OR  Staff -        CRNA: Chantal Sanchez APRN CRNA       Performed By: anesthesiologistIndications and Patient Condition       Indications for airway management: gunnar-procedural       Induction type:intravenous       Mask difficulty assessment: 1 - vent by mask    Final Airway Details       Final airway type: supraglottic airway    Supraglottic Airway Details        Type: LMA       Brand: Ambu AuraGain       LMA size: 5    Post intubation assessment        Placement verified by: capnometry, equal breath sounds and chest rise        Number of attempts at approach: 1       Number of other approaches attempted: 0       Secured with: tape       Ease of procedure: easy       Dentition: Unchanged and Intact

## 2025-01-27 NOTE — ANESTHESIA PROCEDURE NOTES
"Adductor canal Procedure Note    Pre-Procedure   Staff -        Anesthesiologist:  Skyler Hernadez MD       Performed By: anesthesiologist       Location: pre-op       Procedure Start/Stop Times: 1/27/2025 3:00 PM and 1/27/2025 3:05 PM       Pre-Anesthestic Checklist: patient identified, IV checked, site marked, risks and benefits discussed, informed consent, monitors and equipment checked, pre-op evaluation, at physician/surgeon's request and post-op pain management  Timeout:       Correct Patient: Yes        Correct Procedure: Yes        Correct Site: Yes        Correct Position: Yes        Correct Laterality: Yes        Site Marked: Yes  Procedure Documentation  Procedure: Adductor canal         Laterality: right       Patient Position: supine       Patient Prep/Sterile Barriers: mask       Skin prep: Chloraprep       Needle type: echogenic.       Needle Gauge: 20.        Needle Length (Inches): 4        Ultrasound guided       1. Ultrasound was used to identify targeted nerve, plexus, vascular marker, or fascial plane and place a needle adjacent to it in real-time.       2. Ultrasound was used to visualize the spread of anesthetic in close proximity to the above referenced structure.    Assessment/Narrative         The placement was negative for: blood aspirated and painful injection       Paresthesias: No.       Bolus given via needle..        Secured via.        Insertion/Infusion Method: Single Shot       Injection made incrementally with aspirations every 5 mL.    Medication(s) Administered   Bupivacaine 0.5% PF (Infiltration) - Infiltration   10 mL - 1/27/2025 3:05:00 PM  Medication Administration Time: 1/27/2025 3:00 PM     Comments:  Aspiration and injection every 5mL, normal resistance, local visualized spreading on US      FOR [facility] ([facility]) ONLY:   Pain Team Contact information: please page the Pain Team Via [employer]. Search \"Pain\". During daytime hours, please page the attending first. At " night please page the resident first.

## 2025-01-27 NOTE — ANESTHESIA POSTPROCEDURE EVALUATION
Patient: Janice Nowak    Procedure: Procedure(s):  INCISION AND DRAINAGE bilateral lower extremities with application of TheraSkin graft       Anesthesia Type:  General    Note:  Disposition: Inpatient   Postop Pain Control:             Sign Out: Well controlled pain   PONV:    Neuro/Psych:             Sign Out: Acceptable/Baseline neuro status   Airway/Respiratory:             Sign Out: Acceptable/Baseline resp. status   CV/Hemodynamics:             Sign Out: Acceptable CV status   Other NRE:    DID A NON-ROUTINE EVENT OCCUR? No           Last vitals:  Vitals Value Taken Time   BP     Temp 36.2  C (97.16  F) 01/27/25 1655   Pulse 74 01/27/25 1655   Resp 12 01/27/25 1655   SpO2 97 % 01/27/25 1655   Vitals shown include unfiled device data.    Electronically Signed By: Skyler Hernadez MD  January 27, 2025  4:57 PM

## 2025-01-27 NOTE — ANESTHESIA PROCEDURE NOTES
"Sciatic Procedure Note    Pre-Procedure   Staff -        Anesthesiologist:  Skyler Hernadez MD       Performed By: anesthesiologist       Location: pre-op       Procedure Start/Stop Times: 1/27/2025 2:55 PM and 1/27/2025 3:00 PM       Pre-Anesthestic Checklist: patient identified, IV checked, site marked, risks and benefits discussed, informed consent, monitors and equipment checked, pre-op evaluation, at physician/surgeon's request and post-op pain management  Timeout:       Correct Patient: Yes        Correct Procedure: Yes        Correct Site: Yes        Correct Position: Yes        Correct Laterality: Yes        Site Marked: Yes  Procedure Documentation  Procedure: Sciatic         Laterality: right       Patient Position: supine       Patient Prep/Sterile Barriers: mask       Skin prep: Chloraprep (popliteal approach).       Needle type: echogenic.       Needle Gauge: 20.        Needle Length (Inches): 4        Ultrasound guided       1. Ultrasound was used to identify targeted nerve, plexus, vascular marker, or fascial plane and place a needle adjacent to it in real-time.       2. Ultrasound was used to visualize the spread of anesthetic in close proximity to the above referenced structure.    Assessment/Narrative         The placement was negative for: blood aspirated and painful injection       Paresthesias: No.       Bolus given via needle..        Secured via.        Insertion/Infusion Method: Single Shot       Injection made incrementally with aspirations every 5 mL.    Medication(s) Administered   Bupivacaine 0.5% PF (Infiltration) - Infiltration   20 mL - 1/27/2025 3:00:00 PM  Medication Administration Time: 1/27/2025 2:55 PM     Comments:  Aspiration and injection every 5mL, normal resistance, local visualized spreading on the US      FOR [facility] ([facility]) ONLY:   Pain Team Contact information: please page the Pain Team Via [employer]. Search \"Pain\". During daytime hours, please page the attending " first. At night please page the resident first.

## 2025-01-27 NOTE — PROVIDER NOTIFICATION
Notified Dr. Nunez (Anesthesia) regarding blood sugar 58. Patient drank 118mL of apple juice. Pt asymptomatic.     Response: Check blood sugar in 30 minutes.

## 2025-01-27 NOTE — ANESTHESIA CARE TRANSFER NOTE
Patient: Janice Nowak    Procedure: Procedure(s):  INCISION AND DRAINAGE bilateral lower extremities with application of TheraSkin graft       Diagnosis: Diabetic ulcer of right heel associated with type 2 diabetes mellitus, limited to breakdown of skin (H) [E11.621, L97.411]  Diagnosis Additional Information: No value filed.    Anesthesia Type:   General     Note:    Oropharynx: oropharynx clear of all foreign objects and spontaneously breathing  Level of Consciousness: awake  Oxygen Supplementation: nasal cannula  Level of Supplemental Oxygen (L/min / FiO2): 2  Independent Airway: airway patency satisfactory and stable  Dentition: dentition unchanged  Vital Signs Stable: post-procedure vital signs reviewed and stable  Report to RN Given: handoff report given  Patient transferred to: PACU    Handoff Report: Identifed the Patient, Identified the Reponsible Provider, Reviewed the pertinent medical history, Discussed the surgical course, Reviewed Intra-OP anesthesia mangement and issues during anesthesia, Set expectations for post-procedure period and Allowed opportunity for questions and acknowledgement of understanding      Vitals:  Vitals Value Taken Time   /72    Temp 36.1  C (96.98  F) 01/27/25 1652   Pulse 74 01/27/25 1652   Resp 16 01/27/25 1652   SpO2 98 % 01/27/25 1652   Vitals shown include unfiled device data.    Electronically Signed By: SILVIA Guerra CRNA  January 27, 2025  4:54 PM

## 2025-01-27 NOTE — OP NOTE
Date: 1/27/2025    Surgeon: BRYAN Patricio DPM    Preoperative diagnosis:   1.  Diabetic ulceration right heel into subcutaneous tissue  2.  Ulceration right leg into muscle  3.  Diabetic ulceration left midfoot into muscle  4.  Diabetic ulceration left hallux into subcutaneous tissue    Postoperativediagnosis: Same    Procedure:   Excisional debridement ulceration right leg into muscle with application of TheraSkin (85818,38099)  Excisional debridement ulceration right heel with application of Theraskin (80316)  Excisional debridement ulceration left ankle with application of TheraSkin (34661)  Excisional debridement ulceration left hallux with application of TheraSkin (31945)  Wound prep right leg  Wound prep right heel  Wound prep left ankle  Wound prep left hallux     Anesthesia: General anesthesia with local     Hemostasis: None    Pathology:None    Injectables: None    Materials: Theraskin, 4-0 vicryl, Staples     Complications: None    Blood loss: 5 cc      Findings: Patient presents for operative intervention for wounds along the right leg, right heel, left ankle and left foot.  We discussed today's procedure to include sharp debridement of the wound and removal of all non-viable tissue with application of Theraskin. Conservative measures were attempted and exhausted. Patient questionsinvited and answered, including appropriate risk, benefits and complications. No guarantees given or implied. Patient has been NPO.    Description: Patient was brought to the operating room and placed on the table insupine position.  General anesthesia with popliteal block along the right lower extremity was administered by the anesthesia department.  Injection of 0.5% Marcaine plain was administered to surgical site left ankle and foot. The bilateral lower extremities were prepped and draped in usual aseptic manner and the following procedure was then performed: Attention was directed to the ulceration along the lateral aspect of  the right leg where nonviable tissue was identified.  At this time a #10 blade was then used to sharply excisionally debride the nonviable tissue and to level of muscle.  Next, a misonics device was then used to sharply debride the ulceration into the level of muscle.  The resulting ulceration measured 6 x 5 x 0.4 cm.  TheraSkin size 38.8 cm  ([id number], 12/15/2028) was prepared per  specifications.  The graft was placed on the wound bed per  specifications and secured with staples along the peripheral graft and center portion of the graft to the wound bed.  Next Adaptic was placed over the graft and secured with staples.    Next, #10 blade was then used to sharply excisionally debride the nonviable tissue along the ulceration posterior aspect of the right heel into the level of subcutaneous tissue.  The resulting ulceration measured 3 x 3 x 0.3 cm.  TheraSkin size 26 cm  ([id number], 1/10/2029) was prepared per  specifications.  The graft was placed on the wound bed per  specifications and secured with staples along the peripheral graft and center portion of the graft to the wound bed.  Next, Adaptic was applied over the graft and secured with staples.    Next, #10 blade was then used to sharply excisionally debride the ulceration with nonviable tissue along the anterior aspect of the left ankle into the level of muscle. The resulting ulceration measured 5 x 4 x 0.4 cm.  There is skin graft size 26 cm  ([id number], 12/18/2028) was prepared per  specifications.  The graft was placed on the wound bed per  specifications and secured with staples along the peripheral graft in the center portion of the graft to the wound bed.  Next, Adaptic was applied over the graft and secured with staples.    Next, #10 blade was then used to sharply excisionally debride the nonviable tissue along the ulceration dorsal left hallux into the level of  subcutaneous tissue.  The resulting ulceration measured 3 x 2 x 0.2 cm.  TheraSkin graft size 12.8 cm  ([id number], 7/25/2029) was prepared per  specifications.  The graft was placed on the wound bed per  specifications and secured with staples along the peripheral graft in the center portion of the graft to the wound bed.  Next Adaptic was applied over the graft and secured with staples.    Dressings consistent of moistened 4x4, 4x4's, ABD, kerlix/shayna roll and an ace wrap.     The patient appeared to tolerate all the procedures and anesthesia well without apparent complications. Patient wastransported from the operating room to the recovery room with vital signs stable and neurovascular status as it was pre-operatively to bilateral lower extremities. Patient to be discharged home per anesthesia. Written and verbal homecareinstructions given to remain non-weight bearing, keep the surgical dressing intact until the wound vac is applied either later today or tomorrow. Patient to follow up in office for post-operative management in one week.

## 2025-01-28 ENCOUNTER — APPOINTMENT (OUTPATIENT)
Dept: PHYSICAL THERAPY | Facility: HOSPITAL | Age: 80
End: 2025-01-28
Attending: PODIATRIST
Payer: COMMERCIAL

## 2025-01-28 ENCOUNTER — LAB REQUISITION (OUTPATIENT)
Dept: LAB | Facility: CLINIC | Age: 80
End: 2025-01-28
Payer: COMMERCIAL

## 2025-01-28 VITALS
HEIGHT: 68 IN | BODY MASS INDEX: 31.71 KG/M2 | DIASTOLIC BLOOD PRESSURE: 60 MMHG | HEART RATE: 71 BPM | OXYGEN SATURATION: 96 % | TEMPERATURE: 97.6 F | RESPIRATION RATE: 20 BRPM | SYSTOLIC BLOOD PRESSURE: 124 MMHG | WEIGHT: 209.22 LBS

## 2025-01-28 LAB
GLUCOSE BLDC GLUCOMTR-MCNC: 113 MG/DL (ref 70–99)
GLUCOSE BLDC GLUCOMTR-MCNC: 73 MG/DL (ref 70–99)
GLUCOSE BLDC GLUCOMTR-MCNC: 82 MG/DL (ref 70–99)

## 2025-01-28 PROCEDURE — 250N000013 HC RX MED GY IP 250 OP 250 PS 637: Performed by: INTERNAL MEDICINE

## 2025-01-28 PROCEDURE — 97161 PT EVAL LOW COMPLEX 20 MIN: CPT | Mod: GP

## 2025-01-28 PROCEDURE — 97530 THERAPEUTIC ACTIVITIES: CPT | Mod: GP

## 2025-01-28 PROCEDURE — 99239 HOSP IP/OBS DSCHRG MGMT >30: CPT | Performed by: INTERNAL MEDICINE

## 2025-01-28 PROCEDURE — 250N000009 HC RX 250: Performed by: INTERNAL MEDICINE

## 2025-01-28 PROCEDURE — 250N000013 HC RX MED GY IP 250 OP 250 PS 637: Performed by: PODIATRIST

## 2025-01-28 PROCEDURE — 250N000012 HC RX MED GY IP 250 OP 636 PS 637: Performed by: INTERNAL MEDICINE

## 2025-01-28 RX ORDER — INSULIN GLARGINE 100 [IU]/ML
30 INJECTION, SOLUTION SUBCUTANEOUS EVERY MORNING
Status: SHIPPED
Start: 2025-01-28

## 2025-01-28 RX ADMIN — Medication 1 CAPSULE: at 08:40

## 2025-01-28 RX ADMIN — ANTICOAGULANT CITRATE DEXTROSE SOLUTION FORMULA A 5 ML: 12.25; 11; 3.65 SOLUTION INTRAVENOUS at 11:51

## 2025-01-28 RX ADMIN — INSULIN GLARGINE 20 UNITS: 100 INJECTION, SOLUTION SUBCUTANEOUS at 09:44

## 2025-01-28 RX ADMIN — ACETAMINOPHEN 975 MG: 325 TABLET ORAL at 07:01

## 2025-01-28 RX ADMIN — LEVOTHYROXINE SODIUM 50 MCG: 0.03 TABLET ORAL at 07:01

## 2025-01-28 RX ADMIN — SENNOSIDES, DOCUSATE SODIUM 1 TABLET: 50; 8.6 TABLET, FILM COATED ORAL at 08:40

## 2025-01-28 ASSESSMENT — ACTIVITIES OF DAILY LIVING (ADL)
ADLS_ACUITY_SCORE: 39
ADLS_ACUITY_SCORE: 39
ADLS_ACUITY_SCORE: 43
ADLS_ACUITY_SCORE: 43
ADLS_ACUITY_SCORE: 39
DEPENDENT_IADLS:: INDEPENDENT
ADLS_ACUITY_SCORE: 39

## 2025-01-28 NOTE — PROVIDER NOTIFICATION
Reached out to swing MD Morales) via text to request   Code status orders at 4000. Still awaiting response.

## 2025-01-28 NOTE — PROGRESS NOTES
[facility]    Hospitalist Progress Note    Assessment & Plan   79 year old male who was admitted on 1/27/2025 after elective foot surgery.     Impression:   Principal Problem:    Diabetic ulcer of right heel -- S/P Surg 1/27/25  Active Problems:    Obstructive Sleep Apnea -- stopped using CPAP    Hypertension    DM 2 w PVD on Insulin, Hgb A1C9.2 10/24/24      Plan:  Continue postop care, will reduce AM Lantus from 50 units to 30 units to avoid hypoglycemia.     DVT Prophylaxis: Low Risk/Ambulatory with no VTE prophylaxis indicated  Code Status: Prior    Disposition: Expected discharge to home in about 3 days, has home care and son planning to help.  His wife has dementia and lives in a NH.  Will have input from PT and OT.     Marcio Turpin MD  Pager [phone]  Cell Phone [phone]  Text Page (7am to 6pm)    Interval History   Feels OK, had low glucose this AM .    Physical Exam   Temp: 97.9  F (36.6  C) Temp src: Oral BP: 137/63 Pulse: 86   Resp: 18 SpO2: 94 % O2 Device: None (Room air) Oxygen Delivery: 1 LPM  Vitals:    01/27/25 1437 01/27/25 2034   Weight: 88.1 kg (194 lb 3.6 oz) 94.9 kg (209 lb 3.5 oz)     Vital Signs with Ranges  Temp:  [97  F (36.1  C)-98.1  F (36.7  C)] 97.9  F (36.6  C)  Pulse:  [72-86] 86  Resp:  [7-20] 18  BP: (123-179)/(61-84) 137/63  SpO2:  [94 %-98 %] 94 %  No intake/output data recorded.    # Pain Assessment:      1/27/2025     8:53 PM   Current Pain Score   Patient currently in pain? [email or web address]   Janice s pain level was assessed and he currently denies pain.        Constitutional: Awake, alert, cooperative, no apparent distress  Respiratory: Clear to auscultation bilaterally, no crackles or wheezing  Cardiovascular: Regular rate and rhythm, normal S1 and S2, and no murmur noted  GI: Normal bowel sounds, soft, non-distended, non-tender  Extrem: No calf tenderness, no ankle edema  Neuro: Ox3, no focal motor deficits -- both feet ace wrapped      Medications   Current Facility-Administered Medications   Medication Dose Route Frequency Provider Last Rate Last Admin     Current Facility-Administered Medications   Medication Dose Route Frequency Provider Last Rate Last Admin    acetaminophen (TYLENOL) tablet 975 mg  975 mg Oral Q8H Amol Patricio DPM        anticoagulant citrate flush 5-10 mL  5-10 mL Intracatheter Q28 Days Marcio Knutson MD        aspirin EC tablet 81 mg  81 mg Oral QPM Marcio Knutson MD        atorvastatin (LIPITOR) tablet 80 mg  80 mg Oral At Bedtime Marcio Knutson MD        [START ON 1/28/2025] insulin aspart (NovoLOG) injection (RAPID ACTING)  1-10 Units Subcutaneous TID AC Marcio Knutson MD        insulin aspart (NovoLOG) injection (RAPID ACTING)  1-7 Units Subcutaneous At Bedtime Marcio Knutson MD        [START ON 1/28/2025] insulin glargine (LANTUS PEN) injection 30 Units  30 Units Subcutaneous QAM Marcio Knutson MD        [START ON 1/28/2025] irbesartan (AVAPRO) tablet 300 mg  300 mg Oral Daily Marcio Knutson MD        [START ON 1/28/2025] levothyroxine (SYNTHROID/LEVOTHROID) tablet 50 mcg  50 mcg Oral QAM  Marcio Knutson MD        losartan (COZAAR) tablet 50 mg  50 mg Oral At Bedtime Marcio Knutson MD        multivitamin  with lutein (OCUVITE WITH LUTEIN) per capsule 1 capsule  1 capsule Oral BID Marcio Knutson MD        [START ON 1/28/2025] polyethylene glycol (MIRALAX) Packet 17 g  17 g Oral Daily Amol Patricio DPM        senna-docusate (SENOKOT-S/PERICOLACE) 8.6-50 MG per tablet 1 tablet  1 tablet Oral BID Amol Patricio DPM        sodium chloride (PF) 0.9% PF flush 10-20 mL  10-20 mL Intracatheter Q28 Days Marcio Knutson MD        sodium chloride (PF) 0.9% PF flush 3 mL  3 mL Intracatheter Q8H Amol Patricio DPM           Data   Recent Labs   Lab 01/27/25  1845 01/27/25  5506  "01/27/25  1741   [facility] 138* [phone]*       Imaging:   Recent Results (from the past 24 hours)   POC US Guidance Needle Placement    Narrative    Ultrasound was performed as guidance to an anesthesia procedure.  Click   \"PACS images\" hyperlink below to view any stored images.  For specific   procedure details, view procedure note authored by anesthesia.   POC US Guidance Needle Placement    Narrative    Ultrasound was performed as guidance to an anesthesia procedure.  Click   \"PACS images\" hyperlink below to view any stored images.  For specific   procedure details, view procedure note authored by anesthesia.      "

## 2025-01-28 NOTE — PLAN OF CARE
"Patient arrived on unit from [facility] around 2030. Patient alert and oriented but \"tired\". Patient offered food and beverage ,but declined. Blood glucose check 72 Juice given. Patient was reported to have had a hypoglycemic event earlier today. Continue to monitor and treat. Patient with both feet elevated and resting comfortably.  Problem: Adult Inpatient Plan of Care  Goal: Absence of Hospital-Acquired Illness or Injury  Intervention: Identify and Manage Fall Risk  Recent Flowsheet Documentation  Taken 1/27/2025 2047 by Joi Forbes, RN  Safety Promotion/Fall Prevention:   activity supervised   assistive device/personal items within reach   clutter free environment maintained   mobility aid in reach   nonskid shoes/slippers when out of bed   safety round/check completed     Problem: Infection  Goal: Absence of Infection Signs and Symptoms  Outcome: Progressing     Problem: Mobility Impairment  Goal: Optimal Mobility  Outcome: Not Progressing  Intervention: Optimize Mobility  Recent Flowsheet Documentation  Taken 1/27/2025 2047 by Joi Forbes, RN  Activity Management:   activity adjusted per tolerance   bedrest     Problem: Pain Acute  Goal: Optimal Pain Control and Function  Outcome: Progressing   Goal Outcome Evaluation:                        "

## 2025-01-28 NOTE — CONSULTS
Care Management Initial Consult    General Information  Assessment completed with: Patient,    Type of CM/SW Visit: Initial Assessment    Primary Care Provider verified and updated as needed: Yes   Readmission within the last 30 days: no previous admission in last 30 days      Reason for Consult: discharge planning  Advance Care Planning: Advance Care Planning Reviewed: no concerns identified          Communication Assessment  Patient's communication style: spoken language (English or Bilingual)    Hearing Difficulty or Deaf: no   Wear Glasses or Blind: yes    Cognitive  Cognitive/Neuro/Behavioral: WDL                      Living Environment:   People in home: facility resident     Current living Arrangements: independent living facility      Able to return to prior arrangements: yes  Living Arrangement Comments[place]; [place] same location    Family/Social Support:  Care provided by: self, homecare agency  Provides care for: no one  Marital Status:   Support system: Children, Facility resident(s)/Staff          Description of Support System: Supportive    Support Assessment: Adequate family and caregiver support    Current Resources:   Patient receiving home care services: Yes  Skilled Home Care Services: Skilled Nursing     Community Resources: None  Equipment currently used at home: wheelchair, manual  Supplies currently used at home: Wound Care Supplies    Employment/Financial:  Employment Status: retired        Financial Concerns:             Does the patient's insurance plan have a 3 day qualifying hospital stay waiver?  No    Lifestyle & Psychosocial Needs:  Social Drivers of Health     Food Insecurity: Low Risk  (1/27/2025)    Food Insecurity     Within the past 12 months, did you worry that your food would run out before you got money to buy more?: No     Within the past 12 months, did the food you bought just not last and you didn t have money to get more?: No   Depression: Not at  risk (11/13/2024)    PHQ-2     PHQ-2 Score: 0   Housing Stability: Low Risk  (1/27/2025)    Housing Stability     Do you have housing? : Yes     Are you worried about losing your housing?: No   Tobacco Use: Medium Risk (1/27/2025)    Patient History     Smoking Tobacco Use: Former     Smokeless Tobacco Use: Never     Passive Exposure: Never   Financial Resource Strain: Low Risk  (1/27/2025)    Financial Resource Strain     Within the past 12 months, have you or your family members you live with been unable to get utilities (heat, electricity) when it was really needed?: No   Alcohol Use: Not on file   Transportation Needs: Low Risk  (1/27/2025)    Transportation Needs     Within the past 12 months, has lack of transportation kept you from medical appointments, getting your medicines, non-medical meetings or appointments, work, or from getting things that you need?: No   Physical Activity: Not on file   Interpersonal Safety: Low Risk  (1/27/2025)    Interpersonal Safety     Do you feel physically and emotionally safe where you currently live?: Yes     Within the past 12 months, have you been hit, slapped, kicked or otherwise physically hurt by someone?: No     Within the past 12 months, have you been humiliated or emotionally abused in other ways by your partner or ex-partner?: No   Stress: Not on file   Social Connections: Not on file   Health Literacy: Not on file       Functional Status:  Prior to admission patient needed assistance:   Dependent ADLs:: Wheelchair-independent  Dependent IADLs:: Independent       Mental Health Status:  Mental Health Status: No Current Concerns       Chemical Dependency Status:  Chemical Dependency Status: No Current Concerns             Values/Beliefs:  Spiritual, Cultural Beliefs, [employer] Practices, Values that affect care: no               Discussed  Partnership in Safe Discharge Planning  document with patient/family: No    Additional Information:    CM/SW initial assessment.   Met w/patient in his room; he is alert, answers all assessment questions appropriately.  He reports living at [facility] in [facility]; his spouse lives in  at this location.  Confirms has had home care for RN wound care management [facility], requests this to continue per need.  He reports he is hopeful to discharge home today; confirms family will provide ride when able.      Referrals sent to [facility] per pt request to resume home RN for continued wound care upon discharge.      Next Steps:     Waiting completed orders from MD for discharge.    Gia Dailey, BSW

## 2025-01-28 NOTE — PLAN OF CARE
"  Problem: Adult Inpatient Plan of Care  Goal: Optimal Comfort and Wellbeing  Outcome: Progressing     Problem: Glycemic Control Impaired  Goal: Minimize Risk of Hypoglycemia  Outcome: Progressing     Problem: Infection  Goal: Absence of Infection Signs and Symptoms  Outcome: Progressing   Goal Outcome Evaluation:       Pt A&OX4, on RA, denies pain, on 2L NC over night. Ace wraps clean, dry and intact. Pt did void this shift, using bedside urinal.  No significant event over night. /58 (BP Location: Right arm)   Pulse 74   Temp 97.8  F (36.6  C) (Oral)   Resp 20   Ht 1.727 m (5' 8\")   Wt 94.9 kg (209 lb 3.5 oz)   SpO2 94%   BMI 31.81 kg/m                    "

## 2025-01-28 NOTE — PROGRESS NOTES
[facility]                                                                                   OUTPATIENT PHYSICAL THERAPY    PLAN OF TREATMENT FOR OUTPATIENT REHABILITATION   Patient's Last Name, First Name, Janice Funez YOB: 1945   Provider's Name   [facility]   Medical Record No.  [id number]     Onset Date: 01/27/25 Start of Care Date: 01/28/25     Medical Diagnosis:  Diabetic ulcer of right heel -- S/P Surg 1/27/25               PT Diagnosis:  Impaired functional mobility Certification Dates:  From: 01/28/25  To: 02/04/25       See note for plan of treatment, functional goals, and certification details.    I CERTIFY THE NEED FOR THESE SERVICES FURNISHED UNDER        THIS PLAN OF TREATMENT AND WHILE UNDER MY CARE (Physician co-signature of this document indicates review and certification of the therapy plan).             PT Evaluation   01/28/25 0908   Appointment Info   Signing Clinician's Name / Credentials (PT) Isabella Barger PT, DPT   Rehab Comments (PT) per RN, who stated she was present in PACU with Dr. Patricio & pt, stated Dr. Patricio stated pt could weightbear through LLE for transfers. Pt to remain NWB RLE.   Living Environment   People in Home alone   Current Living Arrangements independent living facility   Home Accessibility wheelchair accessible   Transportation Anticipated family or friend will provide   Self-Care   Usual Activity Tolerance moderate   Current Activity Tolerance fair   Equipment Currently Used at Home wheelchair, manual   Fall history within last six months no  (however reports he did have a syncopal episode after a recent surgery)   Activity/Exercise/Self-Care Comment IND with ADLs. Has home RN. Has had home therapies. pivot transfers to/from  at home. has been NWB RLE for months now, typically wears PRAFO on RLE but will not fit with current dressing s/p surgery.   General Information   Onset  "of Illness/Injury or Date of Surgery 01/27/25   Referring Physician Amol Patricio DPM   Patient/Family Therapy Goals Statement (PT) Go home   Pertinent History of Current Problem (include personal factors and/or comorbidities that impact the POC) Per chart: \"79 year old male who was admitted on 1/27/2025 after elective foot surgery.      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 Patricio & pt, stated Dr. Patricio stated pt could weightbear through LLE for transfers)   Weight-Bearing Status - RLE nonweight-bearing   Cognition   Affect/Mental Status (Cognition) WNL   Follows Commands (Cognition) WNL   Pain Assessment   Patient Currently in Pain No   Integumentary/Edema   Integumentary/Edema other (describe)   Integumentary/Edema Comments BLEs wrapped, R PRAFO unable to fit at this time d/t dressing   Posture    Posture Forward head position   Range of Motion (ROM)   Range of Motion ROM deficits secondary to surgical procedure   Strength (Manual Muscle Testing)   Strength (Manual Muscle Testing) Deficits observed during functional mobility   Bed Mobility   Bed Mobility supine-sit;sit-supine   Supine-Sit [place] (Bed Mobility) modified independence   Sit-Supine [place] (Bed Mobility) modified independence   Bed Mobility Limitations decreased ability to use legs for bridging/pushing   Impairments Contributing to Impaired Bed Mobility other (see comments)  (WB precautions)   Assistive Device (Bed Mobility) bed rails   Transfers   Transfers bed-chair transfer   Maintains Weight-bearing Status (Transfers) verbal cues to maintain;able to maintain   Transfer Safety Concerns Noted " decreased balance during turns   Impairments Contributing to Impaired Transfers impaired balance;decreased strength   Bed-Chair Transfer   Bed-Chair [place] (Transfers) supervision;verbal cues;nonverbal cues (demo/gesture)   Comment, (Bed-Chair Transfer) able to maintain NWB RLE with reminders with first squat pivot transfer. However, on following transfers begins to stand himself with BLEs and then elevate RLE, needs reminders to fully maintain NWB RLE.   Gait/Stairs (Locomotion)   [place] Level (Gait) not tested   Comment, (Gait/Stairs) Pt unable to amb, has been using wc   Balance   Balance other (describe)   Balance Comments SBA for safety with squat pivot transfer. IND sitting EOB balance.   Clinical Impression   Criteria for Skilled Therapeutic Intervention Yes, treatment indicated   PT Diagnosis (PT) Impaired functional mobility   Influenced by the following impairments Impaired strength, balance, activity tolerance   Functional limitations due to impairments transfers, gait   Clinical Presentation (PT Evaluation Complexity) stable   Clinical Presentation Rationale Clinical judgment   Clinical Decision Making (Complexity) low complexity   Planned Therapy Interventions (PT) balance training;gait training;home exercise program;neuromuscular re-education;patient/family education;stair training;strengthening;stretching;transfer training;progressive activity/exercise;home program guidelines   Risk & Benefits of therapy have been explained evaluation/treatment results reviewed;participants included;patient;participants voiced agreement with care plan   PT Total Evaluation Time   PT Eval, Low Complexity Minutes (27457) 10   Physical Therapy Goals   PT Frequency Daily   PT Predicted Duration/Target Date for Goal Attainment 02/04/25   PT Goals Transfers;Gait;PT Goal 1;Wheelchair Mobility   PT: Transfers Independent;Bed to/from chair;Within precautions   PT: Gait Supervision/stand-by assist;5 feet;Rolling  walker;Within precautions   PT: Wheelchair Mobility 50 feet;Caregiver SBA;manual wheelchair   PT: Goal 1 Pt will be IND with seated HEP to increase functional strength to improve safety with transfers.   Interventions   Interventions Quick Adds Therapeutic Activity   Therapeutic Activity   Therapeutic Activities: dynamic activities to improve functional performance Minutes (44733) 14   Symptoms Noted During/After Treatment None   Treatment Detail/Skilled Intervention ModA for brief management with cues to grab on to railing for support in standing, reminders for fully maintaining NWB RLE as pt with tendency to first push through BLEs to stand and then lift RLE from floor. SBA sit<>stand manual wc<>railing. Pt reporting feeling good sitting up in manual wc. BP taken in sitting - first 91/54 mmHg, taken again 81/53 mmHg - pt denies any dizziness. Facilitated squat pivot wc>eob SBA, pt needing reminders for NWB RLE, able to maintain with cues. Cues for technique to half bridge to scoot up in bed with NWB RLE SBA. Educated on use of call light to have staff present for transfers for safety. Pt left supine w call light & all other needs in reach, bed alarm engaged. Handoff to MD. PT left message for RN regarding low bps.   PT Discharge Planning   PT Plan Monitor BPs - NWB RLE, can pivot with LLE per RN. - transfers, seated ex HEP, wc mobility (his wc in room)   PT Discharge Recommendation (DC Rec) home with assist;home with home care physical therapy   PT Rationale for DC Rec Patient needing reminders for NWB RLE - with reminders, able to maintain for squat pivot transfers. Recommend home PT to help ensure adherence to precautions and to progress safety and IND with mobility.   PT Brief overview of current status SBA squat pivot - NWB RLE   PT Total Distance Amb During Session (feet) 0   PT Equipment Needed at Discharge   (already has wc)   Physical Therapy Time and Intention   Timed Code Treatment Minutes 14   Total  Session Time (sum of timed and untimed services) 24

## 2025-01-28 NOTE — PHARMACY-ADMISSION MEDICATION HISTORY
Pharmacist Admission Medication History    Admission medication history is complete. The information provided in this note is only as accurate as the sources available at the time of the update.    Information Source(s): Patient, Clinic records, and CareEverywhere/SureScripts via in-person    Pertinent Information: Patient gets meds from [facility]. In November he had an appointment where they discontinued irbesartan and amlodipine and started losartan.   For patients on insulin therapy:  Do you use sliding scale insulin based on blood sugars? Yes, but feels he hasn't needed novolog in a while so hasn't been using in weeks.   Do you typically eat three meals a day? No  How many times do you check your blood glucose per day? 2-3 times daily  How many episodes of hypoglycemia do you typically have per month? 2-3      Changes made to PTA medication list:  Added: None  Deleted: Irbesartan(patient on losartan), atorvastatin(patient on rosuvastatin), ertugliflozin (patient on jardiance)  Changed: Metformin changed from 500mg 2BID to 1000mg tablet BID    Allergies reviewed with patient and updates made in EHR: yes    Medication History Completed By: Christiano Edwards RPH 1/28/2025 8:58 AM    PTA Med List   Medication Sig Last Dose/Taking    aspirin 81 MG EC tablet Take 81 mg by mouth every evening. Past Week    clopidogrel (PLAVIX) 75 MG tablet Take 75 mg by mouth every morning. Past Week    co-enzyme Q-10 100 MG CAPS capsule Take 200 mg by mouth every evening. Past Week    empagliflozin (JARDIANCE) 10 MG TABS tablet Take 1 tablet (10 mg) by mouth daily. Past Week    guaiFENesin (MUCUS RELIEF ER PO) Take 1,200 mg by mouth 2 times daily as needed. Past Week    insulin aspart (NOVOLOG PEN) 100 UNIT/ML pen Inject 5 Units subcutaneously 3 times daily as needed for high blood sugar (With Meals). Novolog Flexpen: 5 units with breakfast, 5 units with lunch, 5 units with dinner. Takes as needed with each meal. Past Month    insulin  glargine (LANTUS VIAL) 100 UNIT/ML vial Inject 50 Units subcutaneously every morning. 1/27/2025    lactobacillus rhamnosus, GG, (CULTURELL) capsule Take 1 capsule by mouth every morning. Past Week    levothyroxine (SYNTHROID/LEVOTHROID) 50 MCG tablet Take 50 mcg by mouth every morning. 1/27/2025 at  6:00 AM    losartan (COZAAR) 50 MG tablet Take 50 mg by mouth at bedtime. Past Week    meclizine (ANTIVERT) 25 MG tablet Take 50 mg by mouth 3 times daily as needed (Vertigo). Past Month    metFORMIN (GLUCOPHAGE) 1000 MG tablet Take 1,000 mg by mouth 2 times daily (with meals). Past Week    Multiple Vitamins-Minerals (PRESERVISION AREDS 2 PO) Take 2 capsules by mouth 2 times daily. Past Week    oxyCODONE (ROXICODONE) 5 MG tablet Take 1-2 tablets (5-10 mg) by mouth every 4 hours as needed for moderate to severe pain. Taking As Needed    rosuvastatin (CRESTOR) 40 MG tablet Take 40 mg by mouth daily. Past Week    spironolactone (ALDACTONE) 25 MG tablet Take 25 mg by mouth daily. Past Week    torsemide (DEMADEX) 10 MG tablet Take 1 tablet (10 mg) by mouth daily. Past Week    vitamin C (ASCORBIC ACID) 1000 MG TABS Take 1,000 mg by mouth 2 times daily. Past Week    vitamin C with B complex (B COMPLEX-C) tablet Take 1 tablet by mouth every morning. Past Week

## 2025-01-28 NOTE — PROVIDER NOTIFICATION
Blood sugar recheck was 73. Pt doesn't feel like they can drink another apple juice. Notified Dr. Nunez (anesthesia).     Response: D50 25mL. Then check blood sugar in 30 minutes after administration.

## 2025-01-28 NOTE — DISCHARGE SUMMARY
[facility]    Discharge Summary  Hospitalist    Date of Admission:  1/27/2025  Date of Discharge:  1/28/2025  Discharging Provider: Marcio Turpin MD, MD    Discharge Diagnoses   Principal Problem:    Diabetic ulcer of right heel -- S/P Debridement 1/27/25      Left Ankle Ulcer -- S/P debridement 1/27/25      Obstructive Sleep Apnea -- stopped using CPAP      Hypertension      DM 2 w PVD on Insulin, Hgb A1C9.2 10/24/24      History of Present Illness   79 year old male with DM type 2 and right heel ulcer, and left ankle ulcer, who was admitted on 1/27/2025 after elective bilateral foot surgery.     Hospital Course   Dr. Patricio, podiatry, performed:  Excisional debridement ulceration right leg into muscle with application of TheraSkin (48599,59269)  Excisional debridement ulceration right heel with application of Theraskin (23898)  Excisional debridement ulceration left ankle with application of TheraSkin (24906)  Excisional debridement ulceration left hallux with application of TheraSkin (24616)  Wound prep right leg  Wound prep right heel  Wound prep left ankle  Wound prep left hallux     Patient did well, seen by PT and OT, and will discharge with home care, and son will live with his father until wounds heal.  The patient is , but wife with dementia and can not help with his care.  No antibiotics needed on discharge.     Marcio Turpin MD  Pager: [phone]  Cell Phone:  [phone]       Significant Results and Procedures   As above    Pending Results   These results will be followed up by Dr. Turpin  Unresulted Labs Ordered in the Past 30 Days of this Admission       Date and Time Order Name Status Description    1/21/2025  8:49 AM [place] MISCELLANEOUS TEST In process             Code Status   Full Code       Primary Care Physician   April Jeannette    Physical Exam   Temp: 97.6  F (36.4  C) Temp src: Oral BP: 124/60 Pulse: 71   Resp: 20 SpO2: 96 % O2 Device:  "None (Room air) Oxygen Delivery: 1 LPM  Vitals:    01/27/25 1437 01/27/25 2034   Weight: 88.1 kg (194 lb 3.6 oz) 94.9 kg (209 lb 3.5 oz)     Vital Signs with Ranges  Temp:  [97  F (36.1  C)-98.1  F (36.7  C)] 97.6  F (36.4  C)  Pulse:  [71-86] 71  Resp:  [7-20] 20  BP: ()/(53-84) 124/60  SpO2:  [94 %-98 %] 96 %  I/O last 3 completed shifts:  In: 1033 [P.O.:233; I.V.:800]  Out: 250 [Urine:250]    Exam on discharge:   Both feet ace wrapped.     Discharge Disposition   Discharged to home with home care  Condition at discharge: Stable    Consultations This Hospital Stay   PHYSICAL THERAPY ADULT IP CONSULT  HOSPITALIST IP CONSULT  CARE MANAGEMENT / SOCIAL WORK IP CONSULT    Time Spent on this Encounter   I spent a total of 35 minutes discharging this patient.     Discharge Orders      Home Care Referral      Brief Discharge Instructions    Review outpatient procedure discharge instructions with patient as directed by Provider     When to call - Contact Surgeon Team    You may experience symptoms that require follow-up before your scheduled appointment. Refer to the \"Stoplight Tool\" for instructions on when to contact your Surgeon Team if you are concerned about pain control, blood clots, constipation, or if you are unable to urinate.     When to call - Reach out to Urgent Care    If you are not able to reach your Surgeon Team and you need immediate care, go to the Orthopedic Walk-in Clinic or Urgent Care at your Surgeon's office.  Do NOT go to the Emergency Room unless you have shortness of breath, chest pain, or other signs of a medical emergency.     When to call - Reasons to Call 911    Call 911 immediately if you experience sudden-onset chest pain, arm weakness/numbness, slurred speech, or shortness of breath     Discharge Instruction - Breathing exercises    Perform breathing exercises 10 times per hours while awake for 2 weeks. (If given, use your Incentive Spirometer)     Symptoms - Fever Management    A low " grade fever can be expected after surgery.  Use acetaminophen (TYLENOL) as needed for fever management.  Contact your Surgeon Team if you have a fever greater than 101.5 F, chills, and/or night sweats.     Symptoms - Constipation management    Constipation (hard, dry bowel movements) is expected after surgery due to the combination of being less active, the anesthetic, and the opioid pain medication.  You can do the following to help reduce constipation:  ~  FLUIDS:  Drink clear liquids (water or Gatorade), or juice (apple/prune).  ~  DIET:  Eat a fiber rich diet.    ~  ACTIVITY:  Get up and move around several times a day.  Increase your activity as you are able.  MEDICATIONS:  Reduce the risk of constipation by starting medications before you are constipated.  You can take Miralax   (1 packet as directed) and/or a stool softener (Senokot 1-2 tablets 1-2 times a day).  If you already have constipation and these medications are not working, you can get magnesium citrate and use as directed.  If you continue to have constipation you can try an over the counter suppository or enema.  Call your Surgeon Team if it has been greater than 3 days since your last bowel movement.     Comfort and Pain Management - Pain after Surgery    Pain after surgery is normal and expected.  You will have some amount of pain for several weeks after surgery.  Your pain will improve with time.  There are several things you can do to help reduce your pain including: rest, ice, elevation, and using pain medications as needed. Contact your Surgeon Team if you have pain that persists or worsens after surgery despite rest, ice, elevation, and taking your medication(s) as prescribed. Contact your Surgeon Team if you have new numbness, tingling, or weakness in your operative extremity.     Comfort and Pain Management - Swelling after Surgery    Swelling and/or bruising of the surgical extremity is common and may persist for several months after  surgery. In addition to frequent icing and elevation, gentle compressive support with an ACE wrap or tubigrip may help with swelling. Apply compression regularly, removing at least twice daily to perform skin checks. Contact your Surgeon Team if your swelling increases and is NOT associated with an increase in your activity level, or if your swelling increases and is associated with redness and pain.     Comfort and Pain Management - Cold therapy    Ice can be used to control swelling and discomfort after surgery. Place a thin towel over your operative site and apply the ice pack overtop. Leave ice pack in place for 20 minutes, then remove for 20 minutes. Repeat this 20 minutes on/20 minutes off routine as often as tolerated.     Medication Instructions - Acetaminophen (TYLENOL) Instructions    You were discharged with acetaminophen (TYLENOL) for pain management after surgery. Acetaminophen most effectively manages pain symptoms when it is taken on a schedule without missing doses (every four, six, or eight hours). Your Provider will prescribe a safe daily dose between 3000 - 4000 mg.  Do NOT exceed this daily dose. Most patients use acetaminophen for pain control for the first four weeks after surgery.  You can wean from this medication as your pain decreases.     Medication Instructions - NSAID Instructions    You were discharged with an anti-inflammatory medication for pain management to use in combination with acetaminophen (TYLENOL) and the narcotic pain medication.  Take this medication exactly as directed.  You should only take one anti-inflammatory at a time.  Some common anti-inflammatories include: ibuprofen (ADVIL, MOTRIN), naproxen (ALEVE, NAPROSYN), celecoxib (CELEBREX), meloxicam (MOBIC), ketorolac (TORADOL).  Take this medication with food and water.     No weight bearing    Nonweightbearing on the surgical site     Follow Up Care    Follow-up with your Surgeon Team in one week for wound check.      Dressing Change    - Wound Vac Instructions post Theraskin application:     1. Apply wound vac to the right leg and heel ulcers within 24 hours from time of surgery. Wound vac must be set at 100 mmHg continuous suction.     2. LEAVE WOUND VAC IN PLACE UNTIL AFTER 1 WEEK POST OP FOLLOW UP APPOINTMENT.  Home care nurse to re-apply vac after the 1 wk follow-up appointment.     3. Apply Cavilon no-sting barrier wipe to the skin surrounding the wound to protect from drainage/maceration.     4. Apply drape to gunnar wound. Cut strip of drape and apply to skin if bridging isneeded; plan this area in advance; should not be over bony prominence.     5. Cut the foam to fit the size of the wound.     6. Apply foam over the ADAPTIC TOUCH (or similar non-adherent dressing) THAT IS STAPLED/SUTURED IN PLACE OVER the WOUND/GRAFT SITE. DO NOT REMOVE NON-ADHERENT.      7. Cut narrow strip of foam if bridging if needed.     8. Cover foam with drape to obtain air tight seal.     9.Cut opening the size of a quarter for where the suction pad will be applied.     10. Apply Suction pad.     11. 100mmHG suction continuous.   12. Please apply a wet to dry dressing along the left ankle and foot graft sites when changing the wound vac on the right lower extremity.      I Contact Center can be reached at [phone], 24 hours aday 7 days a week      - If you do not have a back up plan in place:     If the negative pressure wound therapy malfunctions or unable to maintain seal: dressing must be removed and reapplied within 2 hours of the incident. If unable to reapply negative pressure wound dressing, place Normal Saline moistened gauze in wound bed and cover with appropriate dressing to keep wound bed moist.  Change wet-to-moist dressing two times aday until healthcare staff can re-implement negative pressure therapy. Change canister at least weekly.  I Contact Center can be reached at [phone], 24 hours a day 7 days a week      No VTE Prophylaxis     Activity    Podiatry discharge instructions  -Nonweightbearing right foot.  No pivot transfer allowed.  -Elevate both foot above waist at all times  -Surgical dressing to remain intact bilateral foot.   Wound VAC to be applied by [facility] health nurses at his home after discharge  -Follow-up with Dr. Patricio in 2 weeks  For scheduling please call [phone].     Discharge Instructions    Call Dr. Knutson if any medical questions at Cell Phone [phone].     Reason for your hospital stay    Right heal diabetic foot ulcer.     Discharge Instruction - Regular Diet Adult    Return to your pre-surgery diet unless instructed otherwise     Discharge Medications   Current Discharge Medication List        START taking these medications    Details   oxyCODONE (ROXICODONE) 5 MG tablet Take 1-2 tablets (5-10 mg) by mouth every 4 hours as needed for moderate to severe pain.  Qty: 12 tablet, Refills: 0    Associated Diagnoses: Diabetic ulcer of right heel associated with type 2 diabetes mellitus, limited to breakdown of skin (H)           CONTINUE these medications which have CHANGED    Details   insulin glargine (LANTUS VIAL) 100 UNIT/ML vial Inject 30 Units subcutaneously every morning.    Associated Diagnoses: Type 2 diabetes mellitus with diabetic peripheral angiopathy without gangrene, with long-term current use of insulin (H)           CONTINUE these medications which have NOT CHANGED    Details   aspirin 81 MG EC tablet Take 81 mg by mouth every evening.      clopidogrel (PLAVIX) 75 MG tablet Take 75 mg by mouth every morning.      co-enzyme Q-10 100 MG CAPS capsule Take 200 mg by mouth every evening.      empagliflozin (JARDIANCE) 10 MG TABS tablet Take 1 tablet (10 mg) by mouth daily.  Qty: 90 tablet, Refills: 1    Associated Diagnoses: Heart failure with preserved ejection fraction, NYHA class I (H)      guaiFENesin (MUCUS RELIEF ER PO) Take 1,200 mg by mouth 2 times daily as needed.       insulin aspart (NOVOLOG PEN) 100 UNIT/ML pen Inject 5 Units subcutaneously 3 times daily as needed for high blood sugar (With Meals). Novolog Flexpen: 5 units with breakfast, 5 units with lunch, 5 units with dinner. Takes as needed with each meal.      lactobacillus rhamnosus, GG, (CULTURELL) capsule Take 1 capsule by mouth every morning.      levothyroxine (SYNTHROID/LEVOTHROID) 50 MCG tablet Take 50 mcg by mouth every morning.      losartan (COZAAR) 50 MG tablet Take 50 mg by mouth at bedtime.      meclizine (ANTIVERT) 25 MG tablet Take 50 mg by mouth 3 times daily as needed (Vertigo).      metFORMIN (GLUCOPHAGE) 1000 MG tablet Take 1,000 mg by mouth 2 times daily (with meals).      Multiple Vitamins-Minerals (PRESERVISION AREDS 2 PO) Take 2 capsules by mouth 2 times daily.      rosuvastatin (CRESTOR) 40 MG tablet Take 40 mg by mouth daily.      spironolactone (ALDACTONE) 25 MG tablet Take 25 mg by mouth daily.      torsemide (DEMADEX) 10 MG tablet Take 1 tablet (10 mg) by mouth daily.  Qty: 90 tablet, Refills: 3    Associated Diagnoses: Essential hypertension; Heart failure with preserved ejection fraction, NYHA class I (H)      vitamin C (ASCORBIC ACID) 1000 MG TABS Take 1,000 mg by mouth 2 times daily.      vitamin C with B complex (B COMPLEX-C) tablet Take 1 tablet by mouth every morning.           STOP taking these medications       atorvastatin (LIPITOR) 80 MG tablet Comments:   Reason for Stopping:             Allergies   Allergies   Allergen Reactions    Exenatide Unknown    Heparin Unknown    Liraglutide Unknown    Lisinopril Cough    Morphine Unknown     Data   Most Recent 3 CBC's:  Recent Labs   Lab Test 01/17/25  0720 01/16/25  1601 01/16/25  1044   WBC 9.1 11.8* 11.7*   HGB 10.7* 10.7* 12.1*   MCV 84 84 84   * 187 206      Most Recent 3 BMP's:  Recent Labs   Lab Test 01/28/25  1134 01/28/25  0727 01/28/25  0200 01/27/25  1331 01/20/25  0816 01/17/25  0801 01/17/25  0720 01/17/25  0220  01/16/25  1656 01/16/25  1601   NA  --   --   --   --  137  --  138  --   --  140   POTASSIUM  --   --   --   --  3.9  --  3.4 3.4  --  3.1*   CHLORIDE  --   --   --   --  98  --  100  --   --  99   CO2  --   --   --   --  28  --  29  --   --  28   BUN  --   --   --   --  41.8*  --  32.2*  --   --  41.5*   CR  --   --   --   --  1.20*  --  0.91  --   --  1.00   ANIONGAP  --   --   --   --  11  --  9  --   --  13   AYAN  --   --   --   --  8.9  --  9.0  --   --  9.1   * 82 73   < > 191*   < > 108*  --    < > 102*    < > = values in this interval not displayed.     Most Recent 2 LFT's:  Recent Labs   Lab Test 01/20/25  0816 01/16/25  1601   AST 51* 36   ALT 44 35   ALKPHOS 97 92   BILITOTAL 0.4 0.4     Most Recent INR's and Anticoagulation Dosing History:  Anticoagulation Dose History          Latest Ref Rng & Units 12/12/2024 12/26/2024 1/16/2025   Recent Dosing and Labs   INR 0.85 - 1.15 1.36  1.21  2.04  1.16       Details          Multiple values from one day are sorted in reverse-chronological order             Most Recent 3 Troponin's:No lab results found.  Most Recent Cholesterol Panel:  Recent Labs   Lab Test 02/10/20  1026   CHOL 152   LDL 65   HDL 63   TRIG 119     Most Recent 6 Bacteria Isolates From Any Culture (See EPIC Reports for Culture Details):No lab results found.  Most Recent TSH, T4 and A1c Labs:  Recent Labs   Lab Test 01/20/25  0816 10/24/24  1841   TSH 3.87  --    A1C  --  9.2*

## 2025-01-28 NOTE — PROGRESS NOTES
"Podiatry / Foot and Ankle Surgery Progress Note    January 28, 2025    Assessment/Plan: 79 year old diabetic male POD # 1 Bilateral lower extremity I&D with TheraSkin application.    Physical therapy consult - appreciate cares  Nonweightbearing Right foot  Surgical dressing to remain clean, dry and intact pending wound VAC application at home- he has [facility] take care of his wounds/wound VAC.  This has been done in the past and he relates is already set up.    Follow up with Dr. Patricio in 2 weeks.  Okay to discharge per Podiatry pending Hospital team recommendations    Subject: Patient was seen at bedside.  He relates that he is feeling well.  Denies n/v/f/c.  Physical therapy was just leaving the room, had been working with him though he did have a low blood pressure episode so the session was cut a bit short.    Objective:  Vitals: /62 (BP Location: Right arm)   Pulse 71   Temp 97.6  F (36.4  C) (Oral)   Resp 20   Ht 1.727 m (5' 8\")   Wt 94.9 kg (209 lb 3.5 oz)   SpO2 96%   BMI 31.81 kg/m    BMI= Body mass index is 31.81 kg/m .    General:  Patient is alert and orientated.  NAD.    Surgical dressings are clean, dry and intact.         Lourdes Delarosa DPM  8:35 AM   "

## 2025-01-28 NOTE — PROGRESS NOTES
Patient's son does not feel comfortable taking his father home post-op bilateral lower extremity I&D due to concerns related to patient feeling weak. Will plan to admit to [facility] overnight with expected discharge tomorrow morning. Consult placed to hospitalist service for medical management of diabetes.

## 2025-01-29 ENCOUNTER — TELEPHONE (OUTPATIENT)
Dept: VASCULAR SURGERY | Facility: CLINIC | Age: 80
End: 2025-01-29
Payer: COMMERCIAL

## 2025-01-29 NOTE — OR NURSING
Post Operative Phone Call     Surgery: I&D bilateral feet with application of Theraskin    Date of Surgery:1/27/25    Surgeon: Dr. Amol Patricio    Patient Discharged from Hospital: 1/28/25      Spoke with: Patient      Discussed patients status since discharging home after surgery.     Pt reports their pain is  Currently 0/10,  and they are using Opiods: Oxycodone (Percocet, Oxycontin) to control their pain as needed.    The surgical site is currently covered with a surgical dressing. The HC nurse is coming back out to apply the wound vac to the right foot as she had to go get some non adherent dressing. Pt reports they are eating and drinking normal. Patient reports he is drinking fluids and having Ensure supplements during the day but his appetite has been poor for a few months. This is nothing new after surgery.    Pt is voiding normal and had a bowel movement on 1/28/25. States he is having some trouble with constipation and is taking an OTC stool softener. Discussed Miralax powder as an option for constipation.    Pt is not having any stroke like symptoms or new onset of headaches.             Confirmed follow up appointments: No    Callback number provided for further questions/concerns.    Kellie Flores RN

## 2025-01-29 NOTE — PLAN OF CARE
Physical Therapy Discharge Summary    Reason for therapy discharge:    Discharged to home with home therapy.    Progress towards therapy goal(s). See goals on Care Plan in [facility] electronic health record for goal details.  Goals partially met.  Barriers to achieving goals:   discharge on same date as initial evaluation.    Therapy recommendation(s):    Continued therapy is recommended.  Rationale/Recommendations:  home PT to improve safety and IND with mobility.

## 2025-01-30 ENCOUNTER — PATIENT OUTREACH (OUTPATIENT)
Dept: ONCOLOGY | Facility: HOSPITAL | Age: 80
End: 2025-01-30
Payer: COMMERCIAL

## 2025-01-30 NOTE — PROGRESS NOTES
[facility]: Cancer Care                                                                                          Writer called patient's son Michael to let him know that an appt had been made for the patient to de-access his port and flush his port. Patient's son Michael stated that his wife who is a NICU nurse de-accessed the patient's port and flushed it with the flush supplies that the patient was discharged with from the hospital. Patient's son stated he was going to cancel the infusion appt. Patient's son was expressed his frustrations regarding the nurses discharging the patient with the needle still accessed to the port. Writer acknowledged the patient's feelings and stated I would submit a patient safety event.     Signature:  Azul Aguilar RN

## 2025-01-30 NOTE — TELEPHONE ENCOUNTER
Caller: [facility]     Provider: CLYDE Patricio    Detailed reason for call: Conor is asking for a call back to discuss changing the plan for wound cares based on his last assessment. Asking for a call back before he sees this patient today.     Best phone number to contact: [phone]    Best time to contact: ASAP if possible    Ok to leave a detailed message: Yes

## 2025-01-30 NOTE — TELEPHONE ENCOUNTER
Spoke with Conor, current orders for left ankle and foot:    12. Please apply a wet to dry dressing along the left ankle and foot graft sites when changing the wound vac on the right lower extremity. This is to be changed on the day of the wound VAC application and then left in place until pt is seen by Dr. Patricio at 1 week post op.     They note that they gauze is drying out and are asking for ok to do daily change on this, they are able to see daily for next week. Advised ok to change daily if needed to prevent graft from drying. Advised to add to order to wet gauze prior to removal if needed to prevent sticking and moving and ensure very gentle care of grafted area to prevent disruption.    Amanda Skelton RN, CWOCN

## 2025-01-31 LAB — MAYO MISC RESULT: NORMAL

## 2025-02-04 ENCOUNTER — APPOINTMENT (OUTPATIENT)
Dept: ULTRASOUND IMAGING | Facility: HOSPITAL | Age: 80
DRG: 270 | End: 2025-02-04
Payer: COMMERCIAL

## 2025-02-04 ENCOUNTER — APPOINTMENT (OUTPATIENT)
Dept: CT IMAGING | Facility: HOSPITAL | Age: 80
DRG: 270 | End: 2025-02-04
Attending: EMERGENCY MEDICINE
Payer: COMMERCIAL

## 2025-02-04 ENCOUNTER — HOSPITAL ENCOUNTER (INPATIENT)
Facility: HOSPITAL | Age: 80
End: 2025-02-04
Attending: EMERGENCY MEDICINE | Admitting: INTERNAL MEDICINE
Payer: COMMERCIAL

## 2025-02-04 ENCOUNTER — APPOINTMENT (OUTPATIENT)
Dept: RADIOLOGY | Facility: HOSPITAL | Age: 80
DRG: 270 | End: 2025-02-04
Payer: COMMERCIAL

## 2025-02-04 DIAGNOSIS — E11.51 TYPE 2 DIABETES MELLITUS WITH DIABETIC PERIPHERAL ANGIOPATHY WITHOUT GANGRENE, WITH LONG-TERM CURRENT USE OF INSULIN (H): ICD-10-CM

## 2025-02-04 DIAGNOSIS — Z79.4 TYPE 2 DIABETES MELLITUS WITH DIABETIC PERIPHERAL ANGIOPATHY WITHOUT GANGRENE, WITH LONG-TERM CURRENT USE OF INSULIN (H): ICD-10-CM

## 2025-02-04 DIAGNOSIS — E11.621 DIABETIC ULCER OF RIGHT HEEL ASSOCIATED WITH TYPE 2 DIABETES MELLITUS, LIMITED TO BREAKDOWN OF SKIN (H): Primary | ICD-10-CM

## 2025-02-04 DIAGNOSIS — E11.621 DIABETIC ULCER OF RIGHT HEEL ASSOCIATED WITH TYPE 2 DIABETES MELLITUS, LIMITED TO BREAKDOWN OF SKIN (H): ICD-10-CM

## 2025-02-04 DIAGNOSIS — I50.30 HEART FAILURE WITH PRESERVED EJECTION FRACTION, NYHA CLASS II (H): ICD-10-CM

## 2025-02-04 DIAGNOSIS — L97.529 ULCER OF LEFT FOOT DUE TO TYPE 2 DIABETES MELLITUS (H): ICD-10-CM

## 2025-02-04 DIAGNOSIS — E03.9 HYPOTHYROIDISM, UNSPECIFIED TYPE: ICD-10-CM

## 2025-02-04 DIAGNOSIS — R53.1 WEAKNESS: ICD-10-CM

## 2025-02-04 DIAGNOSIS — I50.30 HEART FAILURE WITH PRESERVED EJECTION FRACTION, NYHA CLASS I (H): ICD-10-CM

## 2025-02-04 DIAGNOSIS — L97.411 DIABETIC ULCER OF RIGHT HEEL ASSOCIATED WITH TYPE 2 DIABETES MELLITUS, LIMITED TO BREAKDOWN OF SKIN (H): ICD-10-CM

## 2025-02-04 DIAGNOSIS — I73.9 PAD (PERIPHERAL ARTERY DISEASE): ICD-10-CM

## 2025-02-04 DIAGNOSIS — L97.411 DIABETIC ULCER OF RIGHT HEEL ASSOCIATED WITH TYPE 2 DIABETES MELLITUS, LIMITED TO BREAKDOWN OF SKIN (H): Primary | ICD-10-CM

## 2025-02-04 DIAGNOSIS — L97.911 ULCER OF RIGHT LEG, LIMITED TO BREAKDOWN OF SKIN (H): ICD-10-CM

## 2025-02-04 DIAGNOSIS — Z98.890 HISTORY OF VASCULAR ACCESS DEVICE: ICD-10-CM

## 2025-02-04 DIAGNOSIS — I10 ESSENTIAL HYPERTENSION: ICD-10-CM

## 2025-02-04 DIAGNOSIS — T14.8XXA OPEN WOUND: ICD-10-CM

## 2025-02-04 DIAGNOSIS — E11.621 ULCER OF LEFT FOOT DUE TO TYPE 2 DIABETES MELLITUS (H): ICD-10-CM

## 2025-02-04 DIAGNOSIS — M86.8X7 OTHER OSTEOMYELITIS OF RIGHT FOOT (H): ICD-10-CM

## 2025-02-04 PROBLEM — I77.9 CAROTID ARTERIAL DISEASE: Status: ACTIVE | Noted: 2024-10-29

## 2025-02-04 PROBLEM — E11.40 DIABETIC NEUROPATHY (H): Status: ACTIVE | Noted: 2024-10-29

## 2025-02-04 LAB
ALBUMIN SERPL BCG-MCNC: 2.9 G/DL (ref 3.5–5.2)
ALBUMIN UR-MCNC: 50 MG/DL
ALP SERPL-CCNC: 147 U/L (ref 40–150)
ALT SERPL W P-5'-P-CCNC: 114 U/L (ref 0–70)
AMMONIA PLAS-SCNC: 17 UMOL/L (ref 16–60)
ANION GAP SERPL CALCULATED.3IONS-SCNC: 11 MMOL/L (ref 7–15)
APPEARANCE UR: CLEAR
AST SERPL W P-5'-P-CCNC: 138 U/L (ref 0–45)
BASE EXCESS BLDV CALC-SCNC: 0 MMOL/L (ref -3–3)
BILIRUB SERPL-MCNC: 0.7 MG/DL
BILIRUB UR QL STRIP: NEGATIVE
BUN SERPL-MCNC: 50.4 MG/DL (ref 8–23)
CALCIUM SERPL-MCNC: 8.7 MG/DL (ref 8.8–10.4)
CHLORIDE SERPL-SCNC: 95 MMOL/L (ref 98–107)
CK SERPL-CCNC: 28 U/L (ref 39–308)
COLOR UR AUTO: YELLOW
CREAT SERPL-MCNC: 1.35 MG/DL (ref 0.67–1.17)
EGFRCR SERPLBLD CKD-EPI 2021: 53 ML/MIN/1.73M2
ERYTHROCYTE [DISTWIDTH] IN BLOOD BY AUTOMATED COUNT: 16.4 % (ref 10–15)
EST. AVERAGE GLUCOSE BLD GHB EST-MCNC: 180 MG/DL
FLUAV RNA SPEC QL NAA+PROBE: NEGATIVE
FLUBV RNA RESP QL NAA+PROBE: NEGATIVE
GLUCOSE BLDC GLUCOMTR-MCNC: 146 MG/DL (ref 70–99)
GLUCOSE BLDC GLUCOMTR-MCNC: 169 MG/DL (ref 70–99)
GLUCOSE SERPL-MCNC: 243 MG/DL (ref 70–99)
GLUCOSE UR STRIP-MCNC: >1000 MG/DL
GRANULAR CAST: 3 /LPF
HBA1C MFR BLD: 7.9 %
HCO3 BLDV-SCNC: 23 MMOL/L (ref 21–28)
HCO3 SERPL-SCNC: 25 MMOL/L (ref 22–29)
HCT VFR BLD AUTO: 32.8 % (ref 40–53)
HGB BLD-MCNC: 10.4 G/DL (ref 13.3–17.7)
HGB UR QL STRIP: NEGATIVE
HOLD SPECIMEN: NORMAL
HYALINE CASTS: 1 /LPF
INR PPP: 1.37 (ref 0.85–1.15)
KETONES UR STRIP-MCNC: NEGATIVE MG/DL
LACTATE SERPL-SCNC: 1.8 MMOL/L (ref 0.7–2)
LACTATE SERPL-SCNC: 1.8 MMOL/L (ref 0.7–2)
LEUKOCYTE ESTERASE UR QL STRIP: NEGATIVE
MAGNESIUM SERPL-MCNC: 2.2 MG/DL (ref 1.7–2.3)
MCH RBC QN AUTO: 25.7 PG (ref 26.5–33)
MCHC RBC AUTO-ENTMCNC: 31.7 G/DL (ref 31.5–36.5)
MCV RBC AUTO: 81 FL (ref 78–100)
MUCOUS THREADS #/AREA URNS LPF: PRESENT /LPF
NITRATE UR QL: NEGATIVE
O2/TOTAL GAS SETTING VFR VENT: 21 %
OXYHGB MFR BLDV: 76 % (ref 70–75)
PCO2 BLDV: 32 MM HG (ref 40–50)
PH BLDV: 7.47 [PH] (ref 7.32–7.43)
PH UR STRIP: 5.5 [PH] (ref 5–7)
PLATELET # BLD AUTO: 79 10E3/UL (ref 150–450)
PO2 BLDV: 41 MM HG (ref 25–47)
POTASSIUM SERPL-SCNC: 4.5 MMOL/L (ref 3.4–5.3)
PROT SERPL-MCNC: 7.5 G/DL (ref 6.4–8.3)
RBC # BLD AUTO: 4.04 10E6/UL (ref 4.4–5.9)
RBC URINE: 1 /HPF
RSV RNA SPEC NAA+PROBE: NEGATIVE
SAO2 % BLDV: 76.9 % (ref 70–75)
SARS-COV-2 RNA RESP QL NAA+PROBE: NEGATIVE
SODIUM SERPL-SCNC: 131 MMOL/L (ref 135–145)
SP GR UR STRIP: 1.02 (ref 1–1.03)
SQUAMOUS EPITHELIAL: <1 /HPF
TRANSITIONAL EPI: <1 /HPF
TROPONIN T SERPL HS-MCNC: 109 NG/L
TROPONIN T SERPL HS-MCNC: 127 NG/L
TSH SERPL DL<=0.005 MIU/L-ACNC: 3.55 UIU/ML (ref 0.3–4.2)
UROBILINOGEN UR STRIP-MCNC: <2 MG/DL
WBC # BLD AUTO: 6.5 10E3/UL (ref 4–11)
WBC URINE: 4 /HPF

## 2025-02-04 PROCEDURE — 71045 X-RAY EXAM CHEST 1 VIEW: CPT

## 2025-02-04 PROCEDURE — 87637 SARSCOV2&INF A&B&RSV AMP PRB: CPT | Performed by: STUDENT IN AN ORGANIZED HEALTH CARE EDUCATION/TRAINING PROGRAM

## 2025-02-04 PROCEDURE — 99285 EMERGENCY DEPT VISIT HI MDM: CPT | Mod: 25

## 2025-02-04 PROCEDURE — 74177 CT ABD & PELVIS W/CONTRAST: CPT

## 2025-02-04 PROCEDURE — 250N000011 HC RX IP 250 OP 636: Performed by: EMERGENCY MEDICINE

## 2025-02-04 PROCEDURE — 36415 COLL VENOUS BLD VENIPUNCTURE: CPT | Performed by: INTERNAL MEDICINE

## 2025-02-04 PROCEDURE — 82040 ASSAY OF SERUM ALBUMIN: CPT | Performed by: EMERGENCY MEDICINE

## 2025-02-04 PROCEDURE — 83735 ASSAY OF MAGNESIUM: CPT | Performed by: EMERGENCY MEDICINE

## 2025-02-04 PROCEDURE — 85027 COMPLETE CBC AUTOMATED: CPT | Performed by: EMERGENCY MEDICINE

## 2025-02-04 PROCEDURE — 76705 ECHO EXAM OF ABDOMEN: CPT

## 2025-02-04 PROCEDURE — 81001 URINALYSIS AUTO W/SCOPE: CPT | Performed by: EMERGENCY MEDICINE

## 2025-02-04 PROCEDURE — 82805 BLOOD GASES W/O2 SATURATION: CPT

## 2025-02-04 PROCEDURE — 36415 COLL VENOUS BLD VENIPUNCTURE: CPT

## 2025-02-04 PROCEDURE — 85610 PROTHROMBIN TIME: CPT

## 2025-02-04 PROCEDURE — 258N000003 HC RX IP 258 OP 636: Performed by: INTERNAL MEDICINE

## 2025-02-04 PROCEDURE — 250N000012 HC RX MED GY IP 250 OP 636 PS 637

## 2025-02-04 PROCEDURE — 250N000011 HC RX IP 250 OP 636: Performed by: INTERNAL MEDICINE

## 2025-02-04 PROCEDURE — 83605 ASSAY OF LACTIC ACID: CPT | Performed by: INTERNAL MEDICINE

## 2025-02-04 PROCEDURE — 250N000013 HC RX MED GY IP 250 OP 250 PS 637

## 2025-02-04 PROCEDURE — 93005 ELECTROCARDIOGRAM TRACING: CPT | Performed by: EMERGENCY MEDICINE

## 2025-02-04 PROCEDURE — 82140 ASSAY OF AMMONIA: CPT | Performed by: INTERNAL MEDICINE

## 2025-02-04 PROCEDURE — 258N000003 HC RX IP 258 OP 636: Performed by: EMERGENCY MEDICINE

## 2025-02-04 PROCEDURE — 82550 ASSAY OF CK (CPK): CPT

## 2025-02-04 PROCEDURE — 80048 BASIC METABOLIC PNL TOTAL CA: CPT | Performed by: EMERGENCY MEDICINE

## 2025-02-04 PROCEDURE — 84484 ASSAY OF TROPONIN QUANT: CPT | Performed by: EMERGENCY MEDICINE

## 2025-02-04 PROCEDURE — 82435 ASSAY OF BLOOD CHLORIDE: CPT | Performed by: EMERGENCY MEDICINE

## 2025-02-04 PROCEDURE — 99222 1ST HOSP IP/OBS MODERATE 55: CPT | Mod: FS | Performed by: INTERNAL MEDICINE

## 2025-02-04 PROCEDURE — 82962 GLUCOSE BLOOD TEST: CPT

## 2025-02-04 PROCEDURE — 36415 COLL VENOUS BLD VENIPUNCTURE: CPT | Performed by: STUDENT IN AN ORGANIZED HEALTH CARE EDUCATION/TRAINING PROGRAM

## 2025-02-04 PROCEDURE — 96360 HYDRATION IV INFUSION INIT: CPT

## 2025-02-04 PROCEDURE — 258N000003 HC RX IP 258 OP 636

## 2025-02-04 PROCEDURE — 99207 PR APP CREDIT; MD BILLING SHARED VISIT: CPT | Mod: FS

## 2025-02-04 PROCEDURE — 120N000001 HC R&B MED SURG/OB

## 2025-02-04 PROCEDURE — 83036 HEMOGLOBIN GLYCOSYLATED A1C: CPT

## 2025-02-04 PROCEDURE — 36415 COLL VENOUS BLD VENIPUNCTURE: CPT | Performed by: EMERGENCY MEDICINE

## 2025-02-04 PROCEDURE — 84443 ASSAY THYROID STIM HORMONE: CPT

## 2025-02-04 PROCEDURE — 70450 CT HEAD/BRAIN W/O DYE: CPT

## 2025-02-04 RX ORDER — ACETAMINOPHEN 650 MG/1
650 SUPPOSITORY RECTAL EVERY 4 HOURS PRN
Status: DISCONTINUED | OUTPATIENT
Start: 2025-02-04 | End: 2025-02-13 | Stop reason: HOSPADM

## 2025-02-04 RX ORDER — LEVOTHYROXINE SODIUM 25 UG/1
50 TABLET ORAL EVERY MORNING
Status: DISCONTINUED | OUTPATIENT
Start: 2025-02-05 | End: 2025-02-13 | Stop reason: HOSPADM

## 2025-02-04 RX ORDER — PIPERACILLIN SODIUM, TAZOBACTAM SODIUM 3; .375 G/15ML; G/15ML
3.38 INJECTION, POWDER, LYOPHILIZED, FOR SOLUTION INTRAVENOUS ONCE
Status: COMPLETED | OUTPATIENT
Start: 2025-02-04 | End: 2025-02-04

## 2025-02-04 RX ORDER — DEXTROSE MONOHYDRATE 25 G/50ML
25-50 INJECTION, SOLUTION INTRAVENOUS
Status: DISCONTINUED | OUTPATIENT
Start: 2025-02-04 | End: 2025-02-13 | Stop reason: HOSPADM

## 2025-02-04 RX ORDER — AMOXICILLIN 250 MG
1 CAPSULE ORAL 2 TIMES DAILY PRN
Status: DISCONTINUED | OUTPATIENT
Start: 2025-02-04 | End: 2025-02-13 | Stop reason: HOSPADM

## 2025-02-04 RX ORDER — CLOPIDOGREL BISULFATE 75 MG/1
75 TABLET ORAL EVERY MORNING
Status: DISCONTINUED | OUTPATIENT
Start: 2025-02-05 | End: 2025-02-13 | Stop reason: HOSPADM

## 2025-02-04 RX ORDER — GUAIFENESIN 600 MG/1
1200 TABLET, EXTENDED RELEASE ORAL 2 TIMES DAILY PRN
Status: DISCONTINUED | OUTPATIENT
Start: 2025-02-04 | End: 2025-02-13 | Stop reason: HOSPADM

## 2025-02-04 RX ORDER — ACETAMINOPHEN 325 MG/1
650 TABLET ORAL EVERY 4 HOURS PRN
Status: DISCONTINUED | OUTPATIENT
Start: 2025-02-04 | End: 2025-02-13 | Stop reason: HOSPADM

## 2025-02-04 RX ORDER — CALCIUM CARBONATE 500 MG/1
1000 TABLET, CHEWABLE ORAL 4 TIMES DAILY PRN
Status: DISCONTINUED | OUTPATIENT
Start: 2025-02-04 | End: 2025-02-13 | Stop reason: HOSPADM

## 2025-02-04 RX ORDER — AMOXICILLIN 250 MG
2 CAPSULE ORAL 2 TIMES DAILY PRN
Status: DISCONTINUED | OUTPATIENT
Start: 2025-02-04 | End: 2025-02-13 | Stop reason: HOSPADM

## 2025-02-04 RX ORDER — PIPERACILLIN SODIUM, TAZOBACTAM SODIUM 3; .375 G/15ML; G/15ML
3.38 INJECTION, POWDER, LYOPHILIZED, FOR SOLUTION INTRAVENOUS EVERY 8 HOURS
Status: DISCONTINUED | OUTPATIENT
Start: 2025-02-04 | End: 2025-02-11

## 2025-02-04 RX ORDER — ROSUVASTATIN CALCIUM 40 MG/1
40 TABLET, COATED ORAL DAILY
Status: DISCONTINUED | OUTPATIENT
Start: 2025-02-05 | End: 2025-02-13 | Stop reason: HOSPADM

## 2025-02-04 RX ORDER — SODIUM CHLORIDE 9 MG/ML
INJECTION, SOLUTION INTRAVENOUS CONTINUOUS
Status: DISCONTINUED | OUTPATIENT
Start: 2025-02-04 | End: 2025-02-09

## 2025-02-04 RX ORDER — ONDANSETRON 2 MG/ML
4 INJECTION INTRAMUSCULAR; INTRAVENOUS EVERY 6 HOURS PRN
Status: DISCONTINUED | OUTPATIENT
Start: 2025-02-04 | End: 2025-02-13 | Stop reason: HOSPADM

## 2025-02-04 RX ORDER — TORSEMIDE 5 MG/1
10 TABLET ORAL DAILY
Status: DISCONTINUED | OUTPATIENT
Start: 2025-02-05 | End: 2025-02-13 | Stop reason: HOSPADM

## 2025-02-04 RX ORDER — LIDOCAINE 40 MG/G
CREAM TOPICAL
Status: DISCONTINUED | OUTPATIENT
Start: 2025-02-04 | End: 2025-02-04

## 2025-02-04 RX ORDER — SPIRONOLACTONE 25 MG/1
25 TABLET ORAL DAILY
Status: DISCONTINUED | OUTPATIENT
Start: 2025-02-05 | End: 2025-02-13 | Stop reason: HOSPADM

## 2025-02-04 RX ORDER — LOSARTAN POTASSIUM 50 MG/1
50 TABLET ORAL AT BEDTIME
Status: DISCONTINUED | OUTPATIENT
Start: 2025-02-04 | End: 2025-02-13 | Stop reason: HOSPADM

## 2025-02-04 RX ORDER — MECLIZINE HYDROCHLORIDE 25 MG/1
50 TABLET ORAL 3 TIMES DAILY PRN
Status: DISCONTINUED | OUTPATIENT
Start: 2025-02-04 | End: 2025-02-13 | Stop reason: HOSPADM

## 2025-02-04 RX ORDER — NICOTINE POLACRILEX 4 MG
15-30 LOZENGE BUCCAL
Status: DISCONTINUED | OUTPATIENT
Start: 2025-02-04 | End: 2025-02-13 | Stop reason: HOSPADM

## 2025-02-04 RX ORDER — IOPAMIDOL 755 MG/ML
75 INJECTION, SOLUTION INTRAVASCULAR ONCE
Status: COMPLETED | OUTPATIENT
Start: 2025-02-04 | End: 2025-02-04

## 2025-02-04 RX ORDER — ONDANSETRON 4 MG/1
4 TABLET, ORALLY DISINTEGRATING ORAL EVERY 6 HOURS PRN
Status: DISCONTINUED | OUTPATIENT
Start: 2025-02-04 | End: 2025-02-13 | Stop reason: HOSPADM

## 2025-02-04 RX ORDER — LIDOCAINE 40 MG/G
CREAM TOPICAL
Status: DISCONTINUED | OUTPATIENT
Start: 2025-02-04 | End: 2025-02-13 | Stop reason: HOSPADM

## 2025-02-04 RX ORDER — ASPIRIN 81 MG/1
81 TABLET ORAL EVERY EVENING
Status: DISCONTINUED | OUTPATIENT
Start: 2025-02-04 | End: 2025-02-13 | Stop reason: HOSPADM

## 2025-02-04 RX ORDER — HYDRALAZINE HYDROCHLORIDE 20 MG/ML
10 INJECTION INTRAMUSCULAR; INTRAVENOUS EVERY 4 HOURS PRN
Status: DISCONTINUED | OUTPATIENT
Start: 2025-02-04 | End: 2025-02-13 | Stop reason: HOSPADM

## 2025-02-04 RX ADMIN — SODIUM CHLORIDE 1000 ML: 9 INJECTION, SOLUTION INTRAVENOUS at 10:48

## 2025-02-04 RX ADMIN — ACETAMINOPHEN 650 MG: 325 TABLET ORAL at 20:30

## 2025-02-04 RX ADMIN — SODIUM CHLORIDE: 9 INJECTION, SOLUTION INTRAVENOUS at 16:18

## 2025-02-04 RX ADMIN — LOSARTAN POTASSIUM 50 MG: 50 TABLET, FILM COATED ORAL at 21:12

## 2025-02-04 RX ADMIN — ASPIRIN 81 MG: 81 TABLET, COATED ORAL at 20:30

## 2025-02-04 RX ADMIN — PIPERACILLIN AND TAZOBACTAM 3.38 G: 3; .375 INJECTION, POWDER, FOR SOLUTION INTRAVENOUS at 21:12

## 2025-02-04 RX ADMIN — INSULIN ASPART 1 UNITS: 100 INJECTION, SOLUTION INTRAVENOUS; SUBCUTANEOUS at 17:59

## 2025-02-04 RX ADMIN — PIPERACILLIN AND TAZOBACTAM 3.38 G: 3; .375 INJECTION, POWDER, FOR SOLUTION INTRAVENOUS at 16:07

## 2025-02-04 RX ADMIN — SODIUM CHLORIDE 2000 MG: 9 INJECTION, SOLUTION INTRAVENOUS at 16:18

## 2025-02-04 RX ADMIN — METFORMIN HYDROCHLORIDE 1000 MG: 500 TABLET, FILM COATED ORAL at 17:45

## 2025-02-04 RX ADMIN — SODIUM CHLORIDE, POTASSIUM CHLORIDE, SODIUM LACTATE AND CALCIUM CHLORIDE 1000 ML: 600; 310; 30; 20 INJECTION, SOLUTION INTRAVENOUS at 14:55

## 2025-02-04 RX ADMIN — IOPAMIDOL 75 ML: 755 INJECTION, SOLUTION INTRAVENOUS at 11:51

## 2025-02-04 RX ADMIN — Medication 1 MG: at 21:12

## 2025-02-04 ASSESSMENT — ACTIVITIES OF DAILY LIVING (ADL)
ADLS_ACUITY_SCORE: 64
ADLS_ACUITY_SCORE: 59
ADLS_ACUITY_SCORE: 64
ADLS_ACUITY_SCORE: 62
ADLS_ACUITY_SCORE: 59
ADLS_ACUITY_SCORE: 64
ADLS_ACUITY_SCORE: 62
ADLS_ACUITY_SCORE: 62
ADLS_ACUITY_SCORE: 59
ADLS_ACUITY_SCORE: 64
ADLS_ACUITY_SCORE: 62
ADLS_ACUITY_SCORE: 59
ADLS_ACUITY_SCORE: 64
ADLS_ACUITY_SCORE: 59

## 2025-02-04 NOTE — PHARMACY-VANCOMYCIN DOSING SERVICE
"Pharmacy Vancomycin Initial Note  Date of Service 2025  Patient's  1945  80 year old, male    Indication: Sepsis and Skin and Soft Tissue Infection    Current estimated CrCl = Estimated Creatinine Clearance: 49.6 mL/min (A) (based on SCr of 1.35 mg/dL (H)).    Creatinine for last 3 days  2025: 10:04 AM Creatinine 1.35 mg/dL    Recent Vancomycin Level(s) for last 3 days  No results found for requested labs within last 3 days.      Vancomycin IV Administrations (past 72 hours)                     vancomycin (VANCOCIN) 2,000 mg in 0.9% NaCl 520 mL intermittent infusion (mg) 2,000 mg New Bag 25 1618                    Nephrotoxins and other renal medications (From now, onward)      Start     Dose/Rate Route Frequency Ordered Stop    25 1000  vancomycin (VANCOCIN) 1,250 mg in 0.9% NaCl 262.5 mL intermittent infusion         1,250 mg  over 90 Minutes Intravenous EVERY 24 HOURS 25 1627      25 0800  empagliflozin (JARDIANCE) tablet 10 mg        Note to Pharmacy: PTA Sig:Take 1 tablet (10 mg) by mouth daily.      10 mg Oral DAILY 25 1405      25 0800  torsemide (DEMADEX) tablet 10 mg        Note to Pharmacy: PTA Sig:Take 1 tablet (10 mg) by mouth daily.      10 mg Oral DAILY 25 1405      25 2200  piperacillin-tazobactam (ZOSYN) 3.375 g vial to attach to  mL bag        Note to Pharmacy: For [facility], [facility] and [facility]: For Zosyn-naive patients, use the \"Zosyn initial dose + extended infusion\" order panel.    3.375 g  over 240 Minutes Intravenous EVERY 8 HOURS 25 1532      25 1630  vancomycin (VANCOCIN) 2,000 mg in 0.9% NaCl 520 mL intermittent infusion         2,000 mg  over 2 Hours Intravenous ONCE 25 1541      25 1600  piperacillin-tazobactam (ZOSYN) 3.375 g vial to attach to  mL bag         3.375 g  over 30 Minutes Intravenous ONCE 25 1532              Contrast Orders - past 72 hours (72h ago, onward)      Start     " Dose/Rate Route Frequency Stop    02/04/25 1200  iopamidol (ISOVUE-370) solution 75 mL         75 mL Intravenous ONCE 02/04/25 1151            [employer] Prediction of Planned Initial Vancomycin Regimen  Loading dose: 2000 mg iv x1 then  Regimen: 1250 mg IV every 24 hours.  Start time: 16:18 on 02/05/2025  Exposure target: AUC24 (range)400-600 mg/L.hr   AUC24,ss: 480 mg/L.hr  Probability of AUC24 > 400: 70 %  Ctrough,ss: 14.9 mg/L  Probability of Ctrough,ss > 20: 25 %  Probability of nephrotoxicity ([place] DOT 2009): 10 %            Plan:  Start vancomycin  2000mg x1 then 1250 mg IV q24h.   Vancomycin monitoring method: AUC  Vancomycin therapeutic monitoring goal: 400-600 mg*h/L  Pharmacy will check vancomycin levels as appropriate in  2-4 days .    Serum creatinine levels will be ordered daily for the first week of therapy and at least twice weekly for subsequent weeks.      Henrry Jose RP

## 2025-02-04 NOTE — PROGRESS NOTES
[facility]: Notification ID [id number].    Heather Connor RN

## 2025-02-04 NOTE — ED TRIAGE NOTES
[place]. Was in hospital 2 weeks ago for wounds on legs, has wound vac and home nurse that comes every day. This past week pt has been feeling weaker and weaker and today was unable to stand and urinated on himself. VSS,  no insulin yet today     Triage Assessment (Adult)       Row Name 02/04/25 0955          Triage Assessment    Airway WDL WDL        Respiratory WDL    Respiratory WDL WDL        Skin Circulation/Temperature WDL    Skin Circulation/Temperature WDL WDL        Cardiac WDL    Cardiac WDL WDL        Peripheral/Neurovascular WDL    Peripheral Neurovascular WDL X        Cognitive/Neuro/Behavioral WDL    Cognitive/Neuro/Behavioral WDL WDL

## 2025-02-04 NOTE — ED PROVIDER NOTES
EMERGENCY DEPARTMENT ENCOUNTER      NAME: Janice Nowak  AGE: 80 year old male  YOB: 1945  MRN: [id number]  EVALUATION DATE & TIME: 2/4/2025  9:51 AM    PCP: Jeannette April    ED PROVIDER: Scar Fuller M.D.      Chief Complaint   Patient presents with    Generalized Weakness         FINAL IMPRESSION:  Weakness  Cirrhosis  Elevated troponin    ED COURSE & MEDICAL DECISION MAKING:    Pertinent Labs & Imaging studies reviewed. (See chart for details)  80 year old male presents to the Emergency Department for evaluation of weakness.  Patient arrives with his son who provides much of the history.  Patient with recent debridement of right foot/ankle by podiatry.  Seen by home health nurse.  Today is responsive and urinated on himself.  Son states last few days patient has been less active and eating less.  On exam patient is quite somnolent.  He does have a mild right esotropia which the son feels is new.  He also is globally weak.  Did have a fall approximately a week ago raising concerns of intracranial pathology.  Also concerns of possibility of dehydration or infectious process given presentation and recent debridement.  Baseline blood work being obtained.  Patient with diffuse abdominal tenderness in the lower abdomen suggestive of potential diverticular disease, appendicitis or urinary tract infection.  Imaging of the abdomen ordered.  Normal saline fluid bolus ordered given concerns of infectious process.  Echocardiogram reviewed from 12/11/2024 which reveals normal ejection fracture and mild concentric LVH. Patient appears non toxic with stable vitals signs. Overall exam is benign.      10:25 AM I met with the patient for the initial interview and physical examination. Discussed plan for treatment and workup in the ED.    10:50 AM.  Lab calls reporting elevated troponin at 127.  EKG being obtained.  Basic metabolic panel with slight hyponatremia and renal insufficiency.  Sodium 131.  BUN of  "15.4 creatinine 1.35.  Liver function test significant for slight transaminitis however alkaline phosphatase and total bilirubin normal.  Hemoglobin slightly reduced at 10.4 platelets reduced at 79.  White cell count normal at 6.5.  Viral swab negative for COVID/RSV/influenza.  Awaiting CT imaging.  12:14 PM.  Viral swab negative for RSV/influenza/COVID. CT head imaging reviewed.  No evidence of acute intracranial process.  Radiology confirms findings.  CT abdomen images reviewed.  Marked distention of the proximal small bowel noted.  No other acute findings.  Awaiting definitive report.  12:45 PM.  CT imaging they would like unremarkable.  Status postcholecystectomy.  CT consistent with cirrhosis and splenomegaly.  Given patient's presentation we will plan on hospitalization and gentle rehydration.    1:10 PM.  Patient discussed with the hospitalist.  They are agreeable plan.  At the conclusion of the encounter I discussed the results of all of the tests and the disposition. The questions were answered and return precautions provided. The patient or family acknowledged understanding and was agreeable with the care plan.     MEDICATIONS GIVEN IN THE EMERGENCY:  Medications - No data to display    NEW PRESCRIPTIONS STARTED AT TODAY'S ER VISIT  New Prescriptions    No medications on file          =================================================================    HPI      Janice Nowak is a 80 year old male with a pertient medical history of hepatocellular carcinoma, type 2 diabetes mellitus, diabetic neuropathy, coronary artery disease, hyperlipidemia, hypertension, obstructive sleep apnea,  who presents to the ED for evaluation of increased weakness.     Per patient's son:  After patient's hospitalization discharge 6 days ago (01/28/2025), son was \"sick\" and in contact with patient. 5 days ago (01/30/2025) patient was \"doing well\". Since an onset 4 days ago (01/31/2025) patient has endosred increased weakness " "described as \"being unable to stand on his own\", dehydration, cough \"here and there\", and decreased sleep. This morning (02/04/2025) patient's son noticed his eyes \"crusting over\" prompting them to present to the ED. Home health nurse recorded a blood glucose level of 191 this morning. Nurse has been unable to \"directly view wounds due to coverings\" but denied any issues. To manage dehydration, son has been \"forcing\" patient to drink gatorade and protein shake. Patient is only oriented to self, not to place or time.    At somepoint after his hospitalization discharge 6 days ago (01/28/2025) patient endorsed an unwitnessed fall. Patient was able to get up on his own. No loss of conciousness or head strike noted.     Son states patient denies rhinorrhea, bowel or bladder issues, fever, abdominal pain, chest pain, or any other symptoms at this time.    Per chart review, 01/27/2025 - 01/28/2025 (23 hours) [facility] admission for an elective bilateral foot surgry. Patient was with a right heel ulcer, and left ankle ulcer. Dr. Patricio of podiatry performed excisional debridement ulceration right heel, right leg into muscle, left ankle, and left hallex with application of Theraskin. Ian did well and was seen by PT and OT. Patient discharged in stable codition with home care. Son was going to live with him until wounds heal. No antibitoics needed at discharge.           REVIEW OF SYSTEMS   Constitutional:  Positive for weakness. Positive for dehydration. Positive for decreased sleep. Denies fever, chills  Respiratory:  Positive for cough. Negative for rhinorrhea.  Cardiovascular:  Denies chest pain, palpitations  GI:  Denies abdominal pain, nausea, vomiting, or change in bowel or bladder habits   Musculoskeletal:  Denies any new muscle/joint swelling  Skin:  Denies rash   Neurologic:  Denies focal weakness  All systems negative except as marked.     PAST MEDICAL HISTORY:  Past Medical History: "   Diagnosis Date    Coronary artery disease     Diabetes (H)     Hypertension     Pleural effusion     Sleep apnea     Thyroid disease        PAST SURGICAL HISTORY:  Past Surgical History:   Procedure Laterality Date    BACK SURGERY      BONE EXOSTOSIS EXCISION Right 10/24/2024    Procedure: PARTIAL CALCANECTOMY RIGHT FOOT;  Surgeon: Amol Patricio DPM;  Location: [facility] OR    CHOLECYSTECTOMY      ENDOSCOPIC ULTRASOUND UPPER GASTROINTESTINAL TRACT (GI) N/A 01/02/2025    Procedure: ENDOSCOPIC ULTRASOUND, ESOPHAGOSCOPY / UPPER GASTROINTESTINAL TRACT (GI) with Fine Needle Aspiration;  Surgeon: Kong Medina MD;  Location: [facility] OR    INCISION AND DRAINAGE FOOT, COMBINED Right 10/24/2024    Procedure: INCISION AND DRAINAGE;  Surgeon: Amol Patricio DPM;  Location: [facility] OR    INCISION AND DRAINAGE FOOT, COMBINED Bilateral 1/27/2025    Procedure: INCISION AND DRAINAGE bilateral feet with application of TheraSkin graft;  Surgeon: Amol Patricio DPM;  Location: [facility] OR    IR CHEST PORT PLACEMENT > 5 YRS OF AGE  01/16/2025    IR LOWER EXTREMITY ANGIOGRAM LEFT  01/16/2025    IR LOWER EXTREMITY ANGIOGRAM RIGHT  12/26/2024    IRRIGATION AND DEBRIDEMENT FOOT, COMBINED Right 10/24/2024    Procedure: AND DEBRIDEMENT RIGHT HEEL,;  Surgeon: Amol Patricio DPM;  Location: [facility] OR    ORTHOPEDIC SURGERY      PICC SINGLE LUMEN PLACEMENT  10/30/2024    VASCULAR SURGERY           CURRENT MEDICATIONS:    No current facility-administered medications for this encounter.    Current Outpatient Medications:     aspirin 81 MG EC tablet, Take 81 mg by mouth every evening., Disp: , Rfl:     clopidogrel (PLAVIX) 75 MG tablet, Take 75 mg by mouth every morning., Disp: , Rfl:     co-enzyme Q-10 100 MG CAPS capsule, Take 200 mg by mouth every evening., Disp: , Rfl:     empagliflozin (JARDIANCE) 10 MG TABS tablet, Take 1 tablet (10 mg) by mouth daily., Disp: 90 tablet, Rfl: 1     guaiFENesin (MUCUS RELIEF ER PO), Take 1,200 mg by mouth 2 times daily as needed., Disp: , Rfl:     insulin aspart (NOVOLOG PEN) 100 UNIT/ML pen, Inject 5 Units subcutaneously 3 times daily as needed for high blood sugar (With Meals). Novolog Flexpen: 5 units with breakfast, 5 units with lunch, 5 units with dinner. Takes as needed with each meal., Disp: , Rfl:     insulin glargine (LANTUS VIAL) 100 UNIT/ML vial, Inject 30 Units subcutaneously every morning., Disp: , Rfl:     lactobacillus rhamnosus, GG, (CULTURELL) capsule, Take 1 capsule by mouth every morning., Disp: , Rfl:     levothyroxine (SYNTHROID/LEVOTHROID) 50 MCG tablet, Take 50 mcg by mouth every morning., Disp: , Rfl:     losartan (COZAAR) 50 MG tablet, Take 50 mg by mouth at bedtime., Disp: , Rfl:     meclizine (ANTIVERT) 25 MG tablet, Take 50 mg by mouth 3 times daily as needed (Vertigo)., Disp: , Rfl:     metFORMIN (GLUCOPHAGE) 1000 MG tablet, Take 1,000 mg by mouth 2 times daily (with meals)., Disp: , Rfl:     Multiple Vitamins-Minerals (PRESERVISION AREDS 2 PO), Take 2 capsules by mouth 2 times daily., Disp: , Rfl:     oxyCODONE (ROXICODONE) 5 MG tablet, Take 1-2 tablets (5-10 mg) by mouth every 4 hours as needed for moderate to severe pain., Disp: 12 tablet, Rfl: 0    rosuvastatin (CRESTOR) 40 MG tablet, Take 40 mg by mouth daily., Disp: , Rfl:     spironolactone (ALDACTONE) 25 MG tablet, Take 25 mg by mouth daily., Disp: , Rfl:     torsemide (DEMADEX) 10 MG tablet, Take 1 tablet (10 mg) by mouth daily., Disp: 90 tablet, Rfl: 3    vitamin C (ASCORBIC ACID) 1000 MG TABS, Take 1,000 mg by mouth 2 times daily., Disp: , Rfl:     vitamin C with B complex (B COMPLEX-C) tablet, Take 1 tablet by mouth every morning., Disp: , Rfl:     ALLERGIES:  Allergies   Allergen Reactions    Exenatide Unknown    Heparin Unknown    Liraglutide Unknown    Lisinopril Cough    Morphine Unknown       FAMILY HISTORY:  Family History   Problem Relation Age of Onset     Anesthesia Reaction No family hx of     Thrombosis No family hx of        SOCIAL HISTORY:   Social History     Socioeconomic History    Marital status:      Spouse name: None    Number of children: None    Years of education: None    Highest education level: None   Tobacco Use    Smoking status: Former     Current packs/day: 0.00     Types: Cigarettes     Quit date:      Years since quittin.1     Passive exposure: Never    Smokeless tobacco: Never   Vaping Use    Vaping status: Never Used   Substance and Sexual Activity    Alcohol use: Not Currently     Alcohol/week: 5.0 standard drinks of alcohol     Types: 5 Cans of beer per week     Comment: once in awhile    Drug use: Never     Social Drivers of Health     Financial Resource Strain: Low Risk  (2025)    Financial Resource Strain     Within the past 12 months, have you or your family members you live with been unable to get utilities (heat, electricity) when it was really needed?: No   Food Insecurity: Low Risk  (2025)    Food Insecurity     Within the past 12 months, did you worry that your food would run out before you got money to buy more?: No     Within the past 12 months, did the food you bought just not last and you didn t have money to get more?: No   Transportation Needs: Low Risk  (2025)    Transportation Needs     Within the past 12 months, has lack of transportation kept you from medical appointments, getting your medicines, non-medical meetings or appointments, work, or from getting things that you need?: No   Interpersonal Safety: Low Risk  (2025)    Interpersonal Safety     Do you feel physically and emotionally safe where you currently live?: Yes     Within the past 12 months, have you been hit, slapped, kicked or otherwise physically hurt by someone?: No     Within the past 12 months, have you been humiliated or emotionally abused in other ways by your partner or ex-partner?: No   Housing Stability: Low Risk   "(1/27/2025)    Housing Stability     Do you have housing? : Yes     Are you worried about losing your housing?: No       VITALS:  Patient Vitals for the past 24 hrs:   Height Weight   02/04/25 0954 1.727 m (5' 8\") 94.8 kg (209 lb)        PHYSICAL EXAM    Constitutional:  Awake, alert, in no apparent distress  HENT:  Normocephalic, Atraumatic. Bilateral external ears normal. Oropharynx moist. Nose normal. Neck- Normal range of motion with no guarding, No midline cervical tenderness, Supple, No stridor.   Eyes:  PERRL, EOMI with no signs of entrapment, Conjunctiva normal, No discharge.   Respiratory:  Normal breath sounds, No respiratory distress, No wheezing.    Cardiovascular:  Normal heart rate, Normal rhythm, No appreciable rubs or gallops.   GI:  Soft, No tenderness, No distension, No palpable masses  Musculoskeletal:  Intact distal pulses, No edema. Good range of motion in all major joints. No tenderness to palpation or major deformities noted.  Integument:  Warm, Dry, No erythema, No rash.   Neurologic:  Alert & oriented, Normal motor function, Normal sensory function, No focal deficits noted.   Psychiatric:  Affect normal, Judgment normal, Mood normal.     LAB:  All pertinent labs reviewed and interpreted.     Results for orders placed or performed during the hospital encounter of 02/04/25   Head CT w/o contrast     Status: None    Narrative    EXAM: CT HEAD W/O CONTRAST  LOCATION: [facility]  DATE: 2/4/2025    INDICATION: Recent fall, confusion  COMPARISON: 3/8/2016 brain MRI  TECHNIQUE: Routine CT Head without IV contrast. Multiplanar reformats. Dose reduction techniques were used.    FINDINGS:  INTRACRANIAL CONTENTS: No intracranial hemorrhage, extraaxial collection, or mass effect.  Small chronic right cerebellar infarct. No CT evidence of acute infarct. Mild presumed chronic small vessel ischemic changes. Mild generalized volume loss. No   hydrocephalus.     VISUALIZED " ORBITS/SINUSES/MASTOIDS: No intraorbital abnormality. Minor mucosal thickening right maxillary sinus. No middle ear or mastoid effusion.    BONES/SOFT TISSUES: No acute abnormality.      Impression    IMPRESSION:  1.  No acute intracranial injury, hemorrhage, mass, or CT evidence of recent ischemia.   CT Abdomen Pelvis w Contrast     Status: None    Narrative    EXAM: CT ABDOMEN PELVIS W CONTRAST  LOCATION: [facility]  DATE: 2/4/2025    INDICATION: Weakness, diffuse lower abdominal tenderness  COMPARISON: 1/16/2025  TECHNIQUE: CT scan of the abdomen and pelvis was performed following injection of IV contrast. Multiplanar reformats were obtained. Dose reduction techniques were used.  CONTRAST: IsoVue 370 75mL    FINDINGS:   LOWER CHEST: Partially visualized nodule along the right major fissure. Three-vessel coronary artery calcification.    HEPATOBILIARY: [Morphologic changes of cirrhosis with ill-defined 3.7 x 3.4 cm hypoenhancing lesion in the right lobe posteriorly (4/36). Cholecystectomy. No biliary dilation.    PANCREAS: Normal.    SPLEEN: Enlarged, 16.0 cm craniocaudal.    ADRENAL GLANDS: Normal.    KIDNEYS/BLADDER: Normal.    BOWEL: Diverticulosis of the colon. No acute inflammatory change. No obstruction. Normal appendix.    LYMPH NODES: Enlarged periportal lymph node measuring 7.4 x 5.1 cm (4/67), previously 7.3 x 5.2 cm. No other enlarged lymph nodes in the abdomen and pelvis. Some borderline enlarged retroperitoneal lymph nodes are unchanged.    VASCULATURE: Severe atherosclerotic calcification of the aorta, visceral branches, and iliofemoral arteries. Right common iliac and left common-external iliac artery stents, which appear to be patent, although the arterial enhancement is suboptimal. No   aortic aneurysm.    PELVIC ORGANS: Normal.    MUSCULOSKELETAL: Multilevel degenerative changes in the spine.      Impression    IMPRESSION:   1.  No acute abnormality in the abdomen and  pelvis.  2.  Morphologic changes of cirrhosis with splenomegaly. No ascites.  3.  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      [place] Draw     Status: None    Narrative    The following orders were created for panel order [place] Draw.  Procedure                               Abnormality         Status                     ---------                               -----------         ------                     Extra Blue Top Tube[[id number]]                              Final result               Extra Green Top (Lithium...[[id number]]                      Final result               Extra Purple Top Tube[[id number]]                            Final result               Extra Green Top (Lithium...[[id number]]                      Final result                 Please view results for these tests on the individual orders.   Influenza A/B, RSV and SARS-CoV2 PCR (COVID-19) Nasopharyngeal     Status: Normal    Specimen: Nasopharyngeal; Swab   Result Value Ref Range    Influenza A PCR Negative Negative    Influenza B PCR Negative Negative    RSV PCR Negative Negative    SARS CoV2 PCR Negative Negative    Narrative    Testing was performed using the Xpert Xpress CoV2/Flu/RSV Assay on the [employer] GeneXpert Instrument. This test should be ordered for the detection of SARS-CoV2, influenza, and RSV viruses in individuals with signs and symptoms of respiratory tract infection. This test is for in vitro diagnostic use under the [place] FDA for laboratories certified under CLIA to perform high or moderate complexity testing. This test has been [place] FDA cleared. A negative result does not rule out the presence of PCR inhibitors in the specimen or target RNA in concentration below the limit of detection for the assay. If only one viral target is positive but coinfection with  multiple targets is suspected, the sample should be re-tested with another FDA cleared, approved, or authorized test, if coninfection would change clinical management. This test was validated by the [facility]. These laboratories are certified under the Clinical Laboratory Improvement Amendments of 1988 (CLIA-88) as qualified to perfom high complexity laboratory testing.   Extra Blue Top Tube     Status: None   Result Value Ref Range    Hold Specimen JIC    Extra Green Top (Lithium Heparin) Tube     Status: None   Result Value Ref Range    Hold Specimen JIC    Extra Purple Top Tube     Status: None   Result Value Ref Range    Hold Specimen JIC    Extra Green Top (Lithium Heparin) ON ICE     Status: None   Result Value Ref Range    Hold Specimen JIC    Comprehensive metabolic panel     Status: Abnormal   Result Value Ref Range    Sodium 131 (L) 135 - 145 mmol/L    Potassium 4.5 3.4 - 5.3 mmol/L    Carbon Dioxide (CO2) 25 22 - 29 mmol/L    Anion Gap 11 7 - 15 mmol/L    Urea Nitrogen 50.4 (H) 8.0 - 23.0 mg/dL    Creatinine 1.35 (H) 0.67 - 1.17 mg/dL    GFR Estimate 53 (L) >60 mL/min/1.73m2    Calcium 8.7 (L) 8.8 - 10.4 mg/dL    Chloride 95 (L) 98 - 107 mmol/L    Glucose 243 (H) 70 - 99 mg/dL    Alkaline Phosphatase 147 40 - 150 U/L     (H) 0 - 45 U/L     (H) 0 - 70 U/L    Protein Total 7.5 6.4 - 8.3 g/dL    Albumin 2.9 (L) 3.5 - 5.2 g/dL    Bilirubin Total 0.7 <=1.2 mg/dL   Magnesium     Status: Normal   Result Value Ref Range    Magnesium 2.2 1.7 - 2.3 mg/dL   Troponin T, High Sensitivity     Status: Abnormal   Result Value Ref Range    Troponin T, High Sensitivity 127 (HH) <=22 ng/L   CBC (+ platelets, no diff)     Status: Abnormal   Result Value Ref Range    WBC Count 6.5 4.0 - 11.0 10e3/uL    RBC Count 4.04 (L) 4.40 - 5.90 10e6/uL    Hemoglobin 10.4 (L) 13.3 - 17.7 g/dL    Hematocrit 32.8 (L) 40.0 - 53.0 %    MCV 81 78 - 100 fL    MCH 25.7 (L) 26.5 - 33.0 pg    MCHC 31.7 31.5 -  36.5 g/dL    RDW 16.4 (H) 10.0 - 15.0 %    Platelet Count 79 (L) 150 - 450 10e3/uL   Troponin T, High Sensitivity     Status: Abnormal   Result Value Ref Range    Troponin T, High Sensitivity 109 (HH) <=22 ng/L      RADIOLOGY:  Reviewed all pertinent imaging. Please see official radiology report.  No orders to display       EKG:    Normal sinus rhythm with occasional PAC.  Right bundle branch block morphology.  No acute ST segment abnormalities.  Unchanged compared to January 16, 2025  I have independently reviewed and interpreted the EKG(s) documented above.        I, Jono Carter, am serving as a scribe to document services personally performed by Scar Fuller MD, based on my observation and the provider's statements to me. I, Scar Fuller MD attest that Jono Carter is acting in a scribe capacity, has observed my performance of the services and has documented them in accordance with my direction.    Scar Fuller M.D.  Emergency Medicine  [facility] EMERGENCY DEPARTMENT      Scar Fuller MD  02/04/25 1311       Scar Fuller MD  02/04/25 1315

## 2025-02-04 NOTE — PLAN OF CARE
Goal Outcome Evaluation:       discharge back to [facility] but this time with Assisted Living level of care and resume home care orders.

## 2025-02-04 NOTE — PHARMACY-ADMISSION MEDICATION HISTORY
Pharmacist Admission Medication History    Admission medication history is complete. The information provided in this note is only as accurate as the sources available at the time of the update.    Information Source(s): Patient, Family member, and CareEverywhere/SureScripts via in-person    Pertinent Information: Patient's son states patient was not taking medications since being discharged on 1/28/25 until this past Saturday, family reset up meds and patient has been taking medications the past couple of days except for insulin. Patient has not taken any insulin since being hospitalized.     Changes made to PTA medication list:  Added: None  Deleted: oxycodone (never picked up)  Changed: None    Allergies reviewed with patient and updates made in EHR: yes    Medication History Completed By: ARACELIS BROWN RPH 2/4/2025 11:35 AM    PTA Med List   Medication Sig Last Dose/Taking    aspirin 81 MG EC tablet Take 81 mg by mouth every evening. 2/3/2025 Evening    clopidogrel (PLAVIX) 75 MG tablet Take 75 mg by mouth every morning. 2/4/2025 Morning    co-enzyme Q-10 100 MG CAPS capsule Take 200 mg by mouth every evening. 2/3/2025 Evening    empagliflozin (JARDIANCE) 10 MG TABS tablet Take 1 tablet (10 mg) by mouth daily. 2/4/2025 Morning    guaiFENesin (MUCUS RELIEF ER PO) Take 1,200 mg by mouth 2 times daily as needed (congestion). Taking As Needed    insulin aspart (NOVOLOG PEN) 100 UNIT/ML pen Inject 5 Units subcutaneously 3 times daily as needed for high blood sugar (With Meals). Novolog Flexpen: 5 units with breakfast, 5 units with lunch, 5 units with dinner. Takes as needed with each meal. Past Month    insulin glargine (LANTUS VIAL) 100 UNIT/ML vial Inject 30 Units subcutaneously every morning. Past Month    lactobacillus rhamnosus, GG, (CULTURELL) capsule Take 1 capsule by mouth every morning. 2/4/2025 Morning    levothyroxine (SYNTHROID/LEVOTHROID) 50 MCG tablet Take 50 mcg by mouth every morning. 2/4/2025  Morning    losartan (COZAAR) 50 MG tablet Take 50 mg by mouth at bedtime. 2/3/2025 Bedtime    meclizine (ANTIVERT) 25 MG tablet Take 50 mg by mouth 3 times daily as needed (Vertigo). Taking As Needed    metFORMIN (GLUCOPHAGE) 1000 MG tablet Take 1,000 mg by mouth 2 times daily (with meals). 2/4/2025 Morning    Multiple Vitamins-Minerals (PRESERVISION AREDS 2 PO) Take 2 capsules by mouth 2 times daily. 2/4/2025 Morning    rosuvastatin (CRESTOR) 40 MG tablet Take 40 mg by mouth daily. 2/4/2025 Morning    spironolactone (ALDACTONE) 25 MG tablet Take 25 mg by mouth daily. 2/4/2025 Morning    torsemide (DEMADEX) 10 MG tablet Take 1 tablet (10 mg) by mouth daily. 2/4/2025 Morning    vitamin C (ASCORBIC ACID) 1000 MG TABS Take 1,000 mg by mouth 2 times daily. 2/4/2025 Morning    vitamin C with B complex (B COMPLEX-C) tablet Take 1 tablet by mouth every morning. 2/4/2025 Morning

## 2025-02-04 NOTE — CONSULTS
Care Management Initial Consult    General Information  Assessment completed with: Patient, Care Team Member, Children, Patient [facility] Care Manager Mariaelena, and son Michael  Type of CM/SW Visit: Initial Assessment    Primary Care Provider verified and updated as needed: Yes (PCP is Dr. Machado)   Readmission within the last 30 days: previous discharge plan unsuccessful   Return Category: Progression of disease  Reason for Consult: discharge planning  Advance Care Planning: Advance Care Planning Reviewed: concerns discussed          Communication Assessment  Patient's communication style: spoken language (English or Bilingual)             Cognitive  Cognitive/Neuro/Behavioral: Appears very tired, could get the energy to answer simple yes/no questions but otherwise it seemed like it took too much effort for him to visit with me.                   Living Environment:   People in home: alone     Current living Arrangements: independent living facility      Able to return to prior arrangements: yes       Family/Social Support:  Care provided by: other (see comments)  Provides care for: no one, unable/limited ability to care for self  Marital Status:   Support system: Children          Description of Support System: Supportive, Involved         Current Resources:   Patient receiving home care services: Yes  Skilled Home Care Services: Physical Therapy, Skilled Nursing, Occupational Therapy     Community Resources: OP Infusion, Other (see comment) (Care Manager through [facility])  Equipment currently used at home: wheelchair, manual  Supplies currently used at home: Incontinence Supplies, Wound Care Supplies, Other, Diabetic Supplies (cpap)    Employment/Financial:  Employment Status: retired, , previous service        Financial Concerns: other (see comments) (TBD as they are now transitioning into Assisted living from independent living and it will cost more)   Referral to  Financial Worker: No       Does the patient's insurance plan have a 3 day qualifying hospital stay waiver?  No    Lifestyle & Psychosocial Needs:  Social Drivers of Health     Food Insecurity: Low Risk  (1/27/2025)    Food Insecurity     Within the past 12 months, did you worry that your food would run out before you got money to buy more?: No     Within the past 12 months, did the food you bought just not last and you didn t have money to get more?: No   Depression: Not at risk (11/13/2024)    PHQ-2     PHQ-2 Score: 0   Housing Stability: Low Risk  (1/27/2025)    Housing Stability     Do you have housing? : Yes     Are you worried about losing your housing?: No   Tobacco Use: Medium Risk (2/4/2025)    Patient History     Smoking Tobacco Use: Former     Smokeless Tobacco Use: Never     Passive Exposure: Never   Financial Resource Strain: Low Risk  (1/27/2025)    Financial Resource Strain     Within the past 12 months, have you or your family members you live with been unable to get utilities (heat, electricity) when it was really needed?: No   Alcohol Use: Not on file   Transportation Needs: Low Risk  (1/27/2025)    Transportation Needs     Within the past 12 months, has lack of transportation kept you from medical appointments, getting your medicines, non-medical meetings or appointments, work, or from getting things that you need?: No   Physical Activity: Not on file   Interpersonal Safety: Low Risk  (1/27/2025)    Interpersonal Safety     Do you feel physically and emotionally safe where you currently live?: Yes     Within the past 12 months, have you been hit, slapped, kicked or otherwise physically hurt by someone?: No     Within the past 12 months, have you been humiliated or emotionally abused in other ways by your partner or ex-partner?: No   Stress: Not on file   Social Connections: Not on file   Health Literacy: Not on file       Functional Status:  Prior to admission patient needed assistance:   Dependent  ADLs:: Wheelchair-independent, Bathing, Dressing, Grooming, Incontinence, Positioning, Toileting  Dependent IADLs:: Cleaning, Cooking, Incontinence, Transportation, Money Management, Meal Preparation, Laundry, Shopping  Assesssment of Functional Status: Not at baseline with ADL Functioning    Mental Health Status:  Mental Health Status: No Current Concerns       Chemical Dependency Status:  Chemical Dependency Status: No Current Concerns             Values/Beliefs:  Spiritual, Cultural Beliefs, Evangelical Practices, Values that affect care: no               Discussed  Partnership in Safe Discharge Planning  document with patient/family: No    Additional Information:  Janice currently lives at [facility] in Independent Senior living but his functional status has been declining so son/family are working to get him transitioned over to Assisted Living level of care but he would remain in his same apartment.     He had foot surgery fairly recently with weightbearing restrictions so he's been in a wheelchair propelling himself around independent. He requires assistance with most ADL's at this point and is dependent in his IADLs with the exception of med management - he had been managing his own medications up until now.     I stopped to verify information with Janice but he appeared quite tired, he could respond to me but it seemed to take so much effort for him to engage in conversation subsequently, I asked him if I could call Michael to do this assessment so he could rest and he said yes.     I spoke with Michael and had a nice visit. I also received a call from his [facility] RN Care Manager Mariaelena (ph. [phone]). Mariaelena relayed that Janice has had multiple visits to the hospital and usually wants to get out as soon as he can so she sometimes worries he's getting discharged a little sooner than he should be. His family feels he's declining fairly quickly. Mariaelena isn't sure if they have thought about  hospice at all yet or not.     Per Mariaelena, Janice is supposed to have foot wound follow up with podiatry this Friday, 2/7 - Dr. Robledo notified  and consulted Podiatry so Dr. Patricio's team could see him here.     Janice is open to home care with [facility] for PT, OT and SN.      Next Steps: Follow along and address discharge needs as they arise. PT and OT are consulted.  Of note, has not designated a health care agent. May be something primary team wants to talk with Janice about.       Ysabel Matias RN  [facility] ED Care Manager  [phone]

## 2025-02-04 NOTE — ED NOTES
[facility]  Expected date:   Expected time:   Means of arrival: Ambulance  Comments:  WBL: Weak, leg wounds

## 2025-02-04 NOTE — H&P
[facility]    History and Physical - Hospitalist Service       Date of Admission:  2/4/2025    Assessment & Plan    Janice Nowak is a 80 year old male with past medical hx of T2DM, HFpEF, recent debridement of diabetic ulcers, SABRINA, hepatocellular carcinoma, hypothyroidism, HTN, HLD admitted on 2/4/2025. He presented for generalized weakness.    Generalized weakness  Abdominal tenderness  Presented from home for 3-4 days of progressive weakness, poor PO intake, poor sleep, myalgias. He has several recent admissions lately, last discharge 1/28. Workup including CT head and a/p unrevealing. Per my read of CT a/p the SMA has severe calcifications. Patient has diffuse abdominal pain. Question early presentation of ischemic bowel due to low flow state, however vitally stable. Patient states he does not like to eat but is unsure if he has pain after eating.    - LR 100ml/hr for 10hrs.   - UA, CXR, RUQ U/S pending   - If above are negative, consider treatment for hepatic encephalopathy despite normal ammonia  - See below for possible post-op cellulitis  - PT/OT/SW consulted    Diabetic ulcer of right heel -- S/P Surg 1/27/25 with possible post-op infection  Osteomyelitis s/p debridement and right partial calcanectomy 10/2024  Hospitalized 10/2024 for osteo s/p debridement and partial right calcanectomy. Admitted 1/27-28 for diabetic ulcer of right heel s/p debridement and left ankle ulcer s/p debridement. Per home health nurse the VAC stopped working 2/1.     - Continued PTA Aspirin 81mg, Clopidogrel 75mg  - Podiatry, WOC consulted  - Zosyn 3.375g IV Q8h   - Vanc, pharmacy dosed for possible post-op infection    Hepatobiliary Cancer   Concern for Metastasis to Lungs     Relatively new diagnosis, began with incidental liver mass noted in 11/2024. Underwent IR guided biopsy on 1/2/25 which confirmed HCC. Met with oncology on 1/10/25 who discussed recommendation for palliative immunotherapy. Port  placed 1/17. CT chest on recent admission showed nodules and bulky adenopathy concerning for malignancy.     On admission liver function slightly worse than baseline. PLT 79, baseline 120-190. , . Albumin 2.9. INR 1.37. 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     - follow up with oncology on outpatient basis     Heart failure with preserved ejection fraction, NYHA class II   Hypertension  Echo 12/11 with mild concentric LVH. EF 55-60%. Grade 2 diastolic dysfunction. Had transudative pleural effusion s/p thoracentesis 12/2024.  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   - Continued PTA Empagliflozin 10mg, Glargine 30U Qam, Metformin 1000mg BID  - Consistent carb diet  - sliding scale insulin     Troponinemia   127 ?  109. Similar to baseline and downtrending. EKG similar to prior. Patient declined cardio workup on last admission.    Anemia, normocytic, chronic  Hemoglobin 10.4, baseline 10-12.    Hyponatremia, mild  131 on admit. Likely due to poor PO intake.    CKD stage 2  Creatinine 1.35, baseline 1-1.3.     Carotid arterial disease  U/S 11/2024 with <50% stenosis bilaterally.    Hx of syncope  Admitted 1/16 for syncope after port placement. Suspected orthostatic hypotension. Patient declined cardiology workup.    Mixed hyperlipidemia  - Continued PTA Rosuvastatin 40mg    Obstructive Sleep Apnea -- stopped using CPAP  Does not use CPAP.    Hypothyroidism  TSH WNL.  - Continued PTA Levothyroxine 50mcg        Diet: Combination Diet Moderate Consistent Carb (60 g CHO per Meal) Diet; Low Saturated Fat Na <2400mg Diet  DVT Prophylaxis: Pneumatic Compression Devices. Patient cannot use lovenox due to PLT. Allergy to Heparin  Reyes Catheter: Not present  Lines: PRESENT             Cardiac Monitoring: None  Code Status: No CPR- Do  "NOT Intubate    Clinically Significant Risk Factors Present on Admission         # Hyponatremia: Lowest Na = 131 mmol/L in last 2 days, will monitor as appropriate  # Hypochloremia: Lowest Cl = 95 mmol/L in last 2 days, will monitor as appropriate      # Hypoalbuminemia: Lowest albumin = 2.9 g/dL at 2/4/2025 10:04 AM, will monitor as appropriate  # Coagulation Defect: INR = 1.37 (Ref range: 0.85 - 1.15) and/or PTT = 46 Seconds (Ref range: 22 - 38 Seconds), will monitor for bleeding  # Drug Induced Platelet Defect: home medication list includes an antiplatelet medication   # Hypertension: Noted on problem list        # Anemia: based on hgb <11      # DMII: A1C = 7.9 % (Ref range: <5.7 %) within past 6 months    # Obesity: Estimated body mass index is 31.78 kg/m  as calculated from the following:    Height as of this encounter: 1.727 m (5' 8\").    Weight as of this encounter: 94.8 kg (209 lb).         # Financial/Environmental Concerns:           Disposition Plan     Medically Ready for Discharge: Anticipated in 2-4 Days         The patient's care was discussed with the Attending Physician, Dr. Robledo and Patient.    Ha Ashford PA-C  Hospitalist Service  [facility]  Securely message with [employer] (more info)  Text page via [employer] Paging/Directory     ______________________________________________________________________    Chief Complaint   Weakness    History is obtained from the patient    History of Present Illness   Janice Nowak is a 80 year old male who presented for weakness.    Patient presents from home for increased weakness.  He has several recent admissions lately, including a admission in October for osteomyelitis status postdebridement, admission in December for CHF, admission in January for syncope, admission in January for debridement of diabetic ulcers.  He has been home for the last week with his son.  His son was sick when he got home with a URI.  Over the last 3 to 4 " days he has been getting progressively more weak, not able to stand on his own, not sleeping, eating less, less active, somnolent.  He had a fall 1 week ago while in the hospital.  He denies nausea, vomiting, diarrhea, pain, vision changes, edema.  He states that he has mild myalgias.  He states that he does not like to eat, but does not get nauseous or abdominal pain after he eats.  He states he has been using all of his medications recently.  He states he has not had any alcohol recently, last use several weeks ago.    Past Medical History    Past Medical History:   Diagnosis Date    Coronary artery disease     Diabetes (H)     Hypertension     Pleural effusion     Sleep apnea     Thyroid disease        Past Surgical History   Past Surgical History:   Procedure Laterality Date    BACK SURGERY      BONE EXOSTOSIS EXCISION Right 10/24/2024    Procedure: PARTIAL CALCANECTOMY RIGHT FOOT;  Surgeon: Amol Patricio DPM;  Location: [facility] OR    CHOLECYSTECTOMY      ENDOSCOPIC ULTRASOUND UPPER GASTROINTESTINAL TRACT (GI) N/A 01/02/2025    Procedure: ENDOSCOPIC ULTRASOUND, ESOPHAGOSCOPY / UPPER GASTROINTESTINAL TRACT (GI) with Fine Needle Aspiration;  Surgeon: Kong Medina MD;  Location: [facility] OR    INCISION AND DRAINAGE FOOT, COMBINED Right 10/24/2024    Procedure: INCISION AND DRAINAGE;  Surgeon: Amol Patricio DPM;  Location: [facility] OR    INCISION AND DRAINAGE FOOT, COMBINED Bilateral 1/27/2025    Procedure: INCISION AND DRAINAGE bilateral feet with application of TheraSkin graft;  Surgeon: Amol Patricio DPM;  Location: [facility] OR    IR CHEST PORT PLACEMENT > 5 YRS OF AGE  01/16/2025    IR LOWER EXTREMITY ANGIOGRAM LEFT  01/16/2025    IR LOWER EXTREMITY ANGIOGRAM RIGHT  12/26/2024    IRRIGATION AND DEBRIDEMENT FOOT, COMBINED Right 10/24/2024    Procedure: AND DEBRIDEMENT RIGHT HEEL,;  Surgeon: Amol Patricio DPM;  Location: [facility] OR    ORTHOPEDIC SURGERY       PICC SINGLE LUMEN PLACEMENT  10/30/2024    VASCULAR SURGERY         Prior to Admission Medications   Prior to Admission Medications   Prescriptions Last Dose Informant Patient Reported? Taking?   Multiple Vitamins-Minerals (PRESERVISION AREDS 2 PO) 2/4/2025 Morning  Yes Yes   Sig: Take 2 capsules by mouth 2 times daily.   aspirin 81 MG EC tablet 2/3/2025 Evening  Yes Yes   Sig: Take 81 mg by mouth every evening.   clopidogrel (PLAVIX) 75 MG tablet 2/4/2025 Morning  Yes Yes   Sig: Take 75 mg by mouth every morning.   co-enzyme Q-10 100 MG CAPS capsule 2/3/2025 Evening  Yes Yes   Sig: Take 200 mg by mouth every evening.   empagliflozin (JARDIANCE) 10 MG TABS tablet 2/4/2025 Morning  No Yes   Sig: Take 1 tablet (10 mg) by mouth daily.   guaiFENesin (MUCUS RELIEF ER PO)   Yes Yes   Sig: Take 1,200 mg by mouth 2 times daily as needed (congestion).   insulin aspart (NOVOLOG PEN) 100 UNIT/ML pen Past Month  Yes Yes   Sig: Inject 5 Units subcutaneously 3 times daily as needed for high blood sugar (With Meals). Novolog Flexpen: 5 units with breakfast, 5 units with lunch, 5 units with dinner. Takes as needed with each meal.   insulin glargine (LANTUS VIAL) 100 UNIT/ML vial Past Month  No Yes   Sig: Inject 30 Units subcutaneously every morning.   lactobacillus rhamnosus, GG, (CULTURELL) capsule 2/4/2025 Morning  Yes Yes   Sig: Take 1 capsule by mouth every morning.   levothyroxine (SYNTHROID/LEVOTHROID) 50 MCG tablet 2/4/2025 Morning  Yes Yes   Sig: Take 50 mcg by mouth every morning.   losartan (COZAAR) 50 MG tablet 2/3/2025 Bedtime  Yes Yes   Sig: Take 50 mg by mouth at bedtime.   meclizine (ANTIVERT) 25 MG tablet   Yes Yes   Sig: Take 50 mg by mouth 3 times daily as needed (Vertigo).   metFORMIN (GLUCOPHAGE) 1000 MG tablet 2/4/2025 Morning  Yes Yes   Sig: Take 1,000 mg by mouth 2 times daily (with meals).   rosuvastatin (CRESTOR) 40 MG tablet 2/4/2025 Morning  Yes Yes   Sig: Take 40 mg by mouth daily.   spironolactone  (ALDACTONE) 25 MG tablet 2/4/2025 Morning  Yes Yes   Sig: Take 25 mg by mouth daily.   torsemide (DEMADEX) 10 MG tablet 2/4/2025 Morning  No Yes   Sig: Take 1 tablet (10 mg) by mouth daily.   vitamin C (ASCORBIC ACID) 1000 MG TABS 2/4/2025 Morning  Yes Yes   Sig: Take 1,000 mg by mouth 2 times daily.   vitamin C with B complex (B COMPLEX-C) tablet 2/4/2025 Morning  Yes Yes   Sig: Take 1 tablet by mouth every morning.      Facility-Administered Medications: None      2023 UPDATE: these sections are not required for  billing, but can be added when medically relevant     Physical Exam   Vital Signs: Temp: 97.9  F (36.6  C)   BP: (!) 141/67 Pulse: 92   Resp: 25 SpO2: 95 %      Weight: 209 lbs 0 oz    Constitutional: Somnolent, oriented, cooperative, in no acute distress, appears nontoxic.  HENT: Oropharynx is clear and moist. No evidence of cranial trauma. Normocephalic. Eyes anicteric. EOM intact without nystagmus. Pupils ~2mm bilaterally even after prolonged eye closure. Mild sunken eyes.  Cardiovascular: Regular rate and rhythm, normal S1 and S2, and no murmur noted. No lower extremity edema or calf tenderness.  Respiratory: Decreased breath sounds diffusely without adventitious breath sounds.   GI: Soft, diffusely moderately tender, no rebound or guarding, decreased bowel sounds, no hepatosplenomegaly, no masses appreciated.  Musculoskeletal: Normal muscle bulk and tone. FROM in all extremities. Fine tremor with arms extended.  Skin: Warm and dry, no rashes on exposed skin.   Psych: Flat affect and soft speech.  Neurologic: Cranial nerves 2-12 are grossly intact. A&Ox4. Flapping tremor. Tongue midline. Strength 3/5 in bilateral , elbow flexion/extension, ankle dorsi/plantarflexion      Medical Decision Making       80 MINUTES SPENT BY ME on the date of service doing chart review, history, exam, documentation & further activities per the note.      Data     I have personally reviewed the following data over the  past 24 hrs:    6.5  \   10.4 (L)   / 79 (L)     131 (L) 95 (L) 50.4 (H) /  243 (H)   4.5 25 1.35 (H) \     ALT: 114 (H) AST: 138 (H) AP: 147 TBILI: 0.7   ALB: 2.9 (L) TOT PROTEIN: 7.5 LIPASE: N/A     Trop: 109 (HH) BNP: N/A     TSH: 3.55 T4: N/A A1C: 7.9 (H)     INR:  1.37 (H) PTT:  N/A   D-dimer:  N/A Fibrinogen:  N/A       Imaging results reviewed over the past 24 hrs:   Recent Results (from the past 24 hours)   Head CT w/o contrast    Narrative    EXAM: CT HEAD W/O CONTRAST  LOCATION: [facility]  DATE: 2/4/2025    INDICATION: Recent fall, confusion  COMPARISON: 3/8/2016 brain MRI  TECHNIQUE: Routine CT Head without IV contrast. Multiplanar reformats. Dose reduction techniques were used.    FINDINGS:  INTRACRANIAL CONTENTS: No intracranial hemorrhage, extraaxial collection, or mass effect.  Small chronic right cerebellar infarct. No CT evidence of acute infarct. Mild presumed chronic small vessel ischemic changes. Mild generalized volume loss. No   hydrocephalus.     VISUALIZED ORBITS/SINUSES/MASTOIDS: No intraorbital abnormality. Minor mucosal thickening right maxillary sinus. No middle ear or mastoid effusion.    BONES/SOFT TISSUES: No acute abnormality.      Impression    IMPRESSION:  1.  No acute intracranial injury, hemorrhage, mass, or CT evidence of recent ischemia.   CT Abdomen Pelvis w Contrast    Narrative    EXAM: CT ABDOMEN PELVIS W CONTRAST  LOCATION: [facility]  DATE: 2/4/2025    INDICATION: Weakness, diffuse lower abdominal tenderness  COMPARISON: 1/16/2025  TECHNIQUE: CT scan of the abdomen and pelvis was performed following injection of IV contrast. Multiplanar reformats were obtained. Dose reduction techniques were used.  CONTRAST: IsoVue 370 75mL    FINDINGS:   LOWER CHEST: Partially visualized nodule along the right major fissure. Three-vessel coronary artery calcification.    HEPATOBILIARY: [Morphologic changes of cirrhosis with ill-defined  3.7 x 3.4 cm hypoenhancing lesion in the right lobe posteriorly (4/36). Cholecystectomy. No biliary dilation.    PANCREAS: Normal.    SPLEEN: Enlarged, 16.0 cm craniocaudal.    ADRENAL GLANDS: Normal.    KIDNEYS/BLADDER: Normal.    BOWEL: Diverticulosis of the colon. No acute inflammatory change. No obstruction. Normal appendix.    LYMPH NODES: Enlarged periportal lymph node measuring 7.4 x 5.1 cm (4/67), previously 7.3 x 5.2 cm. No other enlarged lymph nodes in the abdomen and pelvis. Some borderline enlarged retroperitoneal lymph nodes are unchanged.    VASCULATURE: Severe atherosclerotic calcification of the aorta, visceral branches, and iliofemoral arteries. Right common iliac and left common-external iliac artery stents, which appear to be patent, although the arterial enhancement is suboptimal. No   aortic aneurysm.    PELVIC ORGANS: Normal.    MUSCULOSKELETAL: Multilevel degenerative changes in the spine.      Impression    IMPRESSION:   1.  No acute abnormality in the abdomen and pelvis.  2.  Morphologic changes of cirrhosis with splenomegaly. No ascites.  3.  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

## 2025-02-05 ENCOUNTER — APPOINTMENT (OUTPATIENT)
Dept: OCCUPATIONAL THERAPY | Facility: HOSPITAL | Age: 80
End: 2025-02-05
Payer: COMMERCIAL

## 2025-02-05 ENCOUNTER — APPOINTMENT (OUTPATIENT)
Dept: RADIOLOGY | Facility: HOSPITAL | Age: 80
DRG: 270 | End: 2025-02-05
Attending: PODIATRIST
Payer: COMMERCIAL

## 2025-02-05 ENCOUNTER — APPOINTMENT (OUTPATIENT)
Dept: MRI IMAGING | Facility: HOSPITAL | Age: 80
DRG: 270 | End: 2025-02-05
Attending: PODIATRIST
Payer: COMMERCIAL

## 2025-02-05 ENCOUNTER — DOCUMENTATION ONLY (OUTPATIENT)
Dept: OTHER | Facility: CLINIC | Age: 80
End: 2025-02-05
Payer: COMMERCIAL

## 2025-02-05 LAB
ALBUMIN SERPL BCG-MCNC: 2.3 G/DL (ref 3.5–5.2)
ALP SERPL-CCNC: 130 U/L (ref 40–150)
ALT SERPL W P-5'-P-CCNC: 91 U/L (ref 0–70)
ANION GAP SERPL CALCULATED.3IONS-SCNC: 14 MMOL/L (ref 7–15)
AST SERPL W P-5'-P-CCNC: 99 U/L (ref 0–45)
ATRIAL RATE - MUSE: 95 BPM
BILIRUB SERPL-MCNC: 0.5 MG/DL
BKR LAB AP ADD'L TEST STATUS: NORMAL
BKR PATH ADDL TEST FINAL COMMENTS: NORMAL
BUN SERPL-MCNC: 49.3 MG/DL (ref 8–23)
CALCIUM SERPL-MCNC: 7.8 MG/DL (ref 8.8–10.4)
CHLORIDE SERPL-SCNC: 102 MMOL/L (ref 98–107)
CREAT SERPL-MCNC: 1.32 MG/DL (ref 0.67–1.17)
DIASTOLIC BLOOD PRESSURE - MUSE: 68 MMHG
EGFRCR SERPLBLD CKD-EPI 2021: 55 ML/MIN/1.73M2
ERYTHROCYTE [DISTWIDTH] IN BLOOD BY AUTOMATED COUNT: 16.7 % (ref 10–15)
GLUCOSE BLDC GLUCOMTR-MCNC: 129 MG/DL (ref 70–99)
GLUCOSE BLDC GLUCOMTR-MCNC: 139 MG/DL (ref 70–99)
GLUCOSE BLDC GLUCOMTR-MCNC: 141 MG/DL (ref 70–99)
GLUCOSE BLDC GLUCOMTR-MCNC: 144 MG/DL (ref 70–99)
GLUCOSE SERPL-MCNC: 137 MG/DL (ref 70–99)
HCO3 SERPL-SCNC: 20 MMOL/L (ref 22–29)
HCT VFR BLD AUTO: 28.2 % (ref 40–53)
HGB BLD-MCNC: 9 G/DL (ref 13.3–17.7)
HOLD SPECIMEN: NORMAL
HOLD SPECIMEN: NORMAL
INR PPP: 1.45 (ref 0.85–1.15)
INTERPRETATION ECG - MUSE: NORMAL
MCH RBC QN AUTO: 25.9 PG (ref 26.5–33)
MCHC RBC AUTO-ENTMCNC: 31.9 G/DL (ref 31.5–36.5)
MCV RBC AUTO: 81 FL (ref 78–100)
P AXIS - MUSE: 35 DEGREES
PLATELET # BLD AUTO: 62 10E3/UL (ref 150–450)
POTASSIUM SERPL-SCNC: 4.1 MMOL/L (ref 3.4–5.3)
PR INTERVAL - MUSE: 180 MS
PROT SERPL-MCNC: 6.3 G/DL (ref 6.4–8.3)
QRS DURATION - MUSE: 144 MS
QT - MUSE: 418 MS
QTC - MUSE: 525 MS
R AXIS - MUSE: 102 DEGREES
RBC # BLD AUTO: 3.47 10E6/UL (ref 4.4–5.9)
SODIUM SERPL-SCNC: 136 MMOL/L (ref 135–145)
SYSTOLIC BLOOD PRESSURE - MUSE: 149 MMHG
T AXIS - MUSE: 35 DEGREES
VENTRICULAR RATE- MUSE: 95 BPM
WBC # BLD AUTO: 4.7 10E3/UL (ref 4–11)

## 2025-02-05 PROCEDURE — 250N000013 HC RX MED GY IP 250 OP 250 PS 637: Performed by: HOSPITALIST

## 2025-02-05 PROCEDURE — 73620 X-RAY EXAM OF FOOT: CPT | Mod: LT

## 2025-02-05 PROCEDURE — 250N000013 HC RX MED GY IP 250 OP 250 PS 637

## 2025-02-05 PROCEDURE — 73718 MRI LOWER EXTREMITY W/O DYE: CPT | Mod: LT

## 2025-02-05 PROCEDURE — 85610 PROTHROMBIN TIME: CPT

## 2025-02-05 PROCEDURE — 84155 ASSAY OF PROTEIN SERUM: CPT

## 2025-02-05 PROCEDURE — 250N000011 HC RX IP 250 OP 636: Performed by: INTERNAL MEDICINE

## 2025-02-05 PROCEDURE — 82962 GLUCOSE BLOOD TEST: CPT

## 2025-02-05 PROCEDURE — 120N000001 HC R&B MED SURG/OB

## 2025-02-05 PROCEDURE — 84132 ASSAY OF SERUM POTASSIUM: CPT

## 2025-02-05 PROCEDURE — 97535 SELF CARE MNGMENT TRAINING: CPT | Mod: GO

## 2025-02-05 PROCEDURE — 97166 OT EVAL MOD COMPLEX 45 MIN: CPT | Mod: GO

## 2025-02-05 PROCEDURE — 99222 1ST HOSP IP/OBS MODERATE 55: CPT | Performed by: PODIATRIST

## 2025-02-05 PROCEDURE — 73721 MRI JNT OF LWR EXTRE W/O DYE: CPT | Mod: RT

## 2025-02-05 PROCEDURE — 36415 COLL VENOUS BLD VENIPUNCTURE: CPT

## 2025-02-05 PROCEDURE — 73600 X-RAY EXAM OF ANKLE: CPT | Mod: RT

## 2025-02-05 PROCEDURE — 999N000198 HC STATISTIC WOC PT EDUCATION, 16-30 MIN

## 2025-02-05 PROCEDURE — 250N000012 HC RX MED GY IP 250 OP 636 PS 637

## 2025-02-05 PROCEDURE — 250N000011 HC RX IP 250 OP 636: Mod: JW | Performed by: INTERNAL MEDICINE

## 2025-02-05 PROCEDURE — 85027 COMPLETE CBC AUTOMATED: CPT

## 2025-02-05 PROCEDURE — 73650 X-RAY EXAM OF HEEL: CPT | Mod: RT

## 2025-02-05 PROCEDURE — 258N000003 HC RX IP 258 OP 636: Performed by: INTERNAL MEDICINE

## 2025-02-05 PROCEDURE — 99233 SBSQ HOSP IP/OBS HIGH 50: CPT | Performed by: INTERNAL MEDICINE

## 2025-02-05 PROCEDURE — 250N000013 HC RX MED GY IP 250 OP 250 PS 637: Performed by: INTERNAL MEDICINE

## 2025-02-05 RX ORDER — HYDROMORPHONE HYDROCHLORIDE 1 MG/ML
0.3 INJECTION, SOLUTION INTRAMUSCULAR; INTRAVENOUS; SUBCUTANEOUS
Status: DISCONTINUED | OUTPATIENT
Start: 2025-02-05 | End: 2025-02-11

## 2025-02-05 RX ORDER — TRAMADOL HYDROCHLORIDE 50 MG/1
50 TABLET ORAL EVERY 6 HOURS PRN
Status: DISCONTINUED | OUTPATIENT
Start: 2025-02-05 | End: 2025-02-13 | Stop reason: HOSPADM

## 2025-02-05 RX ADMIN — INSULIN GLARGINE 30 UNITS: 100 INJECTION, SOLUTION SUBCUTANEOUS at 08:15

## 2025-02-05 RX ADMIN — PIPERACILLIN AND TAZOBACTAM 3.38 G: 3; .375 INJECTION, POWDER, FOR SOLUTION INTRAVENOUS at 05:16

## 2025-02-05 RX ADMIN — SPIRONOLACTONE 25 MG: 25 TABLET, FILM COATED ORAL at 08:14

## 2025-02-05 RX ADMIN — Medication: at 00:12

## 2025-02-05 RX ADMIN — PIPERACILLIN AND TAZOBACTAM 3.38 G: 3; .375 INJECTION, POWDER, FOR SOLUTION INTRAVENOUS at 13:00

## 2025-02-05 RX ADMIN — EMPAGLIFLOZIN 10 MG: 10 TABLET, FILM COATED ORAL at 08:13

## 2025-02-05 RX ADMIN — LEVOTHYROXINE SODIUM 50 MCG: 0.03 TABLET ORAL at 05:18

## 2025-02-05 RX ADMIN — TRAMADOL HYDROCHLORIDE 50 MG: 50 TABLET, COATED ORAL at 14:56

## 2025-02-05 RX ADMIN — ROSUVASTATIN 40 MG: 40 TABLET, FILM COATED ORAL at 08:14

## 2025-02-05 RX ADMIN — ACETAMINOPHEN 650 MG: 325 TABLET ORAL at 11:50

## 2025-02-05 RX ADMIN — TORSEMIDE 10 MG: 5 TABLET ORAL at 08:13

## 2025-02-05 RX ADMIN — HYDROMORPHONE HYDROCHLORIDE 0.3 MG: 1 INJECTION, SOLUTION INTRAMUSCULAR; INTRAVENOUS; SUBCUTANEOUS at 17:05

## 2025-02-05 RX ADMIN — INSULIN ASPART 1 UNITS: 100 INJECTION, SOLUTION INTRAVENOUS; SUBCUTANEOUS at 08:13

## 2025-02-05 RX ADMIN — TRAMADOL HYDROCHLORIDE 50 MG: 50 TABLET, COATED ORAL at 20:34

## 2025-02-05 RX ADMIN — Medication 1 MG: at 20:33

## 2025-02-05 RX ADMIN — CLOPIDOGREL BISULFATE 75 MG: 75 TABLET ORAL at 08:13

## 2025-02-05 RX ADMIN — SODIUM CHLORIDE 1250 MG: 9 INJECTION, SOLUTION INTRAVENOUS at 09:44

## 2025-02-05 RX ADMIN — ACETAMINOPHEN 650 MG: 325 TABLET ORAL at 17:11

## 2025-02-05 RX ADMIN — LOSARTAN POTASSIUM 50 MG: 50 TABLET, FILM COATED ORAL at 20:34

## 2025-02-05 RX ADMIN — PIPERACILLIN AND TAZOBACTAM 3.38 G: 3; .375 INJECTION, POWDER, FOR SOLUTION INTRAVENOUS at 22:28

## 2025-02-05 RX ADMIN — METFORMIN HYDROCHLORIDE 1000 MG: 500 TABLET, FILM COATED ORAL at 20:34

## 2025-02-05 RX ADMIN — Medication: at 11:50

## 2025-02-05 RX ADMIN — ASPIRIN 81 MG: 81 TABLET, COATED ORAL at 20:34

## 2025-02-05 RX ADMIN — METFORMIN HYDROCHLORIDE 1000 MG: 500 TABLET, FILM COATED ORAL at 08:14

## 2025-02-05 ASSESSMENT — ACTIVITIES OF DAILY LIVING (ADL)
ADLS_ACUITY_SCORE: 63
ADLS_ACUITY_SCORE: 70
ADLS_ACUITY_SCORE: 63
ADLS_ACUITY_SCORE: 63
ADLS_ACUITY_SCORE: 70
ADLS_ACUITY_SCORE: 63
ADLS_ACUITY_SCORE: 70
ADLS_ACUITY_SCORE: 63
ADLS_ACUITY_SCORE: 70
ADLS_ACUITY_SCORE: 63
ADLS_ACUITY_SCORE: 64
ADLS_ACUITY_SCORE: 63
ADLS_ACUITY_SCORE: 63
ADLS_ACUITY_SCORE: 70
ADLS_ACUITY_SCORE: 70

## 2025-02-05 NOTE — PROGRESS NOTES
[facility]    Medicine Progress Note - Hospitalist Service    Date of Admission:  2/4/2025    Assessment & Plan    Janice Nowak is a 80 year old male with past medical hx of T2DM, HFpEF, recent debridement of diabetic ulcers, SABRINA, hepatocellular carcinoma, hypothyroidism, HTN, HLD admitted on 2/4/2025. He presented for generalized weakness.    Generalized weakness  Abdominal tenderness  Presented from home for 3-4 days of progressive weakness, poor PO intake, poor sleep, myalgias. He has several recent admissions lately, last discharge 1/28. Workup including CT head and a/p unrevealing. Per my read of CT a/p the SMA has severe calcifications. Patient has diffuse abdominal pain. Question early presentation of ischemic bowel due to low flow state, however vitally stable. Patient states he does not like to eat but is unsure if he has pain after eating.  - LR 100ml/hr for 10hrs.   - UA, CXR, RUQ U/S pending   - If above are negative, consider treatment for hepatic encephalopathy despite normal ammonia  - See below for possible post-op cellulitis  - PT/OT/SW consulted    Diabetic ulcer of right heel -- S/P Surg 1/27/25 with possible post-op infection  Osteomyelitis s/p debridement and right partial calcanectomy 10/2024  Lower leg wound cellulitis (wound vac in place)  POD # 9 Bilateral lower extremity I&D with TheraSkin application performed by Dr Amol Patricio at [facility] and continuation of wound VAC.  Per home health nurse the VAC stopped working 2/1.   - Continued PTA Aspirin 81mg, Clopidogrel 75mg  - Podiatry, WOC consulted, POC reviewed with Dr. Delarosa.  Some staples were removed at the tenderness skin site, which appears to be infected with malodorous drainage.  - Xray bilateral foot, MRI ordered left foot, right ankle -per podiatry.  - Tentative plans for surgical debridement of wounds at some point on admission .  - Zosyn 3.375g IV Q8h   - Vanc, pharmacy dosed for  possible post-op infection  - Nonweightbearing on right leg  - Podiatry requested vascular surgery consult :patient with history of Lower extremity angiogram and now worsening wounds, 12/26/24(right) and 1/16/25(Left).  D/W vascular surgery fellow, who recommended consulting IR, since angiogram was done by Dr. Jc.  -Pain management with as needed Tylenol, tramadol, IV Dilaudid.    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 Underwent IR guided biopsy on 1/2/25 which confirmed HCC. Met with oncology on 1/10/25 who discussed recommendation for palliative immunotherapy. Port placed 1/17. CT chest on recent admission showed nodules and bulky adenopathy concerning for malignancy.     On admission liver function slightly worse than baseline. PLT 79, baseline 120-190. , . Albumin 2.9. INR 1.37. 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   - follow up with oncology on outpatient basis     Heart failure with preserved ejection fraction, NYHA class II   Hypertension  Echo 12/11 with mild concentric LVH. EF 55-60%. Grade 2 diastolic dysfunction. Had transudative pleural effusion s/p thoracentesis 12/2024.  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   - Continued PTA Empagliflozin 10mg, Glargine 30U Qam, Metformin 1000mg BID  - Consistent carb diet  - sliding scale insulin     Troponinemia   127 ?  109. Similar to baseline and downtrending. EKG similar to prior. Patient declined cardio workup on last admission.    Anemia, normocytic, chronic  Hemoglobin 10.4, baseline 10-12.    Hyponatremia, mild  131 on admit. Likely due to poor PO intake.    CKD stage 2  Creatinine 1.35, baseline 1-1.3.     Carotid arterial disease  U/S 11/2024 with <50% stenosis  "bilaterally.    Hx of syncope  Admitted 1/16 for syncope after port placement. Suspected orthostatic hypotension. Patient declined cardiology workup.    Mixed hyperlipidemia  - Continued PTA Rosuvastatin 40mg    Obstructive Sleep Apnea -- stopped using CPAP  Does not use CPAP.    Hypothyroidism  TSH WNL.  - Continued PTA Levothyroxine 50mcg    Diet: Combination Diet Moderate Consistent Carb (60 g CHO per Meal) Diet; Low Saturated Fat Na <2400mg Diet    DVT Prophylaxis: Aspirin and Plavix.  SCD contraindicated due to PVD/right leg wound VAC.  Cannot use heparin products due to allergy to heparin.  Reyes Catheter: Not present  Lines: PRESENT       Cardiac Monitoring: None  Code Status: No CPR- Do NOT Intubate      Clinically Significant Risk Factors Present on Admission      # Hyponatremia: Lowest Na = 131 mmol/L in last 2 days, will monitor as appropriate  # Hypochloremia: Lowest Cl = 95 mmol/L in last 2 days, will monitor as appropriate      # Hypoalbuminemia: Lowest albumin = 2.3 g/dL at 2/5/2025  7:40 AM, will monitor as appropriate  # Coagulation Defect: INR = 1.45 (Ref range: 0.85 - 1.15) and/or PTT = 46 Seconds (Ref range: 22 - 38 Seconds), will monitor for bleeding  # Drug Induced Platelet Defect: home medication list includes an antiplatelet medication   # Hypertension: Noted on problem list   # Anemia: based on hgb <11  # DMII: A1C = 7.9 % (Ref range: <5.7 %) within past 6 months    # Obesity: Estimated body mass index is 31.78 kg/m  as calculated from the following:    Height as of this encounter: 1.727 m (5' 8\").    Weight as of this encounter: 94.8 kg (209 lb).       # Financial/Environmental Concerns: other (see comments) (TBD as they are now transitioning into Assisted living from independent living and it will cost more)      Social Drivers of Health    Tobacco Use: Medium Risk (2/4/2025)    Patient History     Smoking Tobacco Use: Former     Smokeless Tobacco Use: Never     Passive Exposure: Never "     Disposition Plan     Medically Ready for Discharge: Anticipated in 2-4 Days    June Ortega MD  Hospitalist Service  [facility]  Securely message with [employer] (more info)  Text page via [employer] Paging/Directory   ______________________________________________________________________    Interval History   Patient is new to me.  Chart reviewed.  Patient was seen and examined.  Podiatry at the bedside during my visit.  Lantus and removed, revealing right mid lower leg wound with surrounding cellulitis, wound VAC in place.  Left foot wounds with staples, with some elevated drainage.  Patient still  The bedside.  Called patient's son Michael, obtained collateral information.  Physical Exam   Vital Signs: Temp: 100.4  F (38  C) Temp src: Axillary BP: 127/60 Pulse: 79   Resp: 22 SpO2: 94 % O2 Device: None (Room air)    Weight: 209 lbs 0 oz  General: Alert and oriented x 2. Not in obvious distress.  HEENT: NC, AT. Neck- supple, No JVP elevation, lymphadenopathy or thyromegaly. Trachea-central.  Chest: Clear to auscultation bilaterally.  Heart: S1S2 regular. No M/R/G.  Abdomen: Soft. NT, ND. No organomegaly. Bowel sounds- active.  Back: No spine tenderness. No CVA tenderness.  Extremities: Left foot distal surgical wounds with staples, surrounding hyperemia, malodorous drainage.  Left heel wound with staples.  Right lower leg wound with wound VAC in place, surrounding hyperemia-see pictures from Dr. Delarosa note from today.  Neuro: Cranial nerves 1-12 grossly normal. No focal neurological deficit    Medical Decision Making       50 MINUTES SPENT BY ME on the date of service doing chart review, history, exam, documentation & further activities per the note.      Data     I have personally reviewed the following data over the past 24 hrs:  N/A  \   N/A   / N/A     N/A N/A N/A /  164 (H)   N/A N/A 1.47 (H) \       Imaging results reviewed over the past 24 hrs:   Recent Results (from the past 24  hours)   XR Ankle Right 2 Views    Narrative    EXAM: XR ANKLE RIGHT 2 VIEWS, XR FOOT LEFT 2 VIEWS, XR CALCANEUS RIGHT G/E 2 VIEWS  LOCATION: [facility]  DATE: 2/5/2025  INDICATION: Lateral ankle wound.   COMPARISON: Radiographs 10/7/2024 and MRI 10/8/2024       Impression    IMPRESSION:   Right: Degenerative arthrosis of the talonavicular joint. Ankle joint spaces are otherwise maintained and normally aligned. Intact ankle mortise. No acute fracture. Skin staples over the posterior heel with a broad-based ulceration evident. No   radiographic evidence of osteomyelitis.  Left: Screw fixation of the distal tibia. Skin staples over the dorsal midfoot and first toe. No acute fracture or radiographic evidence of osteomyelitis.    XR Foot Left 2 Views    Narrative    EXAM: XR ANKLE RIGHT 2 VIEWS, XR FOOT LEFT 2 VIEWS, XR CALCANEUS RIGHT G/E 2 VIEWS  LOCATION: [facility]  DATE: 2/5/2025  INDICATION: Lateral ankle wound.   COMPARISON: Radiographs 10/7/2024 and MRI 10/8/2024       Impression    IMPRESSION:   Right: Degenerative arthrosis of the talonavicular joint. Ankle joint spaces are otherwise maintained and normally aligned. Intact ankle mortise. No acute fracture. Skin staples over the posterior heel with a broad-based ulceration evident. No   radiographic evidence of osteomyelitis.  Left: Screw fixation of the distal tibia. Skin staples over the dorsal midfoot and first toe. No acute fracture or radiographic evidence of osteomyelitis.    XR Calcaneus Right G/E 2 Views    Narrative    EXAM: XR ANKLE RIGHT 2 VIEWS, XR FOOT LEFT 2 VIEWS, XR CALCANEUS RIGHT G/E 2 VIEWS  LOCATION: [facility]  DATE: 2/5/2025  INDICATION: Lateral ankle wound.   COMPARISON: Radiographs 10/7/2024 and MRI 10/8/2024       Impression    IMPRESSION:   Right: Degenerative arthrosis of the talonavicular joint. Ankle joint spaces are otherwise maintained and normally aligned.  Intact ankle mortise. No acute fracture. Skin staples over the posterior heel with a broad-based ulceration evident. No   radiographic evidence of osteomyelitis.  Left: Screw fixation of the distal tibia. Skin staples over the dorsal midfoot and first toe. No acute fracture or radiographic evidence of osteomyelitis.    MR Ankle Right w/o Contrast    Narrative    EXAM: MR ANKLE RIGHT W/O CONTRAST  LOCATION: [facility]  DATE: 2/5/2025  INDICATION: Ulceration with TheraSkin application heel and lateral ankle region, post op day # 9.  Wound infected.  History of osteomyelitis of right heel as well.  COMPARISON: Calcaneus radiographic exam 2/5/2025. Foot MRI 10/8/2024.  TECHNIQUE: Unenhanced.  FINDINGS:   TENDONS:   -Peroneal: Peroneus longus and brevis tendons are intact. Mild tendinopathy. No significant lateral tenosynovitis. No subluxation.  -Medial: Posterior tibialis tendon is intact. Mild posterior tibialis tendinopathy. Trace distal posterior tibialis tendon sheath fluid. Flexor digitorum longus and flexor hallucis longus tendons are normal. No tenosynovitis.  -Anterior: Anterior tibialis, extensor hallucis longus, and extensor digitorum longus tendons are normal. No tenosynovitis.  -Achilles: No significant tendinopathy or paratenonitis.  LIGAMENTS:   -Anterior talofibular ligament: Intact.   -Calcaneofibular ligament: Intact.   -Posterior talofibular ligament: Intact.  -Syndesmotic inferior tibiofibular ligaments: Intact.  -Deltoid ligament complex: Intact.  -Spring ligament complex: Intact.  JOINTS AND BONES:   -Abnormal edema-like marrow signal intensity posterior calcaneus deep to what appears to be an ulcer along the posterior heel, compatible with osteomyelitis.  No acute ankle or hindfoot fracture. No calcaneus stress fracture. 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 Scattered midfoot arthrosis. Trace ankle and subtalar joint  effusions.  SOFT TISSUES:  -Plantar fascia: Chronic thickening proximal medial bundle plantar fascia. No acute inflammation or discrete tear.  -Sinus tarsi and tarsal tunnel: Preserved sinus Tarsi fat signal. No space-occupying mass is seen along the course of the tarsal tunnel.  -Muscles: Global intrinsic foot muscle atrophy with patchy muscle edema suggestive of denervation edema.  Broad-based ulcer and adjacent skin staples posterior heel. No soft tissue abscess.      Impression    IMPRESSION:  1.  Broad-based soft tissue ulcer posterior heel with adjacent skin staples and MR findings compatible with adjacent posterior calcaneus osteomyelitis.  2.  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  3.  Mild peroneus longus and brevis tendinopathy. Mild posterior tibialis tendinopathy.   MR Foot Left w/o Contrast    Narrative    EXAM: MR FOOT LEFT W/O CONTRAST  LOCATION: [facility]  DATE: 2/5/2025  INDICATION: Ulcer debridement with TheraSkin application, dorsal left foot, post op day 9.  COMPARISON: Left foot radiographic exam 2/5/2025  TECHNIQUE: Unenhanced.  FINDINGS:   Postoperative change with skin staples are seen along the dorsum of the great toe extending from the MTP joint to near the IP joint. Adjacent susceptibility artifact. No convincing evidence for great toe acute osteomyelitis. The lesser toes are negative   for fracture or acute osteomyelitis. No metatarsal fracture or evidence for metatarsal stress fracture. Similar postoperative changes are seen along the dorsum of the more proximal foot at the level of the naviculocuneiform joints and more proximally. No   convincing evidence for osteomyelitis in the tarsal bones were imaged.  Mild scattered degenerative change in the left foot including at the first MTP and metatarsal sesamoid joints. Degenerative change in the midfoot. No sizable joint effusion.  No acute tendon injury. No tenosynovitis. No significant  tendinopathy. Distal anterior tibialis and peroneus longus intact.  Lisfranc ligament intact. No midfoot subluxation.  Generalized foot muscle atrophy is moderate to severe with denervation muscle edema. Visualized plantar fascia is intact. No drainable fluid collection. Dorsal foot soft tissue swelling.       Impression    IMPRESSION:  1.  Postoperative changes related to reported ulcer debridement with skin grafting along the dorsum of the great toe and dorsum of the proximal foot.  2.  No convincing MR evidence for left foot acute osteomyelitis, with particular attention at the great toe.  3.  No evidence for acute foot fracture or stress fracture.  4.  Scattered degenerative change including in the midfoot and at the first MTP and metatarsal sesamoid joints.  5.  Diffuse acute on chronic denervation edema throughout the intrinsic musculature of the left foot.  6.  Dorsal foot soft tissue swelling. No soft tissue abscess.   This document is created with the help of Dragon dictation system. All grammatical errors are unintentional.

## 2025-02-05 NOTE — CONSULTS
[facility] Nurse Inpatient Assessment     Consulted for: Feet    Summary: DPM consulted as well. VAC dressing in place per nursing, but VAC pump off for over 2 hours. Nursing will remove VAC dressing and place saline moistened gauze to wound bed until DPM can assess sites later today.  See DPM note - they will follow and have consulted vascular surgery. WOC will defer to DPM and sign off at this time. Please consult DPM with questions about wounds/orders.    [facility] nurse follow-up plan: signing off    Ela Vides MSN RN CWOCN  Pager no longer is use, please contact through [employer] group: [facility]

## 2025-02-05 NOTE — PROGRESS NOTES
OT Evaluation     02/05/25 0800   Appointment Info   Signing Clinician's Name / Credentials (OT) Arian Holden, RENEER/L   Living Environment   People in Home alone   Current Living Arrangements independent living facility   Home Accessibility no concerns   Living Environment Comments Bathroom setup includes: walk-in shower with grab bars, standard toilet with grab bars   Self-Care   Usual Activity Tolerance fair   Current Activity Tolerance poor   Regular Exercise No   Equipment Currently Used at Home grab bar, toilet;grab bar, tub/shower;wheelchair, manual   Fall history within last six months yes  (Patient reports he had a syncopal episode after a surgery)   Number of times patient has fallen within last six months 1   Activity/Exercise/Self-Care Comment Patient reports being IND with most ADL's at baseline. Reports he received assistance for bathing 1x/wk from home health for showering. Patient has had home therapies previously. Performs pivot transfers to/from manual wheelchair at home.   Instrumental Activities of Daily Living (IADL)   IADL Comments Patient reports being IND with med management, and endorses being IND with cooking. Reports his son assists with transport and home management tasks.   General Information   Onset of Illness/Injury or Date of Surgery 02/04/25   Referring Physician June Ortega MD   Patient/Family Therapy Goal Statement (OT) None stated   Additional Occupational Profile Info/Pertinent History of Current Problem Janice Nowak is a 80 year old male with past medical hx of T2DM, HFpEF, recent debridement of diabetic ulcers, SABRINA, hepatocellular carcinoma, hypothyroidism, HTN, HLD admitted on 2/4/2025. He presented for generalized weakness.   Existing Precautions/Restrictions fall;weight bearing   Right Lower Extremity (Weight-bearing Status) non weight-bearing (NWB)   Cognitive Status Examination   Orientation Status orientation to person, place and time   Visual Perception    Visual Impairment/Limitations WFL   Range of Motion Comprehensive   General Range of Motion bilateral upper extremity ROM WFL   Strength Comprehensive (MMT)   Comment, General Manual Muscle Testing (MMT) Assessment Generalized weakness   Balance   Balance Assessment sitting static balance   Sitting Balance: Static moderate assist   Position, Sitting Balance supported;sitting edge of bed   Activities of Daily Living   BADL Assessment/Intervention other (see comments)  (Unable to assess, per clinical judgement - patient would require assistance with nearly all ADL's)   Clinical Impression   Criteria for Skilled Therapeutic Interventions Met (OT) Yes, treatment indicated   OT Diagnosis Decreased independence with ADL's and transfers   OT Problem List-Impairments impacting ADL problems related to;activity tolerance impaired;balance;fear & anxiety;mobility;strength;pain;post-surgical precautions   Assessment of Occupational Performance 3-5 Performance Deficits   Identified Performance Deficits Dressing, Bathing, Toileting, Transfers, mobility   Planned Therapy Interventions (OT) ADL retraining;bed mobility training;strengthening;transfer training;progressive activity/exercise   Clinical Decision Making Complexity (OT) detailed assessment/moderate complexity   Risk & Benefits of therapy have been explained care plan/treatment goals reviewed;participants voiced agreement with care plan;participants included;patient   OT Total Evaluation Time   OT Eval, Moderate Complexity Minutes (48435) 10   OT Goals   Therapy Frequency (OT) 5 times/week   OT Predicted Duration/Target Date for Goal Attainment 02/14/25   OT Goals Hygiene/Grooming;Upper Body Dressing;Lower Body Dressing;Toilet Transfer/Toileting   OT: Hygiene/Grooming supervision/stand-by assist;from wheelchair   OT: Upper Body Dressing Supervision/stand-by assist;from wheelchair   OT: Lower Body Dressing Supervision/stand-by assist;from wheelchair   OT: Toilet  Transfer/Toileting Supervision/stand-by assist   Interventions   Interventions Quick Adds Self-Care/Home Management   Self-Care/Home Management   Self-Care/Home Mgmt/ADL, Compensatory, Meal Prep Minutes (09354) 8   Symptoms Noted During/After Treatment (Meal Preparation/Planning Training) increased pain;fatigue   Treatment Detail/Skilled Intervention Patient supine when approached, agreeable to OT eval. Eval completed and treat initiated. Patient observably anxious and fearful regarding initiating movement; provided gentle encouragement and skilled education regarding postive outcomes with early mobility and concerns regarding prolonged bedrest. Facilitated bed mobility (supine<>sitting) by providing moderate-maximal assistance x1 for increased safety and increased trunk support. Patient demonstrates ability to tolerate sitting at EOB for approximately 1 minute before returning to supine (facilitated sitting<>supine by providing maximal assistance x1 for increased safety and for gudiance of LE's over EOB). Unable to progress transfers as to ultimately progress patient's ability to participate in mobility realted ADL's. Session ended with patient supine with call light within reach and all questions answered.\   OT Discharge Planning   OT Plan Next session: stand pivots (to/from w/c or bedside chair); seated ADL's, monitor cognition   OT Discharge Recommendation (DC Rec) Transitional Care Facility   OT Rationale for DC Rec Patient significantly below baseline. Patient lives alone and would not be able to take care of himself given current status. Recommending TCU for further therapy.   OT Brief overview of current status Patient requires mod-max assist x1 for bed mobility. Patient demonstrates limited ability to participate in seated ADL's, and demonstrated inability to tolerate transfers.   Total Session Time   Timed Code Treatment Minutes 8   Total Session Time (sum of timed and untimed services) 18

## 2025-02-05 NOTE — PLAN OF CARE
"  Problem: Adult Inpatient Plan of Care  Goal: Plan of Care Review  Description: The Plan of Care Review/Shift note should be completed every shift.  The Outcome Evaluation is a brief statement about your assessment that the patient is improving, declining, or no change.  This information will be displayed automatically on your shift  note.  Outcome: Progressing  Flowsheets (Taken 2/5/2025 1101)  Plan of Care Reviewed With: patient  Goal: Patient-Specific Goal (Individualized)  Description: You can add care plan individualizations to a care plan. Examples of Individualization might be:  \"Parent requests to be called daily at 9am for status\", \"I have a hard time hearing out of my right ear\", or \"Do not touch me to wake me up as it startles  me\".  Outcome: Progressing  Goal: Absence of Hospital-Acquired Illness or Injury  Outcome: Progressing  Intervention: Identify and Manage Fall Risk  Recent Flowsheet Documentation  Taken 2/5/2025 0800 by Houston Muñoz RN  Safety Promotion/Fall Prevention:   activity supervised   assistive device/personal items within reach   clutter free environment maintained   mobility aid in reach   nonskid shoes/slippers when out of bed   safety round/check completed  Intervention: Prevent Skin Injury  Recent Flowsheet Documentation  Taken 2/5/2025 0800 by Houston Muñoz RN  Body Position: position changed independently  Skin Protection: (sacral mepilex.)   adhesive use limited   silicone foam dressing in place  Intervention: Prevent and Manage VTE (Venous Thromboembolism) Risk  Recent Flowsheet Documentation  Taken 2/5/2025 0800 by Houston Muñoz RN  VTE Prevention/Management: SCDs off (sequential compression devices)  Intervention: Prevent Infection  Recent Flowsheet Documentation  Taken 2/5/2025 0800 by Houston Muñoz RN  Infection Prevention: rest/sleep promoted  Goal: Optimal Comfort and Wellbeing  Outcome: Progressing  Goal: Readiness for Transition of Care  Outcome: " Progressing     Problem: Delirium  Goal: Optimal Coping  Outcome: Progressing  Goal: Improved Behavioral Control  Outcome: Progressing  Intervention: Minimize Safety Risk  Recent Flowsheet Documentation  Taken 2/5/2025 0800 by Houston Muñoz RN  Enhanced Safety Measures:   pain management   review medications for side effects with activity  Goal: Improved Attention and Thought Clarity  Outcome: Progressing  Goal: Improved Sleep  Outcome: Progressing     Problem: Skin Injury Risk Increased  Goal: Skin Health and Integrity  Outcome: Progressing  Intervention: Plan: Nurse Driven Intervention: Positioning  Recent Flowsheet Documentation  Taken 2/5/2025 0800 by Houston Muñoz RN  Plan: Positioning Interventions: REPOSITION Left/Right (No supine) q2h  Intervention: Optimize Skin Protection  Recent Flowsheet Documentation  Taken 2/5/2025 0800 by Houston Muñoz RN  Pressure Reduction Techniques:   heels elevated off bed   frequent weight shift encouraged  Skin Protection: (sacral mepilex.)   adhesive use limited   silicone foam dressing in place  Activity Management: activity adjusted per tolerance  Head of Bed (HOB) Positioning: HOB at 30-45 degrees     Problem: Comorbidity Management  Goal: Blood Glucose Levels Within Targeted Range  Outcome: Progressing  Intervention: Monitor and Manage Glycemia  Recent Flowsheet Documentation  Taken 2/5/2025 0800 by Houston Muñoz RN  Medication Review/Management: medications reviewed  Goal: Blood Pressure in Desired Range  Outcome: Progressing  Intervention: Maintain Blood Pressure Management  Recent Flowsheet Documentation  Taken 2/5/2025 0800 by Houston Muñoz RN  Medication Review/Management: medications reviewed     Problem: Pain Acute  Goal: Optimal Pain Control and Function  Outcome: Progressing  Intervention: Prevent or Manage Pain  Recent Flowsheet Documentation  Taken 2/5/2025 0800 by Houston Muñoz RN  Medication Review/Management: medications reviewed      Problem: Wound  Goal: Optimal Coping  Outcome: Progressing  Goal: Optimal Functional Ability  Outcome: Progressing  Intervention: Optimize Functional Ability  Recent Flowsheet Documentation  Taken 2/5/2025 0800 by Houston Muñoz RN  Assistive Device Utilized: gait belt  Activity Management: activity adjusted per tolerance  Activity Assistance Provided: assistance, 1 person  Goal: Absence of Infection Signs and Symptoms  Outcome: Progressing  Goal: Improved Oral Intake  Outcome: Progressing  Goal: Optimal Pain Control and Function  Outcome: Progressing  Goal: Skin Health and Integrity  Outcome: Progressing  Intervention: Optimize Skin Protection  Recent Flowsheet Documentation  Taken 2/5/2025 0800 by Houston Muñoz RN  Pressure Reduction Techniques:   heels elevated off bed   frequent weight shift encouraged  Activity Management: activity adjusted per tolerance  Head of Bed (HOB) Positioning: HOB at 30-45 degrees  Goal: Optimal Wound Healing  Outcome: Progressing   Goal Outcome Evaluation:      Plan of Care Reviewed With: patient        Pt is alert and oriented x3. Pt is med complaint. Pt denies pain. Pt's v/s is stable. Pt's foot wound was changed by Md. Pt is inc of urine and Bm. Continue to monitor pt.

## 2025-02-05 NOTE — PROGRESS NOTES
Care Management Follow Up    Length of Stay (days): 1    Expected Discharge Date: 02/06/2025    Anticipated Discharge Plan:   TBD    Transportation: TBD    PT Recommendations:    OT Recommendations:  Transitional Care Facility     Barriers to Discharge: medical stability, IV abx    Prior Living Situation: Janice currently lives at [facility] in Independent Senior living but his functional status has been declining so son/family are working to get him transitioned over to Assisted Living level of care but he would remain in his same apartment. He had foot surgery fairly recently with weightbearing restrictions so he's been in a wheelchair propelling himself around independent. He requires assistance with most ADL's at this point and is dependent in his IADLs with the exception of med management - he had been managing his own medications up until now. Last time pt was here he discharged with [facility] home care RN/PT/OT and has home wound vac.    Discussed  Partnership in Safe Discharge Planning  document with patient/family: No     Handoff Completed: No, handoff not indicated or clinically appropriate    Patient/Spokesperson Updated: No    Additional Information:  Writer went to pt room x2 to discuss therapy recommendation is TCU but pt was busy each time. Pt not medically ready to discharge today and is still being followed by podiatry and now has a vascular consult pending. CM will cont to follow for medial progression.    Writer got a fax from [facility] and attached was pt's health care directive and this has been sent to [facility] marcus to validate and put in pt's chart.    Writer also tried to get in touch with pt's living facility [facility] ([phone]) as in some notes it says that pt will be transitioning to assisted living and I wanted to discuss with someone if this has happened yet, no answer LVM.    Contacts:  [facility] - [phone] or Jose  [phone]  [facility] RN Care Manager Mariaelena (ph. [phone]).     Next Steps: medical progression, discuss TCU recommendation with pt and verify with [facility] if pt is DOLORES or ILF.    3:47 PM  Got a call from [facility] stating that they were in the process of getting the pt changed to senior living status but he was having so much trouble walking at this time that she was not able to cont this process as he would not qualify as he would need to be A1 for mobility and at the time he was A2. She did say that if pt does get better, pain is managed and he is A1 with mobility they could look at him again and do an assessment to change his status from ILF to DOLORES. CM told [facility] we will keep them in the loop as to how pt is progressing.    Treasure Arzate, RN

## 2025-02-05 NOTE — CONSULTS
ASSESSMENT:  80 year old diabetic male with PAD and ulcerations bilateral foot, h/o osteomyelitis right heel with history of theraskin application  Diabetic ulceration right heel into subcutaneous tissue   Ulceration right leg into subcutaneous tissue  Acute osteomyelitis right calcaneus  Diabetic ulceration left hallux into epidermis/dermis  Diabetic ulceration left foot into epidermis/dermis    POD # 9 Bilateral lower extremity I&D with TheraSkin application performed by Dr Amol Patricio at [facility] and continuation of wound VAC,  Patient also has history of partial calcanectomy of the right heel on 10/24/24 due to osteomyelitis.       PLAN:  Reviewed patient's chart in [facility].  Antibiotics - Zosyn/Vanco per hospital team  Wound VAC is removed.   Some of the staples are removed at the theraskin site which appeared infected with malodor, patient unable to tolerate.   Adaptic placed, dry sterile dressing applied.  NWB of RLE  Patient is afebrile, WBC of 4.7  Will order xray bilateral foot, MRI ordered left foot, right ankle  Vascular consult - patient with  history of Lower extremity angiogram and now worsening wounds, 12/26/24(right) and 1/16/25(Left) - appreciate input  Will continue to follow.  Tentative plans for surgical debridement of wounds at some point on admission    PATIENT HISTORY: Janice Nowak is a 80 year old male with past medical hx of T2DM, HFpEF, recent debridement of diabetic ulcers, SABRINA, hepatocellular carcinoma, hypothyroidism, HTN, HLD admitted on 2/4/2025. He presented for generalized weakness.     I was asked to see Janice Nowak  by Toi Ramírez MD for evaluation of recent lower extremity debridement sites.     PAST MEDICAL HISTORY:   Past Medical History:   Diagnosis Date    Coronary artery disease     Diabetes (H)     Hypertension     Pleural effusion     Sleep apnea     Thyroid disease         PAST SURGICAL HISTORY:   Past Surgical History:   Procedure Laterality  Date    BACK SURGERY      BONE EXOSTOSIS EXCISION Right 10/24/2024    Procedure: PARTIAL CALCANECTOMY RIGHT FOOT;  Surgeon: Amol Patricio DPM;  Location: [facility] OR    CHOLECYSTECTOMY      ENDOSCOPIC ULTRASOUND UPPER GASTROINTESTINAL TRACT (GI) N/A 01/02/2025    Procedure: ENDOSCOPIC ULTRASOUND, ESOPHAGOSCOPY / UPPER GASTROINTESTINAL TRACT (GI) with Fine Needle Aspiration;  Surgeon: Kong Medina MD;  Location: [facility] OR    INCISION AND DRAINAGE FOOT, COMBINED Right 10/24/2024    Procedure: INCISION AND DRAINAGE;  Surgeon: Amol Patricio DPM;  Location: [facility] OR    INCISION AND DRAINAGE FOOT, COMBINED Bilateral 1/27/2025    Procedure: INCISION AND DRAINAGE bilateral feet with application of TheraSkin graft;  Surgeon: Amol Patricio DPM;  Location: [facility] OR    IR CHEST PORT PLACEMENT > 5 YRS OF AGE  01/16/2025    IR LOWER EXTREMITY ANGIOGRAM LEFT  01/16/2025    IR LOWER EXTREMITY ANGIOGRAM RIGHT  12/26/2024    IRRIGATION AND DEBRIDEMENT FOOT, COMBINED Right 10/24/2024    Procedure: AND DEBRIDEMENT RIGHT HEEL,;  Surgeon: Amol Patricio DPM;  Location: [facility] OR    ORTHOPEDIC SURGERY      PICC SINGLE LUMEN PLACEMENT  10/30/2024    VASCULAR SURGERY          MEDICATIONS:   Current Facility-Administered Medications:     acetaminophen (TYLENOL) tablet 650 mg, 650 mg, Oral, Q4H PRN, 650 mg at 02/04/25 2030 **OR** acetaminophen (TYLENOL) Suppository 650 mg, 650 mg, Rectal, Q4H PRN, Ha Ashford PA-C    aspirin EC tablet 81 mg, 81 mg, Oral, QPM, Ha Ashford PA-C, 81 mg at 02/04/25 2030    benzocaine-menthol (CHLORASEPTIC) 6-10 MG lozenge 1 lozenge, 1 lozenge, Buccal, Q1H PRN, Ha Ashford PA-C    calcium carbonate (TUMS) chewable tablet 1,000 mg, 1,000 mg, Oral, 4x Daily PRN, Ha Ashford PA-C    clopidogrel (PLAVIX) tablet 75 mg, 75 mg, Oral, QAM, Ha Ashford PA-C, 75 mg at 02/05/25 0813    glucose gel 15-30 g, 15-30 g, Oral, Q15 Min  PRN **OR** dextrose 50 % injection 25-50 mL, 25-50 mL, Intravenous, Q15 Min PRN **OR** glucagon injection 1 mg, 1 mg, Subcutaneous, Q15 Min PRN, Ha Ashford PA-C    empagliflozin (JARDIANCE) tablet 10 mg, 10 mg, Oral, Daily, Ha Ashford PA-C, 10 mg at 02/05/25 0813    guaiFENesin (MUCINEX) 12 hr tablet 1,200 mg, 1,200 mg, Oral, BID PRN, Ha Ashford PA-C    hydrALAZINE (APRESOLINE) injection 10 mg, 10 mg, Intravenous, Q4H PRN, Toi Robledo MD    insulin aspart (NovoLOG) injection (RAPID ACTING), 1-7 Units, Subcutaneous, TID AC, Ha Ashford PA-C, 1 Units at 02/05/25 0813    insulin aspart (NovoLOG) injection (RAPID ACTING), 1-5 Units, Subcutaneous, At Bedtime, Ha Ashford PA-C    insulin glargine (LANTUS PEN) injection 30 Units, 30 Units, Subcutaneous, QAM, Ha Ashford PA-C, 30 Units at 02/05/25 0815    levothyroxine (SYNTHROID/LEVOTHROID) tablet 50 mcg, 50 mcg, Oral, QAM, Ha Ashford PA-DAMI, 50 mcg at 02/05/25 0518    lidocaine (LMX4) cream, , Topical, Q1H PRN, Ha Ashford PA-C    lidocaine 1 % 0.1-1 mL, 0.1-1 mL, Other, Q1H PRN, Ha Ashofrd PA-C    losartan (COZAAR) tablet 50 mg, 50 mg, Oral, At Bedtime, Ha Ashford PA-DAMI, 50 mg at 02/04/25 2112    meclizine (ANTIVERT) tablet 50 mg, 50 mg, Oral, TID PRN, Ha Ashford PA-C    melatonin tablet 1 mg, 1 mg, Oral, At Bedtime PRN, Ha Ashford PA-DAMI, 1 mg at 02/04/25 2112    metFORMIN (GLUCOPHAGE) tablet 1,000 mg, 1,000 mg, Oral, BID w/meals, Ha Ashford PA-C, 1,000 mg at 02/05/25 0814    ondansetron (ZOFRAN ODT) ODT tab 4 mg, 4 mg, Oral, Q6H PRN **OR** ondansetron (ZOFRAN) injection 4 mg, 4 mg, Intravenous, Q6H PRN, Ha Ashford PA-C    piperacillin-tazobactam (ZOSYN) 3.375 g vial to attach to  mL bag, 3.375 g, Intravenous, Q8H, Toi Robledo MD, 3.375 g at 02/05/25 0516    rosuvastatin (CRESTOR) tablet 40 mg, 40 mg, Oral, Daily, Ha Ashford PA-C, 40 mg at 02/05/25 0814     senna-docusate (SENOKOT-S/PERICOLACE) 8.6-50 MG per tablet 1 tablet, 1 tablet, Oral, BID PRN **OR** senna-docusate (SENOKOT-S/PERICOLACE) 8.6-50 MG per tablet 2 tablet, 2 tablet, Oral, BID PRN, Ha Ashford PA-C    sodium chloride (PF) 0.9% PF flush 3 mL, 3 mL, Intracatheter, Q8H, Ha Ashford PA-C    sodium chloride (PF) 0.9% PF flush 3 mL, 3 mL, Intracatheter, q1 min prn, Ha Ashford PA-C    sodium chloride (PF) 0.9% PF flush 3 mL, 3 mL, Intracatheter, Q8H, Toi Robledo MD    sodium chloride (PF) 0.9% PF flush 3 mL, 3 mL, Intracatheter, q1 min prn, Toi Robledo MD    sodium chloride 0.9 % infusion, , Intravenous, Continuous, Toi Robledo MD, Last Rate: 50 mL/hr at 02/05/25 0341, Rate Verify at 02/05/25 0341    spironolactone (ALDACTONE) tablet 25 mg, 25 mg, Oral, Daily, Ha Ashford PA-C, 25 mg at 02/05/25 0814    torsemide (DEMADEX) tablet 10 mg, 10 mg, Oral, Daily, Ha Ashford PA-C, 10 mg at 02/05/25 0813    vancomycin (VANCOCIN) 1,250 mg in 0.9% NaCl 262.5 mL intermittent infusion, 1,250 mg, Intravenous, Q24H, Toi Robledo MD    zinc oxide (DESITIN) 40 % paste, , Topical, Q12H, Edu Conley DO, Given at 02/05/25 0012    Current Outpatient Medications:     aspirin 81 MG EC tablet, Take 81 mg by mouth every evening., Disp: , Rfl:     clopidogrel (PLAVIX) 75 MG tablet, Take 75 mg by mouth every morning., Disp: , Rfl:     co-enzyme Q-10 100 MG CAPS capsule, Take 200 mg by mouth every evening., Disp: , Rfl:     empagliflozin (JARDIANCE) 10 MG TABS tablet, Take 1 tablet (10 mg) by mouth daily., Disp: 90 tablet, Rfl: 1    guaiFENesin (MUCUS RELIEF ER PO), Take 1,200 mg by mouth 2 times daily as needed (congestion)., Disp: , Rfl:     insulin aspart (NOVOLOG PEN) 100 UNIT/ML pen, Inject 5 Units subcutaneously 3 times daily as needed for high blood sugar (With Meals). Novolog Flexpen: 5 units with breakfast, 5 units with lunch, 5 units with dinner. Takes as needed with each meal., Disp:  , Rfl:     insulin glargine (LANTUS VIAL) 100 UNIT/ML vial, Inject 30 Units subcutaneously every morning., Disp: , Rfl:     lactobacillus rhamnosus, GG, (CULTURELL) capsule, Take 1 capsule by mouth every morning., Disp: , Rfl:     levothyroxine (SYNTHROID/LEVOTHROID) 50 MCG tablet, Take 50 mcg by mouth every morning., Disp: , Rfl:     losartan (COZAAR) 50 MG tablet, Take 50 mg by mouth at bedtime., Disp: , Rfl:     meclizine (ANTIVERT) 25 MG tablet, Take 50 mg by mouth 3 times daily as needed (Vertigo)., Disp: , Rfl:     metFORMIN (GLUCOPHAGE) 1000 MG tablet, Take 1,000 mg by mouth 2 times daily (with meals)., Disp: , Rfl:     Multiple Vitamins-Minerals (PRESERVISION AREDS 2 PO), Take 2 capsules by mouth 2 times daily., Disp: , Rfl:     rosuvastatin (CRESTOR) 40 MG tablet, Take 40 mg by mouth daily., Disp: , Rfl:     spironolactone (ALDACTONE) 25 MG tablet, Take 25 mg by mouth daily., Disp: , Rfl:     torsemide (DEMADEX) 10 MG tablet, Take 1 tablet (10 mg) by mouth daily., Disp: 90 tablet, Rfl: 3    vitamin C (ASCORBIC ACID) 1000 MG TABS, Take 1,000 mg by mouth 2 times daily., Disp: , Rfl:     vitamin C with B complex (B COMPLEX-C) tablet, Take 1 tablet by mouth every morning., Disp: , Rfl:      ALLERGIES:    Allergies   Allergen Reactions    Exenatide Unknown    Heparin Unknown    Liraglutide Unknown    Lisinopril Cough    Morphine Unknown        SOCIAL HISTORY:   Social History     Socioeconomic History    Marital status:      Spouse name: Not on file    Number of children: Not on file    Years of education: Not on file    Highest education level: Not on file   Occupational History    Not on file   Tobacco Use    Smoking status: Former     Current packs/day: 0.00     Types: Cigarettes     Quit date:      Years since quittin.1     Passive exposure: Never    Smokeless tobacco: Never   Vaping Use    Vaping status: Never Used   Substance and Sexual Activity    Alcohol use: Not Currently      Alcohol/week: 5.0 standard drinks of alcohol     Types: 5 Cans of beer per week     Comment: once in awhile    Drug use: Never    Sexual activity: Not on file   Other Topics Concern    Not on file   Social History Narrative    Not on file     Social Drivers of Health     Financial Resource Strain: Low Risk  (1/27/2025)    Financial Resource Strain     Within the past 12 months, have you or your family members you live with been unable to get utilities (heat, electricity) when it was really needed?: No   Food Insecurity: Low Risk  (1/27/2025)    Food Insecurity     Within the past 12 months, did you worry that your food would run out before you got money to buy more?: No     Within the past 12 months, did the food you bought just not last and you didn t have money to get more?: No   Transportation Needs: Low Risk  (1/27/2025)    Transportation Needs     Within the past 12 months, has lack of transportation kept you from medical appointments, getting your medicines, non-medical meetings or appointments, work, or from getting things that you need?: No   Physical Activity: Not on file   Stress: Not on file   Social Connections: Not on file   Interpersonal Safety: Low Risk  (1/27/2025)    Interpersonal Safety     Do you feel physically and emotionally safe where you currently live?: Yes     Within the past 12 months, have you been hit, slapped, kicked or otherwise physically hurt by someone?: No     Within the past 12 months, have you been humiliated or emotionally abused in other ways by your partner or ex-partner?: No   Housing Stability: Low Risk  (1/27/2025)    Housing Stability     Do you have housing? : Yes     Are you worried about losing your housing?: No        FAMILY HISTORY:   Family History   Problem Relation Age of Onset    Anesthesia Reaction No family hx of     Thrombosis No family hx of         EXAM:Vitals: /60 (BP Location: Right arm)   Pulse 79   Temp 100.4  F (38  C) (Axillary)   Resp 22   Ht  "1.727 m (5' 8\")   Wt 94.8 kg (209 lb)   SpO2 94%   BMI 31.78 kg/m    BMI= Body mass index is 31.78 kg/m .    LABS:      WBC: 4.7    HA1C: 7.9    INR: 1.45    CRP: 14.20    ESR: 77    General appearance: Patient is alert and fully cooperative with history & exam.  He drifts in and out of sleep, he appears to be in distress     Psychiatric: Affect is pleasant & appropriate.  Patient appears motivated to improve health.       Respiratory: Breathing is regular & unlabored while sitting.      HEENT: Hearing is intact to spoken word.  Speech is clear.  No gross evidence of visual impairment that would impact ambulation.       Dermatologic: Wounds dorsal left foot and dorsal left hallux - necrotic appearing graft, positive malodor,  Right lower lateral leg and heel with non functioning wound  VAC that is removed.  Significant malodor noted, some purulent drainage noted.  Periwound erythema noted to all these sites    Vascular: DP & PT pulses are diminished.  No significant edema or varicosities noted.  CFT and skin temperature is normal to both lower extremities.       Neurologic: Lower extremity sensation is intact to light touch.  No evidence of weakness or contracture in the lower extremities.  No evidence of neuropathy.       Musculoskeletal: Patient is ambulatory without assistive device or brace.  No gross ankle deformity noted.  No foot or ankle joint effusion is noted.         Media Information      Document Information    Other: Photograph   left foot   02/05/2025 10:27 AM        Media Information      Document Information    Other: Photograph   right foot   02/05/2025 10:49 AM        Media Information      Document Information    Other: Photograph   Right leg   02/05/2025 10:42 AM       IMAGING:   No imaging on this admission for foot/ankle           Lourdes Delarosa DPM    8:51 AM   "

## 2025-02-05 NOTE — PLAN OF CARE
Problem: Wound  Goal: Skin Health and Integrity  Outcome: Not Progressing     Problem: Comorbidity Management  Goal: Blood Glucose Levels Within Targeted Range  Outcome: Progressing     Problem: Comorbidity Management  Goal: Blood Pressure in Desired Range  Outcome: Progressing     Problem: Pain Acute  Goal: Optimal Pain Control and Function  Outcome: Progressing     Problem: Wound  Goal: Absence of Infection Signs and Symptoms  Outcome: Progressing     Problem: Wound  Goal: Optimal Pain Control and Function  Outcome: Progressing     Problem: Wound  Goal: Optimal Wound Healing  Outcome: Progressing   Goal Outcome Evaluation:    Pt slept between cares. VSS on room air. Denies pain. BLE wound dressing intact. WOC and podiatry consult. Barrier ointment applied to redness on scrotum and penis. Sacral mepilex on  for protection. Turns in bed with assist of 1. Uses urinal with assist.

## 2025-02-06 ENCOUNTER — APPOINTMENT (OUTPATIENT)
Dept: OCCUPATIONAL THERAPY | Facility: HOSPITAL | Age: 80
End: 2025-02-06
Attending: RADIOLOGY
Payer: COMMERCIAL

## 2025-02-06 ENCOUNTER — APPOINTMENT (OUTPATIENT)
Dept: ULTRASOUND IMAGING | Facility: HOSPITAL | Age: 80
DRG: 270 | End: 2025-02-06
Attending: RADIOLOGY
Payer: COMMERCIAL

## 2025-02-06 ENCOUNTER — APPOINTMENT (OUTPATIENT)
Dept: PHYSICAL THERAPY | Facility: HOSPITAL | Age: 80
DRG: 270 | End: 2025-02-06
Payer: COMMERCIAL

## 2025-02-06 VITALS
SYSTOLIC BLOOD PRESSURE: 117 MMHG | BODY MASS INDEX: 31.67 KG/M2 | TEMPERATURE: 98.3 F | DIASTOLIC BLOOD PRESSURE: 67 MMHG | HEIGHT: 68 IN | HEART RATE: 90 BPM | OXYGEN SATURATION: 94 % | RESPIRATION RATE: 18 BRPM | WEIGHT: 209 LBS

## 2025-02-06 DIAGNOSIS — I70.244 ATHEROSCLEROSIS OF NATIVE ARTERIES OF LEFT LEG WITH ULCERATION OF HEEL AND MIDFOOT (H): ICD-10-CM

## 2025-02-06 DIAGNOSIS — I70.244 ATHEROSCLEROSIS OF NATIVE ARTERIES OF LEFT LEG WITH ULCERATION OF HEEL AND MIDFOOT (H): Primary | ICD-10-CM

## 2025-02-06 DIAGNOSIS — L97.929 ULCER OF LOWER LIMB, LEFT, WITH UNSPECIFIED SEVERITY (H): Primary | ICD-10-CM

## 2025-02-06 LAB
CREAT SERPL-MCNC: 1.47 MG/DL (ref 0.67–1.17)
EGFRCR SERPLBLD CKD-EPI 2021: 48 ML/MIN/1.73M2
GLUCOSE BLDC GLUCOMTR-MCNC: 113 MG/DL (ref 70–99)
GLUCOSE BLDC GLUCOMTR-MCNC: 120 MG/DL (ref 70–99)
GLUCOSE BLDC GLUCOMTR-MCNC: 159 MG/DL (ref 70–99)
GLUCOSE BLDC GLUCOMTR-MCNC: 164 MG/DL (ref 70–99)
HOLD SPECIMEN: NORMAL
PROCALCITONIN SERPL IA-MCNC: 0.63 NG/ML

## 2025-02-06 PROCEDURE — 82962 GLUCOSE BLOOD TEST: CPT

## 2025-02-06 PROCEDURE — 258N000003 HC RX IP 258 OP 636: Performed by: INTERNAL MEDICINE

## 2025-02-06 PROCEDURE — 250N000011 HC RX IP 250 OP 636: Performed by: INTERNAL MEDICINE

## 2025-02-06 PROCEDURE — 36415 COLL VENOUS BLD VENIPUNCTURE: CPT | Performed by: INTERNAL MEDICINE

## 2025-02-06 PROCEDURE — 93925 LOWER EXTREMITY STUDY: CPT

## 2025-02-06 PROCEDURE — 250N000013 HC RX MED GY IP 250 OP 250 PS 637: Performed by: INTERNAL MEDICINE

## 2025-02-06 PROCEDURE — 99222 1ST HOSP IP/OBS MODERATE 55: CPT | Performed by: INTERNAL MEDICINE

## 2025-02-06 PROCEDURE — 99233 SBSQ HOSP IP/OBS HIGH 50: CPT | Performed by: INTERNAL MEDICINE

## 2025-02-06 PROCEDURE — 250N000011 HC RX IP 250 OP 636: Mod: JW | Performed by: INTERNAL MEDICINE

## 2025-02-06 PROCEDURE — 120N000001 HC R&B MED SURG/OB

## 2025-02-06 PROCEDURE — 250N000013 HC RX MED GY IP 250 OP 250 PS 637

## 2025-02-06 PROCEDURE — 97530 THERAPEUTIC ACTIVITIES: CPT | Mod: GP

## 2025-02-06 PROCEDURE — 97530 THERAPEUTIC ACTIVITIES: CPT | Mod: GO

## 2025-02-06 PROCEDURE — 97110 THERAPEUTIC EXERCISES: CPT | Mod: GP

## 2025-02-06 PROCEDURE — 99232 SBSQ HOSP IP/OBS MODERATE 35: CPT | Performed by: PODIATRIST

## 2025-02-06 PROCEDURE — 93922 UPR/L XTREMITY ART 2 LEVELS: CPT

## 2025-02-06 PROCEDURE — 84145 PROCALCITONIN (PCT): CPT | Performed by: INTERNAL MEDICINE

## 2025-02-06 PROCEDURE — 82565 ASSAY OF CREATININE: CPT | Performed by: INTERNAL MEDICINE

## 2025-02-06 PROCEDURE — 97162 PT EVAL MOD COMPLEX 30 MIN: CPT | Mod: GP

## 2025-02-06 RX ORDER — LACTULOSE 10 G/15ML
20 SOLUTION ORAL 2 TIMES DAILY
Status: DISCONTINUED | OUTPATIENT
Start: 2025-02-06 | End: 2025-02-08

## 2025-02-06 RX ORDER — NALOXONE HYDROCHLORIDE 0.4 MG/ML
0.4 INJECTION, SOLUTION INTRAMUSCULAR; INTRAVENOUS; SUBCUTANEOUS
Status: DISCONTINUED | OUTPATIENT
Start: 2025-02-06 | End: 2025-02-13 | Stop reason: HOSPADM

## 2025-02-06 RX ORDER — NALOXONE HYDROCHLORIDE 0.4 MG/ML
0.2 INJECTION, SOLUTION INTRAMUSCULAR; INTRAVENOUS; SUBCUTANEOUS
Status: DISCONTINUED | OUTPATIENT
Start: 2025-02-06 | End: 2025-02-13 | Stop reason: HOSPADM

## 2025-02-06 RX ADMIN — TORSEMIDE 10 MG: 5 TABLET ORAL at 08:06

## 2025-02-06 RX ADMIN — TRAMADOL HYDROCHLORIDE 50 MG: 50 TABLET, COATED ORAL at 16:05

## 2025-02-06 RX ADMIN — INSULIN ASPART 1 UNITS: 100 INJECTION, SOLUTION INTRAVENOUS; SUBCUTANEOUS at 08:06

## 2025-02-06 RX ADMIN — LOSARTAN POTASSIUM 50 MG: 50 TABLET, FILM COATED ORAL at 22:30

## 2025-02-06 RX ADMIN — SODIUM CHLORIDE 1250 MG: 9 INJECTION, SOLUTION INTRAVENOUS at 10:18

## 2025-02-06 RX ADMIN — METFORMIN HYDROCHLORIDE 1000 MG: 500 TABLET, FILM COATED ORAL at 08:06

## 2025-02-06 RX ADMIN — PIPERACILLIN AND TAZOBACTAM 3.38 G: 3; .375 INJECTION, POWDER, FOR SOLUTION INTRAVENOUS at 06:45

## 2025-02-06 RX ADMIN — HYDROMORPHONE HYDROCHLORIDE 0.3 MG: 1 INJECTION, SOLUTION INTRAMUSCULAR; INTRAVENOUS; SUBCUTANEOUS at 11:14

## 2025-02-06 RX ADMIN — Medication: at 22:34

## 2025-02-06 RX ADMIN — METFORMIN HYDROCHLORIDE 1000 MG: 500 TABLET, FILM COATED ORAL at 18:55

## 2025-02-06 RX ADMIN — INSULIN GLARGINE 30 UNITS: 100 INJECTION, SOLUTION SUBCUTANEOUS at 08:06

## 2025-02-06 RX ADMIN — LACTULOSE SOLUTION USP, 10 G/15 ML 20 G: 10 SOLUTION ORAL; RECTAL at 16:05

## 2025-02-06 RX ADMIN — LEVOTHYROXINE SODIUM 50 MCG: 0.03 TABLET ORAL at 06:45

## 2025-02-06 RX ADMIN — PIPERACILLIN AND TAZOBACTAM 3.38 G: 3; .375 INJECTION, POWDER, FOR SOLUTION INTRAVENOUS at 22:31

## 2025-02-06 RX ADMIN — ASPIRIN 81 MG: 81 TABLET, COATED ORAL at 20:51

## 2025-02-06 RX ADMIN — LACTULOSE SOLUTION USP, 10 G/15 ML 20 G: 10 SOLUTION ORAL; RECTAL at 20:52

## 2025-02-06 RX ADMIN — Medication: at 11:32

## 2025-02-06 RX ADMIN — CLOPIDOGREL BISULFATE 75 MG: 75 TABLET ORAL at 08:07

## 2025-02-06 RX ADMIN — EMPAGLIFLOZIN 10 MG: 10 TABLET, FILM COATED ORAL at 08:07

## 2025-02-06 RX ADMIN — TRAMADOL HYDROCHLORIDE 50 MG: 50 TABLET, COATED ORAL at 08:06

## 2025-02-06 RX ADMIN — INSULIN ASPART 1 UNITS: 100 INJECTION, SOLUTION INTRAVENOUS; SUBCUTANEOUS at 13:48

## 2025-02-06 RX ADMIN — SODIUM CHLORIDE: 9 INJECTION, SOLUTION INTRAVENOUS at 10:18

## 2025-02-06 RX ADMIN — SPIRONOLACTONE 25 MG: 25 TABLET, FILM COATED ORAL at 08:07

## 2025-02-06 RX ADMIN — PIPERACILLIN AND TAZOBACTAM 3.38 G: 3; .375 INJECTION, POWDER, FOR SOLUTION INTRAVENOUS at 16:05

## 2025-02-06 RX ADMIN — ROSUVASTATIN 40 MG: 40 TABLET, FILM COATED ORAL at 08:06

## 2025-02-06 ASSESSMENT — ACTIVITIES OF DAILY LIVING (ADL)
ADLS_ACUITY_SCORE: 73
ADLS_ACUITY_SCORE: 70
ADLS_ACUITY_SCORE: 74
ADLS_ACUITY_SCORE: 74
ADLS_ACUITY_SCORE: 70
ADLS_ACUITY_SCORE: 70
ADLS_ACUITY_SCORE: 73
ADLS_ACUITY_SCORE: 70
ADLS_ACUITY_SCORE: 70
ADLS_ACUITY_SCORE: 73
ADLS_ACUITY_SCORE: 70
ADLS_ACUITY_SCORE: 73
ADLS_ACUITY_SCORE: 70
ADLS_ACUITY_SCORE: 73
ADLS_ACUITY_SCORE: 70
ADLS_ACUITY_SCORE: 73
ADLS_ACUITY_SCORE: 70

## 2025-02-06 NOTE — PROGRESS NOTES
"Podiatry / Foot and Ankle Surgery Progress Note    February 6, 2025  ASSESSMENT:  80 year old diabetic male with PAD and ulcerations bilateral foot, h/o osteomyelitis right heel with history of theraskin application  Diabetic ulceration right heel into subcutaneous tissue   Ulceration right leg into subcutaneous tissue  Acute osteomyelitis right calcaneus  Diabetic ulceration left hallux into epidermis/dermis  Diabetic ulceration left foot into epidermis/dermis     POD # 10 Bilateral lower extremity I&D with TheraSkin application performed by Dr Amol Patricio at [facility] and continuation of wound VAC,  Patient also has history of partial calcanectomy of the right heel on 10/24/24 due to osteomyelitis.        PLAN:  Reviewed patient's chart in [facility].  Antibiotics - Zosyn/Vanco per hospital team  Will place wound care orders.  Adaptic, saline wet to dry dressing to the right foot wounds.  Adaptic and dry sterile dressing to left foot wounds.   NWB of RLE  Patient is afebrile, WBC of 4.7  IR consult pending - patient with  history of Lower extremity angiogram and now worsening wounds, 12/26/24(right) and 1/16/25(Left) - appreciate input  Infectious Disease consult pending  Wound on the right heel looks like it is healing overall, MRI with osteomyelitis in the calcaneus, theragraft seems to be taking.  No plan for debridement of the calcaneus at this time, will also appreciate advice from Infectious Disease.      Will continue to follow      Subject: Patient was seen at bedside.  He is with his daughter in law.  Sleeping.    Objective:  Vitals: /56   Pulse 85   Temp 98.2  F (36.8  C) (Oral)   Resp 20   Ht 1.727 m (5' 8\")   Wt 94.8 kg (209 lb)   SpO2 92%   BMI 31.78 kg/m    BMI= Body mass index is 31.78 kg/m .    General:  Patient is alert and orientated.  NAD.    Wounds unchanged from yesterday with the exception of no malodor noted on there right today.    Imaging: I personally reviewed images. "     EXAM: MR FOOT LEFT W/O CONTRAST  LOCATION: [facility]  DATE: 2/5/2025     INDICATION: Ulcer debridement with TheraSkin application, dorsal left foot, post op day 9.  COMPARISON: Left foot radiographic exam 2/5/2025  TECHNIQUE: Unenhanced.     FINDINGS:      Postoperative change with skin staples are seen along the dorsum of the great toe extending from the MTP joint to near the IP joint. Adjacent susceptibility artifact. No convincing evidence for great toe acute osteomyelitis. The lesser toes are negative   for fracture or acute osteomyelitis. No metatarsal fracture or evidence for metatarsal stress fracture. Similar postoperative changes are seen along the dorsum of the more proximal foot at the level of the naviculocuneiform joints and more proximally. No   convincing evidence for osteomyelitis in the tarsal bones were imaged.     Mild scattered degenerative change in the left foot including at the first MTP and metatarsal sesamoid joints. Degenerative change in the midfoot. No sizable joint effusion.     No acute tendon injury. No tenosynovitis. No significant tendinopathy. Distal anterior tibialis and peroneus longus intact.     Lisfranc ligament intact. No midfoot subluxation.     Generalized foot muscle atrophy is moderate to severe with denervation muscle edema. Visualized plantar fascia is intact. No drainable fluid collection. Dorsal foot soft tissue swelling.                                                                       IMPRESSION:  1.  Postoperative changes related to reported ulcer debridement with skin grafting along the dorsum of the great toe and dorsum of the proximal foot.  2.  No convincing MR evidence for left foot acute osteomyelitis, with particular attention at the great toe.  3.  No evidence for acute foot fracture or stress fracture.  4.  Scattered degenerative change including in the midfoot and at the first MTP and metatarsal sesamoid joints.  5.   Diffuse acute on chronic denervation edema throughout the intrinsic musculature of the left foot.  6.  Dorsal foot soft tissue swelling. No soft tissue abscess.    Narrative & Impression   EXAM: MR ANKLE RIGHT W/O CONTRAST  LOCATION: [facility]  DATE: 2/5/2025     INDICATION: Ulceration with TheraSkin application heel and lateral ankle region, post op day # 9.  Wound infected.  History of osteomyelitis of right heel as well.  COMPARISON: Calcaneus radiographic exam 2/5/2025. Foot MRI 10/8/2024.  TECHNIQUE: Unenhanced.     FINDINGS:      TENDONS:   -Peroneal: Peroneus longus and brevis tendons are intact. Mild tendinopathy. No significant lateral tenosynovitis. No subluxation.  -Medial: Posterior tibialis tendon is intact. Mild posterior tibialis tendinopathy. Trace distal posterior tibialis tendon sheath fluid. Flexor digitorum longus and flexor hallucis longus tendons are normal. No tenosynovitis.  -Anterior: Anterior tibialis, extensor hallucis longus, and extensor digitorum longus tendons are normal. No tenosynovitis.  -Achilles: No significant tendinopathy or paratenonitis.     LIGAMENTS:   -Anterior talofibular ligament: Intact.   -Calcaneofibular ligament: Intact.   -Posterior talofibular ligament: Intact.  -Syndesmotic inferior tibiofibular ligaments: Intact.  -Deltoid ligament complex: Intact.  -Spring ligament complex: Intact.     JOINTS AND BONES:   -Abnormal edema-like marrow signal intensity posterior calcaneus deep to what appears to be an ulcer along the posterior heel, compatible with osteomyelitis.  No acute ankle or hindfoot fracture. No calcaneus stress fracture. 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 Scattered midfoot arthrosis. Trace ankle and subtalar joint effusions.     SOFT TISSUES:  -Plantar fascia: Chronic thickening proximal medial bundle plantar fascia. No acute inflammation or discrete tear.  -Sinus tarsi and  tarsal tunnel: Preserved sinus Tarsi fat signal. No space-occupying mass is seen along the course of the tarsal tunnel.  -Muscles: Global intrinsic foot muscle atrophy with patchy muscle edema suggestive of denervation edema.     Broad-based ulcer and adjacent skin staples posterior heel. No soft tissue abscess.                                                                      IMPRESSION:  1.  Broad-based soft tissue ulcer posterior heel with adjacent skin staples and MR findings compatible with adjacent posterior calcaneus osteomyelitis.  2.  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  3.  Mild peroneus longus and brevis tendinopathy. Mild posterior tibialis tendinopathy.               Lourdes Delarosa DPM  11:43 AM

## 2025-02-06 NOTE — PROGRESS NOTES
[facility]    Medicine Progress Note - Hospitalist Service    Date of Admission:  2/4/2025    Assessment & Plan    Janice Nowak is a 80 year old male with past medical hx of T2DM, HFpEF, recent debridement of diabetic ulcers, SABRINA, hepatocellular carcinoma, hypothyroidism, HTN, HLD admitted on 2/4/2025. He presented for generalized weakness.    Generalized weakness  Abdominal tenderness  Encephalopathy multifactorial, hepatic and due to opiates  Presented from home for 3-4 days of progressive weakness, poor PO intake, poor sleep, myalgias. He has several recent admissions lately, last discharge 1/28. Workup including CT head and a/p unrevealing. Per my read of CT a/p the SMA has severe calcifications. Patient has diffuse abdominal pain. Question early presentation of ischemic bowel due to low flow state, however vitally stable. Patient states he does not like to eat but is unsure if he has pain after eating.  - LR 100ml/hr for 10hrs.   - UA, CXR, RUQ U/S s/p cholecystectomy, cirrhosis with 2.5 cm solid subcapsular mass compatible with known neoplasm.  - Treatment for hepatic encephalopathy with lactulose despite normal ammonia  - Opiates likely contributing to encephalopathy, unfortunately needing them for bilateral leg wound pain  - See below for possible post-op cellulitis  - PT/OT/SW consulted    Diabetic ulcer of right heel -- S/P Surg 1/27/25 with possible post-op infection  Osteomyelitis s/p debridement and right partial calcanectomy 10/2024  Lower leg wound cellulitis (wound vac in place)  POD # 10 Bilateral lower extremity I&D with TheraSkin application performed by Dr Amol Patricio at [facility] and continuation of wound VAC.  Per home health nurse the VAC stopped working 2/1.   - Continued PTA Aspirin 81mg, Clopidogrel 75mg  - Podiatry, WOC consulted, POC reviewed with Dr. Delarosa.  Some staples were removed at the tenderness skin site, which appears to be  infected with malodorous drainage.  - Xray bilateral foot, MRI ordered left foot, right ankle -per podiatry.  - Tentative plans for surgical debridement of wounds at some point on admission .  - Zosyn 3.375g IV Q8h   - Vanc, pharmacy dosed for possible post-op infection  - Nonweightbearing on right leg  - Podiatry requested vascular surgery consult :patient with history of Lower extremity angiogram and now worsening wounds, 12/26/24(right) and 1/16/25(Left).  D/W vascular surgery fellow, who recommended consulting IR, since angiogram was done by Dr. Jc.  -Pain management with as needed Tylenol, tramadol, IV Dilaudid.    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 Underwent IR guided biopsy on 1/2/25 which confirmed HCC. Met with oncology on 1/10/25 who discussed recommendation for palliative immunotherapy. Port placed 1/17. CT chest on recent admission showed nodules and bulky adenopathy concerning for malignancy.     On admission liver function slightly worse than baseline. PLT 79, baseline 120-190. , . Albumin 2.9. INR 1.37. 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   - follow up with oncology on outpatient basis     Heart failure with preserved ejection fraction, NYHA class II   Hypertension  Echo 12/11 with mild concentric LVH. EF 55-60%. Grade 2 diastolic dysfunction. Had transudative pleural effusion s/p thoracentesis 12/2024.  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   - Continued PTA Empagliflozin 10mg, Glargine 30U Qam, Metformin 1000mg BID  - Consistent carb diet  - sliding scale insulin     Troponinemia   127 ?  109. Similar to baseline and downtrending. EKG similar to prior. Patient declined cardio workup on last  "admission.    Anemia, normocytic, chronic  Hemoglobin 10.4, baseline 10-12.    Hyponatremia, mild  131 on admit. Likely due to poor PO intake.    CKD stage 2  Creatinine 1.35, baseline 1-1.3.     Carotid arterial disease  U/S 11/2024 with <50% stenosis bilaterally.    Hx of syncope  Admitted 1/16 for syncope after port placement. Suspected orthostatic hypotension. Patient declined cardiology workup.    Mixed hyperlipidemia  - Continued PTA Rosuvastatin 40mg    Obstructive Sleep Apnea -- stopped using CPAP  Does not use CPAP.    Hypothyroidism  TSH WNL.  - Continued PTA Levothyroxine 50mcg    Diet: Combination Diet Moderate Consistent Carb (60 g CHO per Meal) Diet; Low Saturated Fat Na <2400mg Diet    DVT Prophylaxis: Aspirin and Plavix.  SCD contraindicated due to PVD/right leg wound VAC.  Cannot use heparin products due to allergy to heparin.  Reyes Catheter: Not present  Lines: PRESENT       Cardiac Monitoring: None  Code Status: No CPR- Do NOT Intubate      Clinically Significant Risk Factors      # Hyponatremia: Lowest Na = 131 mmol/L in last 2 days, will monitor as appropriate  # Hypochloremia: Lowest Cl = 95 mmol/L in last 2 days, will monitor as appropriate      # Hypoalbuminemia: Lowest albumin = 2.3 g/dL at 2/5/2025  7:40 AM, will monitor as appropriate  # Coagulation Defect: INR = 1.45 (Ref range: 0.85 - 1.15) and/or PTT = 46 Seconds (Ref range: 22 - 38 Seconds), will monitor for bleeding  # Drug Induced Platelet Defect: home medication list includes an antiplatelet medication   # Hypertension: Noted on problem list    # Anemia: based on hgb <11  # DMII: A1C = 7.9 % (Ref range: <5.7 %) within past 6 months, PRESENT ON ADMISSION  # Obesity: Estimated body mass index is 31.78 kg/m  as calculated from the following:    Height as of this encounter: 1.727 m (5' 8\").    Weight as of this encounter: 94.8 kg (209 lb)., PRESENT ON ADMISSION     # Financial/Environmental Concerns: other (see comments) (TBD as they " are now transitioning into Assisted living from independent living and it will cost more)      Social Drivers of Health    Tobacco Use: Medium Risk (2/4/2025)    Patient History     Smoking Tobacco Use: Former     Smokeless Tobacco Use: Never     Passive Exposure: Never     Disposition Plan     Medically Ready for Discharge: Anticipated in 2-4 Days    June Ortega MD  Hospitalist Service  [facility]  Securely message with "[employer] (more info)  Text page via [employer] Paging/Directory   ______________________________________________________________________    Interval History   Chart reviewed.  Patient was seen and examined.  Podiatry patient's daughter-in-law at the bedside during my visit.  Right leg wound VAC was removed yesterday.  Hyperemia both legs, malodorous drainage slightly improved.  Patient is more somnolent during my visit today.  20 minutes prior to visit received IV Dilaudid, for dressing change.  D/W [facility] coordinator, patient's PCP is from [facility], care transferred to resident team.    Physical Exam   Vital Signs: Temp: 98.2  F (36.8  C) Temp src: Oral BP: 107/56 Pulse: 85   Resp: 20 SpO2: 92 % O2 Device: None (Room air)    Weight: 209 lbs 0 oz  General: Alert and oriented x 2. Not in obvious distress.  HEENT: NC, AT. Neck- supple, No JVP elevation, lymphadenopathy or thyromegaly. Trachea-central.  Chest: Clear to auscultation bilaterally.  Heart: S1S2 regular. No M/R/G.  Abdomen: Soft. NT, ND. No organomegaly. Bowel sounds- active.  Back: No spine tenderness. No CVA tenderness.  Extremities: Left foot distal surgical wounds with staples, surrounding hyperemia, malodorous drainage.  Left heel wound with staples.  Right lower leg wound with wound VAC in place, surrounding hyperemia-see pictures from Dr. Delarosa note from today.  Neuro: Cranial nerves 1-12 grossly normal. No focal neurological deficit    Medical Decision Making     50 MINUTES SPENT BY ME on the date of  service doing chart review, history, exam, documentation & further activities per the note.      Data     I have personally reviewed the following data over the past 24 hrs:  N/A  \   N/A   / N/A     N/A N/A N/A /  164 (H)   N/A N/A 1.47 (H) \       Imaging results reviewed over the past 24 hrs:   Recent Results (from the past 24 hours)   XR Ankle Right 2 Views    Narrative    EXAM: XR ANKLE RIGHT 2 VIEWS, XR FOOT LEFT 2 VIEWS, XR CALCANEUS RIGHT G/E 2 VIEWS  LOCATION: [facility]  DATE: 2/5/2025  INDICATION: Lateral ankle wound.   COMPARISON: Radiographs 10/7/2024 and MRI 10/8/2024       Impression    IMPRESSION:   Right: Degenerative arthrosis of the talonavicular joint. Ankle joint spaces are otherwise maintained and normally aligned. Intact ankle mortise. No acute fracture. Skin staples over the posterior heel with a broad-based ulceration evident. No   radiographic evidence of osteomyelitis.  Left: Screw fixation of the distal tibia. Skin staples over the dorsal midfoot and first toe. No acute fracture or radiographic evidence of osteomyelitis.    XR Foot Left 2 Views    Narrative    EXAM: XR ANKLE RIGHT 2 VIEWS, XR FOOT LEFT 2 VIEWS, XR CALCANEUS RIGHT G/E 2 VIEWS  LOCATION: [facility]  DATE: 2/5/2025  INDICATION: Lateral ankle wound.   COMPARISON: Radiographs 10/7/2024 and MRI 10/8/2024       Impression    IMPRESSION:   Right: Degenerative arthrosis of the talonavicular joint. Ankle joint spaces are otherwise maintained and normally aligned. Intact ankle mortise. No acute fracture. Skin staples over the posterior heel with a broad-based ulceration evident. No   radiographic evidence of osteomyelitis.  Left: Screw fixation of the distal tibia. Skin staples over the dorsal midfoot and first toe. No acute fracture or radiographic evidence of osteomyelitis.    XR Calcaneus Right G/E 2 Views    Narrative    EXAM: XR ANKLE RIGHT 2 VIEWS, XR FOOT LEFT 2 VIEWS, XR  CALCANEUS RIGHT G/E 2 VIEWS  LOCATION: [facility]  DATE: 2/5/2025  INDICATION: Lateral ankle wound.   COMPARISON: Radiographs 10/7/2024 and MRI 10/8/2024       Impression    IMPRESSION:   Right: Degenerative arthrosis of the talonavicular joint. Ankle joint spaces are otherwise maintained and normally aligned. Intact ankle mortise. No acute fracture. Skin staples over the posterior heel with a broad-based ulceration evident. No   radiographic evidence of osteomyelitis.  Left: Screw fixation of the distal tibia. Skin staples over the dorsal midfoot and first toe. No acute fracture or radiographic evidence of osteomyelitis.    MR Ankle Right w/o Contrast    Narrative    EXAM: MR ANKLE RIGHT W/O CONTRAST  LOCATION: [facility]  DATE: 2/5/2025  INDICATION: Ulceration with TheraSkin application heel and lateral ankle region, post op day # 9.  Wound infected.  History of osteomyelitis of right heel as well.  COMPARISON: Calcaneus radiographic exam 2/5/2025. Foot MRI 10/8/2024.  TECHNIQUE: Unenhanced.  FINDINGS:   TENDONS:   -Peroneal: Peroneus longus and brevis tendons are intact. Mild tendinopathy. No significant lateral tenosynovitis. No subluxation.  -Medial: Posterior tibialis tendon is intact. Mild posterior tibialis tendinopathy. Trace distal posterior tibialis tendon sheath fluid. Flexor digitorum longus and flexor hallucis longus tendons are normal. No tenosynovitis.  -Anterior: Anterior tibialis, extensor hallucis longus, and extensor digitorum longus tendons are normal. No tenosynovitis.  -Achilles: No significant tendinopathy or paratenonitis.  LIGAMENTS:   -Anterior talofibular ligament: Intact.   -Calcaneofibular ligament: Intact.   -Posterior talofibular ligament: Intact.  -Syndesmotic inferior tibiofibular ligaments: Intact.  -Deltoid ligament complex: Intact.  -Spring ligament complex: Intact.  JOINTS AND BONES:   -Abnormal edema-like marrow signal intensity  posterior calcaneus deep to what appears to be an ulcer along the posterior heel, compatible with osteomyelitis.  No acute ankle or hindfoot fracture. No calcaneus stress fracture. 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 Scattered midfoot arthrosis. Trace ankle and subtalar joint effusions.  SOFT TISSUES:  -Plantar fascia: Chronic thickening proximal medial bundle plantar fascia. No acute inflammation or discrete tear.  -Sinus tarsi and tarsal tunnel: Preserved sinus Tarsi fat signal. No space-occupying mass is seen along the course of the tarsal tunnel.  -Muscles: Global intrinsic foot muscle atrophy with patchy muscle edema suggestive of denervation edema.  Broad-based ulcer and adjacent skin staples posterior heel. No soft tissue abscess.      Impression    IMPRESSION:  1.  Broad-based soft tissue ulcer posterior heel with adjacent skin staples and MR findings compatible with adjacent posterior calcaneus osteomyelitis.  2.  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  3.  Mild peroneus longus and brevis tendinopathy. Mild posterior tibialis tendinopathy.   MR Foot Left w/o Contrast    Narrative    EXAM: MR FOOT LEFT W/O CONTRAST  LOCATION: [facility]  DATE: 2/5/2025  INDICATION: Ulcer debridement with TheraSkin application, dorsal left foot, post op day 9.  COMPARISON: Left foot radiographic exam 2/5/2025  TECHNIQUE: Unenhanced.  FINDINGS:   Postoperative change with skin staples are seen along the dorsum of the great toe extending from the MTP joint to near the IP joint. Adjacent susceptibility artifact. No convincing evidence for great toe acute osteomyelitis. The lesser toes are negative   for fracture or acute osteomyelitis. No metatarsal fracture or evidence for metatarsal stress fracture. Similar postoperative changes are seen along the dorsum of the more proximal foot at the level of the naviculocuneiform joints and  more proximally. No   convincing evidence for osteomyelitis in the tarsal bones were imaged.  Mild scattered degenerative change in the left foot including at the first MTP and metatarsal sesamoid joints. Degenerative change in the midfoot. No sizable joint effusion.  No acute tendon injury. No tenosynovitis. No significant tendinopathy. Distal anterior tibialis and peroneus longus intact.  Lisfranc ligament intact. No midfoot subluxation.  Generalized foot muscle atrophy is moderate to severe with denervation muscle edema. Visualized plantar fascia is intact. No drainable fluid collection. Dorsal foot soft tissue swelling.       Impression    IMPRESSION:  1.  Postoperative changes related to reported ulcer debridement with skin grafting along the dorsum of the great toe and dorsum of the proximal foot.  2.  No convincing MR evidence for left foot acute osteomyelitis, with particular attention at the great toe.  3.  No evidence for acute foot fracture or stress fracture.  4.  Scattered degenerative change including in the midfoot and at the first MTP and metatarsal sesamoid joints.  5.  Diffuse acute on chronic denervation edema throughout the intrinsic musculature of the left foot.  6.  Dorsal foot soft tissue swelling. No soft tissue abscess.   This document is created with the help of Dragon dictation system. All grammatical errors are unintentional.

## 2025-02-06 NOTE — PROGRESS NOTES
Care Management Follow Up    Length of Stay (days): 2    Expected Discharge Date: 02/06/2025    Anticipated Discharge Plan:   TCU    Transportation: TBD    PT Recommendations: Transitional Care Facility  OT Recommendations:  Transitional Care Facility     Barriers to Discharge: medical stability, placement    Prior Living Situation: Pt is from [facility] and [facility] just needs assessment and to be A1 with mobility. He has home care with [facility] for PT/OT/RN. Pt's wife lives in the  at [facility].    Discussed  Partnership in Safe Discharge Planning  document with patient/family: No     Handoff Completed: No, handoff not indicated or clinically appropriate    Patient/Spokesperson Updated: Yes. Who? Pt and pt's sonMichael    Additional Information:  CM went to pt's room to discuss the therapy recommendation for TCU at discharge. Pt was busy with nursing and the DIL was in the room and told CM that the main family contact is the pt's son, Michael and to give him a call for TCU choices. CM then called Michael and we went over therapy recommendation and that I had also discussed yesterday with [facility] that if pt is A1 they can come do an assessment for the [facility] but currently the pt is A2 with mobility so we will need to look into TCU and of course if pt progresses then we can work with [facility]. He was understanding and gave CM his top TCU choices 1) [facility], 2) [facility], 3) [facility] 4) [facility]. CM then sent referral to these TCUs.    Next Steps: medical progression, TCU placement    Treasure Arzate RN

## 2025-02-06 NOTE — PROGRESS NOTES
02/06/25 0900   Appointment Info   Signing Clinician's Name / Credentials (PT) Missy Louie, DEMARCO   Living Environment   People in Home alone   Current Living Arrangements independent living facility   Home Accessibility no concerns   Living Environment Comments Bathroom setup includes: walk-in shower with grab bars, standard toilet with grab bars   Self-Care   Usual Activity Tolerance moderate   Current Activity Tolerance fair   Regular Exercise No   Equipment Currently Used at Home walker, rolling;wheelchair, manual   Fall history within last six months yes   Number of times patient has fallen within last six months 1   Activity/Exercise/Self-Care Comment Pt was indep with WC transfers but needed increased assist lately.  Pt did have assist with bathing. Pt did use the WC to get around.   General Information   Onset of Illness/Injury or Date of Surgery 02/04/25   Referring Physician Ha Ashford PA-C   Patient/Family Therapy Goals Statement (PT) none stated   Pertinent History of Current Problem (include personal factors and/or comorbidities that impact the POC) Per the chart, Janice Nowak is a 80 year old male with past medical hx of T2DM, HFpEF, recent debridement of diabetic ulcers, SABRINA, hepatocellular carcinoma, hypothyroidism, HTN, HLD admitted on 2/4/2025. He presented for generalized weakness.   Existing Precautions/Restrictions fall;weight bearing   Weight-Bearing Status - LLE   (WBAT for transfers)   Weight-Bearing Status - RLE nonweight-bearing  (and he does have a PRAFO.)   Cognition   Orientation Status (Cognition) oriented to;person;place   Cognitive Status Comments Contant cues to open his eyes  (Pt was lethargic and was more alert with mobility.)   Pain Assessment   Patient Currently in Pain   (Pt has bilat foot pain and some pain at the L UE IV site.)   Integumentary/Edema   Integumentary/Edema Comments bilat feet wrapped. R PRAFO was placed on the pt.   Range of Motion (ROM)   Range of  Motion ROM deficits secondary to surgical procedure   Strength (Manual Muscle Testing)   Strength (Manual Muscle Testing) Deficits observed during functional mobility   Strength Comments Pt did WB on the left LE and needed assist to keep NWB R LE w his PRAFO   Bed Mobility   Supine-Sit Turner (Bed Mobility) maximum assist (25% patient effort);2 person assist   Sit-Supine Turner (Bed Mobility) maximum assist (25% patient effort);2 person assist   Transfers   Comment, (Transfers) Sit>stand with HHA x 2 with assist to keep NWB R LE  (max A x 2)   Balance   Balance Comments static stand bal with mod A x 2 with HHA x 2   Sensory Examination   Sensory Perception Comments Pt could feel light touch bilat LEs   Clinical Impression   Criteria for Skilled Therapeutic Intervention Yes, treatment indicated   PT Diagnosis (PT) Impaired functional mobility.   Influenced by the following impairments dec strength, WB restriction, dec bal and dec endurance.   Functional limitations due to impairments bed mobility, transfers.   Clinical Presentation (PT Evaluation Complexity) stable   Clinical Presentation Rationale Pt presents medically diagnosed.   Clinical Decision Making (Complexity) moderate complexity   Planned Therapy Interventions (PT) bed mobility training;strengthening;transfer training;wheelchair management/propulsion training   Risk & Benefits of therapy have been explained evaluation/treatment results reviewed;care plan/treatment goals reviewed;risks/benefits reviewed;current/potential barriers reviewed;patient;daughter;participants voiced agreement with care plan   PT Total Evaluation Time   PT Eval, Moderate Complexity Minutes (10634) 15   Physical Therapy Goals   PT Frequency 6x/week   PT Predicted Duration/Target Date for Goal Attainment 02/13/25   PT Goals Bed Mobility;Transfers;Wheelchair Mobility;PT Goal 1   PT: Bed Mobility Moderate assist;Supine to/from sit;Rolling;Within precautions   PT: Transfers    (pivot transfers bed<>WC with min A x 2 with the pt keeping NWB R LE)   PT: Wheelchair Mobility 50 feet;Caregiver SBA;manual wheelchair   PT: Goal 1 Pt to brandon bilat LE ex x 10 to 20 reps with the pt needing CGA with ex to increase strength for mobility.   Interventions   Interventions Quick Adds Therapeutic Procedure   Therapeutic Procedure/Exercise   Ther. Procedure: strength, endurance, ROM, flexibillity Minutes (69153) 8   Treatment Detail/Skilled Intervention bilat LE ex x 10 reps with AAROM x 10 reps.  Pt did have increased pain in the lEs   Therapeutic Activity   Therapeutic Activities: dynamic activities to improve functional performance Minutes (22063) 15   Symptoms Noted During/After Treatment Fatigue;Increased pain   Treatment Detail/Skilled Intervention rolled bilat with mod A x 1 with cues and assist to fix bedding.  Supine>sit with max A x 2 with cues and max A x2. Sit scoot with max Ax 2.  Pt needed cues fo open his eyes and work on his sit bal.  Pt did c/o bilat LE pain.  Sit<>stand x 2 reps with max A x 2 for the first stand. Pt is not able to maintain NWB R LE without assist.  Pt stood a 2nd time with mod Ax  2 with HHA x 2 and PT did block the L knee in standing.  He was able to WB more on the L LE and stand more upright.  Pt needed assist to maintain NWB R LE. He did have the R PRAFO on.  Pt worked on sit scoot to the HOB with mod Ax 1 and cues and assist to keep NWB R LE.  Sit>supine with max Ax 2.  Supine scoot with max A x 2 and increased time taken to position in bed.  Call light left by the pt  and bed alarm is on.   PT Discharge Planning   PT Plan bed mobility, sit <>stand with A x 2, progress to pivot transfers with A  x2 w NWB R LE.  LE ex.   PT Discharge Recommendation (DC Rec) Transitional Care Facility   PT Rationale for DC Rec The pt needed max A x 2 with bed mobility and standing and assist to keep NWB R LE Pt does live alone and is not safe to dc home. .  TCU is recommended.   PT  Brief overview of current status PT eval, LE ex, bed mobility w A x2 and max A x 2 with standing.  Assist to keep NWB R LE.   PT Total Distance Amb During Session (feet) 0   PT Equipment Needed at Discharge wheelchair;wheelchair cushion;walker, rolling  (FWW)   Physical Therapy Time and Intention   Timed Code Treatment Minutes 23   Total Session Time (sum of timed and untimed services) 38

## 2025-02-06 NOTE — CONSULTS
Interventional Radiology - Consultation Note  Inpatient - [facility]  02/06/2025     Procedure Requested: left foot nonhealing wounds/grafts  Requested by: Dr. Delarosa    History and Physical Reviewed: H&P documented within 30 days (by Ha Ashford PA-C on 2/4/25. I have personally reviewed the patient's medical history and have updated the medical record as necessary.      Past Medical History:   Diagnosis Date    Coronary artery disease     Diabetes (H)     Hypertension     Pleural effusion     Sleep apnea     Thyroid disease       HPI: Janice Nowak is a 80 year old male with past medical hx of T2DM, HFpEF,  SABRINA, hepatocellular carcinoma, hypothyroidism, HTN, HLD, PAD with previous bilateral iliac artery stenting in IR with Dr. Jc 1/16/25, Hx of right foot osteomyelitis and recent bilateral LE debridement of diabetic ulcers and with theraskin application with podiatry on 1/27/25. He was readmitted 2/4/2025 with generalized weakness and concern for nonhealing wounds and concern for infection. Podiatry following and planning for further debridement. MRI of left foot does not demonstrate osteomyelitis, Left ankle MRI demonstrates known osteomyelitis and degenerative changes. Patient was started on antibiotics. IR consulted for consideration of angiogram for wound healing potential.          IMAGING:  EXAM: CT ABDOMEN PELVIS W CONTRAST  LOCATION: [facility]  DATE: 2/4/2025     INDICATION: Weakness, diffuse lower abdominal tenderness  COMPARISON: 1/16/2025  TECHNIQUE: CT scan of the abdomen and pelvis was performed following injection of IV contrast. Multiplanar reformats were obtained. Dose reduction techniques were used.  CONTRAST: IsoVue 370 75mL     FINDINGS:   LOWER CHEST: Partially visualized nodule along the right major fissure. Three-vessel coronary artery calcification.     HEPATOBILIARY: [Morphologic changes of cirrhosis with  ill-defined 3.7 x 3.4 cm hypoenhancing lesion in the right lobe posteriorly (4/36). Cholecystectomy. No biliary dilation.     PANCREAS: Normal.     SPLEEN: Enlarged, 16.0 cm craniocaudal.     ADRENAL GLANDS: Normal.     KIDNEYS/BLADDER: Normal.     BOWEL: Diverticulosis of the colon. No acute inflammatory change. No obstruction. Normal appendix.     LYMPH NODES: Enlarged periportal lymph node measuring 7.4 x 5.1 cm (4/67), previously 7.3 x 5.2 cm. No other enlarged lymph nodes in the abdomen and pelvis. Some borderline enlarged retroperitoneal lymph nodes are unchanged.     VASCULATURE: Severe atherosclerotic calcification of the aorta, visceral branches, and iliofemoral arteries. Right common iliac and left common-external iliac artery stents, which appear to be patent, although the arterial enhancement is suboptimal. No   aortic aneurysm.     PELVIC ORGANS: Normal.     MUSCULOSKELETAL: Multilevel degenerative changes in the spine.                                                                      IMPRESSION:   1.  No acute abnormality in the abdomen and pelvis.  2.  Morphologic changes of cirrhosis with splenomegaly. No ascites.  3.  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     LOCATION: [facility]  DATE: 1/16/2025     PROCEDURE: IMPLANTABLE VENOUS ACCESS PORT PLACEMENT, FLUOROSCOPIC GUIDANCE FOR CENTRAL VENOUS ACCESS DEVICE PLACEMENT, BALLOON EXPANDED WITH COVERED STENT PLACEMENT IN THE BILATERAL COMMON ILIAC ARTERIES, BALLOON ANGIOPLASTY AND DRUG-ELUTING STENT   PLACEMENT IN THE LEFT EXTERNAL ILIAC ARTERY, ULTRASOUND GUIDANCE FOR VASCULAR ACCESSX3.     INTERVENTIONAL RADIOLOGIST: Silvestre Jc MD     INDICATION: 79-year-old male with recently diagnosed hepatocellular carcinoma, plan for venous port placement for  administration of chemotherapy. The patient is also noted to have bilateral lower extremity wounds/critical limb threatening ischemia, plan   for pelvic angiography with possible intervention.     INDICATION FOR DIAGNOSTIC ANGIOGRAPHY: Diagnostic angiography was required to precisely identify plaque morphology to aid in evaluation and management     CONSENT: The risks, benefits and alternatives of the procedure were discussed with the patient or patient's guardian in detail. All questions were answered. Informed consent was given to proceed with the procedure.     MODERATE SEDATION: Versed 3 mg IV and fentanyl 200 mcg IV were administered by the radiology nurse with continuous vital sign monitoring under my direct supervision. During the time out, immediately prior to the administration of medications, the patient   was reassessed for adequacy to receive conscious sedation. Total moderate sedation time was 135 minutes.     CONTRAST: 60 cc     FLUOROSCOPIC TIME: 13.8 minutes.  RADIATION DOSE: Air Kerma: 2662 mGy.     COMPLICATIONS: No immediate complications.     UNIVERSAL PROTOCOL: The operative site was marked and any prior imaging was reviewed. Required items including blood products, implants, devices and special equipment was made available. Patient identity was confirmed either verbally, with demographic   information, hospital assigned identification or other identification markers. A timeout was performed immediately prior to the procedure.     STERILE BARRIER TECHNIQUE: Maximum sterile barrier technique was used. Cutaneous antisepsis was performed at the operative site with application of 2% chlorhexidine and large sterile drape. Prior to the procedure, the  and assistant performed   hand hygiene and wore hat, mask, sterile gown, and sterile gloves during the entire procedure.     PROCEDURE:    1. RIGHT INTERNAL JUGULAR IMPLANTABLE VENOUS PORT PLACEMENT:  Using real-time ultrasound guidance the right  internal jugular vein was accessed. Imaging demonstrates a patent compressible vein. Permanent images were stored to the patient's medical record. A subcutaneous pocket was created and irrigated with sterile   normal saline. The catheter tubing was tunneled in an antegrade fashion from the port pocket to the dermatotomy site. Over a guidewire, a peel-away sheath was advanced with fluoroscopic monitoring. Through the peel-away sheath, the catheter was advanced   until the tip was at the cavoatrial junction. Positioning of the distal tip was confirmed with a radiograph from the in room fluoroscopic unit. The catheter was cut to length and attached firmly to the port. The port pocket incision was closed with   layered absorbable suture and surgical glue. The dermatotomy site was closed with surgical glue.      2. ABDOMINAL AORTOGRAM AND BILATERAL ILIAC STENT PLACEMENT:  Access was achieved into the left common femoral artery utilizing real-time ultrasound guidance and micropuncture access kit. Imaging demonstrates a patent and compressible vessel. Permanent images were stored to the patient's medical record. Conversion   was made for a 5 Irish vascular sheath, which was attached to a continuous Argatroban/saline infusion.      A small volume of contrast was injected through the left common femoral arterial sheath. Imaging demonstrates complete occlusion of the left common and external iliac arteries. An angled catheter and Glidewire were advanced to the sheath and the   occlusion in the left common iliac and external iliac artery were crossed. A small volume of contrast was injected and the caudal abdominal aorta, confirming true lumen crossing. Over-the-wire exchange is made for a marked flush catheter and a pelvic   angiogram was obtained. Imaging demonstrates patent caudal abdominal aorta. Patent right common iliac artery with moderate grade stenosis of the ostial vessel. Patent right external iliac artery.  Occluded right internal iliac artery. Occluded left common   iliac, internal iliac and external iliac arteries. Reconstituted flow in the left common femoral artery. The segment of occluded vasculature was measured. Over-the-wire exchange is made for a 6 Bulgarian x 25 cm vascular sheath which was positioned in the   caudal abdominal aorta.     Systemic anticoagulation was performed with Argatroban and monitored with serial activated clotting times.     Access was achieved into the right common femoral artery utilizing real-time ultrasound guidance and micropuncture access kit. Imaging demonstrates a patent and compressible vessel. Permanent images were stored to the patient's medical record. Conversion   was made for a 6 Bulgarian x 25 cm vascular sheath, which was attached to a continuous Argatroban/saline infusion and advanced the caudal abdominal aorta.      Utilizing the right common femoral arterial access, over-the-wire exchange is made for a 7 mm x 29 mm Viabahn VBX balloon expandable covered stent which was carefully positioned in the area of stenosis in the right common iliac artery. Utilizing the left   common femoral arterial access, over the wire exchange is made for a 7 mm x 59 mm Viabahn VBX balloon expandable covered stent which was carefully positioned in the area of occlusion in the left common and external iliac arteries. Both stents were   simultaneously deployed to nominal pressure in a kissing fashion. Utilizing the left common femoral arterial access, over the wire exchange is made for a 7 mm x 120 mm Jill drug-eluting stent. The stent was positioned in the caudal common iliac artery   and extended to the caudal left external iliac artery. The stent was carefully deployed and post stent deployment balloon angioplasty was performed utilizing a 7 mm x 10 cm angioplasty balloon. A repeat pelvic aortogram was performed through the left   common femoral arterial sheath. Imaging demonstrates patent  "bilateral common iliac arteries without significant stenosis. Interval revascularization of the left external iliac artery with flow into the left common femoral artery. There is no significant   residual stenosis in the bilateral iliac system.     At this point, the procedure was considered complete. The Argatroban infusion was discontinued and hemostasis was achieved utilizing an Angio-Seal closure device in each groin. There is complete hemostasis post procedure.                                                                      IMPRESSION:    1. Pelvic angiography demonstrates moderate stenosis of the ostial right common iliac artery and patent right external iliac artery. Occlusion of the left common and external iliac arteries.  2. Successful bilateral common iliac artery \"kissing\" stent placement. Successful placement of a drug-eluting stent in the occluded left external iliac artery. Post intervention angiography demonstrates patent bilateral common iliac and external iliac   arteries without significant residual stenosis.  3. Successful power-injectable venous port placement.     PLAN: 2 hours bedrest post sheath removal. Continue dual antiplatelet therapy with aspirin and Plavix status post drug-eluting stent placement.       ANTICOAGULANTS/ANTIPLATELETS: Plavix 75mg, ASA 81mg      ALLERGIES:  Allergies   Allergen Reactions    Exenatide Unknown    Heparin Unknown    Liraglutide Unknown    Lisinopril Cough    Morphine Unknown         LABS:  INR   Date Value Ref Range Status   02/05/2025 1.45 (H) 0.85 - 1.15 Final      Hemoglobin   Date Value Ref Range Status   02/05/2025 9.0 (L) 13.3 - 17.7 g/dL Final     Platelet Count   Date Value Ref Range Status   02/05/2025 62 (L) 150 - 450 10e3/uL Final     Creatinine   Date Value Ref Range Status   02/06/2025 1.47 (H) 0.67 - 1.17 mg/dL Final     Potassium   Date Value Ref Range Status   02/05/2025 4.1 3.4 - 5.3 mmol/L Final   07/06/2020 4.7 3.5 - 5.0 mmol/L Final " "        EXAM:  /56   Pulse 86   Temp 98.2  F (36.8  C) (Oral)   Resp 20   Ht 1.727 m (5' 8\")   Wt 94.8 kg (209 lb)   SpO2 94%   BMI 31.78 kg/m    General: Stable. In no acute distress.    Neuro: Alert and oriented x 3. No focal deficits.  Psych: Appropriate mood and affect. Linear/coherent thought process.   Resp: Normal respirations.   Cardio: S1S2, regular rate and rhythm  Abdomen: Soft, non-distended, non-tender.  Skin: Both lower extremities with bandages.  See above image for left foot wounds.      ASSESSMENT/PLAN:   Guideline recommended medical therapy for peripheral vascular disease  -Continue aspirin/Plavix as previously prescribed  -Continue high intensity statin therapy with Crestor 40 mg once daily  -Continue offloading, adequate protein intake (1.5g/kg/day), MVI     I did review the recent CT abdomen and pelvis with IV contrast.  Although this was not a CT angiogram, the bilateral iliac stents appear patent.      Will obtain a bilateral lower extremity arterial ultrasound as well as ankle-brachial indices with digit pressures to evaluate wound healing potential.  It does appear his right lower leg skin grafts are healing appropriately.  There is dry eschar and necrosis involving his left foot wounds/grafts.  The patient most likely has below the knee and below the ankle disease involving the left lower extremity.    At this point, the patient is at risk for limb loss most likely requiring below-knee amputation.  This was reviewed with both the patient and his daughter in the emergency room.  We will attempt a left lower extremity angiogram via antegrade access on 2/13/2025 at [facility].  This has been scheduled and can be performed as an outpatient.    Total time spent on the date of the encounter including face-to-face and non-face-to-face time: 45 minutes.    Silvestre Jc MD, [facility]  Vascular and Interventional Physician  Vascular Medicine  Internal Medicine  Pager: " [phone]  Clinic: [phone]

## 2025-02-06 NOTE — CONSULTS
INFECTIOUS DISEASE CONSULT NOTE    Date: 02/06/2025   CHIEF COMPLAINT:   Chief Complaint   Patient presents with    Generalized Weakness        ================================================================  SUBJECTIVE    HPI  80m w/ weakness. PMH DM2, HFpEF, diabetic foot ulcers, SABRINA, HC, hypothyroid, HTN, HLD.    He was admitted 2/4 with weakeness. Of note, had right calcanectomy on 10/24/25.  On 1/27 had elective surgery with Dr. Patricio - right heel I&D. He now presents with severe weakness and fatigue.  MRI shows OM of right calcaneus.  No fevers/chills.    Past Medical History  Active Ambulatory Problems     Diagnosis Date Noted    Type 2 Diabetes Mellitus     Microalbuminuria     Benign Adenomatous Polyp Of The Large Intestine     Mixed hyperlipidemia     Obstructive Sleep Apnea -- stopped using CPAP     Hypertension     Diabetic foot infection (H) 10/07/2024    DM 2 w PVD on Insulin, Hgb A1C9.2 10/24/24 10/07/2024    Diabetic ulcer of right heel associated with type 2 diabetes mellitus, with necrosis of muscle (H) 10/16/2024    Carotid arterial disease 10/29/2024    Atypical chest pain 11/12/2015    Charcot joint of foot 10/29/2024    Charcot's arthropathy 10/29/2024    Diabetic neuropathy (H) 10/29/2024    Exposure to potentially hazardous substance 10/29/2024    Fatigue 11/12/2015    Pressure ulcer of right heel, unstageable (H) 10/29/2024    Occlusion and stenosis of unspecified carotid artery 10/29/2024    Postoperative back pain 10/29/2024    Hypothyroidism 10/29/2024    History of cholecystectomy 10/29/2024    Hearing loss 10/29/2024    Ulcer of right leg, limited to breakdown of skin (H) 11/13/2024    Diabetic ulcer of right heel associated with type 2 diabetes mellitus, with necrosis of bone (H) 11/13/2024    DIAS (dyspnea on exertion) 12/11/2024    Diabetic ulcer of right heel associated with diabetes mellitus due to underlying condition, with muscle involvement without evidence of necrosis (H)  "12/27/2024    Hepatocellular carcinoma (H) 01/10/2025    Abnormal hemoglobin (Hgb) 01/16/2025    Elevated troponin 01/16/2025    Syncope, unspecified syncope type 01/16/2025    History of vascular access device 01/20/2025    Diabetic ulcer of right heel -- S/P Surg 1/27/25 01/27/2025     Resolved Ambulatory Problems     Diagnosis Date Noted    Hypothyroidism, unspecified 10/29/2024     Past Medical History:   Diagnosis Date    Coronary artery disease     Diabetes (H)     Hypertension     Pleural effusion     Sleep apnea     Thyroid disease        Past Surgical History  He   has a past surgical history that includes vascular surgery; orthopedic surgery; back surgery; Incision and drainage foot, combined (Right, 10/24/2024); Irrigation and debridement foot, combined (Right, 10/24/2024); Bone Exostosis Excision (Right, 10/24/2024); PICC/Midline Placement (10/30/2024); IR Lower Extremity Angiogram Right (12/26/2024); Endoscopic ultrasound upper gastrointestinal tract (GI) (N/A, 01/02/2025); IR Lower Extremity Angiogram Left (01/16/2025); IR Chest Port Placement > 5 Yrs of Age (01/16/2025); Cholecystectomy; and Incision and drainage foot, combined (Bilateral, 1/27/2025).    Social History  he   reports that he quit smoking about 42 years ago. His smoking use included cigarettes. He has never been exposed to tobacco smoke. He has never used smokeless tobacco. He reports that he does not currently use alcohol after a past usage of about 5.0 standard drinks of alcohol per week. He reports that he does not use drugs.    Family History  Reviewed and non-contributory  family history is not on file.    Social Needs  Social History     Social History Narrative    Not on file       ================================================================  OBJECTIVE  /56   Pulse 86   Temp 98.2  F (36.8  C) (Oral)   Resp 20   Ht 1.727 m (5' 8\")   Wt 94.8 kg (209 lb)   SpO2 94%   BMI 31.78 kg/m      Physical Exam  General: " alert, cooperative, no apparent distress  HEENT: atraumatic, normocephalic, no scleral icterus, moist mucus membranes, no cervical lymphadenopathy  Heart: Normal rate, regular rhythm, no murmurs  Lungs: CTAB, good inspiratory effort  Abdomen: soft, non-tender, non-distended  Extremities: multiple heel wounds per below    RLE 2/5         Pertinent labs  CBC RESULTS:   Recent Labs   Lab Test 02/05/25  0740   WBC 4.7   RBC 3.47*   HGB 9.0*   HCT 28.2*   MCV 81   MCH 25.9*   MCHC 31.9   RDW 16.7*   PLT 62*        Imaging  2/5 MRI left foot  1.  Postoperative changes related to reported ulcer debridement with skin grafting along the dorsum of the great toe and dorsum of the proximal foot.  2.  No convincing MR evidence for left foot acute osteomyelitis, with particular attention at the great toe.  3.  No evidence for acute foot fracture or stress fracture.  4.  Scattered degenerative change including in the midfoot and at the first MTP and metatarsal sesamoid joints.  5.  Diffuse acute on chronic denervation edema throughout the intrinsic musculature of the left foot.  6.  Dorsal foot soft tissue swelling. No soft tissue abscess.    2/5 MRI right foot  1.  Broad-based soft tissue ulcer posterior heel with adjacent skin staples and MR findings compatible with adjacent posterior calcaneus osteomyelitis.  2.  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  3.  Mild peroneus longus and brevis tendinopathy. Mild posterior tibialis tendinopathy.    10/8/24 MRI right foot  1.  Soft tissue ulcer of the heel with subcutaneous edema abutting the calcaneus compatible with cellulitis. No organized/drainable collection. There is T2 hyperintense marrow edema of the subjacent calcaneal tuberosity with mild linear T1 hypointense signal but no confluent T1 hypointense replacement. Given location subjacent to an ulcer, these findings are compatible with a high likelihood though are not definitive of osteomyelitis.  2.  Severe  talonavicular joint osteoarthritis.    I have personally reviewed the relevant laboratory, imaging, and microbiology data  ================================================================  Assessment  Janice Nowak is a 80 year old with right calcaneal osteomyelitis. PMH DM2, HFpEF, diabetic foot ulcers, SABRINA, HC, hypothyroid, HTN, HLD.    He had right heel OM back in October. This was debrided and he was given 6 weeks broad-spectrum abx. He now has recurrence despite flap coverage.  Given this, seems very unlikely antibiotics can be curative, and additional surgical options should be explored. Will discuss with other teams.    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 He may warrant BKA. Will discuss with podiatry.  - Okay to continue vanc + pip-tazo  - ID will follow    Scott Shah MD, MPH  [facility]   Office Telephone [phone].  Fax [phone]  [facility] paging

## 2025-02-06 NOTE — PROGRESS NOTES
Patient transferring to resident service. Seen by hospitalist service today.     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 Regarding encephalopathy, current working diagnosis is hepatic encephalopathy for which patient is receiving lactulose. Also considering confusion 2/2 opiates for which patient is receiving for leg pain.     Patient also noted to have diabetic foot ulcer and osteomyelitis now s/p surgical intervention 1/27/25. Podiatry and vascular surgery following. On broad spectrum antibiotics for concern of post op infection.     Resident team to begin rounding on patient tomorrow 2/7/25.     Heather Brewer MD PGY-3  [facility] Residency

## 2025-02-07 LAB
ALBUMIN SERPL BCG-MCNC: 2.3 G/DL (ref 3.5–5.2)
ALP SERPL-CCNC: 200 U/L (ref 40–150)
ALT SERPL W P-5'-P-CCNC: 83 U/L (ref 0–70)
AMMONIA PLAS-SCNC: 11 UMOL/L (ref 16–60)
ANION GAP SERPL CALCULATED.3IONS-SCNC: 8 MMOL/L (ref 7–15)
AST SERPL W P-5'-P-CCNC: 88 U/L (ref 0–45)
BILIRUB SERPL-MCNC: 0.4 MG/DL
BUN SERPL-MCNC: 37.9 MG/DL (ref 8–23)
CALCIUM SERPL-MCNC: 8.1 MG/DL (ref 8.8–10.4)
CHLORIDE SERPL-SCNC: 107 MMOL/L (ref 98–107)
CREAT SERPL-MCNC: 1.34 MG/DL (ref 0.67–1.17)
CRP SERPL-MCNC: 118.6 MG/L
EGFRCR SERPLBLD CKD-EPI 2021: 54 ML/MIN/1.73M2
ERYTHROCYTE [DISTWIDTH] IN BLOOD BY AUTOMATED COUNT: 16.8 % (ref 10–15)
GLUCOSE BLDC GLUCOMTR-MCNC: 107 MG/DL (ref 70–99)
GLUCOSE BLDC GLUCOMTR-MCNC: 108 MG/DL (ref 70–99)
GLUCOSE BLDC GLUCOMTR-MCNC: 119 MG/DL (ref 70–99)
GLUCOSE BLDC GLUCOMTR-MCNC: 119 MG/DL (ref 70–99)
GLUCOSE BLDC GLUCOMTR-MCNC: 153 MG/DL (ref 70–99)
GLUCOSE BLDC GLUCOMTR-MCNC: 63 MG/DL (ref 70–99)
GLUCOSE SERPL-MCNC: 110 MG/DL (ref 70–99)
HCO3 SERPL-SCNC: 21 MMOL/L (ref 22–29)
HCT VFR BLD AUTO: 28.1 % (ref 40–53)
HGB BLD-MCNC: 9 G/DL (ref 13.3–17.7)
MCH RBC QN AUTO: 26.1 PG (ref 26.5–33)
MCHC RBC AUTO-ENTMCNC: 32 G/DL (ref 31.5–36.5)
MCV RBC AUTO: 81 FL (ref 78–100)
PLATELET # BLD AUTO: 66 10E3/UL (ref 150–450)
POTASSIUM SERPL-SCNC: 4 MMOL/L (ref 3.4–5.3)
PROT SERPL-MCNC: 6.3 G/DL (ref 6.4–8.3)
RBC # BLD AUTO: 3.45 10E6/UL (ref 4.4–5.9)
SODIUM SERPL-SCNC: 136 MMOL/L (ref 135–145)
VANCOMYCIN SERPL-MCNC: 21.1 UG/ML
WBC # BLD AUTO: 4.7 10E3/UL (ref 4–11)

## 2025-02-07 PROCEDURE — 82962 GLUCOSE BLOOD TEST: CPT

## 2025-02-07 PROCEDURE — 250N000013 HC RX MED GY IP 250 OP 250 PS 637: Performed by: HOSPITALIST

## 2025-02-07 PROCEDURE — 97606 NEG PRS WND THER DME>50 SQCM: CPT

## 2025-02-07 PROCEDURE — G0463 HOSPITAL OUTPT CLINIC VISIT: HCPCS | Mod: 25

## 2025-02-07 PROCEDURE — 82310 ASSAY OF CALCIUM: CPT | Performed by: INTERNAL MEDICINE

## 2025-02-07 PROCEDURE — 99232 SBSQ HOSP IP/OBS MODERATE 35: CPT | Performed by: INTERNAL MEDICINE

## 2025-02-07 PROCEDURE — 250N000011 HC RX IP 250 OP 636: Performed by: INTERNAL MEDICINE

## 2025-02-07 PROCEDURE — 86140 C-REACTIVE PROTEIN: CPT | Performed by: INTERNAL MEDICINE

## 2025-02-07 PROCEDURE — 36415 COLL VENOUS BLD VENIPUNCTURE: CPT | Performed by: INTERNAL MEDICINE

## 2025-02-07 PROCEDURE — 258N000003 HC RX IP 258 OP 636: Performed by: INTERNAL MEDICINE

## 2025-02-07 PROCEDURE — 250N000013 HC RX MED GY IP 250 OP 250 PS 637: Performed by: INTERNAL MEDICINE

## 2025-02-07 PROCEDURE — 99232 SBSQ HOSP IP/OBS MODERATE 35: CPT | Performed by: PODIATRIST

## 2025-02-07 PROCEDURE — 99232 SBSQ HOSP IP/OBS MODERATE 35: CPT | Mod: GC

## 2025-02-07 PROCEDURE — 85018 HEMOGLOBIN: CPT | Performed by: INTERNAL MEDICINE

## 2025-02-07 PROCEDURE — 250N000011 HC RX IP 250 OP 636: Performed by: FAMILY MEDICINE

## 2025-02-07 PROCEDURE — 120N000001 HC R&B MED SURG/OB

## 2025-02-07 PROCEDURE — 80202 ASSAY OF VANCOMYCIN: CPT | Performed by: INTERNAL MEDICINE

## 2025-02-07 PROCEDURE — 250N000013 HC RX MED GY IP 250 OP 250 PS 637

## 2025-02-07 PROCEDURE — 82140 ASSAY OF AMMONIA: CPT | Performed by: INTERNAL MEDICINE

## 2025-02-07 PROCEDURE — 82947 ASSAY GLUCOSE BLOOD QUANT: CPT | Performed by: INTERNAL MEDICINE

## 2025-02-07 PROCEDURE — 250N000011 HC RX IP 250 OP 636: Mod: JW | Performed by: INTERNAL MEDICINE

## 2025-02-07 RX ORDER — VANCOMYCIN HYDROCHLORIDE 1 G/200ML
1000 INJECTION, SOLUTION INTRAVENOUS EVERY 24 HOURS
Status: DISCONTINUED | OUTPATIENT
Start: 2025-02-07 | End: 2025-02-11

## 2025-02-07 RX ADMIN — SPIRONOLACTONE 25 MG: 25 TABLET, FILM COATED ORAL at 10:02

## 2025-02-07 RX ADMIN — TRAMADOL HYDROCHLORIDE 50 MG: 50 TABLET, COATED ORAL at 22:57

## 2025-02-07 RX ADMIN — LEVOTHYROXINE SODIUM 50 MCG: 0.03 TABLET ORAL at 06:40

## 2025-02-07 RX ADMIN — ROSUVASTATIN 40 MG: 40 TABLET, FILM COATED ORAL at 10:02

## 2025-02-07 RX ADMIN — Medication: at 14:30

## 2025-02-07 RX ADMIN — Medication: at 23:01

## 2025-02-07 RX ADMIN — METFORMIN HYDROCHLORIDE 1000 MG: 500 TABLET, FILM COATED ORAL at 10:09

## 2025-02-07 RX ADMIN — CLOPIDOGREL BISULFATE 75 MG: 75 TABLET ORAL at 10:02

## 2025-02-07 RX ADMIN — SODIUM CHLORIDE: 9 INJECTION, SOLUTION INTRAVENOUS at 07:35

## 2025-02-07 RX ADMIN — TORSEMIDE 10 MG: 5 TABLET ORAL at 10:03

## 2025-02-07 RX ADMIN — PIPERACILLIN AND TAZOBACTAM 3.38 G: 3; .375 INJECTION, POWDER, FOR SOLUTION INTRAVENOUS at 06:40

## 2025-02-07 RX ADMIN — PIPERACILLIN AND TAZOBACTAM 3.38 G: 3; .375 INJECTION, POWDER, FOR SOLUTION INTRAVENOUS at 22:17

## 2025-02-07 RX ADMIN — HYDROMORPHONE HYDROCHLORIDE 0.3 MG: 1 INJECTION, SOLUTION INTRAMUSCULAR; INTRAVENOUS; SUBCUTANEOUS at 14:36

## 2025-02-07 RX ADMIN — ASPIRIN 81 MG: 81 TABLET, COATED ORAL at 20:52

## 2025-02-07 RX ADMIN — LACTULOSE SOLUTION USP, 10 G/15 ML 20 G: 10 SOLUTION ORAL; RECTAL at 10:04

## 2025-02-07 RX ADMIN — LACTULOSE SOLUTION USP, 10 G/15 ML 20 G: 10 SOLUTION ORAL; RECTAL at 20:52

## 2025-02-07 RX ADMIN — EMPAGLIFLOZIN 10 MG: 10 TABLET, FILM COATED ORAL at 10:03

## 2025-02-07 RX ADMIN — PIPERACILLIN AND TAZOBACTAM 3.38 G: 3; .375 INJECTION, POWDER, FOR SOLUTION INTRAVENOUS at 14:30

## 2025-02-07 RX ADMIN — VANCOMYCIN HYDROCHLORIDE 1000 MG: 1 INJECTION, SOLUTION INTRAVENOUS at 10:16

## 2025-02-07 RX ADMIN — INSULIN GLARGINE 30 UNITS: 100 INJECTION, SOLUTION SUBCUTANEOUS at 10:13

## 2025-02-07 ASSESSMENT — ACTIVITIES OF DAILY LIVING (ADL)
ADLS_ACUITY_SCORE: 81
ADLS_ACUITY_SCORE: 81
ADLS_ACUITY_SCORE: 78
ADLS_ACUITY_SCORE: 81
ADLS_ACUITY_SCORE: 78
ADLS_ACUITY_SCORE: 78
ADLS_ACUITY_SCORE: 81
ADLS_ACUITY_SCORE: 81
ADLS_ACUITY_SCORE: 78
ADLS_ACUITY_SCORE: 81
ADLS_ACUITY_SCORE: 78
ADLS_ACUITY_SCORE: 81
ADLS_ACUITY_SCORE: 73
ADLS_ACUITY_SCORE: 81
ADLS_ACUITY_SCORE: 73
ADLS_ACUITY_SCORE: 81
ADLS_ACUITY_SCORE: 81

## 2025-02-07 NOTE — PROGRESS NOTES
Able to get in touch with family. They feel strongly dilaudid is contributing to confusion, request it be held. Did discuss this may results in more pain with dressing changes, which they understand.     Informed that IR will take the patient back for angiogram tomorrow. NPO at midnight.     Will call family with ongoing updates tomorrow.     Heather Brewer MD PGY-3  [facility] Residency

## 2025-02-07 NOTE — DISCHARGE INSTRUCTIONS
[facility] DISCHARGE INSTRUCTIONS:    Continue hygiene cares as needed  Apply small amount of zinc oxide to wound area twice daily    Wound location: R heel and RLE lateral   Change Days: T/F  Supplies: Black foam, oil emulsion gauze, Y connector, skin prep  Cleanse with: Saline, pat dry.  Suction: Continuous at -100 mmHg pressure.    Back up plan: If VAC malfunctions or unable to maintain seal: VAC dressing must be removed and reapplied within 2 hours of the incident. If not able to reapply, place oil emulsion gauze over graft site and then cover with dry gauze 4 x 4.  Change daily and notify nursing staff for reimplementation of VAC therapy when available.            Janice Nowak,    Your visit to [facility] for your procedure is coming soon and we look forward to seeing you! This friendly reminder and pre-procedure checklist will help to ensure your procedure goes smoothly and meets your expectations. At [facility], our goal is to provide you with a great patient experience and to deliver genuine, professional care to every patient.     Please complete all the steps in advance of your visit. If you have any questions about the items listed below, please give our office a call. We can be reached at [phone] or visit our website at [email or web address] for more information.     Procedure: Left  Leg  Angiogram     Procedure Date :  2/13/2025    Procedure Time :  10:00 AM    Arrival Time: 9:00 AM    Follow up:   3/7/2025 9:45 AM    ultrasounds at the [facility]  3/1/2025 10:25 AM  visit w/Dr. Jc at the [facility]    Admission Type: Outpatient    Surgeon: Dr. Silvestre Jc    Procedure Location: [facility]:  [address] [place] [address] (phone: [phone], Fax: [phone])      If you take blood thinners: SEE SPECIFIC INSTRUCTIONS BELOW   PLEASE DO NOT STOP YOUR ASPIRIN OR  PLAVIX UNLESS SPECIFICALLY DIRECTED BY THE VASCULAR SURGEON TO STOP!  - In most cases Vascular providers want you to continue these. This is different from most NON vascular surgeries and may not be well known by your Primary Care Provider     Aspirin: PLEASE DO NOT STOP THIS MEDICATION PRIOR TO SURGERY/ PROCEDURE.    PLAVIX: PLEASE DO NOT STOP THIS MEDICATION PRIOR TO ANGIOGRAM.    If you take these diabetic medications, please discuss with your primary doctor and follow the hold instructions:   Hold seven (7) days prior for once weekly injectable doses [semaglutide (Ozempic, Wegovy), dulaglutide  (Trulicity), exenatide ER (Bydureon), tirzepatide (Mounjaro)]  Hold the day before and day of for once daily injectable GLP-1 agonists [exenatide (Byetta), liraglutide (Saxenda,Victoza)]  Hold seven (7) days for oral semaglutide (Rybelsus)    Prepare for the peripheral angiography as follows:  Do not eat 8 hours before your procedure. You may have clear liquids up to 1 hour before surgery.  Tell your healthcare provider about all medicines you take and any allergies you may have.  Arrange for a family member or friend to drive you home.  If you are on Metformin, please HOLD for 48 hours after the procedure.  Please be sure to address your diabetic medications with your Primary Care Physician prior to your angiogram.    Peripheral Angiography    Peripheral angiography is an outpatient procedure that makes a  map  of the vessels (arteries) in your lower body, legs, and arms, using X-ray and dye.This map can show where blood flow may be blocked.    An angiogram is commonly performed under sedation with the use of local anesthesia.    The procedure usually starts with a needle put into the femoral (groin) artery. From one treatment site, areas all over the body can be treated.  After access is established, catheters (thin tubes) and wires are threaded through the arterial system to a specific area of interest or throughout  the entire body.  As a contrast agent (iodine dye) is injected, X-ray images are taken to let your provider view the flow of the dye and identify blockages. The surgeon can then choose the best mode of therapy for you - whether during or following the angiogram. This decision depends on your symptoms and the severity and characteristics of the blockages.  Two common therapies that can be provided during the angiogram are balloon angioplasty and stent placement.                   Angioplasty can be used to open arterial blockages. Guided by X-ray, your provider navigates through the blockage with a wire and introduces a special device equipped with an inflatable balloon. After positioning the balloon device across the blocked portion of the artery, the provider inflates the balloon to expand the artery and compress the blockage. The balloon is then deflated and removed while keeping the wire in place across the area that has been treated. Next, contrast dye is injected to assess the result. Treatment is considered a success if blood flow is improved and less than 30% of the blockage remains. If the vessel is still considerably narrowed, placing a stent may be the next step.    Stents are used to prop open an artery at the site of a narrowing. Stents are generally placed after balloon angioplasty when there is residual narrowing or insufficient blood flow in a treated vessel. Stents are considered a permanent implant and cannot be used if you have a metal allergy. Stents that are used in the leg are constructed of a nickel-titanium alloy (Nitinol), a memory-shaped metal. This alloy has a predetermined size and shape at body temperature and expands to this size and shape after being introduced through a catheter. These stents resist kinking and are flexible so that damage from activities that involve your legs is minimized.  Your procedure may require other techniques to address the problem or plaque.     If surgery is  felt to be a better option, your vascular provider will obtain any additional X-ray images needed to plan a surgical bypass of the blocked vessel/s and will then conclude the angiogram.      During the procedure  Here is what to expect:  You may get medicine through an IV (intravenous) line to relax you. You re given an injection to numb the insertion site. Then, a tiny skin cut (incision) is made near an artery in your groin.  Your provider inserts a thin tube (catheter) through the incision. He or she then threads the catheter into an artery while looking at a video monitor.  Contrast  dye  is injected into the catheter to confirm position. You may feel warmth or pressure in your legs and back. You lie still as X-rays are taken. The catheter is then taken out.  After the procedure  You ll be taken to a recovery area. A healthcare provider will apply pressure to the site for about 10 minutes. Your healthcare provider will tell you how long to lie down and keep the insertion site still. Your healthcare provider will discuss the results with you soon after the procedure.      Angiogram Procedure Discharge Instructions:     1. If you received sedation for your procedure: Do not drive or operate heavy machinery for the rest of the day.  2. Avoid strenuous activity for 72 hours (3 days):                        - Do not lift greater than 10 pounds.                        - Excessive exercise                        - Straining                        - Return to your normal activities as you tolerate after the 3 days restriction  3. Avoid tub baths, Jacuzzis, hot tubs and pools for 72 hours (3 days) or until puncture site is will healed.  4. You may shower beginning tomorrow. Do not scrub puncture site(s) until well healed, pat dry.  5. You can expect to return to work 1-2 days after your procedure - depending on the nature of your profession.  6. It is normal to have some tenderness and minimal swelling at puncture site.  A small area of discoloration may be present. Tenderness typically subsides in 24-48 hours. A small knot may also be present at puncture site for 6-8 weeks, this can be a normal part of the healing process.       After the angiogram If you:      1. Experience any bleeding or active swelling from puncture site: Lie down, firmly apply pressure to puncture site and CALL 9-1-1  2. Fever greater than 101 degrees Fahrenheit.  3. Redness, swelling, warmth to touch, or purulent (yellow/green/foul smelling) drainage from the puncture site.  4. Increasing pain, tenderness or swelling at puncture site OR of arm/leg near puncture site.  5. Feeling weak or faint.  6. Change in color, temperature, or sensation of arm/leg where puncture was made.  7. You can t feel your thrill (pulse at your dialysis fistula site) or it feels weak (If you had fistulogram done).     Call us with any other questions or concerns after your procedure: [phone]      All invasive procedures can have complications. While the risk of an angiogram is low it is not zero. The most common complications are related to the arterial access site.       Risks/ Complications   Bruising is Common  You will likely have bruising (ecchymosis) where the artery was entered.    Pain and Bleeding  Less commonly, patients experience pain and bleeding that may include blood collecting under the skin (hematoma).    Blockage and Leakage   In rare cases, the access artery can become blocked. Infrequently, patients experience persistent leakage of blood where the artery was entered, which can result in the formation of a pseudoaneurysm--a blood-filled sac--that may require further treatment.  Other complications related to an angiogram include:   Allergic reaction to the iodine contrast dye, which can lead to the development of kidney failure.  Very rarely during balloon angioplasty and/or stent placement, part of the arterial blockage can break off (embolism) and travel to  "more distant arteries. This can worsen blood flow.    Pre-Procedure checklist:    [] A Pre-op physical within 30 days of the procedure is required. You will need to set up an appointment with your primary care provider.  [] Contact your insurance regarding coverage  If you would like a Good Karina Estimate for your upcoming procedure at [facility] Location, contact Cost of Care Estimates   Advocates are available Monday through Friday 8am - 5pm     You may also submit a request online at [email or web address]  - Complete the secure online form found under \"Services and Procedure Pricing\"   If your procedure is at [facility], please contact the numbers below for Cost of Care Estimates.   - Facility Charge: [phone]    Anesthesiology charge:  [phone]   [] DO NOT BRING FMLA WITH TO SURGERY.  These should be sent to the provider's office by fax to [phone].     [] Day of Surgery  Medications - Take as indicated with sips of water.   Wear comfortable loose-fitting clothes. Wear your glasses-Not contacts. Do not wear jewelry and remove body piercings. Surgery may be cancelled if they are not removed.   You may have 1 family member wait in the lobby during your surgery. Visitor restrictions are subject to change. Please verify with the surgery nurse when they call.   If same day surgery-Have a someone come with you to surgery that can help you understand the surgeon's instructions, drive you home and stay with you overnight the first night.    [] You will receive a call from a nurse 1-3 days prior to the procedure. They will go over more details with you    Notify our office right away, if you have any changes in your health status, or if you develop a cold, flu, diarrhea, infection, fever or sore throat before your scheduled surgery date. We can be reached at  [phone] Monday-Friday 8 am-4:30 pm if you have any questions.   Thank you for choosing [facility] " Vascular    Please scan QR codes to watch videos about Angioplasty for Peripheral Artery Disease. There are links provided if you are unable to use the code.                                                                                                                                       [email or web address]                    [email or web address]  Angioplasty for Peripheral Artery Disease: Before Your Procedure                                               Angioplasty for Peripheral Artery Disease: Returning Home

## 2025-02-07 NOTE — PROGRESS NOTES
Podiatry / Foot and Ankle Surgery Progress Note    February 7, 2025    ASSESSMENT:  80 year old diabetic male with PAD and ulcerations bilateral foot, h/o osteomyelitis right heel with history of theraskin application  Diabetic ulceration right heel into subcutaneous tissue   Ulceration right leg into subcutaneous tissue  Acute osteomyelitis right calcaneus  Diabetic ulceration left hallux into epidermis/dermis  Diabetic ulceration left foot into epidermis/dermis     POD # 11 Bilateral lower extremity I&D with TheraSkin application performed by Dr Amol Patricio at [facility] and continuation of wound VAC,  Patient also has history of partial calcanectomy of the right heel on 10/24/24 due to osteomyelitis.     MRI findings with osteomyelitis of calcaneus right       PLAN:  Reviewed patient's chart in [facility].  Antibiotics - Zosyn/Vanco per Infectious Disease , discussed plan.  Appreciate input and cares for this patient.  Will place wound care orders.  Adaptic and dry sterile dressing to left foot wounds.   Will place WOC consult for wound VAC right lower extremity at 100  NWB of RLE  Patient is afebrile, WBC of 4.7  IR consult - patient with  history of Lower extremity angiogram and now worsening wounds, 12/26/24(right) and 1/16/25(Left) - plan for outpatient angiogram on 2/13/25.  Unclear if foot will be salvageable.  appreciate input and cares  Infectious Disease   Wound on the right heel looks like it is healing overall, MRI with osteomyelitis in the calcaneus, theragraft seems to be taking.  No plan for surgical debridement at this time.   Right calcaneus with probable osteomyelitis seen on MRI.  The theraskin graft seems to be incorporating overlying this area.  Patient has already completed a 6 week course of antibiotics for ostomyelitis, unclear if this is residual, etc.  Discussed with Dr Patricio and Infectious Disease.  Can treat with antibiotics for a couple more weeks, give the graft more time to  "incorporate.  Unclear at this time if the foot will be salvageable.  Infectious Disease - appreciate recommendations  [facility] consult for wound VAC on RLE at 100    Discussed findings with patient and his son \"Michael\".  Unfortunately patient has limb threatening conditions on both lower extremities many relates to osteomyelitis of the calcaneus on the right and vascular status of the left.      Patient can follow up with Dr Patricio in 1-2 weeks after discharge    All questions answered.        Subject: Patient was seen at bedside.  He is resting.  Relates some pain to the feet.     Objective:  Vitals: /60 (BP Location: Right arm)   Pulse 82   Temp 97  F (36.1  C) (Oral)   Resp 16   Ht 1.727 m (5' 8\")   Wt 90.7 kg (199 lb 15.3 oz)   SpO2 96%   BMI 30.40 kg/m    BMI= Body mass index is 30.4 kg/m .    General:  Patient is alert and orientated.  NAD.    Both lower extremities with dressings intact.      Lourdes Delarosa DPM  9:35 AM   "

## 2025-02-07 NOTE — PLAN OF CARE
Goal Outcome Evaluation:      Plan of Care Reviewed With: patient, child          Outcome Evaluation: [facility] to [facility].

## 2025-02-07 NOTE — PLAN OF CARE
Problem: Adult Inpatient Plan of Care  Goal: Optimal Comfort and Wellbeing  Outcome: Progressing  Intervention: Monitor Pain and Promote Comfort  Recent Flowsheet Documentation  Taken 2/7/2025 1436 by Zollinger, Nancy K, RN  Pain Management Interventions: medication (see MAR)   Goal Outcome Evaluation:  Patient complaining of not having an appetite.  Refused breakfast.  Only had a couple bites for lunch.  Updated Dr. Brewer asking if a dietitian consult could be ordered.  Wound vac being applied by WOC.  IV dilaudid given prior to dressing change.

## 2025-02-07 NOTE — PHARMACY-VANCOMYCIN DOSING SERVICE
Pharmacy Vancomycin Note  Date of Service 2025  Patient's  1945   80 year old, male    Indication: Sepsis and Skin and Soft Tissue Infection  Day of Therapy: 4  Current vancomycin regimen:  1250 mg IV q24h  Current vancomycin monitoring method: AUC  Current vancomycin therapeutic monitoring goal: 400-600 mg*h/L    Insight[employer] Prediction of Current Vancomycin Regimen  Regimen: 1250 mg IV every 24 hours.  Start time: 10:18 on 2025  Exposure target: AUC24 (range)400-600 mg/L.hr   AUC24,ss: 634 mg/L.hr  Probability of AUC24 > 400: 100 %  Ctrough,ss: 20.4 mg/L  Probability of Ctrough,ss > 20: 54 %  Probability of nephrotoxicity (Lodise DOT ): 18 %      Current estimated CrCl = Estimated Creatinine Clearance: 48.9 mL/min (A) (based on SCr of 1.34 mg/dL (H)).    Creatinine for last 3 days  2025: 10:04 AM Creatinine 1.35 mg/dL  2025:  7:40 AM Creatinine 1.32 mg/dL  2025:  7:16 AM Creatinine 1.47 mg/dL  2025:  7:31 AM Creatinine 1.34 mg/dL    Recent Vancomycin Levels (past 3 days)  2025:  7:31 AM Vancomycin 21.1 ug/mL    Vancomycin IV Administrations (past 72 hours)                     vancomycin (VANCOCIN) 1,250 mg in 0.9% NaCl 262.5 mL intermittent infusion (mg) 1,250 mg New Bag 25 1018     1,250 mg New Bag 25 0944    vancomycin (VANCOCIN) 2,000 mg in 0.9% NaCl 520 mL intermittent infusion (mg) 2,000 mg New Bag 25 1618                    Nephrotoxins and other renal medications (From now, onward)      Start     Dose/Rate Route Frequency Ordered Stop    25 1000  vancomycin (VANCOCIN) 1,000 mg in 200 mL dextrose intermittent infusion         1,000 mg  200 mL/hr over 1 Hours Intravenous EVERY 24 HOURS 25 0906      25 0800  empagliflozin (JARDIANCE) tablet 10 mg        Note to Pharmacy: PTA Sig:Take 1 tablet (10 mg) by mouth daily.      10 mg Oral DAILY 25 1405      25 0800  torsemide (DEMADEX) tablet 10 mg        Note to Pharmacy:  "PTA Sig:Take 1 tablet (10 mg) by mouth daily.      10 mg Oral DAILY 02/04/25 1405      02/04/25 2200  piperacillin-tazobactam (ZOSYN) 3.375 g vial to attach to  mL bag        Note to Pharmacy: For [facility], [facility] and [facility]: For Zosyn-naive patients, use the \"Zosyn initial dose + extended infusion\" order panel.    3.375 g  over 240 Minutes Intravenous EVERY 8 HOURS 02/04/25 1532                 Contrast Orders - past 72 hours (72h ago, onward)      Start     Dose/Rate Route Frequency Stop    02/04/25 1200  iopamidol (ISOVUE-370) solution 75 mL         75 mL Intravenous ONCE 02/04/25 1151            Interpretation of levels and current regimen:  Vancomycin level is reflective of AUC greater than 600    Has serum creatinine changed greater than 50% in last 72 hours: No    Urine output:  unable to determine    Renal Function: Stable    InsightRX Prediction of Planned New Vancomycin Regimen  Regimen: 1000 mg IV every 24 hours.  Start time: 10:18 on 02/07/2025  Exposure target: AUC24 (range)400-600 mg/L.hr   AUC24,ss: 515 mg/L.hr  Probability of AUC24 > 400: 96 %  Ctrough,ss: 16.5 mg/L  Probability of Ctrough,ss > 20: 17 %  Probability of nephrotoxicity (Lodise DOT 2009): 12 %      Plan:  Decrease Dose to 1000mg IV q24h  Vancomycin monitoring method: AUC  Vancomycin therapeutic monitoring goal: 400-600 mg*h/L  Pharmacy will check vancomycin levels as appropriate in 1-3 Days.  Serum creatinine levels will be ordered daily for the first week of therapy and at least twice weekly for subsequent weeks.    Niyah Teran, [facility]    "

## 2025-02-07 NOTE — PLAN OF CARE
Problem: Adult Inpatient Plan of Care  Goal: Absence of Hospital-Acquired Illness or Injury  Outcome: Progressing  Intervention: Identify and Manage Fall Risk  Recent Flowsheet Documentation  Taken 2/6/2025 1630 by Anny Lala RN  Safety Promotion/Fall Prevention:   activity supervised   clutter free environment maintained  Taken 2/6/2025 0830 by Anny Lala RN  Safety Promotion/Fall Prevention:   activity supervised   clutter free environment maintained  Intervention: Prevent Skin Injury  Recent Flowsheet Documentation  Taken 2/6/2025 1300 by Anny Lala RN  Body Position: turned  Intervention: Prevent Infection  Recent Flowsheet Documentation  Taken 2/6/2025 1630 by Anny Lala RN  Infection Prevention: rest/sleep promoted  Taken 2/6/2025 0830 by Anny Lala RN  Infection Prevention: rest/sleep promoted  Goal: Optimal Comfort and Wellbeing  Outcome: Progressing     Problem: Skin Injury Risk Increased  Goal: Skin Health and Integrity  Outcome: Progressing  Intervention: Plan: Nurse Driven Intervention: Positioning  Recent Flowsheet Documentation  Taken 2/6/2025 1630 by Anny Lala RN  Plan: Positioning Interventions: REPOSITION Left/Right (No supine) q2h  Taken 2/6/2025 0830 by Anny Lala RN  Plan: Positioning Interventions: REPOSITION Left/Right (No supine) q2h  Intervention: Plan: Nurse Driven Intervention: Moisture Management  Recent Flowsheet Documentation  Taken 2/6/2025 1300 by Anny Lala RN  Bathing/Skin Care: incontinence care  Intervention: Optimize Skin Protection  Recent Flowsheet Documentation  Taken 2/6/2025 1300 by Anny Lala RN  Head of Bed (HOB) Positioning: HOB at 20-30 degrees   Goal Outcome Evaluation:       Patient alert and oriented with some forgetfulness. Reported having moderate pain to his lower extremities. Administered PRN tramadol and dilaudid for pain management. Dressings changed to his lower  extremities. Sacral mepilex applied to sacrum. Redness has improved to perineum, desitin applied. He had 2 loose BMs this shift. Vital signs stable. Poor oral intake.

## 2025-02-07 NOTE — PROGRESS NOTES
"INFECTIOUS DISEASE FOLLOW UP NOTE    Date: 02/07/2025   CHIEF COMPLAINT:   Chief Complaint   Patient presents with    Generalized Weakness        ================================================================  SUBJECTIVE  No events    ================================================================  OBJECTIVE  /58 (BP Location: Right arm)   Pulse 82   Temp 98.5  F (36.9  C) (Oral)   Resp 16   Ht 1.727 m (5' 8\")   Wt 90.7 kg (199 lb 15.3 oz)   SpO2 94%   BMI 30.40 kg/m      Physical Exam  General: alert, cooperative, no apparent distress  HEENT: atraumatic, normocephalic, no scleral icterus, moist mucus membranes, no cervical lymphadenopathy  Heart: Normal rate, regular rhythm, no murmurs  Lungs: CTAB, good inspiratory effort  Abdomen: soft, non-tender, non-distended  Extremities: multiple heel wounds per below     RLE 2/5       Pertinent labs  CBC RESULTS:   Recent Labs   Lab Test 02/07/25  0731   WBC 4.7   RBC 3.45*   HGB 9.0*   HCT 28.1*   MCV 81   MCH 26.1*   MCHC 32.0   RDW 16.8*   PLT 66*        Imaging  2/5 MRI left foot  1.  Postoperative changes related to reported ulcer debridement with skin grafting along the dorsum of the great toe and dorsum of the proximal foot.  2.  No convincing MR evidence for left foot acute osteomyelitis, with particular attention at the great toe.  3.  No evidence for acute foot fracture or stress fracture.  4.  Scattered degenerative change including in the midfoot and at the first MTP and metatarsal sesamoid joints.  5.  Diffuse acute on chronic denervation edema throughout the intrinsic musculature of the left foot.  6.  Dorsal foot soft tissue swelling. No soft tissue abscess.     2/5 MRI right foot  1.  Broad-based soft tissue ulcer posterior heel with adjacent skin staples and MR findings compatible with adjacent posterior calcaneus osteomyelitis.  2.  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  3.  Mild peroneus longus and brevis tendinopathy. " Mild posterior tibialis tendinopathy.     10/8/24 MRI right foot  1.  Soft tissue ulcer of the heel with subcutaneous edema abutting the calcaneus compatible with cellulitis. No organized/drainable collection. There is T2 hyperintense marrow edema of the subjacent calcaneal tuberosity with mild linear T1 hypointense signal but no confluent T1 hypointense replacement. Given location subjacent to an ulcer, these findings are compatible with a high likelihood though are not definitive of osteomyelitis.  2.  Severe talonavicular joint osteoarthritis.     I have personally reviewed the relevant laboratory, imaging, and microbiology data  ================================================================  Assessment  Janice Nowak is a 80 year old with right calcaneal osteomyelitis. PMH DM2, HFpEF, diabetic foot ulcers, SABRINA, HC, hypothyroid, HTN, HLD.     He had right heel OM back in October. This was debrided and he was given 6 weeks broad-spectrum abx. He presented on 2/4 with global weakness, abdominal pain, malaise. A repeat MRI of the right foot showed evidence of OM, though it is unclear if this represents residual OM from prior infection vs new changes. Given that the graft seems to be incorporating, it is possible that this represents previously treated infection.  As a precaution, it is reasonable to trial a round of antibiotics in case there is any residual soft tissue infection contributing to his current presentation.  Long-term the foot may not be salvageable, and podiatry is following closely.     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  [facility]   Office Telephone [phone].  Fax [phone]  [facility] paging

## 2025-02-07 NOTE — PROGRESS NOTES
[facility]    Progress Note - Hospitalist Service       Date of Admission:  2/4/2025    Assessment & Plan   Janice Nowak is a 80 year old male with past medical history notable for T2DM, HFpEF, recent debridement of diabetic ulcers, SABRINA, hepatocellular carcinoma currently receiving palliative immunotherapy, hypothyroidism, HTN, HLD. Admitted on 2/4/2025 for abdominal pain (now resolved) and confusion (ongoing). Also noted to have ongoing foot wounds for which podiatry, vascular, and infectious disease are consulted.     Bilateral Lower Extremity Ulcers   Acute on Chronic Right Calcaneal Osteomyelitis   Patient with significant bilateral foot wounds. Podiatry, vascular disease, and infectious disease are involved. Patient with prior hospitalizations for this concern. S/p partial calcanectomy right heel 10/24/24 and bilateral LE I&D 1/27/25. Given evidence of PAD, also had stenting with IR on 1/16/24. Most recently discharge with wound vac and completed 6 week course of antibiotics. Plan per specialists on this admission:   - Podiatry consulted, recommending:    - s/p right calcanectomy + bilateral LE I&D   - No planned repeat surgical intervention at this time    - Continue wound vac    - Reconsider surgical intervention/amputation if no improved healing in a few weeks   - IR consulted, recommending:    - Repeat angiogram on 2/13 (on outpatient basis)    - Continue ASA + Clopidegrel   - ID consulted, recommending:    - Continue IV Vanc/Zosyn    - Transition to Doxy + Augmentin x 14 days on discharge   - SW consulted    - Likely discharge to TCU once medically stable     Generalized weakness  Encephalopathy  Patient reportedly presented with multiple days of progressive weakness, poor p.o. intake, poor sleep, myalgias.  Head imaging on admission negative for acute intracranial pathology.  No significant metabolic derangements.  Certainly infectious concerns (see above) could be  contributing.  Also wonder if known hepatobiliary cancer (see below) is contributing to patient's general deconditioning and decline.  At this time, patient clearing slightly with lactulose targeted for possible hepatic encephalopathy secondary to patient's known hepatobiliary cancer.  Will continue this.  Will also continue to explore goals of care with patient and family given known malignancy and multiple recent hospitalizations.  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 Underwent IR guided biopsy on 1/2/25 which confirmed HCC. Met with oncology on 1/10/25 who discussed recommendation for palliative immunotherapy. CT chest on recent admission showed nodules and bulky adenopathy concerning for malignancy. Patient was endorsing abdominal pain on admission, though this has resolved now. Will continue to pursue goals of care conversations in the context of known malignancy and patient's repeated request to limit hospitalizations.   - Continue GOC conversation   - No indication for inpatient oncology consult at this time      Heart failure with preserved ejection fraction, NYHA class II   Hypertension  Echo 12/11 with mild concentric LVH. EF 55-60%. Had transudative pleural effusion s/p thoracentesis 12/2024. Mild LE edema and abdominal distension present but otherwise does not appear overtly fluid overloaded.   - Hold PTA Losartan, Spironolactone, Torsemide in setting of CAROLINE   - Continue gentle mIVF   - Continue to monitor fluid status closely     Type II Diabetes.   A1c 7.9%. Admitted by hospitalist team who continued all home meds. Adequate control at this time.   - Continued PTA  Empagliflozin 10mg, Glargine 30U Qam, Metformin 1000mg BID  - Consistent carb diet  - Medium resistance sliding scale     Troponinemia   127 ?  109 on admission. Similar to baseline and downtrending. EKG similar to prior. Patient declined cardio workup on last admission when admitted for syncope. Denies current pre-syncope symptoms.      Anemia, normocytic, chronic  Baseline 10-12. Hbg 9 today. Will continue to monitor. No evidence of acute bleed aside from wounds on feet.   - Daily CBC      Hyponatremia, resolved   131 on admit. Likely due to poor PO intake. Resolved with IVF and encouragement of PO intake.   - continue mIVF: NS 50 ml/hr with ongoing caution to fluid status given CHF      CAROLINE on CKD   Creatinine 1.35, baseline 1-1.3. Likely pre-renal in the setting of poor PO intake   - continue mIVF: NS 50 ml/hr with ongoing caution to fluid status given CHF   - encourage PO intake  - Hold PTA Losartan, Spironolactone, Torsemide in setting of CAROLINE      Carotid arterial disease  U/S 11/2024 with <50% stenosis bilaterally.     Mixed hyperlipidemia  - Continued PTA Rosuvastatin 40mg     Obstructive Sleep Apnea  Does not use CPAP.     Hypothyroidism  TSH WNL.  - Continued PTA Levothyroxine 50mcg           Diet: Combination Diet Moderate Consistent Carb (60 g CHO per Meal) Diet; Low Saturated Fat Na <2400mg Diet    DVT Prophylaxis: Aspirin and Plavix. SCD contraindicated due to PVD/right leg wound VAC. Cannot use heparin products due to allergy to heparin.   Reyes Catheter: Not present  Fluids: None  Lines: PRESENT             Cardiac Monitoring: None  Code Status: No CPR- Do NOT Intubate      Clinically Significant Risk Factors               # Hypoalbuminemia: Lowest albumin = 2.3 g/dL at 2/7/2025  7:31 AM, will monitor as appropriate    # Coagulation Defect: INR = 1.45 (Ref range: 0.85 - 1.15) and/or PTT = 46 Seconds (Ref range: 22 - 38 Seconds), will monitor for bleeding  # Thrombocytopenia: Lowest platelets = 66 in  "last 2 days, will monitor for bleeding   # Hypertension: Noted on problem list           # DMII: A1C = 7.9 % (Ref range: <5.7 %) within past 6 months, PRESENT ON ADMISSION  # Obesity: Estimated body mass index is 30.4 kg/m  as calculated from the following:    Height as of this encounter: 1.727 m (5' 8\").    Weight as of this encounter: 90.7 kg (199 lb 15.3 oz)., PRESENT ON ADMISSION       # Financial/Environmental Concerns: other (see comments) (TBD as they are now transitioning into Assisted living from independent living and it will cost more)         Social Drivers of Health   Tobacco Use: Medium Risk (2/4/2025)    Patient History     Smoking Tobacco Use: Former     Smokeless Tobacco Use: Never     Passive Exposure: Never         Disposition Plan          The patient's care was discussed with the Attending Physician, Dr. Horvath .    Heather Brewer MD  Hospitalist Service  [facility]  Securely message with [employer] (more info)  Text page via [employer] Paging/Directory   ______________________________________________________________________    Interval History   Picking up patient today. Chart reviewed. Podiatry, vascular, ID all involved. Per podiatry, angiogram outpatient scheduled 2/13. Waiting a few more weeks to see if healing occurs before discussing amputation.     Patient himself is not able to tell me if he would want to pursue surgical intervention or not. Working to get in touch with son regarding this.     Abdominal pain resolved? Had multiple bowel movement overnight, is on lactulose for confusion and presumed hepatic encephalopathy. Low appetite but is able to eat a small amount.     Working to get in touch with son.     Physical Exam   Vital Signs: Temp: 98.5  F (36.9  C) Temp src: Oral BP: 123/58 Pulse: 82   Resp: 16 SpO2: 94 % O2 Device: None (Room air)    Weight: 199 lbs 15.32 oz  General: Alert and oriented x 3. Does not appear in pain or in acute distress, but frustrate by " hospital stay.   HEENT: No lymphadenopathy or thyromegaly.   Chest: Clear to auscultation bilaterally. No wheezing. On room air.   Heart: RRR. No murmurs.   Abdomen: Non-tender with palpation, mild distension but not appreciated organomegaly or fluid wave.   Extremities: Left foot distal surgical wounds with staples, surrounding erythema.  Left heel wound with staples.  Right lower leg wound with wound VAC in place, surrounding erythema.  Mild LE edema bilaterally in shins.    Medical Decision Making   Please see A&P for additional details of medical decision making.      Data   ------------------------- PAST 24 HR DATA REVIEWED -----------------------------------------------

## 2025-02-07 NOTE — CONSULTS
"Care Management Follow Up    Length of Stay (days): 3    Expected Discharge Date: 02/10/2025     Concerns to be Addressed:       Patient plan of care discussed at interdisciplinary rounds: No    Anticipated Discharge Disposition:    To TCU.        Anticipated Discharge Services: TCU for rehab   Anticipated Discharge DME:  unknown    Patient/family educated on Medicare website which has current facility and service quality ratings:  yes  Education Provided on the Discharge Plan:  yes. Son Michael called and updated about TCU facility acceptance. He states, \"He has been at [facility] before and I am glad they can take him back. It is ok that [facility] cannot take him\".  Patient/Family in Agreement with the Plan:  yes    Referrals Placed by CM/SW:  yes  Private pay costs discussed: Not applicable    Discussed  Partnership in Safe Discharge Planning  document with patient/family: No     Handoff Completed: No, handoff not indicated or clinically appropriate    Additional Information:  I called the son Michael and updated him about TCU facility acceptance. He states, \"He has been at [facility] before and I am glad they can take him back. It is ok that [facility] cannot take him. I know that he really needs a TCU\".    [facility] TCU able to accept patient today for discharge. I reached out to Heather Willard and patient is new to the residency service as of today. Per Daniella, \"At this time not ready for discharge today and only possibly tomorrow on 2/8/25.\"    [facility] is saving a bed for him for discharge tentatively planned for 2/10/25.    Next Steps: awaiting medical clearance to go to TCU. Accepted at [facility].    Ria Rosenberg RN    "

## 2025-02-07 NOTE — PLAN OF CARE
"  Problem: Adult Inpatient Plan of Care  Goal: Plan of Care Review  Description: The Plan of Care Review/Shift note should be completed every shift.  The Outcome Evaluation is a brief statement about your assessment that the patient is improving, declining, or no change.  This information will be displayed automatically on your shift  note.  Outcome: Progressing  Goal: Patient-Specific Goal (Individualized)  Description: You can add care plan individualizations to a care plan. Examples of Individualization might be:  \"Parent requests to be called daily at 9am for status\", \"I have a hard time hearing out of my right ear\", or \"Do not touch me to wake me up as it startles  me\".  Outcome: Progressing  Goal: Absence of Hospital-Acquired Illness or Injury  Outcome: Progressing  Intervention: Identify and Manage Fall Risk  Recent Flowsheet Documentation  Taken 2/7/2025 0000 by Summer Aggarwal RN  Safety Promotion/Fall Prevention:   activity supervised   clutter free environment maintained   room near nurse's station  Taken 2/6/2025 2000 by Summer Aggarwal RN  Safety Promotion/Fall Prevention:   activity supervised   clutter free environment maintained   room near nurse's station  Intervention: Prevent Skin Injury  Recent Flowsheet Documentation  Taken 2/7/2025 0000 by Summer Aggarwal RN  Body Position: turned  Taken 2/6/2025 2000 by Summer Aggarwal RN  Body Position: turned  Intervention: Prevent Infection  Recent Flowsheet Documentation  Taken 2/7/2025 0000 by Summer Aggarwal RN  Infection Prevention:   hand hygiene promoted   rest/sleep promoted  Taken 2/6/2025 2000 by Summer Aggarwal RN  Infection Prevention:   hand hygiene promoted   rest/sleep promoted  Goal: Optimal Comfort and Wellbeing  Outcome: Progressing  Intervention: Monitor Pain and Promote Comfort  Recent Flowsheet Documentation  Taken 2/7/2025 0000 by Summer Aggarwal RN  Pain Management Interventions: repositioned  Goal: Readiness for Transition of " Care  Outcome: Progressing     Problem: Delirium  Goal: Optimal Coping  Outcome: Progressing  Goal: Improved Behavioral Control  Outcome: Progressing  Intervention: Minimize Safety Risk  Recent Flowsheet Documentation  Taken 2/7/2025 0000 by Summer Aggarwal RN  Enhanced Safety Measures:   pain management   review medications for side effects with activity   room near unit station  Taken 2/6/2025 2000 by Summer Aggarwal RN  Enhanced Safety Measures:   pain management   review medications for side effects with activity   room near unit station  Goal: Improved Attention and Thought Clarity  Outcome: Progressing  Goal: Improved Sleep  Outcome: Progressing     Problem: Skin Injury Risk Increased  Goal: Skin Health and Integrity  Outcome: Progressing  Intervention: Plan: Nurse Driven Intervention: Moisture Management  Recent Flowsheet Documentation  Taken 2/7/2025 0000 by Summer Aggarwal RN  Moisture Interventions: Incontinence pad  Bathing/Skin Care:   linen changed   bath, partial  Taken 2/6/2025 2000 by Summer Aggarwal RN  Moisture Interventions: Incontinence pad  Bathing/Skin Care:   linen changed   bath, partial  Intervention: Plan: Nurse Driven Intervention: Friction and Shear  Recent Flowsheet Documentation  Taken 2/7/2025 0000 by Summer Aggarwal RN  Friction/Shear Interventions: HOB 30 degrees or less  Taken 2/6/2025 2000 by Summer Aggarwal RN  Friction/Shear Interventions: HOB 30 degrees or less  Intervention: Optimize Skin Protection  Recent Flowsheet Documentation  Taken 2/7/2025 0000 by Summer Aggarwal RN  Activity Management: bedrest  Head of Bed (HOB) Positioning: HOB at 20-30 degrees  Taken 2/6/2025 2000 by Summer Aggarwal RN  Activity Management: bedrest  Head of Bed (HOB) Positioning: HOB at 20-30 degrees     Problem: Comorbidity Management  Goal: Blood Glucose Levels Within Targeted Range  Outcome: Progressing  Intervention: Monitor and Manage Glycemia  Recent Flowsheet Documentation  Taken  2/7/2025 0000 by Summer Aggarwal RN  Medication Review/Management: medications reviewed  Taken 2/6/2025 2000 by Summer Aggarwal RN  Medication Review/Management: medications reviewed  Goal: Blood Pressure in Desired Range  Outcome: Progressing  Intervention: Maintain Blood Pressure Management  Recent Flowsheet Documentation  Taken 2/7/2025 0000 by Summer Aggarwal RN  Medication Review/Management: medications reviewed  Taken 2/6/2025 2000 by Summer Aggarwal RN  Medication Review/Management: medications reviewed     Problem: Pain Acute  Goal: Optimal Pain Control and Function  Outcome: Progressing  Intervention: Develop Pain Management Plan  Recent Flowsheet Documentation  Taken 2/7/2025 0000 by Summer Aggarwal RN  Pain Management Interventions: repositioned  Intervention: Prevent or Manage Pain  Recent Flowsheet Documentation  Taken 2/7/2025 0000 by Summer Aggarwal RN  Medication Review/Management: medications reviewed  Taken 2/6/2025 2000 by Summer Aggarwal RN  Medication Review/Management: medications reviewed     Problem: Wound  Goal: Optimal Coping  Outcome: Progressing  Goal: Optimal Functional Ability  Outcome: Progressing  Intervention: Optimize Functional Ability  Recent Flowsheet Documentation  Taken 2/7/2025 0000 by Summer Aggarwal RN  Activity Management: bedrest  Activity Assistance Provided: assistance, 2 people  Taken 2/6/2025 2000 by Summer Aggarwal RN  Activity Management: bedrest  Activity Assistance Provided: assistance, 2 people  Goal: Absence of Infection Signs and Symptoms  Outcome: Progressing  Goal: Improved Oral Intake  Outcome: Progressing  Goal: Optimal Pain Control and Function  Outcome: Progressing  Intervention: Prevent or Manage Pain  Recent Flowsheet Documentation  Taken 2/7/2025 0000 by Summer gAgarwal RN  Pain Management Interventions: repositioned  Goal: Skin Health and Integrity  Outcome: Progressing  Intervention: Optimize Skin Protection  Recent Flowsheet  Documentation  Taken 2/7/2025 0000 by Summer Aggarwal, RN  Activity Management: bedrest  Head of Bed (HOB) Positioning: HOB at 20-30 degrees  Taken 2/6/2025 2000 by Summer Aggarwal, RN  Activity Management: bedrest  Head of Bed (HOB) Positioning: HOB at 20-30 degrees  Goal: Optimal Wound Healing  Outcome: Progressing   Goal Outcome Evaluation:  Pt is alert and oriented, but forgetful.  Pt does not use call light.  Pt uses WC at home, and he has been increasingly more weak.  Pt was in bed entire shift and declined repositioning.  Pt was willing to weigh shift with each brief change.  Pt has been incontinent of bladder and bowel.  Pt was given Lactulose and has had 4 loose stools since 1900 yesterday.  Pt's perineum is denuded from incontinence, and [place] has been applied to site.  Pt was last incontinent of bladder at 2315 last evening.  Pt was bladder scanned for 600, and he was unable to void.  Pt was st cath for 700 ml at 0545 this morning.  Pt continues on IV antbiotics and Lactulose.  Pt lived alone in independent living, so discharge plan is unclear at this time.

## 2025-02-08 ENCOUNTER — APPOINTMENT (OUTPATIENT)
Dept: INTERVENTIONAL RADIOLOGY/VASCULAR | Facility: HOSPITAL | Age: 80
End: 2025-02-08
Attending: RADIOLOGY
Payer: COMMERCIAL

## 2025-02-08 LAB
ACT BLD: 259 SECONDS (ref 74–150)
ACT BLD: 267 SECONDS (ref 74–150)
ACT BLD: 284 SECONDS (ref 74–150)
ACT BLD: 284 SECONDS (ref 74–150)
ACT BLD: 308 SECONDS (ref 74–150)
ACT BLD: 316 SECONDS (ref 74–150)
ACT BLD: 320 SECONDS (ref 74–150)
ACT BLD: 320 SECONDS (ref 74–150)
ACT BLD: 324 SECONDS (ref 74–150)
ACT BLD: 389 SECONDS (ref 74–150)
ANION GAP SERPL CALCULATED.3IONS-SCNC: 8 MMOL/L (ref 7–15)
APTT PPP: 115 SECONDS (ref 22–38)
APTT PPP: 82 SECONDS (ref 22–38)
APTT PPP: 83 SECONDS (ref 22–38)
APTT PPP: 91 SECONDS (ref 22–38)
APTT PPP: 96 SECONDS (ref 22–38)
BUN SERPL-MCNC: 32.9 MG/DL (ref 8–23)
CALCIUM SERPL-MCNC: 8.2 MG/DL (ref 8.8–10.4)
CHLORIDE SERPL-SCNC: 107 MMOL/L (ref 98–107)
CREAT SERPL-MCNC: 1.24 MG/DL (ref 0.67–1.17)
CREAT SERPL-MCNC: 1.24 MG/DL (ref 0.67–1.17)
EGFRCR SERPLBLD CKD-EPI 2021: 59 ML/MIN/1.73M2
EGFRCR SERPLBLD CKD-EPI 2021: 59 ML/MIN/1.73M2
ERYTHROCYTE [DISTWIDTH] IN BLOOD BY AUTOMATED COUNT: 16.9 % (ref 10–15)
GLUCOSE BLDC GLUCOMTR-MCNC: 135 MG/DL (ref 70–99)
GLUCOSE BLDC GLUCOMTR-MCNC: 158 MG/DL (ref 70–99)
GLUCOSE BLDC GLUCOMTR-MCNC: 177 MG/DL (ref 70–99)
GLUCOSE BLDC GLUCOMTR-MCNC: 198 MG/DL (ref 70–99)
GLUCOSE SERPL-MCNC: 200 MG/DL (ref 70–99)
HCO3 SERPL-SCNC: 23 MMOL/L (ref 22–29)
HCT VFR BLD AUTO: 27.6 % (ref 40–53)
HGB BLD-MCNC: 8.8 G/DL (ref 13.3–17.7)
HOLD SPECIMEN: NORMAL
HOLD SPECIMEN: NORMAL
MCH RBC QN AUTO: 25.8 PG (ref 26.5–33)
MCHC RBC AUTO-ENTMCNC: 31.9 G/DL (ref 31.5–36.5)
MCV RBC AUTO: 81 FL (ref 78–100)
PLATELET # BLD AUTO: 73 10E3/UL (ref 150–450)
POTASSIUM SERPL-SCNC: 3.7 MMOL/L (ref 3.4–5.3)
RBC # BLD AUTO: 3.41 10E6/UL (ref 4.4–5.9)
SODIUM SERPL-SCNC: 138 MMOL/L (ref 135–145)
WBC # BLD AUTO: 4.1 10E3/UL (ref 4–11)

## 2025-02-08 PROCEDURE — 272N000569 HC SHEATH CR6

## 2025-02-08 PROCEDURE — 120N000004 HC R&B MS OVERFLOW

## 2025-02-08 PROCEDURE — B41G1ZZ FLUOROSCOPY OF LEFT LOWER EXTREMITY ARTERIES USING LOW OSMOLAR CONTRAST: ICD-10-PCS | Performed by: RADIOLOGY

## 2025-02-08 PROCEDURE — 272N000302 HC DEVICE INFLATION CR5

## 2025-02-08 PROCEDURE — 37227: CPT

## 2025-02-08 PROCEDURE — 04CQ3ZZ EXTIRPATION OF MATTER FROM LEFT ANTERIOR TIBIAL ARTERY, PERCUTANEOUS APPROACH: ICD-10-PCS | Performed by: RADIOLOGY

## 2025-02-08 PROCEDURE — 80048 BASIC METABOLIC PNL TOTAL CA: CPT | Performed by: INTERNAL MEDICINE

## 2025-02-08 PROCEDURE — 37229: CPT

## 2025-02-08 PROCEDURE — 85041 AUTOMATED RBC COUNT: CPT

## 2025-02-08 PROCEDURE — C1725 CATH, TRANSLUMIN NON-LASER: HCPCS

## 2025-02-08 PROCEDURE — 272N000566 HC SHEATH CR3

## 2025-02-08 PROCEDURE — 047N341 DILATION OF LEFT POPLITEAL ARTERY WITH DRUG-ELUTING INTRALUMINAL DEVICE, USING DRUG-COATED BALLOON, PERCUTANEOUS APPROACH: ICD-10-PCS | Performed by: RADIOLOGY

## 2025-02-08 PROCEDURE — 85347 COAGULATION TIME ACTIVATED: CPT

## 2025-02-08 PROCEDURE — 04CN3ZZ EXTIRPATION OF MATTER FROM LEFT POPLITEAL ARTERY, PERCUTANEOUS APPROACH: ICD-10-PCS | Performed by: RADIOLOGY

## 2025-02-08 PROCEDURE — 36415 COLL VENOUS BLD VENIPUNCTURE: CPT | Performed by: INTERNAL MEDICINE

## 2025-02-08 PROCEDURE — 85014 HEMATOCRIT: CPT

## 2025-02-08 PROCEDURE — 250N000011 HC RX IP 250 OP 636: Performed by: FAMILY MEDICINE

## 2025-02-08 PROCEDURE — C1769 GUIDE WIRE: HCPCS

## 2025-02-08 PROCEDURE — C1724 CATH, TRANS ATHEREC,ROTATION: HCPCS

## 2025-02-08 PROCEDURE — 272N000116 HC CATH CR1

## 2025-02-08 PROCEDURE — 250N000011 HC RX IP 250 OP 636: Performed by: INTERNAL MEDICINE

## 2025-02-08 PROCEDURE — 047L341 DILATION OF LEFT FEMORAL ARTERY WITH DRUG-ELUTING INTRALUMINAL DEVICE, USING DRUG-COATED BALLOON, PERCUTANEOUS APPROACH: ICD-10-PCS | Performed by: RADIOLOGY

## 2025-02-08 PROCEDURE — 80048 BASIC METABOLIC PNL TOTAL CA: CPT

## 2025-02-08 PROCEDURE — 99233 SBSQ HOSP IP/OBS HIGH 50: CPT | Mod: GC

## 2025-02-08 PROCEDURE — C1887 CATHETER, GUIDING: HCPCS

## 2025-02-08 PROCEDURE — 04CU3ZZ EXTIRPATION OF MATTER FROM LEFT PERONEAL ARTERY, PERCUTANEOUS APPROACH: ICD-10-PCS | Performed by: RADIOLOGY

## 2025-02-08 PROCEDURE — 82565 ASSAY OF CREATININE: CPT | Performed by: INTERNAL MEDICINE

## 2025-02-08 PROCEDURE — 82435 ASSAY OF BLOOD CHLORIDE: CPT

## 2025-02-08 PROCEDURE — 255N000002 HC RX 255 OP 636: Performed by: RADIOLOGY

## 2025-02-08 PROCEDURE — 36415 COLL VENOUS BLD VENIPUNCTURE: CPT

## 2025-02-08 PROCEDURE — C1876 STENT, NON-COA/NON-COV W/DEL: HCPCS

## 2025-02-08 PROCEDURE — 75710 ARTERY X-RAYS ARM/LEG: CPT | Mod: LT

## 2025-02-08 PROCEDURE — 258N000003 HC RX IP 258 OP 636: Performed by: RADIOLOGY

## 2025-02-08 PROCEDURE — 250N000011 HC RX IP 250 OP 636: Performed by: RADIOLOGY

## 2025-02-08 PROCEDURE — 250N000013 HC RX MED GY IP 250 OP 250 PS 637

## 2025-02-08 PROCEDURE — C2623 CATH, TRANSLUMIN, DRUG-COAT: HCPCS

## 2025-02-08 PROCEDURE — 272N000570 HC SHEATH CR7

## 2025-02-08 PROCEDURE — 047N3ZZ DILATION OF LEFT POPLITEAL ARTERY, PERCUTANEOUS APPROACH: ICD-10-PCS | Performed by: RADIOLOGY

## 2025-02-08 PROCEDURE — 04CL3ZZ EXTIRPATION OF MATTER FROM LEFT FEMORAL ARTERY, PERCUTANEOUS APPROACH: ICD-10-PCS | Performed by: RADIOLOGY

## 2025-02-08 PROCEDURE — 85730 THROMBOPLASTIN TIME PARTIAL: CPT

## 2025-02-08 PROCEDURE — 76937 US GUIDE VASCULAR ACCESS: CPT

## 2025-02-08 PROCEDURE — 272N000500 HC NEEDLE CR2

## 2025-02-08 RX ORDER — THERA TABS 400 MCG
1 TAB ORAL DAILY
Status: DISCONTINUED | OUTPATIENT
Start: 2025-02-08 | End: 2025-02-13 | Stop reason: HOSPADM

## 2025-02-08 RX ORDER — ARGATROBAN 1 MG/ML
150 INJECTION, SOLUTION INTRAVENOUS ONCE
Status: COMPLETED | OUTPATIENT
Start: 2025-02-08 | End: 2025-02-08

## 2025-02-08 RX ORDER — FENTANYL CITRATE 50 UG/ML
25-50 INJECTION, SOLUTION INTRAMUSCULAR; INTRAVENOUS EVERY 5 MIN PRN
Status: DISCONTINUED | OUTPATIENT
Start: 2025-02-08 | End: 2025-02-08 | Stop reason: HOSPADM

## 2025-02-08 RX ORDER — DEXTROSE MONOHYDRATE 25 G/50ML
25-50 INJECTION, SOLUTION INTRAVENOUS
Status: DISCONTINUED | OUTPATIENT
Start: 2025-02-08 | End: 2025-02-08

## 2025-02-08 RX ORDER — NALOXONE HYDROCHLORIDE 0.4 MG/ML
0.2 INJECTION, SOLUTION INTRAMUSCULAR; INTRAVENOUS; SUBCUTANEOUS
Status: DISCONTINUED | OUTPATIENT
Start: 2025-02-08 | End: 2025-02-08 | Stop reason: HOSPADM

## 2025-02-08 RX ORDER — NICOTINE POLACRILEX 4 MG
15-30 LOZENGE BUCCAL
Status: DISCONTINUED | OUTPATIENT
Start: 2025-02-08 | End: 2025-02-08

## 2025-02-08 RX ORDER — IODIXANOL 320 MG/ML
150 INJECTION, SOLUTION INTRAVASCULAR ONCE
Status: COMPLETED | OUTPATIENT
Start: 2025-02-08 | End: 2025-02-08

## 2025-02-08 RX ORDER — MULTIVIT WITH MINERALS/LUTEIN
500 TABLET ORAL DAILY
Status: DISCONTINUED | OUTPATIENT
Start: 2025-02-08 | End: 2025-02-13 | Stop reason: HOSPADM

## 2025-02-08 RX ORDER — FLUMAZENIL 0.1 MG/ML
0.2 INJECTION, SOLUTION INTRAVENOUS
Status: DISCONTINUED | OUTPATIENT
Start: 2025-02-08 | End: 2025-02-08 | Stop reason: HOSPADM

## 2025-02-08 RX ORDER — NALOXONE HYDROCHLORIDE 0.4 MG/ML
0.4 INJECTION, SOLUTION INTRAMUSCULAR; INTRAVENOUS; SUBCUTANEOUS
Status: DISCONTINUED | OUTPATIENT
Start: 2025-02-08 | End: 2025-02-08 | Stop reason: HOSPADM

## 2025-02-08 RX ORDER — ZINC SULFATE 50(220)MG
220 CAPSULE ORAL DAILY
Status: DISCONTINUED | OUTPATIENT
Start: 2025-02-08 | End: 2025-02-13 | Stop reason: HOSPADM

## 2025-02-08 RX ORDER — ARGATROBAN 1 MG/ML
80 INJECTION, SOLUTION INTRAVENOUS EVERY 10 MIN PRN
Status: DISCONTINUED | OUTPATIENT
Start: 2025-02-08 | End: 2025-02-08 | Stop reason: HOSPADM

## 2025-02-08 RX ORDER — ONDANSETRON 2 MG/ML
4 INJECTION INTRAMUSCULAR; INTRAVENOUS
Status: DISCONTINUED | OUTPATIENT
Start: 2025-02-08 | End: 2025-02-08 | Stop reason: HOSPADM

## 2025-02-08 RX ADMIN — FENTANYL CITRATE 25 MCG: 50 INJECTION, SOLUTION INTRAMUSCULAR; INTRAVENOUS at 12:14

## 2025-02-08 RX ADMIN — LEVOTHYROXINE SODIUM 50 MCG: 0.03 TABLET ORAL at 07:47

## 2025-02-08 RX ADMIN — IODIXANOL 94 ML: 320 INJECTION, SOLUTION INTRAVASCULAR at 14:58

## 2025-02-08 RX ADMIN — MIDAZOLAM HYDROCHLORIDE 0.5 MG: 1 INJECTION, SOLUTION INTRAMUSCULAR; INTRAVENOUS at 12:00

## 2025-02-08 RX ADMIN — INSULIN ASPART 1 UNITS: 100 INJECTION, SOLUTION INTRAVENOUS; SUBCUTANEOUS at 17:09

## 2025-02-08 RX ADMIN — VANCOMYCIN HYDROCHLORIDE 1000 MG: 1 INJECTION, SOLUTION INTRAVENOUS at 09:26

## 2025-02-08 RX ADMIN — MIDAZOLAM HYDROCHLORIDE 1 MG: 1 INJECTION, SOLUTION INTRAMUSCULAR; INTRAVENOUS at 11:38

## 2025-02-08 RX ADMIN — FENTANYL CITRATE 25 MCG: 50 INJECTION, SOLUTION INTRAMUSCULAR; INTRAVENOUS at 13:36

## 2025-02-08 RX ADMIN — PIPERACILLIN AND TAZOBACTAM 3.38 G: 3; .375 INJECTION, POWDER, FOR SOLUTION INTRAVENOUS at 07:26

## 2025-02-08 RX ADMIN — EMPAGLIFLOZIN 10 MG: 10 TABLET, FILM COATED ORAL at 09:20

## 2025-02-08 RX ADMIN — MIDAZOLAM HYDROCHLORIDE 0.5 MG: 1 INJECTION, SOLUTION INTRAMUSCULAR; INTRAVENOUS at 12:26

## 2025-02-08 RX ADMIN — ASPIRIN 81 MG: 81 TABLET, COATED ORAL at 20:47

## 2025-02-08 RX ADMIN — MIDAZOLAM HYDROCHLORIDE 0.5 MG: 1 INJECTION, SOLUTION INTRAMUSCULAR; INTRAVENOUS at 12:55

## 2025-02-08 RX ADMIN — FENTANYL CITRATE 25 MCG: 50 INJECTION, SOLUTION INTRAMUSCULAR; INTRAVENOUS at 13:09

## 2025-02-08 RX ADMIN — MIDAZOLAM HYDROCHLORIDE 0.5 MG: 1 INJECTION, SOLUTION INTRAMUSCULAR; INTRAVENOUS at 13:23

## 2025-02-08 RX ADMIN — FENTANYL CITRATE 50 MCG: 50 INJECTION, SOLUTION INTRAMUSCULAR; INTRAVENOUS at 14:14

## 2025-02-08 RX ADMIN — MIDAZOLAM HYDROCHLORIDE 0.5 MG: 1 INJECTION, SOLUTION INTRAMUSCULAR; INTRAVENOUS at 14:17

## 2025-02-08 RX ADMIN — FENTANYL CITRATE 50 MCG: 50 INJECTION, SOLUTION INTRAMUSCULAR; INTRAVENOUS at 11:42

## 2025-02-08 RX ADMIN — ARGATROBAN 10 MCG/KG/MIN: 1 INJECTION INTRAVENOUS at 11:45

## 2025-02-08 RX ADMIN — ARGATROBAN 13610 MCG: 1 INJECTION, SOLUTION INTRAVENOUS at 11:45

## 2025-02-08 RX ADMIN — ARGATROBAN 10 MCG/KG/MIN: 1 INJECTION INTRAVENOUS at 12:11

## 2025-02-08 RX ADMIN — SODIUM CHLORIDE 50000 MCG: 9 INJECTION, SOLUTION INTRAVENOUS at 11:48

## 2025-02-08 RX ADMIN — FENTANYL CITRATE 25 MCG: 50 INJECTION, SOLUTION INTRAMUSCULAR; INTRAVENOUS at 12:40

## 2025-02-08 RX ADMIN — PIPERACILLIN AND TAZOBACTAM 3.38 G: 3; .375 INJECTION, POWDER, FOR SOLUTION INTRAVENOUS at 17:00

## 2025-02-08 RX ADMIN — PIPERACILLIN AND TAZOBACTAM 3.38 G: 3; .375 INJECTION, POWDER, FOR SOLUTION INTRAVENOUS at 22:05

## 2025-02-08 RX ADMIN — ROSUVASTATIN 40 MG: 40 TABLET, FILM COATED ORAL at 09:20

## 2025-02-08 RX ADMIN — MIDAZOLAM HYDROCHLORIDE 0.5 MG: 1 INJECTION, SOLUTION INTRAMUSCULAR; INTRAVENOUS at 13:55

## 2025-02-08 ASSESSMENT — ACTIVITIES OF DAILY LIVING (ADL)
ADLS_ACUITY_SCORE: 78
ADLS_ACUITY_SCORE: 76
ADLS_ACUITY_SCORE: 78
ADLS_ACUITY_SCORE: 76
ADLS_ACUITY_SCORE: 76
ADLS_ACUITY_SCORE: 78
ADLS_ACUITY_SCORE: 76
ADLS_ACUITY_SCORE: 78
ADLS_ACUITY_SCORE: 76
ADLS_ACUITY_SCORE: 78
ADLS_ACUITY_SCORE: 78
ADLS_ACUITY_SCORE: 76
ADLS_ACUITY_SCORE: 76
ADLS_ACUITY_SCORE: 78

## 2025-02-08 NOTE — PLAN OF CARE
Problem: Pain Acute  Goal: Optimal Pain Control and Function  Outcome: Progressing  Intervention: Prevent or Manage Pain  Recent Flowsheet Documentation  Taken 2/8/2025 0158 by Lynnette Ovalle RN  Medication Review/Management: medications reviewed   Goal Outcome Evaluation: Denies pain    Problem: Adult Inpatient Plan of Care  Goal: Absence of Hospital-Acquired Illness or Injury  Intervention: Identify and Manage Fall Risk  Recent Flowsheet Documentation  Taken 2/8/2025 0158 by Lynnette Ovalle RN  Safety Promotion/Fall Prevention:   assistive device/personal items within reach   room door open   room near nurse's station   safety round/check completed  No fall overnight. A&O x4. Set off bed alarm x1 sitting on side of bed wanting void. Assistance provided with urinal.     0200 blood sugar=198. NPO since midnight for angiogram today.

## 2025-02-08 NOTE — PRE-PROCEDURE
GENERAL PRE-PROCEDURE:   Procedure:  LLE angiogram  Date/Time:  2/8/2025 11:04 AM    Written consent obtained?: Yes    Risks and benefits: Risks, benefits and alternatives were discussed    Consent given by:  Patient  Patient states understanding of procedure being performed: Yes    Patient's understanding of procedure matches consent: Yes    Procedure consent matches procedure scheduled: Yes    Expected level of sedation:  Moderate  Appropriately NPO:  Yes  ASA Class:  4  Mallampati  :  Grade 3- soft palate visible, posterior pharyngeal wall not visible  Lungs:  Lungs clear with good breath sounds bilaterally  Heart:  Normal heart sounds and rate  History & Physical reviewed:  History and physical reviewed and no updates needed  Statement of review:  I have reviewed the lab findings, diagnostic data, medications, and the plan for sedation

## 2025-02-08 NOTE — PROGRESS NOTES
"Patient underwent LLE angiogram today. High grade stenosis was identified. Now s/p crossing/orbital atherectomy/angioplasty of the ALEX/DP, Angioplasty/DCB of the Popliteal lesion, FELIPE placement in the proximal SFA lesion.     Per IR: \"Sheath is in at site and ACT's are being run until less than 170 to pull\". Patient brought to ICU in post op period per request of IR due to high ACTs.     Asked by ICU RN to evaluate patient due to bleeding though bandage. Bandage switched out, when checked 20 minutes later bandage was dry and in tact.     Per RN, ICU protocol is to check PTT as opposed to ACT. Will reach out to IR RN coordinator asking how frequently they would like PTT checked and at what PTT sheath can be pulled.     Heather Brewer MD PGY-3  [facility]      Addendum  5:54 PM   Discussed with IR nurse, they are not sure how protocol should be adjusted if PTTs are being used. For the time being, I will order PTT q1hour. RN has paged out to IR to ask about definitive pan in the post angiogram period.     Heather Brewer MD PGY-3  [facility]      Addendum   7:51 PM   Discussed case with Dr Jc. Can continue to check PTT once per hour and pull sheath once PTT is under 38 (or defer to standard ICU protocol for alternative value).     Heather Brewer MD PGY-3  [facility]      "

## 2025-02-08 NOTE — IR NOTE
Interventional Radiology Intra-procedural Nursing Note  Patient Name: Janice Nowak  Medical Record Number: [id number]  Today's Date: February 8, 2025     Post procedure brought pt to ICU for monitoring of ACT  and sheath management. Bedside report given to Venus SAMSON, reviewed sheath site and left foot wound which is actively bleeding. Monitored via transfer, vss. Family in waiting room.

## 2025-02-08 NOTE — SEDATION DOCUMENTATION
Interventional Radiology Intra-procedural Nursing Note  Patient Name: Janice Nowak  Medical Record Number: [id number]  Today's Date: February 8, 2025    Procedure: left leg angio  Start time: 1140  End time: 1420  Sedation time: 160 minutes    Administered medication totals:  Versed 4 mg IVP  Fentanyl 200 mcg IVP     Procedure well tolerated by patient without complications. Procedure end with debrief by Dr. Jc.  Report given to       Note: Patient entered Interventional Radiology Suite number 1 via cart. Patient awake, alert and orientated. Assisted onto procedural table in supine position. Prepped and draped.  Dr. Jc in room. Time out and procedure started. Vital signs stable. Telemetry reading NSR.

## 2025-02-08 NOTE — PROGRESS NOTES
[facility]    Progress Note - Hospitalist Service       Date of Admission:  2/4/2025    Assessment & Plan   Janice Nowak is a 80 year old male with past medical history notable for T2DM, HFpEF, recent debridement of diabetic ulcers, SABRINA, hepatocellular carcinoma currently receiving palliative immunotherapy, hypothyroidism, HTN, HLD. Admitted on 2/4/2025 for abdominal pain (now resolved) and confusion (improving). Also noted to have ongoing foot wounds for which podiatry, vascular, and infectious disease are consulted.     Bilateral Lower Extremity Ulcers   Acute on Chronic Right Calcaneal Osteomyelitis   Patient with significant bilateral foot wounds. Podiatry, vascular disease, and infectious disease are involved. Patient with prior hospitalizations for this concern. S/p partial calcanectomy right heel 10/24/24 and bilateral LE I&D 1/27/25. Given evidence of PAD, also had stenting with IR on 1/16/24. Most recently discharged with wound vac and completed 6 week course of antibiotics. Plan per specialists on this admission:   - Podiatry consulted, recommending:    - s/p right calcanectomy + bilateral LE I&D   - No planned repeat surgical intervention at this time    - Continue wound vac    - Reconsider surgical intervention/amputation if no improved healing in a few weeks   - IR consulted, recommending:    - Repeat angiogram today, results and updated recs pending    - Continue ASA + Clopidegrel   - ID consulted, recommending:    - Continue IV Vanc/Zosyn    - Transition to Doxy + Augmentin x 14 days on discharge   - SW consulted    - Likely discharge to TCU once medically stable     Generalized weakness  Encephalopathy  Patient reportedly presented with multiple days of progressive weakness, poor p.o. intake, poor sleep, myalgias.  Head imaging on admission negative for acute intracranial pathology.  No significant metabolic derangements.  Certainly infectious concerns (see above)  could be contributing.  Also wonder if known hepatobiliary cancer (see below) is contributing to patient's general deconditioning and decline. Dilaudid held with significant improvement to mentation today. Lower suspicion now for hepatic encephalopathy, but will continue to monitor.   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 Underwent IR guided biopsy on 1/2/25 which confirmed HCC. Met with oncology on 1/10/25 who discussed recommendation for palliative immunotherapy. CT chest on recent admission showed nodules and bulky adenopathy concerning for malignancy. Patient was endorsing abdominal pain on admission, though this has resolved now. Will continue to pursue goals of care conversations in the context of known malignancy and patient's repeated request to limit hospitalizations.   - Continue GOC conversation   - No indication for inpatient oncology consult at this time      Heart failure with preserved ejection fraction, NYHA class II   Hypertension  Echo 12/11 with mild concentric LVH. EF 55-60%. Had transudative pleural effusion s/p thoracentesis 12/2024. Mild LE edema and abdominal distension present but otherwise does not appear overtly fluid overloaded.   - Hold PTA Losartan, Spironolactone, Torsemide in setting of CAROLINE   - Continue gentle mIVF   - Continue to monitor fluid status closely     Type II Diabetes.   A1c 7.9%. Admitted by hospitalist team who continued all home meds. Adequate control at this time.   - Continued PTA Empagliflozin 10mg, Glargine 30U Qam, Metformin 1000mg BID  - Consistent carb  diet  - Medium resistance sliding scale     Troponinemia   127 ?  109 on admission. Similar to baseline and downtrending. EKG similar to prior. Patient declined cardio workup on last admission when admitted for syncope. Denies current pre-syncope symptoms.      Anemia, normocytic  Baseline 10-12. Hbg  8.8 today. Will continue to monitor. No evidence of acute bleed aside from wounds on feet.   - Daily CBC      Hyponatremia, resolved   131 on admit. Likely due to poor PO intake. Resolved with IVF and encouragement of PO intake.   - continue mIVF: NS 50 ml/hr with ongoing caution to fluid status given CHF      CAROLINE on CKD   Creatinine 1.24, baseline closer to 1. Likely pre-renal in the setting of poor PO intake   - continue mIVF: NS 50 ml/hr with ongoing caution to fluid status given CHF   - encourage PO intake  - Hold PTA Losartan, Spironolactone, Torsemide in setting of CAROLINE      Carotid arterial disease  U/S 11/2024 with <50% stenosis bilaterally.     Mixed hyperlipidemia  - Continued PTA Rosuvastatin 40mg     Obstructive Sleep Apnea  Does not use CPAP.     Hypothyroidism  TSH WNL.  - Continued PTA Levothyroxine 50mcg           Diet: NPO for Medical/Clinical Reasons Except for: Meds    DVT Prophylaxis: Aspirin and Plavix. SCD contraindicated due to PVD/right leg wound VAC. Cannot use heparin products due to allergy to heparin.   Reyes Catheter: Not present  Fluids: None  Lines: PRESENT             Cardiac Monitoring: None  Code Status: No CPR- Do NOT Intubate      Clinically Significant Risk Factors               # Hypoalbuminemia: Lowest albumin = 2.3 g/dL at 2/7/2025  7:31 AM, will monitor as appropriate     # Thrombocytopenia: Lowest platelets = 66 in last 2 days, will monitor for bleeding   # Hypertension: Noted on problem list           # DMII: A1C = 7.9 % (Ref range: <5.7 %) within past 6 months, PRESENT ON ADMISSION  # Obesity: Estimated body mass index is 30.4 kg/m  as calculated from the following:    Height  "as of this encounter: 1.727 m (5' 8\").    Weight as of this encounter: 90.7 kg (199 lb 15.3 oz)., PRESENT ON ADMISSION       # Financial/Environmental Concerns: other (see comments) (TBD as they are now transitioning into Assisted living from independent living and it will cost more)         Social Drivers of Health   Tobacco Use: Medium Risk (2/4/2025)    Patient History     Smoking Tobacco Use: Former     Smokeless Tobacco Use: Never     Passive Exposure: Never         Disposition Plan          The patient's care was discussed with the Attending Physician, Dr. Montano .    Heather Brewer MD  Hospitalist Service  [facility] [place]  Securely message with [email or web address] (more info)  Text page via [employer] Paging/Directory   ______________________________________________________________________    Interval History   Overnight events reviewed. Mentation improved with holding dilaudid. Planning for angio today, will await recs.     Physical Exam   Vital Signs: Temp: 98.5  F (36.9  C) Temp src: Oral BP: 131/68 Pulse: 87   Resp: 18 SpO2: 94 % O2 Device: None (Room air)    Weight: 199 lbs 15.32 oz  General: Alert and oriented x 3. Does not appear in pain or in acute distress, but frustrate by hospital stay.   HEENT: No lymphadenopathy or thyromegaly.   Chest: Clear to auscultation bilaterally. No wheezing. On room air.   Heart: RRR. No murmurs.   Abdomen: Non-tender with palpation, mild distension but not appreciated organomegaly or fluid wave.   Extremities: Left foot distal surgical wounds with staples, surrounding erythema.  Left heel wound with staples.  Right lower leg wound with wound VAC in place, surrounding erythema.  Mild LE edema bilaterally in shins.    Medical Decision Making   Please see A&P for additional details of medical decision making.      Data   ------------------------- PAST 24 HR DATA REVIEWED -----------------------------------------------  "

## 2025-02-08 NOTE — CONSULTS
CLINICAL NUTRITION SERVICES - ASSESSMENT NOTE    RECOMMENDATIONS FOR MDs/PROVIDERS TO ORDER:      Malnutrition Status:    Severe in acute illness/injury    Registered Dietitian Interventions:  Expedite  Multivitamin, vitamin C, zinc sulfate x 10 days  Ensure Enlive TID meals    Future/Additional Recommendations:  Monitor diet advancement, intake, weight, labs     REASON FOR ASSESSMENT  Positive admission nutrition risk screen and Provider order - patient with known malignancy, poor appetite, weight loss    HPI: 80 year old male with past medical history notable for T2DM, HFpEF, recent debridement of diabetic ulcers, SABRINA, hepatocellular carcinoma currently receiving palliative immunotherapy, hypothyroidism, HTN, HLD. Admitted on 2/4/2025 for abdominal pain (now resolved) and confusion (ongoing). Also noted to have ongoing foot wounds for which podiatry, vascular, and infectious disease are consulted.      SUBJECTIVE INFORMATION  Assessed patient in room.    NUTRITION HISTORY  Poor appetite and intake now and for months PTA, does not have a taste for food. Lives alone and prepares his own meals although has not really been preparing anything. Drinks Ensure for his meals, has a bag he brought in with several in it. Does not have any specific eating habits and does not follow any special diet. Some difficulties swallowing. Encouraged protein for wound healing, reviewed high protein food sources. Does not really eat meat or eggs very often, sometimes will drink milk. Does agree to Ensure with meals, and Expedite for wound healing-once diet advanced. Feels he has lost weight. Asked if he has ever received Meals-on-wheels or if he would be interested in it, has not received and unsure if interested.     CURRENT NUTRITION ORDERS  Diet: NPO, was on a moderate consistent carb low saturated fat Na < 2400 mg diet    CURRENT INTAKE/TOLERANCE  0% of meals per flowsheets  Per nursing notes refused breakfast and dinner yesterday,  "ate bites for lunch    LABS  Nutrition-relevant labs:  urea nitrogen 32.9 (H), creatinine 1.24 (H), glucose 200    MEDICATIONS  Nutrition-relevant medications:  NaCl 50 mL/hr, lactulose, synthroid, insulin    ANTHROPOMETRICS  Height: 172.7 cm (5' 8\")  Most Recent Weight: 90.7 kg (199 lb 15.3 oz)  IBW: 70 kg  % IBW: 130%  BMI (kg/m ): Obesity Class I BMI 30-34.9  Weight History:   Wt Readings from Last 20 Encounters:   02/07/25 90.7 kg (199 lb 15.3 oz)   01/27/25 94.9 kg (209 lb 3.5 oz)   01/16/25 86.2 kg (190 lb)   01/10/25 86.2 kg (190 lb 0.6 oz)   01/08/25 86.2 kg (190 lb)   01/02/25 93.3 kg (205 lb 11 oz)   12/20/24 93.4 kg (206 lb)   12/15/24 93.5 kg (206 lb 3.2 oz)   11/19/24 99.5 kg (219 lb 6.4 oz)   11/13/24 98.9 kg (218 lb)   11/11/24 98.9 kg (218 lb)   11/06/24 99.1 kg (218 lb 6.4 oz)   11/04/24 101.7 kg (224 lb 1.6 oz)   10/24/24 101.4 kg (223 lb 8 oz)   10/07/24 102.3 kg (225 lb 8 oz)   Weight loss of 20# (9%) x 2+ months    Dosing Weight: 75.2 kg, based on adjusted wt    ASSESSED NUTRITION NEEDS  Estimated Energy Needs: 0937-3109 kcals/day (25 - 30 kcals/kg)  Justification: Maintenance  Estimated Protein Needs: 90+ grams protein/day (1.2+ grams of pro/kg)  Justification:  elevated creatinine and Wound healing  Estimated Fluid Needs: 9101-9387 mL/day (1 mL/kcal)  Justification: Maintenance    SYSTEM FINDINGS    Skin/wounds: multiple leg wounds-followed by WOC  GI symptoms: BM 2/7    MALNUTRITION  % Intake: </=75% for >/= 1 month (severe)  % Weight Loss: > 7.5% in 3 months (severe)   Subcutaneous Fat Loss: Triceps: Mild  Muscle Loss: Wasting of the temples (temporalis muscle): Mild, Clavicles (pectoralis and deltoids): Mild, and Interosseous muscles: Mild  Fluid Accumulation/Edema: Mild, 1+  Malnutrition Diagnosis: Severe malnutrition in the context of acute illness or injury  Malnutrition Present on Admission: Unable to assess    NUTRITION DIAGNOSIS  Malnutrition (undernutrition) related to poor appetite " as evidenced by weight loss of 9% x 2+ months, po intake </=75% for >/= 1 month, loss of muscle.     Increased nutrient (protein) needs related to increased demand as evidenced by the presence of wounds.     INTERVENTIONS  Encouraged protein for wound healing  Medical food supplement therapy  Multivitamin, vitamin C, zinc sulfate x 10 days and Expedite for wound healing    Goals  Advance diet to solids  Patient to consume % of nutritionally adequate meal trays TID, or the equivalent with supplements/snacks.  Wound healing per WOC documentation     Monitoring/Evaluation  Progress toward goals will be monitored and evaluated per policy.

## 2025-02-08 NOTE — SEDATION DOCUMENTATION
While using aseptic technique a syringe was used to draw 20 mL of ViperSlide lubricant and injected into 1000 mL of saline. The lubricant and saline solution was thoroughly mixed by inverting the saline bag three to five times. Patient has no allergies to any of the lubricant components (Soy, Egg, Glycerin, Sodium Hydroxide). ViperSlide Lubricant was then used in this case to reduce the friction between the flexible drive shaft of the Atherectomy Device and the Guide Wire.

## 2025-02-08 NOTE — PLAN OF CARE
"  Problem: Adult Inpatient Plan of Care  Goal: Plan of Care Review  Description: The Plan of Care Review/Shift note should be completed every shift.  The Outcome Evaluation is a brief statement about your assessment that the patient is improving, declining, or no change.  This information will be displayed automatically on your shift  note.  Outcome: Progressing  Flowsheets (Taken 2/8/2025 8099)  Plan of Care Reviewed With:   patient   family  Goal: Patient-Specific Goal (Individualized)  Description: You can add care plan individualizations to a care plan. Examples of Individualization might be:  \"Parent requests to be called daily at 9am for status\", \"I have a hard time hearing out of my right ear\", or \"Do not touch me to wake me up as it startles  me\".  Outcome: Progressing  Goal: Absence of Hospital-Acquired Illness or Injury  Outcome: Progressing  Goal: Optimal Comfort and Wellbeing  Outcome: Progressing  Goal: Readiness for Transition of Care  Outcome: Progressing     Problem: Delirium  Goal: Optimal Coping  Outcome: Progressing  Goal: Improved Behavioral Control  Outcome: Progressing  Goal: Improved Attention and Thought Clarity  Outcome: Progressing  Goal: Improved Sleep  Outcome: Progressing     Problem: Skin Injury Risk Increased  Goal: Skin Health and Integrity  Outcome: Progressing  Intervention: Plan: Nurse Driven Intervention: Moisture Management  Recent Flowsheet Documentation  Taken 2/8/2025 0727 by Marshall Magaña, RN  Moisture Interventions:   No brief in bed   Incontinence pad  Bathing/Skin Care: patient refused     Problem: Comorbidity Management  Goal: Blood Glucose Levels Within Targeted Range  Outcome: Progressing  Goal: Blood Pressure in Desired Range  Outcome: Progressing     Problem: Pain Acute  Goal: Optimal Pain Control and Function  Outcome: Progressing     Problem: Wound  Goal: Improved Oral Intake  Outcome: Progressing  Goal: Optimal Pain Control and Function  Outcome: Progressing  Goal: " Skin Health and Integrity  Outcome: Progressing  Goal: Optimal Wound Healing  Outcome: Progressing   Goal Outcome Evaluation:      Plan of Care Reviewed With: patient, family

## 2025-02-08 NOTE — SEDATION DOCUMENTATION
Act results 320, Dr. Fisher aware, per his verbal request will continuing the infusion at 10mcg/kg/min.

## 2025-02-08 NOTE — PROGRESS NOTES
Phone call from IR.  They want pt NPO at midnight.  Dr. Menon will be doing procedure, time TBD.  IR requests that pulses be assessed, groin sites prepped and pt placed on hovermat.

## 2025-02-08 NOTE — IR NOTE
Pt was brought out of IR procedure room and brought to IR recovery, family is present at bedside. Dr. Jc came and discussed with family plan of care. Sheath is in at site and ACT's are being run until less than 170 to pull.

## 2025-02-08 NOTE — SEDATION DOCUMENTATION
ACT results 389, Dr. Jc aware, argatroban infusion put on hold for 15 minutes. Will redraw an act prior to restarting.

## 2025-02-08 NOTE — PROCEDURES
[facility]    Procedure: IR Procedure Note    Date/Time: 2/8/2025 2:32 PM    Performed by: Silvestre Jc MD  Authorized by: Silvestre Jc MD  IR Fellow Physician:    Pre Procedure Diagnosis: CLTI  Post Procedure Diagnosis: CLTI    UNIVERSAL PROTOCOL   Site Marked: NA  Prior Images Obtained and Reviewed:  Yes  Required items: Required blood products, implants, devices and special equipment available    Patient identity confirmed:  Arm band, provided demographic data, hospital-assigned identification number and verbally with patient  Patient was reevaluated immediately before administering moderate or deep sedation or anesthesia  Confirmation Checklist:  Correct equipment/implants were available, procedure was appropriate and matched the consent or emergent situation, relevant allergies and patient's identity using two indicators  Time out: Immediately prior to the procedure a time out was called    Universal Protocol: the Joint Commission Universal Protocol was followed    Preparation: Patient was prepped and draped in usual sterile fashion       ANESTHESIA    Anesthesia:  Local infiltration  Local Anesthetic:  Lidocaine 1% without epinephrine      SEDATION    Patient Sedated: No    See dictated procedure note for full details.  Findings: LLE angiogram demonstrates high grade stenosis of the proximal SFA, moderate stenosis of the above knee pop, occluded ALEX/distal peroneal with one vessel runoff via the PTA. Successful crossing/orbital atherectomy/angioplasty of the ALEX/DP. Angioplasty/DCB of the Popliteal lesion. FELIPE placement in the proximal SFA lesion.    Two vessel runoff post with palpable DP and PT pulses. Marked improvement in flow to the foot on post intervention angiogram.    4 hours bedrest post sheath removal/Manual pressure. OK to eat post. Will plan for follow up with TCPO2s     Specimens: none    Procedural Complications: None    Condition:  Stable      PROCEDURE    Patient Tolerance:  Patient tolerated the procedure well with no immediate complications  Length of time physician/provider present for 1:1 monitoring during sedation:  157-171 min

## 2025-02-08 NOTE — PLAN OF CARE
Problem: Adult Inpatient Plan of Care  Goal: Plan of Care Review  Description: The Plan of Care Review/Shift note should be completed every shift.  The Outcome Evaluation is a brief statement about your assessment that the patient is improving, declining, or no change.  This information will be displayed automatically on your shift  note.  Outcome: Progressing  Goal: Optimal Comfort and Wellbeing  Outcome: Progressing  Intervention: Monitor Pain and Promote Comfort  Recent Flowsheet Documentation  Taken 2/7/2025 1750 by Xuan Tariq RN  Pain Management Interventions:   quiet environment facilitated   pillow support provided  Goal: Readiness for Transition of Care  Outcome: Progressing  Flowsheets (Taken 2/7/2025 1839)  Transportation Anticipated: family or friend will provide  Intervention: Mutually Develop Transition Plan  Recent Flowsheet Documentation  Taken 2/7/2025 1839 by Xuan Tariq RN  Transportation Anticipated: family or friend will provide  Patient/Family Anticipated Services at Transition: (home or tcu) other (see comments)  Patient/Family Anticipates Transition to: home with help/services  Equipment Currently Used at Home:   walker, rolling   wheelchair, manual     Problem: Delirium  Goal: Optimal Coping  Outcome: Progressing  Goal: Improved Attention and Thought Clarity  Outcome: Progressing     Problem: Pain Acute  Goal: Optimal Pain Control and Function  Outcome: Progressing  Intervention: Develop Pain Management Plan  Recent Flowsheet Documentation  Taken 2/7/2025 1750 by Xuan Tariq RN  Pain Management Interventions:   quiet environment facilitated   pillow support provided     Problem: Wound  Goal: Improved Oral Intake  Outcome: Progressing  Goal: Optimal Pain Control and Function  Outcome: Progressing  Intervention: Prevent or Manage Pain  Recent Flowsheet Documentation  Taken 2/7/2025 1750 by Xuan Tariq RN  Pain Management Interventions:   quiet environment facilitated   pillow support provided      Goal Outcome Evaluation:       Pt alert and oriented X4. Pt is able to make needs known. Pt reported having pain. Scheduled meds and PRN Tramadol 50 mg given. Pt reports effective. Pt refused dinner. NPO at midnight for angiogram tomorrow. IV Zosyn infusing. Continuous 0.9 Sodium Chloride at 50 ml/hr. Pt is assist of two, bedpan. Pt voiding and medium BM this shift. Metformin 1000 mg held, GFR <60, MD aware.       Xuan Tariq RN

## 2025-02-09 ENCOUNTER — APPOINTMENT (OUTPATIENT)
Dept: OCCUPATIONAL THERAPY | Facility: HOSPITAL | Age: 80
DRG: 270 | End: 2025-02-09
Attending: RADIOLOGY
Payer: COMMERCIAL

## 2025-02-09 ENCOUNTER — APPOINTMENT (OUTPATIENT)
Dept: PHYSICAL THERAPY | Facility: HOSPITAL | Age: 80
DRG: 270 | End: 2025-02-09
Attending: RADIOLOGY
Payer: COMMERCIAL

## 2025-02-09 LAB
ANION GAP SERPL CALCULATED.3IONS-SCNC: 12 MMOL/L (ref 7–15)
APTT PPP: 47 SECONDS (ref 22–38)
APTT PPP: 60 SECONDS (ref 22–38)
APTT PPP: 65 SECONDS (ref 22–38)
APTT PPP: 70 SECONDS (ref 22–38)
APTT PPP: 75 SECONDS (ref 22–38)
BUN SERPL-MCNC: 27.7 MG/DL (ref 8–23)
CALCIUM SERPL-MCNC: 7.8 MG/DL (ref 8.8–10.4)
CHLORIDE SERPL-SCNC: 108 MMOL/L (ref 98–107)
CREAT SERPL-MCNC: 1.13 MG/DL (ref 0.67–1.17)
EGFRCR SERPLBLD CKD-EPI 2021: 66 ML/MIN/1.73M2
ERYTHROCYTE [DISTWIDTH] IN BLOOD BY AUTOMATED COUNT: 17 % (ref 10–15)
GLUCOSE BLDC GLUCOMTR-MCNC: 109 MG/DL (ref 70–99)
GLUCOSE BLDC GLUCOMTR-MCNC: 127 MG/DL (ref 70–99)
GLUCOSE BLDC GLUCOMTR-MCNC: 130 MG/DL (ref 70–99)
GLUCOSE BLDC GLUCOMTR-MCNC: 149 MG/DL (ref 70–99)
GLUCOSE BLDC GLUCOMTR-MCNC: 192 MG/DL (ref 70–99)
GLUCOSE SERPL-MCNC: 120 MG/DL (ref 70–99)
HCO3 SERPL-SCNC: 18 MMOL/L (ref 22–29)
HCT VFR BLD AUTO: 23.7 % (ref 40–53)
HGB BLD-MCNC: 7.7 G/DL (ref 13.3–17.7)
MCH RBC QN AUTO: 25.9 PG (ref 26.5–33)
MCHC RBC AUTO-ENTMCNC: 32.5 G/DL (ref 31.5–36.5)
MCV RBC AUTO: 80 FL (ref 78–100)
PLATELET # BLD AUTO: 79 10E3/UL (ref 150–450)
POTASSIUM SERPL-SCNC: 3.6 MMOL/L (ref 3.4–5.3)
RBC # BLD AUTO: 2.97 10E6/UL (ref 4.4–5.9)
SODIUM SERPL-SCNC: 138 MMOL/L (ref 135–145)
WBC # BLD AUTO: 4.7 10E3/UL (ref 4–11)

## 2025-02-09 PROCEDURE — 97535 SELF CARE MNGMENT TRAINING: CPT | Mod: GO

## 2025-02-09 PROCEDURE — 97530 THERAPEUTIC ACTIVITIES: CPT | Mod: GP

## 2025-02-09 PROCEDURE — 36415 COLL VENOUS BLD VENIPUNCTURE: CPT

## 2025-02-09 PROCEDURE — 85730 THROMBOPLASTIN TIME PARTIAL: CPT

## 2025-02-09 PROCEDURE — 250N000013 HC RX MED GY IP 250 OP 250 PS 637

## 2025-02-09 PROCEDURE — 99233 SBSQ HOSP IP/OBS HIGH 50: CPT | Mod: GC

## 2025-02-09 PROCEDURE — 82310 ASSAY OF CALCIUM: CPT

## 2025-02-09 PROCEDURE — 85014 HEMATOCRIT: CPT

## 2025-02-09 PROCEDURE — 120N000001 HC R&B MED SURG/OB

## 2025-02-09 PROCEDURE — 80048 BASIC METABOLIC PNL TOTAL CA: CPT

## 2025-02-09 PROCEDURE — 250N000011 HC RX IP 250 OP 636: Performed by: FAMILY MEDICINE

## 2025-02-09 PROCEDURE — 250N000013 HC RX MED GY IP 250 OP 250 PS 637: Performed by: INTERNAL MEDICINE

## 2025-02-09 PROCEDURE — 258N000003 HC RX IP 258 OP 636

## 2025-02-09 PROCEDURE — 250N000011 HC RX IP 250 OP 636: Performed by: INTERNAL MEDICINE

## 2025-02-09 RX ORDER — SODIUM CHLORIDE 9 MG/ML
INJECTION, SOLUTION INTRAVENOUS CONTINUOUS
Status: DISCONTINUED | OUTPATIENT
Start: 2025-02-09 | End: 2025-02-10

## 2025-02-09 RX ADMIN — Medication 500 MG: at 08:37

## 2025-02-09 RX ADMIN — Medication 1 MG: at 00:45

## 2025-02-09 RX ADMIN — EMPAGLIFLOZIN 10 MG: 10 TABLET, FILM COATED ORAL at 08:37

## 2025-02-09 RX ADMIN — INSULIN ASPART 2 UNITS: 100 INJECTION, SOLUTION INTRAVENOUS; SUBCUTANEOUS at 16:50

## 2025-02-09 RX ADMIN — METFORMIN HYDROCHLORIDE 1000 MG: 500 TABLET, FILM COATED ORAL at 08:36

## 2025-02-09 RX ADMIN — LEVOTHYROXINE SODIUM 50 MCG: 0.03 TABLET ORAL at 06:54

## 2025-02-09 RX ADMIN — PIPERACILLIN AND TAZOBACTAM 3.38 G: 3; .375 INJECTION, POWDER, FOR SOLUTION INTRAVENOUS at 21:35

## 2025-02-09 RX ADMIN — INSULIN GLARGINE 30 UNITS: 100 INJECTION, SOLUTION SUBCUTANEOUS at 08:37

## 2025-02-09 RX ADMIN — ROSUVASTATIN 40 MG: 40 TABLET, FILM COATED ORAL at 08:36

## 2025-02-09 RX ADMIN — VANCOMYCIN HYDROCHLORIDE 1000 MG: 1 INJECTION, SOLUTION INTRAVENOUS at 09:54

## 2025-02-09 RX ADMIN — TRAMADOL HYDROCHLORIDE 50 MG: 50 TABLET, COATED ORAL at 21:34

## 2025-02-09 RX ADMIN — CLOPIDOGREL BISULFATE 75 MG: 75 TABLET ORAL at 08:36

## 2025-02-09 RX ADMIN — THIAMINE HCL TAB 100 MG 100 MG: 100 TAB at 08:37

## 2025-02-09 RX ADMIN — THERA TABS 1 TABLET: TAB at 08:36

## 2025-02-09 RX ADMIN — PIPERACILLIN AND TAZOBACTAM 3.38 G: 3; .375 INJECTION, POWDER, FOR SOLUTION INTRAVENOUS at 13:54

## 2025-02-09 RX ADMIN — ASPIRIN 81 MG: 81 TABLET, COATED ORAL at 21:34

## 2025-02-09 RX ADMIN — PIPERACILLIN AND TAZOBACTAM 3.38 G: 3; .375 INJECTION, POWDER, FOR SOLUTION INTRAVENOUS at 06:21

## 2025-02-09 RX ADMIN — METFORMIN HYDROCHLORIDE 1000 MG: 500 TABLET, FILM COATED ORAL at 16:54

## 2025-02-09 RX ADMIN — Medication: at 09:54

## 2025-02-09 RX ADMIN — SODIUM CHLORIDE: 9 INJECTION, SOLUTION INTRAVENOUS at 16:41

## 2025-02-09 RX ADMIN — Medication 60 ML: at 08:37

## 2025-02-09 RX ADMIN — ZINC SULFATE 220 MG (50 MG) CAPSULE 220 MG: CAPSULE at 08:37

## 2025-02-09 ASSESSMENT — ACTIVITIES OF DAILY LIVING (ADL)
ADLS_ACUITY_SCORE: 74
ADLS_ACUITY_SCORE: 78
ADLS_ACUITY_SCORE: 78
ADLS_ACUITY_SCORE: 74
ADLS_ACUITY_SCORE: 78
ADLS_ACUITY_SCORE: 74
ADLS_ACUITY_SCORE: 78
ADLS_ACUITY_SCORE: 78
ADLS_ACUITY_SCORE: 74
ADLS_ACUITY_SCORE: 78
ADLS_ACUITY_SCORE: 74
ADLS_ACUITY_SCORE: 74
ADLS_ACUITY_SCORE: 78
ADLS_ACUITY_SCORE: 74
ADLS_ACUITY_SCORE: 78
ADLS_ACUITY_SCORE: 74
ADLS_ACUITY_SCORE: 78

## 2025-02-09 NOTE — PROGRESS NOTES
[facility]    Progress Note - Hospitalist Service       Date of Admission:  2/4/2025    Assessment & Plan   Janice Nowak is a 80 year old male with past medical history notable for T2DM, HFpEF, recent debridement of diabetic ulcers, SABRINA, hepatocellular carcinoma currently receiving palliative immunotherapy, hypothyroidism, HTN, HLD. Admitted on 2/4/2025 for abdominal pain (now resolved) and confusion (improving). Also noted to have ongoing foot wounds for which podiatry, vascular, and infectious disease are consulted. LLE angiogram 2/8 - high grade stenosis, now s/p crossing/orbital atherectomy/angioplasty of the ALEX/DP, Angioplasty/DCB of the Popliteal lesion, FELIPE placement in the proximal SFA lesion. Had some post op bleeding, sheath pulled evening 2/9 at direction of IR.      Changes 2/9:  Nurse reaching out regarding weight bearing status of left foot  Consider resuming losartan, spirono, torsemide 2/10 if kidney function continues to improve  50 ml/hr NS will stop on 2/10 at 1400 if not reordered     Bilateral Lower Extremity Ulcers   Acute on Chronic Right Calcaneal Osteomyelitis   Patient with significant bilateral foot wounds. Podiatry, vascular disease, and infectious disease are involved. Patient with prior hospitalizations for this concern. S/p partial calcanectomy right heel 10/24/24 and bilateral LE I&D 1/27/25. Given evidence of PAD, also had stenting with IR on 1/16/24. Most recently discharged with wound vac and completed 6 week course of antibiotics. Plan per specialists on this admission: LLE angiogram 2/8 - high grade stenosis, now s/p crossing/orbital atherectomy/angioplasty of the ALEX/DP, Angioplasty/DCB of the Popliteal lesion, FELIPE placement in the proximal SFA lesion. Had some post op bleeding, sheath pulled evening 2/9 at direction of IR.    - Podiatry consulted, recommending:               - s/p right calcanectomy + bilateral LE I&D              - No  planned repeat surgical intervention at this time               - Continue wound vac               - Reconsider surgical intervention/amputation if no improved healing in a few weeks    - Nurse reaching out regarding weight restrictions on left foot  - IR consulted, recommending:               - Angio as above              - Continue ASA + Clopidegrel   - ID consulted, recommending:               - Continue IV Vanc/Zosyn               - Transition to Doxy + Augmentin x 14 days on discharge   - SW consulted               - Likely discharge to TCU once medically stable      Generalized weakness  Encephalopathy  Patient reportedly presented with multiple days of progressive weakness, poor p.o. intake, poor sleep, myalgias.  Head imaging on admission negative for acute intracranial pathology.  No significant metabolic derangements.  Certainly infectious concerns (see above) could be contributing.  Also wonder if known hepatobiliary cancer (see below) is contributing to patient's general deconditioning and decline. Dilaudid held with significant improvement to mentation today. Lower suspicion now for hepatic encephalopathy, but will continue to monitor.   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 Underwent IR guided biopsy on 1/2/25 which confirmed HCC. Met with oncology on 1/10/25 who discussed recommendation for palliative immunotherapy. CT chest on recent admission showed nodules and bulky adenopathy concerning for malignancy. Patient was endorsing abdominal pain on admission, though this has resolved  now. Will continue to pursue goals of care conversations in the context of known malignancy and patient's repeated request to limit hospitalizations.   - Continue GOC conversation   - No indication for inpatient oncology consult at this time      Heart failure with preserved ejection fraction, NYHA class II   Hypertension  Echo 12/11 with mild concentric LVH. EF 55-60%. Had transudative pleural effusion s/p thoracentesis 12/2024. Mild LE edema and abdominal distension present but otherwise does not appear overtly fluid overloaded.   - Hold PTA Losartan, Spironolactone, Torsemide in setting of CAROLINE   - Continue gentle mIVF    - mIVF will stop 1400 on 2/10  - Continue to monitor fluid status closely     Type II Diabetes.   A1c 7.9%. Admitted by hospitalist team who continued all home meds. Adequate control at this time. No lows or highs.   - Continued PTA Empagliflozin 10mg, Glargine 30U Qam, Metformin 1000mg BID  - Consistent carb diet  - Medium resistance sliding scale      Troponinemia   127 ?  109 on admission. Similar to baseline and downtrending. EKG similar to prior. Patient declined cardio workup on last admission when admitted for syncope. Denies current pre-syncope symptoms.      Anemia, normocytic  Baseline 10-12. Hbg  8.8 today. Will continue to monitor. No evidence of acute bleed aside from wounds on feet.   - Daily CBC      Hyponatremia, resolved   131 on admit. Likely due to poor PO intake. Resolved with IVF and encouragement of PO intake.   - continue mIVF: NS 50 ml/hr with ongoing caution to fluid status given CHF      CAROLINE on CKD   Creatinine 1.24, baseline closer to 1. Likely pre-renal in the setting of poor PO intake. Improving.   - continue mIVF: NS 50 ml/hr with ongoing caution to fluid status given CHF    - will be stopped 2/10 without being re-ordered  - encourage PO intake  - Hold PTA Losartan, Spironolactone, Torsemide in setting of CAROLINE      Carotid arterial disease  U/S 11/2024 with <50%  "stenosis bilaterally.     Mixed hyperlipidemia  - Continued PTA Rosuvastatin 40mg     Obstructive Sleep Apnea  Does not use CPAP.     Hypothyroidism  TSH WNL.  - Continued PTA Levothyroxine 50mcg             Diet: Moderate Consistent Carb (60 g CHO per Meal) Diet    DVT Prophylaxis: Aspirin and plavix. SCD contraindicated due to PVD/right leg wound vac. Heparin allergy.  Reyes Catheter: Not present  Fluids: PO, 50 ml/hr NS  Lines: PRESENT             Cardiac Monitoring: None  Code Status: No CPR- Do NOT Intubate      Clinically Significant Risk Factors          # Hyperchloremia: Highest Cl = 108 mmol/L in last 2 days, will monitor as appropriate          # Hypoalbuminemia: Lowest albumin = 2.3 g/dL at 2/7/2025  7:31 AM, will monitor as appropriate  # Coagulation Defect: INR = 1.45 (Ref range: 0.85 - 1.15) and/or PTT = 47 Seconds (Ref range: 22 - 38 Seconds), will monitor for bleeding  # Thrombocytopenia: Lowest platelets = 73 in last 2 days, will monitor for bleeding   # Hypertension: Noted on problem list           # DMII: A1C = 7.9 % (Ref range: <5.7 %) within past 6 months   # Obesity: Estimated body mass index is 30.24 kg/m  as calculated from the following:    Height as of this encounter: 1.727 m (5' 8\").    Weight as of this encounter: 90.2 kg (198 lb 14.4 oz).   # Severe Malnutrition: based on nutrition assessment    # Financial/Environmental Concerns: other (see comments) (TBD as they are now transitioning into Assisted living from independent living and it will cost more)         Social Drivers of Health   Tobacco Use: Medium Risk (2/4/2025)    Patient History     Smoking Tobacco Use: Former     Smokeless Tobacco Use: Never     Passive Exposure: Never         Disposition Plan     Medically Ready for Discharge: Anticipated in 2-4 Days         The patient's care was discussed with the Attending Physician, Dr. Montano .    Trevor Esparza MD  Hospitalist Service  [facility]  Securely message with [employer] (more info)  Text page via [facility] Paging/Directory   ______________________________________________________________________    Interval History   No acute overnight events.  Patient slept well.  He has a little bit of pain in his right leg around washout areas.  No pain in his left leg.  No chest pain or difficulty breathing.  No fevers or chills.    Physical Exam   Vital Signs: Temp: 97.8  F (36.6  C) Temp src: Oral BP: (!) 146/69 Pulse: 83   Resp: 20 SpO2: 98 % O2 Device: None (Room air) Oxygen Delivery: 2 LPM  Weight: 198 lbs 14.4 oz  GENERAL: Healthy, alert and no distress  EYES: Eyes grossly normal to inspection.  No discharge or erythema, or obvious scleral/conjunctival abnormalities.  RESP:  Lungs clear throughout. No wheeze or crackles.   CV: Heart RRR. No murmur  Abdomen: Soft, nontender, nondistended.  Extremities: Left foot has a surgical wound with staples and mild surrounding erythema.  Left heel wound also with staples.  Right lower leg with wound VAC in place and mild surrounding erythema and tenderness.  Trace edema.      Medical Decision Making       Please see A&P for additional details of medical decision making.      Data   ------------------------- PAST 24 HR DATA REVIEWED -----------------------------------------------    I have personally reviewed the following data over the past 24 hrs:    4.7  \   7.7 (L)   / 79 (L)     138 108 (H) 27.7 (H) /  127 (H)   3.6 18 (L) 1.13 \     INR:  N/A PTT:  47 (H)   D-dimer:  N/A Fibrinogen:  N/A       Imaging results reviewed over the past 24 hrs:   Recent Results (from the past 24 hours)   IR Lower Extremity Angiogram Left    Narrative    LOCATION: [facility]  DATE: 2/8/2025    PROCEDURE: LEFT LOWER EXTREMITY DIAGNOSTIC ARTERIOGRAPHY, ROTATIONAL ATHERECTOMY OF THE LEFT COMMON FEMORAL, SUPERFICIAL FEMORAL, POPLITEAL, ANTERIOR TIBIAL AND DORSALIS PEDIS ARTERIES, BALLOON ANGIOPLASTY OF THE LEFT  COMMON FEMORAL, SUPERFICIAL FEMORAL,   POPLITEAL, ANTERIOR TIBIAL AND DORSALIS PEDIS ARTERIES, STENT PLACEMENT IN THE PROXIMAL LEFT SUPERFICIAL FEMORAL ARTERY, ULTRASOUND GUIDANCE FOR VASCULAR ACCESS, MODERATE SEDATION    INTERVENTIONAL RADIOLOGIST: Silvestre Jc MD    INDICATION: 80-year-old male with nonhealing left foot wounds/critical limb threatening ischemia, plan for left lower extremity angiogram with intervention for limb salvage.    INDICATION FOR DIAGNOSTIC ANGIOGRAPHY: Diagnostic angiography was required to precisely identify plaque morphology to aid in evaluation and management    CONSENT: The risks, benefits and alternatives of the procedure were discussed with the patient  in detail. All questions were answered. Informed consent was given to proceed with the procedure.    MODERATE SEDATION: Versed 4 mg IV; Fentanyl 200 mcg IV. During the time out, immediately prior to the administration of medications, the patient was reassessed for adequacy to receive conscious sedation.  Under physician supervision, Versed and fentanyl   were administered for moderate sedation. Pulse oximetry, heart rate and blood pressure were continuously monitored by an independent trained observer. The physician spent 160 minutes of face-to-face sedation time with the patient.    CONTRAST: 94 cc Visipaque  ADDITIONAL MEDICATIONS: Argatroban     FLUOROSCOPIC TIME: 58.7 minutes.  RADIATION DOSE: Air Kerma: 2433 mGy.    COMPLICATIONS: No immediate complications.    UNIVERSAL PROTOCOL: The operative site was marked and any prior imaging was reviewed. Required items including blood products, implants, devices and special equipment was made available. Patient identity was confirmed either verbally, with demographic   information, hospital assigned identification or other identification markers. A timeout was performed immediately prior to the procedure.    STERILE BARRIER TECHNIQUE: Maximum sterile barrier technique was used.  Cutaneous antisepsis was performed at the operative site with application of 2% chlorhexidine and large sterile drape. Prior to the procedure, the  and assistant performed   hand hygiene and wore hat, mask, sterile gown, and sterile gloves during the entire procedure.    PROCEDURE:    Access was achieved into the right common femoral artery utilizing real-time ultrasound guidance and a micropuncture access kit. Imaging demonstrates a patent and compressible artery. Permanent images were stored to the patient's medical record.   Conversion was made for a 5 Lithuanian vascular sheath, which was attached to a continuous saline infusion. The Omni Flush catheter was advanced to the caudal abdominal aorta and selective catheterization of the left external iliac artery was performed.    A diagnostic left lower extremity angiogram was obtained. Imaging demonstrates patent left common femoral, profunda femoral, superficial femoral and popliteal arteries. There is focal, high-grade stenosis involving the proximal superficial femoral   artery. There is moderate stenosis involving the distal superficial femoral artery and above-knee popliteal artery. Occlusion of the proximal anterior tibial artery. The posterior tibial artery is patent to the foot without significant stenosis. Disease   involving the mid/distal peroneal artery. Occluded dorsalis pedis artery.    Systemic anticoagulation was performed with Argatroban as per institutional protocol and monitored with serial activated clotting times.    Exchange is made for a 5 Lithuanian x 55 cm vascular sheath which was positioned in the left common femoral artery. An angled 018 crossing catheter and guidewire were used to selectively catheterize the anterior tibial artery. Contrast injection demonstrates   multi segment occlusions involving the anterior tibial artery. The guidewire and catheter were carefully advanced under fluoroscopic guidance to the dorsalis pedis artery. A  small volume of contrast was injected, confirming intraluminal crossing.    Exchange is made for an 014 Viperwire which was positioned in the distal dorsalis pedis artery. The crossing catheter was removed. Over-the-wire exchange is made for a 1.25 Solid CSI atherectomy catheter. Rotational atherectomy of disease in the common   femoral, proximal superficial femoral, distal superficial femoral and above-knee popliteal arteries was performed in the low, medium and high speeds.     Rotational atherectomy of disease in the proximal anterior tibial artery was performed. There is inability to advance the CSI device to the mid and distal anterior tibial artery secondary to significant disease. Over-the-wire exchange is made for a 2 mm   x 1 20 mm angioplasty balloon and angioplasty of the proximal/mid anterior tibial artery was performed. There is inability to advance the balloon to the distal vessel. Over-the-wire exchange is made for a 1.5 mm x 2 cm angioplasty balloon. The balloon   was carefully advanced to the distal anterior tibial artery and balloon angioplasty of significant stenosis was performed to allow for passage of the atherectomy device. Over-the-wire exchange is again made for the CSI 1.25 Solid atherectomy catheter and   atherectomy of the remaining metatarsals distal anterior tibial and dorsalis pedis arteries was performed in the low, medium and high speeds. Over-the-wire exchange is made for a 3 mm x 120 mm angioplasty balloon and angioplasty of the entire anterior   tibial and dorsalis pedis arteries was performed.    Over-the-wire exchange is made for a 6 mm x 15 cm InPact drug-eluting balloon which was positioned in the distal superficial femoral and above-knee popliteal arteries and inflated to burst pressure for 3 minutes. Over-the-wire exchange is made for a 6 mm   x 15 cm InPact drug-eluting balloon which was positioned in the common femoral and proximal superficial femoral arteries and inflated  to burst pressure for 3 minutes. Post drug coated balloon angioplasty angiography demonstrates persistent moderate   stenosis involving the proximal superficial femoral artery lesion. This residual stenosis in the distal superficial femoral and popliteal arteries. Over-the-wire exchange is made for a 6 Tanzanian x 40 cm sheath which was positioned external iliac artery.   Over-the-wire exchange is made for a 6 mm x 2 cm high pressure balloon which was positioned and inflated in the proximal superficial femoral artery lesion. Post high pressure balloon angioplasty angiography demonstrates persistent residual stenosis.   Over-the-wire exchange is made for a 7 mm x 4 cm Jill drug-eluting stent which was positioned in the proximal superficial femoral artery lesion and deployed. Post deployment balloon into the pelvis was performed utilizing a 7 mm x 4 cm balloon. Post   stent angiography demonstrates minimal residual stenosis at this location with small focus of active extravasation. The area of bleeding was successfully treated with low pressure, prolonged balloon angioplasty for 2 minutes.    At this point, the procedure was considered complete. Repeat check the activated clotting time demonstrated persistent elevated values. Exchange is made for a short 6 Tanzanian sheath which was secured in place.      Impression    IMPRESSION:    1. Left lower extremity angiography demonstrates focal, high-grade stenosis involving the proximal superficial femoral artery. Moderate stenosis involving the distal superficial femoral and above-knee popliteal arteries. Dominant runoff to the foot via   the posterior tibial artery. Disease involving the mid/distal peroneal artery. Occluded anterior tibial and dorsalis pedis arteries.  2. Successful crossing, recanalization, orbital atherectomy and angioplasty of the occluded anterior tibial and dorsalis pedis arteries. Status post drug-coated balloon angioplasty of the distal superficial  femoral and popliteal arteries. Status post   drug-eluting stent placement in the proximal superficial femoral artery lesion.  3. Post intervention angiography demonstrates excellent result with three-vessel runoff to the foot. There is marked improvement in perfusion of the foot on post intervention imaging.     PLAN: Four hours of bedrest post sheath removal. Palpable left posterior tibial and dorsalis pedis pulses post procedure. Continue Crestor 40 mg once daily, aspirin 81 mg once daily and Plavix 75 mg once daily.        Z Plasty Text: The lesion was extirpated to the level of the fat with a #15 scalpel blade.  Given the location of the defect, shape of the defect and the proximity to free margins a Z-plasty was deemed most appropriate for repair.  Using a sterile surgical marker, the appropriate transposition arms of the Z-plasty were drawn incorporating the defect and placing the expected incisions within the relaxed skin tension lines where possible.    The area thus outlined was incised deep to adipose tissue with a #15 scalpel blade.  The skin margins were undermined to an appropriate distance in all directions utilizing iris scissors.  The opposing transposition arms were then transposed into place in opposite direction and anchored with interrupted buried subcutaneous sutures.

## 2025-02-09 NOTE — PLAN OF CARE
Verbal phone report given to MALI Arriaza at 1707. Patient notified of transfer to room 430 and son, Michael, called at 1710 to update on transfer. Belongings packed up, along with chart and medications. Patient transferred to floor bed and escorted off the unit with aide and nurse at this time.

## 2025-02-09 NOTE — PLAN OF CARE
[facility] - ICU    RN Progress Note:            Pertinent Assessments:      Please refer to flowsheet rows for full assessment     Patient is alert and oriented, denies pain, no fever, vital signs stable.           Key Events - This Shift:       Uneventful     RN Managed Protocols Ordered:  No  Protocols:  PRN'S:  Protocols Status: N/A                Barriers to Discharge / Downgrade:     ICU needs         Point of Contact Update: YES-OR-NO: Yes  If No, reason:   Name:  Phone Number:  Summary of Conversation:

## 2025-02-10 ENCOUNTER — TELEPHONE (OUTPATIENT)
Dept: VASCULAR SURGERY | Facility: CLINIC | Age: 80
End: 2025-02-10
Payer: COMMERCIAL

## 2025-02-10 LAB
ANION GAP SERPL CALCULATED.3IONS-SCNC: 6 MMOL/L (ref 7–15)
BUN SERPL-MCNC: 20.9 MG/DL (ref 8–23)
CALCIUM SERPL-MCNC: 7.9 MG/DL (ref 8.8–10.4)
CHLORIDE SERPL-SCNC: 108 MMOL/L (ref 98–107)
CREAT SERPL-MCNC: 1.11 MG/DL (ref 0.67–1.17)
EGFRCR SERPLBLD CKD-EPI 2021: 68 ML/MIN/1.73M2
ERYTHROCYTE [DISTWIDTH] IN BLOOD BY AUTOMATED COUNT: 17.2 % (ref 10–15)
GLUCOSE BLDC GLUCOMTR-MCNC: 104 MG/DL (ref 70–99)
GLUCOSE BLDC GLUCOMTR-MCNC: 159 MG/DL (ref 70–99)
GLUCOSE BLDC GLUCOMTR-MCNC: 237 MG/DL (ref 70–99)
GLUCOSE BLDC GLUCOMTR-MCNC: 271 MG/DL (ref 70–99)
GLUCOSE BLDC GLUCOMTR-MCNC: 322 MG/DL (ref 70–99)
GLUCOSE SERPL-MCNC: 137 MG/DL (ref 70–99)
HCO3 SERPL-SCNC: 23 MMOL/L (ref 22–29)
HCT VFR BLD AUTO: 22.7 % (ref 40–53)
HGB BLD-MCNC: 7.4 G/DL (ref 13.3–17.7)
MCH RBC QN AUTO: 26.4 PG (ref 26.5–33)
MCHC RBC AUTO-ENTMCNC: 32.6 G/DL (ref 31.5–36.5)
MCV RBC AUTO: 81 FL (ref 78–100)
PLATELET # BLD AUTO: 92 10E3/UL (ref 150–450)
POTASSIUM SERPL-SCNC: 3.8 MMOL/L (ref 3.4–5.3)
RBC # BLD AUTO: 2.8 10E6/UL (ref 4.4–5.9)
SODIUM SERPL-SCNC: 137 MMOL/L (ref 135–145)
WBC # BLD AUTO: 4.8 10E3/UL (ref 4–11)

## 2025-02-10 PROCEDURE — 85048 AUTOMATED LEUKOCYTE COUNT: CPT

## 2025-02-10 PROCEDURE — G0463 HOSPITAL OUTPT CLINIC VISIT: HCPCS

## 2025-02-10 PROCEDURE — 99232 SBSQ HOSP IP/OBS MODERATE 35: CPT | Performed by: PODIATRIST

## 2025-02-10 PROCEDURE — 80048 BASIC METABOLIC PNL TOTAL CA: CPT

## 2025-02-10 PROCEDURE — 120N000001 HC R&B MED SURG/OB

## 2025-02-10 PROCEDURE — 250N000013 HC RX MED GY IP 250 OP 250 PS 637

## 2025-02-10 PROCEDURE — 99232 SBSQ HOSP IP/OBS MODERATE 35: CPT | Mod: GC

## 2025-02-10 PROCEDURE — 250N000011 HC RX IP 250 OP 636: Performed by: FAMILY MEDICINE

## 2025-02-10 PROCEDURE — 250N000011 HC RX IP 250 OP 636: Performed by: INTERNAL MEDICINE

## 2025-02-10 PROCEDURE — 36415 COLL VENOUS BLD VENIPUNCTURE: CPT

## 2025-02-10 PROCEDURE — 85018 HEMOGLOBIN: CPT

## 2025-02-10 RX ADMIN — Medication 60 ML: at 08:54

## 2025-02-10 RX ADMIN — THERA TABS 1 TABLET: TAB at 08:55

## 2025-02-10 RX ADMIN — ACETAMINOPHEN 650 MG: 325 TABLET ORAL at 21:39

## 2025-02-10 RX ADMIN — INSULIN ASPART 3 UNITS: 100 INJECTION, SOLUTION INTRAVENOUS; SUBCUTANEOUS at 17:21

## 2025-02-10 RX ADMIN — THIAMINE HCL TAB 100 MG 100 MG: 100 TAB at 08:55

## 2025-02-10 RX ADMIN — INSULIN ASPART 2 UNITS: 100 INJECTION, SOLUTION INTRAVENOUS; SUBCUTANEOUS at 12:13

## 2025-02-10 RX ADMIN — PIPERACILLIN AND TAZOBACTAM 3.38 G: 3; .375 INJECTION, POWDER, FOR SOLUTION INTRAVENOUS at 21:31

## 2025-02-10 RX ADMIN — CLOPIDOGREL BISULFATE 75 MG: 75 TABLET ORAL at 08:55

## 2025-02-10 RX ADMIN — ASPIRIN 81 MG: 81 TABLET, COATED ORAL at 21:31

## 2025-02-10 RX ADMIN — METFORMIN HYDROCHLORIDE 1000 MG: 500 TABLET, FILM COATED ORAL at 08:55

## 2025-02-10 RX ADMIN — Medication 500 MG: at 08:55

## 2025-02-10 RX ADMIN — Medication: at 12:14

## 2025-02-10 RX ADMIN — PIPERACILLIN AND TAZOBACTAM 3.38 G: 3; .375 INJECTION, POWDER, FOR SOLUTION INTRAVENOUS at 06:14

## 2025-02-10 RX ADMIN — INSULIN GLARGINE 30 UNITS: 100 INJECTION, SOLUTION SUBCUTANEOUS at 09:02

## 2025-02-10 RX ADMIN — EMPAGLIFLOZIN 10 MG: 10 TABLET, FILM COATED ORAL at 08:55

## 2025-02-10 RX ADMIN — PIPERACILLIN AND TAZOBACTAM 3.38 G: 3; .375 INJECTION, POWDER, FOR SOLUTION INTRAVENOUS at 13:36

## 2025-02-10 RX ADMIN — ROSUVASTATIN 40 MG: 40 TABLET, FILM COATED ORAL at 08:55

## 2025-02-10 RX ADMIN — Medication: at 01:05

## 2025-02-10 RX ADMIN — LEVOTHYROXINE SODIUM 50 MCG: 0.03 TABLET ORAL at 06:14

## 2025-02-10 RX ADMIN — VANCOMYCIN HYDROCHLORIDE 1000 MG: 1 INJECTION, SOLUTION INTRAVENOUS at 09:04

## 2025-02-10 RX ADMIN — ZINC SULFATE 220 MG (50 MG) CAPSULE 220 MG: CAPSULE at 08:58

## 2025-02-10 RX ADMIN — METFORMIN HYDROCHLORIDE 1000 MG: 500 TABLET, FILM COATED ORAL at 17:21

## 2025-02-10 ASSESSMENT — ACTIVITIES OF DAILY LIVING (ADL)
ADLS_ACUITY_SCORE: 74
ADLS_ACUITY_SCORE: 75
ADLS_ACUITY_SCORE: 75
ADLS_ACUITY_SCORE: 74
ADLS_ACUITY_SCORE: 75
ADLS_ACUITY_SCORE: 74
ADLS_ACUITY_SCORE: 75
ADLS_ACUITY_SCORE: 74
ADLS_ACUITY_SCORE: 74
ADLS_ACUITY_SCORE: 75
ADLS_ACUITY_SCORE: 74
ADLS_ACUITY_SCORE: 75
ADLS_ACUITY_SCORE: 75
ADLS_ACUITY_SCORE: 74
ADLS_ACUITY_SCORE: 75
ADLS_ACUITY_SCORE: 74
ADLS_ACUITY_SCORE: 79

## 2025-02-10 NOTE — PROGRESS NOTES
"  Interventional Radiology - Progress Note  Inpatient - [facility]  02/10/2025     S:  POD 2 LLE angiogram with atherectomy, angioplasty, and stent placement. Denies pain.        O:  /60 (BP Location: Left arm)   Pulse 87   Temp 97.9  F (36.6  C) (Oral)   Resp 16   Ht 1.727 m (5' 8\")   Wt 90.2 kg (198 lb 14.4 oz)   SpO2 97%   BMI 30.24 kg/m    General: Stable. In no acute distress.    Neuro: Alert and oriented x 3. No focal deficits.  Psych: Appropriate mood and affect. Linear/coherent thought process.   Resp: Normal respirations.  Vascular: RIGHT groin CDI, slight ecchymosis, no bleeding or hematoma noted. RLE with wound vac. LLE wrapped.      IMAGING:  LOCATION: [facility]  DATE: 2/8/2025     PROCEDURE: LEFT LOWER EXTREMITY DIAGNOSTIC ARTERIOGRAPHY, ROTATIONAL ATHERECTOMY OF THE LEFT COMMON FEMORAL, SUPERFICIAL FEMORAL, POPLITEAL, ANTERIOR TIBIAL AND DORSALIS PEDIS ARTERIES, BALLOON ANGIOPLASTY OF THE LEFT COMMON FEMORAL, SUPERFICIAL FEMORAL,   POPLITEAL, ANTERIOR TIBIAL AND DORSALIS PEDIS ARTERIES, STENT PLACEMENT IN THE PROXIMAL LEFT SUPERFICIAL FEMORAL ARTERY, ULTRASOUND GUIDANCE FOR VASCULAR ACCESS, MODERATE SEDATION     INTERVENTIONAL RADIOLOGIST: Silvestre Jc MD     INDICATION: 80-year-old male with nonhealing left foot wounds/critical limb threatening ischemia, plan for left lower extremity angiogram with intervention for limb salvage.     INDICATION FOR DIAGNOSTIC ANGIOGRAPHY: Diagnostic angiography was required to precisely identify plaque morphology to aid in evaluation and management     CONSENT: The risks, benefits and alternatives of the procedure were discussed with the patient  in detail. All questions were answered. Informed consent was given to proceed with the procedure.     MODERATE SEDATION: Versed 4 mg IV; Fentanyl 200 mcg IV. During the time out, immediately prior to the administration of medications, the patient was " reassessed for adequacy to receive conscious sedation.  Under physician supervision, Versed and fentanyl   were administered for moderate sedation. Pulse oximetry, heart rate and blood pressure were continuously monitored by an independent trained observer. The physician spent 160 minutes of face-to-face sedation time with the patient.     CONTRAST: 94 cc Visipaque  ADDITIONAL MEDICATIONS: Argatroban      FLUOROSCOPIC TIME: 58.7 minutes.  RADIATION DOSE: Air Kerma: 2433 mGy.     COMPLICATIONS: No immediate complications.     UNIVERSAL PROTOCOL: The operative site was marked and any prior imaging was reviewed. Required items including blood products, implants, devices and special equipment was made available. Patient identity was confirmed either verbally, with demographic   information, hospital assigned identification or other identification markers. A timeout was performed immediately prior to the procedure.     STERILE BARRIER TECHNIQUE: Maximum sterile barrier technique was used. Cutaneous antisepsis was performed at the operative site with application of 2% chlorhexidine and large sterile drape. Prior to the procedure, the  and assistant performed   hand hygiene and wore hat, mask, sterile gown, and sterile gloves during the entire procedure.     PROCEDURE:    Access was achieved into the right common femoral artery utilizing real-time ultrasound guidance and a micropuncture access kit. Imaging demonstrates a patent and compressible artery. Permanent images were stored to the patient's medical record.   Conversion was made for a 5 Azerbaijani vascular sheath, which was attached to a continuous saline infusion. The Omni Flush catheter was advanced to the caudal abdominal aorta and selective catheterization of the left external iliac artery was performed.     A diagnostic left lower extremity angiogram was obtained. Imaging demonstrates patent left common femoral, profunda femoral, superficial femoral and  popliteal arteries. There is focal, high-grade stenosis involving the proximal superficial femoral   artery. There is moderate stenosis involving the distal superficial femoral artery and above-knee popliteal artery. Occlusion of the proximal anterior tibial artery. The posterior tibial artery is patent to the foot without significant stenosis. Disease   involving the mid/distal peroneal artery. Occluded dorsalis pedis artery.     Systemic anticoagulation was performed with Argatroban as per institutional protocol and monitored with serial activated clotting times.     Exchange is made for a 5 [place] Islander x 55 cm vascular sheath which was positioned in the left common femoral artery. An angled 018 crossing catheter and guidewire were used to selectively catheterize the anterior tibial artery. Contrast injection demonstrates   multi segment occlusions involving the anterior tibial artery. The guidewire and catheter were carefully advanced under fluoroscopic guidance to the dorsalis pedis artery. A small volume of contrast was injected, confirming intraluminal crossing.     Exchange is made for an 014 Viperwire which was positioned in the distal dorsalis pedis artery. The crossing catheter was removed. Over-the-wire exchange is made for a 1.25 Solid CSI atherectomy catheter. Rotational atherectomy of disease in the common   femoral, proximal superficial femoral, distal superficial femoral and above-knee popliteal arteries was performed in the low, medium and high speeds.      Rotational atherectomy of disease in the proximal anterior tibial artery was performed. There is inability to advance the CSI device to the mid and distal anterior tibial artery secondary to significant disease. Over-the-wire exchange is made for a 2 mm   x 1 20 mm angioplasty balloon and angioplasty of the proximal/mid anterior tibial artery was performed. There is inability to advance the balloon to the distal vessel. Over-the-wire exchange is made  for a 1.5 mm x 2 cm angioplasty balloon. The balloon   was carefully advanced to the distal anterior tibial artery and balloon angioplasty of significant stenosis was performed to allow for passage of the atherectomy device. Over-the-wire exchange is again made for the CSI 1.25 Solid atherectomy catheter and   atherectomy of the remaining metatarsals distal anterior tibial and dorsalis pedis arteries was performed in the low, medium and high speeds. Over-the-wire exchange is made for a 3 mm x 120 mm angioplasty balloon and angioplasty of the entire anterior   tibial and dorsalis pedis arteries was performed.     Over-the-wire exchange is made for a 6 mm x 15 cm InPact drug-eluting balloon which was positioned in the distal superficial femoral and above-knee popliteal arteries and inflated to burst pressure for 3 minutes. Over-the-wire exchange is made for a 6 mm   x 15 cm InPact drug-eluting balloon which was positioned in the common femoral and proximal superficial femoral arteries and inflated to burst pressure for 3 minutes. Post drug coated balloon angioplasty angiography demonstrates persistent moderate   stenosis involving the proximal superficial femoral artery lesion. This residual stenosis in the distal superficial femoral and popliteal arteries. Over-the-wire exchange is made for a 6 Malay x 40 cm sheath which was positioned external iliac artery.   Over-the-wire exchange is made for a 6 mm x 2 cm high pressure balloon which was positioned and inflated in the proximal superficial femoral artery lesion. Post high pressure balloon angioplasty angiography demonstrates persistent residual stenosis.   Over-the-wire exchange is made for a 7 mm x 4 cm Jill drug-eluting stent which was positioned in the proximal superficial femoral artery lesion and deployed. Post deployment balloon into the pelvis was performed utilizing a 7 mm x 4 cm balloon. Post   stent angiography demonstrates minimal residual stenosis at  this location with small focus of active extravasation. The area of bleeding was successfully treated with low pressure, prolonged balloon angioplasty for 2 minutes.     At this point, the procedure was considered complete. Repeat check the activated clotting time demonstrated persistent elevated values. Exchange is made for a short 6 Citizen of [place] sheath which was secured in place.                                                                      IMPRESSION:    1. Left lower extremity angiography demonstrates focal, high-grade stenosis involving the proximal superficial femoral artery. Moderate stenosis involving the distal superficial femoral and above-knee popliteal arteries. Dominant runoff to the foot via   the posterior tibial artery. Disease involving the mid/distal peroneal artery. Occluded anterior tibial and dorsalis pedis arteries.  2. Successful crossing, recanalization, orbital atherectomy and angioplasty of the occluded anterior tibial and dorsalis pedis arteries. Status post drug-coated balloon angioplasty of the distal superficial femoral and popliteal arteries. Status post   drug-eluting stent placement in the proximal superficial femoral artery lesion.  3. Post intervention angiography demonstrates excellent result with three-vessel runoff to the foot. There is marked improvement in perfusion of the foot on post intervention imaging.      PLAN: Four hours of bedrest post sheath removal. Palpable left posterior tibial and dorsalis pedis pulses post procedure. Continue Crestor 40 mg once daily, aspirin 81 mg once daily and Plavix 75 mg once daily.    LABS:  Recent Labs   Lab 02/10/25  0535 02/09/25  0417 02/08/25  0513 02/08/25  0512 02/07/25  0731 02/06/25  0716 02/05/25  0740 02/04/25  1004   WBC 4.8 4.7 4.1  --  4.7  --  4.7 6.5   HGB 7.4* 7.7* 8.8*  --  9.0*  --  9.0* 10.4*   PLT 92* 79* 73*  --  66*  --  62* 79*   INR  --   --   --   --   --   --  1.45* 1.37*   CR 1.11 1.13  --  1.24*  1.24* 1.34*    < > 1.32* 1.35*   BILITOTAL  --   --   --   --  0.4  --  0.5 0.7   ALKPHOS  --   --   --   --  200*  --  130 147   AST  --   --   --   --  88*  --  99* 138*   ALT  --   --   --   --  83*  --  91* 114*    < > = values in this interval not displayed.     A:  80 year old male with a PMH of CAD, DM II, HTN, HLD, SABRINA, HFpEF, HCC, recent debridement of diabetic ulcers, RIGHT foot osteomyelitis, and PAD s/p previous bilateral iliac artery stenting who was admitted to [facility] on 02/04/2025 2/2 abdominal pain and confusion. Noted to have ongoing foot wounds with concern for infection. LEFT ankle MRI demonstrated known osteomyelitis and degenerative changes. Seen by Dr. Jc in the ED on 02/06, then underwent LLE angiogram with atherectomy, angioplasty, and stent placement on 02/08. Did have some post-op bleeding from his sheath area, pulled 02/09. No further issues with RIGHT groin. Planning on discharge to TCU tomorrow per patient.    P:    - Post procedure care education discussed with patient. All questions answered.   - Post procedure discharge instructions entered into D/C navigator.  - Planning follow up in Vascular Clinic with Dr. Jc - imaging scheduled for 03/07. Discussed plan for follow up with patient who expressed his understanding.   - Patient to contact [facility] with questions or concerns, number in D/C instructions.  - OK to discharge from IR procedural standpoint perspective once patient cleared by other services.    Total time spent on the date of the encounter: 30 minutes.    SILVIA ELLINGTON CNP  Interventional Radiology  [phone]

## 2025-02-10 NOTE — CONSULTS
[facility]  WO Nurse Inpatient Assessment     Consulted for: 2/10: tip of penis; Moisture related to lesion on tip of penis  Already following for  RLE    Summary: Did not assess other areas, only reconsult for penis    [facility] nurse follow-up plan: Tuesday/Friday    Patient History (according to provider note(s):    ASSESSMENT:  80 year old diabetic male with PAD and ulcerations bilateral foot, h/o osteomyelitis right heel with history of theraskin application  Diabetic ulceration right heel into subcutaneous tissue   Ulceration right leg into subcutaneous tissue  Acute osteomyelitis right calcaneus  Diabetic ulceration left hallux into epidermis/dermis  Diabetic ulceration left foot into epidermis/dermis     POD # 9 Bilateral lower extremity I&D with TheraSkin application performed by Dr Amol Patricio at [facility] and continuation of wound VAC,  Patient also has history of partial calcanectomy of the right heel on 10/24/24 due to osteomyelitis.    Assessment:      Skin Injury Location: penile head    Last photo: 2/10  Skin injury due to: Unclear etiology  Skin history and plan of care:   Patient states it was present prior to admission to this facility  Affected area:      Skin assessment:  scab cersus eschar     Color: normal and consistent with surrounding tissue     Temperature  normal      Drainage: none .      Color: none      Odor: none  Pain: mild  Pain interventions prior to dressing change: slow and gentle cares   Treatment goal: Maintain (prevention of deterioration)  STATUS: initial assessment  Supplies ordered: at bedside      Negative pressure wound therapy applied to: RLE     Last photo: 2/7/25     R heel                              RLE lateral  Wound due to:  Thera Skin graft site    Wound history/plan of care:    Surgical date: 01/27/25   Service following: DPM  Date Negative Pressure Wound Therapy initiated: Restarted on 2/7/25   Interventions in place: offloading  and elevation  Is patient s nutritional status compromised? yes   If yes, what interventions are in place? other Dietary consult in place  Reason for initiating vac therapy? other Skin graft sites - patient already has a home pump  Which?of?the?following?co-morbidities?apply? Diabetes, Immobility, and Obesity  If diabetic is patient on a diabetic management program? Yes   Is osteomyelitis present in wound? no   If yes what treatments are in place? N/A    Wound base: For both wounds, healthy poink tissue and skin graft present in wound bed     Palpation of the wound bed: normal       Drainage: small      Volume in cannister: new today     Last cannister change date: New     Description of drainage: serosanguinous      Measurements (length x width x depth, in cm) Lateral wound - 6 cm x 6.5 cm and Heel is 5 cm x 5.5 cm     Tunneling N/A      Undermining N/A   Periwound skin: Intact and Erythema- blanchable       Color: normal and consistent with surrounding tissue, pink, and red       Temperature: normal    Odor: none   Pain: score 5/10 , sharp   Pain intervention prior to dressing change: patient tolerated well, IV Dilaudid, and slow and gentle cares   Treatment goal:  Graft attachment  STATUS: stable   Supplies ordered: ordered VAC pump, canister, small and medium black foam, Y connector    Number of foam pieces removed from a wound (excluding foam for bridge) :  1 from each Oil emulsion dressing   Verified this matched the number of foam pieces applied last dressing change: N/A   Number of foam pieces packed into wound (excluding foam for bridge) :  Lateral - 1 piece and Heel - 1 piece GranuFoam Black and lateral 2 pieces, Heel 1 piece Oil emulsion dressing       New area of injury noted to R posterolateral calf. This was not noticed prior to RLE lateral VAC dressing application as it wasn't visible until RLE lifted to place VAC dressing onto heel wound. Currently this is covered with VAC drape, but will plan to  begin Medihoney/Mepilex at next dressing change unless DPM has other preferred treatment plan.  Wound measures approximately 5 cm x 2.5 cm and appears to be necrotic epidermal tissue.    Treatment Plan:   Penis  Continue hygiene cares as needed  Apply small amount of zinc oxide to wound area twice daily      Wound location: R heel and RLE lateral   Change Days: T/F  Supplies: Black foam, oil emulsion gauze, Y connector, skin prep  Cleanse with: Saline, pat dry.  Suction: Continuous at -100 mmHg pressure.    Back up plan: If VAC malfunctions or unable to maintain seal: VAC dressing must be removed and reapplied within 2 hours of the incident. If floor staff is not able to reapply, place oil emulsion gauze over graft site and then cover with dry gauze 4 x 4.  Change daily and notify [facility] staff for reimplementation of VAC therapy when available.  Date canister. Chart canister output every shift.    The hospital VAC pump is not to be discharged with the patient.  Please do one of the following prior to discharge:  If a home VAC pump has been delivered, please apply the home pump to the dressing prior to patient discharge.    If the patient is discharging to LTAC or a TCU/SNF and there is a VAC pump waiting for the patient, close clamp and then disconnect the tubing for transfer, place tubing inside a glove.   If the patient is discharging to home or other facility and there is no VAC pump available for immediate placement, remove the VAC dressing and apply a wet-to-moist gauze dressing for transfer.     R calf - T/F (WOC RN will change with VAC dressing changes)  Cleanse with wound cleanser or saline; gently dry.  Apply Medihoney to wound bed; Cover with Mepilex.    Orders: Written    RECOMMEND PRIMARY TEAM ORDER: None, at this time  Education provided: plan of care  Discussed plan of care with: Patient, Family, and Nurse  Notify [facility] if wound(s) deteriorate.  Nursing to notify the Provider(s) and re-consult the [facility] Nurse  if new skin concern.    DATA:     Current support surface: Standard  Standard gel mattress (Isoflex)  Containment of urine/stool: Continent of bladder and Continent of bowel  BMI: Body mass index is 30.24 kg/m .   Active diet order: Orders Placed This Encounter      Moderate Consistent Carb (60 g CHO per Meal) Diet     Output: I/O last 3 completed shifts:  In: 1060 [P.O.:1060]  Out: 1575 [Urine:1575]     Labs:   Recent Labs   Lab 02/10/25  0535 02/08/25  0513 02/07/25  0731 02/05/25  0740 02/04/25  1004   ALBUMIN  --   --  2.3* 2.3* 2.9*   HGB 7.4*   < > 9.0* 9.0* 10.4*   INR  --   --   --  1.45* 1.37*   WBC 4.8   < > 4.7 4.7 6.5   A1C  --   --   --   --  7.9*    < > = values in this interval not displayed.     Pressure injury risk assessment:   Sensory Perception: 3-->slightly limited  Moisture: 3-->occasionally moist  Activity: 1-->bedfast  Mobility: 2-->very limited  Nutrition: 2-->probably inadequate  Friction and Shear: 2-->potential problem  Ramón Score: 13    Luli Osorio, CARAN, RN, PHN, HNB-BC, CWOCN  Pager no longer is use, please contact through [employer] group: [facility]

## 2025-02-10 NOTE — PLAN OF CARE
Problem: Adult Inpatient Plan of Care  Goal: Plan of Care Review  Description: The Plan of Care Review/Shift note should be completed every shift.  The Outcome Evaluation is a brief statement about your assessment that the patient is improving, declining, or no change.  This information will be displayed automatically on your shift  note.  Outcome: Progressing     Problem: Wound  Goal: Improved Oral Intake  Outcome: Progressing   Goal Outcome Evaluation:         Pt alert and oriented,VS stable on room air,BLE  wound dressing intact,left groin dressing patent ,no bleeding noted,PRN tramadol effective for pain control,voiding ok,turned and repositioned q 2 hours.

## 2025-02-10 NOTE — PROGRESS NOTES
Care Management Follow Up    Length of Stay (days): 6    Expected Discharge Date: 02/10/2025    Anticipated Discharge Plan:   [facility]    Transportation: Confirmed medical transport    PT Recommendations: Transitional Care Facility  OT Recommendations:  Transitional Care Facility     Barriers to Discharge: final ID plan, IR.     Prior Living Situation: independent living facility with alone    Discussed  Partnership in Safe Discharge Planning  document with patient/family: No     Handoff Completed: No, handoff not indicated or clinically appropriate    Patient/Spokesperson Updated:patient, his son and daughter     Additional Information:  Chart reviewed.   [facility].   Awaiting ID and IR input. Facility can accept morning of 2/11.  SW met with patient, his son and daughter at bedside. They are agreeable with plan and transport. Costs discussed and son states understanding of potential transport costs. Family will bring patient's personal wheelchair.  Transport via wheelchair scheduled for 10:37am on 2/11.     PAS done. [id number]    [facility] Physician : Mariaelena [phone]    Next Steps: ELIU Zamora, [facility]

## 2025-02-10 NOTE — PROGRESS NOTES
Janice was seen for fit and delivery of bilateral Rooke (prevalon) boots per order of his physician. He was asleep and did not arouse to my presence.     The Prevalon boots were donned in the supine position and he handled the fitting process without waking.     The goal of the orthoses is to provide protection to his foot and ankles while on bed rest.     An orthotics department card was left in his room for contact after discharge and I plan to see him PRN.     Electronically signed by NANDA Cannon CPO  [place] [facility]  [phone]

## 2025-02-10 NOTE — TELEPHONE ENCOUNTER
"Pt currently inpatient, new wound referral received, pt is current pt of Dr. Patricio's but needing a follow-up. Please call to schedule.    Per hospital note:    Discussed case with Dr Patricio  Antibiotics - Zosyn/Vanco per Infectious Disease- appreciate input  Wound VAC for RLE.  Adaptic and wet to dry dressing for the left foot wounds.  NWB of RLE  Rooke boots ordered  No plan for surgical debridement at this time - Dr Patricio would like to give the wounds another 2 weeks to see if theraskin grafting will take.      Discussed findings with patient and his son \"Michael\".  Unfortunately patient has limb threatening conditions on both lower extremities many relates to osteomyelitis of the calcaneus on the right and vascular status of the left.       Patient can follow up with Dr Patricio in 1-2 weeks after discharge       "

## 2025-02-10 NOTE — PROGRESS NOTES
"Podiatry / Foot and Ankle Surgery Progress Note    February 10, 2025    Subject: Patient was seen at bedside.  He is with his sone and daughter in law.  Overall is feeling improved from last week.     Objective:  Vitals: /60 (BP Location: Left arm)   Pulse 87   Temp 97.9  F (36.6  C) (Oral)   Resp 16   Ht 1.727 m (5' 8\")   Wt 90.2 kg (198 lb 14.4 oz)   SpO2 97%   BMI 30.24 kg/m    BMI= Body mass index is 30.24 kg/m .    General:  Patient is alert and orientated.  NAD.    Wound VAC is intact and functioning on the right lower extremity.    Left - somewhat necrotic type appearing dorsal foot wound with exposed tendon.  Wound on the hallux appears stable, necrotic.  Theraskin grafting seems mostly intact      ASSESSMENT:  80 year old diabetic male with PAD and ulcerations bilateral foot, h/o osteomyelitis right heel with history of theraskin application.  Now also s/p LLE angiogram 2/8 - high grade stenosis, now s/p crossing/orbital atherectomy/angioplasty of the ALEX/DP, Angioplasty/DCB of the Popliteal lesion, FELIPE placement in the proximal SFA lesion        POD # 14 Bilateral lower extremity I&D with TheraSkin application performed by Dr Amol Patricio at [facility] and continuation of wound VAC,  Patient also has history of partial calcanectomy of the right heel on 10/24/24 due to osteomyelitis.     MRI findings with osteomyelitis of calcaneus right         PLAN:  Reviewed patient's chart in [facility].  Discussed case with Dr Patricio  Antibiotics - Zosyn/Vanco per Infectious Disease- appreciate input  Wound VAC for RLE.  Adaptic and wet to dry dressing for the left foot wounds.  NWB of RLE  Rooke boots ordered  No plan for surgical debridement at this time - Dr Patricio would like to give the wounds another 2 weeks to see if theraskin grafting will take.     Discussed findings with patient and his son \"Michael\".  Unfortunately patient has limb threatening conditions on both lower extremities many relates to " osteomyelitis of the calcaneus on the right and vascular status of the left.       Patient can follow up with Dr Patricio in 1-2 weeks after discharge     All questions answered.    Lourdes Delarosa DPM  8:56 AM

## 2025-02-10 NOTE — PROGRESS NOTES
[facility]    Progress Note - Hospitalist Service       Date of Admission:  2/4/2025    Assessment & Plan   Janice Nowak is a 80 year old male with past medical history notable for T2DM, HFpEF, recent debridement of diabetic ulcers, SABRINA, hepatocellular carcinoma currently receiving palliative immunotherapy, hypothyroidism, HTN, HLD. Admitted on 2/4/2025 for abdominal pain (now resolved) and confusion (improving). Also noted to have ongoing foot wounds for which podiatry, vascular, and infectious disease are consulted. LLE angiogram 2/8 - high grade stenosis, now s/p crossing/orbital atherectomy/angioplasty of the ALEX/DP, Angioplasty/DCB of the Popliteal lesion, FELIPE placement in the proximal SFA lesion. Had some post op bleeding, sheath pulled evening 2/9 at direction of IR.       Changes 2/10:  - CAROLINE stable, will stop IVF and resume meds tomorrow if still here, otherwise resume at discharge  - Discussed LLE with podiatry, no plans for acute intervention at this time given angioplasty and stent placement 2/8  - continue on ASA + Plavix at discharge  - per ID's note from 2/7, can transition to doxy + augmentin x 14 days at discharge  - accepted to TCU, awaiting ride plan, possibly later today     Bilateral Lower Extremity Ulcers   Acute on Chronic Right Calcaneal Osteomyelitis   Patient with significant bilateral foot wounds. Podiatry, vascular disease, and infectious disease are involved. Patient with prior hospitalizations for this concern. S/p partial calcanectomy right heel 10/24/24 and bilateral LE I&D 1/27/25. Given evidence of PAD, also had stenting with IR on 1/16/24. Most recently discharged with wound vac and completed 6 week course of antibiotics. Plan per specialists on this admission: LLE angiogram 2/8 - high grade stenosis, now s/p crossing/orbital atherectomy/angioplasty of the ALEX/DP, Angioplasty/DCB of the Popliteal lesion, FELIPE placement in the proximal SFA lesion.  Had some post op bleeding, sheath pulled evening 2/9 at direction of IR.    - Podiatry consulted, recommending:               - s/p right calcanectomy + bilateral LE I&D              - per podiatry, no repeat debridement of foot ulceration nor continued wound vac planned for now on LLE, will continue with wet to dry dressing until seen again in clinic              - Continue wound vac on RLE               - Reconsider surgical intervention/amputation if no improved healing in a few weeks    - plan for tentative discharge to TCU later today if ride can be set up  - IR consulted, recommending:               - Angio done 2/9, stable              - Continue ASA + Clopidegrel at discharge  - ID consulted, recommending:               - Continue IV Vanc/Zosyn               - Transition to Doxy + Augmentin x 14 days on discharge   - SW consulted               - Likely discharge to TCU once medically stable      Generalized weakness  Encephalopathy  Patient reportedly presented with multiple days of progressive weakness, poor p.o. intake, poor sleep, myalgias.  Head imaging on admission negative for acute intracranial pathology.  No significant metabolic derangements.  Certainly infectious concerns (see above) could be contributing.  Also wonder if known hepatobiliary cancer (see below) is contributing to patient's general deconditioning and decline. Dilaudid held with significant improvement to mentation today. Lower suspicion now for hepatic encephalopathy, but will continue to monitor.   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 Underwent IR guided biopsy on 1/2/25 which confirmed HCC. Met with oncology on 1/10/25 who discussed recommendation for palliative immunotherapy. CT chest on recent admission showed nodules and bulky adenopathy concerning for malignancy. Patient was endorsing abdominal pain on admission, though this has resolved now. Will continue to pursue goals of care conversations in the context of known malignancy and patient's repeated request to limit hospitalizations.   - Continue GOC conversation   - No indication for inpatient oncology consult at this time      Heart failure with preserved ejection fraction, NYHA class II   Hypertension  Echo 12/11 with mild concentric LVH. EF 55-60%. Had transudative pleural effusion s/p thoracentesis 12/2024. Mild LE edema and abdominal distension present but otherwise does not appear overtly fluid overloaded.   - Hold PTA Losartan, Spironolactone, Torsemide in setting of CAROLINE for now, restart at discharge  - stop IVF  - Continue to monitor fluid status closely     Type II Diabetes.   A1c 7.9%. Admitted by hospitalist team who continued all home meds. Adequate control at this time. No lows or highs.   - Continued PTA Empagliflozin 10mg, Glargine 30U Qam, Metformin 1000mg BID  - Consistent carb diet  - Medium resistance sliding scale      Troponinemia   127 ?  109 on admission. Similar to baseline and downtrending. EKG similar to prior. Patient declined cardio workup on last admission when admitted for syncope. Denies current pre-syncope symptoms.      Anemia, normocytic  Baseline 10-12. Hbg  8.8 today. Will continue to monitor. No evidence of acute bleed aside from wounds on feet.   - Daily CBC      Hyponatremia, resolved   131 on admit. Likely due to poor PO intake. Resolved with IVF and encouragement of PO intake.   - continue mIVF: NS 50 ml/hr with ongoing caution to fluid status given CHF      CAROLINE on CKD,  "improved  Creatinine 1.24, baseline closer to 1. Likely pre-renal in the setting of poor PO intake. Improving.   - stop  - encourage PO intake  - Hold PTA Losartan, Spironolactone, Torsemide in setting of CAROLINE for now, restart at discharge     Carotid arterial disease  U/S 11/2024 with <50% stenosis bilaterally.     Mixed hyperlipidemia  - Continued PTA Rosuvastatin 40mg     Obstructive Sleep Apnea  Does not use CPAP.     Hypothyroidism  TSH WNL.  - Continued PTA Levothyroxine 50mcg        Diet: Moderate Consistent Carb (60 g CHO per Meal) Diet  Snacks/Supplements Adult: Ensure Enlive; With Meals    DVT Prophylaxis: Aspirin and plavix. SCD contraindicated due to PVD/right leg wound vac. Heparin allergy.   Reyes Catheter: Not present  Fluids: PO, 50ml/hr NS  Lines: PRESENT             Cardiac Monitoring: None  Code Status: No CPR- Do NOT Intubate      Clinically Significant Risk Factors       # Hyperchloremia: Highest Cl = 108 mmol/L in last 2 days, will monitor as appropriate          # Hypoalbuminemia: Lowest albumin = 2.3 g/dL at 2/7/2025  7:31 AM, will monitor as appropriate  # Coagulation Defect: INR = 1.45 (Ref range: 0.85 - 1.15) and/or PTT = 47 Seconds (Ref range: 22 - 38 Seconds), will monitor for bleeding  # Thrombocytopenia: Lowest platelets = 79 in last 2 days, will monitor for bleeding   # Hypertension: Noted on problem list           # DMII: A1C = 7.9 % (Ref range: <5.7 %) within past 6 months   # Obesity: Estimated body mass index is 30.24 kg/m  as calculated from the following:    Height as of this encounter: 1.727 m (5' 8\").    Weight as of this encounter: 90.2 kg (198 lb 14.4 oz).   # Severe Malnutrition: based on nutrition assessment    # Financial/Environmental Concerns: other (see comments) (TBD as they are now transitioning into Assisted living from independent living and it will cost more)         Social Drivers of Health   Tobacco Use: Medium Risk (2/4/2025)    Patient History     Smoking " Tobacco Use: Former     Smokeless Tobacco Use: Never     Passive Exposure: Never         Disposition Plan     Medically Ready for Discharge: Anticipated Today       Precepted patient with Dr. Julio Bain.      Jayla Tariq MD  Hospitalist Service  [facility]  Securely message with [email or web address] (more info)  Text page via [email or web address] Paging/Directory   ______________________________________________________________________    Interval History   NAEON. Tolerating pain with tramadol ok. Pain well managed at this time. Discussed with podiatry, no plans for additional intervention to LLE at this time. If ok with ID and IR, plan for discharge to TCU later today.    Physical Exam   Vital Signs: Temp: 97.9  F (36.6  C) Temp src: Oral BP: 121/60 Pulse: 87   Resp: 16 SpO2: 97 % O2 Device: None (Room air)    Weight: 198 lbs 14.4 oz    General: Alert and oriented x 3, no acute distress  Skin: generally clean and dry  HEENT: normocephalic, atraumatic, PERRL, EOMI, no conjunctival injection or icterus, ears and nose normal, no LAD or masses  Resp: clear to ausculation bilaterally, no rales or wheezes  Cardio: RRR, S1 and S2 present, no S3 or S4, no rubs or murmurs  Abdomen: soft, nontender, nondistended, bowel sounds present x 4  Extremities: RLE with wound vac in place over heel and anterior lower leg, appears mildly erythematous but no edema or worsening. LLE with visible eschar and staples over eschar site, no acute edema, bleeding, or inflammation over the site.  Psych: calm, appropriate affect      Medical Decision Making   Please see A&P for additional details of medical decision making.      Data   ------------------------- PAST 24 HR DATA REVIEWED -----------------------------------------------    I have personally reviewed the following data over the past 24 hrs:    4.8  \   7.4 (L)   / 92 (L)     137 108 (H) 20.9 /  104 (H)   3.8 23 1.11 \       Imaging results reviewed over the past 24 hrs:   No results  found for this or any previous visit (from the past 24 hours).

## 2025-02-10 NOTE — PROGRESS NOTES
Patient transferred from ICU. Vitals stable, patient appears tired and states he wants to rest. Bed alarm on, IV infusing. Will monitor.

## 2025-02-10 NOTE — PLAN OF CARE
Problem: Adult Inpatient Plan of Care  Goal: Plan of Care Review  Description: The Plan of Care Review/Shift note should be completed every shift.  The Outcome Evaluation is a brief statement about your assessment that the patient is improving, declining, or no change.  This information will be displayed automatically on your shift  note.  Outcome: Progressing     Problem: Adult Inpatient Plan of Care  Goal: Absence of Hospital-Acquired Illness or Injury  Outcome: Progressing     Problem: Adult Inpatient Plan of Care  Goal: Absence of Hospital-Acquired Illness or Injury  Intervention: Identify and Manage Fall Risk  Recent Flowsheet Documentation  Taken 2/10/2025 1000 by Guillermo Whitehead, RN  Safety Promotion/Fall Prevention:   activity supervised   assistive device/personal items within reach     Problem: Adult Inpatient Plan of Care  Goal: Absence of Hospital-Acquired Illness or Injury  Intervention: Prevent Skin Injury  Recent Flowsheet Documentation  Taken 2/10/2025 1000 by Guillermo Whitehead, RN  Body Position: sitting up in bed  Taken 2/10/2025 0809 by Guillermo Whitehead, RN  Body Position: sitting up in bed   Goal Outcome Evaluation:  Patient alert oriented x 4, able to verbalize needs, poor appetite, Ensure ordered with meals, podiatry changed left foot dressing.continue IV ABX's, , family at bedside. Patient will discharge tomorrow a.m.

## 2025-02-10 NOTE — TELEPHONE ENCOUNTER
Message also received from Dr. Kvng Christian.  Will reach out to patient to schedule once discharged.       Lourdes Delarosa DPM  [facility]  Please have patient make appointment in 1-2 weeks for post hospital/wound VAC/theraskin follow up.    Thanks,    Lourdes

## 2025-02-11 LAB
ANION GAP SERPL CALCULATED.3IONS-SCNC: 11 MMOL/L (ref 7–15)
BUN SERPL-MCNC: 27.3 MG/DL (ref 8–23)
CALCIUM SERPL-MCNC: 7.8 MG/DL (ref 8.8–10.4)
CHLORIDE SERPL-SCNC: 105 MMOL/L (ref 98–107)
CREAT SERPL-MCNC: 1.12 MG/DL (ref 0.67–1.17)
EGFRCR SERPLBLD CKD-EPI 2021: 67 ML/MIN/1.73M2
ERYTHROCYTE [DISTWIDTH] IN BLOOD BY AUTOMATED COUNT: 17.2 % (ref 10–15)
GLUCOSE BLDC GLUCOMTR-MCNC: 207 MG/DL (ref 70–99)
GLUCOSE BLDC GLUCOMTR-MCNC: 224 MG/DL (ref 70–99)
GLUCOSE BLDC GLUCOMTR-MCNC: 254 MG/DL (ref 70–99)
GLUCOSE BLDC GLUCOMTR-MCNC: 254 MG/DL (ref 70–99)
GLUCOSE BLDC GLUCOMTR-MCNC: 256 MG/DL (ref 70–99)
GLUCOSE SERPL-MCNC: 236 MG/DL (ref 70–99)
HCO3 SERPL-SCNC: 21 MMOL/L (ref 22–29)
HCT VFR BLD AUTO: 23.8 % (ref 40–53)
HGB BLD-MCNC: 7.7 G/DL (ref 13.3–17.7)
MCH RBC QN AUTO: 26.6 PG (ref 26.5–33)
MCHC RBC AUTO-ENTMCNC: 32.4 G/DL (ref 31.5–36.5)
MCV RBC AUTO: 82 FL (ref 78–100)
PLATELET # BLD AUTO: 111 10E3/UL (ref 150–450)
POTASSIUM SERPL-SCNC: 3.9 MMOL/L (ref 3.4–5.3)
RBC # BLD AUTO: 2.9 10E6/UL (ref 4.4–5.9)
SODIUM SERPL-SCNC: 137 MMOL/L (ref 135–145)
VANCOMYCIN SERPL-MCNC: 19.4 UG/ML
WBC # BLD AUTO: 6 10E3/UL (ref 4–11)

## 2025-02-11 PROCEDURE — 250N000013 HC RX MED GY IP 250 OP 250 PS 637

## 2025-02-11 PROCEDURE — 250N000011 HC RX IP 250 OP 636: Performed by: FAMILY MEDICINE

## 2025-02-11 PROCEDURE — 250N000011 HC RX IP 250 OP 636: Performed by: INTERNAL MEDICINE

## 2025-02-11 PROCEDURE — 36415 COLL VENOUS BLD VENIPUNCTURE: CPT

## 2025-02-11 PROCEDURE — 120N000001 HC R&B MED SURG/OB

## 2025-02-11 PROCEDURE — 82310 ASSAY OF CALCIUM: CPT

## 2025-02-11 PROCEDURE — 85014 HEMATOCRIT: CPT

## 2025-02-11 PROCEDURE — 97605 NEG PRS WND THER DME<=50SQCM: CPT

## 2025-02-11 PROCEDURE — 80202 ASSAY OF VANCOMYCIN: CPT | Performed by: FAMILY MEDICINE

## 2025-02-11 PROCEDURE — 80048 BASIC METABOLIC PNL TOTAL CA: CPT

## 2025-02-11 PROCEDURE — 99232 SBSQ HOSP IP/OBS MODERATE 35: CPT | Mod: GC

## 2025-02-11 RX ORDER — ASPIRIN 81 MG/1
81 TABLET ORAL EVERY EVENING
Qty: 30 TABLET | Refills: 0 | Status: SHIPPED | OUTPATIENT
Start: 2025-02-11

## 2025-02-11 RX ORDER — SPIRONOLACTONE 25 MG/1
25 TABLET ORAL DAILY
Qty: 30 TABLET | Refills: 0 | Status: SHIPPED | OUTPATIENT
Start: 2025-02-11 | End: 2025-02-13

## 2025-02-11 RX ORDER — INSULIN GLARGINE 100 [IU]/ML
30 INJECTION, SOLUTION SUBCUTANEOUS EVERY MORNING
Qty: 10 ML | Refills: 0 | Status: SHIPPED | OUTPATIENT
Start: 2025-02-11

## 2025-02-11 RX ORDER — LEVOTHYROXINE SODIUM 50 UG/1
50 TABLET ORAL EVERY MORNING
Qty: 30 TABLET | Refills: 0 | Status: SHIPPED | OUTPATIENT
Start: 2025-02-11

## 2025-02-11 RX ORDER — TORSEMIDE 10 MG/1
10 TABLET ORAL DAILY
Qty: 30 TABLET | Refills: 0 | Status: SHIPPED | OUTPATIENT
Start: 2025-02-11

## 2025-02-11 RX ORDER — TRAMADOL HYDROCHLORIDE 50 MG/1
50 TABLET ORAL EVERY 6 HOURS PRN
Qty: 30 TABLET | Refills: 0 | Status: SHIPPED | OUTPATIENT
Start: 2025-02-11 | End: 2025-02-14

## 2025-02-11 RX ORDER — CLOPIDOGREL BISULFATE 75 MG/1
75 TABLET ORAL EVERY MORNING
Qty: 30 TABLET | Refills: 0 | Status: SHIPPED | OUTPATIENT
Start: 2025-02-11

## 2025-02-11 RX ORDER — ROSUVASTATIN CALCIUM 40 MG/1
40 TABLET, COATED ORAL DAILY
Qty: 30 TABLET | Refills: 0 | Status: SHIPPED | OUTPATIENT
Start: 2025-02-11

## 2025-02-11 RX ORDER — LIDOCAINE 4 G/G
1 PATCH TOPICAL
Status: DISCONTINUED | OUTPATIENT
Start: 2025-02-11 | End: 2025-02-13 | Stop reason: HOSPADM

## 2025-02-11 RX ORDER — DOXYCYCLINE 100 MG/1
100 CAPSULE ORAL EVERY 12 HOURS SCHEDULED
Status: DISCONTINUED | OUTPATIENT
Start: 2025-02-11 | End: 2025-02-13 | Stop reason: HOSPADM

## 2025-02-11 RX ORDER — LOSARTAN POTASSIUM 50 MG/1
50 TABLET ORAL AT BEDTIME
Qty: 30 TABLET | Refills: 0 | Status: SHIPPED | OUTPATIENT
Start: 2025-02-11

## 2025-02-11 RX ORDER — DOXYCYCLINE 100 MG/1
100 CAPSULE ORAL 2 TIMES DAILY
Qty: 28 CAPSULE | Refills: 0 | Status: SHIPPED | OUTPATIENT
Start: 2025-02-11 | End: 2025-02-13

## 2025-02-11 RX ADMIN — METFORMIN HYDROCHLORIDE 1000 MG: 500 TABLET, FILM COATED ORAL at 09:30

## 2025-02-11 RX ADMIN — CLOPIDOGREL BISULFATE 75 MG: 75 TABLET ORAL at 09:30

## 2025-02-11 RX ADMIN — Medication: at 13:52

## 2025-02-11 RX ADMIN — PIPERACILLIN AND TAZOBACTAM 3.38 G: 3; .375 INJECTION, POWDER, FOR SOLUTION INTRAVENOUS at 05:10

## 2025-02-11 RX ADMIN — INSULIN ASPART 3 UNITS: 100 INJECTION, SOLUTION INTRAVENOUS; SUBCUTANEOUS at 12:19

## 2025-02-11 RX ADMIN — LEVOTHYROXINE SODIUM 50 MCG: 0.03 TABLET ORAL at 05:13

## 2025-02-11 RX ADMIN — ASPIRIN 81 MG: 81 TABLET, COATED ORAL at 20:52

## 2025-02-11 RX ADMIN — Medication 60 ML: at 09:30

## 2025-02-11 RX ADMIN — Medication 500 MG: at 09:30

## 2025-02-11 RX ADMIN — DICLOFENAC SODIUM 2 G: 10 GEL TOPICAL at 19:46

## 2025-02-11 RX ADMIN — AMOXICILLIN AND CLAVULANATE POTASSIUM 1 TABLET: 875; 125 TABLET, FILM COATED ORAL at 20:49

## 2025-02-11 RX ADMIN — Medication: at 01:19

## 2025-02-11 RX ADMIN — Medication 1 MG: at 01:30

## 2025-02-11 RX ADMIN — LIDOCAINE 1 PATCH: 4 PATCH TOPICAL at 20:52

## 2025-02-11 RX ADMIN — INSULIN ASPART 2 UNITS: 100 INJECTION, SOLUTION INTRAVENOUS; SUBCUTANEOUS at 18:33

## 2025-02-11 RX ADMIN — LOSARTAN POTASSIUM 50 MG: 50 TABLET, FILM COATED ORAL at 21:37

## 2025-02-11 RX ADMIN — INSULIN GLARGINE 30 UNITS: 100 INJECTION, SOLUTION SUBCUTANEOUS at 09:38

## 2025-02-11 RX ADMIN — ACETAMINOPHEN 650 MG: 325 TABLET ORAL at 23:52

## 2025-02-11 RX ADMIN — ZINC SULFATE 220 MG (50 MG) CAPSULE 220 MG: CAPSULE at 09:29

## 2025-02-11 RX ADMIN — INSULIN ASPART 2 UNITS: 100 INJECTION, SOLUTION INTRAVENOUS; SUBCUTANEOUS at 09:37

## 2025-02-11 RX ADMIN — ACETAMINOPHEN 650 MG: 325 TABLET ORAL at 14:09

## 2025-02-11 RX ADMIN — AMOXICILLIN AND CLAVULANATE POTASSIUM 1 TABLET: 875; 125 TABLET, FILM COATED ORAL at 13:52

## 2025-02-11 RX ADMIN — EMPAGLIFLOZIN 10 MG: 10 TABLET, FILM COATED ORAL at 09:30

## 2025-02-11 RX ADMIN — ROSUVASTATIN 40 MG: 40 TABLET, FILM COATED ORAL at 09:30

## 2025-02-11 RX ADMIN — Medication: at 23:52

## 2025-02-11 RX ADMIN — METFORMIN HYDROCHLORIDE 1000 MG: 500 TABLET, FILM COATED ORAL at 18:32

## 2025-02-11 RX ADMIN — VANCOMYCIN HYDROCHLORIDE 1000 MG: 1 INJECTION, SOLUTION INTRAVENOUS at 09:32

## 2025-02-11 RX ADMIN — DOXYCYCLINE 100 MG: 100 CAPSULE ORAL at 20:51

## 2025-02-11 RX ADMIN — THERA TABS 1 TABLET: TAB at 09:30

## 2025-02-11 RX ADMIN — THIAMINE HCL TAB 100 MG 100 MG: 100 TAB at 09:30

## 2025-02-11 RX ADMIN — OXYCODONE HYDROCHLORIDE 2.5 MG: 5 TABLET ORAL at 20:52

## 2025-02-11 ASSESSMENT — ACTIVITIES OF DAILY LIVING (ADL)
ADLS_ACUITY_SCORE: 79

## 2025-02-11 NOTE — PLAN OF CARE
Problem: Adult Inpatient Plan of Care  Goal: Plan of Care Review  Description: The Plan of Care Review/Shift note should be completed every shift.  The Outcome Evaluation is a brief statement about your assessment that the patient is improving, declining, or no change.  This information will be displayed automatically on your shift  note.  Outcome: Progressing     Problem: Delirium  Goal: Optimal Coping  Outcome: Progressing  Intervention: Optimize Psychosocial Adjustment to Delirium  Recent Flowsheet Documentation  Taken 2/10/2025 1700 by Lourdes Romero RN  Supportive Measures: active listening utilized     Problem: Skin Injury Risk Increased  Goal: Skin Health and Integrity  Outcome: Progressing  Intervention: Plan: Nurse Driven Intervention: Moisture Management  Recent Flowsheet Documentation  Taken 2/10/2025 1700 by Lourdes Romero RN  Moisture Interventions: Encourage regular toileting  Intervention: Plan: Nurse Driven Intervention: Friction and Shear  Recent Flowsheet Documentation  Taken 2/10/2025 1700 by Lourdes Romero RN  Friction/Shear Interventions: HOB 30 degrees or less  Intervention: Optimize Skin Protection  Recent Flowsheet Documentation  Taken 2/10/2025 1700 by Lourdes Romero RN  Activity Management:   activity adjusted per tolerance   activity encouraged  Head of Bed (HOB) Positioning: HOB at 20-30 degrees     Problem: Comorbidity Management  Goal: Blood Glucose Levels Within Targeted Range  Outcome: Progressing  Intervention: Monitor and Manage Glycemia  Recent Flowsheet Documentation  Taken 2/10/2025 1700 by Lourdes Romero RN  Medication Review/Management: medications reviewed     Problem: Risk for Delirium  Goal: Optimal Coping  Outcome: Progressing  Intervention: Optimize Psychosocial Adjustment to Delirium  Recent Flowsheet Documentation  Taken 2/10/2025 1700 by Lourdes Romero RN  Supportive Measures: active listening utilized     Problem: Adult Inpatient Plan of Care  Goal:  Absence of Hospital-Acquired Illness or Injury  Intervention: Identify and Manage Fall Risk  Recent Flowsheet Documentation  Taken 2/10/2025 1700 by Lourdes Romero RN  Safety Promotion/Fall Prevention:   activity supervised   assistive device/personal items within reach  Intervention: Prevent Skin Injury  Recent Flowsheet Documentation  Taken 2/10/2025 1700 by Lourdes Romero RN  Body Position: sitting up in bed  Goal: Optimal Comfort and Wellbeing  Intervention: Provide Person-Centered Care  Recent Flowsheet Documentation  Taken 2/10/2025 1700 by Lourdes Romero RN  Trust Relationship/Rapport: care explained     Problem: Delirium  Goal: Improved Behavioral Control  Intervention: Minimize Safety Risk  Recent Flowsheet Documentation  Taken 2/10/2025 1700 by Lourdes Romero RN  Trust Relationship/Rapport: care explained  Goal: Improved Attention and Thought Clarity  Intervention: Maximize Cognitive Function  Recent Flowsheet Documentation  Taken 2/10/2025 1700 by Lourdes Romero RN  Reorientation Measures: clock in view     Problem: Comorbidity Management  Goal: Blood Pressure in Desired Range  Intervention: Maintain Blood Pressure Management  Recent Flowsheet Documentation  Taken 2/10/2025 1700 by Lourdes Romero RN  Medication Review/Management: medications reviewed  Goal: Maintenance of Behavioral Health Symptom Control  Intervention: Maintain Behavioral Health Symptom Control  Recent Flowsheet Documentation  Taken 2/10/2025 1700 by Lourdes Romero RN  Medication Review/Management: medications reviewed  Goal: Maintenance of Heart Failure Symptom Control  Intervention: Maintain Heart Failure Management  Recent Flowsheet Documentation  Taken 2/10/2025 1700 by Lourdes Romero RN  Medication Review/Management: medications reviewed     Problem: Pain Acute  Goal: Optimal Pain Control and Function  Intervention: Optimize Psychosocial Wellbeing  Recent Flowsheet Documentation  Taken 2/10/2025 1700 by Lourdes Romero  SOL RN  Supportive Measures: active listening utilized  Intervention: Prevent or Manage Pain  Recent Flowsheet Documentation  Taken 2/10/2025 1700 by Lourdes Romero RN  Medication Review/Management: medications reviewed     Problem: Wound  Goal: Optimal Coping  Intervention: Support Patient and Family Response  Recent Flowsheet Documentation  Taken 2/10/2025 1700 by Lourdes Romero RN  Supportive Measures: active listening utilized  Goal: Optimal Functional Ability  Intervention: Optimize Functional Ability  Recent Flowsheet Documentation  Taken 2/10/2025 1700 by Lourdes Romero RN  Activity Management:   activity adjusted per tolerance   activity encouraged  Activity Assistance Provided: assistance, 2 people  Goal: Skin Health and Integrity  Intervention: Optimize Skin Protection  Recent Flowsheet Documentation  Taken 2/10/2025 1700 by Lourdes Romero RN  Activity Management:   activity adjusted per tolerance   activity encouraged  Head of Bed (HOB) Positioning: HOB at 20-30 degrees     Problem: Risk for Delirium  Goal: Improved Behavioral Control  Intervention: Minimize Safety Risk  Recent Flowsheet Documentation  Taken 2/10/2025 1700 by Lourdes Romero RN  Trust Relationship/Rapport: care explained  Goal: Improved Attention and Thought Clarity  Intervention: Maximize Cognitive Function  Recent Flowsheet Documentation  Taken 2/10/2025 1700 by Lourdes Romero RN  Reorientation Measures: clock in view     Problem: Fall Injury Risk  Goal: Absence of Fall and Fall-Related Injury  Intervention: Identify and Manage Contributors  Recent Flowsheet Documentation  Taken 2/10/2025 1700 by Lourdes Romero RN  Medication Review/Management: medications reviewed  Intervention: Promote Injury-Free Environment  Recent Flowsheet Documentation  Taken 2/10/2025 1700 by Lourdes Romero RN  Safety Promotion/Fall Prevention:   activity supervised   assistive device/personal items within reach     Problem: Fatigue  Goal: Improved  Activity Tolerance  Intervention: Promote Improved Energy  Recent Flowsheet Documentation  Taken 2/10/2025 1700 by Lourdes Romero, RN  Activity Management:   activity adjusted per tolerance   activity encouraged     Problem: Anemia  Goal: Anemia Symptom Improvement  Intervention: Monitor and Manage Anemia  Recent Flowsheet Documentation  Taken 2/10/2025 1700 by Lourdes Romero, RN  Safety Promotion/Fall Prevention:   activity supervised   assistive device/personal items within reach     Problem: Acute Kidney Injury/Impairment  Goal: Effective Renal Function  Intervention: Monitor and Support Renal Function  Recent Flowsheet Documentation  Taken 2/10/2025 1700 by Lourdes Romero RN  Medication Review/Management: medications reviewed     Problem: Chronic Kidney Disease  Goal: Optimal Coping with Chronic Illness  Intervention: Support Psychosocial Response  Recent Flowsheet Documentation  Taken 2/10/2025 1700 by Lourdes Romero RN  Supportive Measures: active listening utilized  Goal: Optimal Functional Ability  Intervention: Optimize Functional Ability  Recent Flowsheet Documentation  Taken 2/10/2025 1700 by Lourdes Romero, RN  Activity Management:   activity adjusted per tolerance   activity encouraged  Goal: Minimize Renal Failure Effects  Intervention: Monitor and Support Renal Function  Recent Flowsheet Documentation  Taken 2/10/2025 1700 by Lourdes Romero, RN  Medication Review/Management: medications reviewed   Goal Outcome Evaluation:                  Pt. Is alert. Had a headache this shift. Tylenol given with effect. Zosyn infusing. RA. Poor appetite. Continue with POC.

## 2025-02-11 NOTE — PHARMACY-VANCOMYCIN DOSING SERVICE
Past Medical History:   Diagnosis Date    AICD (automatic cardioverter/defibrillator) present     RYAN (acute kidney injury) 3/25/2018    CHF (congestive heart failure)     COPD with acute bronchitis 2018    Coronary artery disease     Encounter for blood transfusion     Hyperlipemia     Hypertension     MI (myocardial infarction)     Neuropathy     PAD (peripheral artery disease)     PVD (peripheral vascular disease)        Past Surgical History:   Procedure Laterality Date    AMPUTATION Right     great toe    BYBPASS GRAFT AORTOBIUFEMORAL      CARDIAC DEFIBRILLATOR PLACEMENT      coronary stents      EXPLORATION AND EVaCUATION OF HEMATOMA OF UPPER EXTREMITY Left     KNEE ARTHROPLASTY Left     VASCULAR SURGERY      fem-pop bypass       Review of patient's allergies indicates:  No Known Allergies    Current Facility-Administered Medications on File Prior to Encounter   Medication    [] 0.45% NaCl infusion    [COMPLETED] albuterol sulfate nebulizer solution 10 mg    [COMPLETED] calcium gluconate 100 mg/mL (10%) injection    [COMPLETED] etomidate (AMIDATE) 2 mg/mL injection    [COMPLETED] piperacillin-tazobactam 4.5 g in dextrose 5 % 100 mL IVPB (ready to mix system)    [COMPLETED] sodium bicarbonate solution 50 mEq    [COMPLETED] sodium polystyrene 15 gram/60 mL suspension 30 g    [COMPLETED] succinylcholine (ANECTINE) 20 mg/mL injection    [COMPLETED] succinylcholine (ANECTINE) 20 mg/mL injection    [COMPLETED] vancomycin in dextrose 5 % 1 gram/250 mL IVPB 1,000 mg    [DISCONTINUED] acetaminophen tablet 650 mg    [DISCONTINUED] aspirin EC tablet 81 mg    [DISCONTINUED] atorvastatin tablet 80 mg    [DISCONTINUED] calcium gluconate 500 mg in dextrose 5 % 100 mL IVPB    [DISCONTINUED] clonazePAM tablet 0.5 mg    [DISCONTINUED] collagenase ointment    [DISCONTINUED] digoxin tablet 0.125 mg    [DISCONTINUED] diphenhydrAMINE capsule 25 mg    [DISCONTINUED] DOBUTamine 500mg  Pharmacy Vancomycin Note  Date of Service 2025  Patient's  1945   80 year old, male    Indication: Skin and Soft Tissue Infection  Day of Therapy: 8  Current vancomycin regimen:  1000 mg IV q24h  Current vancomycin monitoring method: AUC  Current vancomycin therapeutic monitoring goal: 400-600 mg*h/L    [employer] Prediction of Current Vancomycin Regimen  Regimen: 1000 mg IV every 24 hours.  Start time: 09:04 on 2025  Exposure target: AUC24 (range)400-600 mg/L.hr   AUC24,ss: 487 mg/L.hr  Probability of AUC24 > 400: 97 %  Ctrough,ss: 15.9 mg/L  Probability of Ctrough,ss > 20: 4 %  Probability of nephrotoxicity (Lodise DOT ): 11 %    Current estimated CrCl = Estimated Creatinine Clearance: 58.3 mL/min (based on SCr of 1.12 mg/dL).    Creatinine for last 3 days  2025:  4:17 AM Creatinine 1.13 mg/dL  2/10/2025:  5:35 AM Creatinine 1.11 mg/dL  2025:  5:41 AM Creatinine 1.12 mg/dL    Recent Vancomycin Levels (past 3 days)  2025:  5:41 AM Vancomycin 19.4 ug/mL    Vancomycin IV Administrations (past 72 hours)                     vancomycin (VANCOCIN) 1,000 mg in 200 mL dextrose intermittent infusion (mg) 1,000 mg New Bag 02/10/25 0904     1,000 mg New Bag 25 0954     1,000 mg New Bag 25 0926                    Nephrotoxins and other renal medications (From now, onward)      Start     Dose/Rate Route Frequency Ordered Stop    25 1000  vancomycin (VANCOCIN) 1,000 mg in 200 mL dextrose intermittent infusion         1,000 mg  200 mL/hr over 1 Hours Intravenous EVERY 24 HOURS 25 0906      25 0800  empagliflozin (JARDIANCE) tablet 10 mg        Note to Pharmacy: PTA Sig:Take 1 tablet (10 mg) by mouth daily.      10 mg Oral DAILY 25 1405      25 0800  [Held by provider]  torsemide (DEMADEX) tablet 10 mg        (On hold since 2025 at 1323 until manually unheld; held by Heather Brewer MDHold Reason: Acute Kidney Injury)   Note to Pharmacy:  "PTA Sig:Take 1 tablet (10 mg) by mouth daily.      10 mg Oral DAILY 02/04/25 1405      02/04/25 2200  piperacillin-tazobactam (ZOSYN) 3.375 g vial to attach to  mL bag        Note to Pharmacy: For [facility], [facility] and [facility]: For Zosyn-naive patients, use the \"Zosyn initial dose + extended infusion\" order panel.    3.375 g  over 240 Minutes Intravenous EVERY 8 HOURS 02/04/25 1532                 Contrast Orders - past 72 hours (72h ago, onward)      Start     Dose/Rate Route Frequency Stop    02/08/25 1500  iodixanol (VISIPAQUE 320) injection 150 mL         150 mL Arterial ONCE 02/08/25 1458            Interpretation of levels and current regimen:  Vancomycin level is reflective of -600    Has serum creatinine changed greater than 50% in last 72 hours: No    Urine output:  good urine output    Renal Function: Stable    [employer]ight[employer] Prediction of Planned New Vancomycin Regimen  Regimen: 1000 mg IV every 24 hours.  Start time: 09:04 on 02/11/2025  Exposure target: AUC24 (range)400-600 mg/L.hr   AUC24,ss: 487 mg/L.hr  Probability of AUC24 > 400: 97 %  Ctrough,ss: 15.9 mg/L  Probability of Ctrough,ss > 20: 4 %  Probability of nephrotoxicity (Lodise DOT 2009): 11 %    Plan:  Continue Current Dose  Vancomycin monitoring method: AUC  Vancomycin therapeutic monitoring goal: 400-600 mg*h/L  Pharmacy will check vancomycin levels as appropriate in 3-5 Days.  Serum creatinine levels will be ordered a minimum of twice weekly.    [facility]    " in D5W 250mL infusion (premix) (NON-TITRATING)    [DISCONTINUED] etomidate injection    [DISCONTINUED] furosemide injection 40 mg    [DISCONTINUED] gentamicin 0.1 % ointment    [DISCONTINUED] haloperidol tablet 5 mg    [DISCONTINUED] HYDROcodone-acetaminophen 5-325 mg per tablet 1 tablet    [DISCONTINUED] levalbuterol nebulizer solution 1.25 mg    [DISCONTINUED] lidocaine HCL 2% jelly    [DISCONTINUED] metoprolol injection 5 mg    [DISCONTINUED] metoprolol succinate (TOPROL-XL) 24 hr tablet 25 mg    [DISCONTINUED] morphine injection 2 mg    [DISCONTINUED] norepinephrine 4 mg in dextrose 5 % 250 mL infusion    [DISCONTINUED] ondansetron injection 4 mg    [DISCONTINUED] pantoprazole EC tablet 40 mg    [DISCONTINUED] pantoprazole injection 40 mg    [DISCONTINUED] potassium chloride SA CR tablet 10 mEq    [DISCONTINUED] pregabalin capsule 75 mg    [DISCONTINUED] promethazine (PHENERGAN) 12.5 mg in dextrose 5 % 50 mL IVPB    [DISCONTINUED] propofol (DIPRIVAN) 10 mg/mL infusion    [DISCONTINUED] propofol (DIPRIVAN) 10 mg/mL infusion    [DISCONTINUED] propofol (DIPRIVAN) 10 mg/mL infusion    [DISCONTINUED] ranolazine 12 hr tablet 500 mg    [DISCONTINUED] sodium chloride 0.9% flush 10 mL    [DISCONTINUED] succinylcholine injection    [DISCONTINUED] tamsulosin 24 hr capsule 0.4 mg    [DISCONTINUED] ticagrelor tablet 90 mg    [DISCONTINUED] ticagrelor tablet 90 mg    [DISCONTINUED] zolpidem tablet 5 mg     Current Outpatient Prescriptions on File Prior to Encounter   Medication Sig    amitriptyline (ELAVIL) 50 MG tablet Take 1 tablet (50 mg total) by mouth every evening.    aspirin (ECOTRIN) 81 MG EC tablet Take 81 mg by mouth once daily.    atorvastatin (LIPITOR) 80 MG tablet Take 1 tablet (80 mg total) by mouth every evening.    cilostazol (PLETAL) 50 MG Tab Take 2 tablets (100 mg total) by mouth 2 (two) times daily.    cyclobenzaprine (FLEXERIL) 10 MG tablet Take 10 mg by mouth 3 (three)  times daily as needed for Muscle spasms.    folic acid-vit B6-vit B12 2.5-25-2 mg (FOLBIC OR EQUIV) 2.5-25-2 mg Tab Take 1 tablet by mouth once daily.    gabapentin (NEURONTIN) 800 MG tablet Take 800 mg by mouth every evening.    lidocaine (XYLOCAINE) 5 % Oint ointment Apply topically 4 (four) times daily. Apply to entire left foot    metoprolol succinate (TOPROL-XL) 25 MG 24 hr tablet Take 0.5 tablets (12.5 mg total) by mouth once daily.    nicotine (NICODERM CQ) 21 mg/24 hr Place 1 patch onto the skin once daily.    oxyCODONE (ROXICODONE) 10 mg Tab immediate release tablet Take 1 tablet (10 mg total) by mouth every 4 (four) hours as needed for Pain.    polyethylene glycol (GLYCOLAX) 17 gram PwPk Take 17 g by mouth 2 (two) times daily as needed (constipation).    pregabalin (LYRICA) 100 MG capsule Take 1 capsule (100 mg total) by mouth 3 (three) times daily.    ranolazine (RANEXA) 1,000 mg Tb12 Take 1,000 mg by mouth 2 (two) times daily.    senna-docusate 8.6-50 mg (PERICOLACE) 8.6-50 mg per tablet Take 1 tablet by mouth 2 (two) times daily.    thiamine 100 MG tablet Take 1 tablet (100 mg total) by mouth once daily.    ticagrelor (BRILINTA) 90 mg tablet Take 90 mg by mouth 2 (two) times daily.    zolpidem (AMBIEN) 5 MG Tab Take 1 tablet (5 mg total) by mouth nightly as needed.     Family History     None        Social History Main Topics    Smoking status: Former Smoker     Packs/day: 0.50     Quit date: 5/1/2018    Smokeless tobacco: Former User     Types: Chew     Quit date: 8/2/1980      Comment: Uses nicotine gum and nicotine patches    Alcohol use Yes      Comment: rarely    Drug use: No    Sexual activity: Yes     Partners: Female     Review of Systems   Unable to perform ROS: intubated     Objective:     Vital Signs (Most Recent):  Temp: 99.4 °F (37.4 °C) (06/16/18 1945)  Pulse: 82 (06/16/18 2109)  Resp: 19 (06/16/18 2109)  BP: 103/78 (06/16/18 2109)  SpO2: 100 % (06/16/18 2109) Vital Signs  (24h Range):  Temp:  [94.6 °F (34.8 °C)-99.4 °F (37.4 °C)] 99.4 °F (37.4 °C)  Pulse:  [65-92] 82  Resp:  [11-33] 19  SpO2:  [96 %-100 %] 100 %  BP: ()/(40-78) 103/78     Weight: 62.5 kg (137 lb 12.6 oz)  Body mass index is 22.24 kg/m².    SpO2: 100 %  O2 Device (Oxygen Therapy): ventilator    No intake or output data in the 24 hours ending 06/16/18 2118    Lines/Drains/Airways     Central Venous Catheter Line                 Percutaneous Central Line Insertion/Assessment - triple lumen  06/16/18 0825 right subclavian less than 1 day         Trialysis (Dialysis) Catheter 06/16/18 2109 left internal jugular less than 1 day          Drain                 Urethral Catheter 06/16/18 0914 Latex 16 Fr. less than 1 day          Airway                 Airway - Non-Surgical 06/16/18 0756 Other (Comment) less than 1 day          Epidural Line                 Perineural Analgesia/Anesthesia Assessment (using dermatomes) Epidural 04/05/18 1115 72 days          Peripheral Intravenous Line                 Peripheral IV - Single Lumen 06/14/18 1642 Left Forearm 2 days                Physical Exam   Constitutional: He is oriented to person, place, and time. He appears well-developed and well-nourished.   HENT:   Head: Normocephalic and atraumatic.   Intubated, sedated     Eyes: Conjunctivae are normal. Pupils are equal, round, and reactive to light.   Neck: Normal range of motion. Neck supple. JVD present.   Cardiovascular: Normal rate, regular rhythm and normal heart sounds.  Exam reveals no gallop and no friction rub.    No murmur heard.  Pulmonary/Chest: Effort normal. No respiratory distress. He has no wheezes. He has rales. He exhibits no tenderness.   Abdominal: Soft. Bowel sounds are normal. He exhibits no distension. There is no tenderness.   Musculoskeletal: He exhibits no edema or tenderness.   R AKA  L TMA     Neurological: He is alert and oriented to person, place, and time.   Skin: Skin is dry. No erythema. No  pallor.   Cool, cap refill > 3s       Significant Labs:   CMP   Recent Labs  Lab 06/16/18  0335 06/16/18  0836 06/16/18 2003   * 131* 128*   K 5.7* 7.1* 5.3*    97 95   CO2 13* 11* 19*   * 99 137*   BUN 14 17 23*   CREATININE 1.1 1.6* 2.2*   CALCIUM 9.1 8.5* 8.1*   PROT  --   --  6.2   ALBUMIN  --   --  2.8*   BILITOT  --   --  2.6*   ALKPHOS  --   --  212*   ALT  --   --  3,207*   ANIONGAP 19* 23* 14   ESTGFRAFRICA >60 58* 39.2*   EGFRNONAA >60 50* 33.9*   , CBC   Recent Labs  Lab 06/16/18  0836 06/16/18 2003 06/16/18 2058   WBC 8.87 17.86*  --    HGB 11.0* 10.8*  --    HCT 34.3* 32.2* 35*    180  --    , INR   Recent Labs  Lab 06/16/18 2009   INR 2.3*   , Lipid Panel No results for input(s): CHOL, HDL, LDLCALC, TRIG, CHOLHDL in the last 48 hours. and Troponin   Recent Labs  Lab 06/16/18  0836   TROPONINI 0.028*       Significant Imaging: Echocardiogram:   2D echo with color flow doppler:   Results for orders placed or performed during the hospital encounter of 03/18/18   2D echo with color flow doppler   Result Value Ref Range    EF 15 (A) 55 - 65    Mitral Valve Regurgitation MILD TO MODERATE    , EKG: nsr 1st degree avb and rbbb and X-Ray: CXR: X-Ray Chest 1 View (CXR): No results found for this visit on 06/16/18.

## 2025-02-11 NOTE — DISCHARGE SUMMARY
"[facility]  Discharge Summary - Medicine & Pediatrics       Date of Admission:  2/4/2025  Date of Discharge:  2/13/2025  Discharging Provider: Dr. Tariq, Dr. 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    Weight as of this encounter: 94.1 kg (207 lb 7.3 oz).    # Severe Malnutrition: based on nutrition assessment      Follow-ups Needed After Discharge   Follow-up Appointments       Follow Up and recommended labs and tests      Follow up with senior care physician.  The following labs/tests are recommended: BMP, CBC.  Follow up with primary care provider in 14 days.  The following labs/tests are recommended: BMP, CBC.  Follow up with podiatry in 1-2 weeks.  No follow up labs or test are needed.              Unresulted Labs Ordered in the Past 30 Days of this Admission       No orders found from 1/5/2025 to 2/5/2025.            Discharge Disposition   Discharged to short-term care facility  Condition at discharge: Stable    Hospital Course   Janice Nowak was admitted on 2/4/2025. He has a hx of T2DM, HFpEF, recent debridement of diabetic ulcers, SABRINA, hepatocellular carcinoma currently receiving palliative immunotherapy, hypothyroidism, HTN, HLD and was initially admitted for acute abdominal pain and encephalopathy in the setting of his known " hepatocellular carcinoma, then found to have worsening of his chronic osteomyelitis of his bilateral lower extremities.  The following problems were addressed during his hospitalization:    Bilateral Lower Extremity Ulcers s/p bilateral irrigation and debridement  Acute on Chronic Right Calcaneal Osteomyelitis s/p calcanectomy  Patient with significant bilateral foot wounds. Podiatry, vascular disease, and infectious disease are involved. Patient with prior hospitalizations for this concern. S/p partial calcanectomy right heel 10/24/24 and bilateral LE I&D 1/27/25. Given evidence of PAD, also had stenting with IR on 1/16/24. Most recently discharged with wound vac and completed 6 week course of antibiotics. Underwent LLE angiogram 2/8 and found to have high grade stenosis, now s/p crossing/orbital atherectomy/angioplasty of the ALEX/DP, Angioplasty/DCB of the Popliteal lesion with FELIPE placement in the proximal SFA lesion.   - Podiatry consulted, recommending:               - per podiatry, no repeat debridement of foot ulceration nor continued wound vac planned for now on LLE, will continue with wet to dry dressing until seen again in clinic              - Continue wound vac on RLE until cleared by podiatry              - Reconsider surgical intervention/amputation if no improved healing in a few weeks, planned follow-up in podiatry clinic in 1-2 weeks  - IR consulted, recommending:               - Continue ASA + Clopidegrel at discharge  - ID consulted, recommending:    - completed IV vanc/zosyn course prior to admission, discontinued 2/11              - Transition to Doxy + Augmentin x 14 days on discharge, script sent to complete course  - follow-up CMP, CBC outpatient     Generalized weakness  Encephalopathy  Initially presented with multiple days of progressive weakness, poor p.o. intake, poor sleep, myalgias.  Head imaging on admission negative for acute intracranial pathology.  No significant metabolic  derangements.  Differential included hepatic encephalopathy vs infectious derangements, ultimately resolved with change of medication from oxycodone to tramadol.   - tramadol 50mg q8hrs PRN for pain  - Continue to explore goals of care in the context of known metastatic cancer diagnosis in outpt setting     Hepatobiliary Cancer   Concern for Metastasis to Lungs   Relatively new diagnosis, began with incidental liver mass noted in 11/2024. Underwent IR guided biopsy on 1/2/25 which confirmed HCC. Met with oncology on 1/10/25 who discussed recommendation for palliative immunotherapy. CT chest on recent admission showed nodules and bulky adenopathy concerning for malignancy. Patient was endorsing abdominal pain on admission, though this has resolved now.  - Continue GOC conversation outpt  - No indication for inpatient oncology consult through stay, follow-up outpatient      Heart failure with preserved ejection fraction, NYHA class II   Hypertension  Echo 12/11 with mild concentric LVH. EF 55-60%. Had transudative pleural effusion s/p thoracentesis 12/2024. No signs of acute heart failure while admitted, diuretics held due to CAROLINE, restart at discharge  - restart PTA Losartan, Torsemide. Hold spironolactone for soft BP, consider restarting outpt  - follow-up outpt     Chest pain  Known hx of HFrEF, elevated troponins. Overnight 2/11-2/12 had an episode of chest pain, reproducible with palpation on exam, not suspicious for ACS. Patient in the past has declined workup for elevated troponins, again declining workup during this hospital stay. Improved with lidocaine patch and Voltaren gel while admitted, consider restart in outpt setting.    Type II Diabetes.   A1c 7.9%. Admitted by hospitalist team who continued all home meds. Adequate control at this time. No lows or highs.   - PTA Empagliflozin 10mg, Glargine 30U Qam, Metformin 1000mg BID     Troponinemia   127 ?  109 on admission. Similar to baseline and downtrending.  EKG similar to prior. Patient declined cardio workup on last admission when admitted for syncope. No ACS-type symptoms while admitted.     Anemia, normocytic  Baseline 10-12. Hbg downtrended but stable by discharge. No evidence of acute bleed aside from wounds on feet.   - follow-up CBC outpt     Hyponatremia, resolved   131 on admit. Likely due to poor PO intake. Resolved with IVF and encouragement of PO intake.   - outpt BMP     CAROLINE on CKD, improved  Creatinine 1.24, baseline closer to 1. Likely pre-renal in the setting of poor PO intake. Improved by discharge.  - restart PTA meds  - outpt BMP     Carotid arterial disease  U/S 11/2024 with <50% stenosis bilaterally.     Mixed hyperlipidemia  - Continued PTA Rosuvastatin 40mg     Obstructive Sleep Apnea  Does not use CPAP.     Hypothyroidism  TSH WNL.  - Continued PTA Levothyroxine 50mcg    Penile lesion  Present since prior to admission. Stable.  - WOC consult, appreciate    Consultations This Hospital Stay   CARE MANAGEMENT / SOCIAL WORK IP CONSULT  PHYSICAL THERAPY ADULT IP CONSULT  OCCUPATIONAL THERAPY ADULT IP CONSULT  CARE MANAGEMENT / SOCIAL WORK IP CONSULT  PODIATRY IP CONSULT  WOUND OSTOMY CONTINENCE NURSE  IP CONSULT  PHARMACY TO DOSE VANCO  PODIATRY IP CONSULT  CARE MANAGEMENT / SOCIAL WORK IP CONSULT  VASCULAR SURGERY IP CONSULT  INTERVENTIONAL RADIOLOGY ADULT/PEDS IP CONSULT  INFECTIOUS DISEASES IP CONSULT  WOUND OSTOMY CONTINENCE NURSE  IP CONSULT  CARE MANAGEMENT / SOCIAL WORK IP CONSULT  NUTRITION SERVICES ADULT IP CONSULT  WOUND OSTOMY CONTINENCE NURSE  IP CONSULT  PHYSICAL THERAPY ADULT IP CONSULT  OCCUPATIONAL THERAPY ADULT IP CONSULT    Code Status   No CPR- Do NOT Intubate       The patient was discussed with Dr. Odell Tariq MD  [address] [address]  Phone: [phone]  Fax:  [phone]  ______________________________________________________________________    Physical Exam   Vital Signs: Temp: 97.5  F (36.4  C) Temp src: Oral BP: 94/51 Pulse: 86   Resp: 18 SpO2: 95 % O2 Device: None (Room air)    Weight: 207 lbs 7.25 oz    General: Alert, no acute distress. Tired  Skin: clean, dry, and intact  HEENT: normocephalic, atraumatic, spontaneous eye movement, no injection or icterus, ears and nose normal, no neck masses. Dry mucous membranes.  Resp: normal work of breathing, no wheezing  Cardio: RRR, S1 and S2 present TTP over L chest wall just medial to areola, no rubs or murmurs noted.   Abdomen: soft, nontender, nondistended, no palpable masses  Extremities: no erythema, no edema  Neuro: RLE wound vacs draining serosanguineous, LLE dressing clean and dry   Psych: calm, tired, but logical thought process        Primary Care Physician   Anayeli Machado    Discharge Orders      General info for SNF    Length of Stay Estimate: Short Term Care: Estimated # of Days <30  Condition at Discharge: Stable  Level of care:skilled   Rehabilitation Potential: Good  Admission H&P remains valid and up-to-date: Yes  Recent Chemotherapy: N/A   Use Nursing Home Standing Orders: Yes     Follow Up and recommended labs and tests    Follow up with retirement physician.  The following labs/tests are recommended: BMP, CBC.  Follow up with primary care provider in 14 days.  The following labs/tests are recommended: BMP, CBC.  Follow up with podiatry in 1-2 weeks.  No follow up labs or test are needed.     Reason for your hospital stay    You were hospitalized for your bone infection. You are now ok to go to transitional care to get stronger before going home.     Wound care (specify)    Site:   RLE  Instructions:  Wound vac to both sites on RLE per podiatry     Activity - Up with nursing assistance     Activity    Podiatry discharge instructions  -Nonweightbearing right foot.  No pivot transfer  allowed.  -Elevate bilateral feet  -Left foot with adaptic to the dorsal foot and toe wound, cover with wet to dry dressing with sterile saline, gauze, kerlix daily.  Wound VAC per nursing orders  -Rooke boots bilateral  -Follow-up with Dr. Patricio in 1-2 weeks  For scheduling please call [phone].     Brief Discharge Instructions    Angiogram Discharge Instructions:  You had an angiogram procedure. An angiogram is a procedure that uses x-rays to take pictures of your blood vessels. A long, flexible tube or catheter is inserted through the blood stream (through the procedure site) to help deliver contrast (dye) into the arteries so they can be visible on the x-ray. Angiograms are used to evaluate possible blockages in the arterial system. Please follow the below instructions after your angiogram, including monitoring of your procedure site.    Care instructions after angiogram procedure:  -  If you received sedation for your procedure, do not drive or operate heavy machinery for the rest of the day.  -  Do not lift objects greater than 10 pounds for 3 days following angiogram procedure.  -  Avoid excessive exercise and straining for 3 days.   -  Avoid tub baths, pools, hot tubs and Jacuzzis for 3 days or until procedure site is well healed.   -  You may shower beginning tomorrow. Do not scrub procedure site until well healed; pat dry.  -  Return to your normal activities as you tolerate after the 3 day restriction.   -  You can expect to return to work 1-3 days after your procedure - depending on the nature of your profession.  -  It is normal to have some tenderness and minimal swelling at procedure puncture site. A small area of discoloration may be present. Tenderness typically subsides in 1-2 days. A small knot may also be present at puncture site for 6-8 weeks. This can be a normal part of the healing process.     Follow up:  - Follow up with your vascular surgeon or the ordering provider. [facility]  may contact you to help arrange for additional follow up.    Please seek medical evaluation for:  - If you develop fevers (greater than 101 F (38.3C)).  - If you develop increasing pain, redness, purulent drainage, tenderness, or swelling at procedure site.   - If you experience any bleeding from procedure/puncture site: lie down, firmly apply pressure to puncture site and call 911.  - Seek emergent evaluation if you experience any new leg/arm pain, discoloration or numbness.     Physical Therapy Adult Consult    Evaluate and treat as clinically indicated.    Reason:  BLE osteomyelitis     Occupational Therapy Adult Consult    Evaluate and treat as clinically indicated.    Reason:  BLE osteomyelitis     Fall precautions     Diet    Follow this diet upon discharge: Current Diet:Orders Placed This Encounter      Snacks/Supplements Adult: Ensure Enlive; With Meals      Moderate Consistent Carb (60 g CHO per Meal) Diet       Significant Results and Procedures   Most Recent 3 CBC's:  Recent Labs   Lab Test 02/13/25  0947 02/12/25  0555 02/11/25  0541   WBC 8.1 7.2 6.0   HGB 8.1* 8.0* 7.7*   MCV 83 81 82    127* 111*     Most Recent 3 BMP's:  Recent Labs   Lab Test 02/13/25  1136 02/13/25  0947 02/13/25  0744 02/12/25  0809 02/12/25  0555 02/11/25  0829 02/11/25  0541   NA  --  140  --   --  138  --  137   POTASSIUM  --  4.0  --   --  4.0  --  3.9   CHLORIDE  --  107  --   --  108*  --  105   CO2  --  26  --   --  23  --  21*   BUN  --  26.5*  --   --  24.2*  --  27.3*   CR  --  1.14  --   --  1.02  --  1.12   ANIONGAP  --  7  --   --  7  --  11   AYAN  --  8.3*  --   --  8.1*  --  7.8*   * 166* 138*   < > 172*   < > 236*    < > = values in this interval not displayed.     Most Recent 2 LFT's:  Recent Labs   Lab Test 02/07/25  0731 02/05/25  0740   AST 88* 99*   ALT 83* 91*   ALKPHOS 200* 130   BILITOTAL 0.4 0.5     Most Recent 3 INR's:  Recent Labs   Lab Test 02/05/25  0740 02/04/25  1004 01/16/25  1809    INR 1.45* 1.37* 2.04*     Most Recent INR's and Anticoagulation Dosing History:  Anticoagulation Dose History          Latest Ref Rng & Units 12/12/2024 12/26/2024 1/16/2025 2/4/2025 2/5/2025   Recent Dosing and Labs   INR 0.85 - 1.15 1.36  1.21  2.04  1.16  1.37  1.45       Details          Multiple values from one day are sorted in reverse-chronological order             Most Recent 3 Creatinines:  Recent Labs   Lab Test 02/13/25  0947 02/12/25  0555 02/11/25  0541   CR 1.14 1.02 1.12     Most Recent 3 Hemoglobins:  Recent Labs   Lab Test 02/13/25  0947 02/12/25  0555 02/11/25  0541   HGB 8.1* 8.0* 7.7*     Most Recent 3 Troponin's:No lab results found.  Most Recent 3 BNP's:  Recent Labs   Lab Test 01/17/25  0720 01/08/25  1041 12/11/24  1610   NTBNPI 3,027*  --  3,933*   NTBNP  --  3,529*  --      Most Recent D-dimer:  Recent Labs   Lab Test 12/11/24  1610   DD 0.78*     Most Recent Cholesterol Panel:  Recent Labs   Lab Test 02/10/20  1026   CHOL 152   LDL 65   HDL 63   TRIG 119     7-Day Micro Results       No results found for the last 168 hours.          Most Recent TSH and T4:  Recent Labs   Lab Test 02/04/25  1004   TSH 3.55     Most Recent Hemoglobin A1c:  Recent Labs   Lab Test 02/04/25  1004   A1C 7.9*     Most Recent 6 glucoses:  Recent Labs   Lab Test 02/13/25  1136 02/13/25  0947 02/13/25  0744 02/13/25  0206 02/12/25  2141 02/12/25  1722   * 166* 138* 136* 163* 118*     Most Recent Urinalysis:  Recent Labs   Lab Test 02/04/25  1915   COLOR Yellow   APPEARANCE Clear   URINEGLC >1000*   URINEBILI Negative   URINEKETONE Negative   SG 1.022   UBLD Negative   URINEPH 5.5   PROTEIN 50*   NITRITE Negative   LEUKEST Negative   RBCU 1   WBCU 4     Most Recent ABG:No lab results found.  Most Recent ESR & CRP:  Recent Labs   Lab Test 02/07/25  0731 11/20/24  0926   SED  --  77*   CRPI 118.60* 14.20*     Most Recent Anemia Panel:  Recent Labs   Lab Test 02/13/25  0947   WBC 8.1   HGB 8.1*   HCT 26.1*    MCV 83        Most Recent CPK:  Recent Labs   Lab Test 02/04/25  1153 11/20/24  0926 11/12/24  0615   CKT 28* 73 148   ,   Results for orders placed or performed during the hospital encounter of 02/04/25   Head CT w/o contrast    Narrative    EXAM: CT HEAD W/O CONTRAST  LOCATION: [facility]  DATE: 2/4/2025    INDICATION: Recent fall, confusion  COMPARISON: 3/8/2016 brain MRI  TECHNIQUE: Routine CT Head without IV contrast. Multiplanar reformats. Dose reduction techniques were used.    FINDINGS:  INTRACRANIAL CONTENTS: No intracranial hemorrhage, extraaxial collection, or mass effect.  Small chronic right cerebellar infarct. No CT evidence of acute infarct. Mild presumed chronic small vessel ischemic changes. Mild generalized volume loss. No   hydrocephalus.     VISUALIZED ORBITS/SINUSES/MASTOIDS: No intraorbital abnormality. Minor mucosal thickening right maxillary sinus. No middle ear or mastoid effusion.    BONES/SOFT TISSUES: No acute abnormality.      Impression    IMPRESSION:  1.  No acute intracranial injury, hemorrhage, mass, or CT evidence of recent ischemia.   CT Abdomen Pelvis w Contrast    Narrative    EXAM: CT ABDOMEN PELVIS W CONTRAST  LOCATION: [facility]  DATE: 2/4/2025    INDICATION: Weakness, diffuse lower abdominal tenderness  COMPARISON: 1/16/2025  TECHNIQUE: CT scan of the abdomen and pelvis was performed following injection of IV contrast. Multiplanar reformats were obtained. Dose reduction techniques were used.  CONTRAST: IsoVue 370 75mL    FINDINGS:   LOWER CHEST: Partially visualized nodule along the right major fissure. Three-vessel coronary artery calcification.    HEPATOBILIARY: [Morphologic changes of cirrhosis with ill-defined 3.7 x 3.4 cm hypoenhancing lesion in the right lobe posteriorly (4/36). Cholecystectomy. No biliary dilation.    PANCREAS: Normal.    SPLEEN: Enlarged, 16.0 cm craniocaudal.    ADRENAL GLANDS:  Normal.    KIDNEYS/BLADDER: Normal.    BOWEL: Diverticulosis of the colon. No acute inflammatory change. No obstruction. Normal appendix.    LYMPH NODES: Enlarged periportal lymph node measuring 7.4 x 5.1 cm (4/67), previously 7.3 x 5.2 cm. No other enlarged lymph nodes in the abdomen and pelvis. Some borderline enlarged retroperitoneal lymph nodes are unchanged.    VASCULATURE: Severe atherosclerotic calcification of the aorta, visceral branches, and iliofemoral arteries. Right common iliac and left common-external iliac artery stents, which appear to be patent, although the arterial enhancement is suboptimal. No   aortic aneurysm.    PELVIC ORGANS: Normal.    MUSCULOSKELETAL: Multilevel degenerative changes in the spine.      Impression    IMPRESSION:   1.  No acute abnormality in the abdomen and pelvis.  2.  Morphologic changes of cirrhosis with splenomegaly. No ascites.  3.  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      XR Chest Port 1 View    Narrative    EXAM: XR CHEST PORT 1 VIEW  LOCATION: [facility]  DATE: 2/4/2025    INDICATION: Generalized weakness. Hx of CHF.  COMPARISON: Chest radiograph 1/16/2025, CT chest 1/16/2020      Impression    IMPRESSION: Right chest port is unchanged with tip near the cavoatrial junction. No findings of edema. Nodular opacity in the right midlung corresponding to the cavitary nodule seen on prior CT is unchanged accounting for differences in technique. No   pleural effusion or pneumothorax. Stable heart size and mediastinal contours.   US Abdomen Limited    Narrative    EXAM: US ABDOMEN LIMITED  LOCATION: [facility]  DATE: 02/04/2025    INDICATION: History of HCC. Here with weakness, increased LFTs.  COMPARISON: CT 02/04/2025.  TECHNIQUE: Limited abdominal  ultrasound.    FINDINGS:  GALLBLADDER: Cholecystectomy.    BILE DUCTS: No biliary dilatation. The common duct measures 3.8 mm.    LIVER: Cirrhotic appearance with coarsened echotexture and nodular contour. Solid 2.5 cm subcapsular mass in the superior right hepatic lobe compatible with neoplasm. No focal mass.    RIGHT KIDNEY: No hydronephrosis.    PANCREAS: The visualized portions are normal.    No ascites. Splenomegaly.      Impression    IMPRESSION:  1.  Cirrhosis with a 2.5 cm solid subcapsular mass in the superior right hepatic lobe compatible with known neoplasm.    2.  Cholecystectomy. No bile duct dilatation.       XR Foot Left 2 Views    Narrative    EXAM: XR ANKLE RIGHT 2 VIEWS, XR FOOT LEFT 2 VIEWS, XR CALCANEUS RIGHT G/E 2 VIEWS  LOCATION: [facility]  DATE: 2/5/2025    INDICATION: Lateral ankle wound.   COMPARISON: Radiographs 10/7/2024 and MRI 10/8/2024       Impression    IMPRESSION:     Right: Degenerative arthrosis of the talonavicular joint. Ankle joint spaces are otherwise maintained and normally aligned. Intact ankle mortise. No acute fracture. Skin staples over the posterior heel with a broad-based ulceration evident. No   radiographic evidence of osteomyelitis.    Left: Screw fixation of the distal tibia. Skin staples over the dorsal midfoot and first toe. No acute fracture or radiographic evidence of osteomyelitis.    XR Calcaneus Right G/E 2 Views    Narrative    EXAM: XR ANKLE RIGHT 2 VIEWS, XR FOOT LEFT 2 VIEWS, XR CALCANEUS RIGHT G/E 2 VIEWS  LOCATION: [facility]  DATE: 2/5/2025    INDICATION: Lateral ankle wound.   COMPARISON: Radiographs 10/7/2024 and MRI 10/8/2024       Impression    IMPRESSION:     Right: Degenerative arthrosis of the talonavicular joint. Ankle joint spaces are otherwise maintained and normally aligned. Intact ankle mortise. No acute fracture. Skin staples over the posterior heel with a broad-based ulceration evident. No    radiographic evidence of osteomyelitis.    Left: Screw fixation of the distal tibia. Skin staples over the dorsal midfoot and first toe. No acute fracture or radiographic evidence of osteomyelitis.    XR Ankle Right 2 Views    Narrative    EXAM: XR ANKLE RIGHT 2 VIEWS, XR FOOT LEFT 2 VIEWS, XR CALCANEUS RIGHT G/E 2 VIEWS  LOCATION: [facility]  DATE: 2/5/2025    INDICATION: Lateral ankle wound.   COMPARISON: Radiographs 10/7/2024 and MRI 10/8/2024       Impression    IMPRESSION:     Right: Degenerative arthrosis of the talonavicular joint. Ankle joint spaces are otherwise maintained and normally aligned. Intact ankle mortise. No acute fracture. Skin staples over the posterior heel with a broad-based ulceration evident. No   radiographic evidence of osteomyelitis.    Left: Screw fixation of the distal tibia. Skin staples over the dorsal midfoot and first toe. No acute fracture or radiographic evidence of osteomyelitis.    MR Foot Left w/o Contrast    Narrative    EXAM: MR FOOT LEFT W/O CONTRAST  LOCATION: [facility]  DATE: 2/5/2025    INDICATION: Ulcer debridement with TheraSkin application, dorsal left foot, post op day 9.  COMPARISON: Left foot radiographic exam 2/5/2025  TECHNIQUE: Unenhanced.    FINDINGS:     Postoperative change with skin staples are seen along the dorsum of the great toe extending from the MTP joint to near the IP joint. Adjacent susceptibility artifact. No convincing evidence for great toe acute osteomyelitis. The lesser toes are negative   for fracture or acute osteomyelitis. No metatarsal fracture or evidence for metatarsal stress fracture. Similar postoperative changes are seen along the dorsum of the more proximal foot at the level of the naviculocuneiform joints and more proximally. No   convincing evidence for osteomyelitis in the tarsal bones were imaged.    Mild scattered degenerative change in the left foot including at the first MTP  and metatarsal sesamoid joints. Degenerative change in the midfoot. No sizable joint effusion.    No acute tendon injury. No tenosynovitis. No significant tendinopathy. Distal anterior tibialis and peroneus longus intact.    Lisfranc ligament intact. No midfoot subluxation.    Generalized foot muscle atrophy is moderate to severe with denervation muscle edema. Visualized plantar fascia is intact. No drainable fluid collection. Dorsal foot soft tissue swelling.       Impression    IMPRESSION:  1.  Postoperative changes related to reported ulcer debridement with skin grafting along the dorsum of the great toe and dorsum of the proximal foot.  2.  No convincing MR evidence for left foot acute osteomyelitis, with particular attention at the great toe.  3.  No evidence for acute foot fracture or stress fracture.  4.  Scattered degenerative change including in the midfoot and at the first MTP and metatarsal sesamoid joints.  5.  Diffuse acute on chronic denervation edema throughout the intrinsic musculature of the left foot.  6.  Dorsal foot soft tissue swelling. No soft tissue abscess.   MR Ankle Right w/o Contrast    Narrative    EXAM: MR ANKLE RIGHT W/O CONTRAST  LOCATION: [facility]  DATE: 2/5/2025    INDICATION: Ulceration with TheraSkin application heel and lateral ankle region, post op day # 9.  Wound infected.  History of osteomyelitis of right heel as well.  COMPARISON: Calcaneus radiographic exam 2/5/2025. Foot MRI 10/8/2024.  TECHNIQUE: Unenhanced.    FINDINGS:     TENDONS:   -Peroneal: Peroneus longus and brevis tendons are intact. Mild tendinopathy. No significant lateral tenosynovitis. No subluxation.  -Medial: Posterior tibialis tendon is intact. Mild posterior tibialis tendinopathy. Trace distal posterior tibialis tendon sheath fluid. Flexor digitorum longus and flexor hallucis longus tendons are normal. No tenosynovitis.  -Anterior: Anterior tibialis, extensor hallucis longus, and  extensor digitorum longus tendons are normal. No tenosynovitis.  -Achilles: No significant tendinopathy or paratenonitis.    LIGAMENTS:   -Anterior talofibular ligament: Intact.   -Calcaneofibular ligament: Intact.   -Posterior talofibular ligament: Intact.  -Syndesmotic inferior tibiofibular ligaments: Intact.  -Deltoid ligament complex: Intact.  -Spring ligament complex: Intact.    JOINTS AND BONES:   -Abnormal edema-like marrow signal intensity posterior calcaneus deep to what appears to be an ulcer along the posterior heel, compatible with osteomyelitis.  No acute ankle or hindfoot fracture. No calcaneus stress fracture. 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 Scattered midfoot arthrosis. Trace ankle and subtalar joint effusions.    SOFT TISSUES:  -Plantar fascia: Chronic thickening proximal medial bundle plantar fascia. No acute inflammation or discrete tear.  -Sinus tarsi and tarsal tunnel: Preserved sinus Tarsi fat signal. No space-occupying mass is seen along the course of the tarsal tunnel.  -Muscles: Global intrinsic foot muscle atrophy with patchy muscle edema suggestive of denervation edema.    Broad-based ulcer and adjacent skin staples posterior heel. No soft tissue abscess.      Impression    IMPRESSION:  1.  Broad-based soft tissue ulcer posterior heel with adjacent skin staples and MR findings compatible with adjacent posterior calcaneus osteomyelitis.  2.  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  3.  Mild peroneus longus and brevis tendinopathy. Mild posterior tibialis tendinopathy.   US SONNY Doppler No Exercise 1-2 Levels Bilateral    Narrative    EXAM: RESTING ANKLE-BRACHIAL INDICES (ABIs)  LOCATION: [facility]  DATE: 02/06/2025    INDICATION: Please obtain digit pressures, no healing LE wounds.  COMPARISON: None.    SONNY FINDINGS:  RIGHT  Brachial: 157  Ankle (PT): Not performed due to bilateral ankle  dressings in place.  Ankle (DP): Not performed due to bilateral ankle dressings in place.  Digit: Greater than 240 Index: Noncompressible    LEFT  Ankle (PT): Not performed due to bilateral ankle dressings in place.  Ankle (DP): Not performed due to bilateral ankle dressings in place.  Digit: Greater than 240 Index: Noncompressible    WAVEFORMS: Absent right 2nd through 5th digital waveforms and absent left 2nd through 5th digital waveforms.      Impression    IMPRESSION:  1.  Bilateral ABIs were not calculated secondary to overlying bandages.  2.  Bilateral digital brachial pressures show noncompressible vessels.  3.  Absent right 2nd through 5th digital waveforms bilaterally.   US Lower Extremity Arterial Duplex Bilateral    Narrative    EXAM: US LOWER EXTREMITY ARTERIAL DUPLEX BILATERAL  LOCATION: [facility]  DATE: 2/6/2025    INDICATION: nonhealing LE wounds  COMPARISON: CT abdomen and pelvis 2/4/2025  TECHNIQUE: Duplex utilizing 2D gray-scale imaging, Doppler interrogation with color-flow and spectral waveform analysis.    FINDINGS:   RIGHT: Patent arteries throughout the right lower extremity. Waveforms are multiphasic with the exception of the right peroneal artery. Atherosclerotic disease is noted throughout the right lower extremity.    LEFT: Patent arteries throughout the left lower extremity. Waveforms are multiphasic in the external iliac artery. Waveforms are monophasic in the common femoral, profunda femoral, superficial femoral, popliteal and tibial vessels suggesting a stenosis   between the external iliac and common femoral artery that is not visualized on ultrasound.    RIGHT LOWER EXTREMITY ARTERIAL ASSESSMENT:  External iliac artery 174 cm/s  Common femoral artery: 128 cm/s  Profunda femoris artery: 50 cm/s  SFA (proximal): 132 cm/s  SFA (mid): 68 cm/s  SFA (distal): 76 cm/s  Popliteal artery: 71 cm/s  Posterior tibial artery: 98 cm/s  Anterior tibial artery: 30  cm/s  Dorsalis pedis artery: 40 cm/s    LEFT LOWER EXTREMITY ARTERIAL ASSESSMENT:  External iliac artery 198 cm/s  Common femoral artery: 167 cm/s  Profunda femoris artery: 50 cm/s  SFA (proximal): 62 cm/s  SFA (mid): 63 cm/s  SFA (distal): 59 cm/s  Popliteal artery: 82 cm/s  Posterior tibial artery: 64 cm/s  Anterior tibial artery: 53 cm/s  Dorsalis pedis artery: 18 cm/s      Impression    IMPRESSION:  1.  Patent arteries throughout the right lower extremity.  2.  Patent arteries throughout the left lower extremity. However waveforms become monophasic in the common femoral artery, suggesting stenosis not visualized on ultrasound.   IR Lower Extremity Angiogram Left    Narrative    LOCATION: [facility]  DATE: 2/8/2025    PROCEDURE: LEFT LOWER EXTREMITY DIAGNOSTIC ARTERIOGRAPHY, ROTATIONAL ATHERECTOMY OF THE LEFT COMMON FEMORAL, SUPERFICIAL FEMORAL, POPLITEAL, ANTERIOR TIBIAL AND DORSALIS PEDIS ARTERIES, BALLOON ANGIOPLASTY OF THE LEFT COMMON FEMORAL, SUPERFICIAL FEMORAL,   POPLITEAL, ANTERIOR TIBIAL AND DORSALIS PEDIS ARTERIES, STENT PLACEMENT IN THE PROXIMAL LEFT SUPERFICIAL FEMORAL ARTERY, ULTRASOUND GUIDANCE FOR VASCULAR ACCESS, MODERATE SEDATION    INTERVENTIONAL RADIOLOGIST: Silvestre Jc MD    INDICATION: 80-year-old male with nonhealing left foot wounds/critical limb threatening ischemia, plan for left lower extremity angiogram with intervention for limb salvage.    INDICATION FOR DIAGNOSTIC ANGIOGRAPHY: Diagnostic angiography was required to precisely identify plaque morphology to aid in evaluation and management    CONSENT: The risks, benefits and alternatives of the procedure were discussed with the patient  in detail. All questions were answered. Informed consent was given to proceed with the procedure.    MODERATE SEDATION: Versed 4 mg IV; Fentanyl 200 mcg IV. During the time out, immediately prior to the administration of medications, the patient was reassessed for adequacy to  receive conscious sedation.  Under physician supervision, Versed and fentanyl   were administered for moderate sedation. Pulse oximetry, heart rate and blood pressure were continuously monitored by an independent trained observer. The physician spent 160 minutes of face-to-face sedation time with the patient.    CONTRAST: 94 cc Visipaque  ADDITIONAL MEDICATIONS: Argatroban     FLUOROSCOPIC TIME: 58.7 minutes.  RADIATION DOSE: Air Kerma: 2433 mGy.    COMPLICATIONS: No immediate complications.    UNIVERSAL PROTOCOL: The operative site was marked and any prior imaging was reviewed. Required items including blood products, implants, devices and special equipment was made available. Patient identity was confirmed either verbally, with demographic   information, hospital assigned identification or other identification markers. A timeout was performed immediately prior to the procedure.    STERILE BARRIER TECHNIQUE: Maximum sterile barrier technique was used. Cutaneous antisepsis was performed at the operative site with application of 2% chlorhexidine and large sterile drape. Prior to the procedure, the  and assistant performed   hand hygiene and wore hat, mask, sterile gown, and sterile gloves during the entire procedure.    PROCEDURE:    Access was achieved into the right common femoral artery utilizing real-time ultrasound guidance and a micropuncture access kit. Imaging demonstrates a patent and compressible artery. Permanent images were stored to the patient's medical record.   Conversion was made for a 5 Sinhala vascular sheath, which was attached to a continuous saline infusion. The Omni Flush catheter was advanced to the caudal abdominal aorta and selective catheterization of the left external iliac artery was performed.    A diagnostic left lower extremity angiogram was obtained. Imaging demonstrates patent left common femoral, profunda femoral, superficial femoral and popliteal arteries. There is focal,  high-grade stenosis involving the proximal superficial femoral   artery. There is moderate stenosis involving the distal superficial femoral artery and above-knee popliteal artery. Occlusion of the proximal anterior tibial artery. The posterior tibial artery is patent to the foot without significant stenosis. Disease   involving the mid/distal peroneal artery. Occluded dorsalis pedis artery.    Systemic anticoagulation was performed with Argatroban as per institutional protocol and monitored with serial activated clotting times.    Exchange is made for a 5 Dominican x 55 cm vascular sheath which was positioned in the left common femoral artery. An angled 018 crossing catheter and guidewire were used to selectively catheterize the anterior tibial artery. Contrast injection demonstrates   multi segment occlusions involving the anterior tibial artery. The guidewire and catheter were carefully advanced under fluoroscopic guidance to the dorsalis pedis artery. A small volume of contrast was injected, confirming intraluminal crossing.    Exchange is made for an 014 Viperwire which was positioned in the distal dorsalis pedis artery. The crossing catheter was removed. Over-the-wire exchange is made for a 1.25 Solid CSI atherectomy catheter. Rotational atherectomy of disease in the common   femoral, proximal superficial femoral, distal superficial femoral and above-knee popliteal arteries was performed in the low, medium and high speeds.     Rotational atherectomy of disease in the proximal anterior tibial artery was performed. There is inability to advance the CSI device to the mid and distal anterior tibial artery secondary to significant disease. Over-the-wire exchange is made for a 2 mm   x 1 20 mm angioplasty balloon and angioplasty of the proximal/mid anterior tibial artery was performed. There is inability to advance the balloon to the distal vessel. Over-the-wire exchange is made for a 1.5 mm x 2 cm angioplasty balloon.  The balloon   was carefully advanced to the distal anterior tibial artery and balloon angioplasty of significant stenosis was performed to allow for passage of the atherectomy device. Over-the-wire exchange is again made for the CSI 1.25 Solid atherectomy catheter and   atherectomy of the remaining metatarsals distal anterior tibial and dorsalis pedis arteries was performed in the low, medium and high speeds. Over-the-wire exchange is made for a 3 mm x 120 mm angioplasty balloon and angioplasty of the entire anterior   tibial and dorsalis pedis arteries was performed.    Over-the-wire exchange is made for a 6 mm x 15 cm InPact drug-eluting balloon which was positioned in the distal superficial femoral and above-knee popliteal arteries and inflated to burst pressure for 3 minutes. Over-the-wire exchange is made for a 6 mm   x 15 cm InPact drug-eluting balloon which was positioned in the common femoral and proximal superficial femoral arteries and inflated to burst pressure for 3 minutes. Post drug coated balloon angioplasty angiography demonstrates persistent moderate   stenosis involving the proximal superficial femoral artery lesion. This residual stenosis in the distal superficial femoral and popliteal arteries. Over-the-wire exchange is made for a 6 German x 40 cm sheath which was positioned external iliac artery.   Over-the-wire exchange is made for a 6 mm x 2 cm high pressure balloon which was positioned and inflated in the proximal superficial femoral artery lesion. Post high pressure balloon angioplasty angiography demonstrates persistent residual stenosis.   Over-the-wire exchange is made for a 7 mm x 4 cm Jill drug-eluting stent which was positioned in the proximal superficial femoral artery lesion and deployed. Post deployment balloon into the pelvis was performed utilizing a 7 mm x 4 cm balloon. Post   stent angiography demonstrates minimal residual stenosis at this location with small focus of active  extravasation. The area of bleeding was successfully treated with low pressure, prolonged balloon angioplasty for 2 minutes.    At this point, the procedure was considered complete. Repeat check the activated clotting time demonstrated persistent elevated values. Exchange is made for a short 6 Faroese sheath which was secured in place.      Impression    IMPRESSION:    1. Left lower extremity angiography demonstrates focal, high-grade stenosis involving the proximal superficial femoral artery. Moderate stenosis involving the distal superficial femoral and above-knee popliteal arteries. Dominant runoff to the foot via   the posterior tibial artery. Disease involving the mid/distal peroneal artery. Occluded anterior tibial and dorsalis pedis arteries.  2. Successful crossing, recanalization, orbital atherectomy and angioplasty of the occluded anterior tibial and dorsalis pedis arteries. Status post drug-coated balloon angioplasty of the distal superficial femoral and popliteal arteries. Status post   drug-eluting stent placement in the proximal superficial femoral artery lesion.  3. Post intervention angiography demonstrates excellent result with three-vessel runoff to the foot. There is marked improvement in perfusion of the foot on post intervention imaging.     PLAN: Four hours of bedrest post sheath removal. Palpable left posterior tibial and dorsalis pedis pulses post procedure. Continue Crestor 40 mg once daily, aspirin 81 mg once daily and Plavix 75 mg once daily.         Discharge Medications   Current Discharge Medication List        START taking these medications    Details   amoxicillin-clavulanate (AUGMENTIN) 875-125 MG tablet Take 1 tablet by mouth 2 times daily for 12 days.  Qty: 24 tablet, Refills: 0    Associated Diagnoses: Other osteomyelitis of right foot (H)      doxycycline hyclate (VIBRAMYCIN) 100 MG capsule Take 1 capsule (100 mg) by mouth 2 times daily for 12 days.  Qty: 24 capsule, Refills: 0     Associated Diagnoses: Other osteomyelitis of right foot (H)      traMADol (ULTRAM) 50 MG tablet Take 1 tablet (50 mg) by mouth every 6 hours as needed for moderate pain.  Qty: 30 tablet, Refills: 0    Associated Diagnoses: Other osteomyelitis of right foot (H)           CONTINUE these medications which have CHANGED    Details   aspirin 81 MG EC tablet Take 1 tablet (81 mg) by mouth every evening.  Qty: 30 tablet, Refills: 0    Associated Diagnoses: PAD (peripheral artery disease)      clopidogrel (PLAVIX) 75 MG tablet Take 1 tablet (75 mg) by mouth every morning.  Qty: 30 tablet, Refills: 0    Associated Diagnoses: PAD (peripheral artery disease)      empagliflozin (JARDIANCE) 10 MG TABS tablet Take 1 tablet (10 mg) by mouth daily.  Qty: 30 tablet, Refills: 0    Associated Diagnoses: Heart failure with preserved ejection fraction, NYHA class I (H); Heart failure with preserved ejection fraction, NYHA class II (H); Type 2 diabetes mellitus with diabetic peripheral angiopathy without gangrene, with long-term current use of insulin (H)      insulin aspart (NOVOLOG PEN) 100 UNIT/ML pen Inject 5 Units subcutaneously 3 times daily as needed for high blood sugar (With Meals). Novolog Flexpen: 5 units with breakfast, 5 units with lunch, 5 units with dinner. Takes as needed with each meal.  Qty: 15 mL, Refills: 0    Associated Diagnoses: Type 2 diabetes mellitus with diabetic peripheral angiopathy without gangrene, with long-term current use of insulin (H)      insulin glargine (LANTUS VIAL) 100 UNIT/ML vial Inject 30 Units subcutaneously every morning.  Qty: 10 mL, Refills: 0    Associated Diagnoses: Type 2 diabetes mellitus with diabetic peripheral angiopathy without gangrene, with long-term current use of insulin (H)      levothyroxine (SYNTHROID/LEVOTHROID) 50 MCG tablet Take 1 tablet (50 mcg) by mouth every morning.  Qty: 30 tablet, Refills: 0    Associated Diagnoses: Hypothyroidism, unspecified type      losartan  (COZAAR) 50 MG tablet Take 1 tablet (50 mg) by mouth at bedtime.  Qty: 30 tablet, Refills: 0    Associated Diagnoses: Heart failure with preserved ejection fraction, NYHA class I (H)      metFORMIN (GLUCOPHAGE) 1000 MG tablet Take 1 tablet (1,000 mg) by mouth 2 times daily (with meals).  Qty: 60 tablet, Refills: 0    Associated Diagnoses: Type 2 diabetes mellitus with diabetic peripheral angiopathy without gangrene, with long-term current use of insulin (H)      rosuvastatin (CRESTOR) 40 MG tablet Take 1 tablet (40 mg) by mouth daily.  Qty: 30 tablet, Refills: 0    Associated Diagnoses: Heart failure with preserved ejection fraction, NYHA class II (H); Type 2 diabetes mellitus with diabetic peripheral angiopathy without gangrene, with long-term current use of insulin (H)      torsemide (DEMADEX) 10 MG tablet Take 1 tablet (10 mg) by mouth daily.  Qty: 30 tablet, Refills: 0    Associated Diagnoses: Heart failure with preserved ejection fraction, NYHA class I (H); Essential hypertension           CONTINUE these medications which have NOT CHANGED    Details   co-enzyme Q-10 100 MG CAPS capsule Take 200 mg by mouth every evening.      guaiFENesin (MUCUS RELIEF ER PO) Take 1,200 mg by mouth 2 times daily as needed (congestion).      lactobacillus rhamnosus, GG, (CULTURELL) capsule Take 1 capsule by mouth every morning.      meclizine (ANTIVERT) 25 MG tablet Take 50 mg by mouth 3 times daily as needed (Vertigo).      Multiple Vitamins-Minerals (PRESERVISION AREDS 2 PO) Take 2 capsules by mouth 2 times daily.      vitamin C (ASCORBIC ACID) 1000 MG TABS Take 1,000 mg by mouth 2 times daily.      vitamin C with B complex (B COMPLEX-C) tablet Take 1 tablet by mouth every morning.           STOP taking these medications       spironolactone (ALDACTONE) 25 MG tablet Comments:   Reason for Stopping:             Allergies   Allergies   Allergen Reactions    Exenatide Unknown    Heparin Unknown    Liraglutide Unknown    Lisinopril  Cough    Morphine Unknown

## 2025-02-11 NOTE — PROGRESS NOTES
Care Management Follow Up    Length of Stay (days): 7    Expected Discharge Date: 02/13/2025     Concerns to be Addressed:       Patient plan of care discussed at interdisciplinary rounds: Yes    Anticipated Discharge Disposition: Transitional Care         Anticipated Discharge Services:    Anticipated Discharge DME:      Patient/family educated on Medicare website which has current facility and service quality ratings:    Education Provided on the Discharge Plan:    Patient/Family in Agreement with the Plan:      Referrals Placed by CM/SW:    Private pay costs discussed: Not applicable    Discussed  Partnership in Safe Discharge Planning  document with patient/family: No     Handoff Completed: No, handoff not indicated or clinically appropriate    Additional Information:  Patient's discharge was canceled for today as [facility] due to current resident of TCU appealed his discharge.  Possibly bed might be available on Thursday, February 13th.  will reach out Thursday morning to TCU for bed availability.  Team and patient updated.    Next Steps: CM will continue to follow and assist with discharge planing when bed become available.    Renata Francis, LSW

## 2025-02-11 NOTE — PROGRESS NOTES
"5:50 PM    Paged by nursing about patient reporting left sided chest pain.     Assessed patient at bedside.     Patient reports dull pain along left side of ribs that started out of the blue. Pain then self resolved but recurred 10 minutes later. Describes pain as coming in waves. Later described pain as lightning-like with more of a sharp quality. Denies any central chest pain or pressure. Denies any associated breathing difficulty. Tylenol did not help the pain.     /60 (BP Location: Left arm, Patient Position: Supine, Cuff Size: Adult Regular)   Pulse 89   Temp 97.8  F (36.6  C) (Oral)   Resp 20   Ht 1.727 m (5' 8\")   Wt 90.2 kg (198 lb 14.4 oz)   SpO2 95%   BMI 30.24 kg/m      Constitutional: cooperative, no apparent distress  Respiratory: Clear to auscultation bilaterally, no crackles or wheezing  Cardiovascular: Regular rate and rhythm, normal S1 and S2  Musculoskeletal: reproducible point tenderness along midaxillary line of left chest wall along inferior ribs    Left sided rib pain reproducible on exam. Discussed that with description of pain and reproducible nature, less likely to be cardiac in nature. Considered troponin and EKG and reviewed options with patient though patient opts against further testing at this time. He would like to try topical treatments for pain and wants to avoid opioid use if possible.     Plan:  - lidocaine patch  - voltaren gel  - oxycodone 2.5 mg q6 hours prn    Luli Capone MD    "

## 2025-02-11 NOTE — PLAN OF CARE
"  Problem: Adult Inpatient Plan of Care  Goal: Plan of Care Review  Description: The Plan of Care Review/Shift note should be completed every shift.  The Outcome Evaluation is a brief statement about your assessment that the patient is improving, declining, or no change.  This information will be displayed automatically on your shift  note.  Outcome: Progressing     Problem: Adult Inpatient Plan of Care  Goal: Patient-Specific Goal (Individualized)  Description: You can add care plan individualizations to a care plan. Examples of Individualization might be:  \"Parent requests to be called daily at 9am for status\", \"I have a hard time hearing out of my right ear\", or \"Do not touch me to wake me up as it startles  me\".  Outcome: Progressing     Problem: Delirium  Goal: Improved Behavioral Control  Outcome: Progressing     Problem: Skin Injury Risk Increased  Goal: Skin Health and Integrity  Outcome: Progressing     Problem: Comorbidity Management  Goal: Blood Glucose Levels Within Targeted Range  Outcome: Progressing     Problem: Comorbidity Management  Goal: Blood Pressure in Desired Range  Outcome: Progressing     Problem: Pain Acute  Goal: Optimal Pain Control and Function  Outcome: Progressing  Intervention: Optimize Psychosocial Wellbeing  Recent Flowsheet Documentation  Taken 2/11/2025 0100 by Kathia Lea RN  Supportive Measures: active listening utilized  Intervention: Prevent or Manage Pain  Recent Flowsheet Documentation  Taken 2/11/2025 0100 by Kahtia Lea, RN  Medication Review/Management: medications reviewed   Goal Outcome Evaluation:                        "

## 2025-02-11 NOTE — PROGRESS NOTES
[facility]  WO Nurse Inpatient Assessment     Consulted for: 2/10: tip of penis; Moisture related to lesion on tip of penis  Already following for  RLE    Summary: RLE: x2 NPWT, heel and lateral lower leg. STSG to both areas    WO nurse follow-up plan: Tuesday/Friday    Patient History (according to provider note(s):    ASSESSMENT:  80 year old diabetic male with PAD and ulcerations bilateral foot, h/o osteomyelitis right heel with history of theraskin application  Diabetic ulceration right heel into subcutaneous tissue   Ulceration right leg into subcutaneous tissue  Acute osteomyelitis right calcaneus  Diabetic ulceration left hallux into epidermis/dermis  Diabetic ulceration left foot into epidermis/dermis     POD # 9 Bilateral lower extremity I&D with TheraSkin application performed by Dr Amol Patricio at [facility] and continuation of wound VAC,  Patient also has history of partial calcanectomy of the right heel on 10/24/24 due to osteomyelitis.    Assessment:      Skin Injury Location: penile head    Last photo: 2/10  Skin injury due to: Unclear etiology  Skin history and plan of care:   Patient states it was present prior to admission to this facility  Affected area:      Skin assessment:  scab cersus eschar     Color: normal and consistent with surrounding tissue     Temperature  normal      Drainage: none .      Color: none      Odor: none  Pain: mild  Pain interventions prior to dressing change: slow and gentle cares   Treatment goal: Maintain (prevention of deterioration)  STATUS: initial assessment  Supplies ordered: at bedside      Negative pressure wound therapy applied to: RLE       R heel                                 RLE lateral  Last photo: 2/7/25  Wound due to:  Thera Skin graft site    Wound history/plan of care:    Surgical date: 01/27/25   Service following: DPM  Date Negative Pressure Wound Therapy initiated: Restarted on 2/7/25   Interventions in place:  offloading and elevation  Is patient s nutritional status compromised? yes   If yes, what interventions are in place? other Dietary consult in place  Reason for initiating vac therapy? other Skin graft sites - patient already has a home pump  Which?of?the?following?co-morbidities?apply? Diabetes, Immobility, and Obesity  If diabetic is patient on a diabetic management program? Yes   Is osteomyelitis present in wound? no   If yes what treatments are in place? N/A    Wound base: For both wounds, healthy poink tissue and skin graft present in wound bed, skin graft not fully adhered over RLE area at time of vac dressing replacements     Palpation of the wound bed: normal       Drainage: small      Volume in cannister: <100ml     Last cannister change date: 2/7     Description of drainage: serosanguinous      Measurements (length x width x depth, in cm) Lateral wound - 6 cm x 6.5 cm and Heel is 5 cm x 5.5 cm, no change from previous measurements     Tunneling N/A      Undermining N/A   Periwound skin: Intact and Erythema- blanchable       Color: normal and consistent with surrounding tissue, pink, and red       Temperature: normal    Odor: none   Pain: score 6/10 , shooting, intermittent, and stinging   Pain intervention prior to dressing change: patient tolerated well, soaking, slow and gentle cares , and distraction  Treatment goal:  Graft attachment  STATUS: evolving   Supplies ordered: ordered VAC pump, canister, small and medium black foam, Y connector    Number of foam pieces removed from a wound (excluding foam for bridge) :  1 from each Oil emulsion dressing   Verified this matched the number of foam pieces applied last dressing change: N/A   Number of foam pieces packed into wound (excluding foam for bridge) :  Lateral - 2 piece and Heel - 1 piece GranuFoam Black and lateral 2 pieces, Heel 1 piece Oil emulsion dressing       New area of injury noted to R posterolateral calf. This was not noticed prior to RLE  lateral VAC dressing application as it wasn't visible until RLE lifted to place VAC dressing onto heel wound. Currently this is covered with VAC drape, but will plan to begin Medihoney/Mepilex at next dressing change unless DPM has other preferred treatment plan.    Wound measures approximately 5 cm x 2.5 cm and appears to be necrotic epidermal tissue.  2/7: Medihoney applied to wound base, yellow slough covering, included in wound vac dressing    Treatment Plan:   Penis  Continue hygiene cares as needed  Apply small amount of zinc oxide to wound area twice daily      Wound location: R heel and RLE lateral   Change Days: T/F  Supplies: Black foam, oil emulsion gauze, Y connector, skin prep  Cleanse with: Saline, pat dry.  Suction: Continuous at -100 mmHg pressure.    Back up plan: If VAC malfunctions or unable to maintain seal: VAC dressing must be removed and reapplied within 2 hours of the incident. If floor staff is not able to reapply, place oil emulsion gauze over graft site and then cover with dry gauze 4 x 4.  Change daily and notify WOC staff for reimplementation of VAC therapy when available.  Date canister. Chart canister output every shift.    The hospital VAC pump is not to be discharged with the patient.  Please do one of the following prior to discharge:  If a home VAC pump has been delivered, please apply the home pump to the dressing prior to patient discharge.    If the patient is discharging to LTAC or a TCU/SNF and there is a VAC pump waiting for the patient, close clamp and then disconnect the tubing for transfer, place tubing inside a glove.   If the patient is discharging to home or other facility and there is no VAC pump available for immediate placement, remove the VAC dressing and apply a wet-to-moist gauze dressing for transfer.     R calf - T/F (WOC RN will change with VAC dressing changes)  Cleanse with wound cleanser or saline; gently dry.  Apply Medihoney to wound bed; Cover with  Mepilex.    Orders: Reviewed    RECOMMEND PRIMARY TEAM ORDER: None, at this time  Education provided: plan of care  Discussed plan of care with: Patient, Family, and Nurse  Notify [facility] if wound(s) deteriorate.  Nursing to notify the Provider(s) and re-consult the [facility] Nurse if new skin concern.    DATA:     Current support surface: Standard  Standard gel mattress (Isoflex)  Containment of urine/stool: Continent of bladder and Continent of bowel  BMI: Body mass index is 30.24 kg/m .   Active diet order: Orders Placed This Encounter      Moderate Consistent Carb (60 g CHO per Meal) Diet     Output: I/O last 3 completed shifts:  In: 960 [P.O.:960]  Out: 1215 [Urine:1100; Drains:115]     Labs:   Recent Labs   Lab 02/11/25  0541 02/08/25  0513 02/07/25  0731 02/05/25  0740 02/04/25  1004   ALBUMIN  --   --  2.3* 2.3* 2.9*   HGB 7.7*   < > 9.0* 9.0* 10.4*   INR  --   --   --  1.45* 1.37*   WBC 6.0   < > 4.7 4.7 6.5   A1C  --   --   --   --  7.9*    < > = values in this interval not displayed.     Pressure injury risk assessment:   Sensory Perception: 3-->slightly limited  Moisture: 3-->occasionally moist  Activity: 1-->bedfast  Mobility: 2-->very limited  Nutrition: 2-->probably inadequate  Friction and Shear: 2-->potential problem  Ramón Score: 13      Amanda Hastings RN, BSN, CWOCN   Pager no longer is use, please contact through [facility] group: [facility]

## 2025-02-11 NOTE — PLAN OF CARE
"  Problem: Adult Inpatient Plan of Care  Goal: Plan of Care Review  Description: The Plan of Care Review/Shift note should be completed every shift.  The Outcome Evaluation is a brief statement about your assessment that the patient is improving, declining, or no change.  This information will be displayed automatically on your shift  note.  Outcome: Progressing     Problem: Adult Inpatient Plan of Care  Goal: Patient-Specific Goal (Individualized)  Description: You can add care plan individualizations to a care plan. Examples of Individualization might be:  \"Parent requests to be called daily at 9am for status\", \"I have a hard time hearing out of my right ear\", or \"Do not touch me to wake me up as it startles  me\".  Outcome: Progressing     Problem: Adult Inpatient Plan of Care  Goal: Absence of Hospital-Acquired Illness or Injury  Intervention: Identify and Manage Fall Risk  Recent Flowsheet Documentation  Taken 2/11/2025 1244 by Guillermo Whitehead, RN  Safety Promotion/Fall Prevention:   activity supervised   assistive device/personal items within reach     Problem: Adult Inpatient Plan of Care  Goal: Absence of Hospital-Acquired Illness or Injury  Intervention: Prevent Skin Injury  Recent Flowsheet Documentation  Taken 2/11/2025 1244 by Guillermo Whitehead, RN  Body Position: sitting up in bed  Taken 2/11/2025 0940 by Guillermo Whitehead, RN  Body Position: sitting up in bed  Taken 2/11/2025 0809 by Guillermo Whitehead, RN  Body Position: sitting up in bed   Goal Outcome Evaluation:  No verbal or nonverbal expressions of pain, able to verbalize needs, wound vac  changed by WOC, left foot drressing changed. , 254. Discharge will be Thursday.                      "

## 2025-02-11 NOTE — PLAN OF CARE
"  Problem: Adult Inpatient Plan of Care  Goal: Plan of Care Review  Description: The Plan of Care Review/Shift note should be completed every shift.  The Outcome Evaluation is a brief statement about your assessment that the patient is improving, declining, or no change.  This information will be displayed automatically on your shift  note.  Outcome: Progressing     Problem: Adult Inpatient Plan of Care  Goal: Patient-Specific Goal (Individualized)  Description: You can add care plan individualizations to a care plan. Examples of Individualization might be:  \"Parent requests to be called daily at 9am for status\", \"I have a hard time hearing out of my right ear\", or \"Do not touch me to wake me up as it startles  me\".  Outcome: Progressing     Problem: Delirium  Goal: Improved Behavioral Control  Outcome: Progressing     Problem: Skin Injury Risk Increased  Goal: Skin Health and Integrity  Outcome: Progressing     Problem: Comorbidity Management  Goal: Blood Glucose Levels Within Targeted Range  Outcome: Progressing     Problem: Comorbidity Management  Goal: Blood Pressure in Desired Range  Outcome: Progressing     Problem: Pain Acute  Goal: Optimal Pain Control and Function  Outcome: Progressing  Intervention: Optimize Psychosocial Wellbeing  Recent Flowsheet Documentation  Taken 2/11/2025 0100 by Kathia Lea RN  Supportive Measures: active listening utilized  Intervention: Prevent or Manage Pain  Recent Flowsheet Documentation  Taken 2/11/2025 0100 by Kathia Lea RN  Medication Review/Management: medications reviewed   Goal Outcome Evaluation:  Pt is alert and oriented x3. VSS. Denies pain. IV antibiotics infusing. Repositioned, continent with bladder using urinal. Slept comfortably.                      "

## 2025-02-11 NOTE — PLAN OF CARE
Physical Therapy Discharge Summary    Reason for therapy discharge:    Discharged to transitional care facility.    Progress towards therapy goal(s). See goals on Care Plan in [facility] electronic health record for goal details.  Goals not met.  Barriers to achieving goals:   discharge from facility.    Therapy recommendation(s):    Continued therapy is recommended.  Rationale/Recommendations:  PT at TCU to improve functional mobility.

## 2025-02-11 NOTE — PROGRESS NOTES
[facility]    Progress Note - Hospitalist Service       Date of Admission:  2/4/2025    Assessment & Plan   Janice Nowak is a 80 year old male with past medical history notable for T2DM, HFpEF, recent debridement of diabetic ulcers, SABRINA, hepatocellular carcinoma currently receiving palliative immunotherapy, hypothyroidism, HTN, HLD. Admitted on 2/4/2025 for abdominal pain (now resolved) and confusion (improving). Also noted to have ongoing foot wounds for which podiatry, vascular, and infectious disease are consulted. LLE angiogram 2/8 - high grade stenosis, now s/p crossing/orbital atherectomy/angioplasty of the ALEX/DP, Angioplasty/DCB of the Popliteal lesion, FELIPE placement in the proximal SFA lesion. Had some post op bleeding, sheath pulled evening 2/9 at direction of IR.       Changes 2/11:  - initially planned to go to TCU today, however cancelled due to bed availability, now planned for Thursday  - will transition to oral abx from IV abx  - restart PTA losartan today, monitor renal function and BP closely, can restart torsemide and spironolactone tomorrow if BP tolerates  - encourage PO intake     Bilateral Lower Extremity Ulcers   Acute on Chronic Right Calcaneal Osteomyelitis   Patient with significant bilateral foot wounds. Podiatry, vascular disease, and infectious disease are involved. Patient with prior hospitalizations for this concern. S/p partial calcanectomy right heel 10/24/24 and bilateral LE I&D 1/27/25. Given evidence of PAD, also had stenting with IR on 1/16/24. Most recently discharged with wound vac and completed 6 week course of antibiotics. Plan per specialists on this admission: LLE angiogram 2/8 - high grade stenosis, now s/p crossing/orbital atherectomy/angioplasty of the ALEX/DP, Angioplasty/DCB of the Popliteal lesion, FELIPE placement in the proximal SFA lesion. Had some post op bleeding, sheath pulled evening 2/9 at direction of IR.    - Podiatry  consulted, recommending:               - per podiatry, no repeat debridement of foot ulceration nor continued wound vac planned for now on LLE, will continue with wet to dry dressing until seen again in clinic              - Continue wound vac on RLE               - Reconsider surgical intervention/amputation if no improved healing in a few weeks   - IR consulted, recommending:               - Angio done 2/9, stable, now signed off              - Continue ASA + Clopidegrel at discharge  - ID consulted, recommending:               - discontinue IV Vanc/Zosyn               - Transition to Doxy + Augmentin x 14 days today  - SW consulted               - plan for tentative discharge to TCU Thursday    Generalized weakness  Encephalopathy  Patient reportedly presented with multiple days of progressive weakness, poor p.o. intake, poor sleep, myalgias.  Head imaging on admission negative for acute intracranial pathology.  No significant metabolic derangements.  Certainly infectious concerns (see above) could be contributing.  Also wonder if known hepatobiliary cancer (see below) is contributing to patient's general deconditioning and decline. Dilaudid held with significant improvement to mentation today. Lower suspicion now for hepatic encephalopathy, but will continue to monitor.   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 Underwent IR guided biopsy on 1/2/25 which confirmed HCC. Met with oncology on 1/10/25 who discussed recommendation for palliative  immunotherapy. CT chest on recent admission showed nodules and bulky adenopathy concerning for malignancy. Patient was endorsing abdominal pain on admission, though this has resolved now. Will continue to pursue goals of care conversations in the context of known malignancy and patient's repeated request to limit hospitalizations.   - Continue GOC conversation   - No indication for inpatient oncology consult at this time      Heart failure with preserved ejection fraction, NYHA class II   Hypertension  Echo 12/11 with mild concentric LVH. EF 55-60%. Had transudative pleural effusion s/p thoracentesis 12/2024. Mild LE edema and abdominal distension present but otherwise does not appear overtly fluid overloaded.   - restart PTA Losartan, hold PTA Spironolactone, Torsemide, monitor BP closely  - PO fluids  - Continue to monitor fluid status closely     Type II Diabetes  A1c 7.9%. Admitted by hospitalist team who continued all home meds. Adequate control at this time. No lows or highs.   - Continued PTA Empagliflozin 10mg, Glargine 30U Qam, Metformin 1000mg BID  - Consistent carb diet  - Medium resistance sliding scale      Troponinemia   127 ?  109 on admission. Similar to baseline and downtrending. EKG similar to prior. Patient declined cardio workup on last admission when admitted for syncope. Denies current pre-syncope symptoms.      Anemia, normocytic  Baseline 10-12. Hbg  8.8 today. Will continue to monitor. No evidence of acute bleed aside from wounds on feet.   - Daily CBC      Hyponatremia, resolved   131 on admit. Likely due to poor PO intake. Resolved with IVF and encouragement of PO intake.   - continue mIVF: NS 50 ml/hr with ongoing caution to fluid status given CHF      CAROLINE on CKD, improved  Creatinine 1.24, baseline closer to 1. Likely pre-renal in the setting of poor PO intake. Improved back to baseline, will tentatively restart PTA meds   - encourage PO intake  - restart PTA Losartan, hold PTA  "Spironolactone, Torsemide     Carotid arterial disease  U/S 11/2024 with <50% stenosis bilaterally.     Mixed hyperlipidemia  - Continued PTA Rosuvastatin 40mg     Obstructive Sleep Apnea  Does not use CPAP.     Hypothyroidism  TSH WNL.  - Continued PTA Levothyroxine 50mcg    Penile lesion  Present since prior to admission. Stable.  - WOC consult, appreciate        Diet: Moderate Consistent Carb (60 g CHO per Meal) Diet  Snacks/Supplements Adult: Ensure Enlive; With Meals  Diet    DVT Prophylaxis: Aspirin and plavix. SCD contraindicated due to PVD/right leg wound vac. Heparin allergy.   Reyes Catheter: Not present  Fluids: PO  Lines: PRESENT             Cardiac Monitoring: None  Code Status: No CPR- Do NOT Intubate      Clinically Significant Risk Factors       # Hyperchloremia: Highest Cl = 108 mmol/L in last 2 days, will monitor as appropriate          # Hypoalbuminemia: Lowest albumin = 2.3 g/dL at 2/7/2025  7:31 AM, will monitor as appropriate  # Coagulation Defect: INR = 1.45 (Ref range: 0.85 - 1.15) and/or PTT = 47 Seconds (Ref range: 22 - 38 Seconds), will monitor for bleeding  # Thrombocytopenia: Lowest platelets = 92 in last 2 days, will monitor for bleeding   # Hypertension: Noted on problem list           # DMII: A1C = 7.9 % (Ref range: <5.7 %) within past 6 months   # Obesity: Estimated body mass index is 30.24 kg/m  as calculated from the following:    Height as of this encounter: 1.727 m (5' 8\").    Weight as of this encounter: 90.2 kg (198 lb 14.4 oz).   # Severe Malnutrition: based on nutrition assessment    # Financial/Environmental Concerns: other (see comments) (TBD as they are now transitioning into Assisted living from independent living and it will cost more)         Social Drivers of Health   Tobacco Use: Medium Risk (2/4/2025)    Patient History     Smoking Tobacco Use: Former     Smokeless Tobacco Use: Never     Passive Exposure: Never         Disposition Plan     Medically Ready for " Discharge: Ready Now       Precepted patient with Dr. Julio Bain.      Jayla Tariq MD  Hospitalist Service  [facility]  Securely message with [employer] (more info)  Text page via [employer] Paging/Directory   ______________________________________________________________________    Interval History   NAEON. Feeling fine. Was planning for discharge to TCU today but cancelled because bed no longer available (discharge appeal at TCU now taking up bed). Bed not available until Thursday. Otherwise no concerns, feeling good today.    Physical Exam   Vital Signs: Temp: 97.7  F (36.5  C) Temp src: Oral BP: 136/67 Pulse: 91   Resp: 18 SpO2: 97 % O2 Device: None (Room air)    Weight: 198 lbs 14.4 oz    General: Alert, no acute distress  Skin: clean, dry, and intact generally  HEENT: normocephalic, atraumatic, spontaneous eye movement, no injection or icterus, ears and nose normal, no neck masses  Resp: normal work of breathing, no wheezing  Cardio: RRR, S1 and S2 present  Abdomen: nondistended, no masses  Extremities: RLE with wound vacs in place draining serosanguineous drainage. LLE dressing clean and dry.  Psych: quiet, calm.      Medical Decision Making   Please see A&P for additional details of medical decision making.      Data   ------------------------- PAST 24 HR DATA REVIEWED -----------------------------------------------    I have personally reviewed the following data over the past 24 hrs:    6.0  \   7.7 (L)   / 111 (L)     137 105 27.3 (H) /  207 (H)   3.9 21 (L) 1.12 \       Imaging results reviewed over the past 24 hrs:   No results found for this or any previous visit (from the past 24 hours).

## 2025-02-12 LAB
ANION GAP SERPL CALCULATED.3IONS-SCNC: 7 MMOL/L (ref 7–15)
BUN SERPL-MCNC: 24.2 MG/DL (ref 8–23)
CALCIUM SERPL-MCNC: 8.1 MG/DL (ref 8.8–10.4)
CHLORIDE SERPL-SCNC: 108 MMOL/L (ref 98–107)
CREAT SERPL-MCNC: 1.02 MG/DL (ref 0.67–1.17)
EGFRCR SERPLBLD CKD-EPI 2021: 75 ML/MIN/1.73M2
ERYTHROCYTE [DISTWIDTH] IN BLOOD BY AUTOMATED COUNT: 17.3 % (ref 10–15)
GLUCOSE BLDC GLUCOMTR-MCNC: 118 MG/DL (ref 70–99)
GLUCOSE BLDC GLUCOMTR-MCNC: 156 MG/DL (ref 70–99)
GLUCOSE BLDC GLUCOMTR-MCNC: 162 MG/DL (ref 70–99)
GLUCOSE BLDC GLUCOMTR-MCNC: 163 MG/DL (ref 70–99)
GLUCOSE BLDC GLUCOMTR-MCNC: 192 MG/DL (ref 70–99)
GLUCOSE SERPL-MCNC: 172 MG/DL (ref 70–99)
HCO3 SERPL-SCNC: 23 MMOL/L (ref 22–29)
HCT VFR BLD AUTO: 25.4 % (ref 40–53)
HGB BLD-MCNC: 8 G/DL (ref 13.3–17.7)
MCH RBC QN AUTO: 25.6 PG (ref 26.5–33)
MCHC RBC AUTO-ENTMCNC: 31.5 G/DL (ref 31.5–36.5)
MCV RBC AUTO: 81 FL (ref 78–100)
PLATELET # BLD AUTO: 127 10E3/UL (ref 150–450)
POTASSIUM SERPL-SCNC: 4 MMOL/L (ref 3.4–5.3)
RBC # BLD AUTO: 3.13 10E6/UL (ref 4.4–5.9)
SODIUM SERPL-SCNC: 138 MMOL/L (ref 135–145)
WBC # BLD AUTO: 7.2 10E3/UL (ref 4–11)

## 2025-02-12 PROCEDURE — 85014 HEMATOCRIT: CPT

## 2025-02-12 PROCEDURE — 80048 BASIC METABOLIC PNL TOTAL CA: CPT

## 2025-02-12 PROCEDURE — 36415 COLL VENOUS BLD VENIPUNCTURE: CPT

## 2025-02-12 PROCEDURE — 250N000013 HC RX MED GY IP 250 OP 250 PS 637

## 2025-02-12 PROCEDURE — 82435 ASSAY OF BLOOD CHLORIDE: CPT

## 2025-02-12 PROCEDURE — 120N000001 HC R&B MED SURG/OB

## 2025-02-12 PROCEDURE — 85041 AUTOMATED RBC COUNT: CPT

## 2025-02-12 PROCEDURE — 99232 SBSQ HOSP IP/OBS MODERATE 35: CPT | Mod: GC

## 2025-02-12 RX ORDER — POLYETHYLENE GLYCOL 3350 17 G/17G
17 POWDER, FOR SOLUTION ORAL DAILY
Status: DISCONTINUED | OUTPATIENT
Start: 2025-02-12 | End: 2025-02-12

## 2025-02-12 RX ORDER — POLYETHYLENE GLYCOL 3350 17 G/17G
17 POWDER, FOR SOLUTION ORAL ONCE
Status: COMPLETED | OUTPATIENT
Start: 2025-02-12 | End: 2025-02-12

## 2025-02-12 RX ADMIN — METFORMIN HYDROCHLORIDE 1000 MG: 500 TABLET, FILM COATED ORAL at 09:13

## 2025-02-12 RX ADMIN — INSULIN ASPART 1 UNITS: 100 INJECTION, SOLUTION INTRAVENOUS; SUBCUTANEOUS at 09:17

## 2025-02-12 RX ADMIN — INSULIN ASPART 1 UNITS: 100 INJECTION, SOLUTION INTRAVENOUS; SUBCUTANEOUS at 12:26

## 2025-02-12 RX ADMIN — TORSEMIDE 10 MG: 5 TABLET ORAL at 09:12

## 2025-02-12 RX ADMIN — INSULIN GLARGINE 30 UNITS: 100 INJECTION, SOLUTION SUBCUTANEOUS at 09:21

## 2025-02-12 RX ADMIN — LEVOTHYROXINE SODIUM 50 MCG: 0.03 TABLET ORAL at 06:15

## 2025-02-12 RX ADMIN — DOXYCYCLINE 100 MG: 100 CAPSULE ORAL at 20:48

## 2025-02-12 RX ADMIN — ASPIRIN 81 MG: 81 TABLET, COATED ORAL at 20:47

## 2025-02-12 RX ADMIN — SENNOSIDES, DOCUSATE SODIUM 1 TABLET: 50; 8.6 TABLET, FILM COATED ORAL at 12:21

## 2025-02-12 RX ADMIN — METFORMIN HYDROCHLORIDE 1000 MG: 500 TABLET, FILM COATED ORAL at 17:53

## 2025-02-12 RX ADMIN — LOSARTAN POTASSIUM 50 MG: 50 TABLET, FILM COATED ORAL at 22:10

## 2025-02-12 RX ADMIN — ACETAMINOPHEN 650 MG: 325 TABLET ORAL at 16:53

## 2025-02-12 RX ADMIN — DICLOFENAC SODIUM 2 G: 10 GEL TOPICAL at 16:54

## 2025-02-12 RX ADMIN — AMOXICILLIN AND CLAVULANATE POTASSIUM 1 TABLET: 875; 125 TABLET, FILM COATED ORAL at 09:13

## 2025-02-12 RX ADMIN — CLOPIDOGREL BISULFATE 75 MG: 75 TABLET ORAL at 09:13

## 2025-02-12 RX ADMIN — DICLOFENAC SODIUM 2 G: 10 GEL TOPICAL at 12:27

## 2025-02-12 RX ADMIN — POLYETHYLENE GLYCOL 3350 17 G: 17 POWDER, FOR SOLUTION ORAL at 13:02

## 2025-02-12 RX ADMIN — THERA TABS 1 TABLET: TAB at 09:13

## 2025-02-12 RX ADMIN — AMOXICILLIN AND CLAVULANATE POTASSIUM 1 TABLET: 875; 125 TABLET, FILM COATED ORAL at 20:48

## 2025-02-12 RX ADMIN — DICLOFENAC SODIUM 2 G: 10 GEL TOPICAL at 09:11

## 2025-02-12 RX ADMIN — Medication: at 12:27

## 2025-02-12 RX ADMIN — DICLOFENAC SODIUM 2 G: 10 GEL TOPICAL at 20:50

## 2025-02-12 RX ADMIN — ROSUVASTATIN 40 MG: 40 TABLET, FILM COATED ORAL at 09:13

## 2025-02-12 RX ADMIN — LIDOCAINE 1 PATCH: 4 PATCH TOPICAL at 20:48

## 2025-02-12 RX ADMIN — EMPAGLIFLOZIN 10 MG: 10 TABLET, FILM COATED ORAL at 09:13

## 2025-02-12 RX ADMIN — Medication 500 MG: at 09:12

## 2025-02-12 RX ADMIN — DOXYCYCLINE 100 MG: 100 CAPSULE ORAL at 09:13

## 2025-02-12 RX ADMIN — THIAMINE HCL TAB 100 MG 100 MG: 100 TAB at 09:12

## 2025-02-12 RX ADMIN — ZINC SULFATE 220 MG (50 MG) CAPSULE 220 MG: CAPSULE at 09:12

## 2025-02-12 ASSESSMENT — ACTIVITIES OF DAILY LIVING (ADL)
ADLS_ACUITY_SCORE: 79
ADLS_ACUITY_SCORE: 71
ADLS_ACUITY_SCORE: 79
ADLS_ACUITY_SCORE: 71
ADLS_ACUITY_SCORE: 79
ADLS_ACUITY_SCORE: 79
ADLS_ACUITY_SCORE: 71
ADLS_ACUITY_SCORE: 79
ADLS_ACUITY_SCORE: 71
ADLS_ACUITY_SCORE: 79
ADLS_ACUITY_SCORE: 71
ADLS_ACUITY_SCORE: 79

## 2025-02-12 NOTE — PLAN OF CARE
Problem: Adult Inpatient Plan of Care  Goal: Plan of Care Review  Description: The Plan of Care Review/Shift note should be completed every shift.  The Outcome Evaluation is a brief statement about your assessment that the patient is improving, declining, or no change.  This information will be displayed automatically on your shift  note.  Outcome: Progressing     Problem: Adult Inpatient Plan of Care  Goal: Absence of Hospital-Acquired Illness or Injury  Outcome: Progressing     Problem: Adult Inpatient Plan of Care  Goal: Absence of Hospital-Acquired Illness or Injury  Intervention: Prevent Skin Injury  Recent Flowsheet Documentation  Taken 2/12/2025 1209 by Guillermo Whitehead, RN  Body Position: sitting up in bed  Taken 2/12/2025 1055 by Guillermo Whitehead, RN  Body Position: sitting up in bed  Taken 2/12/2025 1009 by Guillermo Whitehead, RN  Body Position: sitting up in bed  Taken 2/12/2025 0809 by Guillermo Whitehead, RN  Body Position: sitting up in bed   Goal Outcome Evaluation:  Patient alert oriented x 4, able to verbalize needs,  senna and Mirilax given today for constipation, XL BM. wound vac intact, left foot wound changed, discharge tomorrow.

## 2025-02-12 NOTE — PROGRESS NOTES
[facility]    Progress Note - Hospitalist Service       Date of Admission:  2/4/2025    Assessment & Plan   Janice Nowak is a 80 year old male with past medical history notable for T2DM, HFpEF, recent debridement of diabetic ulcers, SABRINA, hepatocellular carcinoma currently receiving palliative immunotherapy, hypothyroidism, HTN, HLD. Admitted on 2/4/2025 for abdominal pain (now resolved) and confusion (improving). Also noted to have ongoing foot wounds for which podiatry, vascular, and infectious disease are consulted. LLE angiogram 2/8 - high grade stenosis, now s/p crossing/orbital atherectomy/angioplasty of the ALEX/DP, Angioplasty/DCB of the Popliteal lesion, FELIPE placement in the proximal SFA lesion. Had some post op bleeding, sheath pulled evening 2/9 at direction of IR.  Now stable for TCU placement     Changes 2/12:  - interval reproducible chest wall pain noted overnight, reproducible again today, will continue with lidocaine patch and voltaren gel  - patient declining cardiac workup as he would not want intervention, pain not described as substernal and not experiencing SOB or orthopnea  - restart PTA torsemide, monitor BP closely  - encourage PO intake     Bilateral Lower Extremity Ulcers   Acute on Chronic Right Calcaneal Osteomyelitis   Patient with significant bilateral foot wounds. Podiatry, vascular disease, and infectious disease are involved. Patient with prior hospitalizations for this concern. S/p partial calcanectomy right heel 10/24/24 and bilateral LE I&D 1/27/25. Given evidence of PAD, also had stenting with IR on 1/16/24. Most recently discharged with wound vac and completed 6 week course of antibiotics. Plan per specialists on this admission: LLE angiogram 2/8 - high grade stenosis, now s/p crossing/orbital atherectomy/angioplasty of the ALEX/DP, Angioplasty/DCB of the Popliteal lesion, FELIPE placement in the proximal SFA lesion. Had some post op bleeding,  sheath pulled evening 2/9 at direction of IR.    - Podiatry consulted, recommending:               - per podiatry, no repeat debridement of foot ulceration nor continued wound vac planned for now on LLE, will continue with wet to dry dressing until seen again in clinic              - Continue wound vac on RLE               - Reconsider surgical intervention/amputation if no improved healing in a few weeks   - IR consulted, recommending:               - Angio done 2/9, stable, now signed off              - Continue ASA + Clopidegrel at discharge  - ID consulted, recommending:               - discontinued IV Vanc/Zosyn               - Transitioned to Doxy + Augmentin x 14 days (2/11 - 2/26)  - SW consulted, appreciate              - plan for tentative discharge to TCU Thursday     Generalized weakness  Encephalopathy  Patient reportedly presented with multiple days of progressive weakness, poor p.o. intake, poor sleep, myalgias.  Head imaging on admission negative for acute intracranial pathology.  No significant metabolic derangements.  Certainly infectious concerns (see above) could be contributing.  Also wonder if known hepatobiliary cancer (see below) is contributing to patient's general deconditioning and decline. Dilaudid held with significant improvement to mentation today. Lower suspicion now for hepatic encephalopathy, but will continue to monitor.   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 Underwent IR guided biopsy on 1/2/25 which confirmed HCC.  Met with oncology on 1/10/25 who discussed recommendation for palliative immunotherapy. CT chest on recent admission showed nodules and bulky adenopathy concerning for malignancy. Patient was endorsing abdominal pain on admission, though this has resolved now. Will continue to pursue goals of care conversations in the context of known malignancy and patient's repeated request to limit hospitalizations.   - Continue GOC conversation   - No indication for inpatient oncology consult at this time      Heart failure with preserved ejection fraction, NYHA class II   Hypertension  Echo 12/11 with mild concentric LVH. EF 55-60%. Had transudative pleural effusion s/p thoracentesis 12/2024. Mild LE edema and abdominal distension present but otherwise does not appear overtly fluid overloaded.   - restart PTA Losartan, Torsemide, hold PTA Spironolactone, monitor BP closely  - PO fluids  - Continue to monitor fluid status closely     Chest pain  Known hx of HFrEF, elevated troponins. Overnight 2/11-2/12 had an episode of chest pain, reproducible with palpation on exam, not suspicious for ACS. Patient in the past has declined workup for elevated troponins, again declining workup during this hospital stay. Will monitor closely  - lidocaine patch  - voltaren gel  - pain as above, avoid oxycodone if possible due to encephalopathy    Type II Diabetes  A1c 7.9%. Admitted by hospitalist team who continued all home meds. Adequate control at this time. No lows or highs.   - Continued PTA Empagliflozin 10mg, Glargine 30U Qam, Metformin 1000mg BID  - Consistent carb diet  - Medium resistance sliding scale      Troponinemia   127 ?  109 on admission. Similar to baseline and downtrending. EKG similar to prior. Patient declined cardio workup on last admission when admitted for syncope. Denies current pre-syncope symptoms.      Anemia, normocytic  Baseline 10-12. Hbg  8.8 today. Will continue to monitor. No evidence of acute bleed aside from  "wounds on feet.   - Daily CBC      Hyponatremia, resolved   131 on admit. Likely due to poor PO intake. Resolved with IVF and encouragement of PO intake.   - continue mIVF: NS 50 ml/hr with ongoing caution to fluid status given CHF      CAROLINE on CKD, improved  Creatinine 1.24, baseline closer to 1. Likely pre-renal in the setting of poor PO intake. Improved back to baseline, will tentatively restart PTA meds   - encourage PO intake  - restart PTA Losartan, hold PTA Spironolactone, Torsemide     Carotid arterial disease  U/S 11/2024 with <50% stenosis bilaterally.     Mixed hyperlipidemia  - Continued PTA Rosuvastatin 40mg     Obstructive Sleep Apnea  Does not use CPAP.     Hypothyroidism  TSH WNL.  - Continued PTA Levothyroxine 50mcg     Penile lesion  Present since prior to admission. Stable.  - WOC consult, appreciate        Diet: Moderate Consistent Carb (60 g CHO per Meal) Diet  Snacks/Supplements Adult: Ensure Enlive; With Meals  Diet    DVT Prophylaxis: Aspirin and plavix. SCD contraindicated due to PVD/right leg wound vac. Heparin allergy.   Reyes Catheter: Not present  Fluids: PO  Lines: PRESENT             Cardiac Monitoring: None  Code Status: No CPR- Do NOT Intubate      Clinically Significant Risk Factors       # Hyperchloremia: Highest Cl = 108 mmol/L in last 2 days, will monitor as appropriate          # Hypoalbuminemia: Lowest albumin = 2.3 g/dL at 2/7/2025  7:31 AM, will monitor as appropriate  # Coagulation Defect: INR = 1.45 (Ref range: 0.85 - 1.15) and/or PTT = 47 Seconds (Ref range: 22 - 38 Seconds), will monitor for bleeding  # Thrombocytopenia: Lowest platelets = 111 in last 2 days, will monitor for bleeding   # Hypertension: Noted on problem list           # DMII: A1C = 7.9 % (Ref range: <5.7 %) within past 6 months   # Obesity: Estimated body mass index is 30.24 kg/m  as calculated from the following:    Height as of this encounter: 1.727 m (5' 8\").    Weight as of this encounter: 90.2 kg (198 " lb 14.4 oz).   # Severe Malnutrition: based on nutrition assessment    # Financial/Environmental Concerns: other (see comments) (TBD as they are now transitioning into Assisted living from independent living and it will cost more)         Social Drivers of Health   Tobacco Use: Medium Risk (2/4/2025)    Patient History     Smoking Tobacco Use: Former     Smokeless Tobacco Use: Never     Passive Exposure: Never         Disposition Plan     Medically Ready for Discharge: Ready Now       Precepted patient with Dr. Julio Bain.      Jayla Tariq MD  Hospitalist Service  [facility]  Securely message with [employer]underloop (more info)  Text page via [facility] Paging/Directory   ______________________________________________________________________    Interval History   Noted chest pain event overnight. No complaint of substernal chest pain or shortness of breath, no orthopnea with lying down.    Physical Exam   Vital Signs: Temp: 97.7  F (36.5  C) Temp src: Oral BP: 138/75 Pulse: 99   Resp: 23 SpO2: 96 % O2 Device: None (Room air)    Weight: 198 lbs 14.4 oz    General: Alert, no acute distress  Skin: clean, dry, and intact  HEENT: normocephalic, atraumatic, spontaneous eye movement, no injection or icterus, ears and nose normal, no neck masses  Resp: normal work of breathing, no wheezing  Cardio: RRR, S1 and S2 present. TTP over L chest wall just medial to areola, no rubs or murmurs noted. No visible erythema or edema noted.  Abdomen: nondistended, no masses  Extremities: RLE wound vacs draining serosanguineous, LLE dressing clean and dry  Psych: normal mood, normal affect      Medical Decision Making   Please see A&P for additional details of medical decision making.      Data   ------------------------- PAST 24 HR DATA REVIEWED -----------------------------------------------    I have personally reviewed the following data over the past 24 hrs:    7.2  \   8.0 (L)   / 127 (L)     138 108 (H) 24.2 (H) /  156  (H)   4.0 23 1.02 \       Imaging results reviewed over the past 24 hrs:   No results found for this or any previous visit (from the past 24 hours).

## 2025-02-12 NOTE — PLAN OF CARE
Problem: Adult Inpatient Plan of Care  Goal: Optimal Comfort and Wellbeing  Outcome: Progressing  Intervention: Monitor Pain and Promote Comfort  Recent Flowsheet Documentation  Taken 2/11/2025 2352 by Lee Ricci RN  Pain Management Interventions: medication (see MAR)     Problem: Delirium  Goal: Improved Sleep  Outcome: Progressing     Problem: Comorbidity Management  Goal: Blood Glucose Levels Within Targeted Range  Outcome: Progressing  Intervention: Monitor and Manage Glycemia  Recent Flowsheet Documentation  Taken 2/12/2025 0000 by Lee Ricci RN  Medication Review/Management: medications reviewed     Problem: Pain Acute  Goal: Optimal Pain Control and Function  Outcome: Progressing  Intervention: Optimize Psychosocial Wellbeing  Recent Flowsheet Documentation  Taken 2/12/2025 0000 by Lee Ricci RN  Supportive Measures: active listening utilized  Intervention: Develop Pain Management Plan  Recent Flowsheet Documentation  Taken 2/11/2025 2352 by Lee Ricci RN  Pain Management Interventions: medication (see MAR)  Intervention: Prevent or Manage Pain  Recent Flowsheet Documentation  Taken 2/12/2025 0000 by Lee Ricci RN  Medication Review/Management: medications reviewed     Problem: Risk for Delirium  Goal: Improved Sleep  Outcome: Progressing   Goal Outcome Evaluation:       A&O x 4. Pt not oob this shift. Admitted for weakness. PIV access right upper forearm SL. Port access right chest. Pt is soft spoken. Mild pain reported this shift controlled with tylenol PRN. Pt complaining of inability to sleep past few night, but was able to sleep through the night with minimal interruptions. Woundvac in place on right leg running continuous suction @125. Heel boots on in bed at all times. BG checks. Last BG @0200 was 196. VSS on RA. Bed in low position, call light within reach. Non-slip footwear in use. Patient calls appropriately.   Visit Vitals  /75 (BP Location: Left arm)   Pulse  99   Temp 97.7  F (36.5  C) (Oral)   Resp 23

## 2025-02-12 NOTE — PROGRESS NOTES
"CLINICAL NUTRITION SERVICES - REASSESSMENT NOTE     RECOMMENDATIONS FOR MDs/PROVIDERS TO ORDER:  Suggest biotene for dry mouth and may help altered taste but unsure if pt will tolerate this    Malnutrition Status:    Severe malnutrition in the context of acute illness or injury  Malnutrition Present on Admission: Unable to assess    Registered Dietitian Interventions:  -Encouraged intake of at least 2 ensure/day with goal to take more if able. Suggested use of straw to bypass taste buds some.   - Notified RN of pt constipation-consider giving prn bowel med  - Decrease ensure enlive to BID d/t pt unable to take 3    Future/Additional Recommendations:  May need to consider enteral nutrition support if with in GOC with very poor intake       SUBJECTIVE INFORMATION  Assessed patient in room.    CURRENT NUTRITION ORDERS  Diet: Orders Placed This Encounter      Moderate Consistent Carb (60 g CHO per Meal) Diet      Diet    Nutrition Support: ensure enlive tid = 1050 kcal, 60 g protein + expedite daily     CURRENT INTAKE/TOLERANCE  -Missing much intake documentation  - Only food being ordered is ice cream, sherbet, juice and pepsi  -Meal intakes not documented 2/10 and 2/11  - Extra ensure max being ordered yesterday  -Taking expedite but refused today    - Pt reports only taking 2 ensure/day.  Unsure if they were ensure max or ensure enlive. Pt reports he does not like the taste of them but has been taking them anyway. -Pt reports his son is bringing in ensure complete (350 kcal, 35 g protein) he was taking at home, today.   -Pt states he is unable to drink more than 2 ensure/day. Has minimal foods that \"don't taste awful\".   -Offered other supplement options and pt declined      Main barrier to adequate intake is altered taste. Pt c/o dry mouth and stomach \"not feeling good\" but could not expand on GI sx- may be d/t constipation    Meeting 35% of estimated kcal, 45% of estimated protein needs with 2 ensure/day. Minimal " other intake       NEW FINDINGS  Weight: 9% weight loss x 2+ months  Date/Time Weight Weight Method   02/09/25 0500 90.2 kg (198 lb 14.4 oz) Bed scale   02/07/25 0240 90.7 kg (199 lb 15.3 oz) Bed scale   02/04/25 0954 94.8 kg (209 lb) --     01/16/25 86.2 kg (190 lb)   01/10/25 86.2 kg (190 lb 0.6 oz)   01/08/25 86.2 kg (190 lb)   01/02/25 93.3 kg (205 lb 11 oz)   12/20/24 93.4 kg (206 lb)   12/15/24 93.5 kg (206 lb 3.2 oz)   11/19/24 99.5 kg (219 lb 6.4 oz)   11/13/24 98.9 kg (218 lb)   11/11/24 98.9 kg (218 lb)   11/06/24 99.1 kg (218 lb 6.4 oz)   11/04/24 101.7 kg (224 lb 1.6 oz)   10/24/24 101.4 kg (223 lb 8 oz)   10/07/24 102.3 kg (225 lb 8 oz)     Skin/wounds: LE wound-evolving per WOC 2/11. New penile lesion  GI symptoms: last BM 2/7  Nutrition-relevant labs:   -254 mg/dl past 24 hours, in fair control  Nutrition-relevant medications:   Oral abx, jardiance, ssi, lantus daily, levothyroxine, metformin, mvi with minerals, crestor, thiamine 100 mg daily, vit C 500 mg daily, torsemide, zinc sulfate x 10 days    Dosing Weight: 75.2 kg, based on adjusted wt     ASSESSED NUTRITION NEEDS  Estimated Energy Needs: 0984-3991 kcals/day (25 - 30 kcals/kg)  Justification: Maintenance  Estimated Protein Needs: 90+ grams protein/day (1.2+ grams of pro/kg)  Justification:  elevated creatinine and Wound healing  Estimated Fluid Needs: 7435-0454 mL/day (1 mL/kcal)  Justification: Maintenance    MALNUTRITION  % Intake: </=75% for >/= 1 month (severe)  % Weight Loss: > 7.5% in 3 months (severe)   Subcutaneous Fat Loss: Triceps: Mild  Muscle Loss: Wasting of the temples (temporalis muscle): Mild, Clavicles (pectoralis and deltoids): Mild, and Interosseous muscles: Mild  Fluid Accumulation/Edema: Mild, 1+  Malnutrition Diagnosis: Severe malnutrition in the context of acute illness or injury  Malnutrition Present on Admission: Unable to assess    EVALUATION OF THE PROGRESS TOWARD GOALS     NUTRITION DIAGNOSIS  Malnutrition  (undernutrition) related to poor appetite as evidenced by weight loss of 9% x 2+ months, po intake </=75% for >/= 1 month, and < 50% x 5 days, loss of muscle. - not progressing     Increased nutrient (protein) needs related to increased demand as evidenced by the presence of wounds.     Goals  -Advance diet to solids- met  -Patient to consume % of nutritionally adequate meal trays TID, or the equivalent with supplements/snacks.- not progressing  -Wound healing per WOC documentation- in progress        Monitoring/Evaluation      Progress toward goals will be monitored and evaluated per policy.

## 2025-02-12 NOTE — PLAN OF CARE
"  Problem: Fatigue  Goal: Improved Activity Tolerance  Outcome: Not Progressing     Problem: Infection  Goal: Absence of Infection Signs and Symptoms  Outcome: Progressing     Problem: Risk for Delirium  Goal: Improved Sleep  Outcome: Progressing     Problem: Risk for Delirium  Goal: Improved Attention and Thought Clarity  Outcome: Progressing     Problem: Comorbidity Management  Goal: Blood Glucose Levels Within Targeted Range  Outcome: Not Progressing     Goal Outcome Evaluation:       Pt returned to the hospital on 2/04 with ongoing foot wounds. Podiatry, vascular, WOC and ID consulting. Pt had undergone stenting on 1/16 for PAD and an I&D of the BLE's on 1/27. Currently is POD#3 s/p LLE angiogram. Pt has a wound VAC to the RLE. Scant serosanguineous drainage in cannister. Wet to dry dressing in place to the LLE. Pt has heel protector boots on in bed to promote skin integrity. Numbness/tingling to BLE's. Transitioned to oral abx today in anticipation of discharge to TCU. Now planned for Thursday. Pt is very weak. Napping frequently throughout shift however, reports not sleeping well at night. A&O x4. Speech is soft. Remains on bedrest. Pt had episode of left sided \"chest pain\". MD evaluated and started Voltaren gel, Lidocaine patch and prn Oxycodone 2.5 mg for suspected metastatic lung pain. Received prn Oxycodone at 2100. Pt is tolerating a diabetic diet. Appetite is poor. Blood sugar was 224 before meal and 254 at hs.                  "

## 2025-02-13 ENCOUNTER — LAB REQUISITION (OUTPATIENT)
Dept: LAB | Facility: CLINIC | Age: 80
End: 2025-02-13
Payer: COMMERCIAL

## 2025-02-13 ENCOUNTER — DOCUMENTATION ONLY (OUTPATIENT)
Dept: GERIATRICS | Facility: CLINIC | Age: 80
End: 2025-02-13
Payer: COMMERCIAL

## 2025-02-13 ENCOUNTER — APPOINTMENT (OUTPATIENT)
Dept: PHYSICAL THERAPY | Facility: HOSPITAL | Age: 80
DRG: 270 | End: 2025-02-13
Attending: RADIOLOGY
Payer: COMMERCIAL

## 2025-02-13 VITALS
DIASTOLIC BLOOD PRESSURE: 64 MMHG | HEIGHT: 68 IN | TEMPERATURE: 98 F | WEIGHT: 207.45 LBS | SYSTOLIC BLOOD PRESSURE: 131 MMHG | BODY MASS INDEX: 31.44 KG/M2 | HEART RATE: 85 BPM | OXYGEN SATURATION: 92 % | RESPIRATION RATE: 18 BRPM

## 2025-02-13 DIAGNOSIS — C22.0 HEPATOCELLULAR CARCINOMA (H): Primary | ICD-10-CM

## 2025-02-13 DIAGNOSIS — R52 PAIN, UNSPECIFIED: ICD-10-CM

## 2025-02-13 PROBLEM — I50.30 HEART FAILURE WITH PRESERVED EJECTION FRACTION, NYHA CLASS I (H): Status: ACTIVE | Noted: 2024-12-16

## 2025-02-13 PROBLEM — R79.89 ELEVATED BRAIN NATRIURETIC PEPTIDE (BNP) LEVEL: Status: ACTIVE | Noted: 2024-12-16

## 2025-02-13 PROBLEM — I73.9 PERIPHERAL VASCULAR DISEASE: Status: ACTIVE | Noted: 2024-12-16

## 2025-02-13 PROBLEM — R06.09 DYSPNEA ON EXERTION: Status: ACTIVE | Noted: 2024-12-11

## 2025-02-13 PROBLEM — S81.801A OPEN WOUND OF RIGHT LOWER EXTREMITY: Status: ACTIVE | Noted: 2024-12-16

## 2025-02-13 PROBLEM — J90 PLEURAL EFFUSION: Status: ACTIVE | Noted: 2024-12-16

## 2025-02-13 LAB
ANION GAP SERPL CALCULATED.3IONS-SCNC: 7 MMOL/L (ref 7–15)
BUN SERPL-MCNC: 26.5 MG/DL (ref 8–23)
CALCIUM SERPL-MCNC: 8.3 MG/DL (ref 8.8–10.4)
CHLORIDE SERPL-SCNC: 107 MMOL/L (ref 98–107)
CREAT SERPL-MCNC: 1.14 MG/DL (ref 0.67–1.17)
EGFRCR SERPLBLD CKD-EPI 2021: 65 ML/MIN/1.73M2
ERYTHROCYTE [DISTWIDTH] IN BLOOD BY AUTOMATED COUNT: 18.5 % (ref 10–15)
GLUCOSE BLDC GLUCOMTR-MCNC: 130 MG/DL (ref 70–99)
GLUCOSE BLDC GLUCOMTR-MCNC: 136 MG/DL (ref 70–99)
GLUCOSE BLDC GLUCOMTR-MCNC: 138 MG/DL (ref 70–99)
GLUCOSE SERPL-MCNC: 166 MG/DL (ref 70–99)
HCO3 SERPL-SCNC: 26 MMOL/L (ref 22–29)
HCT VFR BLD AUTO: 26.1 % (ref 40–53)
HGB BLD-MCNC: 8.1 G/DL (ref 13.3–17.7)
MCH RBC QN AUTO: 25.9 PG (ref 26.5–33)
MCHC RBC AUTO-ENTMCNC: 31 G/DL (ref 31.5–36.5)
MCV RBC AUTO: 83 FL (ref 78–100)
PLATELET # BLD AUTO: 151 10E3/UL (ref 150–450)
POTASSIUM SERPL-SCNC: 4 MMOL/L (ref 3.4–5.3)
RBC # BLD AUTO: 3.13 10E6/UL (ref 4.4–5.9)
SODIUM SERPL-SCNC: 140 MMOL/L (ref 135–145)
WBC # BLD AUTO: 8.1 10E3/UL (ref 4–11)

## 2025-02-13 PROCEDURE — 250N000013 HC RX MED GY IP 250 OP 250 PS 637

## 2025-02-13 PROCEDURE — 36415 COLL VENOUS BLD VENIPUNCTURE: CPT

## 2025-02-13 PROCEDURE — 250N000013 HC RX MED GY IP 250 OP 250 PS 637: Performed by: HOSPITALIST

## 2025-02-13 PROCEDURE — 85018 HEMOGLOBIN: CPT

## 2025-02-13 PROCEDURE — 80048 BASIC METABOLIC PNL TOTAL CA: CPT

## 2025-02-13 PROCEDURE — 250N000011 HC RX IP 250 OP 636

## 2025-02-13 PROCEDURE — 85041 AUTOMATED RBC COUNT: CPT

## 2025-02-13 PROCEDURE — 97110 THERAPEUTIC EXERCISES: CPT | Mod: GP

## 2025-02-13 PROCEDURE — 99238 HOSP IP/OBS DSCHRG MGMT 30/<: CPT | Mod: GC

## 2025-02-13 RX ORDER — ALBUTEROL SULFATE 0.83 MG/ML
2.5 SOLUTION RESPIRATORY (INHALATION)
OUTPATIENT
Start: 2025-02-18

## 2025-02-13 RX ORDER — METHYLPREDNISOLONE SODIUM SUCCINATE 40 MG/ML
40 INJECTION INTRAMUSCULAR; INTRAVENOUS
Start: 2025-02-18

## 2025-02-13 RX ORDER — DOXYCYCLINE 100 MG/1
100 CAPSULE ORAL 2 TIMES DAILY
Qty: 24 CAPSULE | Refills: 0 | Status: SHIPPED | OUTPATIENT
Start: 2025-02-13 | End: 2025-02-25

## 2025-02-13 RX ORDER — DOXYCYCLINE 100 MG/1
100 CAPSULE ORAL EVERY 12 HOURS
Qty: 20 CAPSULE | Refills: 0 | Status: CANCELLED | OUTPATIENT
Start: 2025-02-13 | End: 2025-02-23

## 2025-02-13 RX ORDER — HEPARIN SODIUM,PORCINE 10 UNIT/ML
5-20 VIAL (ML) INTRAVENOUS DAILY PRN
OUTPATIENT
Start: 2025-02-18

## 2025-02-13 RX ORDER — ALBUTEROL SULFATE 90 UG/1
1-2 INHALANT RESPIRATORY (INHALATION)
Start: 2025-02-18

## 2025-02-13 RX ORDER — EPINEPHRINE 1 MG/ML
0.3 INJECTION, SOLUTION INTRAMUSCULAR; SUBCUTANEOUS EVERY 5 MIN PRN
OUTPATIENT
Start: 2025-02-18

## 2025-02-13 RX ORDER — LORAZEPAM 2 MG/ML
0.5 INJECTION INTRAMUSCULAR EVERY 4 HOURS PRN
OUTPATIENT
Start: 2025-02-18

## 2025-02-13 RX ORDER — DIPHENHYDRAMINE HYDROCHLORIDE 50 MG/ML
50 INJECTION INTRAMUSCULAR; INTRAVENOUS
Start: 2025-02-18

## 2025-02-13 RX ORDER — MEPERIDINE HYDROCHLORIDE 25 MG/ML
25 INJECTION INTRAMUSCULAR; INTRAVENOUS; SUBCUTANEOUS
OUTPATIENT
Start: 2025-02-18

## 2025-02-13 RX ORDER — DIPHENHYDRAMINE HYDROCHLORIDE 50 MG/ML
25 INJECTION INTRAMUSCULAR; INTRAVENOUS
Start: 2025-02-18

## 2025-02-13 RX ORDER — HEPARIN SODIUM (PORCINE) LOCK FLUSH IV SOLN 100 UNIT/ML 100 UNIT/ML
5 SOLUTION INTRAVENOUS
OUTPATIENT
Start: 2025-02-18

## 2025-02-13 RX ADMIN — DICLOFENAC SODIUM 2 G: 10 GEL TOPICAL at 13:16

## 2025-02-13 RX ADMIN — THIAMINE HCL TAB 100 MG 100 MG: 100 TAB at 09:25

## 2025-02-13 RX ADMIN — ZINC SULFATE 220 MG (50 MG) CAPSULE 220 MG: CAPSULE at 09:26

## 2025-02-13 RX ADMIN — ACETAMINOPHEN 650 MG: 325 TABLET ORAL at 06:25

## 2025-02-13 RX ADMIN — DOXYCYCLINE 100 MG: 100 CAPSULE ORAL at 13:14

## 2025-02-13 RX ADMIN — THERA TABS 1 TABLET: TAB at 09:25

## 2025-02-13 RX ADMIN — OXYCODONE HYDROCHLORIDE 2.5 MG: 5 TABLET ORAL at 00:10

## 2025-02-13 RX ADMIN — CLOPIDOGREL BISULFATE 75 MG: 75 TABLET ORAL at 09:26

## 2025-02-13 RX ADMIN — Medication: at 00:03

## 2025-02-13 RX ADMIN — Medication: at 13:15

## 2025-02-13 RX ADMIN — METFORMIN HYDROCHLORIDE 1000 MG: 500 TABLET, FILM COATED ORAL at 09:25

## 2025-02-13 RX ADMIN — DICLOFENAC SODIUM 2 G: 10 GEL TOPICAL at 09:27

## 2025-02-13 RX ADMIN — ROSUVASTATIN 40 MG: 40 TABLET, FILM COATED ORAL at 09:25

## 2025-02-13 RX ADMIN — LEVOTHYROXINE SODIUM 50 MCG: 0.03 TABLET ORAL at 06:21

## 2025-02-13 RX ADMIN — INSULIN GLARGINE 30 UNITS: 100 INJECTION, SOLUTION SUBCUTANEOUS at 09:23

## 2025-02-13 RX ADMIN — Medication 500 MG: at 09:25

## 2025-02-13 RX ADMIN — ONDANSETRON 4 MG: 2 INJECTION, SOLUTION INTRAMUSCULAR; INTRAVENOUS at 10:30

## 2025-02-13 RX ADMIN — AMOXICILLIN AND CLAVULANATE POTASSIUM 1 TABLET: 875; 125 TABLET, FILM COATED ORAL at 09:27

## 2025-02-13 RX ADMIN — EMPAGLIFLOZIN 10 MG: 10 TABLET, FILM COATED ORAL at 09:26

## 2025-02-13 ASSESSMENT — ACTIVITIES OF DAILY LIVING (ADL)
ADLS_ACUITY_SCORE: 70
ADLS_ACUITY_SCORE: 74
ADLS_ACUITY_SCORE: 70
ADLS_ACUITY_SCORE: 74
ADLS_ACUITY_SCORE: 70
ADLS_ACUITY_SCORE: 74
ADLS_ACUITY_SCORE: 70
ADLS_ACUITY_SCORE: 70
ADLS_ACUITY_SCORE: 74
ADLS_ACUITY_SCORE: 74
ADLS_ACUITY_SCORE: 70
ADLS_ACUITY_SCORE: 71
ADLS_ACUITY_SCORE: 71
ADLS_ACUITY_SCORE: 70

## 2025-02-13 NOTE — PLAN OF CARE
Problem: Adult Inpatient Plan of Care  Goal: Plan of Care Review  Description: The Plan of Care Review/Shift note should be completed every shift.  The Outcome Evaluation is a brief statement about your assessment that the patient is improving, declining, or no change.  This information will be displayed automatically on your shift  note.  Outcome: Progressing   Goal Outcome Evaluation:          Problem: Comorbidity Management  Goal: Blood Glucose Levels Within Targeted Range  Outcome: Progressing       Blood glucose results of 118, no need for sliding scale insulin.    Update given to son whom was present at bedside.

## 2025-02-13 NOTE — PLAN OF CARE
"Patient discharged with medical transport in personal wheelchair to TCU at 1605. Wound vac disconnected at 1550. All personal belongings sent with patient. Questions answered.       Goal Outcome Evaluation:    Problem: Adult Inpatient Plan of Care  Goal: Plan of Care Review  Description: The Plan of Care Review/Shift note should be completed every shift.  The Outcome Evaluation is a brief statement about your assessment that the patient is improving, declining, or no change.  This information will be displayed automatically on your shift  note.  Outcome: Adequate for Care Transition  Goal: Patient-Specific Goal (Individualized)  Description: You can add care plan individualizations to a care plan. Examples of Individualization might be:  \"Parent requests to be called daily at 9am for status\", \"I have a hard time hearing out of my right ear\", or \"Do not touch me to wake me up as it startles  me\".  Outcome: Adequate for Care Transition  Goal: Absence of Hospital-Acquired Illness or Injury  Outcome: Adequate for Care Transition  Goal: Optimal Comfort and Wellbeing  Outcome: Adequate for Care Transition  Goal: Readiness for Transition of Care  Outcome: Adequate for Care Transition     Problem: Delirium  Goal: Optimal Coping  Outcome: Adequate for Care Transition  Goal: Improved Behavioral Control  Outcome: Adequate for Care Transition  Goal: Improved Attention and Thought Clarity  Outcome: Adequate for Care Transition  Goal: Improved Sleep  Outcome: Adequate for Care Transition     Problem: Skin Injury Risk Increased  Goal: Skin Health and Integrity  Outcome: Adequate for Care Transition     Problem: Comorbidity Management  Goal: Blood Glucose Levels Within Targeted Range  Outcome: Adequate for Care Transition  Goal: Blood Pressure in Desired Range  Outcome: Adequate for Care Transition  Goal: Maintenance of Behavioral Health Symptom Control  Outcome: Adequate for Care Transition  Goal: Maintenance of Heart Failure " Symptom Control  Outcome: Adequate for Care Transition     Problem: Pain Acute  Goal: Optimal Pain Control and Function  Outcome: Adequate for Care Transition     Problem: Wound  Goal: Improved Oral Intake  Outcome: Adequate for Care Transition  Goal: Optimal Pain Control and Function  Outcome: Adequate for Care Transition  Goal: Skin Health and Integrity  Outcome: Adequate for Care Transition  Goal: Optimal Wound Healing  Outcome: Adequate for Care Transition     Problem: Risk for Delirium  Goal: Optimal Coping  Outcome: Adequate for Care Transition  Goal: Improved Behavioral Control  Outcome: Adequate for Care Transition  Goal: Improved Attention and Thought Clarity  Outcome: Adequate for Care Transition  Goal: Improved Sleep  Outcome: Adequate for Care Transition     Problem: Fall Injury Risk  Goal: Absence of Fall and Fall-Related Injury  Outcome: Adequate for Care Transition     Problem: Infection  Goal: Absence of Infection Signs and Symptoms  Outcome: Adequate for Care Transition     Problem: Fatigue  Goal: Improved Activity Tolerance  Outcome: Adequate for Care Transition     Problem: Anemia  Goal: Anemia Symptom Improvement  Outcome: Adequate for Care Transition     Problem: Acute Kidney Injury/Impairment  Goal: Fluid and Electrolyte Balance  Outcome: Adequate for Care Transition  Goal: Improved Oral Intake  Outcome: Adequate for Care Transition  Goal: Effective Renal Function  Outcome: Adequate for Care Transition     Problem: Chronic Kidney Disease  Goal: Optimal Coping with Chronic Illness  Outcome: Adequate for Care Transition  Goal: Electrolyte Balance  Outcome: Adequate for Care Transition  Goal: Fluid Balance  Outcome: Adequate for Care Transition  Goal: Optimal Functional Ability  Outcome: Adequate for Care Transition  Goal: Absence of Anemia Signs and Symptoms  Outcome: Adequate for Care Transition  Goal: Optimal Oral Intake  Outcome: Adequate for Care Transition  Goal: Acceptable Pain  Control  Outcome: Adequate for Care Transition  Goal: Minimize Renal Failure Effects  Outcome: Adequate for Care Transition

## 2025-02-13 NOTE — PLAN OF CARE
Problem: Adult Inpatient Plan of Care  Goal: Optimal Comfort and Wellbeing  Outcome: Progressing  Intervention: Monitor Pain and Promote Comfort  Recent Flowsheet Documentation  Taken 2/13/2025 0010 by Lori Corley RN  Pain Management Interventions:   medication (see MAR)   distraction   emotional support   pillow support provided   rest     Problem: Delirium  Goal: Optimal Coping  Outcome: Progressing  Intervention: Optimize Psychosocial Adjustment to Delirium  Recent Flowsheet Documentation  Taken 2/13/2025 0010 by Lori Corley RN  Supportive Measures: active listening utilized  Taken 2/12/2025 2050 by Lori Corley RN  Supportive Measures: active listening utilized     Problem: Delirium  Goal: Improved Sleep  Outcome: Progressing     Problem: Comorbidity Management  Goal: Blood Glucose Levels Within Targeted Range  Outcome: Progressing  Intervention: Monitor and Manage Glycemia  Recent Flowsheet Documentation  Taken 2/13/2025 0010 by Lori Corley RN  Medication Review/Management: medications reviewed  Taken 2/12/2025 2050 by Lori Corley RN  Medication Review/Management: medications reviewed     Problem: Pain Acute  Goal: Optimal Pain Control and Function  Intervention: Prevent or Manage Pain  Recent Flowsheet Documentation  Taken 2/13/2025 0010 by Lori Corley RN  Medication Review/Management: medications reviewed  Taken 2/12/2025 2050 by Lori Corley RN  Medication Review/Management: medications reviewed   Goal Outcome Evaluation:      Plan of Care Reviewed With: patient    Overall Patient Progress: no changeOverall Patient Progress: no change     NURSING PROGRESS NOTE  Shift Summary      Date: February 13, 2025     Neuro/Musculoskeletal:  A&Ox4.   Cardiac: VSS.     Respiratory:  Sating in the 90s on RA.  GI/:  Adequate urine output.   Diet/Appetite:  Tolerating moderate carb diet.  Activity:  Assist of 1   Pain:  managed with PRN oxycodone AND prn Tylenol  Skin:  No  new deficits noted. Off loading boots bilateral in place  LDAs + Drips/IVF:  R piv, wound vac in place        Lori Corley RN  ....................................................

## 2025-02-13 NOTE — PLAN OF CARE
Goal Outcome Evaluation:      Plan of Care Reviewed With: patient, child          Outcome Evaluation: Patient is discharging to TCU: [facility]

## 2025-02-13 NOTE — PLAN OF CARE
Physical Therapy Discharge Summary    Reason for therapy discharge:    Discharged to transitional care facility.    Progress towards therapy goal(s). See goals on Care Plan in [facility] electronic health record for goal details.  Goals partially met.  Barriers to achieving goals:   discharge from facility.    Therapy recommendation(s):    Continued therapy is recommended.  Rationale/Recommendations:  to improve functional mobility.

## 2025-02-13 NOTE — PLAN OF CARE
Occupational Therapy Discharge Summary    Reason for therapy discharge:    Discharged to transitional care facility.    Progress towards therapy goal(s). See goals on Care Plan in [facility] electronic health record for goal details.  Goals partially met.  Barriers to achieving goals:   discharge from facility.    Therapy recommendation(s):    Continued therapy is recommended.  Rationale/Recommendations:  to improve ADL independence.

## 2025-02-13 NOTE — PROGRESS NOTES
Care Management Discharge Note    Discharge Date: 02/13/2025       Discharge Disposition: Transitional Care    Discharge Services:      Discharge DME:      Discharge Transportation: health plan transportation    Private pay costs discussed: Not applicable    Does the patient's insurance plan have a 3 day qualifying hospital stay waiver?  No    PAS Confirmation Code:  [id number]  Patient/family educated on Medicare website which has current facility and service quality ratings:      Education Provided on the Discharge Plan:    Persons Notified of Discharge Plans: Treatment Team, Patient and family  Patient/Family in Agreement with the Plan:  Yes    Handoff Referral Completed: Yes, [facility] PCP: Internal handoff referral completed    Additional Information:  Patient accepted to [facility] TCU. SW informed patient and left VM message for his son Michael to update. Treatment Team and bed nurse updated.   Wheelchair ride is arranged for  between 3:07 - 3:52PM. Facility updated on transportation time.  Will continue to follow till patient successfully discharge this afternoon.  Renata Francis, LSW

## 2025-02-14 ENCOUNTER — TRANSITIONAL CARE UNIT VISIT (OUTPATIENT)
Dept: GERIATRICS | Facility: CLINIC | Age: 80
End: 2025-02-14
Payer: COMMERCIAL

## 2025-02-14 ENCOUNTER — MYC MEDICAL ADVICE (OUTPATIENT)
Dept: ONCOLOGY | Facility: HOSPITAL | Age: 80
End: 2025-02-14

## 2025-02-14 VITALS
OXYGEN SATURATION: 98 % | WEIGHT: 219.4 LBS | RESPIRATION RATE: 18 BRPM | DIASTOLIC BLOOD PRESSURE: 81 MMHG | SYSTOLIC BLOOD PRESSURE: 134 MMHG | HEART RATE: 93 BPM | BODY MASS INDEX: 33.36 KG/M2 | TEMPERATURE: 97.8 F

## 2025-02-14 DIAGNOSIS — E11.42 DIABETIC POLYNEUROPATHY ASSOCIATED WITH TYPE 2 DIABETES MELLITUS (H): ICD-10-CM

## 2025-02-14 DIAGNOSIS — E02 SUBCLINICAL IODINE-DEFICIENCY HYPOTHYROIDISM: ICD-10-CM

## 2025-02-14 DIAGNOSIS — E11.51 TYPE 2 DIABETES MELLITUS WITH DIABETIC PERIPHERAL ANGIOPATHY WITHOUT GANGRENE, WITH LONG-TERM CURRENT USE OF INSULIN (H): Primary | ICD-10-CM

## 2025-02-14 DIAGNOSIS — M86.171 ACUTE OSTEOMYELITIS OF RIGHT CALCANEUS (H): ICD-10-CM

## 2025-02-14 DIAGNOSIS — L97.402 DIABETIC ULCER OF HEEL ASSOCIATED WITH DIABETES MELLITUS DUE TO UNDERLYING CONDITION, WITH FAT LAYER EXPOSED, UNSPECIFIED LATERALITY (H): ICD-10-CM

## 2025-02-14 DIAGNOSIS — Z79.4 TYPE 2 DIABETES MELLITUS WITH DIABETIC PERIPHERAL ANGIOPATHY WITHOUT GANGRENE, WITH LONG-TERM CURRENT USE OF INSULIN (H): Primary | ICD-10-CM

## 2025-02-14 DIAGNOSIS — E78.2 MIXED HYPERLIPIDEMIA: ICD-10-CM

## 2025-02-14 DIAGNOSIS — I50.30 HEART FAILURE WITH PRESERVED EJECTION FRACTION, NYHA CLASS II (H): ICD-10-CM

## 2025-02-14 DIAGNOSIS — E08.621 DIABETIC ULCER OF HEEL ASSOCIATED WITH DIABETES MELLITUS DUE TO UNDERLYING CONDITION, WITH FAT LAYER EXPOSED, UNSPECIFIED LATERALITY (H): ICD-10-CM

## 2025-02-14 DIAGNOSIS — I10 HYPERTENSION, UNSPECIFIED TYPE: ICD-10-CM

## 2025-02-14 PROCEDURE — 99310 SBSQ NF CARE HIGH MDM 45: CPT | Performed by: NURSE PRACTITIONER

## 2025-02-14 RX ORDER — TRAMADOL HYDROCHLORIDE 50 MG/1
50 TABLET ORAL EVERY 6 HOURS PRN
Qty: 30 TABLET | Refills: 0 | Status: SHIPPED | OUTPATIENT
Start: 2025-02-14

## 2025-02-14 NOTE — PROGRESS NOTES
[facility] GERIATRICS  INITIAL VISIT NOTE  February 14, 2025      PRIMARY CARE PROVIDER AND CLINIC:  Jeannette April [facility]  [id number] / [place] [address]    [facility] Medical Record Number:  [id number]  Place of Service where encounter took place:  [facility] ([facility]) [[id number]]    Chief Complaint   Patient presents with    Hospital F/U       HPI:    Janice Nowak is a 80 year old  (1945) male was admitted to the above facility from  [facility]. Hospital stay 2/4/25 through 2/13/25 where they were admitted for bilateral lower extremity ulcers with irrigation/debridement, and right calcaneal osteomyelitis.  Now admitted to this facility for  rehab, medical management, and nursing care.      History obtained from: facility chart records, facility staff, patient report, [facility] chart review, and Care Everywhere [facility] chart review.      Brief Hospital Course:   Janice is a 80 year old gentleman with chronic health conditions that include Type 2 DM with neuropathy, and diabetic foot ulcers, Hypertension, Heart failure with preserved EF. Hypothyroidism, SABRINA, hepatocellular carcinoma currently receiving palliative immunotherapy.      Janice was found to have worsening chronic osteomyelitis of his bilateral lower extremities.  Podiatry, vascular disease, and ID were involved.    S/P partial calcanectomy right heel 10/24/25   Bilateral LE I&D 1/27/25  Evidence of PAD - stenting with IR on 1/16/25.  Discharged with wound vac to right foot and complete 6 weeks of antibiotics.    Wet to dry dressing change to lower left extremity until seen in clinic which is desired 1-2 weeks.  Underwent LLE angiogram on 2/8/25 and found to have high grade stenosis, now s/p crossing/orbital atherectomy/angioplasty of the ALEX/DP, Angioplasty/DCB of the popliteal lesion with FELIPE placement in the proximal SFA lesion.      IR consulted and recommended:  continue ASA + Clopidegrel   ID consulted and recommended:  did complete IV vanc/zosyn course prior to admission and discontinued 2/11  Transition to Doxy+Augmentin x14 days on discharge  Follow CMP and CBC outpatient.    Hepatobiliary Cancer  Questionable mets to Lungs  Incidental liver mass noted in 11/2024.  IR guided biopsy on 1/2/25 which confirmed HCC.    Met with oncology 1/10/25 and discussed recommendation for palliative immunotherapy.    CT on recent admission showed nodules and bulky adenopathy concerning for malignancy.    Continue GOC conversation outpatient.    Heart failure with preserved ejection fraction  Hypertension  ECHO on 12/11/24, showed EF 55-60%.  Transudative pleural effusion s/p thoracentesis 12/2024.  No signs of acute heart failure when admitted.  Diuretics held due to CAROLINE and restart at discharge along with lostartan.  Spironolactone held for soft B/P.  Can restart outpatient if needed.       Type II Diabetes  A1c 7.9%  no highs or lows during hospitalizations.  Continue with Empagliflozin 10mg, Glargine 30 units every AM, and metformin 1000mg BID.        TCU Course:   Met with Janice today before lunch.  During the visit staff brought in lunch and he did not want the aide to set him up as he was talking to this NP and NP student.  At the end, the nurse came in to give him the rest of his morning medications mixed in pudding.  After he took the medication he stated that he gets so much at one time.  Asked if he wants to spread out his medications so they are not all lumped in twice a day dosing.  He wanted that to happen.  The nurse stated she split his medications out this morning as it was too much for him.    Janice is laying in bed with the head of bed slightly elevated.  Appears tired.  Voice is soft but he can hear conversation without difficulty.    Talked about pain as he is not on scheduled Tylenol.  Janice stated that he takes the Tylenol if he has a headache or pain in  shoulder blades.   He states that he has ongoing pain in his legs and feet.      Reviewed medications and will add Zofran PRN in case of nausea.  Stated he had an emesis last night.  Nothing today.  [place] showed this NP his port-a-cath in upper right chest, and his right groin area where the angiogram was done.  Bruising present.      Explained to Janice about the regular rounding NP that works with this NP and that he will see her more.  He will go out to his appointments still.  He is to be to the vascular clinic in 1-2 weeks.  Unknown when that appointment is yet.    Encouraged him to let staff know if he needs anything.    Labs will be Monday.    CODE STATUS/ADVANCE DIRECTIVES: DNR / DNI    ALLERGIES:  Allergies   Allergen Reactions    Exenatide Unknown    Heparin Unknown    Liraglutide Unknown    Lisinopril Cough    Morphine Unknown       PAST MEDICAL HISTORY:   Past Medical History:   Diagnosis Date    Coronary artery disease     Diabetes (H)     Hypertension     Pleural effusion     Sleep apnea     Thyroid disease        PAST SURGICAL HISTORY:   Past Surgical History:   Procedure Laterality Date    BACK SURGERY      BONE EXOSTOSIS EXCISION Right 10/24/2024    Procedure: PARTIAL CALCANECTOMY RIGHT FOOT;  Surgeon: Amol Patricio DPM;  [place] [facility]    CHOLECYSTECTOMY      ENDOSCOPIC ULTRASOUND UPPER GASTROINTESTINAL TRACT (GI) N/A 01/02/2025    Procedure: ENDOSCOPIC ULTRASOUND, ESOPHAGOSCOPY / UPPER GASTROINTESTINAL TRACT (GI) with Fine Needle Aspiration;  Surgeon: Kong Medina MD;  Location: [facility] OR    INCISION AND DRAINAGE FOOT, COMBINED Right 10/24/2024    Procedure: INCISION AND DRAINAGE;  Surgeon: Amol Patricio DPM;  [place] [facility]    INCISION AND DRAINAGE FOOT, COMBINED Bilateral 1/27/2025    Procedure: INCISION AND DRAINAGE bilateral feet with application of TheraSkin graft;  Surgeon: Amol Patricio DPM;  [place] [facility]    IR CHEST  PORT PLACEMENT > 5 YRS OF AGE  01/16/2025    IR LOWER EXTREMITY ANGIOGRAM LEFT  01/16/2025    IR LOWER EXTREMITY ANGIOGRAM LEFT  2/8/2025    IR LOWER EXTREMITY ANGIOGRAM RIGHT  12/26/2024    IRRIGATION AND DEBRIDEMENT FOOT, COMBINED Right 10/24/2024    Procedure: AND DEBRIDEMENT RIGHT HEEL,;  Surgeon: Amol Patricio DPM;  [place] [facility] [place]    ORTHOPEDIC SURGERY      PICC SINGLE LUMEN PLACEMENT  10/30/2024    VASCULAR SURGERY         FAMILY HISTORY:   Family History   Problem Relation Age of Onset    Anesthesia Reaction No family hx of     Thrombosis No family hx of          SOCIAL HISTORY:   Patient's living condition: lives in an assisted living facility  spouse is there in the [facility] care    MEDICATIONS  Post Discharge Medication Reconciliation Status: discharge medications reconciled, continue medications without change.  Current Outpatient Medications   Medication Sig Dispense Refill    amoxicillin-clavulanate (AUGMENTIN) 875-125 MG tablet Take 1 tablet by mouth 2 times daily for 12 days. 24 tablet 0    aspirin 81 MG EC tablet Take 1 tablet (81 mg) by mouth every evening. 30 tablet 0    clopidogrel (PLAVIX) 75 MG tablet Take 1 tablet (75 mg) by mouth every morning. 30 tablet 0    co-enzyme Q-10 100 MG CAPS capsule Take 200 mg by mouth every evening.      doxycycline hyclate (VIBRAMYCIN) 100 MG capsule Take 1 capsule (100 mg) by mouth 2 times daily for 12 days. 24 capsule 0    empagliflozin (JARDIANCE) 10 MG TABS tablet Take 1 tablet (10 mg) by mouth daily. 30 tablet 0    guaiFENesin (MUCUS RELIEF ER PO) Take 1,200 mg by mouth 2 times daily as needed (congestion).      insulin aspart (NOVOLOG PEN) 100 UNIT/ML pen Inject 5 Units subcutaneously 3 times daily as needed for high blood sugar (With Meals). Novolog Flexpen: 5 units with breakfast, 5 units with lunch, 5 units with dinner. Takes as needed with each meal. 15 mL 0    insulin glargine (LANTUS VIAL) 100 UNIT/ML vial Inject 30 Units  subcutaneously every morning. 10 mL 0    lactobacillus rhamnosus, GG, (CULTURELL) capsule Take 1 capsule by mouth every morning.      levothyroxine (SYNTHROID/LEVOTHROID) 50 MCG tablet Take 1 tablet (50 mcg) by mouth every morning. 30 tablet 0    losartan (COZAAR) 50 MG tablet Take 1 tablet (50 mg) by mouth at bedtime. 30 tablet 0    meclizine (ANTIVERT) 25 MG tablet Take 50 mg by mouth 3 times daily as needed (Vertigo).      metFORMIN (GLUCOPHAGE) 1000 MG tablet Take 1 tablet (1,000 mg) by mouth 2 times daily (with meals). 60 tablet 0    Multiple Vitamins-Minerals (PRESERVISION AREDS 2 PO) Take 2 capsules by mouth 2 times daily.      rosuvastatin (CRESTOR) 40 MG tablet Take 1 tablet (40 mg) by mouth daily. 30 tablet 0    torsemide (DEMADEX) 10 MG tablet Take 1 tablet (10 mg) by mouth daily. 30 tablet 0    traMADol (ULTRAM) 50 MG tablet Take 1 tablet (50 mg) by mouth every 6 hours as needed for moderate pain. 30 tablet 0    vitamin C (ASCORBIC ACID) 1000 MG TABS Take 1,000 mg by mouth 2 times daily.      vitamin C with B complex (B COMPLEX-C) tablet Take 1 tablet by mouth every morning.         ROS:  10 point ROS neg other than the symptoms noted above in the HPI.      PHYSICAL EXAM:  /81   Pulse 93   Temp 97.8  F (36.6  C)   Resp 18   Wt 99.5 kg (219 lb 6.4 oz)   SpO2 98%   BMI 33.36 kg/m    Physical Exam  Vitals and nursing note reviewed.   Constitutional:       Appearance: He is ill-appearing.   HENT:      Head: Normocephalic.      Right Ear: External ear normal.      Left Ear: External ear normal.      Nose: Nose normal.      Mouth/Throat:      Mouth: Mucous membranes are moist.      Pharynx: Oropharynx is clear.   Eyes:      Extraocular Movements: Extraocular movements intact.      Conjunctiva/sclera: Conjunctivae normal.   Cardiovascular:      Rate and Rhythm: Normal rate and regular rhythm.      Pulses: Normal pulses.      Heart sounds: Normal heart sounds.   Pulmonary:      Effort: Pulmonary  "effort is normal.      Breath sounds: Normal breath sounds.   Abdominal:      General: Abdomen is flat. Bowel sounds are normal.      Palpations: Abdomen is soft.   Musculoskeletal:      Cervical back: Normal range of motion.      Comments: Legs out in front of him.  Both feet are in cushioned blue boots,   Wound vac right foot.  Left foot is wrapped with Kerlix    Skin:     General: Skin is warm and dry.      Comments: Lower legs are very dry   Neurological:      General: No focal deficit present.      Mental Status: He is alert and oriented to person, place, and time.   Psychiatric:         Mood and Affect: Mood normal.         Behavior: Behavior normal.         Thought Content: Thought content normal.         Judgment: Judgment normal.      LABORATORY/IMAGING DATA:  Review[facility] as [facility] [employer] and[facility]    ASSESSMENT/PLAN:  (E11.51,  Z79.4) Type 2 diabetes mellitus with diabetic peripheral angiopathy without gangrene, with long-term current use of insulin (H)  (primary encounter diagnosis)  (E11.42) Diabetic polyneuropathy associated with type 2 diabetes mellitus (H)  Comment: remains on Jardiance 10mg daily, Metformin 1000mg BID, and Lantus 30 units every AM.  Blood sugars will be done QID.  Has a PRN short acting insulin with meals PRN for high blood sugars.  Very general order for \"high\" sugar.  Will need to check sugars next week after more readings gathered and then evaluate.      (E08.621,  L97.402) Diabetic ulcer of heel associated with diabetes mellitus due to underlying condition, with fat layer exposed, unspecified laterality (H)  (M86.171) Acute osteomyelitis of right calcaneus (H)  Comment: wound vac for right foot, wet to dry dressing for left foot twice a day.  He anticipates that he will be here for some time.    Reordered his tramadol order as only received 10 tabs.  Remains on Augmentin and Doxycycline until 2/25/25.  To see Podiatrist in the next 1-2 weeks.    Told Janice that the NP may " try to see it when dressing gets changed next week.   Aquaphor to legs for moisture daily.    To remain on ASA 81mg daily, CoQ10 daily, and Plavix 75mg daily.    Plan: traMADol (ULTRAM) 50 MG tablet    (I50.30) Heart failure with preserved ejection fraction, NYHA class II (H)  Comment: takes torsemide 10mg po daily.  Has the Jardiance 10mg daily as well.    No changes for now.  Vitals taken daily.    (I10) Hypertension, unspecified type  Comment: vitals daily.  Remains on losartan 50mg po daily.  Ongoing monitoring.    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  Monitor outpatient.    (E78.2) Mixed hyperlipidemia  Comment: currently on Crestor 40mg daily at bedtime.  No changes.     Orders:    Aquaphor ointment to lower legs daily for dry skin  Zofran 4mg po every 6 hours prn for nausea  CBC and BMP on Monday for osteomyelitis  Move vitamin B complex, vitamin C and Lactobacillius to 430pm.      Total time spent with patient visit at the skilled nursing facility was 45 min including patient visit, review of past records, and discussion with nursing. Greater than 50% of total time spent with counseling and coordinating care due to gathering information, discussion of plan of care, reviewing medications and encouragement to let staff know if he needs anything.    Electronically signed by:  SILVIA Howard CNP

## 2025-02-14 NOTE — LETTER
2/14/2025      Janice Nowak  [address]  [place] [address]        [facility] GERIATRICS  INITIAL VISIT NOTE  February 14, 2025      PRIMARY CARE PROVIDER AND CLINIC:  Anayeli Machado [facility]  [id number] / [place] [address]    [facility] Medical Record Number:  [id number]  Place of Service where encounter took place:  [facility] ([facility]) [[id number]]    Chief Complaint   Patient presents with     Hospital F/U       HPI:    Janice Nowak is a 80 year old  (1945) male was admitted to the above facility from  [facility]. Hospital stay 2/4/25 through 2/13/25 where they were admitted for bilateral lower extremity ulcers with irrigation/debridement, and right calcaneal osteomyelitis.  Now admitted to this facility for  rehab, medical management, and nursing care.      History obtained from: facility chart records, facility staff, patient report, [facility] chart review, and Care Everywhere [facility] chart review.      Brief Hospital Course:   Janice is a 80 year old gentleman with chronic health conditions that include Type 2 DM with neuropathy, and diabetic foot ulcers, Hypertension, Heart failure with preserved EF. Hypothyroidism, SABRINA, hepatocellular carcinoma currently receiving palliative immunotherapy.      Janice was found to have worsening chronic osteomyelitis of his bilateral lower extremities.  Podiatry, vascular disease, and ID were involved.    S/P partial calcanectomy right heel 10/24/25   Bilateral LE I&D 1/27/25  Evidence of PAD - stenting with IR on 1/16/25.  Discharged with wound vac to right foot and complete 6 weeks of antibiotics.    Wet to dry dressing change to lower left extremity until seen in clinic which is desired 1-2 weeks.  Underwent LLE angiogram on 2/8/25 and found to have high grade stenosis, now s/p crossing/orbital atherectomy/angioplasty of the ALEX/DP, Angioplasty/DCB of the  popliteal lesion with FELIPE placement in the proximal SFA lesion.      IR consulted and recommended: continue ASA + Clopidegrel   ID consulted and recommended:  did complete IV vanc/zosyn course prior to admission and discontinued 2/11  Transition to Doxy+Augmentin x14 days on discharge  Follow CMP and CBC outpatient.    Hepatobiliary Cancer  Questionable mets to Lungs  Incidental liver mass noted in 11/2024.  IR guided biopsy on 1/2/25 which confirmed HCC.    Met with oncology 1/10/25 and discussed recommendation for palliative immunotherapy.    CT on recent admission showed nodules and bulky adenopathy concerning for malignancy.    Continue GOC conversation outpatient.    Heart failure with preserved ejection fraction  Hypertension  ECHO on 12/11/24, showed EF 55-60%.  Transudative pleural effusion s/p thoracentesis 12/2024.  No signs of acute heart failure when admitted.  Diuretics held due to CAROLINE and restart at discharge along with lostartan.  Spironolactone held for soft B/P.  Can restart outpatient if needed.       Type II Diabetes  A1c 7.9%  no highs or lows during hospitalizations.  Continue with Empagliflozin 10mg, Glargine 30 units every AM, and metformin 1000mg BID.        TCU Course:   Met with Janice today before lunch.  During the visit staff brought in lunch and he did not want the aide to set him up as he was talking to this NP and NP student.  At the end, the nurse came in to give him the rest of his morning medications mixed in pudding.  After he took the medication he stated that he gets so much at one time.  Asked if he wants to spread out his medications so they are not all lumped in twice a day dosing.  He wanted that to happen.  The nurse stated she split his medications out this morning as it was too much for him.    Janice is laying in bed with the head of bed slightly elevated.  Appears tired.  Voice is soft but he can hear conversation without difficulty.    Talked about pain as he is not  on scheduled Tylenol.  Janice stated that he takes the Tylenol if he has a headache or pain in shoulder blades.   He states that he has ongoing pain in his legs and feet.      Reviewed medications and will add Zofran PRN in case of nausea.  Stated he had an emesis last night.  Nothing today.  Janice showed this NP his port-a-cath in upper right chest, and his right groin area where the angiogram was done.  Bruising present.      Explained to Janice about the regular rounding NP that works with this NP and that he will see her more.  He will go out to his appointments still.  He is to be to the vascular clinic in 1-2 weeks.  Unknown when that appointment is yet.    Encouraged him to let staff know if he needs anything.    Labs will be Monday.    CODE STATUS/ADVANCE DIRECTIVES: DNR / DNI    ALLERGIES:  Allergies   Allergen Reactions     Exenatide Unknown     Heparin Unknown     Liraglutide Unknown     Lisinopril Cough     Morphine Unknown       PAST MEDICAL HISTORY:   Past Medical History:   Diagnosis Date     Coronary artery disease      Diabetes (H)      Hypertension      Pleural effusion      Sleep apnea      Thyroid disease        PAST SURGICAL HISTORY:   Past Surgical History:   Procedure Laterality Date     BACK SURGERY       BONE EXOSTOSIS EXCISION Right 10/24/2024    Procedure: PARTIAL CALCANECTOMY RIGHT FOOT;  Surgeon: Amol Patricio DPM;  Location: [facility] OR     CHOLECYSTECTOMY       ENDOSCOPIC ULTRASOUND UPPER GASTROINTESTINAL TRACT (GI) N/A 01/02/2025    Procedure: ENDOSCOPIC ULTRASOUND, ESOPHAGOSCOPY / UPPER GASTROINTESTINAL TRACT (GI) with Fine Needle Aspiration;  Surgeon: Kong Medina MD;  Location: [facility] OR     INCISION AND DRAINAGE FOOT, COMBINED Right 10/24/2024    Procedure: INCISION AND DRAINAGE;  Surgeon: Amol Patricio DPM;  Location: [facility] OR     INCISION AND DRAINAGE FOOT, COMBINED Bilateral 1/27/2025    Procedure: INCISION AND DRAINAGE bilateral feet  with application of TheraSkin graft;  Surgeon: Amol Patricio DPM;  Location: [facility] OR     IR CHEST PORT PLACEMENT > 5 YRS OF AGE  01/16/2025     IR LOWER EXTREMITY ANGIOGRAM LEFT  01/16/2025     IR LOWER EXTREMITY ANGIOGRAM LEFT  2/8/2025     IR LOWER EXTREMITY ANGIOGRAM RIGHT  12/26/2024     IRRIGATION AND DEBRIDEMENT FOOT, COMBINED Right 10/24/2024    Procedure: AND DEBRIDEMENT RIGHT HEEL,;  Surgeon: Amol Patricio DPM;  Location: [facility] OR     ORTHOPEDIC SURGERY       PICC SINGLE LUMEN PLACEMENT  10/30/2024     VASCULAR SURGERY         FAMILY HISTORY:   Family History   Problem Relation Age of Onset     Anesthesia Reaction No family hx of      Thrombosis No family hx of          SOCIAL HISTORY:   Patient's living condition: lives in an assisted living facility  spouse is there in the [facility] care    MEDICATIONS  Post Discharge Medication Reconciliation Status: discharge medications reconciled, continue medications without change.  Current Outpatient Medications   Medication Sig Dispense Refill     amoxicillin-clavulanate (AUGMENTIN) 875-125 MG tablet Take 1 tablet by mouth 2 times daily for 12 days. 24 tablet 0     aspirin 81 MG EC tablet Take 1 tablet (81 mg) by mouth every evening. 30 tablet 0     clopidogrel (PLAVIX) 75 MG tablet Take 1 tablet (75 mg) by mouth every morning. 30 tablet 0     co-enzyme Q-10 100 MG CAPS capsule Take 200 mg by mouth every evening.       doxycycline hyclate (VIBRAMYCIN) 100 MG capsule Take 1 capsule (100 mg) by mouth 2 times daily for 12 days. 24 capsule 0     empagliflozin (JARDIANCE) 10 MG TABS tablet Take 1 tablet (10 mg) by mouth daily. 30 tablet 0     guaiFENesin (MUCUS RELIEF ER PO) Take 1,200 mg by mouth 2 times daily as needed (congestion).       insulin aspart (NOVOLOG PEN) 100 UNIT/ML pen Inject 5 Units subcutaneously 3 times daily as needed for high blood sugar (With Meals). Novolog Flexpen: 5 units with breakfast, 5 units with lunch, 5  units with dinner. Takes as needed with each meal. 15 mL 0     insulin glargine (LANTUS VIAL) 100 UNIT/ML vial Inject 30 Units subcutaneously every morning. 10 mL 0     lactobacillus rhamnosus, GG, (CULTURELL) capsule Take 1 capsule by mouth every morning.       levothyroxine (SYNTHROID/LEVOTHROID) 50 MCG tablet Take 1 tablet (50 mcg) by mouth every morning. 30 tablet 0     losartan (COZAAR) 50 MG tablet Take 1 tablet (50 mg) by mouth at bedtime. 30 tablet 0     meclizine (ANTIVERT) 25 MG tablet Take 50 mg by mouth 3 times daily as needed (Vertigo).       metFORMIN (GLUCOPHAGE) 1000 MG tablet Take 1 tablet (1,000 mg) by mouth 2 times daily (with meals). 60 tablet 0     Multiple Vitamins-Minerals (PRESERVISION AREDS 2 PO) Take 2 capsules by mouth 2 times daily.       rosuvastatin (CRESTOR) 40 MG tablet Take 1 tablet (40 mg) by mouth daily. 30 tablet 0     torsemide (DEMADEX) 10 MG tablet Take 1 tablet (10 mg) by mouth daily. 30 tablet 0     traMADol (ULTRAM) 50 MG tablet Take 1 tablet (50 mg) by mouth every 6 hours as needed for moderate pain. 30 tablet 0     vitamin C (ASCORBIC ACID) 1000 MG TABS Take 1,000 mg by mouth 2 times daily.       vitamin C with B complex (B COMPLEX-C) tablet Take 1 tablet by mouth every morning.         ROS:  10 point ROS neg other than the symptoms noted above in the HPI.      PHYSICAL EXAM:  /81   Pulse 93   Temp 97.8  F (36.6  C)   Resp 18   Wt 99.5 kg (219 lb 6.4 oz)   SpO2 98%   BMI 33.36 kg/m    Physical Exam  Vitals and nursing note reviewed.   Constitutional:       Appearance: He is ill-appearing.   HENT:      Head: Normocephalic.      Right Ear: External ear normal.      Left Ear: External ear normal.      Nose: Nose normal.      Mouth/Throat:      Mouth: Mucous membranes are moist.      Pharynx: Oropharynx is clear.   Eyes:      Extraocular Movements: Extraocular movements intact.      Conjunctiva/sclera: Conjunctivae normal.   Cardiovascular:      Rate and Rhythm:  "Normal rate and regular rhythm.      Pulses: Normal pulses.      Heart sounds: Normal heart sounds.   Pulmonary:      Effort: Pulmonary effort is normal.      Breath sounds: Normal breath sounds.   Abdominal:      General: Abdomen is flat. Bowel sounds are normal.      Palpations: Abdomen is soft.   Musculoskeletal:      Cervical back: Normal range of motion.      Comments: Legs out in front of him.  Both feet are in cushioned blue boots,   Wound vac right foot.  Left foot is wrapped with Kerlix    Skin:     General: Skin is warm and dry.      Comments: Lower legs are very dry   Neurological:      General: No focal deficit present.      Mental Status: He is alert and oriented to person, place, and time.   Psychiatric:         Mood and Affect: Mood normal.         Behavior: Behavior normal.         Thought Content: Thought content normal.         Judgment: Judgment normal.      LABORATORY/IMAGING DATA:  Reviewed as per [facility] and/or [facility]    ASSESSMENT/PLAN:  (E11.51,  Z79.4) Type 2 diabetes mellitus with diabetic peripheral angiopathy without gangrene, with long-term current use of insulin (H)  (primary encounter diagnosis)  (E11.42) Diabetic polyneuropathy associated with type 2 diabetes mellitus (H)  Comment: remains on Jardiance 10mg daily, Metformin 1000mg BID, and Lantus 30 units every AM.  Blood sugars will be done QID.  Has a PRN short acting insulin with meals PRN for high blood sugars.  Very general order for \"high\" sugar.  Will need to check sugars next week after more readings gathered and then evaluate.      (E08.621,  L97.402) Diabetic ulcer of heel associated with diabetes mellitus due to underlying condition, with fat layer exposed, unspecified laterality (H)  (M86.171) Acute osteomyelitis of right calcaneus (H)  Comment: wound vac for right foot, wet to dry dressing for left foot twice a day.  He anticipates that he will be here for some time.    Reordered his tramadol order as only received " 10 tabs.  Remains on Augmentin and Doxycycline until 2/25/25.  To see Podiatrist in the next 1-2 weeks.    Told Janice that the NP may try to see it when dressing gets changed next week.   Aquaphor to legs for moisture daily.    To remain on ASA 81mg daily, CoQ10 daily, and Plavix 75mg daily.    Plan: traMADol (ULTRAM) 50 MG tablet    (I50.30) Heart failure with preserved ejection fraction, NYHA class II (H)  Comment: takes torsemide 10mg po daily.  Has the Jardiance 10mg daily as well.    No changes for now.  Vitals taken daily.    (I10) Hypertension, unspecified type  Comment: vitals daily.  Remains on losartan 50mg po daily.  Ongoing monitoring.    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  Monitor outpatient.    (E78.2) Mixed hyperlipidemia  Comment: currently on Crestor 40mg daily at bedtime.  No changes.     Orders:    Aquaphor ointment to lower legs daily for dry skin  Zofran 4mg po every 6 hours prn for nausea  CBC and BMP on Monday for osteomyelitis  Move vitamin B complex, vitamin C and Lactobacillius to 430pm.      Total time spent with patient visit at the skilled nursing facility was 45 min including patient visit, review of past records, and discussion with nursing. Greater than 50% of total time spent with counseling and coordinating care due to gathering information, discussion of plan of care, reviewing medications and encouragement to let staff know if he needs anything.    Electronically signed by:  SILVIA Howard CNP               Sincerely,        SILVIA Howard CNP    Electronically signed

## 2025-02-16 ENCOUNTER — DOCUMENTATION ONLY (OUTPATIENT)
Dept: ONCOLOGY | Facility: HOSPITAL | Age: 80
End: 2025-02-16
Payer: COMMERCIAL

## 2025-02-16 NOTE — PROGRESS NOTES
Brief oncology note:    Patient's recent liver biopsy pathology was reviewed at the [facility].  Their final diagnosis is hepatocellular carcinoma.  We had initially started him on bevacizumab and atezolizumab.  He was just admitted to the hospital with worsening osteomyelitis and lower extremity ulcers.  It is known that bevacizumab can delay wound healing.  Given his chronic infection and wounds, we are going to change to a non this will include bevacizumab containing treatment plan.  This will include Durvalumab and tremelimumab.  This combination should be much less likely to affect wound healing.    Kong Gleason MD

## 2025-02-17 ENCOUNTER — TRANSITIONAL CARE UNIT VISIT (OUTPATIENT)
Dept: GERIATRICS | Facility: CLINIC | Age: 80
End: 2025-02-17
Payer: COMMERCIAL

## 2025-02-17 ENCOUNTER — PATIENT OUTREACH (OUTPATIENT)
Dept: ONCOLOGY | Facility: HOSPITAL | Age: 80
End: 2025-02-17

## 2025-02-17 VITALS
TEMPERATURE: 97.5 F | RESPIRATION RATE: 16 BRPM | OXYGEN SATURATION: 97 % | SYSTOLIC BLOOD PRESSURE: 134 MMHG | BODY MASS INDEX: 29.61 KG/M2 | HEART RATE: 85 BPM | DIASTOLIC BLOOD PRESSURE: 77 MMHG | HEIGHT: 68 IN | WEIGHT: 195.4 LBS

## 2025-02-17 DIAGNOSIS — Z79.4 TYPE 2 DIABETES MELLITUS WITH DIABETIC PERIPHERAL ANGIOPATHY WITHOUT GANGRENE, WITH LONG-TERM CURRENT USE OF INSULIN (H): ICD-10-CM

## 2025-02-17 DIAGNOSIS — Z86.2 HISTORY OF ANEMIA OF CHRONIC DISEASE: ICD-10-CM

## 2025-02-17 DIAGNOSIS — C24.9: ICD-10-CM

## 2025-02-17 DIAGNOSIS — C78.00 MALIGNANT NEOPLASM METASTATIC TO LUNG, UNSPECIFIED LATERALITY (H): ICD-10-CM

## 2025-02-17 DIAGNOSIS — E11.42 DIABETIC POLYNEUROPATHY ASSOCIATED WITH TYPE 2 DIABETES MELLITUS (H): ICD-10-CM

## 2025-02-17 DIAGNOSIS — I10 HYPERTENSION, UNSPECIFIED TYPE: ICD-10-CM

## 2025-02-17 DIAGNOSIS — E02 SUBCLINICAL IODINE-DEFICIENCY HYPOTHYROIDISM: ICD-10-CM

## 2025-02-17 DIAGNOSIS — L97.402 DIABETIC ULCER OF HEEL ASSOCIATED WITH DIABETES MELLITUS DUE TO UNDERLYING CONDITION, WITH FAT LAYER EXPOSED, UNSPECIFIED LATERALITY (H): Primary | ICD-10-CM

## 2025-02-17 DIAGNOSIS — M86.171 ACUTE OSTEOMYELITIS OF RIGHT CALCANEUS (H): ICD-10-CM

## 2025-02-17 DIAGNOSIS — I50.30 HEART FAILURE WITH PRESERVED EJECTION FRACTION, NYHA CLASS II (H): ICD-10-CM

## 2025-02-17 DIAGNOSIS — E08.621 DIABETIC ULCER OF HEEL ASSOCIATED WITH DIABETES MELLITUS DUE TO UNDERLYING CONDITION, WITH FAT LAYER EXPOSED, UNSPECIFIED LATERALITY (H): Primary | ICD-10-CM

## 2025-02-17 DIAGNOSIS — E78.2 MIXED HYPERLIPIDEMIA: ICD-10-CM

## 2025-02-17 DIAGNOSIS — Z98.890 S/P EXCISIONAL DEBRIDEMENT: ICD-10-CM

## 2025-02-17 DIAGNOSIS — E11.51 TYPE 2 DIABETES MELLITUS WITH DIABETIC PERIPHERAL ANGIOPATHY WITHOUT GANGRENE, WITH LONG-TERM CURRENT USE OF INSULIN (H): ICD-10-CM

## 2025-02-17 DIAGNOSIS — D63.8 ANEMIA IN OTHER CHRONIC DISEASES CLASSIFIED ELSEWHERE: ICD-10-CM

## 2025-02-17 LAB
ANION GAP SERPL CALCULATED.3IONS-SCNC: 11 MMOL/L (ref 7–15)
BUN SERPL-MCNC: 27.3 MG/DL (ref 8–23)
CALCIUM SERPL-MCNC: 8.2 MG/DL (ref 8.8–10.4)
CHLORIDE SERPL-SCNC: 107 MMOL/L (ref 98–107)
CREAT SERPL-MCNC: 0.99 MG/DL (ref 0.67–1.17)
EGFRCR SERPLBLD CKD-EPI 2021: 77 ML/MIN/1.73M2
ERYTHROCYTE [DISTWIDTH] IN BLOOD BY AUTOMATED COUNT: 19.1 % (ref 10–15)
GLUCOSE SERPL-MCNC: 100 MG/DL (ref 70–99)
HCO3 SERPL-SCNC: 22 MMOL/L (ref 22–29)
HCT VFR BLD AUTO: 27.6 % (ref 40–53)
HGB BLD-MCNC: 8.3 G/DL (ref 13.3–17.7)
MCH RBC QN AUTO: 25.8 PG (ref 26.5–33)
MCHC RBC AUTO-ENTMCNC: 30.1 G/DL (ref 31.5–36.5)
MCV RBC AUTO: 86 FL (ref 78–100)
PLATELET # BLD AUTO: 131 10E3/UL (ref 150–450)
POTASSIUM SERPL-SCNC: 3.9 MMOL/L (ref 3.4–5.3)
RBC # BLD AUTO: 3.22 10E6/UL (ref 4.4–5.9)
SODIUM SERPL-SCNC: 140 MMOL/L (ref 135–145)
WBC # BLD AUTO: 10.1 10E3/UL (ref 4–11)

## 2025-02-17 PROCEDURE — P9604 ONE-WAY ALLOW PRORATED TRIP: HCPCS | Performed by: NURSE PRACTITIONER

## 2025-02-17 PROCEDURE — 36415 COLL VENOUS BLD VENIPUNCTURE: CPT | Performed by: NURSE PRACTITIONER

## 2025-02-17 PROCEDURE — 80048 BASIC METABOLIC PNL TOTAL CA: CPT | Performed by: NURSE PRACTITIONER

## 2025-02-17 PROCEDURE — 99310 SBSQ NF CARE HIGH MDM 45: CPT | Performed by: NURSE PRACTITIONER

## 2025-02-17 PROCEDURE — 85027 COMPLETE CBC AUTOMATED: CPT | Performed by: NURSE PRACTITIONER

## 2025-02-17 NOTE — LETTER
2/17/2025      Janice Nowak  [address]  [facility] [address]        [facility]    Code Status:  DNR/DNI   Visit Type:   Chief Complaint   Patient presents with     TCU Follow Up     Facility:  [facility] ([facility]) [[id number]]         HPI: Janice Nowak is a 80 year old male who I am seeing today for admit to the TCU. Past medical history includes T2DM, HFpEF, recent debridement of diabetic ulcers, SABRINA, hepatocellular carcinoma currently receiving palliative immunotherapy, hypothyroidism, HTN and HLD.  Patient recently hospitalized secondary to acute abdominal pain and encephalopathy.  He has known hepatocellular carcinoma.  He was found to have worsening of his chronic osteomyelitis of bilateral lower extremities.  History of calcanectomy 10/2024.   Patient underwent bilateral irrigation and debridement on 1/27/2025.  History of PAD.  Underwent stenting with IR on 1/16/2024.  LLE angiogram 2/8 showed high-grade stenosis now s/p crossing/orbital atherectomy/angioplasty of the ALEX/DP, Angioplasty/DCB of the Popliteal lesion with FELIPE placement in the proximal SFA lesion.  Chest pain with elevated troponins.=Overnight 2/11-2/12 had an episode of chest pain, reproducible with palpation on exam, not suspicious for ACS. Improved with lidocaine patch and Voltaren gel while admitted.  Declined further workup.    Transitional Care Course: On today's visit patient is sitting up in bedside chair.  He is known to me from previous TCU stay.  He has a wound VAC to the right lower extremity.  Dressing intact to left lower extremity and foot.  I did review pictures taken by the nursing staff during dressing change which showed moderate amount of necrosis with staples intact.  1+ lower extremity edema.  Extremities are warm to touch however pedal pulses nonpalpable.  Patient denies any pain.  He is somewhat of a flat affect and feels he has a very poor prognosis and outcome.   He does have underlying hepatocellular carcinoma now with concerns for metastases to the lung.  He has been undergoing palliative immunotherapy with oncology.  He denies shortness of breath or chest pain.  He does report poor oral intake with no appetite.  Tongue appears dry.  BP is satisfactory.  He does have underlying type 2 diabetes.  Blood sugars appear satisfactory.  Chronic anemia.  Hemoglobin 8.3.  Patient was having loose stools yesterday with loose stools x 4.  Only 1 stool today.      Assessment/Plan:      Bilateral Lower Extremity Ulcers s/p bilateral irrigation and debridement  Acute on Chronic Right Calcaneal Osteomyelitis s/p calcanectomy  - S/p partial calcanectomy right heel 10/24/24  -bilateral LE I&D 1/27/25.   -stenting with IR on 1/16/24.   -Underwent LLE angiogram 2/8 and found to have high grade stenosis, now s/p crossing/orbital atherectomy/angioplasty of the ALEX/DP, Angioplasty/DCB of the Popliteal lesion with FELIPE placement in the proximal SFA lesion.   -Continues with Wound Vac to RLE.   -Wet to dry dressing to LLE.   -NWB to BLE.   -Continue blue boots.   -Follow up with podiatry 2/28.   - Continue ASA + Clopidegrel   - completed IV vanc/zosyn course prior to admission, discontinued 2/11.  -Augmentin twice daily x 12 days.  Stop date 2/25.  -Doxycycline twice daily x 12 days.  Stop date 2/24.  - follow-up CBC today with no leukocytosis.  Hemoglobin 8.3.  -Follow-up BMP with creatinine 27.  -Continue probiotic     Generalized weakness  Encephalopathy  -Initially presented with multiple days of progressive weakness, poor p.o. intake, poor sleep, myalgias.  Head imaging on admission negative for acute intracranial pathology.  No significant metabolic derangements.  Differential included hepatic encephalopathy vs infectious derangements, ultimately resolved with change of medication from oxycodone to tramadol.   - tramadol 50mg q8hrs PRN for pain  -Mentation improved.  -ok to PT, OT to eval  and treat.      Hepatobiliary Cancer   Concern for Metastasis to Lungs   Relatively new diagnosis, began with incidental liver mass noted in 11/2024. Underwent IR guided biopsy on 1/2/25 which confirmed HCC. Met with oncology on 1/10/25 who discussed recommendation for palliative immunotherapy. CT chest on recent admission showed nodules and bulky adenopathy concerning for malignancy.   -Patient has been receiving palliative care immunotherapy transfusions outpatient with oncology.  -Follow-up with Dr. Gleason in the AM.     Heart failure with preserved ejection fraction, NYHA class II   Hypertension  -Echo 12/11 with mild concentric LVH. EF 55-60%. Had transudative pleural effusion s/p thoracentesis 12/2024. No signs of acute heart failure while admitted, diuretics held due to CAROLINE, restart at discharge  -Aspirin 81 mg daily.  -Losartan 50 mg nightly  -Torsemide 10 mg daily.  -Spironolactone currently on hold.  -Daily weights.     Type II Diabetes.   -A1c 7.9%.   -Consistent carb diet.  -Blood sugar checks 4 times daily.  -Empagliflozin 10mg  -Glargine 30U Qam  -Metformin 1000mg BID  -DC as needed sliding scale.  -monitor for poor oral intake.      Anemia, normocytic  -Baseline 10-12 but trending downward in hospital.   -today hgb 8.3.   -currently no evidence of bleed.      CAROLINE on CKD  Hyponatremia   -Creatinine 1.24, baseline closer to 1.   -Follow up BMP today with Creatine 0.99.      Carotid arterial disease  Mixed hyperlipidemia  -U/S 11/2024 with <50% stenosis bilaterally.  -Monitor bp.   -continue statin.     Hypothyroidism  -TSH WNL.  -Levothyroxine 50mcg daily.        Active Ambulatory Problems     Diagnosis Date Noted     Type 2 Diabetes Mellitus      Microalbuminuria      Benign Adenomatous Polyp Of The Large Intestine      Mixed hyperlipidemia      Obstructive Sleep Apnea -- stopped using CPAP      Hypertension      Diabetic foot infection (H) 10/07/2024     DM 2 w PVD on Insulin, Hgb A1C9.2 10/24/24  10/07/2024     Diabetic ulcer of right heel associated with type 2 diabetes mellitus, with necrosis of muscle (H) 10/16/2024     Carotid arterial disease 10/29/2024     Atypical chest pain 11/12/2015     Charcot joint of foot 10/29/2024     Charcot's arthropathy 10/29/2024     Diabetic neuropathy (H) 10/29/2024     Exposure to potentially hazardous substance 10/29/2024     Fatigue 11/12/2015     Pressure ulcer of right heel, unstageable (H) 10/29/2024     Occlusion and stenosis of unspecified carotid artery 10/29/2024     Postoperative back pain 10/29/2024     Hypothyroidism 10/29/2024     History of cholecystectomy 10/29/2024     Hearing loss 10/29/2024     Ulcer of right leg, limited to breakdown of skin (H) 11/13/2024     Diabetic ulcer of right heel associated with type 2 diabetes mellitus, with necrosis of bone (H) 11/13/2024     Dyspnea on exertion 12/11/2024     Diabetic ulcer of right heel associated with diabetes mellitus due to underlying condition, with muscle involvement without evidence of necrosis (H) 12/27/2024     Hepatocellular carcinoma (H) 01/10/2025     Abnormal hemoglobin (Hgb) 01/16/2025     Elevated troponin 01/16/2025     Syncope, unspecified syncope type 01/16/2025     History of vascular access device 01/20/2025     Diabetic ulcer of right heel -- S/P Surg 1/27/25 01/27/2025     Weakness 02/04/2025     Heart failure with preserved ejection fraction, NYHA class II (H) 02/04/2025     Open wound of right lower extremity 12/16/2024     Peripheral vascular disease 12/16/2024     Pleural effusion 12/16/2024     Elevated brain natriuretic peptide (BNP) level 12/16/2024     Heart failure with preserved ejection fraction, NYHA class I (H) 12/16/2024     Resolved Ambulatory Problems     Diagnosis Date Noted     Hypothyroidism, unspecified 10/29/2024     Past Medical History:   Diagnosis Date     Coronary artery disease      Diabetes (H)      Hypertension      Sleep apnea      Thyroid disease       Allergies   Allergen Reactions     Exenatide Unknown     Heparin Unknown     Liraglutide Unknown     Lisinopril Cough     Morphine Unknown       All Meds and Allergies reviewed in the record at the facility and is the most up-to-date.    Post Discharge Medication Reconciliation Status: discharge medications reconciled, continue medications without change  Current Outpatient Medications   Medication Sig Dispense Refill     amoxicillin-clavulanate (AUGMENTIN) 875-125 MG tablet Take 1 tablet by mouth 2 times daily for 12 days. 24 tablet 0     aspirin 81 MG EC tablet Take 1 tablet (81 mg) by mouth every evening. 30 tablet 0     clopidogrel (PLAVIX) 75 MG tablet Take 1 tablet (75 mg) by mouth every morning. 30 tablet 0     co-enzyme Q-10 100 MG CAPS capsule Take 200 mg by mouth every evening.       doxycycline hyclate (VIBRAMYCIN) 100 MG capsule Take 1 capsule (100 mg) by mouth 2 times daily for 12 days. 24 capsule 0     empagliflozin (JARDIANCE) 10 MG TABS tablet Take 1 tablet (10 mg) by mouth daily. 30 tablet 0     guaiFENesin (MUCUS RELIEF ER PO) Take 1,200 mg by mouth 2 times daily as needed (congestion).       insulin aspart (NOVOLOG PEN) 100 UNIT/ML pen Inject 5 Units subcutaneously 3 times daily as needed for high blood sugar (With Meals). Novolog Flexpen: 5 units with breakfast, 5 units with lunch, 5 units with dinner. Takes as needed with each meal. 15 mL 0     insulin glargine (LANTUS VIAL) 100 UNIT/ML vial Inject 30 Units subcutaneously every morning. 10 mL 0     lactobacillus rhamnosus, GG, (CULTURELL) capsule Take 1 capsule by mouth every morning.       levothyroxine (SYNTHROID/LEVOTHROID) 50 MCG tablet Take 1 tablet (50 mcg) by mouth every morning. 30 tablet 0     losartan (COZAAR) 50 MG tablet Take 1 tablet (50 mg) by mouth at bedtime. 30 tablet 0     meclizine (ANTIVERT) 25 MG tablet Take 50 mg by mouth 3 times daily as needed (Vertigo).       metFORMIN (GLUCOPHAGE) 1000 MG tablet Take 1 tablet (1,000  "mg) by mouth 2 times daily (with meals). 60 tablet 0     Multiple Vitamins-Minerals (PRESERVISION AREDS 2 PO) Take 2 capsules by mouth 2 times daily.       rosuvastatin (CRESTOR) 40 MG tablet Take 1 tablet (40 mg) by mouth daily. 30 tablet 0     torsemide (DEMADEX) 10 MG tablet Take 1 tablet (10 mg) by mouth daily. 30 tablet 0     traMADol (ULTRAM) 50 MG tablet Take 1 tablet (50 mg) by mouth every 6 hours as needed for moderate pain. 30 tablet 0     vitamin C (ASCORBIC ACID) 1000 MG TABS Take 1,000 mg by mouth 2 times daily.       vitamin C with B complex (B COMPLEX-C) tablet Take 1 tablet by mouth every morning.       No current facility-administered medications for this visit.       REVIEW OF SYSTEMS:   10 point review of systems reviewed and pertinent positives in the HPI.     PHYSICAL EXAMINATION:  Physical Exam     Vital signs: /77   Pulse 85   Temp 97.5  F (36.4  C)   Resp 16   Ht 1.727 m (5' 8\")   Wt 88.6 kg (195 lb 6.4 oz)   SpO2 97%   BMI 29.71 kg/m    General: Awake, Alert, oriented x3, siting up in bedside chair, appropriately, follows simple commands, conversant  HEENT:Pink conjunctiva, dry oral mucosa  NECK: Supple  CVS:  S1  S2, without murmur or gallop.   LUNG: Clear to auscultation, No wheezes, rales or rhonci.  BACK: No kyphosis of the thoracic spine  ABDOMEN: Soft, nontender to palpation, with positive bowel sounds  EXTREMITIES: moves both upper and lower extremities with some limitation to BLE, 1+ pedal edema. Blue boots to BLE.   SKIN: wound Vac to RLE. LLE with dressing intact.   NEUROLOGIC: Pulses non palpable. Skin warm.   PSYCHIATRIC: Flat affect.       Labs:  All labs reviewed in the nursing home record and [facility]   @  Lab Results   Component Value Date    WBC 10.1 02/17/2025     Lab Results   Component Value Date    RBC 3.22 02/17/2025     Lab Results   Component Value Date    HGB 8.3 02/17/2025     Lab Results   Component Value Date    HCT 27.6 02/17/2025     Lab Results "   Component Value Date    MCV 86 02/17/2025     Lab Results   Component Value Date    MCH 25.8 02/17/2025     Lab Results   Component Value Date    MCHC 30.1 02/17/2025     Lab Results   Component Value Date    RDW 19.1 02/17/2025     Lab Results   Component Value Date     02/17/2025        @Last Comprehensive Metabolic Panel:  Sodium   Date Value Ref Range Status   02/17/2025 140 135 - 145 mmol/L Final     Potassium   Date Value Ref Range Status   02/17/2025 3.9 3.4 - 5.3 mmol/L Final   07/06/2020 4.7 3.5 - 5.0 mmol/L Final     Chloride   Date Value Ref Range Status   02/17/2025 107 98 - 107 mmol/L Final   07/06/2020 103 98 - 107 mmol/L Final     Carbon Dioxide (CO2)   Date Value Ref Range Status   02/17/2025 22 22 - 29 mmol/L Final   07/06/2020 22 22 - 31 mmol/L Final     Anion Gap   Date Value Ref Range Status   02/17/2025 11 7 - 15 mmol/L Final   07/06/2020 14 5 - 18 mmol/L Final     Glucose   Date Value Ref Range Status   02/17/2025 100 (H) 70 - 99 mg/dL Final   07/06/2020 102 70 - 125 mg/dL Final     GLUCOSE BY METER POCT   Date Value Ref Range Status   02/13/2025 130 (H) 70 - 99 mg/dL Final     Urea Nitrogen   Date Value Ref Range Status   02/17/2025 27.3 (H) 8.0 - 23.0 mg/dL Final   07/06/2020 30 (H) 8 - 28 mg/dL Final     Creatinine   Date Value Ref Range Status   02/17/2025 0.99 0.67 - 1.17 mg/dL Final     GFR Estimate   Date Value Ref Range Status   02/17/2025 77 >60 mL/min/1.73m2 Final   07/06/2020 55 (L) >60 mL/min/1.73m2 Final     Calcium   Date Value Ref Range Status   02/17/2025 8.2 (L) 8.8 - 10.4 mg/dL Final       > 45 minutes spent preparing for this visit which included reviewing hospital records, labs, imaging, meds, consults as well as face to face time with pt and collaborating with nursing.       This note has been dictated using voice recognition software. Any grammatical or context distortions are unintentional and inherent to the software    Electronically signed by: Treasure Dumont,  CNP       Sincerely,        Treasure Dumont NP    Electronically signed
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
## 2025-02-18 ENCOUNTER — ONCOLOGY VISIT (OUTPATIENT)
Dept: ONCOLOGY | Facility: HOSPITAL | Age: 80
End: 2025-02-18
Attending: INTERNAL MEDICINE
Payer: COMMERCIAL

## 2025-02-18 ENCOUNTER — INFUSION THERAPY VISIT (OUTPATIENT)
Dept: INFUSION THERAPY | Facility: HOSPITAL | Age: 80
End: 2025-02-18
Attending: INTERNAL MEDICINE
Payer: COMMERCIAL

## 2025-02-18 VITALS
OXYGEN SATURATION: 98 % | TEMPERATURE: 97 F | BODY MASS INDEX: 29.71 KG/M2 | HEIGHT: 68 IN | HEART RATE: 94 BPM | SYSTOLIC BLOOD PRESSURE: 97 MMHG | RESPIRATION RATE: 18 BRPM | DIASTOLIC BLOOD PRESSURE: 54 MMHG

## 2025-02-18 DIAGNOSIS — S81.801D OPEN WOUND OF RIGHT LOWER EXTREMITY, SUBSEQUENT ENCOUNTER: ICD-10-CM

## 2025-02-18 DIAGNOSIS — C22.0 HEPATOCELLULAR CARCINOMA (H): Primary | ICD-10-CM

## 2025-02-18 DIAGNOSIS — R74.8 ABNORMAL LEVELS OF OTHER SERUM ENZYMES: ICD-10-CM

## 2025-02-18 LAB
ALBUMIN SERPL BCG-MCNC: 2.5 G/DL (ref 3.5–5.2)
ALP SERPL-CCNC: 202 U/L (ref 40–150)
ALT SERPL W P-5'-P-CCNC: 49 U/L (ref 0–70)
ANION GAP SERPL CALCULATED.3IONS-SCNC: 10 MMOL/L (ref 7–15)
AST SERPL W P-5'-P-CCNC: 53 U/L (ref 0–45)
BASOPHILS # BLD AUTO: 0.1 10E3/UL (ref 0–0.2)
BASOPHILS NFR BLD AUTO: 1 %
BILIRUB SERPL-MCNC: 0.4 MG/DL
BUN SERPL-MCNC: 30.3 MG/DL (ref 8–23)
CALCIUM SERPL-MCNC: 8.7 MG/DL (ref 8.8–10.4)
CHLORIDE SERPL-SCNC: 103 MMOL/L (ref 98–107)
CREAT SERPL-MCNC: 0.99 MG/DL (ref 0.67–1.17)
EGFRCR SERPLBLD CKD-EPI 2021: 77 ML/MIN/1.73M2
EOSINOPHIL # BLD AUTO: 0.1 10E3/UL (ref 0–0.7)
EOSINOPHIL NFR BLD AUTO: 1 %
ERYTHROCYTE [DISTWIDTH] IN BLOOD BY AUTOMATED COUNT: 19.5 % (ref 10–15)
GLUCOSE SERPL-MCNC: 148 MG/DL (ref 70–99)
HCO3 SERPL-SCNC: 25 MMOL/L (ref 22–29)
HCT VFR BLD AUTO: 29.6 % (ref 40–53)
HGB BLD-MCNC: 9.5 G/DL (ref 13.3–17.7)
IMM GRANULOCYTES # BLD: 0.2 10E3/UL
IMM GRANULOCYTES NFR BLD: 2 %
LYMPHOCYTES # BLD AUTO: 1.5 10E3/UL (ref 0.8–5.3)
LYMPHOCYTES NFR BLD AUTO: 11 %
MCH RBC QN AUTO: 26.5 PG (ref 26.5–33)
MCHC RBC AUTO-ENTMCNC: 32.1 G/DL (ref 31.5–36.5)
MCV RBC AUTO: 83 FL (ref 78–100)
MONOCYTES # BLD AUTO: 0.8 10E3/UL (ref 0–1.3)
MONOCYTES NFR BLD AUTO: 6 %
NEUTROPHILS # BLD AUTO: 10.9 10E3/UL (ref 1.6–8.3)
NEUTROPHILS NFR BLD AUTO: 80 %
NRBC # BLD AUTO: 0 10E3/UL
NRBC BLD AUTO-RTO: 0 /100
PLATELET # BLD AUTO: 183 10E3/UL (ref 150–450)
POTASSIUM SERPL-SCNC: 4 MMOL/L (ref 3.4–5.3)
PROT SERPL-MCNC: 6.9 G/DL (ref 6.4–8.3)
RBC # BLD AUTO: 3.58 10E6/UL (ref 4.4–5.9)
RETICS # AUTO: 0.11 10E6/UL (ref 0.03–0.1)
RETICS/RBC NFR AUTO: 3.1 % (ref 0.5–2)
SODIUM SERPL-SCNC: 138 MMOL/L (ref 135–145)
T4 FREE SERPL-MCNC: 1.05 NG/DL (ref 0.9–1.7)
TSH SERPL DL<=0.005 MIU/L-ACNC: 5.88 UIU/ML (ref 0.3–4.2)
VIT B12 SERPL-MCNC: 1628 PG/ML (ref 232–1245)
WBC # BLD AUTO: 13.7 10E3/UL (ref 4–11)

## 2025-02-18 PROCEDURE — 82607 VITAMIN B-12: CPT

## 2025-02-18 PROCEDURE — 85045 AUTOMATED RETICULOCYTE COUNT: CPT

## 2025-02-18 PROCEDURE — 36591 DRAW BLOOD OFF VENOUS DEVICE: CPT | Performed by: INTERNAL MEDICINE

## 2025-02-18 PROCEDURE — 84439 ASSAY OF FREE THYROXINE: CPT | Performed by: INTERNAL MEDICINE

## 2025-02-18 PROCEDURE — 85049 AUTOMATED PLATELET COUNT: CPT | Performed by: INTERNAL MEDICINE

## 2025-02-18 PROCEDURE — 84443 ASSAY THYROID STIM HORMONE: CPT | Performed by: INTERNAL MEDICINE

## 2025-02-18 PROCEDURE — 99215 OFFICE O/P EST HI 40 MIN: CPT | Performed by: INTERNAL MEDICINE

## 2025-02-18 PROCEDURE — 258N000003 HC RX IP 258 OP 636: Performed by: INTERNAL MEDICINE

## 2025-02-18 PROCEDURE — 82024 ASSAY OF ACTH: CPT | Performed by: INTERNAL MEDICINE

## 2025-02-18 PROCEDURE — 80053 COMPREHEN METABOLIC PANEL: CPT | Performed by: INTERNAL MEDICINE

## 2025-02-18 PROCEDURE — G0463 HOSPITAL OUTPT CLINIC VISIT: HCPCS | Performed by: INTERNAL MEDICINE

## 2025-02-18 RX ORDER — HEPARIN SODIUM (PORCINE) LOCK FLUSH IV SOLN 100 UNIT/ML 100 UNIT/ML
5 SOLUTION INTRAVENOUS
OUTPATIENT
Start: 2025-02-18

## 2025-02-18 RX ORDER — HEPARIN SODIUM,PORCINE 10 UNIT/ML
5-20 VIAL (ML) INTRAVENOUS DAILY PRN
OUTPATIENT
Start: 2025-02-18

## 2025-02-18 RX ORDER — HEPARIN SODIUM (PORCINE) LOCK FLUSH IV SOLN 100 UNIT/ML 100 UNIT/ML
5 SOLUTION INTRAVENOUS
Status: CANCELLED | OUTPATIENT
Start: 2025-02-18

## 2025-02-18 RX ORDER — HEPARIN SODIUM,PORCINE 10 UNIT/ML
5-20 VIAL (ML) INTRAVENOUS DAILY PRN
Status: CANCELLED | OUTPATIENT
Start: 2025-02-18

## 2025-02-18 RX ORDER — ACETAMINOPHEN 325 MG/1
650 TABLET, CHEWABLE ORAL EVERY 4 HOURS PRN
COMMUNITY

## 2025-02-18 RX ADMIN — SODIUM CHLORIDE 1000 ML: 0.9 INJECTION, SOLUTION INTRAVENOUS at 09:30

## 2025-02-18 ASSESSMENT — PAIN SCALES - GENERAL: PAINLEVEL_OUTOF10: MODERATE PAIN (4)

## 2025-02-18 NOTE — PROGRESS NOTES
"Oncology Rooming Note    February 18, 2025 8:33 AM   Janice Nowak is a 80 year old male who presents for:    Chief Complaint   Patient presents with    Oncology Clinic Visit     Return visit with labs and infusion     Initial Vitals: BP 97/54 (BP Location: Left arm, Patient Position: Sitting, Cuff Size: Adult Large)   Pulse 94   Temp 97  F (36.1  C) (Tympanic)   Resp 18   Ht 1.727 m (5' 8\")   SpO2 98%   BMI 29.71 kg/m   Estimated body mass index is 29.71 kg/m  as calculated from the following:    Height as of this encounter: 1.727 m (5' 8\").    Weight as of 2/17/25: 88.6 kg (195 lb 6.4 oz). Body surface area is 2.06 meters squared.  Moderate Pain (4) Comment: Data Unavailable   No LMP for male patient.  Allergies reviewed: Yes  Medications reviewed: Yes    Medications: Medication refills not needed today.  Pharmacy name entered into [facility]:    [id number] - [place], [place] - [facility] [place] [facility] [place], [place] - [address] [address]    Frailty Screening:   Is the patient here for a new oncology consult visit in cancer care? 2. No          Clinical concerns:       KAY GARCIA CMA              "

## 2025-02-18 NOTE — LETTER
2/18/2025      Janice Nowak  [address]  [facility] [address]      Dear Colleague,    Thank you for referring your patient, Janice Nowak, to the [facility]. Please see a copy of my visit note below.    [facility] Progress Note    Patient: Janice Nowak  MRN: [id number]  Date of Service: Feb 18, 2025        Assessment and Plan:    1.  Hepatobiliary cancer: Patient has been started on treatment with bevacizumab and atezolizumab.  However he was just admitted to the hospital for worsening lower extremity ulcers and worsening osteomyelitis.  As such, I do not think bevacizumab is a good choice for him if it is going to impair his wound healing.  He has had a significant decline in his performance status since admission.  Eating less.  Lack of energy.  We had a discussion about the wisdom of continuing cancer directed treatment.  I told him that this probably is not a good idea as it would only serve to worsen his quality life without giving him any meaningful improvement in survival.  Additionally, he stated that he is most interested in just going home and limiting his care at this point.  We discussed hospice and he is interested.  Family members are also interested.  I decision was made to pursue a more comfort focused care plan.  This will be relayed to the TCU staff and primary care provider.    2.  Chronic leg ulcerations and osteomyelitis: Recently admitted to the hospital.  He underwent bilateral irrigation and debridement on January 27, 2025.  He underwent stenting with IR on January 16, 2025. LLE angiogram 2/8 showed high-grade stenosis now s/p crossing/orbital atherectomy/angioplasty of the ALEX/DP, Angioplasty/DCB of the Popliteal lesion with FELIPE placement in the proximal SFA lesion.   He now has a wound VAC in place in the right lower extremity.    3.  Normocytic anemia: Secondary to chronic blood loss, chronic  infection and inflammation as well as malignancy and immunotherapy treatment    Medical decision Making:  I spent 50 minutes in the care of this patient today, which included time necessary for preparation for the visit, face to face time with the patient, communication of recommendations to the care team, and documentation time.    ECOG Performance  1    Diagnosis:    1.  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  Diagnosis obtained from abdominal lymph node biopsy January 2, 2025.  Pathology reviewed today in clinic such as hepatocellular carcinoma.    Treatment:    Atezolizumab/bevacizumab initiated January 20, 2025.    Interim History:    Janice returns today for a follow-up visit.  He is here for follow-up after his first cycle of treatment.  He was recently admitted to the hospital for worsening leg wounds and osteomyelitis.  He is currently in a TCU.  Complains of worsening weakness.  He is frustrated with all the wound VAC and dressing on his legs.  His taste is poor.  Not much of an appetite.  He Betaloc.    Review of Systems:    As above in the history.     Review of Systems otherwise Negative for:  General: chills, fever or night sweats  Psychological: anxiety or depression  Ophthalmic: blurry vision, double vision or loss of vision, vision change  ENT: epistaxis, oral lesions, hearing changes  Hematological and Lymphatic: bleeding, bruising, jaundice, swollen lymph nodes  Endocrine: hot flashes  Respiratory: cough, hemoptysis, orthopnea or shortness of breath/DIAS  Cardiovascular: chest pain, edema, palpitations or PND  Gastrointestinal: abdominal pain, blood in stools, change in bowel habits, constipation, diarrhea or nausea/vomiting  Genito-Urinary: change in urinary stream, incontinence, frequency/urgency  Musculoskeletal: joint pain, stiffness, swelling, muscle pain  Neurological: dizziness, headaches, numbness/tingling  Dermatological: lumps    Past  "History:    Past Medical History:   Diagnosis Date     Coronary artery disease      Diabetes (H)      Hypertension      Pleural effusion      Sleep apnea      Thyroid disease      Physical Exam:    BP 97/54 (BP Location: Left arm, Patient Position: Sitting, Cuff Size: Adult Large)   Pulse 94   Temp 97  F (36.1  C) (Tympanic)   Resp 18   Ht 1.727 m (5' 8\")   SpO2 98%   BMI 29.71 kg/m      General: patient appears stated age of 79 year old. Nontoxic and in no distress.   HEENT: Head: atraumatic, normocephalic. Sclerae anicteric.  Chest:  Normal respiratory effort  Cardiac:  No edema.   Abdomen: abdomen is non-distended  Extremities: normal tone and muscle bulk.  Skin: There is a wound VAC in place on the right lower extremity.  CNS: alert and oriented. Grossly non-focal.   Psychiatric: normal mood and affect.     Lab Results:    Recent Results (from the past week)   Glucose by meter   Result Value Ref Range    GLUCOSE BY METER POCT 254 (H) 70 - 99 mg/dL   Glucose by meter   Result Value Ref Range    GLUCOSE BY METER POCT 224 (H) 70 - 99 mg/dL   Glucose by meter   Result Value Ref Range    GLUCOSE BY METER POCT 254 (H) 70 - 99 mg/dL   Glucose by meter   Result Value Ref Range    GLUCOSE BY METER POCT 192 (H) 70 - 99 mg/dL   Basic metabolic panel   Result Value Ref Range    Sodium 138 135 - 145 mmol/L    Potassium 4.0 3.4 - 5.3 mmol/L    Chloride 108 (H) 98 - 107 mmol/L    Carbon Dioxide (CO2) 23 22 - 29 mmol/L    Anion Gap 7 7 - 15 mmol/L    Urea Nitrogen 24.2 (H) 8.0 - 23.0 mg/dL    Creatinine 1.02 0.67 - 1.17 mg/dL    GFR Estimate 75 >60 mL/min/1.73m2    Calcium 8.1 (L) 8.8 - 10.4 mg/dL    Glucose 172 (H) 70 - 99 mg/dL   CBC with platelets   Result Value Ref Range    WBC Count 7.2 4.0 - 11.0 10e3/uL    RBC Count 3.13 (L) 4.40 - 5.90 10e6/uL    Hemoglobin 8.0 (L) 13.3 - 17.7 g/dL    Hematocrit 25.4 (L) 40.0 - 53.0 %    MCV 81 78 - 100 fL    MCH 25.6 (L) 26.5 - 33.0 pg    MCHC 31.5 31.5 - 36.5 g/dL    RDW 17.3 " (H) 10.0 - 15.0 %    Platelet Count 127 (L) 150 - 450 10e3/uL   Glucose by meter   Result Value Ref Range    GLUCOSE BY METER POCT 156 (H) 70 - 99 mg/dL   Glucose by meter   Result Value Ref Range    GLUCOSE BY METER POCT 162 (H) 70 - 99 mg/dL   Glucose by meter   Result Value Ref Range    GLUCOSE BY METER POCT 118 (H) 70 - 99 mg/dL   Glucose by meter   Result Value Ref Range    GLUCOSE BY METER POCT 163 (H) 70 - 99 mg/dL   Glucose by meter   Result Value Ref Range    GLUCOSE BY METER POCT 136 (H) 70 - 99 mg/dL   Glucose by meter   Result Value Ref Range    GLUCOSE BY METER POCT 138 (H) 70 - 99 mg/dL   Basic metabolic panel   Result Value Ref Range    Sodium 140 135 - 145 mmol/L    Potassium 4.0 3.4 - 5.3 mmol/L    Chloride 107 98 - 107 mmol/L    Carbon Dioxide (CO2) 26 22 - 29 mmol/L    Anion Gap 7 7 - 15 mmol/L    Urea Nitrogen 26.5 (H) 8.0 - 23.0 mg/dL    Creatinine 1.14 0.67 - 1.17 mg/dL    GFR Estimate 65 >60 mL/min/1.73m2    Calcium 8.3 (L) 8.8 - 10.4 mg/dL    Glucose 166 (H) 70 - 99 mg/dL   CBC with platelets   Result Value Ref Range    WBC Count 8.1 4.0 - 11.0 10e3/uL    RBC Count 3.13 (L) 4.40 - 5.90 10e6/uL    Hemoglobin 8.1 (L) 13.3 - 17.7 g/dL    Hematocrit 26.1 (L) 40.0 - 53.0 %    MCV 83 78 - 100 fL    MCH 25.9 (L) 26.5 - 33.0 pg    MCHC 31.0 (L) 31.5 - 36.5 g/dL    RDW 18.5 (H) 10.0 - 15.0 %    Platelet Count 151 150 - 450 10e3/uL   Glucose by meter   Result Value Ref Range    GLUCOSE BY METER POCT 130 (H) 70 - 99 mg/dL   CBC with platelets   Result Value Ref Range    WBC Count 10.1 4.0 - 11.0 10e3/uL    RBC Count 3.22 (L) 4.40 - 5.90 10e6/uL    Hemoglobin 8.3 (L) 13.3 - 17.7 g/dL    Hematocrit 27.6 (L) 40.0 - 53.0 %    MCV 86 78 - 100 fL    MCH 25.8 (L) 26.5 - 33.0 pg    MCHC 30.1 (L) 31.5 - 36.5 g/dL    RDW 19.1 (H) 10.0 - 15.0 %    Platelet Count 131 (L) 150 - 450 10e3/uL   Glucose (OUTREACH)   Result Value Ref Range    Glucose 100 (H) 70 - 99 mg/dL   Basic Metabolic Panel No Glucose (OUTREACH)    Result Value Ref Range    Sodium 140 135 - 145 mmol/L    Potassium 3.9 3.4 - 5.3 mmol/L    Chloride 107 98 - 107 mmol/L    Carbon Dioxide (CO2) 22 22 - 29 mmol/L    Anion Gap 11 7 - 15 mmol/L    Urea Nitrogen 27.3 (H) 8.0 - 23.0 mg/dL    Creatinine 0.99 0.67 - 1.17 mg/dL    GFR Estimate 77 >60 mL/min/1.73m2    Calcium 8.2 (L) 8.8 - 10.4 mg/dL   Comprehensive metabolic panel   Result Value Ref Range    Sodium 138 135 - 145 mmol/L    Potassium 4.0 3.4 - 5.3 mmol/L    Carbon Dioxide (CO2) 25 22 - 29 mmol/L    Anion Gap 10 7 - 15 mmol/L    Urea Nitrogen 30.3 (H) 8.0 - 23.0 mg/dL    Creatinine 0.99 0.67 - 1.17 mg/dL    GFR Estimate 77 >60 mL/min/1.73m2    Calcium 8.7 (L) 8.8 - 10.4 mg/dL    Chloride 103 98 - 107 mmol/L    Glucose 148 (H) 70 - 99 mg/dL    Alkaline Phosphatase 202 (H) 40 - 150 U/L    AST 53 (H) 0 - 45 U/L    ALT 49 0 - 70 U/L    Protein Total 6.9 6.4 - 8.3 g/dL    Albumin 2.5 (L) 3.5 - 5.2 g/dL    Bilirubin Total 0.4 <=1.2 mg/dL   TSH with free T4 reflex   Result Value Ref Range    TSH 5.88 (H) 0.30 - 4.20 uIU/mL   CBC with platelets and differential   Result Value Ref Range    WBC Count 13.7 (H) 4.0 - 11.0 10e3/uL    RBC Count 3.58 (L) 4.40 - 5.90 10e6/uL    Hemoglobin 9.5 (L) 13.3 - 17.7 g/dL    Hematocrit 29.6 (L) 40.0 - 53.0 %    MCV 83 78 - 100 fL    MCH 26.5 26.5 - 33.0 pg    MCHC 32.1 31.5 - 36.5 g/dL    RDW 19.5 (H) 10.0 - 15.0 %    Platelet Count 183 150 - 450 10e3/uL    % Neutrophils 80 %    % Lymphocytes 11 %    % Monocytes 6 %    % Eosinophils 1 %    % Basophils 1 %    % Immature Granulocytes 2 %    NRBCs per 100 WBC 0 <1 /100    Absolute Neutrophils 10.9 (H) 1.6 - 8.3 10e3/uL    Absolute Lymphocytes 1.5 0.8 - 5.3 10e3/uL    Absolute Monocytes 0.8 0.0 - 1.3 10e3/uL    Absolute Eosinophils 0.1 0.0 - 0.7 10e3/uL    Absolute Basophils 0.1 0.0 - 0.2 10e3/uL    Absolute Immature Granulocytes 0.2 <=0.4 10e3/uL    Absolute NRBCs 0.0 10e3/uL   Reticulocyte count   Result Value Ref Range    %  Reticulocyte 3.1 (H) 0.5 - 2.0 %    Absolute Reticulocyte 0.114 (H) 0.025 - 0.095 10e6/uL   T4 free   Result Value Ref Range    Free T4 1.05 0.90 - 1.70 ng/dL     Imaging:    IR Lower Extremity Angiogram Left    Result Date: 2/8/2025  LOCATION: [facility] DATE: 2/8/2025 PROCEDURE: LEFT LOWER EXTREMITY DIAGNOSTIC ARTERIOGRAPHY, ROTATIONAL ATHERECTOMY OF THE LEFT COMMON FEMORAL, SUPERFICIAL FEMORAL, POPLITEAL, ANTERIOR TIBIAL AND DORSALIS PEDIS ARTERIES, BALLOON ANGIOPLASTY OF THE LEFT COMMON FEMORAL, SUPERFICIAL FEMORAL,  POPLITEAL, ANTERIOR TIBIAL AND DORSALIS PEDIS ARTERIES, STENT PLACEMENT IN THE PROXIMAL LEFT SUPERFICIAL FEMORAL ARTERY, ULTRASOUND GUIDANCE FOR VASCULAR ACCESS, MODERATE SEDATION INTERVENTIONAL RADIOLOGIST: Silvestre Jc MD INDICATION: 80-year-old male with nonhealing left foot wounds/critical limb threatening ischemia, plan for left lower extremity angiogram with intervention for limb salvage. INDICATION FOR DIAGNOSTIC ANGIOGRAPHY: Diagnostic angiography was required to precisely identify plaque morphology to aid in evaluation and management CONSENT: The risks, benefits and alternatives of the procedure were discussed with the patient  in detail. All questions were answered. Informed consent was given to proceed with the procedure. MODERATE SEDATION: Versed 4 mg IV; Fentanyl 200 mcg IV. During the time out, immediately prior to the administration of medications, the patient was reassessed for adequacy to receive conscious sedation.  Under physician supervision, Versed and fentanyl were administered for moderate sedation. Pulse oximetry, heart rate and blood pressure were continuously monitored by an independent trained observer. The physician spent 160 minutes of face-to-face sedation time with the patient. CONTRAST: 94 cc Visipaque ADDITIONAL MEDICATIONS: Argatroban FLUOROSCOPIC TIME: 58.7 minutes. RADIATION DOSE: Air Kerma: 2433 mGy. COMPLICATIONS: No immediate  complications. UNIVERSAL PROTOCOL: The operative site was marked and any prior imaging was reviewed. Required items including blood products, implants, devices and special equipment was made available. Patient identity was confirmed either verbally, with demographic information, hospital assigned identification or other identification markers. A timeout was performed immediately prior to the procedure. STERILE BARRIER TECHNIQUE: Maximum sterile barrier technique was used. Cutaneous antisepsis was performed at the operative site with application of 2% chlorhexidine and large sterile drape. Prior to the procedure, the  and assistant performed hand hygiene and wore hat, mask, sterile gown, and sterile gloves during the entire procedure. PROCEDURE:  Access was achieved into the right common femoral artery utilizing real-time ultrasound guidance and a micropuncture access kit. Imaging demonstrates a patent and compressible artery. Permanent images were stored to the patient's medical record. Conversion was made for a 5 Indonesian vascular sheath, which was attached to a continuous saline infusion. The Omni Flush catheter was advanced to the caudal abdominal aorta and selective catheterization of the left external iliac artery was performed. A diagnostic left lower extremity angiogram was obtained. Imaging demonstrates patent left common femoral, profunda femoral, superficial femoral and popliteal arteries. There is focal, high-grade stenosis involving the proximal superficial femoral artery. There is moderate stenosis involving the distal superficial femoral artery and above-knee popliteal artery. Occlusion of the proximal anterior tibial artery. The posterior tibial artery is patent to the foot without significant stenosis. Disease involving the mid/distal peroneal artery. Occluded dorsalis pedis artery. Systemic anticoagulation was performed with Argatroban as per institutional protocol and monitored with serial  activated clotting times. Exchange is made for a 5 Icelandic x 55 cm vascular sheath which was positioned in the left common femoral artery. An angled 018 crossing catheter and guidewire were used to selectively catheterize the anterior tibial artery. Contrast injection demonstrates  multi segment occlusions involving the anterior tibial artery. The guidewire and catheter were carefully advanced under fluoroscopic guidance to the dorsalis pedis artery. A small volume of contrast was injected, confirming intraluminal crossing. Exchange is made for an 014 Viperwire which was positioned in the distal dorsalis pedis artery. The crossing catheter was removed. Over-the-wire exchange is made for a 1.25 Solid CSI atherectomy catheter. Rotational atherectomy of disease in the common femoral, proximal superficial femoral, distal superficial femoral and above-knee popliteal arteries was performed in the low, medium and high speeds. Rotational atherectomy of disease in the proximal anterior tibial artery was performed. There is inability to advance the CSI device to the mid and distal anterior tibial artery secondary to significant disease. Over-the-wire exchange is made for a 2 mm x 1 20 mm angioplasty balloon and angioplasty of the proximal/mid anterior tibial artery was performed. There is inability to advance the balloon to the distal vessel. Over-the-wire exchange is made for a 1.5 mm x 2 cm angioplasty balloon. The balloon was carefully advanced to the distal anterior tibial artery and balloon angioplasty of significant stenosis was performed to allow for passage of the atherectomy device. Over-the-wire exchange is again made for the CSI 1.25 Solid atherectomy catheter and  atherectomy of the remaining metatarsals distal anterior tibial and dorsalis pedis arteries was performed in the low, medium and high speeds. Over-the-wire exchange is made for a 3 mm x 120 mm angioplasty balloon and angioplasty of the entire anterior  tibial and dorsalis pedis arteries was performed. Over-the-wire exchange is made for a 6 mm x 15 cm InPact drug-eluting balloon which was positioned in the distal superficial femoral and above-knee popliteal arteries and inflated to burst pressure for 3 minutes. Over-the-wire exchange is made for a 6 mm  x 15 cm InPact drug-eluting balloon which was positioned in the common femoral and proximal superficial femoral arteries and inflated to burst pressure for 3 minutes. Post drug coated balloon angioplasty angiography demonstrates persistent moderate stenosis involving the proximal superficial femoral artery lesion. This residual stenosis in the distal superficial femoral and popliteal arteries. Over-the-wire exchange is made for a 6 Syriac x 40 cm sheath which was positioned external iliac artery. Over-the-wire exchange is made for a 6 mm x 2 cm high pressure balloon which was positioned and inflated in the proximal superficial femoral artery lesion. Post high pressure balloon angioplasty angiography demonstrates persistent residual stenosis. Over-the-wire exchange is made for a 7 mm x 4 cm Jill drug-eluting stent which was positioned in the proximal superficial femoral artery lesion and deployed. Post deployment balloon into the pelvis was performed utilizing a 7 mm x 4 cm balloon. Post stent angiography demonstrates minimal residual stenosis at this location with small focus of active extravasation. The area of bleeding was successfully treated with low pressure, prolonged balloon angioplasty for 2 minutes. At this point, the procedure was considered complete. Repeat check the activated clotting time demonstrated persistent elevated values. Exchange is made for a short 6 Syriac sheath which was secured in place.     IMPRESSION:  1. Left lower extremity angiography demonstrates focal, high-grade stenosis involving the proximal superficial femoral artery. Moderate stenosis involving the distal superficial femoral  and above-knee popliteal arteries. Dominant runoff to the foot via the posterior tibial artery. Disease involving the mid/distal peroneal artery. Occluded anterior tibial and dorsalis pedis arteries. 2. Successful crossing, recanalization, orbital atherectomy and angioplasty of the occluded anterior tibial and dorsalis pedis arteries. Status post drug-coated balloon angioplasty of the distal superficial femoral and popliteal arteries. Status post drug-eluting stent placement in the proximal superficial femoral artery lesion. 3. Post intervention angiography demonstrates excellent result with three-vessel runoff to the foot. There is marked improvement in perfusion of the foot on post intervention imaging. PLAN: Four hours of bedrest post sheath removal. Palpable left posterior tibial and dorsalis pedis pulses post procedure. Continue Crestor 40 mg once daily, aspirin 81 mg once daily and Plavix 75 mg once daily.     US Lower Extremity Arterial Duplex Bilateral    Result Date: 2/6/2025  EXAM: US LOWER EXTREMITY ARTERIAL DUPLEX BILATERAL LOCATION: [facility] DATE: 2/6/2025 INDICATION: nonhealing LE wounds COMPARISON: CT abdomen and pelvis 2/4/2025 TECHNIQUE: Duplex utilizing 2D gray-scale imaging, Doppler interrogation with color-flow and spectral waveform analysis. FINDINGS: RIGHT: Patent arteries throughout the right lower extremity. Waveforms are multiphasic with the exception of the right peroneal artery. Atherosclerotic disease is noted throughout the right lower extremity. LEFT: Patent arteries throughout the left lower extremity. Waveforms are multiphasic in the external iliac artery. Waveforms are monophasic in the common femoral, profunda femoral, superficial femoral, popliteal and tibial vessels suggesting a stenosis between the external iliac and common femoral artery that is not visualized on ultrasound. RIGHT LOWER EXTREMITY ARTERIAL ASSESSMENT: External iliac artery 174 cm/s  Common femoral artery: 128 cm/s Profunda femoris artery: 50 cm/s SFA (proximal): 132 cm/s SFA (mid): 68 cm/s SFA (distal): 76 cm/s Popliteal artery: 71 cm/s Posterior tibial artery: 98 cm/s Anterior tibial artery: 30 cm/s Dorsalis pedis artery: 40 cm/s LEFT LOWER EXTREMITY ARTERIAL ASSESSMENT: External iliac artery 198 cm/s Common femoral artery: 167 cm/s Profunda femoris artery: 50 cm/s SFA (proximal): 62 cm/s SFA (mid): 63 cm/s SFA (distal): 59 cm/s Popliteal artery: 82 cm/s Posterior tibial artery: 64 cm/s Anterior tibial artery: 53 cm/s Dorsalis pedis artery: 18 cm/s     IMPRESSION: 1.  Patent arteries throughout the right lower extremity. 2.  Patent arteries throughout the left lower extremity. However waveforms become monophasic in the common femoral artery, suggesting stenosis not visualized on ultrasound.    US SONNY Doppler No Exercise 1-2 Levels Bilateral    Result Date: 2/6/2025  EXAM: RESTING ANKLE-BRACHIAL INDICES (ABIs) LOCATION: [facility] DATE: 02/06/2025 INDICATION: Please obtain digit pressures, no healing LE wounds. COMPARISON: None. SONNY FINDINGS: RIGHT Brachial: 157 Ankle (PT): Not performed due to bilateral ankle dressings in place. Ankle (DP): Not performed due to bilateral ankle dressings in place. Digit: Greater than 240 Index: Noncompressible LEFT Ankle (PT): Not performed due to bilateral ankle dressings in place. Ankle (DP): Not performed due to bilateral ankle dressings in place. Digit: Greater than 240 Index: Noncompressible WAVEFORMS: Absent right 2nd through 5th digital waveforms and absent left 2nd through 5th digital waveforms.     IMPRESSION: 1.  Bilateral ABIs were not calculated secondary to overlying bandages. 2.  Bilateral digital brachial pressures show noncompressible vessels. 3.  Absent right 2nd through 5th digital waveforms bilaterally.    MR Foot Left w/o Contrast    Result Date: 2/5/2025  EXAM: MR FOOT LEFT W/O CONTRAST LOCATION: [facility]  [facility] DATE: 2/5/2025 INDICATION: Ulcer debridement with TheraSkin application, dorsal left foot, post op day 9. COMPARISON: Left foot radiographic exam 2/5/2025 TECHNIQUE: Unenhanced. FINDINGS: Postoperative change with skin staples are seen along the dorsum of the great toe extending from the MTP joint to near the IP joint. Adjacent susceptibility artifact. No convincing evidence for great toe acute osteomyelitis. The lesser toes are negative for fracture or acute osteomyelitis. No metatarsal fracture or evidence for metatarsal stress fracture. Similar postoperative changes are seen along the dorsum of the more proximal foot at the level of the naviculocuneiform joints and more proximally. No  convincing evidence for osteomyelitis in the tarsal bones were imaged. Mild scattered degenerative change in the left foot including at the first MTP and metatarsal sesamoid joints. Degenerative change in the midfoot. No sizable joint effusion. No acute tendon injury. No tenosynovitis. No significant tendinopathy. Distal anterior tibialis and peroneus longus intact. Lisfranc ligament intact. No midfoot subluxation. Generalized foot muscle atrophy is moderate to severe with denervation muscle edema. Visualized plantar fascia is intact. No drainable fluid collection. Dorsal foot soft tissue swelling.     IMPRESSION: 1.  Postoperative changes related to reported ulcer debridement with skin grafting along the dorsum of the great toe and dorsum of the proximal foot. 2.  No convincing MR evidence for left foot acute osteomyelitis, with particular attention at the great toe. 3.  No evidence for acute foot fracture or stress fracture. 4.  Scattered degenerative change including in the midfoot and at the first MTP and metatarsal sesamoid joints. 5.  Diffuse acute on chronic denervation edema throughout the intrinsic musculature of the left foot. 6.  Dorsal foot soft tissue swelling. No soft tissue abscess.    MR Ankle Right  w/o Contrast    Result Date: 2/5/2025  EXAM: MR ANKLE RIGHT W/O CONTRAST LOCATION: [facility] DATE: 2/5/2025 INDICATION: Ulceration with TheraSkin application heel and lateral ankle region, post op day # 9.  Wound infected.  History of osteomyelitis of right heel as well. COMPARISON: Calcaneus radiographic exam 2/5/2025. Foot MRI 10/8/2024. TECHNIQUE: Unenhanced. FINDINGS: TENDONS: -Peroneal: Peroneus longus and brevis tendons are intact. Mild tendinopathy. No significant lateral tenosynovitis. No subluxation. -Medial: Posterior tibialis tendon is intact. Mild posterior tibialis tendinopathy. Trace distal posterior tibialis tendon sheath fluid. Flexor digitorum longus and flexor hallucis longus tendons are normal. No tenosynovitis. -Anterior: Anterior tibialis, extensor hallucis longus, and extensor digitorum longus tendons are normal. No tenosynovitis. -Achilles: No significant tendinopathy or paratenonitis. LIGAMENTS: -Anterior talofibular ligament: Intact. -Calcaneofibular ligament: Intact. -Posterior talofibular ligament: Intact. -Syndesmotic inferior tibiofibular ligaments: Intact. -Deltoid ligament complex: Intact. -Spring ligament complex: Intact. JOINTS AND BONES: -Abnormal edema-like marrow signal intensity posterior calcaneus deep to what appears to be an ulcer along the posterior heel, compatible with osteomyelitis. No acute ankle or hindfoot fracture. No calcaneus stress fracture. 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 Scattered midfoot arthrosis. Trace ankle and subtalar joint effusions. SOFT TISSUES: -Plantar fascia: Chronic thickening proximal medial bundle plantar fascia. No acute inflammation or discrete tear. -Sinus tarsi and tarsal tunnel: Preserved sinus Tarsi fat signal. No space-occupying mass is seen along the course of the tarsal tunnel. -Muscles: Global intrinsic foot muscle atrophy with patchy muscle edema  suggestive of denervation edema. Broad-based ulcer and adjacent skin staples posterior heel. No soft tissue abscess.     IMPRESSION: 1.  Broad-based soft tissue ulcer posterior heel with adjacent skin staples and MR findings compatible with adjacent posterior calcaneus osteomyelitis. 2.  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 3.  Mild peroneus longus and brevis tendinopathy. Mild posterior tibialis tendinopathy.    XR Foot Left 2 Views    Result Date: 2/5/2025  EXAM: XR ANKLE RIGHT 2 VIEWS, XR FOOT LEFT 2 VIEWS, XR CALCANEUS RIGHT G/E 2 VIEWS LOCATION: [facility] DATE: 2/5/2025 INDICATION: Lateral ankle wound. COMPARISON: Radiographs 10/7/2024 and MRI 10/8/2024     IMPRESSION: Right: Degenerative arthrosis of the talonavicular joint. Ankle joint spaces are otherwise maintained and normally aligned. Intact ankle mortise. No acute fracture. Skin staples over the posterior heel with a broad-based ulceration evident. No radiographic evidence of osteomyelitis. Left: Screw fixation of the distal tibia. Skin staples over the dorsal midfoot and first toe. No acute fracture or radiographic evidence of osteomyelitis.     XR Calcaneus Right G/E 2 Views    Result Date: 2/5/2025  EXAM: XR ANKLE RIGHT 2 VIEWS, XR FOOT LEFT 2 VIEWS, XR CALCANEUS RIGHT G/E 2 VIEWS LOCATION: [facility] DATE: 2/5/2025 INDICATION: Lateral ankle wound. COMPARISON: Radiographs 10/7/2024 and MRI 10/8/2024     IMPRESSION: Right: Degenerative arthrosis of the talonavicular joint. Ankle joint spaces are otherwise maintained and normally aligned. Intact ankle mortise. No acute fracture. Skin staples over the posterior heel with a broad-based ulceration evident. No radiographic evidence of osteomyelitis. Left: Screw fixation of the distal tibia. Skin staples over the dorsal midfoot and first toe. No acute fracture or radiographic evidence of osteomyelitis.     XR Ankle Right 2  Views    Result Date: 2/5/2025  EXAM: XR ANKLE RIGHT 2 VIEWS, XR FOOT LEFT 2 VIEWS, XR CALCANEUS RIGHT G/E 2 VIEWS LOCATION: [facility] DATE: 2/5/2025 INDICATION: Lateral ankle wound. COMPARISON: Radiographs 10/7/2024 and MRI 10/8/2024     IMPRESSION: Right: Degenerative arthrosis of the talonavicular joint. Ankle joint spaces are otherwise maintained and normally aligned. Intact ankle mortise. No acute fracture. Skin staples over the posterior heel with a broad-based ulceration evident. No radiographic evidence of osteomyelitis. Left: Screw fixation of the distal tibia. Skin staples over the dorsal midfoot and first toe. No acute fracture or radiographic evidence of osteomyelitis.     US Abdomen Limited    Result Date: 2/4/2025  EXAM: US ABDOMEN LIMITED LOCATION: [facility] DATE: 02/04/2025 INDICATION: History of HCC. Here with weakness, increased LFTs. COMPARISON: CT 02/04/2025. TECHNIQUE: Limited abdominal ultrasound. FINDINGS: GALLBLADDER: Cholecystectomy. BILE DUCTS: No biliary dilatation. The common duct measures 3.8 mm. LIVER: Cirrhotic appearance with coarsened echotexture and nodular contour. Solid 2.5 cm subcapsular mass in the superior right hepatic lobe compatible with neoplasm. No focal mass. RIGHT KIDNEY: No hydronephrosis. PANCREAS: The visualized portions are normal. No ascites. Splenomegaly.     IMPRESSION: 1.  Cirrhosis with a 2.5 cm solid subcapsular mass in the superior right hepatic lobe compatible with known neoplasm. 2.  Cholecystectomy. No bile duct dilatation.     XR Chest Port 1 View    Result Date: 2/4/2025  EXAM: XR CHEST PORT 1 VIEW LOCATION: [facility] DATE: 2/4/2025 INDICATION: Generalized weakness. Hx of CHF. COMPARISON: Chest radiograph 1/16/2025, CT chest 1/16/2020     IMPRESSION: Right chest port is unchanged with tip near the cavoatrial junction. No findings of edema. Nodular opacity in the right midlung  corresponding to the cavitary nodule seen on prior CT is unchanged accounting for differences in technique. No pleural effusion or pneumothorax. Stable heart size and mediastinal contours.    CT Abdomen Pelvis w Contrast    Result Date: 2/4/2025  EXAM: CT ABDOMEN PELVIS W CONTRAST LOCATION: [facility] DATE: 2/4/2025 INDICATION: Weakness, diffuse lower abdominal tenderness COMPARISON: 1/16/2025 TECHNIQUE: CT scan of the abdomen and pelvis was performed following injection of IV contrast. Multiplanar reformats were obtained. Dose reduction techniques were used. CONTRAST: IsoVue 370 75mL FINDINGS: LOWER CHEST: Partially visualized nodule along the right major fissure. Three-vessel coronary artery calcification. HEPATOBILIARY: [Morphologic changes of cirrhosis with ill-defined 3.7 x 3.4 cm hypoenhancing lesion in the right lobe posteriorly (4/36). Cholecystectomy. No biliary dilation. PANCREAS: Normal. SPLEEN: Enlarged, 16.0 cm craniocaudal. ADRENAL GLANDS: Normal. KIDNEYS/BLADDER: Normal. BOWEL: Diverticulosis of the colon. No acute inflammatory change. No obstruction. Normal appendix. LYMPH NODES: Enlarged periportal lymph node measuring 7.4 x 5.1 cm (4/67), previously 7.3 x 5.2 cm. No other enlarged lymph nodes in the abdomen and pelvis. Some borderline enlarged retroperitoneal lymph nodes are unchanged. VASCULATURE: Severe atherosclerotic calcification of the aorta, visceral branches, and iliofemoral arteries. Right common iliac and left common-external iliac artery stents, which appear to be patent, although the arterial enhancement is suboptimal. No aortic aneurysm. PELVIC ORGANS: Normal. MUSCULOSKELETAL: Multilevel degenerative changes in the spine.     IMPRESSION: 1.  No acute abnormality in the abdomen and pelvis. 2.  Morphologic changes of cirrhosis with splenomegaly. No ascites. 3.  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     Head CT w/o contrast    Result Date: 2/4/2025  EXAM: CT HEAD W/O CONTRAST LOCATION: [facility] DATE: 2/4/2025 INDICATION: Recent fall, confusion COMPARISON: 3/8/2016 brain MRI TECHNIQUE: Routine CT Head without IV contrast. Multiplanar reformats. Dose reduction techniques were used. FINDINGS: INTRACRANIAL CONTENTS: No intracranial hemorrhage, extraaxial collection, or mass effect.  Small chronic right cerebellar infarct. No CT evidence of acute infarct. Mild presumed chronic small vessel ischemic changes. Mild generalized volume loss. No hydrocephalus. VISUALIZED ORBITS/SINUSES/MASTOIDS: No intraorbital abnormality. Minor mucosal thickening right maxillary sinus. No middle ear or mastoid effusion. BONES/SOFT TISSUES: No acute abnormality.     IMPRESSION: 1.  No acute intracranial injury, hemorrhage, mass, or CT evidence of recent ischemia.    POC US Guidance Needle Placement    Result Date: 1/27/2025  Ultrasound was performed as guidance to an anesthesia procedure.  Click \"PACS images\" hyperlink below to view any stored images.  For specific procedure details, view procedure note authored by anesthesia.    POC US Guidance Needle Placement    Result Date: 1/27/2025  Ultrasound was performed as guidance to an anesthesia procedure.  Click \"PACS images\" hyperlink below to view any stored images.  For specific procedure details, view procedure note authored by anesthesia.       Signed by: Kong Gleason MD      Oncology Rooming Note    February 18, 2025 8:33 AM   Janice Nowak is a 80 year old male who presents for:    Chief Complaint   Patient presents with     Oncology Clinic Visit     Return visit with labs and infusion     Initial Vitals: BP 97/54 (BP Location: Left arm, Patient Position: Sitting, Cuff Size: Adult Large)   Pulse 94   Temp 97  F (36.1  C) (Tympanic)   Resp 18   " "Ht 1.727 m (5' 8\")   SpO2 98%   BMI 29.71 kg/m   Estimated body mass index is 29.71 kg/m  as calculated from the following:    Height as of this encounter: 1.727 m (5' 8\").    Weight as of 2/17/25: 88.6 kg (195 lb 6.4 oz). Body surface area is 2.06 meters squared.  Moderate Pain (4) Comment: Data Unavailable   No LMP for male patient.  Allergies reviewed: Yes  Medications reviewed: Yes    Medications: Medication refills not needed today.  Pharmacy name entered into [facility]:    [id number] - [place], [place] - [address] [facility] - [place], [place] - [address] [address]    Frailty Screening:   Is the patient here for a new oncology consult visit in cancer care? 2. No          Clinical concerns:       KAY GARCIA CMA                Again, thank you for allowing me to participate in the care of your patient.        Sincerely,        Kong Gleason MD    Electronically signed"

## 2025-02-18 NOTE — PROGRESS NOTES
[facility] Hematology and Oncology Progress Note    Patient: Janice Nowak  MRN: [id number]  Date of Service: Feb 18, 2025        Assessment and Plan:    1.  Hepatobiliary cancer: Patient has been started on treatment with bevacizumab and atezolizumab.  However he was just admitted to the hospital for worsening lower extremity ulcers and worsening osteomyelitis.  As such, I do not think bevacizumab is a good choice for him if it is going to impair his wound healing.  He has had a significant decline in his performance status since admission.  Eating less.  Lack of energy.  We had a discussion about the wisdom of continuing cancer directed treatment.  I told him that this probably is not a good idea as it would only serve to worsen his quality life without giving him any meaningful improvement in survival.  Additionally, he stated that he is most interested in just going home and limiting his care at this point.  We discussed hospice and he is interested.  Family members are also interested.  I decision was made to pursue a more comfort focused care plan.  This will be relayed to the TCU staff and primary care provider.    2.  Chronic leg ulcerations and osteomyelitis: Recently admitted to the hospital.  He underwent bilateral irrigation and debridement on January 27, 2025.  He underwent stenting with IR on January 16, 2025. LLE angiogram 2/8 showed high-grade stenosis now s/p crossing/orbital atherectomy/angioplasty of the ALEX/DP, Angioplasty/DCB of the Popliteal lesion with FELIPE placement in the proximal SFA lesion.   He now has a wound VAC in place in the right lower extremity.    3.  Normocytic anemia: Secondary to chronic blood loss, chronic infection and inflammation as well as malignancy and immunotherapy treatment    Medical decision Making:  I spent 50 minutes in the care of this patient today, which included time necessary for preparation for the visit, face to face time with the patient,  communication of recommendations to the care team, and documentation time.    ECOG Performance  1    Diagnosis:    1.  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  Diagnosis obtained from abdominal lymph node biopsy January 2, 2025.  Pathology reviewed today in clinic such as hepatocellular carcinoma.    Treatment:    Atezolizumab/bevacizumab initiated January 20, 2025.    Interim History:    Janice returns today for a follow-up visit.  He is here for follow-up after his first cycle of treatment.  He was recently admitted to the hospital for worsening leg wounds and osteomyelitis.  He is currently in a TCU.  Complains of worsening weakness.  He is frustrated with all the wound VAC and dressing on his legs.  His taste is poor.  Not much of an appetite.  He Betaloc.    Review of Systems:    As above in the history.     Review of Systems otherwise Negative for:  General: chills, fever or night sweats  Psychological: anxiety or depression  Ophthalmic: blurry vision, double vision or loss of vision, vision change  ENT: epistaxis, oral lesions, hearing changes  Hematological and Lymphatic: bleeding, bruising, jaundice, swollen lymph nodes  Endocrine: hot flashes  Respiratory: cough, hemoptysis, orthopnea or shortness of breath/DIAS  Cardiovascular: chest pain, edema, palpitations or PND  Gastrointestinal: abdominal pain, blood in stools, change in bowel habits, constipation, diarrhea or nausea/vomiting  Genito-Urinary: change in urinary stream, incontinence, frequency/urgency  Musculoskeletal: joint pain, stiffness, swelling, muscle pain  Neurological: dizziness, headaches, numbness/tingling  Dermatological: lumps    Past History:    Past Medical History:   Diagnosis Date    Coronary artery disease     Diabetes (H)     Hypertension     Pleural effusion     Sleep apnea     Thyroid disease      Physical Exam:    BP 97/54 (BP Location: Left arm, Patient Position: Sitting, Cuff Size: Adult  "Large)   Pulse 94   Temp 97  F (36.1  C) (Tympanic)   Resp 18   Ht 1.727 m (5' 8\")   SpO2 98%   BMI 29.71 kg/m      General: patient appears stated age of 79 year old. Nontoxic and in no distress.   HEENT: Head: atraumatic, normocephalic. Sclerae anicteric.  Chest:  Normal respiratory effort  Cardiac:  No edema.   Abdomen: abdomen is non-distended  Extremities: normal tone and muscle bulk.  Skin: There is a wound VAC in place on the right lower extremity.  CNS: alert and oriented. Grossly non-focal.   Psychiatric: normal mood and affect.     Lab Results:    Recent Results (from the past week)   Glucose by meter   Result Value Ref Range    GLUCOSE BY METER POCT 254 (H) 70 - 99 mg/dL   Glucose by meter   Result Value Ref Range    GLUCOSE BY METER POCT 224 (H) 70 - 99 mg/dL   Glucose by meter   Result Value Ref Range    GLUCOSE BY METER POCT 254 (H) 70 - 99 mg/dL   Glucose by meter   Result Value Ref Range    GLUCOSE BY METER POCT 192 (H) 70 - 99 mg/dL   Basic metabolic panel   Result Value Ref Range    Sodium 138 135 - 145 mmol/L    Potassium 4.0 3.4 - 5.3 mmol/L    Chloride 108 (H) 98 - 107 mmol/L    Carbon Dioxide (CO2) 23 22 - 29 mmol/L    Anion Gap 7 7 - 15 mmol/L    Urea Nitrogen 24.2 (H) 8.0 - 23.0 mg/dL    Creatinine 1.02 0.67 - 1.17 mg/dL    GFR Estimate 75 >60 mL/min/1.73m2    Calcium 8.1 (L) 8.8 - 10.4 mg/dL    Glucose 172 (H) 70 - 99 mg/dL   CBC with platelets   Result Value Ref Range    WBC Count 7.2 4.0 - 11.0 10e3/uL    RBC Count 3.13 (L) 4.40 - 5.90 10e6/uL    Hemoglobin 8.0 (L) 13.3 - 17.7 g/dL    Hematocrit 25.4 (L) 40.0 - 53.0 %    MCV 81 78 - 100 fL    MCH 25.6 (L) 26.5 - 33.0 pg    MCHC 31.5 31.5 - 36.5 g/dL    RDW 17.3 (H) 10.0 - 15.0 %    Platelet Count 127 (L) 150 - 450 10e3/uL   Glucose by meter   Result Value Ref Range    GLUCOSE BY METER POCT 156 (H) 70 - 99 mg/dL   Glucose by meter   Result Value Ref Range    GLUCOSE BY METER POCT 162 (H) 70 - 99 mg/dL   Glucose by meter   Result Value " Ref Range    GLUCOSE BY METER POCT 118 (H) 70 - 99 mg/dL   Glucose by meter   Result Value Ref Range    GLUCOSE BY METER POCT 163 (H) 70 - 99 mg/dL   Glucose by meter   Result Value Ref Range    GLUCOSE BY METER POCT 136 (H) 70 - 99 mg/dL   Glucose by meter   Result Value Ref Range    GLUCOSE BY METER POCT 138 (H) 70 - 99 mg/dL   Basic metabolic panel   Result Value Ref Range    Sodium 140 135 - 145 mmol/L    Potassium 4.0 3.4 - 5.3 mmol/L    Chloride 107 98 - 107 mmol/L    Carbon Dioxide (CO2) 26 22 - 29 mmol/L    Anion Gap 7 7 - 15 mmol/L    Urea Nitrogen 26.5 (H) 8.0 - 23.0 mg/dL    Creatinine 1.14 0.67 - 1.17 mg/dL    GFR Estimate 65 >60 mL/min/1.73m2    Calcium 8.3 (L) 8.8 - 10.4 mg/dL    Glucose 166 (H) 70 - 99 mg/dL   CBC with platelets   Result Value Ref Range    WBC Count 8.1 4.0 - 11.0 10e3/uL    RBC Count 3.13 (L) 4.40 - 5.90 10e6/uL    Hemoglobin 8.1 (L) 13.3 - 17.7 g/dL    Hematocrit 26.1 (L) 40.0 - 53.0 %    MCV 83 78 - 100 fL    MCH 25.9 (L) 26.5 - 33.0 pg    MCHC 31.0 (L) 31.5 - 36.5 g/dL    RDW 18.5 (H) 10.0 - 15.0 %    Platelet Count 151 150 - 450 10e3/uL   Glucose by meter   Result Value Ref Range    GLUCOSE BY METER POCT 130 (H) 70 - 99 mg/dL   CBC with platelets   Result Value Ref Range    WBC Count 10.1 4.0 - 11.0 10e3/uL    RBC Count 3.22 (L) 4.40 - 5.90 10e6/uL    Hemoglobin 8.3 (L) 13.3 - 17.7 g/dL    Hematocrit 27.6 (L) 40.0 - 53.0 %    MCV 86 78 - 100 fL    MCH 25.8 (L) 26.5 - 33.0 pg    MCHC 30.1 (L) 31.5 - 36.5 g/dL    RDW 19.1 (H) 10.0 - 15.0 %    Platelet Count 131 (L) 150 - 450 10e3/uL   Glucose (OUTREACH)   Result Value Ref Range    Glucose 100 (H) 70 - 99 mg/dL   Basic Metabolic Panel No Glucose (OUTREACH)   Result Value Ref Range    Sodium 140 135 - 145 mmol/L    Potassium 3.9 3.4 - 5.3 mmol/L    Chloride 107 98 - 107 mmol/L    Carbon Dioxide (CO2) 22 22 - 29 mmol/L    Anion Gap 11 7 - 15 mmol/L    Urea Nitrogen 27.3 (H) 8.0 - 23.0 mg/dL    Creatinine 0.99 0.67 - 1.17 mg/dL    GFR  Estimate 77 >60 mL/min/1.73m2    Calcium 8.2 (L) 8.8 - 10.4 mg/dL   Comprehensive metabolic panel   Result Value Ref Range    Sodium 138 135 - 145 mmol/L    Potassium 4.0 3.4 - 5.3 mmol/L    Carbon Dioxide (CO2) 25 22 - 29 mmol/L    Anion Gap 10 7 - 15 mmol/L    Urea Nitrogen 30.3 (H) 8.0 - 23.0 mg/dL    Creatinine 0.99 0.67 - 1.17 mg/dL    GFR Estimate 77 >60 mL/min/1.73m2    Calcium 8.7 (L) 8.8 - 10.4 mg/dL    Chloride 103 98 - 107 mmol/L    Glucose 148 (H) 70 - 99 mg/dL    Alkaline Phosphatase 202 (H) 40 - 150 U/L    AST 53 (H) 0 - 45 U/L    ALT 49 0 - 70 U/L    Protein Total 6.9 6.4 - 8.3 g/dL    Albumin 2.5 (L) 3.5 - 5.2 g/dL    Bilirubin Total 0.4 <=1.2 mg/dL   TSH with free T4 reflex   Result Value Ref Range    TSH 5.88 (H) 0.30 - 4.20 uIU/mL   CBC with platelets and differential   Result Value Ref Range    WBC Count 13.7 (H) 4.0 - 11.0 10e3/uL    RBC Count 3.58 (L) 4.40 - 5.90 10e6/uL    Hemoglobin 9.5 (L) 13.3 - 17.7 g/dL    Hematocrit 29.6 (L) 40.0 - 53.0 %    MCV 83 78 - 100 fL    MCH 26.5 26.5 - 33.0 pg    MCHC 32.1 31.5 - 36.5 g/dL    RDW 19.5 (H) 10.0 - 15.0 %    Platelet Count 183 150 - 450 10e3/uL    % Neutrophils 80 %    % Lymphocytes 11 %    % Monocytes 6 %    % Eosinophils 1 %    % Basophils 1 %    % Immature Granulocytes 2 %    NRBCs per 100 WBC 0 <1 /100    Absolute Neutrophils 10.9 (H) 1.6 - 8.3 10e3/uL    Absolute Lymphocytes 1.5 0.8 - 5.3 10e3/uL    Absolute Monocytes 0.8 0.0 - 1.3 10e3/uL    Absolute Eosinophils 0.1 0.0 - 0.7 10e3/uL    Absolute Basophils 0.1 0.0 - 0.2 10e3/uL    Absolute Immature Granulocytes 0.2 <=0.4 10e3/uL    Absolute NRBCs 0.0 10e3/uL   Reticulocyte count   Result Value Ref Range    % Reticulocyte 3.1 (H) 0.5 - 2.0 %    Absolute Reticulocyte 0.114 (H) 0.025 - 0.095 10e6/uL   T4 free   Result Value Ref Range    Free T4 1.05 0.90 - 1.70 ng/dL     Imaging:    IR Lower Extremity Angiogram Left    Result Date: 2/8/2025  LOCATION: [facility] DATE:  2/8/2025 PROCEDURE: LEFT LOWER EXTREMITY DIAGNOSTIC ARTERIOGRAPHY, ROTATIONAL ATHERECTOMY OF THE LEFT COMMON FEMORAL, SUPERFICIAL FEMORAL, POPLITEAL, ANTERIOR TIBIAL AND DORSALIS PEDIS ARTERIES, BALLOON ANGIOPLASTY OF THE LEFT COMMON FEMORAL, SUPERFICIAL FEMORAL,  POPLITEAL, ANTERIOR TIBIAL AND DORSALIS PEDIS ARTERIES, STENT PLACEMENT IN THE PROXIMAL LEFT SUPERFICIAL FEMORAL ARTERY, ULTRASOUND GUIDANCE FOR VASCULAR ACCESS, MODERATE SEDATION INTERVENTIONAL RADIOLOGIST: Silvestre Jc MD INDICATION: 80-year-old male with nonhealing left foot wounds/critical limb threatening ischemia, plan for left lower extremity angiogram with intervention for limb salvage. INDICATION FOR DIAGNOSTIC ANGIOGRAPHY: Diagnostic angiography was required to precisely identify plaque morphology to aid in evaluation and management CONSENT: The risks, benefits and alternatives of the procedure were discussed with the patient  in detail. All questions were answered. Informed consent was given to proceed with the procedure. MODERATE SEDATION: Versed 4 mg IV; Fentanyl 200 mcg IV. During the time out, immediately prior to the administration of medications, the patient was reassessed for adequacy to receive conscious sedation.  Under physician supervision, Versed and fentanyl were administered for moderate sedation. Pulse oximetry, heart rate and blood pressure were continuously monitored by an independent trained observer. The physician spent 160 minutes of face-to-face sedation time with the patient. CONTRAST: 94 cc Visipaque ADDITIONAL MEDICATIONS: Argatroban FLUOROSCOPIC TIME: 58.7 minutes. RADIATION DOSE: Air Kerma: 2433 mGy. COMPLICATIONS: No immediate complications. UNIVERSAL PROTOCOL: The operative site was marked and any prior imaging was reviewed. Required items including blood products, implants, devices and special equipment was made available. Patient identity was confirmed either verbally, with demographic information, hospital  assigned identification or other identification markers. A timeout was performed immediately prior to the procedure. STERILE BARRIER TECHNIQUE: Maximum sterile barrier technique was used. Cutaneous antisepsis was performed at the operative site with application of 2% chlorhexidine and large sterile drape. Prior to the procedure, the  and assistant performed hand hygiene and wore hat, mask, sterile gown, and sterile gloves during the entire procedure. PROCEDURE:  Access was achieved into the right common femoral artery utilizing real-time ultrasound guidance and a micropuncture access kit. Imaging demonstrates a patent and compressible artery. Permanent images were stored to the patient's medical record. Conversion was made for a 5 Hungarian vascular sheath, which was attached to a continuous saline infusion. The Omni Flush catheter was advanced to the caudal abdominal aorta and selective catheterization of the left external iliac artery was performed. A diagnostic left lower extremity angiogram was obtained. Imaging demonstrates patent left common femoral, profunda femoral, superficial femoral and popliteal arteries. There is focal, high-grade stenosis involving the proximal superficial femoral artery. There is moderate stenosis involving the distal superficial femoral artery and above-knee popliteal artery. Occlusion of the proximal anterior tibial artery. The posterior tibial artery is patent to the foot without significant stenosis. Disease involving the mid/distal peroneal artery. Occluded dorsalis pedis artery. Systemic anticoagulation was performed with Argatroban as per institutional protocol and monitored with serial activated clotting times. Exchange is made for a 5 Hungarian x 55 cm vascular sheath which was positioned in the left common femoral artery. An angled 018 crossing catheter and guidewire were used to selectively catheterize the anterior tibial artery. Contrast injection demonstrates  multi  segment occlusions involving the anterior tibial artery. The guidewire and catheter were carefully advanced under fluoroscopic guidance to the dorsalis pedis artery. A small volume of contrast was injected, confirming intraluminal crossing. Exchange is made for an 014 Viperwire which was positioned in the distal dorsalis pedis artery. The crossing catheter was removed. Over-the-wire exchange is made for a 1.25 Solid CSI atherectomy catheter. Rotational atherectomy of disease in the common femoral, proximal superficial femoral, distal superficial femoral and above-knee popliteal arteries was performed in the low, medium and high speeds. Rotational atherectomy of disease in the proximal anterior tibial artery was performed. There is inability to advance the CSI device to the mid and distal anterior tibial artery secondary to significant disease. Over-the-wire exchange is made for a 2 mm x 1 20 mm angioplasty balloon and angioplasty of the proximal/mid anterior tibial artery was performed. There is inability to advance the balloon to the distal vessel. Over-the-wire exchange is made for a 1.5 mm x 2 cm angioplasty balloon. The balloon was carefully advanced to the distal anterior tibial artery and balloon angioplasty of significant stenosis was performed to allow for passage of the atherectomy device. Over-the-wire exchange is again made for the CSI 1.25 Solid atherectomy catheter and  atherectomy of the remaining metatarsals distal anterior tibial and dorsalis pedis arteries was performed in the low, medium and high speeds. Over-the-wire exchange is made for a 3 mm x 120 mm angioplasty balloon and angioplasty of the entire anterior tibial and dorsalis pedis arteries was performed. Over-the-wire exchange is made for a 6 mm x 15 cm InPact drug-eluting balloon which was positioned in the distal superficial femoral and above-knee popliteal arteries and inflated to burst pressure for 3 minutes. Over-the-wire exchange is  made for a 6 mm  x 15 cm InPact drug-eluting balloon which was positioned in the common femoral and proximal superficial femoral arteries and inflated to burst pressure for 3 minutes. Post drug coated balloon angioplasty angiography demonstrates persistent moderate stenosis involving the proximal superficial femoral artery lesion. This residual stenosis in the distal superficial femoral and popliteal arteries. Over-the-wire exchange is made for a 6 Anguillan x 40 cm sheath which was positioned external iliac artery. Over-the-wire exchange is made for a 6 mm x 2 cm high pressure balloon which was positioned and inflated in the proximal superficial femoral artery lesion. Post high pressure balloon angioplasty angiography demonstrates persistent residual stenosis. Over-the-wire exchange is made for a 7 mm x 4 cm Jill drug-eluting stent which was positioned in the proximal superficial femoral artery lesion and deployed. Post deployment balloon into the pelvis was performed utilizing a 7 mm x 4 cm balloon. Post stent angiography demonstrates minimal residual stenosis at this location with small focus of active extravasation. The area of bleeding was successfully treated with low pressure, prolonged balloon angioplasty for 2 minutes. At this point, the procedure was considered complete. Repeat check the activated clotting time demonstrated persistent elevated values. Exchange is made for a short 6 Anguillan sheath which was secured in place.     IMPRESSION:  1. Left lower extremity angiography demonstrates focal, high-grade stenosis involving the proximal superficial femoral artery. Moderate stenosis involving the distal superficial femoral and above-knee popliteal arteries. Dominant runoff to the foot via the posterior tibial artery. Disease involving the mid/distal peroneal artery. Occluded anterior tibial and dorsalis pedis arteries. 2. Successful crossing, recanalization, orbital atherectomy and angioplasty of the  occluded anterior tibial and dorsalis pedis arteries. Status post drug-coated balloon angioplasty of the distal superficial femoral and popliteal arteries. Status post drug-eluting stent placement in the proximal superficial femoral artery lesion. 3. Post intervention angiography demonstrates excellent result with three-vessel runoff to the foot. There is marked improvement in perfusion of the foot on post intervention imaging. PLAN: Four hours of bedrest post sheath removal. Palpable left posterior tibial and dorsalis pedis pulses post procedure. Continue Crestor 40 mg once daily, aspirin 81 mg once daily and Plavix 75 mg once daily.     US Lower Extremity Arterial Duplex Bilateral    Result Date: 2/6/2025  EXAM: US LOWER EXTREMITY ARTERIAL DUPLEX BILATERAL LOCATION: [facility] DATE: 2/6/2025 INDICATION: nonhealing LE wounds COMPARISON: CT abdomen and pelvis 2/4/2025 TECHNIQUE: Duplex utilizing 2D gray-scale imaging, Doppler interrogation with color-flow and spectral waveform analysis. FINDINGS: RIGHT: Patent arteries throughout the right lower extremity. Waveforms are multiphasic with the exception of the right peroneal artery. Atherosclerotic disease is noted throughout the right lower extremity. LEFT: Patent arteries throughout the left lower extremity. Waveforms are multiphasic in the external iliac artery. Waveforms are monophasic in the common femoral, profunda femoral, superficial femoral, popliteal and tibial vessels suggesting a stenosis between the external iliac and common femoral artery that is not visualized on ultrasound. RIGHT LOWER EXTREMITY ARTERIAL ASSESSMENT: External iliac artery 174 cm/s Common femoral artery: 128 cm/s Profunda femoris artery: 50 cm/s SFA (proximal): 132 cm/s SFA (mid): 68 cm/s SFA (distal): 76 cm/s Popliteal artery: 71 cm/s Posterior tibial artery: 98 cm/s Anterior tibial artery: 30 cm/s Dorsalis pedis artery: 40 cm/s LEFT LOWER EXTREMITY ARTERIAL  ASSESSMENT: External iliac artery 198 cm/s Common femoral artery: 167 cm/s Profunda femoris artery: 50 cm/s SFA (proximal): 62 cm/s SFA (mid): 63 cm/s SFA (distal): 59 cm/s Popliteal artery: 82 cm/s Posterior tibial artery: 64 cm/s Anterior tibial artery: 53 cm/s Dorsalis pedis artery: 18 cm/s     IMPRESSION: 1.  Patent arteries throughout the right lower extremity. 2.  Patent arteries throughout the left lower extremity. However waveforms become monophasic in the common femoral artery, suggesting stenosis not visualized on ultrasound.    US SONNY Doppler No Exercise 1-2 Levels Bilateral    Result Date: 2/6/2025  EXAM: RESTING ANKLE-BRACHIAL INDICES (ABIs) LOCATION: [facility] DATE: 02/06/2025 INDICATION: Please obtain digit pressures, no healing LE wounds. COMPARISON: None. SONNY FINDINGS: RIGHT Brachial: 157 Ankle (PT): Not performed due to bilateral ankle dressings in place. Ankle (DP): Not performed due to bilateral ankle dressings in place. Digit: Greater than 240 Index: Noncompressible LEFT Ankle (PT): Not performed due to bilateral ankle dressings in place. Ankle (DP): Not performed due to bilateral ankle dressings in place. Digit: Greater than 240 Index: Noncompressible WAVEFORMS: Absent right 2nd through 5th digital waveforms and absent left 2nd through 5th digital waveforms.     IMPRESSION: 1.  Bilateral ABIs were not calculated secondary to overlying bandages. 2.  Bilateral digital brachial pressures show noncompressible vessels. 3.  Absent right 2nd through 5th digital waveforms bilaterally.    MR Foot Left w/o Contrast    Result Date: 2/5/2025  EXAM: MR FOOT LEFT W/O CONTRAST LOCATION: [facility] DATE: 2/5/2025 INDICATION: Ulcer debridement with TheraSkin application, dorsal left foot, post op day 9. COMPARISON: Left foot radiographic exam 2/5/2025 TECHNIQUE: Unenhanced. FINDINGS: Postoperative change with skin staples are seen along the dorsum of the great toe  extending from the MTP joint to near the IP joint. Adjacent susceptibility artifact. No convincing evidence for great toe acute osteomyelitis. The lesser toes are negative for fracture or acute osteomyelitis. No metatarsal fracture or evidence for metatarsal stress fracture. Similar postoperative changes are seen along the dorsum of the more proximal foot at the level of the naviculocuneiform joints and more proximally. No  convincing evidence for osteomyelitis in the tarsal bones were imaged. Mild scattered degenerative change in the left foot including at the first MTP and metatarsal sesamoid joints. Degenerative change in the midfoot. No sizable joint effusion. No acute tendon injury. No tenosynovitis. No significant tendinopathy. Distal anterior tibialis and peroneus longus intact. Lisfranc ligament intact. No midfoot subluxation. Generalized foot muscle atrophy is moderate to severe with denervation muscle edema. Visualized plantar fascia is intact. No drainable fluid collection. Dorsal foot soft tissue swelling.     IMPRESSION: 1.  Postoperative changes related to reported ulcer debridement with skin grafting along the dorsum of the great toe and dorsum of the proximal foot. 2.  No convincing MR evidence for left foot acute osteomyelitis, with particular attention at the great toe. 3.  No evidence for acute foot fracture or stress fracture. 4.  Scattered degenerative change including in the midfoot and at the first MTP and metatarsal sesamoid joints. 5.  Diffuse acute on chronic denervation edema throughout the intrinsic musculature of the left foot. 6.  Dorsal foot soft tissue swelling. No soft tissue abscess.    MR Ankle Right w/o Contrast    Result Date: 2/5/2025  EXAM: MR ANKLE RIGHT W/O CONTRAST LOCATION: [facility] DATE: 2/5/2025 INDICATION: Ulceration with TheraSkin application heel and lateral ankle region, post op day # 9.  Wound infected.  History of osteomyelitis of right  heel as well. COMPARISON: Calcaneus radiographic exam 2/5/2025. Foot MRI 10/8/2024. TECHNIQUE: Unenhanced. FINDINGS: TENDONS: -Peroneal: Peroneus longus and brevis tendons are intact. Mild tendinopathy. No significant lateral tenosynovitis. No subluxation. -Medial: Posterior tibialis tendon is intact. Mild posterior tibialis tendinopathy. Trace distal posterior tibialis tendon sheath fluid. Flexor digitorum longus and flexor hallucis longus tendons are normal. No tenosynovitis. -Anterior: Anterior tibialis, extensor hallucis longus, and extensor digitorum longus tendons are normal. No tenosynovitis. -Achilles: No significant tendinopathy or paratenonitis. LIGAMENTS: -Anterior talofibular ligament: Intact. -Calcaneofibular ligament: Intact. -Posterior talofibular ligament: Intact. -Syndesmotic inferior tibiofibular ligaments: Intact. -Deltoid ligament complex: Intact. -Spring ligament complex: Intact. JOINTS AND BONES: -Abnormal edema-like marrow signal intensity posterior calcaneus deep to what appears to be an ulcer along the posterior heel, compatible with osteomyelitis. No acute ankle or hindfoot fracture. No calcaneus stress fracture. 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 Scattered midfoot arthrosis. Trace ankle and subtalar joint effusions. SOFT TISSUES: -Plantar fascia: Chronic thickening proximal medial bundle plantar fascia. No acute inflammation or discrete tear. -Sinus tarsi and tarsal tunnel: Preserved sinus Tarsi fat signal. No space-occupying mass is seen along the course of the tarsal tunnel. -Muscles: Global intrinsic foot muscle atrophy with patchy muscle edema suggestive of denervation edema. Broad-based ulcer and adjacent skin staples posterior heel. No soft tissue abscess.     IMPRESSION: 1.  Broad-based soft tissue ulcer posterior heel with adjacent skin staples and MR findings compatible with adjacent posterior calcaneus osteomyelitis. 2.   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 3.  Mild peroneus longus and brevis tendinopathy. Mild posterior tibialis tendinopathy.    XR Foot Left 2 Views    Result Date: 2/5/2025  EXAM: XR ANKLE RIGHT 2 VIEWS, XR FOOT LEFT 2 VIEWS, XR CALCANEUS RIGHT G/E 2 VIEWS LOCATION: [facility] DATE: 2/5/2025 INDICATION: Lateral ankle wound. COMPARISON: Radiographs 10/7/2024 and MRI 10/8/2024     IMPRESSION: Right: Degenerative arthrosis of the talonavicular joint. Ankle joint spaces are otherwise maintained and normally aligned. Intact ankle mortise. No acute fracture. Skin staples over the posterior heel with a broad-based ulceration evident. No radiographic evidence of osteomyelitis. Left: Screw fixation of the distal tibia. Skin staples over the dorsal midfoot and first toe. No acute fracture or radiographic evidence of osteomyelitis.     XR Calcaneus Right G/E 2 Views    Result Date: 2/5/2025  EXAM: XR ANKLE RIGHT 2 VIEWS, XR FOOT LEFT 2 VIEWS, XR CALCANEUS RIGHT G/E 2 VIEWS LOCATION: [facility] DATE: 2/5/2025 INDICATION: Lateral ankle wound. COMPARISON: Radiographs 10/7/2024 and MRI 10/8/2024     IMPRESSION: Right: Degenerative arthrosis of the talonavicular joint. Ankle joint spaces are otherwise maintained and normally aligned. Intact ankle mortise. No acute fracture. Skin staples over the posterior heel with a broad-based ulceration evident. No radiographic evidence of osteomyelitis. Left: Screw fixation of the distal tibia. Skin staples over the dorsal midfoot and first toe. No acute fracture or radiographic evidence of osteomyelitis.     XR Ankle Right 2 Views    Result Date: 2/5/2025  EXAM: XR ANKLE RIGHT 2 VIEWS, XR FOOT LEFT 2 VIEWS, XR CALCANEUS RIGHT G/E 2 VIEWS LOCATION: [facility] DATE: 2/5/2025 INDICATION: Lateral ankle wound. COMPARISON: Radiographs 10/7/2024 and MRI 10/8/2024     IMPRESSION: Right: Degenerative  arthrosis of the talonavicular joint. Ankle joint spaces are otherwise maintained and normally aligned. Intact ankle mortise. No acute fracture. Skin staples over the posterior heel with a broad-based ulceration evident. No radiographic evidence of osteomyelitis. Left: Screw fixation of the distal tibia. Skin staples over the dorsal midfoot and first toe. No acute fracture or radiographic evidence of osteomyelitis.     US Abdomen Limited    Result Date: 2/4/2025  EXAM: US ABDOMEN LIMITED LOCATION: [facility] DATE: 02/04/2025 INDICATION: History of HCC. Here with weakness, increased LFTs. COMPARISON: CT 02/04/2025. TECHNIQUE: Limited abdominal ultrasound. FINDINGS: GALLBLADDER: Cholecystectomy. BILE DUCTS: No biliary dilatation. The common duct measures 3.8 mm. LIVER: Cirrhotic appearance with coarsened echotexture and nodular contour. Solid 2.5 cm subcapsular mass in the superior right hepatic lobe compatible with neoplasm. No focal mass. RIGHT KIDNEY: No hydronephrosis. PANCREAS: The visualized portions are normal. No ascites. Splenomegaly.     IMPRESSION: 1.  Cirrhosis with a 2.5 cm solid subcapsular mass in the superior right hepatic lobe compatible with known neoplasm. 2.  Cholecystectomy. No bile duct dilatation.     XR Chest Port 1 View    Result Date: 2/4/2025  EXAM: XR CHEST PORT 1 VIEW LOCATION: [facility] DATE: 2/4/2025 INDICATION: Generalized weakness. Hx of CHF. COMPARISON: Chest radiograph 1/16/2025, CT chest 1/16/2020     IMPRESSION: Right chest port is unchanged with tip near the cavoatrial junction. No findings of edema. Nodular opacity in the right midlung corresponding to the cavitary nodule seen on prior CT is unchanged accounting for differences in technique. No pleural effusion or pneumothorax. Stable heart size and mediastinal contours.    CT Abdomen Pelvis w Contrast    Result Date: 2/4/2025  EXAM: CT ABDOMEN PELVIS W CONTRAST LOCATION:   [facility] DATE: 2/4/2025 INDICATION: Weakness, diffuse lower abdominal tenderness COMPARISON: 1/16/2025 TECHNIQUE: CT scan of the abdomen and pelvis was performed following injection of IV contrast. Multiplanar reformats were obtained. Dose reduction techniques were used. CONTRAST: IsoVue 370 75mL FINDINGS: LOWER CHEST: Partially visualized nodule along the right major fissure. Three-vessel coronary artery calcification. HEPATOBILIARY: [Morphologic changes of cirrhosis with ill-defined 3.7 x 3.4 cm hypoenhancing lesion in the right lobe posteriorly (4/36). Cholecystectomy. No biliary dilation. PANCREAS: Normal. SPLEEN: Enlarged, 16.0 cm craniocaudal. ADRENAL GLANDS: Normal. KIDNEYS/BLADDER: Normal. BOWEL: Diverticulosis of the colon. No acute inflammatory change. No obstruction. Normal appendix. LYMPH NODES: Enlarged periportal lymph node measuring 7.4 x 5.1 cm (4/67), previously 7.3 x 5.2 cm. No other enlarged lymph nodes in the abdomen and pelvis. Some borderline enlarged retroperitoneal lymph nodes are unchanged. VASCULATURE: Severe atherosclerotic calcification of the aorta, visceral branches, and iliofemoral arteries. Right common iliac and left common-external iliac artery stents, which appear to be patent, although the arterial enhancement is suboptimal. No aortic aneurysm. PELVIC ORGANS: Normal. MUSCULOSKELETAL: Multilevel degenerative changes in the spine.     IMPRESSION: 1.  No acute abnormality in the abdomen and pelvis. 2.  Morphologic changes of cirrhosis with splenomegaly. No ascites. 3.  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     Head CT w/o contrast    Result Date: 2/4/2025  EXAM: CT HEAD W/O CONTRAST LOCATION: [facility] DATE: 2/4/2025 INDICATION: Recent fall, confusion COMPARISON:  "3/8/2016 brain MRI TECHNIQUE: Routine CT Head without IV contrast. Multiplanar reformats. Dose reduction techniques were used. FINDINGS: INTRACRANIAL CONTENTS: No intracranial hemorrhage, extraaxial collection, or mass effect.  Small chronic right cerebellar infarct. No CT evidence of acute infarct. Mild presumed chronic small vessel ischemic changes. Mild generalized volume loss. No hydrocephalus. VISUALIZED ORBITS/SINUSES/MASTOIDS: No intraorbital abnormality. Minor mucosal thickening right maxillary sinus. No middle ear or mastoid effusion. BONES/SOFT TISSUES: No acute abnormality.     IMPRESSION: 1.  No acute intracranial injury, hemorrhage, mass, or CT evidence of recent ischemia.    POC US Guidance Needle Placement    Result Date: 1/27/2025  Ultrasound was performed as guidance to an anesthesia procedure.  Click \"PACS images\" hyperlink below to view any stored images.  For specific procedure details, view procedure note authored by anesthesia.    POC US Guidance Needle Placement    Result Date: 1/27/2025  Ultrasound was performed as guidance to an anesthesia procedure.  Click \"PACS images\" hyperlink below to view any stored images.  For specific procedure details, view procedure note authored by anesthesia.       Signed by: Kong Gleason MD    "

## 2025-02-18 NOTE — PROGRESS NOTES
Infusion Nursing Note:  Janice Nowak presents today for IV fluids only.    Patient seen by provider today: Yes: Dr Gleason   present during visit today: Not Applicable.    Note: Janice was accompanied by his family.  He elected to stay in his wheelchair.  1000cc NS infused over approximately 70 minutes.      Intravenous Access:  Implanted Port.    Treatment Conditions:  Not Applicable.      Post Infusion Assessment:  Patient tolerated infusion without incident.  Blood return noted pre and post infusion.  Site patent and intact, free from redness, edema or discomfort.  No evidence of extravasations.  Access discontinued per protocol.       Discharge Plan:   Patient discharged in stable condition accompanied by: son.  Departure Mode: Wheelchair.      Chantal Dos Santos RN

## 2025-02-18 NOTE — PROGRESS NOTES
[facility] GERIATRIC SERVICES    Code Status:  DNR/DNI   Visit Type:   Chief Complaint   Patient presents with    TCU Follow Up     Facility:  [facility] ([facility]) [[id number]]         HPI: Janice Nowak is a 80 year old male who I am seeing today for admit to the TCU. Past medical history includes T2DM, HFpEF, recent debridement of diabetic ulcers, SABRINA, hepatocellular carcinoma currently receiving palliative immunotherapy, hypothyroidism, HTN and HLD.  Patient recently hospitalized secondary to acute abdominal pain and encephalopathy.  He has known hepatocellular carcinoma.  He was found to have worsening of his chronic osteomyelitis of bilateral lower extremities.  History of calcanectomy 10/2024.   Patient underwent bilateral irrigation and debridement on 1/27/2025.  History of PAD.  Underwent stenting with IR on 1/16/2024.  LLE angiogram 2/8 showed high-grade stenosis now s/p crossing/orbital atherectomy/angioplasty of the ALEX/DP, Angioplasty/DCB of the Popliteal lesion with FELIPE placement in the proximal SFA lesion.  Chest pain with elevated troponins.=Overnight 2/11-2/12 had an episode of chest pain, reproducible with palpation on exam, not suspicious for ACS. Improved with lidocaine patch and Voltaren gel while admitted.  Declined further workup.    Transitional Care Course: On today's visit patient is sitting up in bedside chair.  He is known to me from previous TCU stay.  He has a wound VAC to the right lower extremity.  Dressing intact to left lower extremity and foot.  I did review pictures taken by the nursing staff during dressing change which showed moderate amount of necrosis with staples intact.  1+ lower extremity edema.  Extremities are warm to touch however pedal pulses nonpalpable.  Patient denies any pain.  He is somewhat of a flat affect and feels he has a very poor prognosis and outcome.  He does have underlying hepatocellular carcinoma now with concerns for metastases to the lung.  He  has been undergoing palliative immunotherapy with oncology.  He denies shortness of breath or chest pain.  He does report poor oral intake with no appetite.  Tongue appears dry.  BP is satisfactory.  He does have underlying type 2 diabetes.  Blood sugars appear satisfactory.  Chronic anemia.  Hemoglobin 8.3.  Patient was having loose stools yesterday with loose stools x 4.  Only 1 stool today.      Assessment/Plan:      Bilateral Lower Extremity Ulcers s/p bilateral irrigation and debridement  Acute on Chronic Right Calcaneal Osteomyelitis s/p calcanectomy  - S/p partial calcanectomy right heel 10/24/24  -bilateral LE I&D 1/27/25.   -stenting with IR on 1/16/24.   -Underwent LLE angiogram 2/8 and found to have high grade stenosis, now s/p crossing/orbital atherectomy/angioplasty of the ALEX/DP, Angioplasty/DCB of the Popliteal lesion with FELIPE placement in the proximal SFA lesion.   -Continues with Wound Vac to RLE.   -Wet to dry dressing to LLE.   -NWB to BLE.   -Continue blue boots.   -Follow up with podiatry 2/28.   - Continue ASA + Clopidegrel   - completed IV vanc/zosyn course prior to admission, discontinued 2/11.  -Augmentin twice daily x 12 days.  Stop date 2/25.  -Doxycycline twice daily x 12 days.  Stop date 2/24.  - follow-up CBC today with no leukocytosis.  Hemoglobin 8.3.  -Follow-up BMP with creatinine 27.  -Continue probiotic     Generalized weakness  Encephalopathy  -Initially presented with multiple days of progressive weakness, poor p.o. intake, poor sleep, myalgias.  Head imaging on admission negative for acute intracranial pathology.  No significant metabolic derangements.  Differential included hepatic encephalopathy vs infectious derangements, ultimately resolved with change of medication from oxycodone to tramadol.   - tramadol 50mg q8hrs PRN for pain  -Mentation improved.  -ok to PT, OT to eval and treat.      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 Underwent IR guided biopsy on 1/2/25 which confirmed HCC. Met with oncology on 1/10/25 who discussed recommendation for palliative immunotherapy. CT chest on recent admission showed nodules and bulky adenopathy concerning for malignancy.   -Patient has been receiving palliative care immunotherapy transfusions outpatient with oncology.  -Follow-up with Dr. Gleason in the AM.     Heart failure with preserved ejection fraction, NYHA class II   Hypertension  -Echo 12/11 with mild concentric LVH. EF 55-60%. Had transudative pleural effusion s/p thoracentesis 12/2024. No signs of acute heart failure while admitted, diuretics held due to CAROLINE, restart at discharge  -Aspirin 81 mg daily.  -Losartan 50 mg nightly  -Torsemide 10 mg daily.  -Spironolactone currently on hold.  -Daily weights.     Type II Diabetes.   -A1c 7.9%.   -Consistent carb diet.  -Blood sugar checks 4 times daily.  -Empagliflozin 10mg  -Glargine 30U Qam  -Metformin 1000mg BID  -DC as needed sliding scale.  -monitor for poor oral intake.      Anemia, normocytic  -Baseline 10-12 but trending downward in hospital.   -today hgb 8.3.   -currently no evidence of bleed.      CAROLINE on CKD  Hyponatremia   -Creatinine 1.24, baseline closer to 1.   -Follow up BMP today with Creatine 0.99.      Carotid arterial disease  Mixed hyperlipidemia  -U/S 11/2024 with <50% stenosis bilaterally.  -Monitor bp.   -continue statin.     Hypothyroidism  -TSH WNL.  -Levothyroxine 50mcg daily.        Active Ambulatory Problems     Diagnosis Date Noted    Type 2 Diabetes Mellitus     Microalbuminuria     Benign Adenomatous Polyp Of The Large Intestine     Mixed hyperlipidemia     Obstructive Sleep Apnea -- stopped using CPAP     Hypertension     Diabetic foot infection (H) 10/07/2024    DM 2 w PVD on Insulin, Hgb A1C9.2 10/24/24 10/07/2024    Diabetic ulcer of right heel associated with type 2 diabetes mellitus, with necrosis of muscle  (H) 10/16/2024    Carotid arterial disease 10/29/2024    Atypical chest pain 11/12/2015    Charcot joint of foot 10/29/2024    Charcot's arthropathy 10/29/2024    Diabetic neuropathy (H) 10/29/2024    Exposure to potentially hazardous substance 10/29/2024    Fatigue 11/12/2015    Pressure ulcer of right heel, unstageable (H) 10/29/2024    Occlusion and stenosis of unspecified carotid artery 10/29/2024    Postoperative back pain 10/29/2024    Hypothyroidism 10/29/2024    History of cholecystectomy 10/29/2024    Hearing loss 10/29/2024    Ulcer of right leg, limited to breakdown of skin (H) 11/13/2024    Diabetic ulcer of right heel associated with type 2 diabetes mellitus, with necrosis of bone (H) 11/13/2024    Dyspnea on exertion 12/11/2024    Diabetic ulcer of right heel associated with diabetes mellitus due to underlying condition, with muscle involvement without evidence of necrosis (H) 12/27/2024    Hepatocellular carcinoma (H) 01/10/2025    Abnormal hemoglobin (Hgb) 01/16/2025    Elevated troponin 01/16/2025    Syncope, unspecified syncope type 01/16/2025    History of vascular access device 01/20/2025    Diabetic ulcer of right heel -- S/P Surg 1/27/25 01/27/2025    Weakness 02/04/2025    Heart failure with preserved ejection fraction, NYHA class II (H) 02/04/2025    Open wound of right lower extremity 12/16/2024    Peripheral vascular disease 12/16/2024    Pleural effusion 12/16/2024    Elevated brain natriuretic peptide (BNP) level 12/16/2024    Heart failure with preserved ejection fraction, NYHA class I (H) 12/16/2024     Resolved Ambulatory Problems     Diagnosis Date Noted    Hypothyroidism, unspecified 10/29/2024     Past Medical History:   Diagnosis Date    Coronary artery disease     Diabetes (H)     Hypertension     Sleep apnea     Thyroid disease      Allergies   Allergen Reactions    Exenatide Unknown    Heparin Unknown    Liraglutide Unknown    Lisinopril Cough    Morphine Unknown       All Meds  and Allergies reviewed in the record at the facility and is the most up-to-date.    Post Discharge Medication Reconciliation Status: discharge medications reconciled, continue medications without change  Current Outpatient Medications   Medication Sig Dispense Refill    amoxicillin-clavulanate (AUGMENTIN) 875-125 MG tablet Take 1 tablet by mouth 2 times daily for 12 days. 24 tablet 0    aspirin 81 MG EC tablet Take 1 tablet (81 mg) by mouth every evening. 30 tablet 0    clopidogrel (PLAVIX) 75 MG tablet Take 1 tablet (75 mg) by mouth every morning. 30 tablet 0    co-enzyme Q-10 100 MG CAPS capsule Take 200 mg by mouth every evening.      doxycycline hyclate (VIBRAMYCIN) 100 MG capsule Take 1 capsule (100 mg) by mouth 2 times daily for 12 days. 24 capsule 0    empagliflozin (JARDIANCE) 10 MG TABS tablet Take 1 tablet (10 mg) by mouth daily. 30 tablet 0    guaiFENesin (MUCUS RELIEF ER PO) Take 1,200 mg by mouth 2 times daily as needed (congestion).      insulin aspart (NOVOLOG PEN) 100 UNIT/ML pen Inject 5 Units subcutaneously 3 times daily as needed for high blood sugar (With Meals). Novolog Flexpen: 5 units with breakfast, 5 units with lunch, 5 units with dinner. Takes as needed with each meal. 15 mL 0    insulin glargine (LANTUS VIAL) 100 UNIT/ML vial Inject 30 Units subcutaneously every morning. 10 mL 0    lactobacillus rhamnosus, GG, (CULTURELL) capsule Take 1 capsule by mouth every morning.      levothyroxine (SYNTHROID/LEVOTHROID) 50 MCG tablet Take 1 tablet (50 mcg) by mouth every morning. 30 tablet 0    losartan (COZAAR) 50 MG tablet Take 1 tablet (50 mg) by mouth at bedtime. 30 tablet 0    meclizine (ANTIVERT) 25 MG tablet Take 50 mg by mouth 3 times daily as needed (Vertigo).      metFORMIN (GLUCOPHAGE) 1000 MG tablet Take 1 tablet (1,000 mg) by mouth 2 times daily (with meals). 60 tablet 0    Multiple Vitamins-Minerals (PRESERVISION AREDS 2 PO) Take 2 capsules by mouth 2 times daily.      rosuvastatin  "(CRESTOR) 40 MG tablet Take 1 tablet (40 mg) by mouth daily. 30 tablet 0    torsemide (DEMADEX) 10 MG tablet Take 1 tablet (10 mg) by mouth daily. 30 tablet 0    traMADol (ULTRAM) 50 MG tablet Take 1 tablet (50 mg) by mouth every 6 hours as needed for moderate pain. 30 tablet 0    vitamin C (ASCORBIC ACID) 1000 MG TABS Take 1,000 mg by mouth 2 times daily.      vitamin C with B complex (B COMPLEX-C) tablet Take 1 tablet by mouth every morning.       No current facility-administered medications for this visit.       REVIEW OF SYSTEMS:   10 point review of systems reviewed and pertinent positives in the HPI.     PHYSICAL EXAMINATION:  Physical Exam     Vital signs: /77   Pulse 85   Temp 97.5  F (36.4  C)   Resp 16   Ht 1.727 m (5' 8\")   Wt 88.6 kg (195 lb 6.4 oz)   SpO2 97%   BMI 29.71 kg/m    General: Awake, Alert, oriented x3, siting up in bedside chair, appropriately, follows simple commands, conversant  HEENT:Pink conjunctiva, dry oral mucosa  NECK: Supple  CVS:  S1  S2, without murmur or gallop.   LUNG: Clear to auscultation, No wheezes, rales or rhonci.  BACK: No kyphosis of the thoracic spine  ABDOMEN: Soft, nontender to palpation, with positive bowel sounds  EXTREMITIES: moves both upper and lower extremities with some limitation to BLE, 1+ pedal edema. Blue boots to BLE.   SKIN: wound Vac to RLE. LLE with dressing intact.   NEUROLOGIC: Pulses non palpable. Skin warm.   PSYCHIATRIC: Flat affect.       Labs:  All labs reviewed in the nursing home record and [facility]   @  Lab Results   Component Value Date    WBC 10.1 02/17/2025     Lab Results   Component Value Date    RBC 3.22 02/17/2025     Lab Results   Component Value Date    HGB 8.3 02/17/2025     Lab Results   Component Value Date    HCT 27.6 02/17/2025     Lab Results   Component Value Date    MCV 86 02/17/2025     Lab Results   Component Value Date    MCH 25.8 02/17/2025     Lab Results   Component Value Date    MCHC 30.1 02/17/2025     Lab " Results   Component Value Date    RDW 19.1 02/17/2025     Lab Results   Component Value Date     02/17/2025        @Last Comprehensive Metabolic Panel:  Sodium   Date Value Ref Range Status   02/17/2025 140 135 - 145 mmol/L Final     Potassium   Date Value Ref Range Status   02/17/2025 3.9 3.4 - 5.3 mmol/L Final   07/06/2020 4.7 3.5 - 5.0 mmol/L Final     Chloride   Date Value Ref Range Status   02/17/2025 107 98 - 107 mmol/L Final   07/06/2020 103 98 - 107 mmol/L Final     Carbon Dioxide (CO2)   Date Value Ref Range Status   02/17/2025 22 22 - 29 mmol/L Final   07/06/2020 22 22 - 31 mmol/L Final     Anion Gap   Date Value Ref Range Status   02/17/2025 11 7 - 15 mmol/L Final   07/06/2020 14 5 - 18 mmol/L Final     Glucose   Date Value Ref Range Status   02/17/2025 100 (H) 70 - 99 mg/dL Final   07/06/2020 102 70 - 125 mg/dL Final     GLUCOSE BY METER POCT   Date Value Ref Range Status   02/13/2025 130 (H) 70 - 99 mg/dL Final     Urea Nitrogen   Date Value Ref Range Status   02/17/2025 27.3 (H) 8.0 - 23.0 mg/dL Final   07/06/2020 30 (H) 8 - 28 mg/dL Final     Creatinine   Date Value Ref Range Status   02/17/2025 0.99 0.67 - 1.17 mg/dL Final     GFR Estimate   Date Value Ref Range Status   02/17/2025 77 >60 mL/min/1.73m2 Final   07/06/2020 55 (L) >60 mL/min/1.73m2 Final     Calcium   Date Value Ref Range Status   02/17/2025 8.2 (L) 8.8 - 10.4 mg/dL Final       > 45 minutes spent preparing for this visit which included reviewing hospital records, labs, imaging, meds, consults as well as face to face time with pt and collaborating with nursing.       This note has been dictated using voice recognition software. Any grammatical or context distortions are unintentional and inherent to the software    Electronically signed by: Treasure Dumont, CNP

## 2025-02-19 ENCOUNTER — TRANSITIONAL CARE UNIT VISIT (OUTPATIENT)
Dept: GERIATRICS | Facility: CLINIC | Age: 80
End: 2025-02-19
Payer: COMMERCIAL

## 2025-02-19 VITALS
HEIGHT: 68 IN | OXYGEN SATURATION: 97 % | SYSTOLIC BLOOD PRESSURE: 148 MMHG | RESPIRATION RATE: 18 BRPM | BODY MASS INDEX: 29.52 KG/M2 | TEMPERATURE: 98.2 F | HEART RATE: 92 BPM | WEIGHT: 194.8 LBS | DIASTOLIC BLOOD PRESSURE: 85 MMHG

## 2025-02-19 DIAGNOSIS — E11.42 DIABETIC POLYNEUROPATHY ASSOCIATED WITH TYPE 2 DIABETES MELLITUS (H): ICD-10-CM

## 2025-02-19 DIAGNOSIS — Z98.890 S/P EXCISIONAL DEBRIDEMENT: ICD-10-CM

## 2025-02-19 DIAGNOSIS — I50.30 HEART FAILURE WITH PRESERVED EJECTION FRACTION, NYHA CLASS II (H): ICD-10-CM

## 2025-02-19 DIAGNOSIS — M86.171 ACUTE OSTEOMYELITIS OF RIGHT CALCANEUS (H): ICD-10-CM

## 2025-02-19 DIAGNOSIS — E78.2 MIXED HYPERLIPIDEMIA: ICD-10-CM

## 2025-02-19 DIAGNOSIS — L97.402 DIABETIC ULCER OF HEEL ASSOCIATED WITH DIABETES MELLITUS DUE TO UNDERLYING CONDITION, WITH FAT LAYER EXPOSED, UNSPECIFIED LATERALITY (H): Primary | ICD-10-CM

## 2025-02-19 DIAGNOSIS — E11.51 TYPE 2 DIABETES MELLITUS WITH DIABETIC PERIPHERAL ANGIOPATHY WITHOUT GANGRENE, WITH LONG-TERM CURRENT USE OF INSULIN (H): ICD-10-CM

## 2025-02-19 DIAGNOSIS — C24.9: ICD-10-CM

## 2025-02-19 DIAGNOSIS — Z79.4 TYPE 2 DIABETES MELLITUS WITH DIABETIC PERIPHERAL ANGIOPATHY WITHOUT GANGRENE, WITH LONG-TERM CURRENT USE OF INSULIN (H): ICD-10-CM

## 2025-02-19 DIAGNOSIS — Z86.2 HISTORY OF ANEMIA OF CHRONIC DISEASE: ICD-10-CM

## 2025-02-19 DIAGNOSIS — E08.621 DIABETIC ULCER OF HEEL ASSOCIATED WITH DIABETES MELLITUS DUE TO UNDERLYING CONDITION, WITH FAT LAYER EXPOSED, UNSPECIFIED LATERALITY (H): Primary | ICD-10-CM

## 2025-02-19 DIAGNOSIS — I10 HYPERTENSION, UNSPECIFIED TYPE: ICD-10-CM

## 2025-02-19 PROBLEM — E11.65 TYPE 2 DIABETES MELLITUS WITH HYPERGLYCEMIA, WITH LONG-TERM CURRENT USE OF INSULIN (H): Status: ACTIVE | Noted: 2024-12-26

## 2025-02-19 PROBLEM — D12.6 BENIGN ADENOMATOUS POLYP OF LARGE INTESTINE: Status: ACTIVE | Noted: 2024-07-31

## 2025-02-19 PROBLEM — E46 PROTEIN-CALORIE MALNUTRITION: Status: ACTIVE | Noted: 2025-01-22

## 2025-02-19 PROBLEM — M19.90 OSTEOARTHROSIS: Status: ACTIVE | Noted: 2024-11-27

## 2025-02-19 PROBLEM — R16.0 LIVER MASS: Status: ACTIVE | Noted: 2024-12-11

## 2025-02-19 PROBLEM — I82.409 DVT (DEEP VENOUS THROMBOSIS) (H): Status: ACTIVE | Noted: 2024-12-11

## 2025-02-19 PROBLEM — R06.03 ACUTE RESPIRATORY DISTRESS: Status: ACTIVE | Noted: 2024-12-26

## 2025-02-19 PROBLEM — R54 FRAILTY: Status: ACTIVE | Noted: 2024-08-28

## 2025-02-19 PROBLEM — M48.061 LUMBAR SPINAL STENOSIS: Status: ACTIVE | Noted: 2024-07-31

## 2025-02-19 PROBLEM — I25.10 CORONARY ATHEROSCLEROSIS: Status: ACTIVE | Noted: 2025-01-22

## 2025-02-19 PROBLEM — R42 DIZZINESS: Status: ACTIVE | Noted: 2024-07-31

## 2025-02-19 PROBLEM — G63 NEUROPATHY DUE TO MEDICAL CONDITION: Status: ACTIVE | Noted: 2024-08-28

## 2025-02-19 PROBLEM — D69.2 SENILE PURPURA: Status: ACTIVE | Noted: 2024-08-28

## 2025-02-19 PROBLEM — L08.9 LOCAL INFECTION OF THE SKIN AND SUBCUTANEOUS TISSUE, UNSPECIFIED: Status: ACTIVE | Noted: 2024-10-16

## 2025-02-19 LAB — ACTH PLAS-MCNC: 52 PG/ML

## 2025-02-19 PROCEDURE — 99310 SBSQ NF CARE HIGH MDM 45: CPT | Performed by: NURSE PRACTITIONER

## 2025-02-19 NOTE — LETTER
" 2/19/2025      Janice Nowak  [address]  [facility] [address]        [facility]    Code Status:  DNR/DNI   Visit Type:   Chief Complaint   Patient presents with     TCU Follow Up     Facility:  [facility] ([facility]) [[id number]]         HPI: Janice Nowak is a 80 year old male who I am seeing today for follow up on the TCU. Past medical history includes T2DM, HFpEF, recent debridement of diabetic ulcers, SABRINA, hepatocellular carcinoma currently receiving palliative immunotherapy, hypothyroidism, HTN and HLD.  Patient recently hospitalized secondary to acute abdominal pain and encephalopathy.  He has known hepatocellular carcinoma.  He was found to have worsening of his chronic osteomyelitis of bilateral lower extremities.  History of calcanectomy 10/2024.   Patient underwent bilateral irrigation and debridement on 1/27/2025.  History of PAD.  Underwent stenting with IR on 1/16/2024.  LLE angiogram 2/8 showed high-grade stenosis now s/p crossing/orbital atherectomy/angioplasty of the ALEX/DP, Angioplasty/DCB of the Popliteal lesion with FELIPE placement in the proximal SFA lesion.  Chest pain with elevated troponins.=Overnight 2/11-2/12 had an episode of chest pain, reproducible with palpation on exam, not suspicious for ACS. Improved with lidocaine patch and Voltaren gel while admitted.  Declined further workup.    Transitional Care Course: On today's visit patient is lying in bed. His son and daughter n law are present. Pt with hx of hepatobiliary cancer with mets. He was seen by Oncology yesterday and has decided to pursue hospice. Today I did review this with pt and family and pt would like to met with hospice and review options. He was living [place] at [facility] prior to admit. He would like to go back there on hospice with home care. He also would like \"to cut out some of the many meds he takes.\" I will start with non essentials. Pt appetite varies. His blood " sugar this am was 85. I did reduce his Metformin on last visit. He also continues on insulin. BS reviewed . Pt with wounds to BLE. He continues in wound VAC to RLE. He has a few staples left in place which will need to be removed. He has follow up with vascular next week. Family is asking if those can be removed here. Chronic anemia. Hgb 8.3. Pt denies any pain other than with VAC changes. He has out pouching of lymph nodes to right side of neck today. No redness or warmth. Pt a febrile.         Assessment/Plan:      Bilateral Lower Extremity Ulcers s/p bilateral irrigation and debridement  Acute on Chronic Right Calcaneal Osteomyelitis s/p calcanectomy  - S/p partial calcanectomy right heel 10/24/24  -bilateral LE I&D 1/27/25.   -stenting with IR on 1/16/24.   -Underwent LLE angiogram 2/8 and found to have high grade stenosis, now s/p crossing/orbital atherectomy/angioplasty of the ALEX/DP, Angioplasty/DCB of the Popliteal lesion with FELIPE placement in the proximal SFA lesion.   -Continues with Wound Vac to RLE.   -Wet to dry dressing to LLE.   -NWB to BLE.   -Continue blue boots.   -Follow up with podiatry 2/28.   - Continue ASA + Clopidegrel   - completed IV vanc/zosyn course prior to admission, discontinued 2/11.  -Augmentin twice daily x 12 days.  Stop date 2/25.  -Doxycycline twice daily x 12 days.  Stop date 2/24.  - follow-up CBC today with no leukocytosis.  Hemoglobin 8.3.  -Follow-up BMP with creatinine 27.  -Continue probiotic     Generalized weakness  -Initially presented with multiple days of progressive weakness, poor p.o. intake, poor sleep, myalgias.  Head imaging on admission negative for acute intracranial pathology.  No significant metabolic derangements.  Differential included hepatic encephalopathy vs infectious derangements, ultimately resolved with change of medication from oxycodone to tramadol.   - tramadol 50mg q8hrs PRN for pain  -Continues in therapy.      Hepatobiliary Cancer    Concern for Metastasis to Lungs   Relatively new diagnosis, began with incidental liver mass noted in 11/2024. Underwent IR guided biopsy on 1/2/25 which confirmed HCC. Met with oncology on 1/10/25 who discussed recommendation for palliative immunotherapy. CT chest on recent admission showed nodules and bulky adenopathy concerning for malignancy.   -Patient has been receiving palliative care immunotherapy transfusions outpatient with oncology.  -Follow-up with Dr. Gleason yesterday. Pt would like to pursue hospice.   -OK for hospice to eval and treat.      Heart failure with preserved ejection fraction, NYHA class II   Hypertension  -Echo 12/11 with mild concentric LVH. EF 55-60%. Had transudative pleural effusion s/p thoracentesis 12/2024. No signs of acute heart failure while admitted, diuretics held due to CAROLINE, restart at discharge  -Aspirin 81 mg daily.  -Losartan 50 mg nightly  -Torsemide 10 mg daily.  -Spironolactone currently on hold.  -Daily weights.     Type II Diabetes.   -A1c 7.9%.   -Consistent carb diet.  -Blood sugar checks 4 times daily.  -Discontinue Empagliflozin.   -Glargine 30U Qam. Hold if BS < 105.   -Discontinue Metformin.   -DC as needed sliding scale.  -monitor for poor oral intake.      Anemia, normocytic  -Baseline 10-12 but trending downward in hospital.   -today hgb 8.3.   -currently no evidence of bleed.      CAROLINE on CKD  Hyponatremia   -Creatinine 1.24, baseline closer to 1.   -Follow up BMP with Creatine 0.99.      Carotid arterial disease  Mixed hyperlipidemia  -U/S 11/2024 with <50% stenosis bilaterally.  -Monitor bp.   -continue statin.     Hypothyroidism  -TSH WNL.  -Levothyroxine 50mcg daily.    -medication list reviewed all nonessential meds including vitamins discontinued.     Active Ambulatory Problems     Diagnosis Date Noted     Type 2 Diabetes Mellitus      Microalbuminuria      Benign Adenomatous Polyp Of The Large Intestine      Mixed hyperlipidemia      Obstructive Sleep  Apnea -- stopped using CPAP      Hypertension      Diabetic foot infection (H) 10/07/2024     DM 2 w PVD on Insulin, Hgb A1C9.2 10/24/24 10/07/2024     Diabetic ulcer of right heel associated with type 2 diabetes mellitus, with necrosis of muscle (H) 10/16/2024     Carotid arterial disease 10/29/2024     Atypical chest pain 11/12/2015     Charcot joint of foot 10/29/2024     Charcot's arthropathy 10/29/2024     Diabetic neuropathy (H) 10/29/2024     Exposure to potentially hazardous substance 10/29/2024     Fatigue 11/12/2015     Pressure ulcer of right heel, unstageable (H) 10/29/2024     Occlusion and stenosis of unspecified carotid artery 10/29/2024     Postoperative back pain 10/29/2024     Hypothyroidism 10/29/2024     History of cholecystectomy 10/29/2024     Hearing loss 10/29/2024     Ulcer of right leg, limited to breakdown of skin (H) 11/13/2024     Diabetic ulcer of right heel associated with type 2 diabetes mellitus, with necrosis of bone (H) 11/13/2024     Dyspnea on exertion 12/11/2024     Diabetic ulcer of right heel associated with diabetes mellitus due to underlying condition, with muscle involvement without evidence of necrosis (H) 12/27/2024     Hepatocellular carcinoma (H) 01/10/2025     Abnormal hemoglobin (Hgb) 01/16/2025     Elevated troponin 01/16/2025     Syncope, unspecified syncope type 01/16/2025     History of vascular access device 01/20/2025     Diabetic ulcer of right heel -- S/P Surg 1/27/25 01/27/2025     Weakness 02/04/2025     Heart failure with preserved ejection fraction, NYHA class II (H) 02/04/2025     Open wound of right lower extremity 12/16/2024     Peripheral vascular disease 12/16/2024     Pleural effusion 12/16/2024     Elevated brain natriuretic peptide (BNP) level 12/16/2024     Heart failure with preserved ejection fraction, NYHA class I (H) 12/16/2024     Local infection of the skin and subcutaneous tissue, unspecified 10/16/2024     Liver mass 12/11/2024     Frailty  08/28/2024     Dizziness 07/31/2024     DVT (deep venous thrombosis) (H) 12/11/2024     Coronary atherosclerosis 01/22/2025     Benign adenomatous polyp of large intestine 07/31/2024     Acute respiratory distress 12/26/2024     Acute osteomyelitis of right foot (H) 11/27/2024     Senile purpura 08/28/2024     Type 2 diabetes mellitus with hyperglycemia, with long-term current use of insulin (H) 12/26/2024     Osteoarthrosis 11/27/2024     Protein-calorie malnutrition 01/22/2025     Neuropathy due to medical condition 08/28/2024     Lumbar spinal stenosis 07/31/2024     Long term (current) use of insulin (H) 07/31/2024     Resolved Ambulatory Problems     Diagnosis Date Noted     Hypothyroidism, unspecified 10/29/2024     Past Medical History:   Diagnosis Date     Coronary artery disease      Diabetes (H)      Hypertension      Sleep apnea      Thyroid disease      Allergies   Allergen Reactions     Exenatide Unknown     Heparin Unknown     Liraglutide Unknown     Lisinopril Cough     Morphine Unknown       All Meds and Allergies reviewed in the record at the facility and is the most up-to-date.    Current Outpatient Medications   Medication Sig Dispense Refill     Acetaminophen 325 MG CHEW Take 650 mg by mouth every 4 hours as needed.       amoxicillin-clavulanate (AUGMENTIN) 875-125 MG tablet Take 1 tablet by mouth 2 times daily for 12 days. 24 tablet 0     aspirin 81 MG EC tablet Take 1 tablet (81 mg) by mouth every evening. 30 tablet 0     clopidogrel (PLAVIX) 75 MG tablet Take 1 tablet (75 mg) by mouth every morning. 30 tablet 0     doxycycline hyclate (VIBRAMYCIN) 100 MG capsule Take 1 capsule (100 mg) by mouth 2 times daily for 12 days. 24 capsule 0     Emollient (AQUAPHOR EX) Apply aquaphor to BLE for dry skin daily       guaiFENesin (MUCUS RELIEF ER PO) Take 1,200 mg by mouth 2 times daily as needed (congestion).       insulin glargine (LANTUS VIAL) 100 UNIT/ML vial Inject 30 Units subcutaneously every  "morning. 10 mL 0     lactobacillus rhamnosus, GG, (CULTURELL) capsule Take 1 capsule by mouth every morning.       levothyroxine (SYNTHROID/LEVOTHROID) 50 MCG tablet Take 1 tablet (50 mcg) by mouth every morning. 30 tablet 0     losartan (COZAAR) 50 MG tablet Take 1 tablet (50 mg) by mouth at bedtime. 30 tablet 0     meclizine (ANTIVERT) 25 MG tablet Take 50 mg by mouth 3 times daily as needed (Vertigo).       rosuvastatin (CRESTOR) 40 MG tablet Take 1 tablet (40 mg) by mouth daily. 30 tablet 0     torsemide (DEMADEX) 10 MG tablet Take 1 tablet (10 mg) by mouth daily. 30 tablet 0     traMADol (ULTRAM) 50 MG tablet Take 1 tablet (50 mg) by mouth every 6 hours as needed for moderate pain. 30 tablet 0     No current facility-administered medications for this visit.       REVIEW OF SYSTEMS:   10 point review of systems reviewed and pertinent positives in the HPI.     PHYSICAL EXAMINATION:  Physical Exam     Vital signs: BP (!) 148/85   Pulse 92   Temp 98.2  F (36.8  C)   Resp 18   Ht 1.727 m (5' 8\")   Wt 88.4 kg (194 lb 12.8 oz)   SpO2 97%   BMI 29.62 kg/m    General: Awake, Alert, oriented x3, lying in bed, appropriately, follows simple commands, conversant  HEENT:Pink conjunctiva, dry oral mucosa  NECK: Supple  CVS:  S1  S2, without murmur or gallop.   LUNG: Clear to auscultation, No wheezes, rales or rhonci.  BACK: No kyphosis of the thoracic spine  ABDOMEN: Soft, nontender to palpation, with positive bowel sounds  EXTREMITIES: moves both upper and lower extremities with some limitation to BLE, 1+ pedal edema. Blue boots to BLE.   SKIN: wound Vac to RLE staples in heel wound.  LLE with dressing intact.   NEUROLOGIC: Pulses non palpable. Skin warm.   PSYCHIATRIC: Flat affect.       Labs:  All labs reviewed in the nursing home record and Epic   @  Lab Results   Component Value Date    WBC 10.1 02/17/2025     Lab Results   Component Value Date    RBC 3.22 02/17/2025     Lab Results   Component Value Date    HGB " 8.3 02/17/2025     Lab Results   Component Value Date    HCT 27.6 02/17/2025     Lab Results   Component Value Date    MCV 86 02/17/2025     Lab Results   Component Value Date    MCH 25.8 02/17/2025     Lab Results   Component Value Date    MCHC 30.1 02/17/2025     Lab Results   Component Value Date    RDW 19.1 02/17/2025     Lab Results   Component Value Date     02/17/2025        @Last Comprehensive Metabolic Panel:  Sodium   Date Value Ref Range Status   02/18/2025 138 135 - 145 mmol/L Final     Potassium   Date Value Ref Range Status   02/18/2025 4.0 3.4 - 5.3 mmol/L Final   07/06/2020 4.7 3.5 - 5.0 mmol/L Final     Chloride   Date Value Ref Range Status   02/18/2025 103 98 - 107 mmol/L Final   07/06/2020 103 98 - 107 mmol/L Final     Carbon Dioxide (CO2)   Date Value Ref Range Status   02/18/2025 25 22 - 29 mmol/L Final   07/06/2020 22 22 - 31 mmol/L Final     Anion Gap   Date Value Ref Range Status   02/18/2025 10 7 - 15 mmol/L Final   07/06/2020 14 5 - 18 mmol/L Final     Glucose   Date Value Ref Range Status   02/18/2025 148 (H) 70 - 99 mg/dL Final   07/06/2020 102 70 - 125 mg/dL Final     GLUCOSE BY METER POCT   Date Value Ref Range Status   02/13/2025 130 (H) 70 - 99 mg/dL Final     Urea Nitrogen   Date Value Ref Range Status   02/18/2025 30.3 (H) 8.0 - 23.0 mg/dL Final   07/06/2020 30 (H) 8 - 28 mg/dL Final     Creatinine   Date Value Ref Range Status   02/18/2025 0.99 0.67 - 1.17 mg/dL Final     GFR Estimate   Date Value Ref Range Status   02/18/2025 77 >60 mL/min/1.73m2 Final     Comment:     eGFR calculated using 2021 CKD-EPI equation.   07/06/2020 55 (L) >60 mL/min/1.73m2 Final     Calcium   Date Value Ref Range Status   02/18/2025 8.7 (L) 8.8 - 10.4 mg/dL Final       > 45 minutes face to face with pt and family reviewing recent oncology visit, prognosis and goals of care. Questions answered. Pt would like to pursue hospice.  notified and plans to meet with family to discuss  today.     This note has been dictated using voice recognition software. Any grammatical or context distortions are unintentional and inherent to the software    Electronically signed by: Treasure Dumont CNP       Sincerely,        Treasure Dumont NP    Electronically signed

## 2025-02-20 ENCOUNTER — PATIENT OUTREACH (OUTPATIENT)
Dept: ONCOLOGY | Facility: HOSPITAL | Age: 80
End: 2025-02-20
Payer: COMMERCIAL

## 2025-02-20 NOTE — PROGRESS NOTES
"[facility] GERIATRIC SERVICES    Code Status:  DNR/DNI   Visit Type:   Chief Complaint   Patient presents with    TCU Follow Up     Facility:  [facility] ([facility]) [[id number]]         HPI: Janice Nowak is a 80 year old male who I am seeing today for follow up on the TCU. Past medical history includes T2DM, HFpEF, recent debridement of diabetic ulcers, SABRINA, hepatocellular carcinoma currently receiving palliative immunotherapy, hypothyroidism, HTN and HLD.  Patient recently hospitalized secondary to acute abdominal pain and encephalopathy.  He has known hepatocellular carcinoma.  He was found to have worsening of his chronic osteomyelitis of bilateral lower extremities.  History of calcanectomy 10/2024.   Patient underwent bilateral irrigation and debridement on 1/27/2025.  History of PAD.  Underwent stenting with IR on 1/16/2024.  LLE angiogram 2/8 showed high-grade stenosis now s/p crossing/orbital atherectomy/angioplasty of the ALEX/DP, Angioplasty/DCB of the Popliteal lesion with FELIPE placement in the proximal SFA lesion.  Chest pain with elevated troponins.=Overnight 2/11-2/12 had an episode of chest pain, reproducible with palpation on exam, not suspicious for ACS. Improved with lidocaine patch and Voltaren gel while admitted.  Declined further workup.    Transitional Care Course: On today's visit patient is lying in bed. His son and daughter n law are present. Pt with hx of hepatobiliary cancer with mets. He was seen by Oncology yesterday and has decided to pursue hospice. Today I did review this with pt and family and pt would like to met with hospice and review options. He was living [place] at [facility] prior to admit. He would like to go back there on hospice with home care. He also would like \"to cut out some of the many meds he takes.\" I will start with non essentials. Pt appetite varies. His blood sugar this am was 85. I did reduce his Metformin on last visit. He also continues on insulin. BS " reviewed . Pt with wounds to BLE. He continues in wound VAC to RLE. He has a few staples left in place which will need to be removed. He has follow up with vascular next week. Family is asking if those can be removed here. Chronic anemia. Hgb 8.3. Pt denies any pain other than with VAC changes. He has out pouching of lymph nodes to right side of neck today. No redness or warmth. Pt a febrile.         Assessment/Plan:      Bilateral Lower Extremity Ulcers s/p bilateral irrigation and debridement  Acute on Chronic Right Calcaneal Osteomyelitis s/p calcanectomy  - S/p partial calcanectomy right heel 10/24/24  -bilateral LE I&D 1/27/25.   -stenting with IR on 1/16/24.   -Underwent LLE angiogram 2/8 and found to have high grade stenosis, now s/p crossing/orbital atherectomy/angioplasty of the ALEX/DP, Angioplasty/DCB of the Popliteal lesion with FELIPE placement in the proximal SFA lesion.   -Continues with Wound Vac to RLE.   -Wet to dry dressing to LLE.   -NWB to BLE.   -Continue blue boots.   -Follow up with podiatry 2/28.   - Continue ASA + Clopidegrel   - completed IV vanc/zosyn course prior to admission, discontinued 2/11.  -Augmentin twice daily x 12 days.  Stop date 2/25.  -Doxycycline twice daily x 12 days.  Stop date 2/24.  - follow-up CBC today with no leukocytosis.  Hemoglobin 8.3.  -Follow-up BMP with creatinine 27.  -Continue probiotic     Generalized weakness  -Initially presented with multiple days of progressive weakness, poor p.o. intake, poor sleep, myalgias.  Head imaging on admission negative for acute intracranial pathology.  No significant metabolic derangements.  Differential included hepatic encephalopathy vs infectious derangements, ultimately resolved with change of medication from oxycodone to tramadol.   - tramadol 50mg q8hrs PRN for pain  -Continues in therapy.      Hepatobiliary Cancer   Concern for Metastasis to Lungs   Relatively new diagnosis, began with incidental liver mass noted in  11/2024. Underwent IR guided biopsy on 1/2/25 which confirmed HCC. Met with oncology on 1/10/25 who discussed recommendation for palliative immunotherapy. CT chest on recent admission showed nodules and bulky adenopathy concerning for malignancy.   -Patient has been receiving palliative care immunotherapy transfusions outpatient with oncology.  -Follow-up with Dr. Gleason yesterday. Pt would like to pursue hospice.   -OK for hospice to eval and treat.      Heart failure with preserved ejection fraction, NYHA class II   Hypertension  -Echo 12/11 with mild concentric LVH. EF 55-60%. Had transudative pleural effusion s/p thoracentesis 12/2024. No signs of acute heart failure while admitted, diuretics held due to CAROLINE, restart at discharge  -Aspirin 81 mg daily.  -Losartan 50 mg nightly  -Torsemide 10 mg daily.  -Spironolactone currently on hold.  -Daily weights.     Type II Diabetes.   -A1c 7.9%.   -Consistent carb diet.  -Blood sugar checks 4 times daily.  -Discontinue Empagliflozin.   -Glargine 30U Qam. Hold if BS < 105.   -Discontinue Metformin.   -DC as needed sliding scale.  -monitor for poor oral intake.      Anemia, normocytic  -Baseline 10-12 but trending downward in hospital.   -today hgb 8.3.   -currently no evidence of bleed.      CAROLINE on CKD  Hyponatremia   -Creatinine 1.24, baseline closer to 1.   -Follow up BMP with Creatine 0.99.      Carotid arterial disease  Mixed hyperlipidemia  -U/S 11/2024 with <50% stenosis bilaterally.  -Monitor bp.   -continue statin.     Hypothyroidism  -TSH WNL.  -Levothyroxine 50mcg daily.    -medication list reviewed all nonessential meds including vitamins discontinued.     Active Ambulatory Problems     Diagnosis Date Noted    Type 2 Diabetes Mellitus     Microalbuminuria     Benign Adenomatous Polyp Of The Large Intestine     Mixed hyperlipidemia     Obstructive Sleep Apnea -- stopped using CPAP     Hypertension     Diabetic foot infection (H) 10/07/2024    DM 2 w PVD on  Insulin, Hgb A1C9.2 10/24/24 10/07/2024    Diabetic ulcer of right heel associated with type 2 diabetes mellitus, with necrosis of muscle (H) 10/16/2024    Carotid arterial disease 10/29/2024    Atypical chest pain 11/12/2015    Charcot joint of foot 10/29/2024    Charcot's arthropathy 10/29/2024    Diabetic neuropathy (H) 10/29/2024    Exposure to potentially hazardous substance 10/29/2024    Fatigue 11/12/2015    Pressure ulcer of right heel, unstageable (H) 10/29/2024    Occlusion and stenosis of unspecified carotid artery 10/29/2024    Postoperative back pain 10/29/2024    Hypothyroidism 10/29/2024    History of cholecystectomy 10/29/2024    Hearing loss 10/29/2024    Ulcer of right leg, limited to breakdown of skin (H) 11/13/2024    Diabetic ulcer of right heel associated with type 2 diabetes mellitus, with necrosis of bone (H) 11/13/2024    Dyspnea on exertion 12/11/2024    Diabetic ulcer of right heel associated with diabetes mellitus due to underlying condition, with muscle involvement without evidence of necrosis (H) 12/27/2024    Hepatocellular carcinoma (H) 01/10/2025    Abnormal hemoglobin (Hgb) 01/16/2025    Elevated troponin 01/16/2025    Syncope, unspecified syncope type 01/16/2025    History of vascular access device 01/20/2025    Diabetic ulcer of right heel -- S/P Surg 1/27/25 01/27/2025    Weakness 02/04/2025    Heart failure with preserved ejection fraction, NYHA class II (H) 02/04/2025    Open wound of right lower extremity 12/16/2024    Peripheral vascular disease 12/16/2024    Pleural effusion 12/16/2024    Elevated brain natriuretic peptide (BNP) level 12/16/2024    Heart failure with preserved ejection fraction, NYHA class I (H) 12/16/2024    Local infection of the skin and subcutaneous tissue, unspecified 10/16/2024    Liver mass 12/11/2024    Frailty 08/28/2024    Dizziness 07/31/2024    DVT (deep venous thrombosis) (H) 12/11/2024    Coronary atherosclerosis 01/22/2025    Benign  adenomatous polyp of large intestine 07/31/2024    Acute respiratory distress 12/26/2024    Acute osteomyelitis of right foot (H) 11/27/2024    Senile purpura 08/28/2024    Type 2 diabetes mellitus with hyperglycemia, with long-term current use of insulin (H) 12/26/2024    Osteoarthrosis 11/27/2024    Protein-calorie malnutrition 01/22/2025    Neuropathy due to medical condition 08/28/2024    Lumbar spinal stenosis 07/31/2024    Long term (current) use of insulin (H) 07/31/2024     Resolved Ambulatory Problems     Diagnosis Date Noted    Hypothyroidism, unspecified 10/29/2024     Past Medical History:   Diagnosis Date    Coronary artery disease     Diabetes (H)     Hypertension     Sleep apnea     Thyroid disease      Allergies   Allergen Reactions    Exenatide Unknown    Heparin Unknown    Liraglutide Unknown    Lisinopril Cough    Morphine Unknown       All Meds and Allergies reviewed in the record at the facility and is the most up-to-date.    Current Outpatient Medications   Medication Sig Dispense Refill    Acetaminophen 325 MG CHEW Take 650 mg by mouth every 4 hours as needed.      amoxicillin-clavulanate (AUGMENTIN) 875-125 MG tablet Take 1 tablet by mouth 2 times daily for 12 days. 24 tablet 0    aspirin 81 MG EC tablet Take 1 tablet (81 mg) by mouth every evening. 30 tablet 0    clopidogrel (PLAVIX) 75 MG tablet Take 1 tablet (75 mg) by mouth every morning. 30 tablet 0    doxycycline hyclate (VIBRAMYCIN) 100 MG capsule Take 1 capsule (100 mg) by mouth 2 times daily for 12 days. 24 capsule 0    Emollient (AQUAPHOR EX) Apply aquaphor to BLE for dry skin daily      guaiFENesin (MUCUS RELIEF ER PO) Take 1,200 mg by mouth 2 times daily as needed (congestion).      insulin glargine (LANTUS VIAL) 100 UNIT/ML vial Inject 30 Units subcutaneously every morning. 10 mL 0    lactobacillus rhamnosus, GG, (CULTURELL) capsule Take 1 capsule by mouth every morning.      levothyroxine (SYNTHROID/LEVOTHROID) 50 MCG tablet  "Take 1 tablet (50 mcg) by mouth every morning. 30 tablet 0    losartan (COZAAR) 50 MG tablet Take 1 tablet (50 mg) by mouth at bedtime. 30 tablet 0    meclizine (ANTIVERT) 25 MG tablet Take 50 mg by mouth 3 times daily as needed (Vertigo).      rosuvastatin (CRESTOR) 40 MG tablet Take 1 tablet (40 mg) by mouth daily. 30 tablet 0    torsemide (DEMADEX) 10 MG tablet Take 1 tablet (10 mg) by mouth daily. 30 tablet 0    traMADol (ULTRAM) 50 MG tablet Take 1 tablet (50 mg) by mouth every 6 hours as needed for moderate pain. 30 tablet 0     No current facility-administered medications for this visit.       REVIEW OF SYSTEMS:   10 point review of systems reviewed and pertinent positives in the HPI.     PHYSICAL EXAMINATION:  Physical Exam     Vital signs: BP (!) 148/85   Pulse 92   Temp 98.2  F (36.8  C)   Resp 18   Ht 1.727 m (5' 8\")   Wt 88.4 kg (194 lb 12.8 oz)   SpO2 97%   BMI 29.62 kg/m    General: Awake, Alert, oriented x3, lying in bed, appropriately, follows simple commands, conversant  HEENT:Pink conjunctiva, dry oral mucosa  NECK: Supple  CVS:  S1  S2, without murmur or gallop.   LUNG: Clear to auscultation, No wheezes, rales or rhonci.  BACK: No kyphosis of the thoracic spine  ABDOMEN: Soft, nontender to palpation, with positive bowel sounds  EXTREMITIES: moves both upper and lower extremities with some limitation to BLE, 1+ pedal edema. Blue boots to BLE.   SKIN: wound Vac to RLE staples in heel wound.  LLE with dressing intact.   NEUROLOGIC: Pulses non palpable. Skin warm.   PSYCHIATRIC: Flat affect.       Labs:  All labs reviewed in the nursing home record and Epic   @  Lab Results   Component Value Date    WBC 10.1 02/17/2025     Lab Results   Component Value Date    RBC 3.22 02/17/2025     Lab Results   Component Value Date    HGB 8.3 02/17/2025     Lab Results   Component Value Date    HCT 27.6 02/17/2025     Lab Results   Component Value Date    MCV 86 02/17/2025     Lab Results   Component Value " Date    MCH 25.8 02/17/2025     Lab Results   Component Value Date    MCHC 30.1 02/17/2025     Lab Results   Component Value Date    RDW 19.1 02/17/2025     Lab Results   Component Value Date     02/17/2025        @Last Comprehensive Metabolic Panel:  Sodium   Date Value Ref Range Status   02/18/2025 138 135 - 145 mmol/L Final     Potassium   Date Value Ref Range Status   02/18/2025 4.0 3.4 - 5.3 mmol/L Final   07/06/2020 4.7 3.5 - 5.0 mmol/L Final     Chloride   Date Value Ref Range Status   02/18/2025 103 98 - 107 mmol/L Final   07/06/2020 103 98 - 107 mmol/L Final     Carbon Dioxide (CO2)   Date Value Ref Range Status   02/18/2025 25 22 - 29 mmol/L Final   07/06/2020 22 22 - 31 mmol/L Final     Anion Gap   Date Value Ref Range Status   02/18/2025 10 7 - 15 mmol/L Final   07/06/2020 14 5 - 18 mmol/L Final     Glucose   Date Value Ref Range Status   02/18/2025 148 (H) 70 - 99 mg/dL Final   07/06/2020 102 70 - 125 mg/dL Final     GLUCOSE BY METER POCT   Date Value Ref Range Status   02/13/2025 130 (H) 70 - 99 mg/dL Final     Urea Nitrogen   Date Value Ref Range Status   02/18/2025 30.3 (H) 8.0 - 23.0 mg/dL Final   07/06/2020 30 (H) 8 - 28 mg/dL Final     Creatinine   Date Value Ref Range Status   02/18/2025 0.99 0.67 - 1.17 mg/dL Final     GFR Estimate   Date Value Ref Range Status   02/18/2025 77 >60 mL/min/1.73m2 Final     Comment:     eGFR calculated using 2021 CKD-EPI equation.   07/06/2020 55 (L) >60 mL/min/1.73m2 Final     Calcium   Date Value Ref Range Status   02/18/2025 8.7 (L) 8.8 - 10.4 mg/dL Final       > 45 minutes face to face with pt and family reviewing recent oncology visit, prognosis and goals of care. Questions answered. Pt would like to pursue hospice.  notified and plans to meet with family to discuss today.     This note has been dictated using voice recognition software. Any grammatical or context distortions are unintentional and inherent to the  software    Electronically signed by: Treasure Dumont, CNP

## 2025-02-20 NOTE — PROGRESS NOTES
[facility]: Cancer Care                                                                                          Writer called [facility] physician group and spoke with the care team of Dr. Anayeli Machado and stated that her the request of the patient's family and the patient, they were wanting us at the cancer center to connect with Dr. Machado to determine which hospice agency she recommends that the patient pursue.  Member of Dr. Machado's care team stated that she would send the message over to Dr. Pena sent and then Dr. Machado would reach back out to our clinic to connect with Dr. Gleason to discuss the hospice referral in further detail.    Signature:  Azul Aguilar RN

## 2025-02-24 ENCOUNTER — TRANSITIONAL CARE UNIT VISIT (OUTPATIENT)
Dept: GERIATRICS | Facility: CLINIC | Age: 80
End: 2025-02-24
Payer: COMMERCIAL

## 2025-02-24 VITALS
WEIGHT: 194.8 LBS | SYSTOLIC BLOOD PRESSURE: 106 MMHG | OXYGEN SATURATION: 96 % | HEART RATE: 84 BPM | RESPIRATION RATE: 18 BRPM | HEIGHT: 68 IN | BODY MASS INDEX: 29.52 KG/M2 | TEMPERATURE: 98 F | DIASTOLIC BLOOD PRESSURE: 64 MMHG

## 2025-02-24 DIAGNOSIS — I50.30 HEART FAILURE WITH PRESERVED EJECTION FRACTION, NYHA CLASS II (H): ICD-10-CM

## 2025-02-24 DIAGNOSIS — Z98.890 S/P EXCISIONAL DEBRIDEMENT: ICD-10-CM

## 2025-02-24 DIAGNOSIS — C24.9: ICD-10-CM

## 2025-02-24 DIAGNOSIS — E11.51 TYPE 2 DIABETES MELLITUS WITH DIABETIC PERIPHERAL ANGIOPATHY WITHOUT GANGRENE, WITH LONG-TERM CURRENT USE OF INSULIN (H): ICD-10-CM

## 2025-02-24 DIAGNOSIS — E08.621 DIABETIC ULCER OF HEEL ASSOCIATED WITH DIABETES MELLITUS DUE TO UNDERLYING CONDITION, WITH FAT LAYER EXPOSED, UNSPECIFIED LATERALITY (H): Primary | ICD-10-CM

## 2025-02-24 DIAGNOSIS — E02 SUBCLINICAL IODINE-DEFICIENCY HYPOTHYROIDISM: ICD-10-CM

## 2025-02-24 DIAGNOSIS — Z86.2 HISTORY OF ANEMIA OF CHRONIC DISEASE: ICD-10-CM

## 2025-02-24 DIAGNOSIS — L97.402 DIABETIC ULCER OF HEEL ASSOCIATED WITH DIABETES MELLITUS DUE TO UNDERLYING CONDITION, WITH FAT LAYER EXPOSED, UNSPECIFIED LATERALITY (H): Primary | ICD-10-CM

## 2025-02-24 DIAGNOSIS — E11.42 DIABETIC POLYNEUROPATHY ASSOCIATED WITH TYPE 2 DIABETES MELLITUS (H): ICD-10-CM

## 2025-02-24 DIAGNOSIS — Z79.4 TYPE 2 DIABETES MELLITUS WITH DIABETIC PERIPHERAL ANGIOPATHY WITHOUT GANGRENE, WITH LONG-TERM CURRENT USE OF INSULIN (H): ICD-10-CM

## 2025-02-24 DIAGNOSIS — M86.171 ACUTE OSTEOMYELITIS OF RIGHT CALCANEUS (H): ICD-10-CM

## 2025-02-24 DIAGNOSIS — I10 HYPERTENSION, UNSPECIFIED TYPE: ICD-10-CM

## 2025-02-24 DIAGNOSIS — E78.2 MIXED HYPERLIPIDEMIA: ICD-10-CM

## 2025-02-24 PROCEDURE — 99310 SBSQ NF CARE HIGH MDM 45: CPT | Performed by: NURSE PRACTITIONER

## 2025-02-24 RX ORDER — ONDANSETRON 4 MG/1
4 TABLET, FILM COATED ORAL EVERY 8 HOURS PRN
COMMUNITY

## 2025-02-24 NOTE — LETTER
" 2/24/2025      Janice Nowak  [address]  [facility] [address]        [facility]    Code Status:  DNR/DNI   Visit Type:   Chief Complaint   Patient presents with     TCU Follow Up     Facility:  [facility] ([facility]) [[id number]]         HPI: Janice Nowak is a 80 year old male who I am seeing today for follow up on the TCU. Past medical history includes T2DM, HFpEF, recent debridement of diabetic ulcers, SABRINA, hepatocellular carcinoma currently receiving palliative immunotherapy, hypothyroidism, HTN and HLD.  Patient recently hospitalized secondary to acute abdominal pain and encephalopathy.  He has known hepatocellular carcinoma.  He was found to have worsening of his chronic osteomyelitis of bilateral lower extremities.  History of calcanectomy 10/2024.   Patient underwent bilateral irrigation and debridement on 1/27/2025.  History of PAD.  Underwent stenting with IR on 1/16/2024.  LLE angiogram 2/8 showed high-grade stenosis now s/p crossing/orbital atherectomy/angioplasty of the ALEX/DP, Angioplasty/DCB of the Popliteal lesion with FELIPE placement in the proximal SFA lesion.  Chest pain with elevated troponins.=Overnight 2/11-2/12 had an episode of chest pain, reproducible with palpation on exam, not suspicious for ACS. Improved with lidocaine patch and Voltaren gel while admitted.  Declined further workup.    Transitional Care Course: On today's visit patient is lying in bed. Pt with hx of hepatobiliary cancer with mets. He was seen by Oncology last week and has decided to pursue hospice. Initially pt wanted to return home with hospice however today I told family unable to provide 24 hour care outside of hospice. They are looking at LTC placement. Pt has been refusing meds on/off per nursing. This was reviewed today. Pt reports \"I don't want to take all of these pills.\" He is having some nausea at times. Appetite is poor. Mx meds stopped last week at pt and " family request. He continues with wound VAC to RLE. He denies any pain. Chronic anemia. Hgb 8.3.  He has out pouching of lymph nodes to right side of neck today. No redness or warmth. I did speak with  today about hospice referral. Awaiting set up.         Assessment/Plan:      Bilateral Lower Extremity Ulcers s/p bilateral irrigation and debridement  Acute on Chronic Right Calcaneal Osteomyelitis s/p calcanectomy  - S/p partial calcanectomy right heel 10/24/24  -bilateral LE I&D 1/27/25.   -stenting with IR on 1/16/24.   -Underwent LLE angiogram 2/8 and found to have high grade stenosis, now s/p crossing/orbital atherectomy/angioplasty of the ALEX/DP, Angioplasty/DCB of the Popliteal lesion with FELIPE placement in the proximal SFA lesion.   -Continues with Wound Vac to RLE.   -Wet to dry dressing to LLE.   -NWB to BLE.   -Continue blue boots.   -Follow up with podiatry 2/28.   - Continue ASA + Clopidegrel   - completed IV vanc/zosyn course prior to admission, discontinued 2/11.  -Augmentin twice daily x 12 days.  Stop date 2/25.  -Doxycycline twice daily x 12 days.  Stop date 2/24.  - follow-up CBC today with no leukocytosis.  Hemoglobin 8.3.  -Follow-up BMP with creatinine 27.  -discontinue probiotic.      Generalized weakness  -Initially presented with multiple days of progressive weakness, poor p.o. intake, poor sleep, myalgias.  Head imaging on admission negative for acute intracranial pathology.  No significant metabolic derangements.  Differential included hepatic encephalopathy vs infectious derangements, ultimately resolved with change of medication from oxycodone to tramadol.   - tramadol 50mg q8hrs PRN for pain  -Continues in therapy.   -Plans to sign onto hospice once therapy done.      Hepatobiliary Cancer   Concern for Metastasis to Lungs   Relatively new diagnosis, began with incidental liver mass noted in 11/2024. Underwent IR guided biopsy on 1/2/25 which confirmed HCC. Met with oncology  on 1/10/25 who discussed recommendation for palliative immunotherapy. CT chest on recent admission showed nodules and bulky adenopathy concerning for malignancy.   -Patient has been receiving palliative care immunotherapy transfusions outpatient with oncology.  -Follow-up with Dr. Gleason yesterday. Pt would like to pursue hospice.   -OK for hospice to eval and treat.      Heart failure with preserved ejection fraction, NYHA class II   Hypertension  -Echo 12/11 with mild concentric LVH. EF 55-60%. Had transudative pleural effusion s/p thoracentesis 12/2024. No signs of acute heart failure while admitted, diuretics held due to CAROLINE, restart at discharge  -Aspirin 81 mg daily.  -Losartan 50 mg nightly  -Discontinue torsemide due to poor oral intake.      Type II Diabetes.   -A1c 7.9%.   -Consistent carb diet-ok to liberalize due to poor oral intake.   -Blood sugar checks 4 times daily.  -Empagliflozin discontinued.   -Glargine 15 units Qam. Hold if BS < 105.   -Metformin and sliding scale discontinued.    Nausea  -Zofran 4 mg Q 8 hours prn.      Anemia, normocytic  -Baseline 10-12 but trending downward in hospital.   -today hgb 8.3.   -currently no evidence of bleed.      CAROLINE on CKD  Hyponatremia   -Creatinine 1.24, baseline closer to 1.   -Follow up BMP with Creatine 0.99.      Carotid arterial disease  Mixed hyperlipidemia  -U/S 11/2024 with <50% stenosis bilaterally.  -Monitor bp.   -continue statin.     Hypothyroidism  -TSH WNL.  -Levothyroxine 50mcg daily.    -discontinued the following meds due to refusal  -Preservision, torsemide, ariginaid, meclizine.     Active Ambulatory Problems     Diagnosis Date Noted     Type 2 Diabetes Mellitus      Microalbuminuria      Benign Adenomatous Polyp Of The Large Intestine      Mixed hyperlipidemia      Obstructive Sleep Apnea -- stopped using CPAP      Hypertension      Diabetic foot infection (H) 10/07/2024     DM 2 w PVD on Insulin, Hgb A1C9.2 10/24/24 10/07/2024      Diabetic ulcer of right heel associated with type 2 diabetes mellitus, with necrosis of muscle (H) 10/16/2024     Carotid arterial disease 10/29/2024     Atypical chest pain 11/12/2015     Charcot joint of foot 10/29/2024     Charcot's arthropathy 10/29/2024     Diabetic neuropathy (H) 10/29/2024     Exposure to potentially hazardous substance 10/29/2024     Fatigue 11/12/2015     Pressure ulcer of right heel, unstageable (H) 10/29/2024     Occlusion and stenosis of unspecified carotid artery 10/29/2024     Postoperative back pain 10/29/2024     Hypothyroidism 10/29/2024     History of cholecystectomy 10/29/2024     Hearing loss 10/29/2024     Ulcer of right leg, limited to breakdown of skin (H) 11/13/2024     Diabetic ulcer of right heel associated with type 2 diabetes mellitus, with necrosis of bone (H) 11/13/2024     Dyspnea on exertion 12/11/2024     Diabetic ulcer of right heel associated with diabetes mellitus due to underlying condition, with muscle involvement without evidence of necrosis (H) 12/27/2024     Hepatocellular carcinoma (H) 01/10/2025     Abnormal hemoglobin (Hgb) 01/16/2025     Elevated troponin 01/16/2025     Syncope, unspecified syncope type 01/16/2025     History of vascular access device 01/20/2025     Diabetic ulcer of right heel -- S/P Surg 1/27/25 01/27/2025     Weakness 02/04/2025     Heart failure with preserved ejection fraction, NYHA class II (H) 02/04/2025     Open wound of right lower extremity 12/16/2024     Peripheral vascular disease 12/16/2024     Pleural effusion 12/16/2024     Elevated brain natriuretic peptide (BNP) level 12/16/2024     Heart failure with preserved ejection fraction, NYHA class I (H) 12/16/2024     Local infection of the skin and subcutaneous tissue, unspecified 10/16/2024     Liver mass 12/11/2024     Frailty 08/28/2024     Dizziness 07/31/2024     DVT (deep venous thrombosis) (H) 12/11/2024     Coronary atherosclerosis 01/22/2025     Benign adenomatous  polyp of large intestine 07/31/2024     Acute respiratory distress 12/26/2024     Acute osteomyelitis of right foot (H) 11/27/2024     Senile purpura 08/28/2024     Type 2 diabetes mellitus with hyperglycemia, with long-term current use of insulin (H) 12/26/2024     Osteoarthrosis 11/27/2024     Protein-calorie malnutrition 01/22/2025     Neuropathy due to medical condition 08/28/2024     Lumbar spinal stenosis 07/31/2024     Long term (current) use of insulin (H) 07/31/2024     Resolved Ambulatory Problems     Diagnosis Date Noted     Hypothyroidism, unspecified 10/29/2024     Past Medical History:   Diagnosis Date     Coronary artery disease      Diabetes (H)      Hypertension      Sleep apnea      Thyroid disease      Allergies   Allergen Reactions     Exenatide Unknown     Heparin Unknown     Liraglutide Unknown     Lisinopril Cough     Morphine Unknown       All Meds and Allergies reviewed in the record at the facility and is the most up-to-date.    Current Outpatient Medications   Medication Sig Dispense Refill     insulin glargine (LANTUS VIAL) 100 UNIT/ML vial Inject 30 Units subcutaneously every morning. (Patient taking differently: Inject 15 Units subcutaneously every morning.) 10 mL 0     ondansetron (ZOFRAN) 4 MG tablet Take 4 mg by mouth every 8 hours as needed for nausea.       Acetaminophen 325 MG CHEW Take 650 mg by mouth every 4 hours as needed.       amoxicillin-clavulanate (AUGMENTIN) 875-125 MG tablet Take 1 tablet by mouth 2 times daily for 12 days. 24 tablet 0     aspirin 81 MG EC tablet Take 1 tablet (81 mg) by mouth every evening. 30 tablet 0     clopidogrel (PLAVIX) 75 MG tablet Take 1 tablet (75 mg) by mouth every morning. 30 tablet 0     Emollient (AQUAPHOR EX) Apply aquaphor to BLE for dry skin daily       guaiFENesin (MUCUS RELIEF ER PO) Take 1,200 mg by mouth 2 times daily as needed (congestion).       levothyroxine (SYNTHROID/LEVOTHROID) 50 MCG tablet Take 1 tablet (50 mcg) by mouth  "every morning. 30 tablet 0     losartan (COZAAR) 50 MG tablet Take 1 tablet (50 mg) by mouth at bedtime. 30 tablet 0     rosuvastatin (CRESTOR) 40 MG tablet Take 1 tablet (40 mg) by mouth daily. 30 tablet 0     traMADol (ULTRAM) 50 MG tablet Take 1 tablet (50 mg) by mouth every 6 hours as needed for moderate pain. 30 tablet 0     No current facility-administered medications for this visit.       REVIEW OF SYSTEMS:   10 point review of systems reviewed and pertinent positives in the HPI.     PHYSICAL EXAMINATION:  Physical Exam     Vital signs: /64   Pulse 84   Temp 98  F (36.7  C)   Resp 18   Ht 1.727 m (5' 8\")   Wt 88.4 kg (194 lb 12.8 oz)   SpO2 96%   BMI 29.62 kg/m    General: Awake, Alert, oriented x3, lying in bed, appropriately, follows simple commands, conversant  HEENT:Pink conjunctiva, dry oral mucosa, out pouching to lymph nodes on right side of neck. No redness or warmth.   NECK: Supple  CVS:  S1  S2, without murmur or gallop.   LUNG: Clear to auscultation, No wheezes, rales or rhonci.  BACK: No kyphosis of the thoracic spine  ABDOMEN: Soft, nontender to palpation, with positive bowel sounds  EXTREMITIES: moves both upper and lower extremities with some limitation to BLE, 1+ pedal edema. Blue boots to BLE.   SKIN: wound Vac to RLE staples in heel wound.  LLE with dressing intact.   NEUROLOGIC: Pulses non palpable. Skin warm.   PSYCHIATRIC: Flat affect.       Labs:  All labs reviewed in the nursing home record and [facility]   @  Lab Results   Component Value Date    WBC 10.1 02/17/2025     Lab Results   Component Value Date    RBC 3.22 02/17/2025     Lab Results   Component Value Date    HGB 8.3 02/17/2025     Lab Results   Component Value Date    HCT 27.6 02/17/2025     Lab Results   Component Value Date    MCV 86 02/17/2025     Lab Results   Component Value Date    MCH 25.8 02/17/2025     Lab Results   Component Value Date    MCHC 30.1 02/17/2025     Lab Results   Component Value Date    RDW 19.1 " 02/17/2025     Lab Results   Component Value Date     02/17/2025        @Last Comprehensive Metabolic Panel:  Sodium   Date Value Ref Range Status   02/18/2025 138 135 - 145 mmol/L Final     Potassium   Date Value Ref Range Status   02/18/2025 4.0 3.4 - 5.3 mmol/L Final   07/06/2020 4.7 3.5 - 5.0 mmol/L Final     Chloride   Date Value Ref Range Status   02/18/2025 103 98 - 107 mmol/L Final   07/06/2020 103 98 - 107 mmol/L Final     Carbon Dioxide (CO2)   Date Value Ref Range Status   02/18/2025 25 22 - 29 mmol/L Final   07/06/2020 22 22 - 31 mmol/L Final     Anion Gap   Date Value Ref Range Status   02/18/2025 10 7 - 15 mmol/L Final   07/06/2020 14 5 - 18 mmol/L Final     Glucose   Date Value Ref Range Status   02/18/2025 148 (H) 70 - 99 mg/dL Final   07/06/2020 102 70 - 125 mg/dL Final     GLUCOSE BY METER POCT   Date Value Ref Range Status   02/13/2025 130 (H) 70 - 99 mg/dL Final     Urea Nitrogen   Date Value Ref Range Status   02/18/2025 30.3 (H) 8.0 - 23.0 mg/dL Final   07/06/2020 30 (H) 8 - 28 mg/dL Final     Creatinine   Date Value Ref Range Status   02/18/2025 0.99 0.67 - 1.17 mg/dL Final     GFR Estimate   Date Value Ref Range Status   02/18/2025 77 >60 mL/min/1.73m2 Final     Comment:     eGFR calculated using 2021 CKD-EPI equation.   07/06/2020 55 (L) >60 mL/min/1.73m2 Final     Calcium   Date Value Ref Range Status   02/18/2025 8.7 (L) 8.8 - 10.4 mg/dL Final       > 45 minutes face to face with pt and family reviewing recent oncology visit, prognosis and goals of care. Questions answered. Pt would like to pursue hospice.  notified and plans to meet with family to discuss today.     This note has been dictated using voice recognition software. Any grammatical or context distortions are unintentional and inherent to the software    Electronically signed by: Treasure Dumont CNP       Sincerely,        Treasure Dumont NP    Electronically signed

## 2025-02-24 NOTE — PROGRESS NOTES
"[facility] GERIATRIC SERVICES    Code Status:  DNR/DNI   Visit Type:   Chief Complaint   Patient presents with    TCU Follow Up     Facility:  [facility] ([facility]) [[id number]]         HPI: Janice Nowak is a 80 year old male who I am seeing today for follow up on the TCU. Past medical history includes T2DM, HFpEF, recent debridement of diabetic ulcers, SABRINA, hepatocellular carcinoma currently receiving palliative immunotherapy, hypothyroidism, HTN and HLD.  Patient recently hospitalized secondary to acute abdominal pain and encephalopathy.  He has known hepatocellular carcinoma.  He was found to have worsening of his chronic osteomyelitis of bilateral lower extremities.  History of calcanectomy 10/2024.   Patient underwent bilateral irrigation and debridement on 1/27/2025.  History of PAD.  Underwent stenting with IR on 1/16/2024.  LLE angiogram 2/8 showed high-grade stenosis now s/p crossing/orbital atherectomy/angioplasty of the ALEX/DP, Angioplasty/DCB of the Popliteal lesion with FELIPE placement in the proximal SFA lesion.  Chest pain with elevated troponins.=Overnight 2/11-2/12 had an episode of chest pain, reproducible with palpation on exam, not suspicious for ACS. Improved with lidocaine patch and Voltaren gel while admitted.  Declined further workup.    Transitional Care Course: On today's visit patient is lying in bed. Pt with hx of hepatobiliary cancer with mets. He was seen by Oncology last week and has decided to pursue hospice. Initially pt wanted to return home with hospice however today I told family unable to provide 24 hour care outside of hospice. They are looking at LTC placement. Pt has been refusing meds on/off per nursing. This was reviewed today. Pt reports \"I don't want to take all of these pills.\" He is having some nausea at times. Appetite is poor. Mx meds stopped last week at pt and family request. He continues with wound VAC to RLE. He denies any pain. Chronic anemia. Hgb 8.3.  He " has out pouching of lymph nodes to right side of neck today. No redness or warmth. I did speak with  today about hospice referral. Awaiting set up.         Assessment/Plan:      Bilateral Lower Extremity Ulcers s/p bilateral irrigation and debridement  Acute on Chronic Right Calcaneal Osteomyelitis s/p calcanectomy  - S/p partial calcanectomy right heel 10/24/24  -bilateral LE I&D 1/27/25.   -stenting with IR on 1/16/24.   -Underwent LLE angiogram 2/8 and found to have high grade stenosis, now s/p crossing/orbital atherectomy/angioplasty of the ALEX/DP, Angioplasty/DCB of the Popliteal lesion with FELIPE placement in the proximal SFA lesion.   -Continues with Wound Vac to RLE.   -Wet to dry dressing to LLE.   -NWB to BLE.   -Continue blue boots.   -Follow up with podiatry 2/28.   - Continue ASA + Clopidegrel   - completed IV vanc/zosyn course prior to admission, discontinued 2/11.  -Augmentin twice daily x 12 days.  Stop date 2/25.  -Doxycycline twice daily x 12 days.  Stop date 2/24.  - follow-up CBC today with no leukocytosis.  Hemoglobin 8.3.  -Follow-up BMP with creatinine 27.  -discontinue probiotic.      Generalized weakness  -Initially presented with multiple days of progressive weakness, poor p.o. intake, poor sleep, myalgias.  Head imaging on admission negative for acute intracranial pathology.  No significant metabolic derangements.  Differential included hepatic encephalopathy vs infectious derangements, ultimately resolved with change of medication from oxycodone to tramadol.   - tramadol 50mg q8hrs PRN for pain  -Continues in therapy.   -Plans to sign onto hospice once therapy done.      Hepatobiliary Cancer   Concern for Metastasis to Lungs   Relatively new diagnosis, began with incidental liver mass noted in 11/2024. Underwent IR guided biopsy on 1/2/25 which confirmed HCC. Met with oncology on 1/10/25 who discussed recommendation for palliative immunotherapy. CT chest on recent admission  showed nodules and bulky adenopathy concerning for malignancy.   -Patient has been receiving palliative care immunotherapy transfusions outpatient with oncology.  -Follow-up with Dr. Gleason yesterday. Pt would like to pursue hospice.   -OK for hospice to eval and treat.      Heart failure with preserved ejection fraction, NYHA class II   Hypertension  -Echo 12/11 with mild concentric LVH. EF 55-60%. Had transudative pleural effusion s/p thoracentesis 12/2024. No signs of acute heart failure while admitted, diuretics held due to CAROLINE, restart at discharge  -Aspirin 81 mg daily.  -Losartan 50 mg nightly  -Discontinue torsemide due to poor oral intake.      Type II Diabetes.   -A1c 7.9%.   -Consistent carb diet-ok to liberalize due to poor oral intake.   -Blood sugar checks 4 times daily.  -Empagliflozin discontinued.   -Glargine 15 units Qam. Hold if BS < 105.   -Metformin and sliding scale discontinued.    Nausea  -Zofran 4 mg Q 8 hours prn.      Anemia, normocytic  -Baseline 10-12 but trending downward in hospital.   -today hgb 8.3.   -currently no evidence of bleed.      CAROLINE on CKD  Hyponatremia   -Creatinine 1.24, baseline closer to 1.   -Follow up BMP with Creatine 0.99.      Carotid arterial disease  Mixed hyperlipidemia  -U/S 11/2024 with <50% stenosis bilaterally.  -Monitor bp.   -continue statin.     Hypothyroidism  -TSH WNL.  -Levothyroxine 50mcg daily.    -discontinued the following meds due to refusal  -Preservision, torsemide, ariginaid, meclizine.     Active Ambulatory Problems     Diagnosis Date Noted    Type 2 Diabetes Mellitus     Microalbuminuria     Benign Adenomatous Polyp Of The Large Intestine     Mixed hyperlipidemia     Obstructive Sleep Apnea -- stopped using CPAP     Hypertension     Diabetic foot infection (H) 10/07/2024    DM 2 w PVD on Insulin, Hgb A1C9.2 10/24/24 10/07/2024    Diabetic ulcer of right heel associated with type 2 diabetes mellitus, with necrosis of muscle (H) 10/16/2024     Carotid arterial disease 10/29/2024    Atypical chest pain 11/12/2015    Charcot joint of foot 10/29/2024    Charcot's arthropathy 10/29/2024    Diabetic neuropathy (H) 10/29/2024    Exposure to potentially hazardous substance 10/29/2024    Fatigue 11/12/2015    Pressure ulcer of right heel, unstageable (H) 10/29/2024    Occlusion and stenosis of unspecified carotid artery 10/29/2024    Postoperative back pain 10/29/2024    Hypothyroidism 10/29/2024    History of cholecystectomy 10/29/2024    Hearing loss 10/29/2024    Ulcer of right leg, limited to breakdown of skin (H) 11/13/2024    Diabetic ulcer of right heel associated with type 2 diabetes mellitus, with necrosis of bone (H) 11/13/2024    Dyspnea on exertion 12/11/2024    Diabetic ulcer of right heel associated with diabetes mellitus due to underlying condition, with muscle involvement without evidence of necrosis (H) 12/27/2024    Hepatocellular carcinoma (H) 01/10/2025    Abnormal hemoglobin (Hgb) 01/16/2025    Elevated troponin 01/16/2025    Syncope, unspecified syncope type 01/16/2025    History of vascular access device 01/20/2025    Diabetic ulcer of right heel -- S/P Surg 1/27/25 01/27/2025    Weakness 02/04/2025    Heart failure with preserved ejection fraction, NYHA class II (H) 02/04/2025    Open wound of right lower extremity 12/16/2024    Peripheral vascular disease 12/16/2024    Pleural effusion 12/16/2024    Elevated brain natriuretic peptide (BNP) level 12/16/2024    Heart failure with preserved ejection fraction, NYHA class I (H) 12/16/2024    Local infection of the skin and subcutaneous tissue, unspecified 10/16/2024    Liver mass 12/11/2024    Frailty 08/28/2024    Dizziness 07/31/2024    DVT (deep venous thrombosis) (H) 12/11/2024    Coronary atherosclerosis 01/22/2025    Benign adenomatous polyp of large intestine 07/31/2024    Acute respiratory distress 12/26/2024    Acute osteomyelitis of right foot (H) 11/27/2024    Senile purpura  08/28/2024    Type 2 diabetes mellitus with hyperglycemia, with long-term current use of insulin (H) 12/26/2024    Osteoarthrosis 11/27/2024    Protein-calorie malnutrition 01/22/2025    Neuropathy due to medical condition 08/28/2024    Lumbar spinal stenosis 07/31/2024    Long term (current) use of insulin (H) 07/31/2024     Resolved Ambulatory Problems     Diagnosis Date Noted    Hypothyroidism, unspecified 10/29/2024     Past Medical History:   Diagnosis Date    Coronary artery disease     Diabetes (H)     Hypertension     Sleep apnea     Thyroid disease      Allergies   Allergen Reactions    Exenatide Unknown    Heparin Unknown    Liraglutide Unknown    Lisinopril Cough    Morphine Unknown       All Meds and Allergies reviewed in the record at the facility and is the most up-to-date.    Current Outpatient Medications   Medication Sig Dispense Refill    insulin glargine (LANTUS VIAL) 100 UNIT/ML vial Inject 30 Units subcutaneously every morning. (Patient taking differently: Inject 15 Units subcutaneously every morning.) 10 mL 0    ondansetron (ZOFRAN) 4 MG tablet Take 4 mg by mouth every 8 hours as needed for nausea.      Acetaminophen 325 MG CHEW Take 650 mg by mouth every 4 hours as needed.      amoxicillin-clavulanate (AUGMENTIN) 875-125 MG tablet Take 1 tablet by mouth 2 times daily for 12 days. 24 tablet 0    aspirin 81 MG EC tablet Take 1 tablet (81 mg) by mouth every evening. 30 tablet 0    clopidogrel (PLAVIX) 75 MG tablet Take 1 tablet (75 mg) by mouth every morning. 30 tablet 0    Emollient (AQUAPHOR EX) Apply aquaphor to BLE for dry skin daily      guaiFENesin (MUCUS RELIEF ER PO) Take 1,200 mg by mouth 2 times daily as needed (congestion).      levothyroxine (SYNTHROID/LEVOTHROID) 50 MCG tablet Take 1 tablet (50 mcg) by mouth every morning. 30 tablet 0    losartan (COZAAR) 50 MG tablet Take 1 tablet (50 mg) by mouth at bedtime. 30 tablet 0    rosuvastatin (CRESTOR) 40 MG tablet Take 1 tablet (40 mg)  "by mouth daily. 30 tablet 0    traMADol (ULTRAM) 50 MG tablet Take 1 tablet (50 mg) by mouth every 6 hours as needed for moderate pain. 30 tablet 0     No current facility-administered medications for this visit.       REVIEW OF SYSTEMS:   10 point review of systems reviewed and pertinent positives in the HPI.     PHYSICAL EXAMINATION:  Physical Exam     Vital signs: /64   Pulse 84   Temp 98  F (36.7  C)   Resp 18   Ht 1.727 m (5' 8\")   Wt 88.4 kg (194 lb 12.8 oz)   SpO2 96%   BMI 29.62 kg/m    General: Awake, Alert, oriented x3, lying in bed, appropriately, follows simple commands, conversant  HEENT:Pink conjunctiva, dry oral mucosa, out pouching to lymph nodes on right side of neck. No redness or warmth.   NECK: Supple  CVS:  S1  S2, without murmur or gallop.   LUNG: Clear to auscultation, No wheezes, rales or rhonci.  BACK: No kyphosis of the thoracic spine  ABDOMEN: Soft, nontender to palpation, with positive bowel sounds  EXTREMITIES: moves both upper and lower extremities with some limitation to BLE, 1+ pedal edema. Blue boots to BLE.   SKIN: wound Vac to RLE staples in heel wound.  LLE with dressing intact.   NEUROLOGIC: Pulses non palpable. Skin warm.   PSYCHIATRIC: Flat affect.       Labs:  All labs reviewed in the nursing home record and Epic   @  Lab Results   Component Value Date    WBC 10.1 02/17/2025     Lab Results   Component Value Date    RBC 3.22 02/17/2025     Lab Results   Component Value Date    HGB 8.3 02/17/2025     Lab Results   Component Value Date    HCT 27.6 02/17/2025     Lab Results   Component Value Date    MCV 86 02/17/2025     Lab Results   Component Value Date    MCH 25.8 02/17/2025     Lab Results   Component Value Date    MCHC 30.1 02/17/2025     Lab Results   Component Value Date    RDW 19.1 02/17/2025     Lab Results   Component Value Date     02/17/2025        @Last Comprehensive Metabolic Panel:  Sodium   Date Value Ref Range Status   02/18/2025 138 135 - " 145 mmol/L Final     Potassium   Date Value Ref Range Status   02/18/2025 4.0 3.4 - 5.3 mmol/L Final   07/06/2020 4.7 3.5 - 5.0 mmol/L Final     Chloride   Date Value Ref Range Status   02/18/2025 103 98 - 107 mmol/L Final   07/06/2020 103 98 - 107 mmol/L Final     Carbon Dioxide (CO2)   Date Value Ref Range Status   02/18/2025 25 22 - 29 mmol/L Final   07/06/2020 22 22 - 31 mmol/L Final     Anion Gap   Date Value Ref Range Status   02/18/2025 10 7 - 15 mmol/L Final   07/06/2020 14 5 - 18 mmol/L Final     Glucose   Date Value Ref Range Status   02/18/2025 148 (H) 70 - 99 mg/dL Final   07/06/2020 102 70 - 125 mg/dL Final     GLUCOSE BY METER POCT   Date Value Ref Range Status   02/13/2025 130 (H) 70 - 99 mg/dL Final     Urea Nitrogen   Date Value Ref Range Status   02/18/2025 30.3 (H) 8.0 - 23.0 mg/dL Final   07/06/2020 30 (H) 8 - 28 mg/dL Final     Creatinine   Date Value Ref Range Status   02/18/2025 0.99 0.67 - 1.17 mg/dL Final     GFR Estimate   Date Value Ref Range Status   02/18/2025 77 >60 mL/min/1.73m2 Final     Comment:     eGFR calculated using 2021 CKD-EPI equation.   07/06/2020 55 (L) >60 mL/min/1.73m2 Final     Calcium   Date Value Ref Range Status   02/18/2025 8.7 (L) 8.8 - 10.4 mg/dL Final       > 45 minutes face to face with pt and family reviewing recent oncology visit, prognosis and goals of care. Questions answered. Pt would like to pursue hospice.  notified and plans to meet with family to discuss today.     This note has been dictated using voice recognition software. Any grammatical or context distortions are unintentional and inherent to the software    Electronically signed by: Treasure Dumont CNP

## 2025-02-25 ENCOUNTER — TELEPHONE (OUTPATIENT)
Dept: CARDIOLOGY | Facility: CLINIC | Age: 80
End: 2025-02-25
Payer: COMMERCIAL

## 2025-02-25 NOTE — TELEPHONE ENCOUNTER
Called Senait with [facility].  She faxed us paper work for their billing with regards to an order to reduce patient's Torsemide to 10 mg daily (from 20 mg) on 1/9/25.  This rate reduction was ordered  by Justine Jeff on 1/9/25, and it is requested she signs their fax.  Giving paperwork to Justine for review.  Medication was discontinued by Dr Tariq on 2/11/25.      Thank you    Conor Hurt RN    -----------------------------------------------------------    [facility] Call Center     Phone Message     May a detailed message be left on voicemail: yes      Reason for Call: Other: Home care faxed over an order for Torsemide medication and have not received the signed order back yet. Please fax to . Originally faxed 2/11/25.       Action Taken: Other: cardiology     Travel Screening: Not Applicable  Thank you!  [facility]                   Date of Service:

## 2025-02-25 NOTE — TELEPHONE ENCOUNTER
[facility] Call Center    Phone Message    May a detailed message be left on voicemail: yes     Reason for Call: Other: Home care faxed over an order for Torsemide medication and have not received the signed order back yet. Please fax to . Originally faxed 2/11/25.      Action Taken: Other: cardiology    Travel Screening: Not Applicable  Thank you!  [facility]       Date of Service:

## 2025-02-26 ENCOUNTER — TRANSITIONAL CARE UNIT VISIT (OUTPATIENT)
Dept: GERIATRICS | Facility: CLINIC | Age: 80
End: 2025-02-26
Payer: COMMERCIAL

## 2025-02-26 ENCOUNTER — PATIENT OUTREACH (OUTPATIENT)
Dept: ONCOLOGY | Facility: HOSPITAL | Age: 80
End: 2025-02-26

## 2025-02-26 VITALS
TEMPERATURE: 97.9 F | HEART RATE: 85 BPM | OXYGEN SATURATION: 96 % | DIASTOLIC BLOOD PRESSURE: 83 MMHG | RESPIRATION RATE: 18 BRPM | WEIGHT: 190.5 LBS | HEIGHT: 68 IN | BODY MASS INDEX: 28.87 KG/M2 | SYSTOLIC BLOOD PRESSURE: 151 MMHG

## 2025-02-26 DIAGNOSIS — Z86.2 HISTORY OF ANEMIA OF CHRONIC DISEASE: ICD-10-CM

## 2025-02-26 DIAGNOSIS — I50.30 HEART FAILURE WITH PRESERVED EJECTION FRACTION, NYHA CLASS II (H): ICD-10-CM

## 2025-02-26 DIAGNOSIS — C24.9: ICD-10-CM

## 2025-02-26 DIAGNOSIS — Z98.890 S/P EXCISIONAL DEBRIDEMENT: ICD-10-CM

## 2025-02-26 DIAGNOSIS — E11.51 TYPE 2 DIABETES MELLITUS WITH DIABETIC PERIPHERAL ANGIOPATHY WITHOUT GANGRENE, WITH LONG-TERM CURRENT USE OF INSULIN (H): ICD-10-CM

## 2025-02-26 DIAGNOSIS — I10 HYPERTENSION, UNSPECIFIED TYPE: ICD-10-CM

## 2025-02-26 DIAGNOSIS — Z79.4 TYPE 2 DIABETES MELLITUS WITH DIABETIC PERIPHERAL ANGIOPATHY WITHOUT GANGRENE, WITH LONG-TERM CURRENT USE OF INSULIN (H): ICD-10-CM

## 2025-02-26 DIAGNOSIS — E11.42 DIABETIC POLYNEUROPATHY ASSOCIATED WITH TYPE 2 DIABETES MELLITUS (H): ICD-10-CM

## 2025-02-26 DIAGNOSIS — E08.621 DIABETIC ULCER OF HEEL ASSOCIATED WITH DIABETES MELLITUS DUE TO UNDERLYING CONDITION, WITH FAT LAYER EXPOSED, UNSPECIFIED LATERALITY (H): Primary | ICD-10-CM

## 2025-02-26 DIAGNOSIS — L97.402 DIABETIC ULCER OF HEEL ASSOCIATED WITH DIABETES MELLITUS DUE TO UNDERLYING CONDITION, WITH FAT LAYER EXPOSED, UNSPECIFIED LATERALITY (H): Primary | ICD-10-CM

## 2025-02-26 DIAGNOSIS — E78.2 MIXED HYPERLIPIDEMIA: ICD-10-CM

## 2025-02-26 DIAGNOSIS — M86.171 ACUTE OSTEOMYELITIS OF RIGHT CALCANEUS (H): ICD-10-CM

## 2025-02-26 NOTE — TELEPHONE ENCOUNTER
Spoke to Senait today, and relayed Justine's updates.  She is going to work with their group to try and get updated paperwork.    Thank you    Conor Hurt RN

## 2025-02-26 NOTE — TELEPHONE ENCOUNTER
At the end of business on 2/25, we called Senait and left her a voicemail.  We asked that she send an updated notice under Justine's name who made the dose adjustment, and want to ensure patient will not be taking Torsemide moving forward since is was discontinued by Dr Tariq on 2/11/25.    Thank you    Conor Hurt RN

## 2025-02-26 NOTE — PROGRESS NOTES
[facility]: Cancer Care                                                                                          Writer called [facility] TCU to ask about status of hospice referral. [facility] that a hospice referral has been placed by [facility] and the staff and patient are waiting for the hospice agency to come out and do a consult visit. Writer thanked charge nurse for the update.     Signature:  Azul Aguilar RN

## 2025-02-26 NOTE — PROGRESS NOTES
[facility] GERIATRIC SERVICES    Code Status:  DNR/DNI   Visit Type:   Chief Complaint   Patient presents with    TCU Follow Up     Facility:  [facility] ([facility]) [[id number]]         HPI: Janice Nowak is a 80 year old male who I am seeing today for follow up on the TCU. Past medical history includes T2DM, HFpEF, recent debridement of diabetic ulcers, SABRINA, hepatocellular carcinoma currently receiving palliative immunotherapy, hypothyroidism, HTN and HLD.  Patient recently hospitalized secondary to acute abdominal pain and encephalopathy.  He has known hepatocellular carcinoma.  He was found to have worsening of his chronic osteomyelitis of bilateral lower extremities.  History of calcanectomy 10/2024.   Patient underwent bilateral irrigation and debridement on 1/27/2025.  History of PAD.  Underwent stenting with IR on 1/16/2024.  LLE angiogram 2/8 showed high-grade stenosis now s/p crossing/orbital atherectomy/angioplasty of the ALEX/DP, Angioplasty/DCB of the Popliteal lesion with FELIPE placement in the proximal SFA lesion.  Chest pain with elevated troponins.=Overnight 2/11-2/12 had an episode of chest pain, reproducible with palpation on exam, not suspicious for ACS. Improved with lidocaine patch and Voltaren gel while admitted.  Declined further workup.    Transitional Care Course: On today's visit patient is lying in bed. Pt with hx of hepatobiliary cancer with mets. He was seen by Oncology last week and has decided to pursue hospice. Today I spoke with pt and his family (son Michael and daughter n law). Family has pursued hospice and has decided to go with [facility] hospice. They also are looking at transferring to [facility] on hospice.  updated. Appetite varies. DM meds reduced. Reports today he is comfortable. Continues on tramadol for pain. Wound VAC discontinued to LE wounds. Continues with topical treatment. Plans to see Dr. Praful payton Wound MD.  today for staple removal. Has decided to forgo  any follow up with Vascular. Chronic anemia. Hgb 8.3.     Assessment/Plan:      Bilateral Lower Extremity Ulcers s/p bilateral irrigation and debridement  Acute on Chronic Right Calcaneal Osteomyelitis s/p calcanectomy  - S/p partial calcanectomy right heel 10/24/24  -bilateral LE I&D 1/27/25.   -stenting with IR on 1/16/24.   -Underwent LLE angiogram 2/8 and found to have high grade stenosis, now s/p crossing/orbital atherectomy/angioplasty of the ALEX/DP, Angioplasty/DCB of the Popliteal lesion with FELIPE placement in the proximal SFA lesion.   -Continues with Wound Vac to RLE. Ok to discontinue and use topical wet to dry.   -Wet to dry dressing to LLE.   -NWB to BLE.   -Continue blue boots.   -Follow up with podiatry 2/28-cancelled at request of the family with goals of care comfort/hospice.   - Continue ASA + Clopidegrel   - completed IV vanc/zosyn course prior to admission, discontinued 2/11.  -Augmentin twice daily x 12 days.  Stop date 2/25.  -Doxycycline twice daily x 12 days.  Stop date 2/24.  - follow-up CBC today with no leukocytosis.  Hemoglobin 8.3.  -Follow-up BMP with creatinine 27.  -discontinue probiotic.      Generalized weakness  -Initially presented with multiple days of progressive weakness, poor p.o. intake, poor sleep, myalgias.  Head imaging on admission negative for acute intracranial pathology.  No significant metabolic derangements.  Differential included hepatic encephalopathy vs infectious derangements, ultimately resolved with change of medication from oxycodone to tramadol.   - tramadol 50mg q8hrs PRN for pain  -Continues in therapy.   -Plans to sign onto hospice once therapy done.      Hepatobiliary Cancer   Concern for Metastasis to Lungs   Relatively new diagnosis, began with incidental liver mass noted in 11/2024. Underwent IR guided biopsy on 1/2/25 which confirmed HCC. Met with oncology on 1/10/25 who discussed recommendation for palliative immunotherapy. CT chest on recent  admission showed nodules and bulky adenopathy concerning for malignancy.   -Patient has been receiving palliative care immunotherapy transfusions outpatient with oncology.  -Follow-up with Dr. Glesaon yesterday. Pt would like to pursue hospice.   -OK for hospice to eval and treat.      Heart failure with preserved ejection fraction, NYHA class II   Hypertension  -Echo 12/11 with mild concentric LVH. EF 55-60%. Had transudative pleural effusion s/p thoracentesis 12/2024. No signs of acute heart failure while admitted, diuretics held due to CAROLINE, restart at discharge  -Aspirin 81 mg daily.  -Losartan 50 mg nightly  -Discontinue torsemide due to poor oral intake.      Type II Diabetes.   -A1c 7.9%.   -Consistent carb diet-ok to liberalize due to poor oral intake.   -Blood sugar checks 4 times daily.  -Empagliflozin discontinued.   -Glargine 15 units Qam. Hold if BS < 105.   -Metformin and sliding scale discontinued.    Nausea  -Zofran 4 mg Q 8 hours prn.      Anemia, normocytic  -Baseline 10-12 but trending downward in hospital.   -today hgb 8.3.   -currently no evidence of bleed.      CAROLINE on CKD  Hyponatremia   -Creatinine 1.24, baseline closer to 1.   -Follow up BMP with Creatine 0.99.      Carotid arterial disease  Mixed hyperlipidemia  -U/S 11/2024 with <50% stenosis bilaterally.  -Monitor bp.   -continue statin.     Hypothyroidism  -TSH WNL.  -Levothyroxine 50mcg daily.    -ok to discharge with current meds on hospice.   -ok to send with # 10 tabs of tramadol.     Active Ambulatory Problems     Diagnosis Date Noted    Type 2 Diabetes Mellitus     Microalbuminuria     Benign Adenomatous Polyp Of The Large Intestine     Mixed hyperlipidemia     Obstructive Sleep Apnea -- stopped using CPAP     Hypertension     Diabetic foot infection (H) 10/07/2024    DM 2 w PVD on Insulin, Hgb A1C9.2 10/24/24 10/07/2024    Diabetic ulcer of right heel associated with type 2 diabetes mellitus, with necrosis of muscle (H) 10/16/2024     Carotid arterial disease 10/29/2024    Atypical chest pain 11/12/2015    Charcot joint of foot 10/29/2024    Charcot's arthropathy 10/29/2024    Diabetic neuropathy (H) 10/29/2024    Exposure to potentially hazardous substance 10/29/2024    Fatigue 11/12/2015    Pressure ulcer of right heel, unstageable (H) 10/29/2024    Occlusion and stenosis of unspecified carotid artery 10/29/2024    Postoperative back pain 10/29/2024    Hypothyroidism 10/29/2024    History of cholecystectomy 10/29/2024    Hearing loss 10/29/2024    Ulcer of right leg, limited to breakdown of skin (H) 11/13/2024    Diabetic ulcer of right heel associated with type 2 diabetes mellitus, with necrosis of bone (H) 11/13/2024    Dyspnea on exertion 12/11/2024    Diabetic ulcer of right heel associated with diabetes mellitus due to underlying condition, with muscle involvement without evidence of necrosis (H) 12/27/2024    Hepatocellular carcinoma (H) 01/10/2025    Abnormal hemoglobin (Hgb) 01/16/2025    Elevated troponin 01/16/2025    Syncope, unspecified syncope type 01/16/2025    History of vascular access device 01/20/2025    Diabetic ulcer of right heel -- S/P Surg 1/27/25 01/27/2025    Weakness 02/04/2025    Heart failure with preserved ejection fraction, NYHA class II (H) 02/04/2025    Open wound of right lower extremity 12/16/2024    Peripheral vascular disease 12/16/2024    Pleural effusion 12/16/2024    Elevated brain natriuretic peptide (BNP) level 12/16/2024    Heart failure with preserved ejection fraction, NYHA class I (H) 12/16/2024    Local infection of the skin and subcutaneous tissue, unspecified 10/16/2024    Liver mass 12/11/2024    Frailty 08/28/2024    Dizziness 07/31/2024    DVT (deep venous thrombosis) (H) 12/11/2024    Coronary atherosclerosis 01/22/2025    Benign adenomatous polyp of large intestine 07/31/2024    Acute respiratory distress 12/26/2024    Acute osteomyelitis of right foot (H) 11/27/2024    Senile purpura  08/28/2024    Type 2 diabetes mellitus with hyperglycemia, with long-term current use of insulin (H) 12/26/2024    Osteoarthrosis 11/27/2024    Protein-calorie malnutrition 01/22/2025    Neuropathy due to medical condition 08/28/2024    Lumbar spinal stenosis 07/31/2024    Long term (current) use of insulin (H) 07/31/2024     Resolved Ambulatory Problems     Diagnosis Date Noted    Hypothyroidism, unspecified 10/29/2024     Past Medical History:   Diagnosis Date    Coronary artery disease     Diabetes (H)     Hypertension     Sleep apnea     Thyroid disease      Allergies   Allergen Reactions    Exenatide Unknown    Heparin Unknown    Liraglutide Unknown    Lisinopril Cough    Morphine Unknown       All Meds and Allergies reviewed in the record at the facility and is the most up-to-date.    Current Outpatient Medications   Medication Sig Dispense Refill    Acetaminophen 325 MG CHEW Take 650 mg by mouth every 4 hours as needed.      aspirin 81 MG EC tablet Take 1 tablet (81 mg) by mouth every evening. 30 tablet 0    clopidogrel (PLAVIX) 75 MG tablet Take 1 tablet (75 mg) by mouth every morning. 30 tablet 0    Emollient (AQUAPHOR EX) Apply aquaphor to BLE for dry skin daily      guaiFENesin (MUCUS RELIEF ER PO) Take 1,200 mg by mouth 2 times daily as needed (congestion).      insulin glargine (LANTUS VIAL) 100 UNIT/ML vial Inject 30 Units subcutaneously every morning. (Patient taking differently: Inject 15 Units subcutaneously every morning.) 10 mL 0    levothyroxine (SYNTHROID/LEVOTHROID) 50 MCG tablet Take 1 tablet (50 mcg) by mouth every morning. 30 tablet 0    losartan (COZAAR) 50 MG tablet Take 1 tablet (50 mg) by mouth at bedtime. 30 tablet 0    ondansetron (ZOFRAN) 4 MG tablet Take 4 mg by mouth every 8 hours as needed for nausea.      rosuvastatin (CRESTOR) 40 MG tablet Take 1 tablet (40 mg) by mouth daily. 30 tablet 0    traMADol (ULTRAM) 50 MG tablet Take 1 tablet (50 mg) by mouth every 6 hours as needed for  "moderate pain. 30 tablet 0     No current facility-administered medications for this visit.       REVIEW OF SYSTEMS:   10 point review of systems reviewed and pertinent positives in the HPI.     PHYSICAL EXAMINATION:  Physical Exam     Vital signs: BP (!) 151/83   Pulse 85   Temp 97.9  F (36.6  C)   Resp 18   Ht 1.727 m (5' 8\")   Wt 86.4 kg (190 lb 8 oz)   SpO2 96%   BMI 28.97 kg/m    General: Awake, Alert, oriented x3, lying in bed, appropriately, follows simple commands, conversant  HEENT:Pink conjunctiva, dry oral mucosa, out pouching to lymph nodes on right side of neck. No redness or warmth.   NECK: Supple  CVS:  S1  S2, without murmur or gallop.   LUNG: Clear to auscultation, No wheezes, rales or rhonci.  BACK: No kyphosis of the thoracic spine  ABDOMEN: Soft, nontender to palpation, with positive bowel sounds  EXTREMITIES: moves both upper and lower extremities with some limitation to BLE, No pedal edema. Blue boots to BLE.   SKIN: Dressings to BLE intact.   NEUROLOGIC: Pulses non palpable. Skin warm.   PSYCHIATRIC: Flat affect.       Labs:  All labs reviewed in the nursing home record and Epic   @  Lab Results   Component Value Date    WBC 10.1 02/17/2025     Lab Results   Component Value Date    RBC 3.22 02/17/2025     Lab Results   Component Value Date    HGB 8.3 02/17/2025     Lab Results   Component Value Date    HCT 27.6 02/17/2025     Lab Results   Component Value Date    MCV 86 02/17/2025     Lab Results   Component Value Date    MCH 25.8 02/17/2025     Lab Results   Component Value Date    MCHC 30.1 02/17/2025     Lab Results   Component Value Date    RDW 19.1 02/17/2025     Lab Results   Component Value Date     02/17/2025        @Last Comprehensive Metabolic Panel:  Sodium   Date Value Ref Range Status   02/18/2025 138 135 - 145 mmol/L Final     Potassium   Date Value Ref Range Status   02/18/2025 4.0 3.4 - 5.3 mmol/L Final   07/06/2020 4.7 3.5 - 5.0 mmol/L Final     Chloride   Date " Value Ref Range Status   02/18/2025 103 98 - 107 mmol/L Final   07/06/2020 103 98 - 107 mmol/L Final     Carbon Dioxide (CO2)   Date Value Ref Range Status   02/18/2025 25 22 - 29 mmol/L Final   07/06/2020 22 22 - 31 mmol/L Final     Anion Gap   Date Value Ref Range Status   02/18/2025 10 7 - 15 mmol/L Final   07/06/2020 14 5 - 18 mmol/L Final     Glucose   Date Value Ref Range Status   02/18/2025 148 (H) 70 - 99 mg/dL Final   07/06/2020 102 70 - 125 mg/dL Final     GLUCOSE BY METER POCT   Date Value Ref Range Status   02/13/2025 130 (H) 70 - 99 mg/dL Final     Urea Nitrogen   Date Value Ref Range Status   02/18/2025 30.3 (H) 8.0 - 23.0 mg/dL Final   07/06/2020 30 (H) 8 - 28 mg/dL Final     Creatinine   Date Value Ref Range Status   02/18/2025 0.99 0.67 - 1.17 mg/dL Final     GFR Estimate   Date Value Ref Range Status   02/18/2025 77 >60 mL/min/1.73m2 Final     Comment:     eGFR calculated using 2021 CKD-EPI equation.   07/06/2020 55 (L) >60 mL/min/1.73m2 Final     Calcium   Date Value Ref Range Status   02/18/2025 8.7 (L) 8.8 - 10.4 mg/dL Final     DISCHARGE PLAN/FACE TO FACE:  I certify that this patient is under my care and that I, or a nurse practitioner or physician's assistant working with me, had a face-to-face encounter that meets the physician face-to-face encounter requirements with this patient.       I certify that, based on my findings, the following services are medically necessary home health services.    My clinical findings support the need for the above skilled services.    This patient is homebound because: Recent hospitalization with osteomyelitis. Underlying hepatobiliary CA.     The patient is, or has been, under my care and I have initiated the establishment of the plan of care. This patient will be followed by a physician who will periodically review the plan of care.    > 45 minutes face to face with pt and family reviewing recent goals of care and discharge planning. Time also spent with  nursing and  to coordinate care.     This note has been dictated using voice recognition software. Any grammatical or context distortions are unintentional and inherent to the software    Electronically signed by: Treasure Dumont CNP

## 2025-02-26 NOTE — LETTER
2/26/2025      Janice Nowak  [address]  [facility] [address]        [facility]    Code Status:  DNR/DNI   Visit Type:   Chief Complaint   Patient presents with     TCU Follow Up     Facility:  [facility] ([facility]) [[id number]]         HPI: Janice Nowak is a 80 year old male who I am seeing today for follow up on the TCU. Past medical history includes T2DM, HFpEF, recent debridement of diabetic ulcers, SABRINA, hepatocellular carcinoma currently receiving palliative immunotherapy, hypothyroidism, HTN and HLD.  Patient recently hospitalized secondary to acute abdominal pain and encephalopathy.  He has known hepatocellular carcinoma.  He was found to have worsening of his chronic osteomyelitis of bilateral lower extremities.  History of calcanectomy 10/2024.   Patient underwent bilateral irrigation and debridement on 1/27/2025.  History of PAD.  Underwent stenting with IR on 1/16/2024.  LLE angiogram 2/8 showed high-grade stenosis now s/p crossing/orbital atherectomy/angioplasty of the ALEX/DP, Angioplasty/DCB of the Popliteal lesion with FELIPE placement in the proximal SFA lesion.  Chest pain with elevated troponins.=Overnight 2/11-2/12 had an episode of chest pain, reproducible with palpation on exam, not suspicious for ACS. Improved with lidocaine patch and Voltaren gel while admitted.  Declined further workup.    Transitional Care Course: On today's visit patient is lying in bed. Pt with hx of hepatobiliary cancer with mets. He was seen by Oncology last week and has decided to pursue hospice. Today I spoke with pt and his family (son Michael and daughter n law). Family has pursued hospice and has decided to go with [facility] hospice. They also are looking at transferring to [facility] on hospice.  updated. Appetite varies. DM meds reduced. Reports today he is comfortable. Continues on tramadol for pain. Wound VAC discontinued to LE wounds. Continues with topical  treatment. Plans to see Dr. Praful payton Wound MD.  today for staple removal. Has decided to forgo any follow up with Vascular. Chronic anemia. Hgb 8.3.     Assessment/Plan:      Bilateral Lower Extremity Ulcers s/p bilateral irrigation and debridement  Acute on Chronic Right Calcaneal Osteomyelitis s/p calcanectomy  - S/p partial calcanectomy right heel 10/24/24  -bilateral LE I&D 1/27/25.   -stenting with IR on 1/16/24.   -Underwent LLE angiogram 2/8 and found to have high grade stenosis, now s/p crossing/orbital atherectomy/angioplasty of the ALEX/DP, Angioplasty/DCB of the Popliteal lesion with EFLIPE placement in the proximal SFA lesion.   -Continues with Wound Vac to RLE. Ok to discontinue and use topical wet to dry.   -Wet to dry dressing to LLE.   -NWB to BLE.   -Continue blue boots.   -Follow up with podiatry 2/28-cancelled at request of the family with goals of care comfort/hospice.   - Continue ASA + Clopidegrel   - completed IV vanc/zosyn course prior to admission, discontinued 2/11.  -Augmentin twice daily x 12 days.  Stop date 2/25.  -Doxycycline twice daily x 12 days.  Stop date 2/24.  - follow-up CBC today with no leukocytosis.  Hemoglobin 8.3.  -Follow-up BMP with creatinine 27.  -discontinue probiotic.      Generalized weakness  -Initially presented with multiple days of progressive weakness, poor p.o. intake, poor sleep, myalgias.  Head imaging on admission negative for acute intracranial pathology.  No significant metabolic derangements.  Differential included hepatic encephalopathy vs infectious derangements, ultimately resolved with change of medication from oxycodone to tramadol.   - tramadol 50mg q8hrs PRN for pain  -Continues in therapy.   -Plans to sign onto hospice once therapy done.      Hepatobiliary Cancer   Concern for Metastasis to Lungs   Relatively new diagnosis, began with incidental liver mass noted in 11/2024. Underwent IR guided biopsy on 1/2/25 which confirmed HCC. Met with oncology  on 1/10/25 who discussed recommendation for palliative immunotherapy. CT chest on recent admission showed nodules and bulky adenopathy concerning for malignancy.   -Patient has been receiving palliative care immunotherapy transfusions outpatient with oncology.  -Follow-up with Dr. Gleason yesterday. Pt would like to pursue hospice.   -OK for hospice to eval and treat.      Heart failure with preserved ejection fraction, NYHA class II   Hypertension  -Echo 12/11 with mild concentric LVH. EF 55-60%. Had transudative pleural effusion s/p thoracentesis 12/2024. No signs of acute heart failure while admitted, diuretics held due to CAROLINE, restart at discharge  -Aspirin 81 mg daily.  -Losartan 50 mg nightly  -Discontinue torsemide due to poor oral intake.      Type II Diabetes.   -A1c 7.9%.   -Consistent carb diet-ok to liberalize due to poor oral intake.   -Blood sugar checks 4 times daily.  -Empagliflozin discontinued.   -Glargine 15 units Qam. Hold if BS < 105.   -Metformin and sliding scale discontinued.    Nausea  -Zofran 4 mg Q 8 hours prn.      Anemia, normocytic  -Baseline 10-12 but trending downward in hospital.   -today hgb 8.3.   -currently no evidence of bleed.      CAROLINE on CKD  Hyponatremia   -Creatinine 1.24, baseline closer to 1.   -Follow up BMP with Creatine 0.99.      Carotid arterial disease  Mixed hyperlipidemia  -U/S 11/2024 with <50% stenosis bilaterally.  -Monitor bp.   -continue statin.     Hypothyroidism  -TSH WNL.  -Levothyroxine 50mcg daily.    -ok to discharge with current meds on hospice.   -ok to send with # 10 tabs of tramadol.     Active Ambulatory Problems     Diagnosis Date Noted     Type 2 Diabetes Mellitus      Microalbuminuria      Benign Adenomatous Polyp Of The Large Intestine      Mixed hyperlipidemia      Obstructive Sleep Apnea -- stopped using CPAP      Hypertension      Diabetic foot infection (H) 10/07/2024     DM 2 w PVD on Insulin, Hgb A1C9.2 10/24/24 10/07/2024     Diabetic ulcer  of right heel associated with type 2 diabetes mellitus, with necrosis of muscle (H) 10/16/2024     Carotid arterial disease 10/29/2024     Atypical chest pain 11/12/2015     Charcot joint of foot 10/29/2024     Charcot's arthropathy 10/29/2024     Diabetic neuropathy (H) 10/29/2024     Exposure to potentially hazardous substance 10/29/2024     Fatigue 11/12/2015     Pressure ulcer of right heel, unstageable (H) 10/29/2024     Occlusion and stenosis of unspecified carotid artery 10/29/2024     Postoperative back pain 10/29/2024     Hypothyroidism 10/29/2024     History of cholecystectomy 10/29/2024     Hearing loss 10/29/2024     Ulcer of right leg, limited to breakdown of skin (H) 11/13/2024     Diabetic ulcer of right heel associated with type 2 diabetes mellitus, with necrosis of bone (H) 11/13/2024     Dyspnea on exertion 12/11/2024     Diabetic ulcer of right heel associated with diabetes mellitus due to underlying condition, with muscle involvement without evidence of necrosis (H) 12/27/2024     Hepatocellular carcinoma (H) 01/10/2025     Abnormal hemoglobin (Hgb) 01/16/2025     Elevated troponin 01/16/2025     Syncope, unspecified syncope type 01/16/2025     History of vascular access device 01/20/2025     Diabetic ulcer of right heel -- S/P Surg 1/27/25 01/27/2025     Weakness 02/04/2025     Heart failure with preserved ejection fraction, NYHA class II (H) 02/04/2025     Open wound of right lower extremity 12/16/2024     Peripheral vascular disease 12/16/2024     Pleural effusion 12/16/2024     Elevated brain natriuretic peptide (BNP) level 12/16/2024     Heart failure with preserved ejection fraction, NYHA class I (H) 12/16/2024     Local infection of the skin and subcutaneous tissue, unspecified 10/16/2024     Liver mass 12/11/2024     Frailty 08/28/2024     Dizziness 07/31/2024     DVT (deep venous thrombosis) (H) 12/11/2024     Coronary atherosclerosis 01/22/2025     Benign adenomatous polyp of large  intestine 07/31/2024     Acute respiratory distress 12/26/2024     Acute osteomyelitis of right foot (H) 11/27/2024     Senile purpura 08/28/2024     Type 2 diabetes mellitus with hyperglycemia, with long-term current use of insulin (H) 12/26/2024     Osteoarthrosis 11/27/2024     Protein-calorie malnutrition 01/22/2025     Neuropathy due to medical condition 08/28/2024     Lumbar spinal stenosis 07/31/2024     Long term (current) use of insulin (H) 07/31/2024     Resolved Ambulatory Problems     Diagnosis Date Noted     Hypothyroidism, unspecified 10/29/2024     Past Medical History:   Diagnosis Date     Coronary artery disease      Diabetes (H)      Hypertension      Sleep apnea      Thyroid disease      Allergies   Allergen Reactions     Exenatide Unknown     Heparin Unknown     Liraglutide Unknown     Lisinopril Cough     Morphine Unknown       All Meds and Allergies reviewed in the record at the facility and is the most up-to-date.    Current Outpatient Medications   Medication Sig Dispense Refill     Acetaminophen 325 MG CHEW Take 650 mg by mouth every 4 hours as needed.       aspirin 81 MG EC tablet Take 1 tablet (81 mg) by mouth every evening. 30 tablet 0     clopidogrel (PLAVIX) 75 MG tablet Take 1 tablet (75 mg) by mouth every morning. 30 tablet 0     Emollient (AQUAPHOR EX) Apply aquaphor to BLE for dry skin daily       guaiFENesin (MUCUS RELIEF ER PO) Take 1,200 mg by mouth 2 times daily as needed (congestion).       insulin glargine (LANTUS VIAL) 100 UNIT/ML vial Inject 30 Units subcutaneously every morning. (Patient taking differently: Inject 15 Units subcutaneously every morning.) 10 mL 0     levothyroxine (SYNTHROID/LEVOTHROID) 50 MCG tablet Take 1 tablet (50 mcg) by mouth every morning. 30 tablet 0     losartan (COZAAR) 50 MG tablet Take 1 tablet (50 mg) by mouth at bedtime. 30 tablet 0     ondansetron (ZOFRAN) 4 MG tablet Take 4 mg by mouth every 8 hours as needed for nausea.       rosuvastatin  "(CRESTOR) 40 MG tablet Take 1 tablet (40 mg) by mouth daily. 30 tablet 0     traMADol (ULTRAM) 50 MG tablet Take 1 tablet (50 mg) by mouth every 6 hours as needed for moderate pain. 30 tablet 0     No current facility-administered medications for this visit.       REVIEW OF SYSTEMS:   10 point review of systems reviewed and pertinent positives in the HPI.     PHYSICAL EXAMINATION:  Physical Exam     Vital signs: BP (!) 151/83   Pulse 85   Temp 97.9  F (36.6  C)   Resp 18   Ht 1.727 m (5' 8\")   Wt 86.4 kg (190 lb 8 oz)   SpO2 96%   BMI 28.97 kg/m    General: Awake, Alert, oriented x3, lying in bed, appropriately, follows simple commands, conversant  HEENT:Pink conjunctiva, dry oral mucosa, out pouching to lymph nodes on right side of neck. No redness or warmth.   NECK: Supple  CVS:  S1  S2, without murmur or gallop.   LUNG: Clear to auscultation, No wheezes, rales or rhonci.  BACK: No kyphosis of the thoracic spine  ABDOMEN: Soft, nontender to palpation, with positive bowel sounds  EXTREMITIES: moves both upper and lower extremities with some limitation to BLE, No pedal edema. Blue boots to BLE.   SKIN: Dressings to BLE intact.   NEUROLOGIC: Pulses non palpable. Skin warm.   PSYCHIATRIC: Flat affect.       Labs:  All labs reviewed in the nursing home record and Epic   @  Lab Results   Component Value Date    WBC 10.1 02/17/2025     Lab Results   Component Value Date    RBC 3.22 02/17/2025     Lab Results   Component Value Date    HGB 8.3 02/17/2025     Lab Results   Component Value Date    HCT 27.6 02/17/2025     Lab Results   Component Value Date    MCV 86 02/17/2025     Lab Results   Component Value Date    MCH 25.8 02/17/2025     Lab Results   Component Value Date    MCHC 30.1 02/17/2025     Lab Results   Component Value Date    RDW 19.1 02/17/2025     Lab Results   Component Value Date     02/17/2025        @Last Comprehensive Metabolic Panel:  Sodium   Date Value Ref Range Status   02/18/2025 138 " 135 - 145 mmol/L Final     Potassium   Date Value Ref Range Status   02/18/2025 4.0 3.4 - 5.3 mmol/L Final   07/06/2020 4.7 3.5 - 5.0 mmol/L Final     Chloride   Date Value Ref Range Status   02/18/2025 103 98 - 107 mmol/L Final   07/06/2020 103 98 - 107 mmol/L Final     Carbon Dioxide (CO2)   Date Value Ref Range Status   02/18/2025 25 22 - 29 mmol/L Final   07/06/2020 22 22 - 31 mmol/L Final     Anion Gap   Date Value Ref Range Status   02/18/2025 10 7 - 15 mmol/L Final   07/06/2020 14 5 - 18 mmol/L Final     Glucose   Date Value Ref Range Status   02/18/2025 148 (H) 70 - 99 mg/dL Final   07/06/2020 102 70 - 125 mg/dL Final     GLUCOSE BY METER POCT   Date Value Ref Range Status   02/13/2025 130 (H) 70 - 99 mg/dL Final     Urea Nitrogen   Date Value Ref Range Status   02/18/2025 30.3 (H) 8.0 - 23.0 mg/dL Final   07/06/2020 30 (H) 8 - 28 mg/dL Final     Creatinine   Date Value Ref Range Status   02/18/2025 0.99 0.67 - 1.17 mg/dL Final     GFR Estimate   Date Value Ref Range Status   02/18/2025 77 >60 mL/min/1.73m2 Final     Comment:     eGFR calculated using 2021 CKD-EPI equation.   07/06/2020 55 (L) >60 mL/min/1.73m2 Final     Calcium   Date Value Ref Range Status   02/18/2025 8.7 (L) 8.8 - 10.4 mg/dL Final     DISCHARGE PLAN/FACE TO FACE:  I certify that this patient is under my care and that I, or a nurse practitioner or physician's assistant working with me, had a face-to-face encounter that meets the physician face-to-face encounter requirements with this patient.       I certify that, based on my findings, the following services are medically necessary home health services.    My clinical findings support the need for the above skilled services.    This patient is homebound because: Recent hospitalization with osteomyelitis. Underlying hepatobiliary CA.     The patient is, or has been, under my care and I have initiated the establishment of the plan of care. This patient will be followed by a physician who  will periodically review the plan of care.    > 45 minutes face to face with pt and family reviewing recent goals of care and discharge planning. Time also spent with nursing and  to coordinate care.     This note has been dictated using voice recognition software. Any grammatical or context distortions are unintentional and inherent to the software    Electronically signed by: Treasure Dumont CNP       Sincerely,        Treasure Dumont NP    Electronically signed

## 2025-02-26 NOTE — TELEPHONE ENCOUNTER
Update for Senait.  She reported to have reached out to nurse who origniated the medication change to see if she can amend their paperwork to remove Dr Tellez and add Justine Jeff.  She reported they will reach out to [facility] on that update when it comes in.    Closing Encounter    Thank you    Conor Hurt RN

## 2025-03-03 ENCOUNTER — DISCHARGE SUMMARY NURSING HOME (OUTPATIENT)
Dept: GERIATRICS | Facility: CLINIC | Age: 80
End: 2025-03-03
Payer: COMMERCIAL

## 2025-03-03 VITALS
RESPIRATION RATE: 14 BRPM | HEIGHT: 68 IN | OXYGEN SATURATION: 94 % | HEART RATE: 82 BPM | SYSTOLIC BLOOD PRESSURE: 133 MMHG | WEIGHT: 191.6 LBS | BODY MASS INDEX: 29.04 KG/M2 | DIASTOLIC BLOOD PRESSURE: 79 MMHG | TEMPERATURE: 97.1 F

## 2025-03-03 PROCEDURE — 99315 NF DSCHRG MGMT 30 MIN/LESS: CPT | Performed by: NURSE PRACTITIONER

## 2025-03-03 RX ORDER — AMOXICILLIN 250 MG
1 CAPSULE ORAL DAILY PRN
COMMUNITY

## 2025-03-03 RX ORDER — LORAZEPAM 0.5 MG/1
0.5 TABLET ORAL EVERY 4 HOURS PRN
COMMUNITY

## 2025-03-03 RX ORDER — HYOSCYAMINE SULFATE 0.12 MG/1
0.12 TABLET SUBLINGUAL EVERY 4 HOURS PRN
COMMUNITY

## 2025-03-03 RX ORDER — OXYCODONE HYDROCHLORIDE 5 MG/1
5 TABLET ORAL
COMMUNITY

## 2025-03-03 RX ORDER — BISACODYL 10 MG
10 SUPPOSITORY, RECTAL RECTAL DAILY PRN
COMMUNITY

## 2025-03-03 NOTE — LETTER
3/3/2025      Janice Nowak  [address]  [facility] [address]        [facility]    Code Status:  DNR/DNI   Visit Type:   Chief Complaint   Patient presents with     TCU Discharge     Facility:  [facility] ([facility]) [[id number]]         HPI: Janice Nowak is a 80 year old male who I am seeing today for discharge from the TCU. Past medical history includes T2DM, HFpEF, recent debridement of diabetic ulcers, SABRINA, hepatocellular carcinoma currently receiving palliative immunotherapy, hypothyroidism, HTN and HLD.  Patient recently hospitalized secondary to acute abdominal pain and encephalopathy.  He has known hepatocellular carcinoma.  He was found to have worsening of his chronic osteomyelitis of bilateral lower extremities.  History of calcanectomy 10/2024.   Patient underwent bilateral irrigation and debridement on 1/27/2025.  History of PAD.  Underwent stenting with IR on 1/16/2024.  LLE angiogram 2/8 showed high-grade stenosis now s/p crossing/orbital atherectomy/angioplasty of the ALEX/DP, Angioplasty/DCB of the Popliteal lesion with FELIPE placement in the proximal SFA lesion.  Chest pain with elevated troponins.=Overnight 2/11-2/12 had an episode of chest pain, reproducible with palpation on exam, not suspicious for ACS. Improved with lidocaine patch and Voltaren gel while admitted.  Declined further workup.    Transitional Care Course: On today's visit patient is lying in bed. Pt with hx of hepatobiliary cancer with mets. He was seen by Oncology and has decided to pursue hospice.  Today he plans to meet with hospice at 2 PM to sign on.  He will discharge in the a.m. to [facility] on hospice.  Currently patient using tramadol for pain.  He appears comfortable in bed.  Appetite varies.  He reports a bad taste in his mouth.  Diabetes.  Medications drastically reduced.  Sliding scale discontinued.  Wound VAC discontinued to lower extremity wounds.  He is receiving topical  treatment.  Chronic anemia with hemoglobin of 8.3.  Patient denies any shortness of breath or chest pain.    Assessment/Plan:      Bilateral Lower Extremity Ulcers s/p bilateral irrigation and debridement  Acute on Chronic Right Calcaneal Osteomyelitis s/p calcanectomy  - S/p partial calcanectomy right heel 10/24/24  -bilateral LE I&D 1/27/25.   -stenting with IR on 1/16/24.   -Underwent LLE angiogram 2/8 and found to have high grade stenosis, now s/p crossing/orbital atherectomy/angioplasty of the ALEX/DP, Angioplasty/DCB of the Popliteal lesion with FELIPE placement in the proximal SFA lesion.   -Continues with Wound Vac to RLE. Ok to discontinue and use topical wet to dry.   -Wet to dry dressing to  BLE.   -NWB to BLE.   -Continue blue boots.   -Follow up with podiatry 2/28-cancelled at request of the family with goals of care comfort/hospice.   - Continue ASA + Clopidegrel   - completed IV vanc/zosyn course prior to admission, discontinued 2/11.  -Augmentin twice daily x 12 days.  Stop date 2/25.  -Doxycycline twice daily x 12 days.  Stop date 2/24.  - follow-up CBC today with no leukocytosis.  Hemoglobin 8.3.  -Follow-up BMP with creatinine 27.  -discontinue probiotic.      Generalized weakness  -Initially presented with multiple days of progressive weakness, poor p.o. intake, poor sleep, myalgias.  Head imaging on admission negative for acute intracranial pathology.  No significant metabolic derangements.  Differential included hepatic encephalopathy vs infectious derangements, ultimately resolved with change of medication from oxycodone to tramadol.   - tramadol 50mg q8hrs PRN for pain  -Signed onto hospice today.     Hepatobiliary Cancer   Concern for Metastasis to Lungs   Relatively new diagnosis, began with incidental liver mass noted in 11/2024. Underwent IR guided biopsy on 1/2/25 which confirmed HCC. Met with oncology on 1/10/25 who discussed recommendation for palliative immunotherapy. CT chest on recent  admission showed nodules and bulky adenopathy concerning for malignancy.   -Patient has been receiving palliative care immunotherapy transfusions outpatient with oncology.  -Follow-up with Dr. Gleason  and has decided to sign onto hospice.   -Hospice to meet with patient and family today at 2 PM to sign on.     Heart failure with preserved ejection fraction, NYHA class II   Hypertension  -Echo 12/11 with mild concentric LVH. EF 55-60%. Had transudative pleural effusion s/p thoracentesis 12/2024. No signs of acute heart failure while admitted, diuretics held due to CAROLINE, restart at discharge  -Aspirin 81 mg daily.  -Losartan 50 mg nightly  -Torsemide discontinued due to poor appetite.     Type II Diabetes.   -A1c 7.9%.   -Consistent carb diet-ok to liberalize due to poor oral intake.   -Blood sugar checks 4 times daily.  -Empagliflozin discontinued.   -Glargine 15 units Qam. Hold if BS < 105.   -Metformin and sliding scale discontinued.    Nausea  -Zofran 4 mg Q 8 hours prn.      Anemia, normocytic  -Baseline 10-12 but trending downward in hospital.   -today hgb 8.3.   -currently no evidence of bleed.      CAROLINE on CKD  Hyponatremia   -Creatinine 1.24, baseline closer to 1.   -Follow up BMP with Creatine 0.99.      Carotid arterial disease  Mixed hyperlipidemia  -U/S 11/2024 with <50% stenosis bilaterally.  -Monitor bp.   -continue statin.     Hypothyroidism  -TSH WNL.  -Levothyroxine 50mcg daily.    -ok to discharge with current meds on hospice to [facility].  -Patient seen by hospice today and narcotics filled.  They will be sent to custodial.    Active Ambulatory Problems     Diagnosis Date Noted     Type 2 Diabetes Mellitus      Microalbuminuria      Benign Adenomatous Polyp Of The Large Intestine      Mixed hyperlipidemia      Obstructive Sleep Apnea -- stopped using CPAP      Hypertension      Diabetic foot infection (H) 10/07/2024     DM 2 w PVD on Insulin, Hgb A1C9.2 10/24/24 10/07/2024     Diabetic ulcer of right heel  associated with type 2 diabetes mellitus, with necrosis of muscle (H) 10/16/2024     Carotid arterial disease 10/29/2024     Atypical chest pain 11/12/2015     Charcot joint of foot 10/29/2024     Charcot's arthropathy 10/29/2024     Diabetic neuropathy (H) 10/29/2024     Exposure to potentially hazardous substance 10/29/2024     Fatigue 11/12/2015     Pressure ulcer of right heel, unstageable (H) 10/29/2024     Occlusion and stenosis of unspecified carotid artery 10/29/2024     Postoperative back pain 10/29/2024     Hypothyroidism 10/29/2024     History of cholecystectomy 10/29/2024     Hearing loss 10/29/2024     Ulcer of right leg, limited to breakdown of skin (H) 11/13/2024     Diabetic ulcer of right heel associated with type 2 diabetes mellitus, with necrosis of bone (H) 11/13/2024     Dyspnea on exertion 12/11/2024     Diabetic ulcer of right heel associated with diabetes mellitus due to underlying condition, with muscle involvement without evidence of necrosis (H) 12/27/2024     Hepatocellular carcinoma (H) 01/10/2025     Abnormal hemoglobin (Hgb) 01/16/2025     Elevated troponin 01/16/2025     Syncope, unspecified syncope type 01/16/2025     History of vascular access device 01/20/2025     Diabetic ulcer of right heel -- S/P Surg 1/27/25 01/27/2025     Weakness 02/04/2025     Heart failure with preserved ejection fraction, NYHA class II (H) 02/04/2025     Open wound of right lower extremity 12/16/2024     Peripheral vascular disease 12/16/2024     Pleural effusion 12/16/2024     Elevated brain natriuretic peptide (BNP) level 12/16/2024     Heart failure with preserved ejection fraction, NYHA class I (H) 12/16/2024     Local infection of the skin and subcutaneous tissue, unspecified 10/16/2024     Liver mass 12/11/2024     Frailty 08/28/2024     Dizziness 07/31/2024     DVT (deep venous thrombosis) (H) 12/11/2024     Coronary atherosclerosis 01/22/2025     Benign adenomatous polyp of large intestine  07/31/2024     Acute respiratory distress 12/26/2024     Acute osteomyelitis of right foot (H) 11/27/2024     Senile purpura 08/28/2024     Type 2 diabetes mellitus with hyperglycemia, with long-term current use of insulin (H) 12/26/2024     Osteoarthrosis 11/27/2024     Protein-calorie malnutrition 01/22/2025     Neuropathy due to medical condition 08/28/2024     Lumbar spinal stenosis 07/31/2024     Long term (current) use of insulin (H) 07/31/2024     Resolved Ambulatory Problems     Diagnosis Date Noted     Hypothyroidism, unspecified 10/29/2024     Past Medical History:   Diagnosis Date     Coronary artery disease      Diabetes (H)      Hypertension      Sleep apnea      Thyroid disease      Allergies   Allergen Reactions     Exenatide Unknown     Heparin Unknown     Liraglutide Unknown     Lisinopril Cough     Morphine Unknown       All Meds and Allergies reviewed in the record at the facility and is the most up-to-date.    Current Outpatient Medications   Medication Sig Dispense Refill     acetaminophen (TYLENOL) 325 MG suppository Place 650 mg rectally every 4 hours as needed for fever.       bisacodyl (DULCOLAX) 10 MG suppository Place 10 mg rectally daily as needed for constipation.       hyoscyamine (LEVSIN/SL) 0.125 MG sublingual tablet Place 0.125 mg under the tongue every 4 hours as needed for cramping.       LORazepam (ATIVAN) 0.5 MG tablet Take 0.5 mg by mouth every 4 hours as needed for anxiety.       oxyCODONE (ROXICODONE) 5 MG tablet Take 5 mg by mouth every hour as needed for severe pain. solutabs       senna-docusate (SENOKOT-S/PERICOLACE) 8.6-50 MG tablet Take 1 tablet by mouth daily as needed for constipation.       aspirin 81 MG EC tablet Take 1 tablet (81 mg) by mouth every evening. 30 tablet 0     clopidogrel (PLAVIX) 75 MG tablet Take 1 tablet (75 mg) by mouth every morning. 30 tablet 0     Emollient (AQUAPHOR EX) Apply aquaphor to BLE for dry skin daily       guaiFENesin (MUCUS RELIEF ER  "PO) Take 1,200 mg by mouth 2 times daily as needed (congestion).       insulin glargine (LANTUS VIAL) 100 UNIT/ML vial Inject 30 Units subcutaneously every morning. (Patient taking differently: Inject 15 Units subcutaneously every morning.) 10 mL 0     levothyroxine (SYNTHROID/LEVOTHROID) 50 MCG tablet Take 1 tablet (50 mcg) by mouth every morning. 30 tablet 0     losartan (COZAAR) 50 MG tablet Take 1 tablet (50 mg) by mouth at bedtime. 30 tablet 0     ondansetron (ZOFRAN) 4 MG tablet Take 4 mg by mouth every 8 hours as needed for nausea.       traMADol (ULTRAM) 50 MG tablet Take 1 tablet (50 mg) by mouth every 6 hours as needed for moderate pain. 30 tablet 0     No current facility-administered medications for this visit.       REVIEW OF SYSTEMS:   10 point review of systems reviewed and pertinent positives in the HPI.     PHYSICAL EXAMINATION:  Physical Exam     Vital signs: /79   Pulse 82   Temp 97.1  F (36.2  C)   Resp 14   Ht 1.727 m (5' 8\")   Wt 86.9 kg (191 lb 9.6 oz)   SpO2 94%   BMI 29.13 kg/m    General: Awake, Alert, oriented x3, lying in bed, appropriately, follows simple commands, conversant  HEENT:Pink conjunctiva, dry oral mucosa, out pouching to lymph nodes on right side of neck. No redness or warmth.   NECK: Supple  CVS:  S1  S2, without murmur or gallop.   LUNG: Clear to auscultation, No wheezes, rales or rhonci.  BACK: No kyphosis of the thoracic spine  ABDOMEN: Soft, nontender to palpation, with positive bowel sounds  EXTREMITIES: moves both upper and lower extremities with some limitation to BLE, No pedal edema. Blue boots to BLE.   SKIN: Dressings to BLE intact.   NEUROLOGIC: Pulses non palpable. Skin warm.   PSYCHIATRIC: Flat affect.       Labs:  All labs reviewed in the nursing home record and [employer]   @  Lab Results   Component Value Date    WBC 10.1 02/17/2025     Lab Results   Component Value Date    RBC 3.22 02/17/2025     Lab Results   Component Value Date    HGB 8.3 " 02/17/2025     Lab Results   Component Value Date    HCT 27.6 02/17/2025     Lab Results   Component Value Date    MCV 86 02/17/2025     Lab Results   Component Value Date    MCH 25.8 02/17/2025     Lab Results   Component Value Date    MCHC 30.1 02/17/2025     Lab Results   Component Value Date    RDW 19.1 02/17/2025     Lab Results   Component Value Date     02/17/2025        @Last Comprehensive Metabolic Panel:  Sodium   Date Value Ref Range Status   02/18/2025 138 135 - 145 mmol/L Final     Potassium   Date Value Ref Range Status   02/18/2025 4.0 3.4 - 5.3 mmol/L Final   07/06/2020 4.7 3.5 - 5.0 mmol/L Final     Chloride   Date Value Ref Range Status   02/18/2025 103 98 - 107 mmol/L Final   07/06/2020 103 98 - 107 mmol/L Final     Carbon Dioxide (CO2)   Date Value Ref Range Status   02/18/2025 25 22 - 29 mmol/L Final   07/06/2020 22 22 - 31 mmol/L Final     Anion Gap   Date Value Ref Range Status   02/18/2025 10 7 - 15 mmol/L Final   07/06/2020 14 5 - 18 mmol/L Final     Glucose   Date Value Ref Range Status   02/18/2025 148 (H) 70 - 99 mg/dL Final   07/06/2020 102 70 - 125 mg/dL Final     GLUCOSE BY METER POCT   Date Value Ref Range Status   02/13/2025 130 (H) 70 - 99 mg/dL Final     Urea Nitrogen   Date Value Ref Range Status   02/18/2025 30.3 (H) 8.0 - 23.0 mg/dL Final   07/06/2020 30 (H) 8 - 28 mg/dL Final     Creatinine   Date Value Ref Range Status   02/18/2025 0.99 0.67 - 1.17 mg/dL Final     GFR Estimate   Date Value Ref Range Status   02/18/2025 77 >60 mL/min/1.73m2 Final     Comment:     eGFR calculated using 2021 CKD-EPI equation.   07/06/2020 55 (L) >60 mL/min/1.73m2 Final     Calcium   Date Value Ref Range Status   02/18/2025 8.7 (L) 8.8 - 10.4 mg/dL Final     DISCHARGE PLAN/FACE TO FACE:  I certify that this patient is under my care and that I, or a nurse practitioner or physician's assistant working with me, had a face-to-face encounter that meets the physician face-to-face encounter  requirements with this patient.       I certify that, based on my findings, the following services are medically necessary home health services.    My clinical findings support the need for the above skilled services.    This patient is homebound because: Recent hospitalization with osteomyelitis. Underlying hepatobiliary CA.     The patient is, or has been, under my care and I have initiated the establishment of the plan of care. This patient will be followed by a physician who will periodically review the plan of care.     This note has been dictated using voice recognition software. Any grammatical or context distortions are unintentional and inherent to the software    Electronically signed by: Treasure Dumont CNP       Sincerely,        Treasure Dumont NP    Electronically signed

## 2025-03-04 RX ORDER — OXYCODONE HYDROCHLORIDE 5 MG/1
TABLET ORAL
Qty: 60 TABLET | Refills: 0 | OUTPATIENT
Start: 2025-03-04

## 2025-03-04 NOTE — PROGRESS NOTES
[facility] GERIATRIC SERVICES    Code Status:  DNR/DNI   Visit Type:   Chief Complaint   Patient presents with    TCU Discharge     Facility:  [facility] ([facility]) [[id number]]         HPI: Janice Nowak is a 80 year old male who I am seeing today for discharge from the TCU. Past medical history includes T2DM, HFpEF, recent debridement of diabetic ulcers, SABRINA, hepatocellular carcinoma currently receiving palliative immunotherapy, hypothyroidism, HTN and HLD.  Patient recently hospitalized secondary to acute abdominal pain and encephalopathy.  He has known hepatocellular carcinoma.  He was found to have worsening of his chronic osteomyelitis of bilateral lower extremities.  History of calcanectomy 10/2024.   Patient underwent bilateral irrigation and debridement on 1/27/2025.  History of PAD.  Underwent stenting with IR on 1/16/2024.  LLE angiogram 2/8 showed high-grade stenosis now s/p crossing/orbital atherectomy/angioplasty of the ALEX/DP, Angioplasty/DCB of the Popliteal lesion with FELIPE placement in the proximal SFA lesion.  Chest pain with elevated troponins.=Overnight 2/11-2/12 had an episode of chest pain, reproducible with palpation on exam, not suspicious for ACS. Improved with lidocaine patch and Voltaren gel while admitted.  Declined further workup.    Transitional Care Course: On today's visit patient is lying in bed. Pt with hx of hepatobiliary cancer with mets. He was seen by Oncology and has decided to pursue hospice.  Today he plans to meet with hospice at 2 PM to sign on.  He will discharge in the a.m. to [facility] on hospice.  Currently patient using tramadol for pain.  He appears comfortable in bed.  Appetite varies.  He reports a bad taste in his mouth.  Diabetes.  Medications drastically reduced.  Sliding scale discontinued.  Wound VAC discontinued to lower extremity wounds.  He is receiving topical treatment.  Chronic anemia with hemoglobin of 8.3.  Patient denies any shortness of breath or  chest pain.    Assessment/Plan:      Bilateral Lower Extremity Ulcers s/p bilateral irrigation and debridement  Acute on Chronic Right Calcaneal Osteomyelitis s/p calcanectomy  - S/p partial calcanectomy right heel 10/24/24  -bilateral LE I&D 1/27/25.   -stenting with IR on 1/16/24.   -Underwent LLE angiogram 2/8 and found to have high grade stenosis, now s/p crossing/orbital atherectomy/angioplasty of the ALEX/DP, Angioplasty/DCB of the Popliteal lesion with FELIPE placement in the proximal SFA lesion.   -Continues with Wound Vac to RLE. Ok to discontinue and use topical wet to dry.   -Wet to dry dressing to  BLE.   -NWB to BLE.   -Continue blue boots.   -Follow up with podiatry 2/28-cancelled at request of the family with goals of care comfort/hospice.   - Continue ASA + Clopidegrel   - completed IV vanc/zosyn course prior to admission, discontinued 2/11.  -Augmentin twice daily x 12 days.  Stop date 2/25.  -Doxycycline twice daily x 12 days.  Stop date 2/24.  - follow-up CBC today with no leukocytosis.  Hemoglobin 8.3.  -Follow-up BMP with creatinine 27.  -discontinue probiotic.      Generalized weakness  -Initially presented with multiple days of progressive weakness, poor p.o. intake, poor sleep, myalgias.  Head imaging on admission negative for acute intracranial pathology.  No significant metabolic derangements.  Differential included hepatic encephalopathy vs infectious derangements, ultimately resolved with change of medication from oxycodone to tramadol.   - tramadol 50mg q8hrs PRN for pain  -Signed onto hospice today.     Hepatobiliary Cancer   Concern for Metastasis to Lungs   Relatively new diagnosis, began with incidental liver mass noted in 11/2024. Underwent IR guided biopsy on 1/2/25 which confirmed HCC. Met with oncology on 1/10/25 who discussed recommendation for palliative immunotherapy. CT chest on recent admission showed nodules and bulky adenopathy concerning for malignancy.   -Patient has been  receiving palliative care immunotherapy transfusions outpatient with oncology.  -Follow-up with Dr. Gleason  and has decided to sign onto hospice.   -Hospice to meet with patient and family today at 2 PM to sign on.     Heart failure with preserved ejection fraction, NYHA class II   Hypertension  -Echo 12/11 with mild concentric LVH. EF 55-60%. Had transudative pleural effusion s/p thoracentesis 12/2024. No signs of acute heart failure while admitted, diuretics held due to CAROLINE, restart at discharge  -Aspirin 81 mg daily.  -Losartan 50 mg nightly  -Torsemide discontinued due to poor appetite.     Type II Diabetes.   -A1c 7.9%.   -Consistent carb diet-ok to liberalize due to poor oral intake.   -Blood sugar checks 4 times daily.  -Empagliflozin discontinued.   -Glargine 15 units Qam. Hold if BS < 105.   -Metformin and sliding scale discontinued.    Nausea  -Zofran 4 mg Q 8 hours prn.      Anemia, normocytic  -Baseline 10-12 but trending downward in hospital.   -today hgb 8.3.   -currently no evidence of bleed.      CAROLINE on CKD  Hyponatremia   -Creatinine 1.24, baseline closer to 1.   -Follow up BMP with Creatine 0.99.      Carotid arterial disease  Mixed hyperlipidemia  -U/S 11/2024 with <50% stenosis bilaterally.  -Monitor bp.   -continue statin.     Hypothyroidism  -TSH WNL.  -Levothyroxine 50mcg daily.    -ok to discharge with current meds on hospice to [facility].  -Patient seen by hospice today and narcotics filled.  They will be sent to [facility].    Active Ambulatory Problems     Diagnosis Date Noted    Type 2 Diabetes Mellitus     Microalbuminuria     Benign Adenomatous Polyp Of The Large Intestine     Mixed hyperlipidemia     Obstructive Sleep Apnea -- stopped using CPAP     Hypertension     Diabetic foot infection (H) 10/07/2024    DM 2 w PVD on Insulin, Hgb A1C9.2 10/24/24 10/07/2024    Diabetic ulcer of right heel associated with type 2 diabetes mellitus, with necrosis of muscle (H) 10/16/2024    Carotid arterial disease  10/29/2024    Atypical chest pain 11/12/2015    Charcot joint of foot 10/29/2024    Charcot's arthropathy 10/29/2024    Diabetic neuropathy (H) 10/29/2024    Exposure to potentially hazardous substance 10/29/2024    Fatigue 11/12/2015    Pressure ulcer of right heel, unstageable (H) 10/29/2024    Occlusion and stenosis of unspecified carotid artery 10/29/2024    Postoperative back pain 10/29/2024    Hypothyroidism 10/29/2024    History of cholecystectomy 10/29/2024    Hearing loss 10/29/2024    Ulcer of right leg, limited to breakdown of skin (H) 11/13/2024    Diabetic ulcer of right heel associated with type 2 diabetes mellitus, with necrosis of bone (H) 11/13/2024    Dyspnea on exertion 12/11/2024    Diabetic ulcer of right heel associated with diabetes mellitus due to underlying condition, with muscle involvement without evidence of necrosis (H) 12/27/2024    Hepatocellular carcinoma (H) 01/10/2025    Abnormal hemoglobin (Hgb) 01/16/2025    Elevated troponin 01/16/2025    Syncope, unspecified syncope type 01/16/2025    History of vascular access device 01/20/2025    Diabetic ulcer of right heel -- S/P Surg 1/27/25 01/27/2025    Weakness 02/04/2025    Heart failure with preserved ejection fraction, NYHA class II (H) 02/04/2025    Open wound of right lower extremity 12/16/2024    Peripheral vascular disease 12/16/2024    Pleural effusion 12/16/2024    Elevated brain natriuretic peptide (BNP) level 12/16/2024    Heart failure with preserved ejection fraction, NYHA class I (H) 12/16/2024    Local infection of the skin and subcutaneous tissue, unspecified 10/16/2024    Liver mass 12/11/2024    Frailty 08/28/2024    Dizziness 07/31/2024    DVT (deep venous thrombosis) (H) 12/11/2024    Coronary atherosclerosis 01/22/2025    Benign adenomatous polyp of large intestine 07/31/2024    Acute respiratory distress 12/26/2024    Acute osteomyelitis of right foot (H) 11/27/2024    Senile purpura 08/28/2024    Type 2 diabetes  mellitus with hyperglycemia, with long-term current use of insulin (H) 12/26/2024    Osteoarthrosis 11/27/2024    Protein-calorie malnutrition 01/22/2025    Neuropathy due to medical condition 08/28/2024    Lumbar spinal stenosis 07/31/2024    Long term (current) use of insulin (H) 07/31/2024     Resolved Ambulatory Problems     Diagnosis Date Noted    Hypothyroidism, unspecified 10/29/2024     Past Medical History:   Diagnosis Date    Coronary artery disease     Diabetes (H)     Hypertension     Sleep apnea     Thyroid disease      Allergies   Allergen Reactions    Exenatide Unknown    Heparin Unknown    Liraglutide Unknown    Lisinopril Cough    Morphine Unknown       All Meds and Allergies reviewed in the record at the facility and is the most up-to-date.    Current Outpatient Medications   Medication Sig Dispense Refill    acetaminophen (TYLENOL) 325 MG suppository Place 650 mg rectally every 4 hours as needed for fever.      bisacodyl (DULCOLAX) 10 MG suppository Place 10 mg rectally daily as needed for constipation.      hyoscyamine (LEVSIN/SL) 0.125 MG sublingual tablet Place 0.125 mg under the tongue every 4 hours as needed for cramping.      LORazepam (ATIVAN) 0.5 MG tablet Take 0.5 mg by mouth every 4 hours as needed for anxiety.      oxyCODONE (ROXICODONE) 5 MG tablet Take 5 mg by mouth every hour as needed for severe pain. solutabs      senna-docusate (SENOKOT-S/PERICOLACE) 8.6-50 MG tablet Take 1 tablet by mouth daily as needed for constipation.      aspirin 81 MG EC tablet Take 1 tablet (81 mg) by mouth every evening. 30 tablet 0    clopidogrel (PLAVIX) 75 MG tablet Take 1 tablet (75 mg) by mouth every morning. 30 tablet 0    Emollient (AQUAPHOR EX) Apply aquaphor to BLE for dry skin daily      guaiFENesin (MUCUS RELIEF ER PO) Take 1,200 mg by mouth 2 times daily as needed (congestion).      insulin glargine (LANTUS VIAL) 100 UNIT/ML vial Inject 30 Units subcutaneously every morning. (Patient taking  "differently: Inject 15 Units subcutaneously every morning.) 10 mL 0    levothyroxine (SYNTHROID/LEVOTHROID) 50 MCG tablet Take 1 tablet (50 mcg) by mouth every morning. 30 tablet 0    losartan (COZAAR) 50 MG tablet Take 1 tablet (50 mg) by mouth at bedtime. 30 tablet 0    ondansetron (ZOFRAN) 4 MG tablet Take 4 mg by mouth every 8 hours as needed for nausea.      traMADol (ULTRAM) 50 MG tablet Take 1 tablet (50 mg) by mouth every 6 hours as needed for moderate pain. 30 tablet 0     No current facility-administered medications for this visit.       REVIEW OF SYSTEMS:   10 point review of systems reviewed and pertinent positives in the HPI.     PHYSICAL EXAMINATION:  Physical Exam     Vital signs: /79   Pulse 82   Temp 97.1  F (36.2  C)   Resp 14   Ht 1.727 m (5' 8\")   Wt 86.9 kg (191 lb 9.6 oz)   SpO2 94%   BMI 29.13 kg/m    General: Awake, Alert, oriented x3, lying in bed, appropriately, follows simple commands, conversant  HEENT:Pink conjunctiva, dry oral mucosa, out pouching to lymph nodes on right side of neck. No redness or warmth.   NECK: Supple  CVS:  S1  S2, without murmur or gallop.   LUNG: Clear to auscultation, No wheezes, rales or rhonci.  BACK: No kyphosis of the thoracic spine  ABDOMEN: Soft, nontender to palpation, with positive bowel sounds  EXTREMITIES: moves both upper and lower extremities with some limitation to BLE, No pedal edema. Blue boots to BLE.   SKIN: Dressings to BLE intact.   NEUROLOGIC: Pulses non palpable. Skin warm.   PSYCHIATRIC: Flat affect.       Labs:  All labs reviewed in the nursing home record and [facility]   @  Lab Results   Component Value Date    WBC 10.1 02/17/2025     Lab Results   Component Value Date    RBC 3.22 02/17/2025     Lab Results   Component Value Date    HGB 8.3 02/17/2025     Lab Results   Component Value Date    HCT 27.6 02/17/2025     Lab Results   Component Value Date    MCV 86 02/17/2025     Lab Results   Component Value Date    MCH 25.8 02/17/2025 "     Lab Results   Component Value Date    MCHC 30.1 02/17/2025     Lab Results   Component Value Date    RDW 19.1 02/17/2025     Lab Results   Component Value Date     02/17/2025        @Last Comprehensive Metabolic Panel:  Sodium   Date Value Ref Range Status   02/18/2025 138 135 - 145 mmol/L Final     Potassium   Date Value Ref Range Status   02/18/2025 4.0 3.4 - 5.3 mmol/L Final   07/06/2020 4.7 3.5 - 5.0 mmol/L Final     Chloride   Date Value Ref Range Status   02/18/2025 103 98 - 107 mmol/L Final   07/06/2020 103 98 - 107 mmol/L Final     Carbon Dioxide (CO2)   Date Value Ref Range Status   02/18/2025 25 22 - 29 mmol/L Final   07/06/2020 22 22 - 31 mmol/L Final     Anion Gap   Date Value Ref Range Status   02/18/2025 10 7 - 15 mmol/L Final   07/06/2020 14 5 - 18 mmol/L Final     Glucose   Date Value Ref Range Status   02/18/2025 148 (H) 70 - 99 mg/dL Final   07/06/2020 102 70 - 125 mg/dL Final     GLUCOSE BY METER POCT   Date Value Ref Range Status   02/13/2025 130 (H) 70 - 99 mg/dL Final     Urea Nitrogen   Date Value Ref Range Status   02/18/2025 30.3 (H) 8.0 - 23.0 mg/dL Final   07/06/2020 30 (H) 8 - 28 mg/dL Final     Creatinine   Date Value Ref Range Status   02/18/2025 0.99 0.67 - 1.17 mg/dL Final     GFR Estimate   Date Value Ref Range Status   02/18/2025 77 >60 mL/min/1.73m2 Final     Comment:     eGFR calculated using 2021 CKD-EPI equation.   07/06/2020 55 (L) >60 mL/min/1.73m2 Final     Calcium   Date Value Ref Range Status   02/18/2025 8.7 (L) 8.8 - 10.4 mg/dL Final     DISCHARGE PLAN/FACE TO FACE:  I certify that this patient is under my care and that I, or a nurse practitioner or physician's assistant working with me, had a face-to-face encounter that meets the physician face-to-face encounter requirements with this patient.       I certify that, based on my findings, the following services are medically necessary home health services.    My clinical findings support the need for the above  skilled services.    This patient is homebound because: Recent hospitalization with osteomyelitis. Underlying hepatobiliary CA.     The patient is, or has been, under my care and I have initiated the establishment of the plan of care. This patient will be followed by a physician who will periodically review the plan of care.     This note has been dictated using voice recognition software. Any grammatical or context distortions are unintentional and inherent to the software    Electronically signed by: Treasure Dumont CNP

## 2025-03-06 ENCOUNTER — TRANSITIONAL CARE UNIT VISIT (OUTPATIENT)
Dept: GERIATRICS | Facility: CLINIC | Age: 80
End: 2025-03-06
Payer: COMMERCIAL

## 2025-03-06 ENCOUNTER — PATIENT OUTREACH (OUTPATIENT)
Dept: ONCOLOGY | Facility: HOSPITAL | Age: 80
End: 2025-03-06

## 2025-03-06 VITALS
HEIGHT: 68 IN | SYSTOLIC BLOOD PRESSURE: 121 MMHG | DIASTOLIC BLOOD PRESSURE: 67 MMHG | TEMPERATURE: 97.1 F | HEART RATE: 89 BPM | WEIGHT: 191.6 LBS | RESPIRATION RATE: 17 BRPM | OXYGEN SATURATION: 97 % | BODY MASS INDEX: 29.04 KG/M2

## 2025-03-06 DIAGNOSIS — Z79.4 TYPE 2 DIABETES MELLITUS WITH DIABETIC PERIPHERAL ANGIOPATHY WITHOUT GANGRENE, WITH LONG-TERM CURRENT USE OF INSULIN (H): ICD-10-CM

## 2025-03-06 DIAGNOSIS — I10 HYPERTENSION, UNSPECIFIED TYPE: ICD-10-CM

## 2025-03-06 DIAGNOSIS — Z98.890 S/P EXCISIONAL DEBRIDEMENT: ICD-10-CM

## 2025-03-06 DIAGNOSIS — C24.9: ICD-10-CM

## 2025-03-06 DIAGNOSIS — E11.51 TYPE 2 DIABETES MELLITUS WITH DIABETIC PERIPHERAL ANGIOPATHY WITHOUT GANGRENE, WITH LONG-TERM CURRENT USE OF INSULIN (H): ICD-10-CM

## 2025-03-06 DIAGNOSIS — E02 SUBCLINICAL IODINE-DEFICIENCY HYPOTHYROIDISM: ICD-10-CM

## 2025-03-06 DIAGNOSIS — M86.171 ACUTE OSTEOMYELITIS OF RIGHT CALCANEUS (H): ICD-10-CM

## 2025-03-06 DIAGNOSIS — E78.2 MIXED HYPERLIPIDEMIA: ICD-10-CM

## 2025-03-06 DIAGNOSIS — Z86.2 HISTORY OF ANEMIA OF CHRONIC DISEASE: ICD-10-CM

## 2025-03-06 DIAGNOSIS — E08.621 DIABETIC ULCER OF HEEL ASSOCIATED WITH DIABETES MELLITUS DUE TO UNDERLYING CONDITION, WITH FAT LAYER EXPOSED, UNSPECIFIED LATERALITY (H): Primary | ICD-10-CM

## 2025-03-06 DIAGNOSIS — I50.30 HEART FAILURE WITH PRESERVED EJECTION FRACTION, NYHA CLASS II (H): ICD-10-CM

## 2025-03-06 DIAGNOSIS — L97.402 DIABETIC ULCER OF HEEL ASSOCIATED WITH DIABETES MELLITUS DUE TO UNDERLYING CONDITION, WITH FAT LAYER EXPOSED, UNSPECIFIED LATERALITY (H): Primary | ICD-10-CM

## 2025-03-06 DIAGNOSIS — E11.42 DIABETIC POLYNEUROPATHY ASSOCIATED WITH TYPE 2 DIABETES MELLITUS (H): ICD-10-CM

## 2025-03-06 NOTE — LETTER
3/6/2025      Janice Nowak  [address]  [facility] [address]        [facility]    Code Status:  DNR/DNI   Visit Type:   No chief complaint on file.    Facility:  [facility] ([facility]) [[id number]]         HPI: Janice Nowak is a 80 year old male who I am seeing today for follow up on the TCU. Past medical history includes T2DM, HFpEF, recent debridement of diabetic ulcers, SABRINA, hepatocellular carcinoma currently receiving palliative immunotherapy, hypothyroidism, HTN and HLD.  Patient recently hospitalized secondary to acute abdominal pain and encephalopathy.  He has known hepatocellular carcinoma.  He was found to have worsening of his chronic osteomyelitis of bilateral lower extremities.  History of calcanectomy 10/2024.   Patient underwent bilateral irrigation and debridement on 1/27/2025.  History of PAD.  Underwent stenting with IR on 1/16/2024.  LLE angiogram 2/8 showed high-grade stenosis now s/p crossing/orbital atherectomy/angioplasty of the ALEX/DP, Angioplasty/DCB of the Popliteal lesion with FELIPE placement in the proximal SFA lesion.  Chest pain with elevated troponins.Overnight 2/11-2/12 had an episode of chest pain, reproducible with palpation on exam, not suspicious for ACS. Improved with lidocaine patch and Voltaren gel while admitted.  Declined further workup.    Transitional Care Course: On today's visit patient is lying in bed. Pt with hx of hepatobiliary cancer with mets. He was seen by Oncology and has decided to pursue hospice. Pt signed onto [facility]. Pt started on Oxycodone. He is denying in pain on today's visit. Appetite varies. His insulin has been reduced from 35 units to 15 units. Wound Vac discontinued to LE wounds. He continues with topical treatment. Pt denies any shortness of breath or chest pain. Plans to discharge Monday to [facility] on hospice.     Assessment/Plan:      Bilateral Lower Extremity Ulcers s/p bilateral irrigation  and debridement  Acute on Chronic Right Calcaneal Osteomyelitis s/p calcanectomy  - S/p partial calcanectomy right heel 10/24/24  -bilateral LE I&D 1/27/25.   -stenting with IR on 1/16/24.   -Underwent LLE angiogram 2/8 and found to have high grade stenosis, now s/p crossing/orbital atherectomy/angioplasty of the ALEX/DP, Angioplasty/DCB of the Popliteal lesion with FELIPE placement in the proximal SFA lesion.   -Continues with Wound Vac to RLE. Ok to discontinue and use topical wet to dry.   -Wet to dry dressing to  BLE.   -NWB to BLE.   -Continue blue boots.   -Follow up with podiatry 2/28-cancelled at request of the family with goals of care comfort/hospice.   - Continue ASA + Clopidegrel   - completed IV vanc/zosyn course prior to admission, discontinued 2/11.  -Augmentin twice daily x 12 days.  Stop date 2/25.  -Doxycycline twice daily x 12 days.  Stop date 2/24.  - follow-up CBC today with no leukocytosis.  Hemoglobin 8.3.  -Follow-up BMP with creatinine 27.  -discontinue probiotic.      Generalized weakness  -Initially presented with multiple days of progressive weakness, poor p.o. intake, poor sleep, myalgias.  Head imaging on admission negative for acute intracranial pathology.  No significant metabolic derangements.  Differential included hepatic encephalopathy vs infectious derangements, ultimately resolved with change of medication from oxycodone to tramadol.   - tramadol 50mg q8hrs PRN for pain  -Signed onto hospice.   -Plans to discharge to [facility] on Monday with hospice.      Hepatobiliary Cancer   Concern for Metastasis to Lungs   Relatively new diagnosis, began with incidental liver mass noted in 11/2024. Underwent IR guided biopsy on 1/2/25 which confirmed HCC. Met with oncology on 1/10/25 who discussed recommendation for palliative immunotherapy. CT chest on recent admission showed nodules and bulky adenopathy concerning for malignancy.   -Patient has been receiving palliative care immunotherapy  transfusions outpatient with oncology.  -Follow-up with Dr. Gleason  and has decided to sign onto hospice.   -pt signed onto hospice.      Heart failure with preserved ejection fraction, NYHA class II   Hypertension  -Echo 12/11 with mild concentric LVH. EF 55-60%. Had transudative pleural effusion s/p thoracentesis 12/2024. No signs of acute heart failure while admitted, diuretics held due to CAROLINE, restart at discharge  -Aspirin 81 mg daily.  -Losartan 50 mg nightly  -Torsemide discontinued due to poor appetite.     Type II Diabetes.   -A1c 7.9%.   -Consistent carb diet-ok to liberalize due to poor oral intake.   -Blood sugar checks 4 times daily.  -Empagliflozin discontinued.   -Glargine 15 units Qam. Hold if BS < 105.   -Metformin and sliding scale discontinued.    Nausea  -Zofran 4 mg Q 8 hours prn.      Anemia, normocytic  -Baseline 10-12 but trending downward in hospital.   -today hgb 8.3.   -currently no evidence of bleed.      CAROLINE on CKD  Hyponatremia   -Creatinine 1.24, baseline closer to 1.   -Follow up BMP with Creatine 0.99.      Carotid arterial disease  Mixed hyperlipidemia  -U/S 11/2024 with <50% stenosis bilaterally.  -Monitor bp.   -continue statin.     Hypothyroidism  -TSH WNL.  -Levothyroxine 50mcg daily.      Active Ambulatory Problems     Diagnosis Date Noted     Type 2 Diabetes Mellitus      Microalbuminuria      Benign Adenomatous Polyp Of The Large Intestine      Mixed hyperlipidemia      Obstructive Sleep Apnea -- stopped using CPAP      Hypertension      Diabetic foot infection (H) 10/07/2024     DM 2 w PVD on Insulin, Hgb A1C9.2 10/24/24 10/07/2024     Diabetic ulcer of right heel associated with type 2 diabetes mellitus, with necrosis of muscle (H) 10/16/2024     Carotid arterial disease 10/29/2024     Atypical chest pain 11/12/2015     Charcot joint of foot 10/29/2024     Charcot's arthropathy 10/29/2024     Diabetic neuropathy (H) 10/29/2024     Exposure to potentially hazardous substance  10/29/2024     Fatigue 11/12/2015     Pressure ulcer of right heel, unstageable (H) 10/29/2024     Occlusion and stenosis of unspecified carotid artery 10/29/2024     Postoperative back pain 10/29/2024     Hypothyroidism 10/29/2024     History of cholecystectomy 10/29/2024     Hearing loss 10/29/2024     Ulcer of right leg, limited to breakdown of skin (H) 11/13/2024     Diabetic ulcer of right heel associated with type 2 diabetes mellitus, with necrosis of bone (H) 11/13/2024     Dyspnea on exertion 12/11/2024     Diabetic ulcer of right heel associated with diabetes mellitus due to underlying condition, with muscle involvement without evidence of necrosis (H) 12/27/2024     Hepatocellular carcinoma (H) 01/10/2025     Abnormal hemoglobin (Hgb) 01/16/2025     Elevated troponin 01/16/2025     Syncope, unspecified syncope type 01/16/2025     History of vascular access device 01/20/2025     Diabetic ulcer of right heel -- S/P Surg 1/27/25 01/27/2025     Weakness 02/04/2025     Heart failure with preserved ejection fraction, NYHA class II (H) 02/04/2025     Open wound of right lower extremity 12/16/2024     Peripheral vascular disease 12/16/2024     Pleural effusion 12/16/2024     Elevated brain natriuretic peptide (BNP) level 12/16/2024     Heart failure with preserved ejection fraction, NYHA class I (H) 12/16/2024     Local infection of the skin and subcutaneous tissue, unspecified 10/16/2024     Liver mass 12/11/2024     Frailty 08/28/2024     Dizziness 07/31/2024     DVT (deep venous thrombosis) (H) 12/11/2024     Coronary atherosclerosis 01/22/2025     Benign adenomatous polyp of large intestine 07/31/2024     Acute respiratory distress 12/26/2024     Acute osteomyelitis of right foot (H) 11/27/2024     Senile purpura 08/28/2024     Type 2 diabetes mellitus with hyperglycemia, with long-term current use of insulin (H) 12/26/2024     Osteoarthrosis 11/27/2024     Protein-calorie malnutrition 01/22/2025      Neuropathy due to medical condition 08/28/2024     Lumbar spinal stenosis 07/31/2024     Long term (current) use of insulin (H) 07/31/2024     Resolved Ambulatory Problems     Diagnosis Date Noted     Hypothyroidism, unspecified 10/29/2024     Past Medical History:   Diagnosis Date     Coronary artery disease      Diabetes (H)      Hypertension      Sleep apnea      Thyroid disease      Allergies   Allergen Reactions     Exenatide Unknown     Heparin Unknown     Liraglutide Unknown     Lisinopril Cough     Morphine Unknown       All Meds and Allergies reviewed in the record at the facility and is the most up-to-date.    Current Outpatient Medications   Medication Sig Dispense Refill     acetaminophen (TYLENOL) 325 MG suppository Place 650 mg rectally every 4 hours as needed for fever.       aspirin 81 MG EC tablet Take 1 tablet (81 mg) by mouth every evening. 30 tablet 0     bisacodyl (DULCOLAX) 10 MG suppository Place 10 mg rectally daily as needed for constipation.       clopidogrel (PLAVIX) 75 MG tablet Take 1 tablet (75 mg) by mouth every morning. 30 tablet 0     Emollient (AQUAPHOR EX) Apply aquaphor to BLE for dry skin daily       hyoscyamine (LEVSIN/SL) 0.125 MG sublingual tablet Place 0.125 mg under the tongue every 4 hours as needed for cramping.       insulin glargine (LANTUS VIAL) 100 UNIT/ML vial Inject 30 Units subcutaneously every morning. (Patient taking differently: Inject 15 Units subcutaneously every morning.) 10 mL 0     levothyroxine (SYNTHROID/LEVOTHROID) 50 MCG tablet Take 1 tablet (50 mcg) by mouth every morning. 30 tablet 0     LORazepam (ATIVAN) 0.5 MG tablet Take 0.5 mg by mouth every 4 hours as needed for anxiety.       losartan (COZAAR) 50 MG tablet Take 1 tablet (50 mg) by mouth at bedtime. 30 tablet 0     ondansetron (ZOFRAN) 4 MG tablet Take 4 mg by mouth every 8 hours as needed for nausea.       oxyCODONE (ROXICODONE) 5 MG tablet Take 5 mg by mouth every hour as needed for severe  "pain. solutabs       senna-docusate (SENOKOT-S/PERICOLACE) 8.6-50 MG tablet Take 1 tablet by mouth daily as needed for constipation.       No current facility-administered medications for this visit.       REVIEW OF SYSTEMS:   10 point review of systems reviewed and pertinent positives in the HPI.     PHYSICAL EXAMINATION:  Physical Exam     Vital signs: /67   Pulse 89   Temp 97.1  F (36.2  C)   Resp 17   Ht 1.727 m (5' 8\")   Wt 86.9 kg (191 lb 9.6 oz)   SpO2 97%   BMI 29.13 kg/m    General: Awake, Alert, oriented x3, lying in bed, appropriately, follows simple commands, conversant  HEENT:Pink conjunctiva, dry oral mucosa, out pouching to lymph nodes on right side of neck. No redness or warmth.   NECK: Supple  CVS:  S1  S2, without murmur or gallop.   LUNG: Clear to auscultation, No wheezes, rales or rhonci.  BACK: No kyphosis of the thoracic spine  ABDOMEN: Soft, nontender to palpation, with positive bowel sounds  EXTREMITIES: moves both upper and lower extremities with some limitation to BLE, No pedal edema. Blue boots to BLE.   SKIN: Dressings to BLE intact.   NEUROLOGIC: Pulses non palpable. Skin warm.   PSYCHIATRIC: Flat affect.       Labs:  All labs reviewed in the nursing home record and Epic   @  Lab Results   Component Value Date    WBC 10.1 02/17/2025     Lab Results   Component Value Date    RBC 3.22 02/17/2025     Lab Results   Component Value Date    HGB 8.3 02/17/2025     Lab Results   Component Value Date    HCT 27.6 02/17/2025     Lab Results   Component Value Date    MCV 86 02/17/2025     Lab Results   Component Value Date    MCH 25.8 02/17/2025     Lab Results   Component Value Date    MCHC 30.1 02/17/2025     Lab Results   Component Value Date    RDW 19.1 02/17/2025     Lab Results   Component Value Date     02/17/2025        @Last Comprehensive Metabolic Panel:  Sodium   Date Value Ref Range Status   02/18/2025 138 135 - 145 mmol/L Final     Potassium   Date Value Ref Range " Status   02/18/2025 4.0 3.4 - 5.3 mmol/L Final   07/06/2020 4.7 3.5 - 5.0 mmol/L Final     Chloride   Date Value Ref Range Status   02/18/2025 103 98 - 107 mmol/L Final   07/06/2020 103 98 - 107 mmol/L Final     Carbon Dioxide (CO2)   Date Value Ref Range Status   02/18/2025 25 22 - 29 mmol/L Final   07/06/2020 22 22 - 31 mmol/L Final     Anion Gap   Date Value Ref Range Status   02/18/2025 10 7 - 15 mmol/L Final   07/06/2020 14 5 - 18 mmol/L Final     Glucose   Date Value Ref Range Status   02/18/2025 148 (H) 70 - 99 mg/dL Final   07/06/2020 102 70 - 125 mg/dL Final     GLUCOSE BY METER POCT   Date Value Ref Range Status   02/13/2025 130 (H) 70 - 99 mg/dL Final     Urea Nitrogen   Date Value Ref Range Status   02/18/2025 30.3 (H) 8.0 - 23.0 mg/dL Final   07/06/2020 30 (H) 8 - 28 mg/dL Final     Creatinine   Date Value Ref Range Status   02/18/2025 0.99 0.67 - 1.17 mg/dL Final     GFR Estimate   Date Value Ref Range Status   02/18/2025 77 >60 mL/min/1.73m2 Final     Comment:     eGFR calculated using 2021 CKD-EPI equation.   07/06/2020 55 (L) >60 mL/min/1.73m2 Final     Calcium   Date Value Ref Range Status   02/18/2025 8.7 (L) 8.8 - 10.4 mg/dL Final     This note has been dictated using voice recognition software. Any grammatical or context distortions are unintentional and inherent to the software    Electronically signed by: Treasure Dumont CNP       Sincerely,        Treasure Dumont NP    Electronically signed

## 2025-03-06 NOTE — PROGRESS NOTES
[facility]: Cancer Care                                                                                          Writer graduated patient from care coordination services due to patient enrolling in hospice at an [facility] upon chart review.     Signature:  Azul Aguilar RN

## 2025-03-07 ENCOUNTER — TRANSITIONAL CARE UNIT VISIT (OUTPATIENT)
Dept: GERIATRICS | Facility: CLINIC | Age: 80
End: 2025-03-07
Payer: COMMERCIAL

## 2025-03-07 VITALS
HEART RATE: 89 BPM | TEMPERATURE: 97.3 F | DIASTOLIC BLOOD PRESSURE: 90 MMHG | OXYGEN SATURATION: 97 % | SYSTOLIC BLOOD PRESSURE: 156 MMHG

## 2025-03-07 DIAGNOSIS — E11.51 TYPE 2 DIABETES MELLITUS WITH DIABETIC PERIPHERAL ANGIOPATHY WITHOUT GANGRENE, WITH LONG-TERM CURRENT USE OF INSULIN (H): Primary | ICD-10-CM

## 2025-03-07 DIAGNOSIS — I50.30 HEART FAILURE WITH PRESERVED EJECTION FRACTION, NYHA CLASS II (H): ICD-10-CM

## 2025-03-07 DIAGNOSIS — C78.00 MALIGNANT NEOPLASM METASTATIC TO LUNG, UNSPECIFIED LATERALITY (H): ICD-10-CM

## 2025-03-07 DIAGNOSIS — Z79.4 TYPE 2 DIABETES MELLITUS WITH DIABETIC PERIPHERAL ANGIOPATHY WITHOUT GANGRENE, WITH LONG-TERM CURRENT USE OF INSULIN (H): Primary | ICD-10-CM

## 2025-03-07 DIAGNOSIS — E02 SUBCLINICAL IODINE-DEFICIENCY HYPOTHYROIDISM: ICD-10-CM

## 2025-03-07 DIAGNOSIS — E11.42 DIABETIC POLYNEUROPATHY ASSOCIATED WITH TYPE 2 DIABETES MELLITUS (H): ICD-10-CM

## 2025-03-07 DIAGNOSIS — Z51.5 HOSPICE CARE PATIENT: ICD-10-CM

## 2025-03-07 PROCEDURE — 99309 SBSQ NF CARE MODERATE MDM 30: CPT | Mod: GV | Performed by: NURSE PRACTITIONER

## 2025-03-07 RX ORDER — INSULIN GLARGINE 100 [IU]/ML
15 INJECTION, SOLUTION SUBCUTANEOUS EVERY MORNING
Status: SHIPPED
Start: 2025-03-07

## 2025-03-07 NOTE — LETTER
3/7/2025      Janice Nowak  [address]  [place] [address]        [facility] GERIATRICS  ACUTE/EPISODIC VISIT    [facility] Medical Record Number:  [id number]  Place of Service where encounter took place:  [facility] ([facility]) [[id number]]    Chief Complaint   Patient presents with     RECHECK       HPI:    Janice Nowak is a 80 year old  (1945), who is being seen today for an episodic care visit.  HPI information obtained from: facility chart records, facility staff, patient report, and [facility] Nurse .    Today's concern is:    Diagnoses         Codes Comments    Type 2 diabetes mellitus with diabetic peripheral angiopathy without gangrene, with long-term current use of insulin (H)    -  Primary E11.51, Z79.4     Diabetic polyneuropathy associated with type 2 diabetes mellitus (H)     E11.42     Subclinical iodine-deficiency hypothyroidism     E02     Heart failure with preserved ejection fraction, NYHA class II (H)     I50.30     Malignant neoplasm metastatic to lung, unspecified laterality (H)     C78.00     Hospice care patient     Z51.5           Came to see Janice today due to some confusion on his discharge set for Monday.  [facility] is here to see him and do his dressing changes as well as obtain information on what is occurring on Monday.    The question is about his medications and what he will be discharged with.  Hospice stated to ask him as he has the final say.  The rounding NP had seen him yesterday for a discharge visit.  Tramadol discontinued and oxycodone PRN remains, even though he hardly has used any pain medication.    Currently on a set dose of Lantus every AM.  When checking his blood sugars in the MAR, do suggest to continue with the Lantus daily but [facility], they would only need to record the insulin twice a day vs the 4x a day here.  Janice also stated he has a supply of Cedric sensors  and feels he can put them on himself.  Would be easier than poking him for a blood sample.      Janice was laying in bed, introduced self and explained what is going on.  Asked him about his oral medications and he would like to keep them all for now.  Agree with his plan.      Hospice then had a few requests of staff for sending medicine with and wound care supplies.      ALLERGIES:    Allergies   Allergen Reactions     Exenatide Unknown     Heparin Unknown     Liraglutide Unknown     Lisinopril Cough     Morphine Unknown        MEDICATIONS:  Post Discharge Medication Reconciliation Status:  Medications reconciled today.    Current Outpatient Medications   Medication Sig Dispense Refill     insulin glargine (LANTUS VIAL) 100 UNIT/ML vial Inject 15 Units subcutaneously every morning.       acetaminophen (TYLENOL) 325 MG suppository Place 650 mg rectally every 4 hours as needed for fever.       aspirin 81 MG EC tablet Take 1 tablet (81 mg) by mouth every evening. 30 tablet 0     bisacodyl (DULCOLAX) 10 MG suppository Place 10 mg rectally daily as needed for constipation.       clopidogrel (PLAVIX) 75 MG tablet Take 1 tablet (75 mg) by mouth every morning. 30 tablet 0     Emollient (AQUAPHOR EX) Apply aquaphor to BLE for dry skin daily       hyoscyamine (LEVSIN/SL) 0.125 MG sublingual tablet Place 0.125 mg under the tongue every 4 hours as needed for cramping.       levothyroxine (SYNTHROID/LEVOTHROID) 50 MCG tablet Take 1 tablet (50 mcg) by mouth every morning. 30 tablet 0     LORazepam (ATIVAN) 0.5 MG tablet Take 0.5 mg by mouth every 4 hours as needed for anxiety.       losartan (COZAAR) 50 MG tablet Take 1 tablet (50 mg) by mouth at bedtime. 30 tablet 0     ondansetron (ZOFRAN) 4 MG tablet Take 4 mg by mouth every 8 hours as needed for nausea.       oxyCODONE (ROXICODONE) 5 MG tablet Take 5 mg by mouth every hour as needed for severe pain. solutabs       senna-docusate (SENOKOT-S/PERICOLACE) 8.6-50 MG tablet Take  1 tablet by mouth daily as needed for constipation.       Medications reviewed:  Medications reconciled to facility chart and changes were made to reflect current medications as identified as above med list. Below are the changes that were made:   Medications stopped since last EPIC medication reconciliation:   Medications Discontinued During This Encounter   Medication Reason     insulin glargine (LANTUS VIAL) 100 UNIT/ML vial        Medications started since last [facility] medication reconciliation:  Orders Placed This Encounter   Medications     insulin glargine (LANTUS VIAL) 100 UNIT/ML vial     Sig: Inject 15 Units subcutaneously every morning.         REVIEW OF SYSTEMS:  4 point ROS neg other than the symptoms noted above in the HPI.      PHYSICAL EXAM:  BP (!) 156/90   Pulse 89   Temp 97.3  F (36.3  C)   SpO2 97%   Janice was laying in bed with the head elevated to a 45 degree francesco.    Alert and oriented x3.  Makes his own decisions.  In no acute distress.    Skin is pink/pale, dry and warm.  Treatments done to both legs and covered.  Non weight bearing to right foot.    Blood sugar 4x a day:  blood sugars in the AM still range 150-210's  Sugars run moistly above 140's.        ASSESSMENT / PLAN:  1. DM 2 w PVD on Insulin, Hgb A1C9.2 10/24/24    2. Diabetic polyneuropathy associated with type 2 diabetes mellitus (H)    3. Subclinical iodine-deficiency hypothyroidism    4. Heart failure with preserved ejection fraction, NYHA class II (H)    5. Malignant neoplasm metastatic to lung, unspecified laterality (H)    6. Hospice care patient      Reviewed each medication with Janice as he wanted to continue all the oral medications.  Sensed that he knew his ASA and Plavix were important and knew what time of day he received each medication like the Losartan and Synthroid.      Will keep his Lantus order the same and send the pen(s) with him.    Will write on the orders that he has a supply of the Cedric glucose  monitor system and feels confident to apply to his arm.      Hospice would like medications to be send with him and the best they can do is 3 days.  Would like wound care supplies for 2-3 days with as well so they can order some while there.  Also to send his binder for hospice with him.      Transportation has been arranged for mid morning on Monday to go to his new residential.        Orders:  On Monday please send with Janice to his new [facility]:  hospice binder, wound care supplies for 2-3 days, open medication containers (Lantus etc), 3 days of oral medications.    Ok to discharge to residential on Monday with new list of medications/treatments signed by NP.    Decision to keep the insulin order.  Janice has a supply of Cedric sensors and a reader that he purchases himself.  He feels he can put on his arm by himself.      When he gets to the facility, blood sugars to be recorded twice a day for Type 2 DM    Electronically signed by  SILVIA Howard CNP             Sincerely,        SILVIA Howard CNP    Electronically signed

## 2025-03-07 NOTE — PROGRESS NOTES
[facility] GERIATRIC SERVICES    Code Status:  DNR/DNI   Visit Type:   No chief complaint on file.    Facility:  [facility] ([facility]) [[id number]]         HPI: Janice Nowak is a 80 year old male who I am seeing today for follow up on the TCU. Past medical history includes T2DM, HFpEF, recent debridement of diabetic ulcers, SABRINA, hepatocellular carcinoma currently receiving palliative immunotherapy, hypothyroidism, HTN and HLD.  Patient recently hospitalized secondary to acute abdominal pain and encephalopathy.  He has known hepatocellular carcinoma.  He was found to have worsening of his chronic osteomyelitis of bilateral lower extremities.  History of calcanectomy 10/2024.   Patient underwent bilateral irrigation and debridement on 1/27/2025.  History of PAD.  Underwent stenting with IR on 1/16/2024.  LLE angiogram 2/8 showed high-grade stenosis now s/p crossing/orbital atherectomy/angioplasty of the ALEX/DP, Angioplasty/DCB of the Popliteal lesion with FELIPE placement in the proximal SFA lesion.  Chest pain with elevated troponins.Overnight 2/11-2/12 had an episode of chest pain, reproducible with palpation on exam, not suspicious for ACS. Improved with lidocaine patch and Voltaren gel while admitted.  Declined further workup.    Transitional Care Course: On today's visit patient is lying in bed. Pt with hx of hepatobiliary cancer with mets. He was seen by Oncology and has decided to pursue hospice. Pt signed onto [facility]. Pt started on Oxycodone. He is denying in pain on today's visit. Appetite varies. His insulin has been reduced from 35 units to 15 units. Wound Vac discontinued to LE wounds. He continues with topical treatment. Pt denies any shortness of breath or chest pain. Plans to discharge Monday to [facility] on hospice.     Assessment/Plan:      Bilateral Lower Extremity Ulcers s/p bilateral irrigation and debridement  Acute on Chronic Right Calcaneal Osteomyelitis s/p calcanectomy  - S/p partial  calcanectomy right heel 10/24/24  -bilateral LE I&D 1/27/25.   -stenting with IR on 1/16/24.   -Underwent LLE angiogram 2/8 and found to have high grade stenosis, now s/p crossing/orbital atherectomy/angioplasty of the ALEX/DP, Angioplasty/DCB of the Popliteal lesion with FELIPE placement in the proximal SFA lesion.   -Continues with Wound Vac to RLE. Ok to discontinue and use topical wet to dry.   -Wet to dry dressing to  BLE.   -NWB to BLE.   -Continue blue boots.   -Follow up with podiatry 2/28-cancelled at request of the family with goals of care comfort/hospice.   - Continue ASA + Clopidegrel   - completed IV vanc/zosyn course prior to admission, discontinued 2/11.  -Augmentin twice daily x 12 days.  Stop date 2/25.  -Doxycycline twice daily x 12 days.  Stop date 2/24.  - follow-up CBC today with no leukocytosis.  Hemoglobin 8.3.  -Follow-up BMP with creatinine 27.  -discontinue probiotic.      Generalized weakness  -Initially presented with multiple days of progressive weakness, poor p.o. intake, poor sleep, myalgias.  Head imaging on admission negative for acute intracranial pathology.  No significant metabolic derangements.  Differential included hepatic encephalopathy vs infectious derangements, ultimately resolved with change of medication from oxycodone to tramadol.   - tramadol 50mg q8hrs PRN for pain  -Signed onto hospice.   -Plans to discharge to [facility] on Monday with hospice.      Hepatobiliary Cancer   Concern for Metastasis to Lungs   Relatively new diagnosis, began with incidental liver mass noted in 11/2024. Underwent IR guided biopsy on 1/2/25 which confirmed HCC. Met with oncology on 1/10/25 who discussed recommendation for palliative immunotherapy. CT chest on recent admission showed nodules and bulky adenopathy concerning for malignancy.   -Patient has been receiving palliative care immunotherapy transfusions outpatient with oncology.  -Follow-up with Dr. Gleason  and has decided to sign onto hospice.    -pt signed onto hospice.      Heart failure with preserved ejection fraction, NYHA class II   Hypertension  -Echo 12/11 with mild concentric LVH. EF 55-60%. Had transudative pleural effusion s/p thoracentesis 12/2024. No signs of acute heart failure while admitted, diuretics held due to CAROLINE, restart at discharge  -Aspirin 81 mg daily.  -Losartan 50 mg nightly  -Torsemide discontinued due to poor appetite.     Type II Diabetes.   -A1c 7.9%.   -Consistent carb diet-ok to liberalize due to poor oral intake.   -Blood sugar checks 4 times daily.  -Empagliflozin discontinued.   -Glargine 15 units Qam. Hold if BS < 105.   -Metformin and sliding scale discontinued.    Nausea  -Zofran 4 mg Q 8 hours prn.      Anemia, normocytic  -Baseline 10-12 but trending downward in hospital.   -today hgb 8.3.   -currently no evidence of bleed.      CAROLINE on CKD  Hyponatremia   -Creatinine 1.24, baseline closer to 1.   -Follow up BMP with Creatine 0.99.      Carotid arterial disease  Mixed hyperlipidemia  -U/S 11/2024 with <50% stenosis bilaterally.  -Monitor bp.   -continue statin.     Hypothyroidism  -TSH WNL.  -Levothyroxine 50mcg daily.      Active Ambulatory Problems     Diagnosis Date Noted    Type 2 Diabetes Mellitus     Microalbuminuria     Benign Adenomatous Polyp Of The Large Intestine     Mixed hyperlipidemia     Obstructive Sleep Apnea -- stopped using CPAP     Hypertension     Diabetic foot infection (H) 10/07/2024    DM 2 w PVD on Insulin, Hgb A1C9.2 10/24/24 10/07/2024    Diabetic ulcer of right heel associated with type 2 diabetes mellitus, with necrosis of muscle (H) 10/16/2024    Carotid arterial disease 10/29/2024    Atypical chest pain 11/12/2015    Charcot joint of foot 10/29/2024    Charcot's arthropathy 10/29/2024    Diabetic neuropathy (H) 10/29/2024    Exposure to potentially hazardous substance 10/29/2024    Fatigue 11/12/2015    Pressure ulcer of right heel, unstageable (H) 10/29/2024    Occlusion and stenosis  of unspecified carotid artery 10/29/2024    Postoperative back pain 10/29/2024    Hypothyroidism 10/29/2024    History of cholecystectomy 10/29/2024    Hearing loss 10/29/2024    Ulcer of right leg, limited to breakdown of skin (H) 11/13/2024    Diabetic ulcer of right heel associated with type 2 diabetes mellitus, with necrosis of bone (H) 11/13/2024    Dyspnea on exertion 12/11/2024    Diabetic ulcer of right heel associated with diabetes mellitus due to underlying condition, with muscle involvement without evidence of necrosis (H) 12/27/2024    Hepatocellular carcinoma (H) 01/10/2025    Abnormal hemoglobin (Hgb) 01/16/2025    Elevated troponin 01/16/2025    Syncope, unspecified syncope type 01/16/2025    History of vascular access device 01/20/2025    Diabetic ulcer of right heel -- S/P Surg 1/27/25 01/27/2025    Weakness 02/04/2025    Heart failure with preserved ejection fraction, NYHA class II (H) 02/04/2025    Open wound of right lower extremity 12/16/2024    Peripheral vascular disease 12/16/2024    Pleural effusion 12/16/2024    Elevated brain natriuretic peptide (BNP) level 12/16/2024    Heart failure with preserved ejection fraction, NYHA class I (H) 12/16/2024    Local infection of the skin and subcutaneous tissue, unspecified 10/16/2024    Liver mass 12/11/2024    Frailty 08/28/2024    Dizziness 07/31/2024    DVT (deep venous thrombosis) (H) 12/11/2024    Coronary atherosclerosis 01/22/2025    Benign adenomatous polyp of large intestine 07/31/2024    Acute respiratory distress 12/26/2024    Acute osteomyelitis of right foot (H) 11/27/2024    Senile purpura 08/28/2024    Type 2 diabetes mellitus with hyperglycemia, with long-term current use of insulin (H) 12/26/2024    Osteoarthrosis 11/27/2024    Protein-calorie malnutrition 01/22/2025    Neuropathy due to medical condition 08/28/2024    Lumbar spinal stenosis 07/31/2024    Long term (current) use of insulin (H) 07/31/2024     Resolved Ambulatory  Problems     Diagnosis Date Noted    Hypothyroidism, unspecified 10/29/2024     Past Medical History:   Diagnosis Date    Coronary artery disease     Diabetes (H)     Hypertension     Sleep apnea     Thyroid disease      Allergies   Allergen Reactions    Exenatide Unknown    Heparin Unknown    Liraglutide Unknown    Lisinopril Cough    Morphine Unknown       All Meds and Allergies reviewed in the record at the facility and is the most up-to-date.    Current Outpatient Medications   Medication Sig Dispense Refill    acetaminophen (TYLENOL) 325 MG suppository Place 650 mg rectally every 4 hours as needed for fever.      aspirin 81 MG EC tablet Take 1 tablet (81 mg) by mouth every evening. 30 tablet 0    bisacodyl (DULCOLAX) 10 MG suppository Place 10 mg rectally daily as needed for constipation.      clopidogrel (PLAVIX) 75 MG tablet Take 1 tablet (75 mg) by mouth every morning. 30 tablet 0    Emollient (AQUAPHOR EX) Apply aquaphor to BLE for dry skin daily      hyoscyamine (LEVSIN/SL) 0.125 MG sublingual tablet Place 0.125 mg under the tongue every 4 hours as needed for cramping.      insulin glargine (LANTUS VIAL) 100 UNIT/ML vial Inject 30 Units subcutaneously every morning. (Patient taking differently: Inject 15 Units subcutaneously every morning.) 10 mL 0    levothyroxine (SYNTHROID/LEVOTHROID) 50 MCG tablet Take 1 tablet (50 mcg) by mouth every morning. 30 tablet 0    LORazepam (ATIVAN) 0.5 MG tablet Take 0.5 mg by mouth every 4 hours as needed for anxiety.      losartan (COZAAR) 50 MG tablet Take 1 tablet (50 mg) by mouth at bedtime. 30 tablet 0    ondansetron (ZOFRAN) 4 MG tablet Take 4 mg by mouth every 8 hours as needed for nausea.      oxyCODONE (ROXICODONE) 5 MG tablet Take 5 mg by mouth every hour as needed for severe pain. solutabs      senna-docusate (SENOKOT-S/PERICOLACE) 8.6-50 MG tablet Take 1 tablet by mouth daily as needed for constipation.       No current facility-administered medications for  "this visit.       REVIEW OF SYSTEMS:   10 point review of systems reviewed and pertinent positives in the HPI.     PHYSICAL EXAMINATION:  Physical Exam     Vital signs: /67   Pulse 89   Temp 97.1  F (36.2  C)   Resp 17   Ht 1.727 m (5' 8\")   Wt 86.9 kg (191 lb 9.6 oz)   SpO2 97%   BMI 29.13 kg/m    General: Awake, Alert, oriented x3, lying in bed, appropriately, follows simple commands, conversant  HEENT:Pink conjunctiva, dry oral mucosa, out pouching to lymph nodes on right side of neck. No redness or warmth.   NECK: Supple  CVS:  S1  S2, without murmur or gallop.   LUNG: Clear to auscultation, No wheezes, rales or rhonci.  BACK: No kyphosis of the thoracic spine  ABDOMEN: Soft, nontender to palpation, with positive bowel sounds  EXTREMITIES: moves both upper and lower extremities with some limitation to BLE, No pedal edema. Blue boots to BLE.   SKIN: Dressings to BLE intact.   NEUROLOGIC: Pulses non palpable. Skin warm.   PSYCHIATRIC: Flat affect.       Labs:  All labs reviewed in the nursing home record and Epic   @  Lab Results   Component Value Date    WBC 10.1 02/17/2025     Lab Results   Component Value Date    RBC 3.22 02/17/2025     Lab Results   Component Value Date    HGB 8.3 02/17/2025     Lab Results   Component Value Date    HCT 27.6 02/17/2025     Lab Results   Component Value Date    MCV 86 02/17/2025     Lab Results   Component Value Date    MCH 25.8 02/17/2025     Lab Results   Component Value Date    MCHC 30.1 02/17/2025     Lab Results   Component Value Date    RDW 19.1 02/17/2025     Lab Results   Component Value Date     02/17/2025        @Last Comprehensive Metabolic Panel:  Sodium   Date Value Ref Range Status   02/18/2025 138 135 - 145 mmol/L Final     Potassium   Date Value Ref Range Status   02/18/2025 4.0 3.4 - 5.3 mmol/L Final   07/06/2020 4.7 3.5 - 5.0 mmol/L Final     Chloride   Date Value Ref Range Status   02/18/2025 103 98 - 107 mmol/L Final   07/06/2020 103 98 - " 107 mmol/L Final     Carbon Dioxide (CO2)   Date Value Ref Range Status   02/18/2025 25 22 - 29 mmol/L Final   07/06/2020 22 22 - 31 mmol/L Final     Anion Gap   Date Value Ref Range Status   02/18/2025 10 7 - 15 mmol/L Final   07/06/2020 14 5 - 18 mmol/L Final     Glucose   Date Value Ref Range Status   02/18/2025 148 (H) 70 - 99 mg/dL Final   07/06/2020 102 70 - 125 mg/dL Final     GLUCOSE BY METER POCT   Date Value Ref Range Status   02/13/2025 130 (H) 70 - 99 mg/dL Final     Urea Nitrogen   Date Value Ref Range Status   02/18/2025 30.3 (H) 8.0 - 23.0 mg/dL Final   07/06/2020 30 (H) 8 - 28 mg/dL Final     Creatinine   Date Value Ref Range Status   02/18/2025 0.99 0.67 - 1.17 mg/dL Final     GFR Estimate   Date Value Ref Range Status   02/18/2025 77 >60 mL/min/1.73m2 Final     Comment:     eGFR calculated using 2021 CKD-EPI equation.   07/06/2020 55 (L) >60 mL/min/1.73m2 Final     Calcium   Date Value Ref Range Status   02/18/2025 8.7 (L) 8.8 - 10.4 mg/dL Final     This note has been dictated using voice recognition software. Any grammatical or context distortions are unintentional and inherent to the software    Electronically signed by: Treasure Dumont, CNP

## 2025-03-07 NOTE — PROGRESS NOTES
[facility] GERIATRICS  ACUTE/EPISODIC VISIT    [facility] Medical Record Number:  [id number]  Place of Service where encounter took place:  [facility] ([facility]) [[id number]]    Chief Complaint   Patient presents with    RECHECK       HPI:    Janice Nowak is a 80 year old  (1945), who is being seen today for an episodic care visit.  HPI information obtained from: facility chart records, facility staff, patient report, and [facility] Nurse .    Today's concern is:    Diagnoses         Codes Comments    Type 2 diabetes mellitus with diabetic peripheral angiopathy without gangrene, with long-term current use of insulin (H)    -  Primary E11.51, Z79.4     Diabetic polyneuropathy associated with type 2 diabetes mellitus (H)     E11.42     Subclinical iodine-deficiency hypothyroidism     E02     Heart failure with preserved ejection fraction, NYHA class II (H)     I50.30     Malignant neoplasm metastatic to lung, unspecified laterality (H)     C78.00     Hospice care patient     Z51.5           Came to see Janice today due to some confusion on his discharge set for Monday.  [facility] is here to see him and do his dressing changes as well as obtain information on what is occurring on Monday.    The question is about his medications and what he will be discharged with.  Hospice stated to ask him as he has the final say.  The rounding NP had seen him yesterday for a discharge visit.  Tramadol discontinued and oxycodone PRN remains, even though he hardly has used any pain medication.    Currently on a set dose of Lantus every AM.  When checking his blood sugars in the MAR, do suggest to continue with the Lantus daily but when he leaves to go to the [facility], they would only need to record the insulin twice a day vs the 4x a day here.  Janice also stated he has a supply of Cedric sensors and feels he can put them on himself.  Would be easier than poking him for a blood sample.       Janice was laying in bed, introduced self and explained what is going on.  Asked him about his oral medications and he would like to keep them all for now.  Agree with his plan.      Hospice then had a few requests of staff for sending medicine with and wound care supplies.      ALLERGIES:    Allergies   Allergen Reactions    Exenatide Unknown    Heparin Unknown    Liraglutide Unknown    Lisinopril Cough    Morphine Unknown        MEDICATIONS:  Post Discharge Medication Reconciliation Status:  Medications reconciled today.    Current Outpatient Medications   Medication Sig Dispense Refill    insulin glargine (LANTUS VIAL) 100 UNIT/ML vial Inject 15 Units subcutaneously every morning.      acetaminophen (TYLENOL) 325 MG suppository Place 650 mg rectally every 4 hours as needed for fever.      aspirin 81 MG EC tablet Take 1 tablet (81 mg) by mouth every evening. 30 tablet 0    bisacodyl (DULCOLAX) 10 MG suppository Place 10 mg rectally daily as needed for constipation.      clopidogrel (PLAVIX) 75 MG tablet Take 1 tablet (75 mg) by mouth every morning. 30 tablet 0    Emollient (AQUAPHOR EX) Apply aquaphor to BLE for dry skin daily      hyoscyamine (LEVSIN/SL) 0.125 MG sublingual tablet Place 0.125 mg under the tongue every 4 hours as needed for cramping.      levothyroxine (SYNTHROID/LEVOTHROID) 50 MCG tablet Take 1 tablet (50 mcg) by mouth every morning. 30 tablet 0    LORazepam (ATIVAN) 0.5 MG tablet Take 0.5 mg by mouth every 4 hours as needed for anxiety.      losartan (COZAAR) 50 MG tablet Take 1 tablet (50 mg) by mouth at bedtime. 30 tablet 0    ondansetron (ZOFRAN) 4 MG tablet Take 4 mg by mouth every 8 hours as needed for nausea.      oxyCODONE (ROXICODONE) 5 MG tablet Take 5 mg by mouth every hour as needed for severe pain. solutabs      senna-docusate (SENOKOT-S/PERICOLACE) 8.6-50 MG tablet Take 1 tablet by mouth daily as needed for constipation.       Medications reviewed:  Medications reconciled to  facility chart and changes were made to reflect current medications as identified as above med list. Below are the changes that were made:   Medications stopped since last EPIC medication reconciliation:   Medications Discontinued During This Encounter   Medication Reason    insulin glargine (LANTUS VIAL) 100 UNIT/ML vial        Medications started since last Psychiatric medication reconciliation:  Orders Placed This Encounter   Medications    insulin glargine (LANTUS VIAL) 100 UNIT/ML vial     Sig: Inject 15 Units subcutaneously every morning.         REVIEW OF SYSTEMS:  4 point ROS neg other than the symptoms noted above in the HPI.      PHYSICAL EXAM:  BP (!) 156/90   Pulse 89   Temp 97.3  F (36.3  C)   SpO2 97%   Janice was laying in bed with the head elevated to a 45 degree francesco.    Alert and oriented x3.  Makes his own decisions.  In no acute distress.    Skin is pink/pale, dry and warm.  Treatments done to both legs and covered.  Non weight bearing to right foot.    Blood sugar 4x a day:  blood sugars in the AM still range 150-210's  Sugars run moistly above 140's.        ASSESSMENT / PLAN:  1. DM 2 w PVD on Insulin, Hgb A1C9.2 10/24/24    2. Diabetic polyneuropathy associated with type 2 diabetes mellitus (H)    3. Subclinical iodine-deficiency hypothyroidism    4. Heart failure with preserved ejection fraction, NYHA class II (H)    5. Malignant neoplasm metastatic to lung, unspecified laterality (H)    6. Hospice care patient      Reviewed each medication with Janice as he wanted to continue all the oral medications.  Sensed that he knew his ASA and Plavix were important and knew what time of day he received each medication like the Losartan and Synthroid.      Will keep his Lantus order the same and send the pen(s) with him.    Will write on the orders that he has a supply of the Cedric glucose monitor system and feels confident to apply to his arm.      Hospice would like medications to be send with him and  the best they can do is 3 days.  Would like wound care supplies for 2-3 days with as well so they can order some while there.  Also to send his binder for hospice with him.      Transportation has been arranged for mid morning on Monday to go to his new [facility].        Orders:  On Monday please send with Janice to his new intermediate:  hospice binder, wound care supplies for 2-3 days, open medication containers (Lantus etc), 3 days of oral medications.    Ok to discharge to [facility] on Monday with new list of medications/treatments signed by NP.    Decision to keep the insulin order.  Janice has a supply of Cedric sensors and a reader that he purchases himself.  He feels he can put on his arm by himself.      When he gets to the facility, blood sugars to be recorded twice a day for Type 2 DM    Electronically signed by  SILVIA Howard CNP

## 2025-03-19 ENCOUNTER — PATIENT OUTREACH (OUTPATIENT)
Dept: ONCOLOGY | Facility: HOSPITAL | Age: 80
End: 2025-03-19
Payer: COMMERCIAL

## 2025-03-19 NOTE — PROGRESS NOTES
[facility]: Cancer Care                                                                                          Writer emailed Dr. Gleason' office visit note from 2/18/2025 to [employer] email that the patient's son Jorge provided via [employer] ([email or web address]) via the fax printer machine in clinic.     Signature:  Azul Aguilar RN

## (undated) DEVICE — STPL SKIN 35W 6.9MM  PXW35

## (undated) DEVICE — KIT CONNECTOR FOR OLYMPUS ENDOSCOPES DEFENDO 100310

## (undated) DEVICE — Device

## (undated) DEVICE — DRSG ABD TNDRSRB WET PRUF 8IN X 10IN STRL  9194A

## (undated) DEVICE — SYR 10ML LL W/O NDL 302995

## (undated) DEVICE — SOL WATER IRRIG 1000ML BOTTLE 2F7114

## (undated) DEVICE — CUSTOM PACK LOWER EXTREMITY SOP5BLEHEA

## (undated) DEVICE — TRAY PREP DRY SKIN SCRUB 067

## (undated) DEVICE — SUCTION IRR SYSTEM W/O TIP INTERPULSE HANDPIECE 0210-100-000

## (undated) DEVICE — PAD CHUX UNDERPAD 23X24" 7136

## (undated) DEVICE — KIT ENDO FIRST STEP DISINFECTANT 200ML W/POUCH EP-4

## (undated) DEVICE — BANDAGE ESMARK 4 X 3 YARDS STL 23578-143

## (undated) DEVICE — GLOVE BIOGEL PI ORTHOPRO SZ 7.5 47675

## (undated) DEVICE — ENDO CAP AND TUBING STERILE FOR ENDOGATOR  100130

## (undated) DEVICE — PREP POVIDONE-IODINE 10% SOLUTION 4OZ BOTTLE MDS093944

## (undated) DEVICE — DRSG KERLIX 4 1/2"X4YDS ROLL 6715

## (undated) DEVICE — ESU GROUND PAD ADULT REM W/15' CORD E7507DB

## (undated) DEVICE — CUFF TOURN 18IN STRL DISP

## (undated) DEVICE — BNDG ELASTIC 6"X5YDS UNSTERILE 6611-60

## (undated) DEVICE — DRSG GAUZE 4X4" TRAY 6939

## (undated) DEVICE — BNDG KLING 4" 2236

## (undated) DEVICE — GOWN LG DISP 9515

## (undated) DEVICE — SOL NACL 0.9% IRRIG 1000ML BOTTLE 2F7124

## (undated) DEVICE — SONICVAC & TUBE SET MISONIX NEXUS 120-31-13X2

## (undated) DEVICE — SUCTION MANIFOLD NEPTUNE 2 SYS 1 PORT 702-025-000

## (undated) DEVICE — DEFENDO

## (undated) DEVICE — ESU PENCIL SMOKE EVAC W/ROCKER SWITCH 0703-047-000

## (undated) DEVICE — BONE CLEANING TIP INTERPULSE  0210-010-000

## (undated) DEVICE — PACK ENDOSCOPY GI CUSTOM UMMC

## (undated) DEVICE — BITE BLOCK ENDO W/DENTAL RIM 54FR SBT-114-100

## (undated) DEVICE — PREP POVIDONE-IODINE 7.5% SCRUB 4OZ BOTTLE MDS093945

## (undated) DEVICE — NEEDLE BIOPSY ECHOTIP ACUCORE 22 GA ULTRASOUND G59746

## (undated) DEVICE — DRSG ADAPTIC 3X8" 6113

## (undated) DEVICE — GLOVE BIOGEL PI INDICATOR 8.0 LF 41680

## (undated) DEVICE — ENDO TUBING CO2 SMARTCAP STERILE DISP 100145CO2EXT

## (undated) DEVICE — SUCTION MANIFOLD NEPTUNE 2 SYS 4 PORT 0702-020-000

## (undated) RX ORDER — CEFAZOLIN SODIUM/WATER 2 G/20 ML
SYRINGE (ML) INTRAVENOUS
Status: DISPENSED
Start: 2025-01-16

## (undated) RX ORDER — HEPARIN SODIUM (PORCINE) LOCK FLUSH IV SOLN 100 UNIT/ML 100 UNIT/ML
SOLUTION INTRAVENOUS
Status: DISPENSED
Start: 2025-01-16

## (undated) RX ORDER — BUPIVACAINE HYDROCHLORIDE 5 MG/ML
INJECTION, SOLUTION EPIDURAL; INTRACAUDAL
Status: DISPENSED
Start: 2025-01-27

## (undated) RX ORDER — ACETAMINOPHEN 325 MG/1
TABLET ORAL
Status: DISPENSED
Start: 2025-01-16

## (undated) RX ORDER — LIDOCAINE HYDROCHLORIDE 10 MG/ML
INJECTION, SOLUTION INFILTRATION; PERINEURAL
Status: DISPENSED
Start: 2025-02-08

## (undated) RX ORDER — CEFAZOLIN SODIUM 1 G/3ML
INJECTION, POWDER, FOR SOLUTION INTRAMUSCULAR; INTRAVENOUS
Status: DISPENSED
Start: 2024-10-24

## (undated) RX ORDER — FENTANYL CITRATE 50 UG/ML
INJECTION, SOLUTION INTRAMUSCULAR; INTRAVENOUS
Status: DISPENSED
Start: 2025-02-08

## (undated) RX ORDER — LIDOCAINE HYDROCHLORIDE 10 MG/ML
INJECTION, SOLUTION INFILTRATION; PERINEURAL
Status: DISPENSED
Start: 2024-12-26

## (undated) RX ORDER — FENTANYL CITRATE 50 UG/ML
INJECTION, SOLUTION INTRAMUSCULAR; INTRAVENOUS
Status: DISPENSED
Start: 2025-01-16

## (undated) RX ORDER — ONDANSETRON 2 MG/ML
INJECTION INTRAMUSCULAR; INTRAVENOUS
Status: DISPENSED
Start: 2024-10-24

## (undated) RX ORDER — PROPOFOL 10 MG/ML
INJECTION, EMULSION INTRAVENOUS
Status: DISPENSED
Start: 2024-10-24

## (undated) RX ORDER — ONDANSETRON 2 MG/ML
INJECTION INTRAMUSCULAR; INTRAVENOUS
Status: DISPENSED
Start: 2025-01-27

## (undated) RX ORDER — FENTANYL CITRATE-0.9 % NACL/PF 10 MCG/ML
PLASTIC BAG, INJECTION (ML) INTRAVENOUS
Status: DISPENSED
Start: 2025-01-27

## (undated) RX ORDER — LIDOCAINE HYDROCHLORIDE 10 MG/ML
INJECTION, SOLUTION EPIDURAL; INFILTRATION; INTRACAUDAL; PERINEURAL
Status: DISPENSED
Start: 2024-10-24

## (undated) RX ORDER — LIDOCAINE HYDROCHLORIDE 10 MG/ML
INJECTION, SOLUTION EPIDURAL; INFILTRATION; INTRACAUDAL; PERINEURAL
Status: DISPENSED
Start: 2025-01-27

## (undated) RX ORDER — LIDOCAINE HYDROCHLORIDE AND EPINEPHRINE 10; 10 MG/ML; UG/ML
INJECTION, SOLUTION INFILTRATION; PERINEURAL
Status: DISPENSED
Start: 2025-01-16

## (undated) RX ORDER — LIDOCAINE HYDROCHLORIDE 10 MG/ML
INJECTION, SOLUTION INFILTRATION; PERINEURAL
Status: DISPENSED
Start: 2025-01-16

## (undated) RX ORDER — FENTANYL CITRATE 50 UG/ML
INJECTION, SOLUTION INTRAMUSCULAR; INTRAVENOUS
Status: DISPENSED
Start: 2024-12-26